# Patient Record
Sex: FEMALE | Race: WHITE | NOT HISPANIC OR LATINO | Employment: OTHER | URBAN - METROPOLITAN AREA
[De-identification: names, ages, dates, MRNs, and addresses within clinical notes are randomized per-mention and may not be internally consistent; named-entity substitution may affect disease eponyms.]

---

## 2017-04-14 DIAGNOSIS — I10 BENIGN ESSENTIAL HYPERTENSION: ICD-10-CM

## 2017-04-14 RX ORDER — LOSARTAN POTASSIUM 50 MG/1
50 TABLET ORAL DAILY
Qty: 30 TABLET | Refills: 0 | Status: SHIPPED | OUTPATIENT
Start: 2017-04-14 | End: 2018-02-27

## 2017-04-14 NOTE — TELEPHONE ENCOUNTER
losartan     Last Written Prescription Date: 10/25/2016  Last Fill Quantity: 90, # refills: 1  [place] [facility] [place] [facility] or [facility] prescribing provider: 11/16/2016       Potassium   Date Value Ref Range Status   04/28/2016 4.4 3.4 - 5.3 mmol/L Final     Creatinine   Date Value Ref Range Status   04/28/2016 0.85 0.52 - 1.04 mg/dL Final     BP Readings from Last 3 Encounters:   12/12/16 139/81   11/16/16 138/61   11/14/16 141/85

## 2017-05-23 DIAGNOSIS — J45.50 UNCOMPLICATED SEVERE PERSISTENT ASTHMA (H): ICD-10-CM

## 2017-05-23 RX ORDER — FLUTICASONE PROPIONATE AND SALMETEROL XINAFOATE 230; 21 UG/1; UG/1
2 AEROSOL, METERED RESPIRATORY (INHALATION) 2 TIMES DAILY
Qty: 1 INHALER | Refills: 0 | Status: SHIPPED | OUTPATIENT
Start: 2017-05-23 | End: 2017-05-26

## 2017-05-23 RX ORDER — TIOTROPIUM BROMIDE 18 UG/1
CAPSULE ORAL; RESPIRATORY (INHALATION)
Qty: 30 CAPSULE | Refills: 0 | Status: SHIPPED | OUTPATIENT
Start: 2017-05-23 | End: 2017-05-26

## 2017-05-23 NOTE — TELEPHONE ENCOUNTER
The patient was seen 5 months ago and recommended follow-up in 10 weeks.  She needs Advair 230/21 mcg 2 puffs twice a day.   250/50/21 is what Kasey picked up from 230/21 and I haven't noticed that. Unusual, but I had same before.  Provided one refill of each, but otherwise, she needs an appointment to continue refilling the medicine.  If not, she may get the refills from PCP.

## 2017-05-23 NOTE — TELEPHONE ENCOUNTER
"Last OV with Dr. Orozcoerea 12/12/16.  Received refill requests for Advair 230 and Spiriva.    Per 12/12/16 OV note: \"Start Advair 250/50/21  g 2 puffs twice a day.   -Start Spiriva once a day.\"    1) please clarify which Advair patient should be taking.  Advair 250 diskus or Advair 230?  2) per OV notes, patient should've followed up in 10 weeks.  No appointment currently scheduled.  Ok to refill x 1 and make appointment?    Please advise for refill requests.  Quiana Segovia RN    "

## 2017-05-24 NOTE — TELEPHONE ENCOUNTER
Called and spoke with pt regarding refill and appointment. Informed of refill x 1 until appointment.   Appointment made for follow up on 5/26/17. No further questions or concerns.   Praveena VALE RN  Specialty [facility]

## 2017-05-26 ENCOUNTER — OFFICE VISIT (OUTPATIENT)
Dept: ALLERGY | Facility: CLINIC | Age: 62
End: 2017-05-26
Payer: COMMERCIAL

## 2017-05-26 VITALS
DIASTOLIC BLOOD PRESSURE: 82 MMHG | BODY MASS INDEX: 35.26 KG/M2 | OXYGEN SATURATION: 94 % | HEIGHT: 65 IN | HEART RATE: 93 BPM | WEIGHT: 211.64 LBS | SYSTOLIC BLOOD PRESSURE: 131 MMHG

## 2017-05-26 DIAGNOSIS — J45.50 UNCOMPLICATED SEVERE PERSISTENT ASTHMA (H): Primary | ICD-10-CM

## 2017-05-26 DIAGNOSIS — J31.0 CHRONIC RHINITIS: ICD-10-CM

## 2017-05-26 LAB
FEF 25/75: NORMAL
FEV-1: NORMAL
FEV1/FVC: NORMAL
FVC: NORMAL

## 2017-05-26 PROCEDURE — 99213 OFFICE O/P EST LOW 20 MIN: CPT | Mod: 25 | Performed by: ALLERGY & IMMUNOLOGY

## 2017-05-26 PROCEDURE — A4627 SPACER BAG/RESERVOIR: HCPCS | Performed by: ALLERGY & IMMUNOLOGY

## 2017-05-26 PROCEDURE — 94010 BREATHING CAPACITY TEST: CPT | Performed by: ALLERGY & IMMUNOLOGY

## 2017-05-26 RX ORDER — FLUTICASONE PROPIONATE AND SALMETEROL XINAFOATE 230; 21 UG/1; UG/1
2 AEROSOL, METERED RESPIRATORY (INHALATION) 2 TIMES DAILY
Qty: 1 INHALER | Refills: 3 | Status: SHIPPED | OUTPATIENT
Start: 2017-05-26 | End: 2017-10-23

## 2017-05-26 RX ORDER — TIOTROPIUM BROMIDE 18 UG/1
CAPSULE ORAL; RESPIRATORY (INHALATION)
Qty: 30 CAPSULE | Refills: 3 | Status: SHIPPED | OUTPATIENT
Start: 2017-05-26 | End: 2017-10-23

## 2017-05-26 ASSESSMENT — ENCOUNTER SYMPTOMS
CHEST TIGHTNESS: 0
EYE DISCHARGE: 0
SHORTNESS OF BREATH: 0
MYALGIAS: 0
RHINORRHEA: 0
NAUSEA: 0
DIARRHEA: 0
WHEEZING: 0
SINUS PRESSURE: 0
EYE ITCHING: 0
FACIAL SWELLING: 0
COUGH: 1
ACTIVITY CHANGE: 0
EYE REDNESS: 0
ARTHRALGIAS: 0
HEADACHES: 0
ADENOPATHY: 0
FEVER: 0
CHILLS: 0
VOMITING: 0

## 2017-05-26 NOTE — MR AVS SNAPSHOT
"              After Visit Summary   5/26/2017    Luz Elena Cristina    MRN: [id number]           Patient Information     Date Of Birth          1955        Visit Information        Provider Department      5/26/2017 11:00 AM Galindo Montano MD [facility]        Today's Diagnoses     Uncomplicated severe persistent asthma    -  1    Chronic rhinitis           Follow-ups after your visit        Follow-up notes from your care team     Return in about 4 months (around 9/26/2017) for rhinitis follow up, asthma follow up.      Who to contact     If you have questions or need follow up information about today's clinic visit or your schedule please contact [facility] directly at [phone].  Normal or non-critical lab and imaging results will be communicated to you by MyChart, letter or phone within 4 business days after the clinic has received the results. If you do not hear from us within 7 days, please contact the clinic through [facility] or phone. If you have a critical or abnormal lab result, we will notify you by phone as soon as possible.  Submit refill requests through [employer] or call your pharmacy and they will forward the refill request to us. Please allow 3 business days for your refill to be completed.          Additional Information About Your Visit        MyChart Information     [employer] gives you secure access to your electronic health record. If you see a primary care provider, you can also send messages to your care team and make appointments. If you have questions, please call your primary care clinic.  If you do not have a primary care provider, please call [phone] and they will assist you.        Care EveryWhere ID     This is your Care EveryWhere ID. This could be used by other organizations to access your [facility] medical records  [id number]        Your Vitals Were     Pulse Height Pulse Oximetry BMI (Body Mass Index)          93 5' 5\" (1.651 m) 94% 35.22 kg/m2      "    Blood Pressure from Last 3 Encounters:   05/26/17 131/82   12/12/16 139/81   11/16/16 138/61    Weight from Last 3 Encounters:   05/26/17 211 lb 10.3 oz (96 kg)   12/12/16 238 lb 9.6 oz (108.2 kg)   11/14/16 234 lb (106.1 kg)              We Performed the Following     OPTICHAMBER     Spirometry, Breathing Capacity          Today's Medication Changes          These changes are accurate as of: 5/26/17 12:07 PM.  If you have any questions, ask your nurse or doctor.               Stop taking these medicines if you haven't already. Please contact your care team if you have questions.     mometasone-formoterol 200-5 MCG/ACT oral inhaler   Commonly known as:  DULERA   Stopped by:  Galindo Montano MD                Where to get your medicines      [facility] [address] [place] [address] AT [facility] OF [address] [place] [address]  [address] [facility] [address]     Phone:  [phone]     fluticasone-salmeterol 230-21 MCG/ACT inhaler    tiotropium 18 MCG capsule                Primary Care Provider Office Phone # Fax #    Merari KALI Robin [phone]       [place] [address] [facility] [address]        Thank you!     Thank you for choosing [facility]  for your care. Our goal is always to provide you with excellent care. Hearing back from our patients is one way we can continue to improve our services. Please take a few minutes to complete the written survey that you may receive in the mail after your visit with us. Thank you!             Your Updated Medication List - Protect others around you: Learn how to safely use, store and throw away your medicines at www.disposemymeds.org.          This list is accurate as of: 5/26/17 12:07 PM.  Always use your most recent med list.                   Brand Name Dispense Instructions for use    ACCU-CHEK MARILUZ PLUS test strip   Generic drug:  blood glucose monitoring           * albuterol 108 (90 BASE) MCG/ACT Inhaler    PROAIR HFA/PROVENTIL HFA/VENTOLIN HFA    3 Inhaler    Inhale 2 puffs into the lungs every 6 hours as needed for shortness of breath / dyspnea or wheezing       * albuterol (2.5 MG/3ML) 0.083% neb solution     50 vial    Take 1 vial (2.5 mg) by nebulization every 4 hours as needed for shortness of breath / dyspnea, wheezing or other (chest tightness, persistent cough)       azelastine 0.1 % spray    ASTELIN    30 mL    Spray 2 sprays into both nostrils 2 times daily as needed for rhinitis or allergies       blood glucose monitoring meter device kit          calcipotriene 0.005 % cream    DOVONOX    60 g    Apply topically 2 times daily       * clobetasol 0.05 % ointment    TEMOVATE         * clobetasol 0.05 % cream    TEMOVATE    30 g    Apply sparingly to affected area twice weekly as needed.  Do not apply to face.       fluticasone-salmeterol 230-21 MCG/ACT inhaler    ADVAIR-HFA    1 Inhaler    Inhale 2 puffs into the lungs 2 times daily       losartan 50 MG tablet    COZAAR    30 tablet    Take 1 tablet (50 mg) by mouth daily DUE FOR APPT/ FASTING LAB PRIOR TO FURTHER REFILL       nebulizer/tubing/mouthpiece Kit     1 kit    Use with albuterol neb solution       tiotropium 18 MCG capsule    SPIRIVA HANDIHALER    30 capsule    Inhale contents of one capsule daily.       * Notice:  This list has 4 medication(s) that are the same as other medications prescribed for you. Read the directions carefully, and ask your doctor or other care provider to review them with you.

## 2017-05-26 NOTE — PROGRESS NOTES
"SUBJECTIVE:                                                               Luz Elena Cristina presents today to our Allergy Clinic at [facility] for a follow up visit.  As you know, she is a 62 year old female with severe persistent asthma and chronic rhinitis.  Regarding her asthma, she has been 7/7 days compliant with Advair 230/21 mcg 2 puffs twice a day and Spiriva daily. The patient states that she \"feels one million ways better\". It has been going on for the last 6 months since she was started on LABA/ICS.  Sleeps at night well pretty much without waking up at night. Or maybe once a month. She is to use albuterol less than twice a week.  The patient denies current chest tightness, cough, wheezing or shortness of breath. She uses Advair and albuterol with chamber device; however, she asks for one more.    In regards to postnasal drip and intermittent nasal congestion, she ahs using azelastine on as needed basis which she finds very helpful. In the past, serum IgE for regional aeroallergen panel was negative.    Patient Active Problem List   Diagnosis     Severe persistent asthma     Lichen sclerosus     Benign essential hypertension     Elevated glucose     Family history of diabetes mellitus     Severe persistent asthma with (acute) exacerbation       Past Medical History:   Diagnosis Date     Uncomplicated asthma       Problem (# of Occurrences) Relation (Name,Age of Onset)    Alzheimer Disease (1) Brother: early onset    Aneurysm (1) Paternal Grandmother    Breast Cancer (2) Sister, Paternal Aunt    CEREBROVASCULAR DISEASE (1) Father: CVA    Chronic Obstructive Pulmonary Disease (1) Father    DIABETES (2) Mother, Son    Other Cancer (1) Mother: panreatic    Skin Cancer (1) Sister    Unknown/Adopted (1) Paternal Grandfather        Past Surgical History:   Procedure Laterality Date     TONSILLECTOMY       Social History     Social History     Marital status:      Spouse name: N/A     " Number of children: N/A     Years of education: N/A     Social History Main Topics     Smoking status: Former Smoker     Quit date: 4/28/1987     Smokeless tobacco: None     Alcohol use None     Drug use: None     Sexual activity: Not Asked     Other Topics Concern     None     Social History Narrative    ENVIRONMENTAL HISTORY: [place] The home is heated with a forced air but does not use.  Has floor heating. They do have central air conditioning. The patient's bedroom is furnished with carpeting in bedroom.  No pets inside the house. But is exposed to chicken. There is not history of cockroach or mice infestation. There are no smokers in the house.  The house does not have a damp basement.                Review of Systems   Constitutional: Negative for activity change, chills and fever.   HENT: Negative for congestion, ear discharge, facial swelling, nosebleeds, postnasal drip, rhinorrhea, sinus pressure and sneezing.    Eyes: Negative for discharge, redness and itching.   Respiratory: Positive for cough. Negative for chest tightness, shortness of breath and wheezing.    Cardiovascular: Negative for chest pain.   Gastrointestinal: Negative for diarrhea, nausea and vomiting.   Musculoskeletal: Negative for arthralgias and myalgias.   Skin: Negative for rash.   Allergic/Immunologic: Positive for environmental allergies.   Neurological: Negative for headaches.   Hematological: Negative for adenopathy.   Psychiatric/Behavioral: Negative for behavioral problems and self-injury.           Current Outpatient Prescriptions:      tiotropium (SPIRIVA HANDIHALER) 18 MCG capsule, Inhale contents of one capsule daily., Disp: 30 capsule, Rfl: 3     fluticasone-salmeterol (ADVAIR-HFA) 230-21 MCG/ACT inhaler, Inhale 2 puffs into the lungs 2 times daily, Disp: 1 Inhaler, Rfl: 3     losartan (COZAAR) 50 MG tablet, Take 1 tablet (50 mg) by mouth daily DUE FOR APPT/ FASTING LAB PRIOR TO  "FURTHER REFILL, Disp: 30 tablet, Rfl: 0     azelastine (ASTELIN) 0.1 % spray, Spray 2 sprays into both nostrils 2 times daily as needed for rhinitis or allergies, Disp: 30 mL, Rfl: 3     albuterol (2.5 MG/3ML) 0.083% nebulizer solution, Take 1 vial (2.5 mg) by nebulization every 4 hours as needed for shortness of breath / dyspnea, wheezing or other (chest tightness, persistent cough), Disp: 50 vial, Rfl: 1     Respiratory Therapy Supplies (NEBULIZER/TUBING/MOUTHPIECE) KIT, Use with albuterol neb solution, Disp: 1 kit, Rfl: 0     clobetasol (TEMOVATE) 0.05 % cream, Apply sparingly to affected area twice weekly as needed.  Do not apply to face., Disp: 30 g, Rfl: 0     calcipotriene (DOVONOX) 0.005 % cream, Apply topically 2 times daily, Disp: 60 g, Rfl: 3     blood glucose monitoring (ACCU-CHEK MARILUZ PLUS) meter device kit, , Disp: , Rfl: 0     clobetasol (TEMOVATE) 0.05 % ointment, , Disp: , Rfl: 0     ACCU-CHEK MARILUZ PLUS test strip, , Disp: , Rfl: 0     albuterol (PROAIR HFA, PROVENTIL HFA, VENTOLIN HFA) 108 (90 BASE) MCG/ACT inhaler, Inhale 2 puffs into the lungs every 6 hours as needed for shortness of breath / dyspnea or wheezing, Disp: 3 Inhaler, Rfl: 1     [DISCONTINUED] fluticasone-salmeterol (ADVAIR-HFA) 230-21 MCG/ACT inhaler, Inhale 2 puffs into the lungs 2 times daily, Disp: 1 Inhaler, Rfl: 0     [DISCONTINUED] tiotropium (SPIRIVA HANDIHALER) 18 MCG capsule, Inhale contents of one capsule daily., Disp: 30 capsule, Rfl: 0  Immunization History   Administered Date(s) Administered     Influenza (IIV3) 11/08/2012     Influenza Vaccine IM 3yrs+ 4 Valent IIV4 12/12/2016     TDAP Vaccine (Adacel) 11/08/2012     Allergies   Allergen Reactions     Sulfa Drugs Rash     OBJECTIVE:                                                                 /82 (BP Location: Right arm, Patient Position: Chair, Cuff Size: Adult Regular)  Pulse 93  Ht 5' 5\" (1.651 m)  Wt 211 lb 10.3 oz (96 kg)  SpO2 94%  BMI 35.22 " kg/m2        Physical Exam   Constitutional: No distress.   HENT:   Head: Normocephalic and atraumatic.   Right Ear: Tympanic membrane, external ear and ear canal normal.   Left Ear: Tympanic membrane, external ear and ear canal normal.   Nose: No mucosal edema or rhinorrhea.   Mouth/Throat: Oropharynx is clear and moist and mucous membranes are normal.   Eyes: Conjunctivae are normal. Right eye exhibits no discharge. Left eye exhibits no discharge.   Neck: Normal range of motion.   Cardiovascular: Normal rate, regular rhythm and normal heart sounds.    No murmur heard.  Pulmonary/Chest: Effort normal and breath sounds normal. No respiratory distress. She has no wheezes. She has no rales.   Musculoskeletal: Normal range of motion.   Neurological: She is alert.   Skin: Skin is warm. No rash noted. She is not diaphoretic.   Psychiatric: Affect normal.   Nursing note and vitals reviewed.      WORKUP:     SPIROMETRY       FVC 3.00L (95% of predicted).     FEV1 1.60L (64% of predicted).     FEV1/FVC 53%     FEF 25%-75%  0.81L/s (37% of predicted).    The office spirometry performed today suggests moderate obstruction. Stable comparing with previous study.    Asthma Control Test (ACT) total score: 22      ASSESSMENT/PLAN:      Problem List Items Addressed This Visit        Respiratory    1. Severe persistent asthma  -  Primary  Currently well controlled with Advair 230/21 mcg 2 puffs twice a day and Spiriva daily.  -Continue as is.  Asthma action plan reviewed again and provided. No changes.    Relevant Medications    tiotropium (SPIRIVA HANDIHALER) 18 MCG capsule    fluticasone-salmeterol (ADVAIR-HFA) 230-21 MCG/ACT inhaler    Other Relevant Orders    Spirometry, Breathing Capacity    OPTICHAMBER (Completed)      Other Visit Diagnoses     2. Chronic rhinitis    Currently well controlled with azelastine 2 sprays in each nostril twice a day as needed.  -Continue as is.       Follow up in 4 months or sooner if  needed.    Thank you for allowing us to participate in the care of this patient. Please feel free to contact us if there are any questions or concerns about the patient.    Disclaimer: This note consists of symbols derived from keyboarding, dictation and/or voice recognition software. As a result, there may be errors in the script that have gone undetected. Please consider this when interpreting information found in this chart.    Galindo Montano MD, FACI  Allergy, Asthma and Immunology  [place], [place] and [place]

## 2017-05-26 NOTE — Clinical Note
Dear Dr. Robin  The patient was seen in Allergy Clinic for a follow up visit  Please see the office visit note for more details. Thank you!

## 2017-05-26 NOTE — NURSING NOTE
"Chief Complaint   Patient presents with     Asthma Recheck     Asthma follow up. Needs refills.        Initial /82 (BP Location: Right arm, Patient Position: Chair, Cuff Size: Adult Regular)  Pulse 93  Ht 5' 5\" (1.651 m)  Wt 211 lb 10.3 oz (96 kg)  SpO2 94%  BMI 35.22 kg/m2 Estimated body mass index is 35.22 kg/(m^2) as calculated from the following:    Height as of this encounter: 5' 5\" (1.651 m).    Weight as of this encounter: 211 lb 10.3 oz (96 kg).  Medication Reconciliation: complete    "

## 2017-05-26 NOTE — LETTER
My Asthma Action Plan  Name: Luz Elena Cristina   YOB: 1955  Date: 5/26/2017  My doctor: Merari Robin   My clinic: [facility]      My Control Medicine: Fluticasone+salmeterol (Advair) -  /21 mcg        Dose: 2 puffs twice a day  Spiriva once a day  My Rescue Medicine: Albuterol (Proair/Ventolin/Proventil) HFA        Dose: 2-4 puffs every 4 hrs as needed   My Asthma Severity: severe persistent  Avoid your asthma triggers: smoke, upper respiratory infections, humidity, exercise or sports and cold air        GREEN ZONE   Good Control    I feel good    No cough or wheeze    Can work, sleep and play without asthma symptoms       Take your asthma control medicine every day.     1. If exercise triggers your asthma, take your rescue medication    15 minutes before exercise or sports, and    During exercise if you have asthma symptoms  2. Spacer to use with inhaler: If you have a spacer, make sure to use it with your inhaler             YELLOW ZONE Getting Worse  I have ANY of these:    I do not feel good    Cough or wheeze    Chest feels tight    Wake up at night   1. Keep taking your Green Zone medications  2. Start taking your rescue medicine:    every 20 minutes for up to 1 hour. Then every 4 hours for 24-48 hours.  3. If you stay in the Yellow Zone for more than 12-24 hours, contact your doctor.             RED ZONE Medical Alert - Get Help  I have ANY of these:    I feel awful    Medicine is not helping    Breathing getting harder    Trouble walking or talking    Nose opens wide to breathe       1. Take your rescue medicine NOW  2. If your provider has prescribed an oral steroid medicine, start taking it NOW  3. Call your doctor NOW  4. If you are still in the Red Zone after 20 minutes and you have not reached your doctor:    Take your rescue medicine again and    Call 911 or go to the emergency room right away    See your regular doctor within 2 weeks of an Emergency Room or Urgent  Care visit for follow-up treatment.        The above medication may be given at school or day care?: N/A (Adult Patient)  Child can carry and use inhaler(s) at school with approval of school nurse?: N/A (Adult Patient)    Electronically signed by: Galindo Montano, 5/26/2017      Annual Reminders:  Meet with Asthma Educator,  Flu Shot in the Fall, consider Pneumonia Vaccination for patients with asthma (aged 19 and older).    Pharmacy: [email or web address] [address] [place] [address] AVE AT [address]                    Asthma Triggers  How To Control Things That Make Your Asthma Worse    Triggers are things that make your asthma worse.  Look at the list below to help you find your triggers and what you can do about them.  You can help prevent asthma flare-ups by staying away from your triggers.      Trigger                                                          What you can do   Cigarette Smoke  Tobacco smoke can make asthma worse. Do not allow smoking in your home, car or around you.  Be sure no one smokes at a child s day care or school.  If you smoke, ask your health care provider for ways to help you quit.  Ask family members to quit too.  Ask your health care provider for a referral to Quit Plan to help you quit smoking, or call 3-540-178-PLAN.     Colds, Flu, Bronchitis  These are common triggers of asthma. Wash your hands often.  Don t touch your eyes, nose or mouth.  Get a flu shot every year.     Dust Mites  These are tiny bugs that live in cloth or carpet. They are too small to see. Wash sheets and blankets in hot water every week.   Encase pillows and mattress in dust mite proof covers.  Avoid having carpet if you can. If you have carpet, vacuum weekly.   Use a dust mask and HEPA vacuum.   Pollen and Outdoor Mold  Some people are allergic to trees, grass, or weed pollen, or molds. Try to keep your windows closed.  Limit time out doors when pollen count is high.   Ask you health  care provider about taking medicine during allergy season.     Animal Dander  Some people are allergic to skin flakes, urine or saliva from pets with fur or feathers. Keep pets with fur or feathers out of your home.    If you can t keep the pet outdoors, then keep the pet out of your bedroom.  Keep the bedroom door closed.  Keep pets off cloth furniture and away from stuffed toys.     Mice, Rats, and Cockroaches  Some people are allergic to the waste from these pests.   Cover food and garbage.  Clean up spills and food crumbs.  Store grease in the refrigerator.   Keep food out of the bedroom.   Indoor Mold  This can be a trigger if your home has high moisture. Fix leaking faucets, pipes, or other sources of water.   Clean moldy surfaces.  Dehumidify basement if it is damp and smelly.   Smoke, Strong Odors, and Sprays  These can reduce air quality. Stay away from strong odors and sprays, such as perfume, powder, hair spray, paints, smoke incense, paint, cleaning products, candles and new carpet.   Exercise or Sports  Some people with asthma have this trigger. Be active!  Ask your doctor about taking medicine before sports or exercise to prevent symptoms.    Warm up for 5-10 minutes before and after sports or exercise.     Other Triggers of Asthma  Cold air:  Cover your nose and mouth with a scarf.  Sometimes laughing or crying can be a trigger.  Some medicines and food can trigger asthma.

## 2017-05-27 ASSESSMENT — ASTHMA QUESTIONNAIRES: ACT_TOTALSCORE: 22

## 2017-07-13 ENCOUNTER — TELEPHONE (OUTPATIENT)
Dept: ALLERGY | Facility: CLINIC | Age: 62
End: 2017-07-13

## 2017-07-13 NOTE — TELEPHONE ENCOUNTER
Reason for Call:  Other call back    Detailed comments: pt calling stating she had a cold and some phlegm . This morning she coughed a couple times and coughed up some blood. Not sure what it could be from and if it is something she should be concerned about or be seen for?     Phone Number Patient can be reached at: Cell number on file:    Telephone Information:   Mobile [phone]       Best Time: any     Can we leave a detailed message on this number? YES    Call taken on 7/13/2017 at 11:19 AM by Martha Sharif

## 2017-07-13 NOTE — TELEPHONE ENCOUNTER
Spoke with patient and she states she's been battling a cold with a lot of heavy mucus since the end of June.  Denies fevers.  Denies SOB, wheezing, or chest tightness.  Feels that since she saw Dr. Montano, her asthma is well controlled.  She has been using mucinex for the phlegm.  States it was greenish in color but then turned white and now clear.  This morning she coughed, not even that hard, and there was bright red blood about the size of a 50 cent piece mixed in with clear mucus.  Patient states this was the only incident.  Has not been coughing since this morning.  Overall, feels fine.  Feels like she is at the tail end of this cold.    Patient had not used any albuterol during illness.  Encouraged her to use albuterol neb treatments prn.  This can help to open airways and help to clear the phlegm. Patient will try this and if she coughs again, will observe for any blood again.  Told patient I would still run this concern past Dr. Montano and would only call back if he has anything to add.  Quiana Segovia RN

## 2017-07-14 NOTE — TELEPHONE ENCOUNTER
If more sounds like she had a bronchitis episode. Usually, asthma is not associated with hemoptysis. If she develops again, I recommend to discuss that up with PCP, and consider Pulmonology evaluation.

## 2017-08-25 ENCOUNTER — OFFICE VISIT (OUTPATIENT)
Dept: FAMILY MEDICINE | Facility: CLINIC | Age: 62
End: 2017-08-25
Payer: COMMERCIAL

## 2017-08-25 VITALS
DIASTOLIC BLOOD PRESSURE: 76 MMHG | TEMPERATURE: 98.1 F | OXYGEN SATURATION: 95 % | SYSTOLIC BLOOD PRESSURE: 130 MMHG | HEIGHT: 65 IN | WEIGHT: 213.4 LBS | BODY MASS INDEX: 35.56 KG/M2 | HEART RATE: 81 BPM

## 2017-08-25 DIAGNOSIS — H10.32 ACUTE BACTERIAL CONJUNCTIVITIS OF LEFT EYE: Primary | ICD-10-CM

## 2017-08-25 PROCEDURE — 99213 OFFICE O/P EST LOW 20 MIN: CPT | Performed by: PHYSICIAN ASSISTANT

## 2017-08-25 RX ORDER — ERYTHROMYCIN 5 MG/G
0.25 OINTMENT OPHTHALMIC 3 TIMES DAILY
Qty: 1 G | Refills: 0 | Status: SHIPPED | OUTPATIENT
Start: 2017-08-25 | End: 2017-09-01

## 2017-08-25 NOTE — MR AVS SNAPSHOT
"              After Visit Summary   8/25/2017    Luz Elena Cristina    MRN: [id number]           Patient Information     Date Of Birth          1955        Visit Information        Provider Department      8/25/2017 4:40 PM Balta Barbour PA-C [facility]        Today's Diagnoses     Acute bacterial conjunctivitis of left eye    -  1       Follow-ups after your visit        Who to contact     If you have questions or need follow up information about today's clinic visit or your schedule please contact [facility] directly at [phone].  Normal or non-critical lab and imaging results will be communicated to you by Feedbookshart, letter or phone within 4 business days after the clinic has received the results. If you do not hear from us within 7 days, please contact the clinic through RadarFindt or phone. If you have a critical or abnormal lab result, we will notify you by phone as soon as possible.  Submit refill requests through MaxVision or call your pharmacy and they will forward the refill request to us. Please allow 3 business days for your refill to be completed.          Additional Information About Your Visit        MyChart Information     MaxVision gives you secure access to your electronic health record. If you see a primary care provider, you can also send messages to your care team and make appointments. If you have questions, please call your primary care clinic.  If you do not have a primary care provider, please call [phone] and they will assist you.        Care EveryWhere ID     This is your Care EveryWhere ID. This could be used by other organizations to access your [place] medical records  [id number]        Your Vitals Were     Pulse Temperature Height Pulse Oximetry BMI (Body Mass Index)       81 98.1  F (36.7  C) (Tympanic) 5' 5\" (1.651 m) 95% 35.51 kg/m2        Blood Pressure from Last 3 Encounters:   08/25/17 130/76   05/26/17 131/82   12/12/16 139/81    " Weight from Last 3 Encounters:   08/25/17 213 lb 6.4 oz (96.8 kg)   05/26/17 211 lb 10.3 oz (96 kg)   12/12/16 238 lb 9.6 oz (108.2 kg)              Today, you had the following     No orders found for display         Today's Medication Changes          These changes are accurate as of: 8/25/17 11:59 PM.  If you have any questions, ask your nurse or doctor.               Start taking these medicines.        Dose/Directions    erythromycin ophthalmic ointment   Commonly known as:  ROMYCIN   Used for:  Acute bacterial conjunctivitis of left eye   Started by:  Balta Barbour PA-C        Dose:  0.25 inch   Place 0.25 inches Into the left eye 3 times daily for 7 days   Quantity:  1 g   Refills:  0            Where to get your medicines      These medications were sent to [facility] [id number] [place] [address] AT [facility] OF [address] [place] [address]  [address] [facility] [address]     Phone:  [phone]     erythromycin ophthalmic ointment                Primary Care Provider Office Phone # Fax #    Merari KALI Robin [phone]       [address] [place] [address]        Equal Access to Services     KEKE MONTEJO AH: Hadii titus waddell hadasho Soomaali, waaxda luqadaha, qaybta kaalmada adeegyada, lulu torres. So [place] [phone].    ATENCIÓN: Si habla español, tiene a arriaza disposición servicios gratuitos de asistencia lingüística. Llame al [phone].    We comply with applicable federal civil rights laws and [place] laws. We do not discriminate on the basis of race, color, national origin, age, disability sex, sexual orientation or gender identity.            Thank you!     Thank you for choosing [facility]  for your care. Our goal is always to provide you with excellent care. Hearing back from our patients is one way we can continue to improve our services. Please take a few minutes to complete the written survey that  you may receive in the mail after your visit with us. Thank you!             Your Updated Medication List - Protect others around you: Learn how to safely use, store and throw away your medicines at www.disposemymeds.org.          This list is accurate as of: 8/25/17 11:59 PM.  Always use your most recent med list.                   Brand Name Dispense Instructions for use Diagnosis    ACCU-CHEK MARILUZ PLUS test strip   Generic drug:  blood glucose monitoring           * albuterol 108 (90 BASE) MCG/ACT Inhaler    PROAIR HFA/PROVENTIL HFA/VENTOLIN HFA    3 Inhaler    Inhale 2 puffs into the lungs every 6 hours as needed for shortness of breath / dyspnea or wheezing    Intermittent asthma, uncomplicated       * albuterol (2.5 MG/3ML) 0.083% neb solution     50 vial    Take 1 vial (2.5 mg) by nebulization every 4 hours as needed for shortness of breath / dyspnea, wheezing or other (chest tightness, persistent cough)    Severe persistent asthma with (acute) exacerbation       azelastine 0.1 % spray    ASTELIN    30 mL    Spray 2 sprays into both nostrils 2 times daily as needed for rhinitis or allergies    Postnasal drip       blood glucose monitoring meter device kit           calcipotriene 0.005 % cream    DOVONOX    60 g    Apply topically 2 times daily    Lichen sclerosus       * clobetasol 0.05 % ointment    TEMOVATE          * clobetasol 0.05 % cream    TEMOVATE    30 g    Apply sparingly to affected area twice weekly as needed.  Do not apply to face.    Lichen sclerosus       erythromycin ophthalmic ointment    ROMYCIN    1 g    Place 0.25 inches Into the left eye 3 times daily for 7 days    Acute bacterial conjunctivitis of left eye       fluticasone-salmeterol 230-21 MCG/ACT inhaler    ADVAIR-HFA    1 Inhaler    Inhale 2 puffs into the lungs 2 times daily    Uncomplicated severe persistent asthma       losartan 50 MG tablet    COZAAR    30 tablet    Take 1 tablet (50 mg) by mouth daily DUE FOR APPT/ FASTING LAB  PRIOR TO FURTHER REFILL    Benign essential hypertension       nebulizer/tubing/mouthpiece Kit     1 kit    Use with albuterol neb solution    Severe persistent asthma with (acute) exacerbation       tiotropium 18 MCG capsule    SPIRIVA HANDIHALER    30 capsule    Inhale contents of one capsule daily.    Uncomplicated severe persistent asthma       * Notice:  This list has 4 medication(s) that are the same as other medications prescribed for you. Read the directions carefully, and ask your doctor or other care provider to review them with you.

## 2017-08-25 NOTE — PROGRESS NOTES
SUBJECTIVE:   Luz Elena Cristina is a 62 year old female who presents to clinic today for the following health issues:      Eye(s) Problem      Duration: 24 hours     Description:  Location: left  Pain: YES  Redness: YES  Discharge: YES- was this morning and was crusted over     Accompanying signs and symptoms: looks water filled, jelly bean in size.     History (Trauma, foreign body exposure,): None    Precipitating or alleviating factors (contact use): None    Therapies tried and outcome: hot and cold compresses     SUBJECTIVE:  Chief Complaint:   Chief Complaint   Patient presents with     Eye Problem     History of Present Illness:  Luz Elena Cristina is a 62 year old female who presents complaining of moderate left eye discharge, mattering, redness for 1 day(s).   Onset/timing: sudden.    Associated Signs and Symptoms: none  Treatment measures tried include: warm packs and cool compresses  Contact wearer : No    Past Medical History:   Diagnosis Date     Uncomplicated asthma      Current Outpatient Prescriptions   Medication Sig Dispense Refill     tiotropium (SPIRIVA HANDIHALER) 18 MCG capsule Inhale contents of one capsule daily. 30 capsule 3     fluticasone-salmeterol (ADVAIR-HFA) 230-21 MCG/ACT inhaler Inhale 2 puffs into the lungs 2 times daily 1 Inhaler 3     losartan (COZAAR) 50 MG tablet Take 1 tablet (50 mg) by mouth daily DUE FOR APPT/ FASTING LAB PRIOR TO FURTHER REFILL 30 tablet 0     albuterol (2.5 MG/3ML) 0.083% nebulizer solution Take 1 vial (2.5 mg) by nebulization every 4 hours as needed for shortness of breath / dyspnea, wheezing or other (chest tightness, persistent cough) 50 vial 1     Respiratory Therapy Supplies (NEBULIZER/TUBING/MOUTHPIECE) KIT Use with albuterol neb solution 1 kit 0     clobetasol (TEMOVATE) 0.05 % cream Apply sparingly to affected area twice weekly as needed.  Do not apply to face. 30 g 0     calcipotriene (DOVONOX) 0.005 % cream Apply topically 2 times daily 60 g 3     blood  "glucose monitoring (ACCU-CHEK MARILUZ PLUS) meter device kit   0     clobetasol (TEMOVATE) 0.05 % ointment   0     ACCU-CHEK MARILUZ PLUS test strip   0     albuterol (PROAIR HFA, PROVENTIL HFA, VENTOLIN HFA) 108 (90 BASE) MCG/ACT inhaler Inhale 2 puffs into the lungs every 6 hours as needed for shortness of breath / dyspnea or wheezing 3 Inhaler 1     azelastine (ASTELIN) 0.1 % spray Spray 2 sprays into both nostrils 2 times daily as needed for rhinitis or allergies (Patient not taking: Reported on 8/25/2017) 30 mL 3        ROS:  Review of systems negative except as stated above.    OBJECTIVE:  /76  Pulse 81  Temp 98.1  F (36.7  C) (Tympanic)  Ht 5' 5\" (1.651 m)  Wt 213 lb 6.4 oz (96.8 kg)  SpO2 95%  BMI 35.51 kg/m2  General: no acute distress  Eye exam: left eye abnormal findings: conjunctivitis with erythema.  Edema and swelling under the left eye  NO vision changes.   Sclerae quiet  Anterior chamber quiet  Ears: normal canals, TMs bilaterally, normal TM mobility  Nose: NORMAL - no drainage, turbinates normal in size.  Neck: supple, non-tender, free range of motion, no adenopathy  Heart: NORMAL - regular rate and rhythm without murmur.    ASSESSMENT:  Bacterial Conjunctivitis    PLAN:  Warm packs for comfort. Hygiene measures discussed.   Antibiotic ointment per order.     1. Acute bacterial conjunctivitis of left eye    - erythromycin (ROMYCIN) ophthalmic ointment; Place 0.25 inches Into the left eye 3 times daily for 7 days  Dispense: 1 g; Refill: 0          "

## 2017-08-25 NOTE — NURSING NOTE
"Chief Complaint   Patient presents with     Eye Problem       Initial /76  Pulse 81  Temp 98.1  F (36.7  C) (Tympanic)  Ht 5' 5\" (1.651 m)  Wt 213 lb 6.4 oz (96.8 kg)  SpO2 95%  BMI 35.51 kg/m2 Estimated body mass index is 35.51 kg/(m^2) as calculated from the following:    Height as of this encounter: 5' 5\" (1.651 m).    Weight as of this encounter: 213 lb 6.4 oz (96.8 kg).  Medication Reconciliation: complete    Health Maintenance that is potentially due pending provider review:  Hepatitis c screening and Asthma action plan     Patient would like to do hepatitis c screening at a different time. Rocky Lomeli cma    Is there anyone who you would like to be able to receive your results? No  If yes have patient fill out SOHAM      "

## 2017-08-28 ENCOUNTER — TELEPHONE (OUTPATIENT)
Dept: FAMILY MEDICINE | Facility: CLINIC | Age: 62
End: 2017-08-28

## 2017-08-28 NOTE — TELEPHONE ENCOUNTER
Reason for call:  Patient reporting a symptom    Symptom or request: Pt was seen on Friday by [facility] provider for and eye problem. She continues to have a lot of pain in that eye. She says on Friday it was like a water bag and now it's hard.     Phone Number patient can be reached at:  Cell number on file:    Telephone Information:   Mobile [phone]       Best Time:  anytime    Can we leave a detailed message on this number:  YES    Call taken on 8/28/2017 at 3:00 PM by Airam Kearns

## 2017-08-28 NOTE — TELEPHONE ENCOUNTER
She says she has a painful lump with a white head on it inside her lower left eyelid. The lump is pushing her eyelid out so she can see it when she looks down. She has been using hot packs and the antibiotic ointment. Advised she probably should be seen given the sty ot lump is pushing her eylid out. She is going to call [facility] to see when she can get in.

## 2017-10-23 DIAGNOSIS — J45.50 UNCOMPLICATED SEVERE PERSISTENT ASTHMA (H): ICD-10-CM

## 2017-10-23 RX ORDER — FLUTICASONE PROPIONATE AND SALMETEROL XINAFOATE 230; 21 UG/1; UG/1
2 AEROSOL, METERED RESPIRATORY (INHALATION) 2 TIMES DAILY
Qty: 1 INHALER | Refills: 3 | Status: SHIPPED | OUTPATIENT
Start: 2017-10-23 | End: 2018-06-25

## 2017-10-23 RX ORDER — TIOTROPIUM BROMIDE 18 UG/1
CAPSULE ORAL; RESPIRATORY (INHALATION)
Qty: 30 CAPSULE | Refills: 3 | Status: SHIPPED | OUTPATIENT
Start: 2017-10-23 | End: 2018-06-25

## 2017-10-23 NOTE — TELEPHONE ENCOUNTER
Refill request for Advair and Spiriva per below.   Per last office visit note 5/26/17: follow up in 4 months.  No future appointment scheduled.     Please advise. Thank you.  Praveena WAN

## 2017-10-23 NOTE — TELEPHONE ENCOUNTER
Advair:  LAST REFILL: 09/19/2017  LOV: 05/26/2017    Spiriva:  LAST REFILL: 09/19/2017  LOV: 05/26/2017

## 2017-10-24 NOTE — TELEPHONE ENCOUNTER
Called and spoke with pt. Pt will call in January to schedule as her current insurance dropped [place]. She will have new insurance as of January 1st (that should cover [place]). Pt aware that her refills should get her through the next 4 months. Agreeable.   No further questions or concerns.  Praveena VALE RN

## 2017-11-18 DIAGNOSIS — I10 BENIGN ESSENTIAL HYPERTENSION: ICD-10-CM

## 2017-11-21 RX ORDER — LOSARTAN POTASSIUM 50 MG/1
TABLET ORAL
Qty: 90 TABLET | Refills: 0 | Status: SHIPPED | OUTPATIENT
Start: 2017-11-21 | End: 2018-02-27

## 2018-02-27 ENCOUNTER — OFFICE VISIT (OUTPATIENT)
Dept: FAMILY MEDICINE | Facility: CLINIC | Age: 63
End: 2018-02-27
Payer: MEDICARE

## 2018-02-27 ENCOUNTER — RADIANT APPOINTMENT (OUTPATIENT)
Dept: GENERAL RADIOLOGY | Facility: CLINIC | Age: 63
End: 2018-02-27
Attending: PHYSICIAN ASSISTANT
Payer: MEDICARE

## 2018-02-27 VITALS
SYSTOLIC BLOOD PRESSURE: 138 MMHG | HEIGHT: 65 IN | RESPIRATION RATE: 16 BRPM | HEART RATE: 76 BPM | TEMPERATURE: 98.1 F | BODY MASS INDEX: 38.15 KG/M2 | WEIGHT: 229 LBS | DIASTOLIC BLOOD PRESSURE: 86 MMHG

## 2018-02-27 DIAGNOSIS — Z11.59 NEED FOR HEPATITIS C SCREENING TEST: ICD-10-CM

## 2018-02-27 DIAGNOSIS — Z83.3 FAMILY HISTORY OF DIABETES MELLITUS: ICD-10-CM

## 2018-02-27 DIAGNOSIS — Z83.49 FAMILY HISTORY OF THYROID DISORDER: ICD-10-CM

## 2018-02-27 DIAGNOSIS — Z13.220 LIPID SCREENING: ICD-10-CM

## 2018-02-27 DIAGNOSIS — S69.92XA INJURY OF LEFT WRIST, INITIAL ENCOUNTER: ICD-10-CM

## 2018-02-27 DIAGNOSIS — I10 BENIGN ESSENTIAL HYPERTENSION: Primary | ICD-10-CM

## 2018-02-27 DIAGNOSIS — E66.01 SEVERE OBESITY (BMI 35.0-39.9) WITH COMORBIDITY (H): ICD-10-CM

## 2018-02-27 DIAGNOSIS — S69.92XS INJURY OF LEFT WRIST, SEQUELA: ICD-10-CM

## 2018-02-27 DIAGNOSIS — R73.09 ELEVATED GLUCOSE: ICD-10-CM

## 2018-02-27 DIAGNOSIS — L90.0 LICHEN SCLEROSUS: ICD-10-CM

## 2018-02-27 DIAGNOSIS — J45.50 SEVERE PERSISTENT ASTHMA WITHOUT COMPLICATION (H): ICD-10-CM

## 2018-02-27 DIAGNOSIS — Z23 NEED FOR PROPHYLACTIC VACCINATION AND INOCULATION AGAINST INFLUENZA: ICD-10-CM

## 2018-02-27 LAB — HBA1C MFR BLD: 6.3 % (ref 4.3–6)

## 2018-02-27 PROCEDURE — 73110 X-RAY EXAM OF WRIST: CPT | Mod: RT

## 2018-02-27 PROCEDURE — 86803 HEPATITIS C AB TEST: CPT | Performed by: PHYSICIAN ASSISTANT

## 2018-02-27 PROCEDURE — 99214 OFFICE O/P EST MOD 30 MIN: CPT | Performed by: PHYSICIAN ASSISTANT

## 2018-02-27 PROCEDURE — 36415 COLL VENOUS BLD VENIPUNCTURE: CPT | Performed by: PHYSICIAN ASSISTANT

## 2018-02-27 PROCEDURE — 87522 HEPATITIS C REVRS TRNSCRPJ: CPT | Performed by: PHYSICIAN ASSISTANT

## 2018-02-27 PROCEDURE — G0008 ADMIN INFLUENZA VIRUS VAC: HCPCS | Performed by: PHYSICIAN ASSISTANT

## 2018-02-27 PROCEDURE — 80048 BASIC METABOLIC PNL TOTAL CA: CPT | Performed by: PHYSICIAN ASSISTANT

## 2018-02-27 PROCEDURE — 90670 PCV13 VACCINE IM: CPT | Performed by: PHYSICIAN ASSISTANT

## 2018-02-27 PROCEDURE — 83036 HEMOGLOBIN GLYCOSYLATED A1C: CPT | Performed by: PHYSICIAN ASSISTANT

## 2018-02-27 PROCEDURE — G0009 ADMIN PNEUMOCOCCAL VACCINE: HCPCS | Performed by: PHYSICIAN ASSISTANT

## 2018-02-27 PROCEDURE — 80061 LIPID PANEL: CPT | Performed by: PHYSICIAN ASSISTANT

## 2018-02-27 PROCEDURE — 84443 ASSAY THYROID STIM HORMONE: CPT | Performed by: PHYSICIAN ASSISTANT

## 2018-02-27 PROCEDURE — 90662 IIV NO PRSV INCREASED AG IM: CPT | Performed by: PHYSICIAN ASSISTANT

## 2018-02-27 RX ORDER — LOSARTAN POTASSIUM 50 MG/1
TABLET ORAL
Qty: 90 TABLET | Refills: 3 | Status: SHIPPED | OUTPATIENT
Start: 2018-02-27 | End: 2018-06-18 | Stop reason: SINTOL

## 2018-02-27 ASSESSMENT — PAIN SCALES - GENERAL: PAINLEVEL: NO PAIN (0)

## 2018-02-27 NOTE — MR AVS SNAPSHOT
After Visit Summary   2/27/2018    Luz Elena Cristina    MRN: [id number]           Patient Information     Date Of Birth          1955        Visit Information        Provider Department      2/27/2018 11:00 AM Kiara Zazueta PA-C [facility]        Today's Diagnoses     Benign essential hypertension    -  1    Lichen sclerosus        Elevated glucose        Family history of diabetes mellitus        Severe obesity (BMI 35.0-39.9) with comorbidity (H)        Lipid screening        Need for hepatitis C screening test        Injury of left wrist, sequela        Family history of thyroid disorder          Care Instructions    Labs today   If diabetic as we suspect, make appt for diabetes  Eventually also want wellness visit    Check BPs occasionally - ideally under 130/80    Xray for wrist    Flu and pneumonia shot today    Discuss skin/clothing recommendations with derm so that you can exercise    [place]          Follow-ups after your visit        Future tests that were ordered for you today     Open Future Orders        Priority Expected Expires Ordered    XR Wrist Right G/E 3 Views Routine 2/27/2018 2/27/2019 2/27/2018            Who to contact     If you have questions or need follow up information about today's clinic visit or your schedule please contact [facility] directly at [phone].  Normal or non-critical lab and imaging results will be communicated to you by MyChart, letter or phone within 4 business days after the clinic has received the results. If you do not hear from us within 7 days, please contact the clinic through MyChart or phone. If you have a critical or abnormal lab result, we will notify you by phone as soon as possible.  Submit refill requests through Langhar or call your pharmacy and they will forward the refill request to us. Please allow 3 business days for your refill to be completed.  "         Additional Information About Your Visit        MyChart Information     BlisMedia gives you secure access to your electronic health record. If you see a primary care provider, you can also send messages to your care team and make appointments. If you have questions, please call your primary care clinic.  If you do not have a primary care provider, please call [phone] and they will assist you.        Care EveryWhere ID     This is your Care EveryWhere ID. This could be used by other organizations to access your [place] medical records  [id number]        Your Vitals Were     Pulse Temperature Respirations Height BMI (Body Mass Index)       76 98.1  F (36.7  C) (Tympanic) 16 5' 5\" (1.651 m) 38.11 kg/m2        Blood Pressure from Last 3 Encounters:   02/27/18 138/86   08/25/17 130/76   05/26/17 131/82    Weight from Last 3 Encounters:   02/27/18 229 lb (103.9 kg)   08/25/17 213 lb 6.4 oz (96.8 kg)   05/26/17 211 lb 10.3 oz (96 kg)              We Performed the Following     Basic metabolic panel     Hemoglobin A1c     Hepatitis C Screen Reflex to HCV RNA Quant and Genotype     Lipid panel reflex to direct LDL Non-fasting     TSH with free T4 reflex          Today's Medication Changes          These changes are accurate as of 2/27/18 11:42 AM.  If you have any questions, ask your nurse or doctor.               These medicines have changed or have updated prescriptions.        Dose/Directions    losartan 50 MG tablet   Commonly known as:  COZAAR   This may have changed:  See the new instructions.   Used for:  Benign essential hypertension   Changed by:  Kiara Zazueta PA-C        TAKE 1 TABLET(50 MG) BY MOUTH DAILY   Quantity:  90 tablet   Refills:  3            Where to get your medicines      These medications were sent to [facility] [address] [place] [address] AT [facility] OF [address] [place], [place] [address] [place] [facility] [address]     Phone:  [phone]     losartan " 50 MG tablet                Primary Care Provider Office Phone # Fax #    Kiara Zazueta PA-C [phone] [address] [place] [address]        Equal Access to Services     KEKE MONTEJO : Hadvibha titus ku colteno Sojuvenalali, waaxda luqadaha, qaybta kaalmada adeselinayada, lulu alvarado laMagalysnicole torres. So [facility] [phone].    ATENCIÓN: Si habla español, tiene a arriaza disposición servicios gratuitos de asistencia lingüística. Llame al [phone].    We comply with applicable federal civil rights laws and [place] laws. We do not discriminate on the basis of race, color, national origin, age, disability, sex, sexual orientation, or gender identity.            Thank you!     Thank you for choosing [facility]  for your care. Our goal is always to provide you with excellent care. Hearing back from our patients is one way we can continue to improve our services. Please take a few minutes to complete the written survey that you may receive in the mail after your visit with us. Thank you!             Your Updated Medication List - Protect others around you: Learn how to safely use, store and throw away your medicines at www.disposemymeds.org.          This list is accurate as of 2/27/18 11:42 AM.  Always use your most recent med list.                   Brand Name Dispense Instructions for use Diagnosis    ACCU-CHEK MARILUZ PLUS test strip   Generic drug:  blood glucose monitoring           * albuterol 108 (90 BASE) MCG/ACT Inhaler    PROAIR HFA/PROVENTIL HFA/VENTOLIN HFA    3 Inhaler    Inhale 2 puffs into the lungs every 6 hours as needed for shortness of breath / dyspnea or wheezing    Intermittent asthma, uncomplicated       * albuterol (2.5 MG/3ML) 0.083% neb solution     50 vial    Take 1 vial (2.5 mg) by nebulization every 4 hours as needed for shortness of breath / dyspnea, wheezing or other (chest tightness, persistent cough)    Severe persistent asthma with  (acute) exacerbation       azelastine 0.1 % spray    ASTELIN    30 mL    Spray 2 sprays into both nostrils 2 times daily as needed for rhinitis or allergies    Postnasal drip       blood glucose monitoring meter device kit           calcipotriene 0.005 % cream    DOVONOX    60 g    Apply topically 2 times daily    Lichen sclerosus       * clobetasol 0.05 % ointment    TEMOVATE          * clobetasol 0.05 % cream    TEMOVATE    30 g    Apply sparingly to affected area twice weekly as needed.  Do not apply to face.    Lichen sclerosus       fluticasone-salmeterol 230-21 MCG/ACT inhaler    ADVAIR-HFA    1 Inhaler    Inhale 2 puffs into the lungs 2 times daily    Uncomplicated severe persistent asthma       losartan 50 MG tablet    COZAAR    90 tablet    TAKE 1 TABLET(50 MG) BY MOUTH DAILY    Benign essential hypertension       nebulizer/tubing/mouthpiece Kit     1 kit    Use with albuterol neb solution    Severe persistent asthma with (acute) exacerbation       tiotropium 18 MCG capsule    SPIRIVA HANDIHALER    30 capsule    Inhale contents of one capsule daily.    Uncomplicated severe persistent asthma       * Notice:  This list has 4 medication(s) that are the same as other medications prescribed for you. Read the directions carefully, and ask your doctor or other care provider to review them with you.

## 2018-02-27 NOTE — NURSING NOTE
"Chief Complaint   Patient presents with     Hypertension       Initial /86 (BP Location: Right arm, Patient Position: Chair, Cuff Size: Adult Large)  Pulse 76  Temp 98.1  F (36.7  C) (Tympanic)  Resp 16  Ht 5' 5\" (1.651 m)  Wt 229 lb (103.9 kg)  BMI 38.11 kg/m2 Estimated body mass index is 38.11 kg/(m^2) as calculated from the following:    Height as of this encounter: 5' 5\" (1.651 m).    Weight as of this encounter: 229 lb (103.9 kg).      Health Maintenance that is potentially due pending provider review:  NONE        Is there anyone who you would like to be able to receive your results? No  If yes have patient fill out SOHAM      "

## 2018-02-27 NOTE — PATIENT INSTRUCTIONS
Labs today   If diabetic as we suspect, make appt for diabetes  Eventually also want wellness visit    Check BPs occasionally - ideally under 130/80    Xray for wrist    Flu and pneumonia shot today    Discuss skin/clothing recommendations with derm so that you can exercise    Consider referral to weight specialist in [facility]

## 2018-02-27 NOTE — PROGRESS NOTES
SUBJECTIVE:   Luz Elena Cristina is a 63 year old female who presents to clinic today for the following health issues:      Hypertension Follow-up      Outpatient blood pressures are not being checked.    Low Salt Diet: no added salt      Amount of exercise or physical activity: None.  Has lichen sclerosis, has a question in regards to exercise.  Ends up with sores where the seams are at.  Not sure what exercises she could do without having the sores    Problems taking medications regularly: No    Medication side effects: none    Diet: regular (no restrictions)    Out of med today.  No chest pains, chest pressures, SOB or sudden change of endurance.    Tries to be active, 10,000+ steps per day and competitive with friends.  But limited     * Is out of glucose test strips, not sure if they would be covered for medicare  Prediabetes.  Last a1c 6.4 in 2016.    BMI 38.  Overall stable over time.  Past wt loss with medifast.    R wrist pain x 1 mo since spanked a dog.  No swelling or bruising.    Severe persistent asthma, sees specialist.  Doing well.    Problem list and histories reviewed & adjusted, as indicated.  Additional history: as documented    BP Readings from Last 3 Encounters:   02/27/18 138/86   08/25/17 130/76   05/26/17 131/82    Wt Readings from Last 3 Encounters:   02/27/18 229 lb (103.9 kg)   08/25/17 213 lb 6.4 oz (96.8 kg)   05/26/17 211 lb 10.3 oz (96 kg)         Labs reviewed in EPIC    Reviewed and updated as needed this visit by clinical staff  Tobacco  Allergies  Meds  Problems  Med Hx  Surg Hx  Fam Hx  Soc Hx        Reviewed and updated as needed this visit by Provider  Allergies  Meds  Problems  Med Hx  Surg Hx  Fam Hx         ROS:  Constitutional, cardiovascular, pulmonary, GI, , skin, endocrine and psych systems are negative, except as otherwise noted.    OBJECTIVE:     /86 (BP Location: Right arm, Patient Position: Chair, Cuff Size: Adult Large)  Pulse 76  Temp 98.1  F  "(36.7  C) (Tympanic)  Resp 16  Ht 5' 5\" (1.651 m)  Wt 229 lb (103.9 kg)  BMI 38.11 kg/m2  Body mass index is 38.11 kg/(m^2).  GENERAL: healthy, alert and no distress  RESP: lungs clear to auscultation - no rales, rhonchi or wheezes  CV: regular rate and rhythm, normal S1 S2, no S3 or S4, no murmur, click or rub  PSYCH: mentation appears normal, affect normal/bright  MS: R wrist without swelling or bruising, pain over mid wrist and mid intraosseous membrane, pain with full flexion, extension or radial glide    Xray read by Kiara Zazueta PA-C as unremarkable  ASSESSMENT/PLAN:       ICD-10-CM    1. Benign essential hypertension I10 Basic metabolic panel     TSH with free T4 reflex     losartan (COZAAR) 50 MG tablet   2. Severe obesity (BMI 35.0-39.9) with comorbidity (H) E66.01 Lipid panel reflex to direct LDL Non-fasting     TSH with free T4 reflex   3. Injury of left wrist, initial encounter S69.92XA XR Wrist Right G/E 3 Views   4. Lichen sclerosus L90.0    5. Elevated glucose R73.09 Hemoglobin A1c     TSH with free T4 reflex   6. Family history of diabetes mellitus Z83.3 Hemoglobin A1c   7. Severe persistent asthma without complication J45.50    8. Lipid screening Z13.220 Lipid panel reflex to direct LDL Non-fasting   9. Need for hepatitis C screening test Z11.59 Hepatitis C Screen Reflex to HCV RNA Quant and Genotype   10. Family history of thyroid disorder Z83.49 TSH with free T4 reflex       Patient Instructions   Labs today   If diabetic as we suspect, make appt for diabetes  Eventually also want wellness visit    Check BPs occasionally - ideally under 130/80    Xray for wrist    Flu and pneumonia shot today    Discuss skin/clothing recommendations with derm so that you can exercise    Consider referral to weight specialist in [facility]      Kiara Zazueta PA-C  [facility]    "

## 2018-02-28 PROBLEM — Z91.89 AT RISK FOR CARDIOVASCULAR EVENT: Status: ACTIVE | Noted: 2018-02-28

## 2018-02-28 LAB
ANION GAP SERPL CALCULATED.3IONS-SCNC: 4 MMOL/L (ref 3–14)
BUN SERPL-MCNC: 17 MG/DL (ref 7–30)
CALCIUM SERPL-MCNC: 9 MG/DL (ref 8.5–10.1)
CHLORIDE SERPL-SCNC: 104 MMOL/L (ref 94–109)
CHOLEST SERPL-MCNC: 187 MG/DL
CO2 SERPL-SCNC: 29 MMOL/L (ref 20–32)
CREAT SERPL-MCNC: 0.85 MG/DL (ref 0.52–1.04)
GFR SERPL CREATININE-BSD FRML MDRD: 67 ML/MIN/1.7M2
GLUCOSE SERPL-MCNC: 99 MG/DL (ref 70–99)
HCV AB SERPL QL IA: REACTIVE
HDLC SERPL-MCNC: 71 MG/DL
LDLC SERPL CALC-MCNC: 98 MG/DL
NONHDLC SERPL-MCNC: 116 MG/DL
POTASSIUM SERPL-SCNC: 4.5 MMOL/L (ref 3.4–5.3)
SODIUM SERPL-SCNC: 137 MMOL/L (ref 133–144)
TRIGL SERPL-MCNC: 92 MG/DL
TSH SERPL DL<=0.005 MIU/L-ACNC: 2.23 MU/L (ref 0.4–4)

## 2018-02-28 ASSESSMENT — ASTHMA QUESTIONNAIRES: ACT_TOTALSCORE: 22

## 2018-02-28 NOTE — PROGRESS NOTES
CMA - please let me know if she's willing to start statin.  Add asa 81 to med list.    Jennifer Laguna,    Good news on your labs.  You are still in prediabetic rather than diabetic category for your blood sugars.  Weight loss will help.  Again consider discussing your skin/clothing issue with dermatology and possibly seeing the weight specialist.  Let me know if you want referral.  Cholesterol looks fine overall, but 10 year risk of heart attack or stroke is at 7.5%.  This is at the level where we recommend both a daily baby aspirin (81 mg) and a statin medication to reduce your risk.  Let me know if you'd like to start this medication, or we can discuss further at wellness visit.      Thyroid was normal.     Please contact me if you have questions.    Kiara Zazueta PA-C    The 10-year ASCVD risk score (Kingsland LARRY Jr, et al., 2013) is: 7.5%    Values used to calculate the score:      Age: 63 years      Sex: Female      Is Non- : No      Diabetic: No      Tobacco smoker: No      Systolic Blood Pressure: 138 mmHg      Is BP treated: Yes      HDL Cholesterol: 71 mg/dL      Total Cholesterol: 187 mg/dL

## 2018-02-28 NOTE — PROGRESS NOTES
Jennifer Laguna,    I didn't see any concerns on your xay.  Radiology's report is a bit more complicated, that there may be an old broken (chipped) bone.  Since this is old and not new, I don't think it is the cause of the pain over the past 1 month.  I think you can see how your wrist continues to do, or we can try a splint for 1-2 weeks.  Let me know if you wish to try the splint.    See other result note for lab results.    Please contact me if you have questions.    Kiara Zazueta PA-C

## 2018-03-01 LAB
HCV RNA SERPL NAA+PROBE-ACNC: NORMAL [IU]/ML
HCV RNA SERPL NAA+PROBE-LOG IU: NORMAL LOG IU/ML

## 2018-05-01 ENCOUNTER — HOSPITAL ENCOUNTER (EMERGENCY)
Facility: CLINIC | Age: 63
Discharge: HOME | End: 2018-05-01
Attending: EMERGENCY MEDICINE | Admitting: EMERGENCY MEDICINE
Payer: MEDICARE

## 2018-05-01 ENCOUNTER — APPOINTMENT (OUTPATIENT)
Dept: CT IMAGING | Facility: CLINIC | Age: 63
End: 2018-05-01
Attending: EMERGENCY MEDICINE
Payer: MEDICARE

## 2018-05-01 ENCOUNTER — APPOINTMENT (OUTPATIENT)
Dept: GENERAL RADIOLOGY | Facility: CLINIC | Age: 63
End: 2018-05-01
Attending: EMERGENCY MEDICINE
Payer: MEDICARE

## 2018-05-01 ENCOUNTER — OFFICE VISIT (OUTPATIENT)
Dept: FAMILY MEDICINE | Facility: CLINIC | Age: 63
End: 2018-05-01
Payer: MEDICARE

## 2018-05-01 VITALS
TEMPERATURE: 98 F | HEIGHT: 65 IN | WEIGHT: 229 LBS | SYSTOLIC BLOOD PRESSURE: 129 MMHG | RESPIRATION RATE: 16 BRPM | HEART RATE: 79 BPM | BODY MASS INDEX: 38.15 KG/M2 | DIASTOLIC BLOOD PRESSURE: 80 MMHG | OXYGEN SATURATION: 93 %

## 2018-05-01 VITALS
RESPIRATION RATE: 18 BRPM | TEMPERATURE: 98.2 F | HEART RATE: 80 BPM | DIASTOLIC BLOOD PRESSURE: 74 MMHG | HEIGHT: 65 IN | SYSTOLIC BLOOD PRESSURE: 124 MMHG

## 2018-05-01 DIAGNOSIS — S80.00XA CONTUSION OF KNEE, UNSPECIFIED LATERALITY, INITIAL ENCOUNTER: ICD-10-CM

## 2018-05-01 DIAGNOSIS — S63.502A WRIST SPRAIN, LEFT, INITIAL ENCOUNTER: ICD-10-CM
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
## 2018-05-01 DIAGNOSIS — S09.90XA CLOSED HEAD INJURY WITHOUT LOSS OF CONSCIOUSNESS, INITIAL ENCOUNTER: ICD-10-CM

## 2018-05-01 DIAGNOSIS — S89.92XA INJURY OF LEFT KNEE, INITIAL ENCOUNTER: Primary | ICD-10-CM

## 2018-05-01 DIAGNOSIS — S52.125A CLOSED NONDISPLACED FRACTURE OF HEAD OF LEFT RADIUS, INITIAL ENCOUNTER: ICD-10-CM

## 2018-05-01 DIAGNOSIS — T07.XXXA MULTIPLE ABRASIONS: ICD-10-CM

## 2018-05-01 DIAGNOSIS — S59.902A INJURY OF LEFT ELBOW, INITIAL ENCOUNTER: ICD-10-CM

## 2018-05-01 PROCEDURE — 73562 X-RAY EXAM OF KNEE 3: CPT | Mod: LT

## 2018-05-01 PROCEDURE — 99284 EMERGENCY DEPT VISIT MOD MDM: CPT | Mod: Z6 | Performed by: EMERGENCY MEDICINE

## 2018-05-01 PROCEDURE — 25000132 ZZH RX MED GY IP 250 OP 250 PS 637: Mod: GY | Performed by: EMERGENCY MEDICINE

## 2018-05-01 PROCEDURE — A9270 NON-COVERED ITEM OR SERVICE: HCPCS | Mod: GY | Performed by: EMERGENCY MEDICINE

## 2018-05-01 PROCEDURE — 99284 EMERGENCY DEPT VISIT MOD MDM: CPT | Mod: 25

## 2018-05-01 PROCEDURE — 70450 CT HEAD/BRAIN W/O DYE: CPT

## 2018-05-01 PROCEDURE — 73110 X-RAY EXAM OF WRIST: CPT | Mod: LT

## 2018-05-01 PROCEDURE — 99207 ZZC NO BILLABLE SERVICE THIS VISIT: CPT | Performed by: NURSE PRACTITIONER

## 2018-05-01 PROCEDURE — 73070 X-RAY EXAM OF ELBOW: CPT | Mod: LT

## 2018-05-01 RX ADMIN — IBUPROFEN 600 MG: 400 TABLET ORAL at 17:25

## 2018-05-01 NOTE — MR AVS SNAPSHOT
"              After Visit Summary   5/1/2018    Luz Elena Cristina    MRN: [id number]           Patient Information     Date Of Birth          1955        Visit Information        Provider Department      5/1/2018 2:20 PM Meri Kelly APRN CNP [facility]        Today's Diagnoses     Injury of left knee, initial encounter    -  1    Injury of left elbow, initial encounter        Fall, initial encounter           Follow-ups after your visit        Who to contact     If you have questions or need follow up information about today's clinic visit or your schedule please contact [facility] directly at [phone].  Normal or non-critical lab and imaging results will be communicated to you by Ko[employer], letter or phone within 4 business days after the clinic has received the results. If you do not hear from us within 7 days, please contact the clinic through Koofers[employer] or phone. If you have a critical or abnormal lab result, we will notify you by phone as soon as possible.  Submit refill requests through [employer] or call your pharmacy and they will forward the refill request to us. Please allow 3 business days for your refill to be completed.          Additional Information About Your Visit        MyChart Information     [employer] gives you secure access to your electronic health record. If you see a primary care provider, you can also send messages to your care team and make appointments. If you have questions, please call your primary care clinic.  If you do not have a primary care provider, please call [phone] and they will assist you.        Care EveryWhere ID     This is your Care EveryWhere ID. This could be used by other organizations to access your [facility] medical records  [id number]        Your Vitals Were     Pulse Temperature Respirations Height          80 98.2  F (36.8  C) (Tympanic) 18 5' 5\" (1.651 m)         Blood Pressure from Last 3 Encounters:   05/01/18 117/74 "   05/01/18 124/74   02/27/18 138/86    Weight from Last 3 Encounters:   05/01/18 229 lb (103.9 kg)   02/27/18 229 lb (103.9 kg)   08/25/17 213 lb 6.4 oz (96.8 kg)              We Performed the Following     Asthma Action Plan (AAP)        Primary Care Provider Office Phone # Fax #    Kiara Zazueta PA-C [phone] [address] [place] [address]        Equal Access to Services     KEKE MONTEJO : Hadii aad ku hadasho Soomaali, waaxda luqadaha, qaybta kaalmada adeegyada, waxjayden fernando haynicole green . So [facility] [phone].    ATENCIÓN: Si habla español, tiene a arriaza disposición servicios gratuitos de asistencia lingüística. [facility] [phone].    We comply with applicable federal civil rights laws and [place] laws. We do not discriminate on the basis of race, color, national origin, age, disability, sex, sexual orientation, or gender identity.            Thank you!     Thank you for choosing [facility]  for your care. Our goal is always to provide you with excellent care. Hearing back from our patients is one way we can continue to improve our services. Please take a few minutes to complete the written survey that you may receive in the mail after your visit with us. Thank you!             Your Updated Medication List - Protect others around you: Learn how to safely use, store and throw away your medicines at www.disposemymeds.org.          This list is accurate as of 5/1/18  3:40 PM.  Always use your most recent med list.                   Brand Name Dispense Instructions for use Diagnosis    ACCU-CHEK MARILUZ PLUS test strip   Generic drug:  blood glucose monitoring           * albuterol 108 (90 Base) MCG/ACT Inhaler    PROAIR HFA/PROVENTIL HFA/VENTOLIN HFA    3 Inhaler    Inhale 2 puffs into the lungs every 6 hours as needed for shortness of breath / dyspnea or wheezing    Intermittent asthma, uncomplicated       * albuterol (2.5 MG/3ML) 0.083% neb  solution     50 vial    Take 1 vial (2.5 mg) by nebulization every 4 hours as needed for shortness of breath / dyspnea, wheezing or other (chest tightness, persistent cough)    Severe persistent asthma with (acute) exacerbation       azelastine 0.1 % spray    ASTELIN    30 mL    Spray 2 sprays into both nostrils 2 times daily as needed for rhinitis or allergies    Postnasal drip       blood glucose monitoring meter device kit           calcipotriene 0.005 % cream    DOVONOX    60 g    Apply topically 2 times daily    Lichen sclerosus       * clobetasol 0.05 % ointment    TEMOVATE          * clobetasol 0.05 % cream    TEMOVATE    30 g    Apply sparingly to affected area twice weekly as needed.  Do not apply to face.    Lichen sclerosus       fluticasone-salmeterol 230-21 MCG/ACT inhaler    ADVAIR-HFA    1 Inhaler    Inhale 2 puffs into the lungs 2 times daily    Uncomplicated severe persistent asthma       losartan 50 MG tablet    COZAAR    90 tablet    TAKE 1 TABLET(50 MG) BY MOUTH DAILY    Benign essential hypertension       nebulizer/tubing/mouthpiece Kit     1 kit    Use with albuterol neb solution    Severe persistent asthma with (acute) exacerbation       tiotropium 18 MCG capsule    SPIRIVA HANDIHALER    30 capsule    Inhale contents of one capsule daily.    Uncomplicated severe persistent asthma       * Notice:  This list has 4 medication(s) that are the same as other medications prescribed for you. Read the directions carefully, and ask your doctor or other care provider to review them with you.

## 2018-05-01 NOTE — PROGRESS NOTES
SUBJECTIVE:   Luz Elena Cristina is a 63 year old female who presents to clinic today for the following health issues:    Patient tripped and fell at home and came straight to the clinic. She hit both of her knees, left elbow, and face.     Patient presented today after a fall at home tripped with her flip-flops.  Landed on the bobcat.  No loss of consciousness.  Begin workup patient has left left elbow pain left knee pain with bilateral abrasions to the left knee and abrasion to the bridge of the nose.     In further reviewed patient has frontal hematoma, patient also has questionable septal hematomas bilateral.     Based on these findings would recommend a further workup in the emergency room patient  will drive her to the emergency room.      Report was called to the emergency room workup was canceled here at the clinic Wounds  cleaned and bandaged.    No charge for visit     Meri Kelly CNP

## 2018-05-01 NOTE — ED AVS SNAPSHOT
[facility] Emergency Department    [address] [address]    Phone:  [phone]    Fax:  [phone]                                       Luz Elena Cristina   MRN: [id number]    Department:  [facility] Emergency Department   Date of Visit:  5/1/2018           After Visit Summary Signature Page     I have received my discharge instructions, and my questions have been answered. I have discussed any challenges I see with this plan with the nurse or doctor.    ..........................................................................................................................................  Patient/Patient Representative Signature      ..........................................................................................................................................  Patient Representative Print Name and Relationship to Patient    ..................................................               ................................................  Date                                            Time    ..........................................................................................................................................  Reviewed by Signature/Title    ...................................................              ..............................................  Date                                                            Time

## 2018-05-01 NOTE — ED NOTES
Bed: IN01  Expected date:   Expected time:   Means of arrival: Car  Comments:  Luz Elena Cristina: Fall at home, was at [facility], hit face, no LOC, septum/nasal hematoma, needs more of a work-up than clinic can do

## 2018-05-01 NOTE — ED AVS SNAPSHOT
[facility] Emergency Department    [address] [address]    Phone:  [phone]    Fax:  [phone]                                       Luz Elena Cristina   MRN: [id number]    Department:  [facility] Emergency Department   Date of Visit:  5/1/2018           Patient Information     Date Of Birth          1955        Your diagnoses for this visit were:     Fall, initial encounter     Closed head injury without loss of consciousness, initial encounter     Closed nondisplaced fracture of head of left radius, initial encounter     Wrist sprain, left, initial encounter     Contusion of knee, unspecified laterality, initial encounter Bilateral contusions and abrasions    Multiple abrasions        You were seen by Robin Johnson MD.      Follow-up Information     Follow up with Kiara Zazueta PA-C In 1 week.    Specialty:  Physician Assistant    Why:  For re-evaluation if symptoms not resolving    Contact information:    [address] [place] [address]  [phone]          Follow up with Orthopedic/sports medicine clinic.    Why:  Referral was made for you, they will call you to schedule this      Discharge References/Attachments     RADIAL HEAD FRACTURE (ENGLISH)    WRIST SPRAIN (ENGLISH)    HEAD INJURY, NO WAKE-UP (ADULT) (ENGLISH)    R.I.C.E. (ENGLISH)      24 Hour Appointment Hotline       To make an appointment at any [facility] clinic, call [phone] ([phone]). If you don't have a family doctor or clinic, we will help you find one. [facility] clinics are conveniently located to serve the needs of you and your family.          ED Discharge Orders     ORTHO  REFERRAL       [facility] is referring you to the Orthopedic  Services at [facility].       The  Representative will assist you in the coordination of your Orthopedic and Musculoskeletal Care as prescribed by your physician.    The   Representative will call you within 1 business day to help schedule your appointment, or you may contact the  Representative at:    All areas ~ [phone]     Type of Referral : Non Surgical       Timeframe requested: As deemed appropriate for radial head fracture and wrist sprain    Coverage of these services is subject to the terms and limitations of your health insurance plan.  Please call member services at your health plan with any benefit or coverage questions.      If X-rays, CT or MRI's have been performed, please contact the facility where they were done to arrange for , prior to your scheduled appointment.  Please bring this referral request to your appointment and present it to your specialist.            SLING           [employer]                    Review of your medicines      Our records show that you are taking the medicines listed below. If these are incorrect, please call your family doctor or clinic.        Dose / Directions Last dose taken    ACCU-CHEK MARILUZ PLUS test strip   Generic drug:  blood glucose monitoring        Refills:  0        * albuterol 108 (90 Base) MCG/ACT Inhaler   Commonly known as:  PROAIR HFA/PROVENTIL HFA/VENTOLIN HFA   Dose:  2 puff   Quantity:  3 Inhaler        Inhale 2 puffs into the lungs every 6 hours as needed for shortness of breath / dyspnea or wheezing   Refills:  1        * albuterol (2.5 MG/3ML) 0.083% neb solution   Dose:  1 vial   Quantity:  50 vial        Take 1 vial (2.5 mg) by nebulization every 4 hours as needed for shortness of breath / dyspnea, wheezing or other (chest tightness, persistent cough)   Refills:  1        aspirin 81 MG EC tablet   Dose:  81 mg        Take 81 mg by mouth daily   Refills:  0        azelastine 0.1 % spray   Commonly known as:  ASTELIN   Dose:  2 spray   Quantity:  30 mL        Spray 2 sprays into both nostrils 2 times daily as needed for rhinitis or allergies   Refills:  3        blood  glucose monitoring meter device kit        Refills:  0        clobetasol 0.05 % cream   Commonly known as:  TEMOVATE   Quantity:  30 g        Apply sparingly to affected area twice weekly as needed.  Do not apply to face.   Refills:  0        fluticasone-salmeterol 230-21 MCG/ACT inhaler   Commonly known as:  ADVAIR-HFA   Dose:  2 puff   Quantity:  1 Inhaler        Inhale 2 puffs into the lungs 2 times daily   Refills:  3        losartan 50 MG tablet   Commonly known as:  COZAAR   Quantity:  90 tablet        TAKE 1 TABLET(50 MG) BY MOUTH DAILY   Refills:  3        nebulizer/tubing/mouthpiece Kit   Quantity:  1 kit        Use with albuterol neb solution   Refills:  0        tiotropium 18 MCG capsule   Commonly known as:  SPIRIVA HANDIHALER   Quantity:  30 capsule        Inhale contents of one capsule daily.   Refills:  3        * Notice:  This list has 2 medication(s) that are the same as other medications prescribed for you. Read the directions carefully, and ask your doctor or other care provider to review them with you.            Procedures and tests performed during your visit     Elbow XR, 2 view, left    Head CT w/o contrast    XR Knee Left 3 Views    XR Wrist Left G/E 3 Views      Orders Needing Specimen Collection     None      Pending Results     Date and Time Order Name Status Description    5/1/2018 1549 XR Knee Left 3 Views Preliminary     5/1/2018 1549 XR Wrist Left G/E 3 Views Preliminary     5/1/2018 1549 Elbow XR, 2 view, left Preliminary             Pending Culture Results     No orders found from 4/29/2018 to 5/2/2018.            Pending Results Instructions     If you had any lab results that were not finalized at the time of your Discharge, you can call the ED Lab Result RN at [phone]. You will be contacted by this team for any positive Lab results or changes in treatment. The nurses are available 7 days a week from 10A to 6:30P.  You can leave a message 24 hours per day and they will return  your call.        Test Results From Your Hospital Stay        5/1/2018  4:23 PM      Narrative     CT OF THE HEAD WITHOUT CONTRAST 5/1/2018 4:14 PM     COMPARISON: None    HISTORY: 63-year-old female with sniffing closed frontal head injury,  including nasal trauma. Evaluate for traumatic injury, including the  nose.     TECHNIQUE: 5 mm thick axial CT images of the head were acquired  without IV contrast material.    FINDINGS: There is mild diffuse cerebral volume loss. There are subtle  patchy areas of decreased density in the cerebral white matter  bilaterally that are consistent with sequela of chronic small vessel  ischemic disease.    The ventricles and basal cisterns are within normal limits in  configuration given the degree of cerebral volume loss. There is no  midline shift. There are no extra-axial fluid collections.    No intracranial hemorrhage, mass or recent infarct.    The visualized paranasal sinuses are well-aerated. There is no  mastoiditis. There are no fractures of the visualized bones. There is  a small soft tissue contusion of the midline forehead.        Impression     IMPRESSION: Diffuse cerebral volume loss and cerebral white matter  changes consistent with chronic small vessel ischemic disease. No  evidence for acute intracranial pathology.        Radiation dose for this scan was reduced using automated exposure  control, adjustment of the mA and/or kV according to patient size, or  iterative reconstruction technique.    JERI MARIA MD         5/1/2018  4:35 PM      Narrative     LEFT ELBOW TWO VIEWS  5/1/2018 4:29 PM      HISTORY: Pain, trauma.    COMPARISON: None.        Impression     IMPRESSION: Minimally displaced radial head fracture. Joint effusion.         5/1/2018  4:37 PM      Narrative     LEFT WRIST THREE OR MORE VIEWS   5/1/2018 4:29 PM     HISTORY: Trauma, pain.    COMPARISON: None.         Impression     IMPRESSION: No acute fracture or dislocation. Arthritis at the  thumb  carpometacarpal joint.         5/1/2018  4:38 PM      Narrative     LEFT KNEE THREE VIEWS   5/1/2018 4:28 PM     HISTORY: Trauma, pain. Please include sunrise view.    COMPARISON: None.         Impression     IMPRESSION: No acute fracture or dislocation. Soft tissue swelling  anterior to the patella.                Thank you for choosing [facility]       Thank you for choosing [facility] for your care. Our goal is always to provide you with excellent care. Hearing back from our patients is one way we can continue to improve our services. Please take a few minutes to complete the written survey that you may receive in the mail after you visit with us. Thank you!        [employer] Information     [employer] gives you secure access to your electronic health record. If you see a primary care provider, you can also send messages to your care team and make appointments. If you have questions, please call your primary care clinic.  If you do not have a primary care provider, please call [phone] and they will assist you.        Care EveryWhere ID     This is your Care EveryWhere ID. This could be used by other organizations to access your [facility] medical records  [id number]        Equal Access to Services     SERENA [facility] Hospital[facility] : Hadii titus Betancourt, warodgerda bharat, qamelvin hartman, lulu green . So [facility] [phone].    ATENCIÓN: Si habla español, tiene a arriaza disposición servicios gratuitos de asistencia lingüística. Llame al [phone].    We comply with applicable federal civil rights laws and [place] laws. We do not discriminate on the basis of race, color, national origin, age, disability, sex, sexual orientation, or gender identity.            After Visit Summary       This is your record. Keep this with you and show to your community pharmacist(s) and doctor(s) at your next visit.

## 2018-05-01 NOTE — ED NOTES
Pt reports she was walking into a barn in flip flops and the flip flop caught and sent her flying forward into the counter-weight of a bobcat. Pt did hit head. Pt did not lose consciousness. Pt reports taking blood thinners (81 mg aspirin daily). Pt complains of pain on forehead and nose. Nose bleed immediately. Pt also noted pain later pain in the left wrist and left elbow.

## 2018-05-01 NOTE — ED PROVIDER NOTES
"  History     Chief Complaint   Patient presents with     Fall     tripped on her shoes, went face first into a bobcat, injury to left elbow, nose/face, both knees. sent down from clinic for xrays and workup      HPI  Luz Elena Cristina is a 63 year old female who tripped while wearing sandals and struck the back of the stationary Bobcat.  Injury occurred shortly prior to arrival.  She presented to clinic and was referred to the ED for further evaluation due to significant facial trauma. She stumbled forward onto her knees, then fell further forward and struck her head on the back of the Bobcat without LOC.  She struck her forehead and nose. She has a nasal abrasion and nasal bleeding which have subsided.  She has localized tenderness to the areas of this injury, but no unusual headache and no nausea or vomiting.  She has no visual or neurologic deficit or abnormality.  She has no neck or back injury.  She has lateral left elbow joint pain, left wrist pain and the left \"knee cap\" is very painful, but she is able to bear weight.  No left upper or lower extremity CMS abnormality.  She has abrasions to the anterior knees. She is unsure of her tetanus immunization status.  The musculoskeletal injuries were associated with sudden onset of a aching throbbing pain well localized to the areas of injury, nonradiating and constant.  Left elbow and left patellar pain are severe and the other areas of injury are mildly painful.  No anticoagulant therapy.  No other acute complaints or concerns.    Previous Records Reviewed:  Td/Tdap  11/08/2012        Problem List:    Patient Active Problem List    Diagnosis Date Noted     At risk for cardiovascular event 02/28/2018     Priority: Medium     ASCVD risk 7.5%.  Offering statin.       Severe obesity (BMI 35.0-39.9) with comorbidity (H) 02/27/2018     Priority: Medium     Severe persistent asthma with (acute) exacerbation 11/14/2016     Priority: Medium     Severe persistent asthma " 04/29/2016     Priority: Medium     Symptoms since childhood. Smoked since age of 11 until 31 years.  Until seen in November 2014, used albuterol once in her life.       Lichen sclerosus 04/29/2016     Priority: Medium     Benign essential hypertension 04/29/2016     Priority: Medium     Elevated glucose 04/29/2016     Priority: Medium     Family history of diabetes mellitus 04/29/2016     Priority: Medium        Past Medical History:    Past Medical History:   Diagnosis Date     Hypertension      Uncomplicated asthma        Past Surgical History:    Past Surgical History:   Procedure Laterality Date     BIOPSY      breast,     COLONOSCOPY       GYN SURGERY       TONSILLECTOMY         Family History:    Family History   Problem Relation Age of Onset     Other Cancer Mother      panreatic     DIABETES Mother      Breast Cancer Sister      Skin Cancer Sister      CEREBROVASCULAR DISEASE Father      CVA     Chronic Obstructive Pulmonary Disease Father      Aneurysm Paternal Grandmother      Unknown/Adopted Paternal Grandfather      Alzheimer Disease Brother      early onset     DIABETES Son      Thyroid Disease Son      Breast Cancer Paternal Aunt      DIABETES Son      Breast Cancer Sister        Social History:  Marital Status:   [4]  Social History   Substance Use Topics     Smoking status: Former Smoker     Packs/day: 1.50     Types: Cigarettes     Quit date: 4/28/1987     Smokeless tobacco: Never Used     Alcohol use Yes        Medications:      albuterol (PROAIR HFA, PROVENTIL HFA, VENTOLIN HFA) 108 (90 BASE) MCG/ACT inhaler   aspirin 81 MG EC tablet   clobetasol (TEMOVATE) 0.05 % cream   fluticasone-salmeterol (ADVAIR-HFA) 230-21 MCG/ACT inhaler   losartan (COZAAR) 50 MG tablet   tiotropium (SPIRIVA HANDIHALER) 18 MCG capsule   ACCU-CHEK MARILUZ PLUS test strip   albuterol (2.5 MG/3ML) 0.083% nebulizer solution   azelastine (ASTELIN) 0.1 % spray   blood glucose monitoring (ACCU-CHEK MARILUZ PLUS) meter  "device kit   Respiratory Therapy Supplies (NEBULIZER/TUBING/MOUTHPIECE) KIT         Review of Systems  As mentioned above in the HPI, otherwise focused 10 point ROS was reviewed and was negative.  Constitutional: no fever or chills.  Ears, Nose,Throat: no sore throat or difficulty swallowing.  Respiratory: no cough or shortness of breath.  Cardiovascular: no chest pain or leg swelling.  Gastrointestinal: no nausea, vomiting or abdominal pain or blood in the stools.  Genitourinary: no acute difficulty urinating or UTI symptoms.  Musculoskeletal: no other joint pain or swelling.  Skin: no rash or acute skin changes.  Neurologic: no focal weakness or numbness.  Hematologic: no unusual bleeding or bruising.    Physical Exam   BP: 117/74  Temp: 98  F (36.7  C)  Resp: 16  Height: 165.1 cm (5' 5\")  Weight: 103.9 kg (229 lb)  SpO2: 96 %      Physical Exam   Constitutional: She is oriented to person, place, and time. She appears well-developed and well-nourished. No distress.   HENT:   Head: Normocephalic and atraumatic.   Right Ear: External ear normal.   Left Ear: External ear normal.   Mouth/Throat: Oropharynx is clear and moist.   Eyes: Conjunctivae and EOM are normal. Pupils are equal, round, and reactive to light. No scleral icterus.   Neck: Normal range of motion and full passive range of motion without pain. Neck supple. No JVD present. No spinous process tenderness and no muscular tenderness present. No tracheal deviation and normal range of motion present. No thyromegaly present.   Cardiovascular: Normal rate, regular rhythm, normal heart sounds and intact distal pulses.  Exam reveals no gallop and no friction rub.    No murmur heard.  Pulmonary/Chest: Effort normal and breath sounds normal. No respiratory distress. She has no wheezes. She has no rales.   Abdominal: Soft. She exhibits no distension. There is no tenderness.   Musculoskeletal: She exhibits tenderness. She exhibits no edema.        Left elbow: She " exhibits normal range of motion, no swelling, no effusion, no deformity and no laceration. Tenderness found. Radial head, lateral epicondyle and olecranon process tenderness noted. No medial epicondyle tenderness noted.        Left wrist: She exhibits tenderness. She exhibits normal range of motion, no bony tenderness, no swelling, no effusion, no crepitus, no deformity and no laceration.        Left knee: She exhibits bony tenderness (patellar). She exhibits normal range of motion, no swelling, no effusion, no ecchymosis, no deformity, no laceration, no erythema, normal alignment, no LCL laxity, normal patellar mobility and no MCL laxity. Tenderness ( anterior) found. No medial joint line, no lateral joint line, no MCL, no LCL and no patellar tendon tenderness noted.        Arms:       Legs:  Neurological: She is alert and oriented to person, place, and time. She has normal strength. No cranial nerve deficit ( 2-12 intact) or sensory deficit. Coordination normal. GCS eye subscore is 4. GCS verbal subscore is 5. GCS motor subscore is 6.   Skin: Skin is warm and dry. No rash noted. She is not diaphoretic. No erythema. No pallor.   Psychiatric: She has a normal mood and affect. Her behavior is normal.   Nursing note and vitals reviewed.      ED Course     ED Course     Procedures                 Results for orders placed or performed during the hospital encounter of 05/01/18 (from the past 24 hour(s))   Head CT w/o contrast    Narrative    CT OF THE HEAD WITHOUT CONTRAST 5/1/2018 4:14 PM     COMPARISON: None    HISTORY: 63-year-old female with sniffing closed frontal head injury,  including nasal trauma. Evaluate for traumatic injury, including the  nose.     TECHNIQUE: 5 mm thick axial CT images of the head were acquired  without IV contrast material.    FINDINGS: There is mild diffuse cerebral volume loss. There are subtle  patchy areas of decreased density in the cerebral white matter  bilaterally that are  consistent with sequela of chronic small vessel  ischemic disease.    The ventricles and basal cisterns are within normal limits in  configuration given the degree of cerebral volume loss. There is no  midline shift. There are no extra-axial fluid collections.    No intracranial hemorrhage, mass or recent infarct.    The visualized paranasal sinuses are well-aerated. There is no  mastoiditis. There are no fractures of the visualized bones. There is  a small soft tissue contusion of the midline forehead.      Impression    IMPRESSION: Diffuse cerebral volume loss and cerebral white matter  changes consistent with chronic small vessel ischemic disease. No  evidence for acute intracranial pathology.        Radiation dose for this scan was reduced using automated exposure  control, adjustment of the mA and/or kV according to patient size, or  iterative reconstruction technique.    JERI MARIA MD   XR Knee Left 3 Views    Narrative    LEFT KNEE THREE VIEWS   5/1/2018 4:28 PM     HISTORY: Trauma, pain. Please include sunrise view.    COMPARISON: None.       Impression    IMPRESSION: No acute fracture or dislocation. Soft tissue swelling  anterior to the patella.   XR Wrist Left G/E 3 Views    Narrative    LEFT WRIST THREE OR MORE VIEWS   5/1/2018 4:29 PM     HISTORY: Trauma, pain.    COMPARISON: None.       Impression    IMPRESSION: No acute fracture or dislocation. Arthritis at the thumb  carpometacarpal joint.   Elbow XR, 2 view, left    Narrative    LEFT ELBOW TWO VIEWS  5/1/2018 4:29 PM      HISTORY: Pain, trauma.    COMPARISON: None.      Impression    IMPRESSION: Minimally displaced radial head fracture. Joint effusion.     I independently reviewed the X-rays: Agree with the Radiologist's interpretation.      Medications   ibuprofen (ADVIL/MOTRIN) tablet 600 mg (not administered)       Assessments & Plan (with Medical Decision Making)   Fall with CHI, multiple contusions and abrasions and left radial head fx  treated with placement in a sling. Neurovasc intact. Tetanus immunization status is up-to-date. Ortho  referral made. Patient was provided instructions for supportive care and will return as needed for worsened condition or worsening symptoms, or new problems or concerns. Pt declined rx for an opiate analgesic and will use NSAID prn.      I have reviewed the nursing notes.    I have reviewed the findings, diagnosis, plan and need for follow up with the patient.      New Prescriptions    Ibuprofen 600 mg po q 6 hrs prn (OTC)         Final diagnoses:   Fall, initial encounter   Closed head injury without loss of consciousness, initial encounter   Closed nondisplaced fracture of head of left radius, initial encounter   Wrist sprain, left, initial encounter   Contusion of knee, unspecified laterality, initial encounter - Bilateral contusions and abrasions   Multiple abrasions       5/1/2018   [facility] EMERGENCY DEPARTMENT     Robin Johnson MD  05/07/18 2038

## 2018-05-09 ENCOUNTER — RADIANT APPOINTMENT (OUTPATIENT)
Dept: GENERAL RADIOLOGY | Facility: CLINIC | Age: 63
End: 2018-05-09
Attending: PEDIATRICS
Payer: MEDICARE

## 2018-05-09 ENCOUNTER — OFFICE VISIT (OUTPATIENT)
Dept: ORTHOPEDICS | Facility: CLINIC | Age: 63
End: 2018-05-09
Payer: MEDICARE

## 2018-05-09 VITALS
BODY MASS INDEX: 38.15 KG/M2 | HEIGHT: 65 IN | SYSTOLIC BLOOD PRESSURE: 124 MMHG | WEIGHT: 229 LBS | DIASTOLIC BLOOD PRESSURE: 78 MMHG

## 2018-05-09 DIAGNOSIS — S59.902A ELBOW INJURY, LEFT, INITIAL ENCOUNTER: ICD-10-CM

## 2018-05-09 DIAGNOSIS — S89.92XA INJURY OF LEFT KNEE, INITIAL ENCOUNTER: ICD-10-CM

## 2018-05-09 DIAGNOSIS — S69.92XA INJURY OF LEFT WRIST, INITIAL ENCOUNTER: ICD-10-CM

## 2018-05-09 DIAGNOSIS — M70.42 PREPATELLAR BURSITIS OF LEFT KNEE: ICD-10-CM

## 2018-05-09 DIAGNOSIS — S52.135A CLOSED NONDISPLACED FRACTURE OF NECK OF LEFT RADIUS, INITIAL ENCOUNTER: Primary | ICD-10-CM

## 2018-05-09 PROCEDURE — 73080 X-RAY EXAM OF ELBOW: CPT | Mod: LT

## 2018-05-09 PROCEDURE — 73110 X-RAY EXAM OF WRIST: CPT | Mod: LT

## 2018-05-09 PROCEDURE — 99204 OFFICE O/P NEW MOD 45 MIN: CPT | Performed by: PEDIATRICS

## 2018-05-09 NOTE — LETTER
5/9/2018         RE: Luz Elena Cristina  [id number]  [place] [facility] [id number]        Dear Colleague,    Thank you for referring your patient, Luz Elena Cristina, to the [facility]. Please see a copy of my visit note below.    Sports Medicine Clinic Visit    PCP: Kiara Zazueta    Luz Elena Cristina is a 63 year old female who is seen  as an ER referral presenting with left sided injuries    Injury: Patient reports an injury last week, she tripped going into the barn at her home and ran face first into a bobcat causing injuries to her nose/face, left arm, and bilateral knees. Was evaluated and treated in the ED. Reports her left elbow and wrist are slowly improving, however her left knee seems to be more swollen and painful over the past few days. Denied any new/additional injury.    1) Left elbow radial side, no swelling or bruising  2) Left wrist: mild dorsal pain  3) Left knee worst: medial and lateral pain along with significant swelling.    Location of Pain: left elbow, left wrist, left knee  Duration of Pain: 5/1/18  Rating of Pain at worst: 10/10  Rating of Pain Currently: 7/10  Symptoms are better with: Ice, Rest, Elevation and bracing  Symptoms are worse with: movement of wrist and elbow, knee direct pressure, weightbearing, flexion/extension  Additional Features:   Positive: knee swelling, bruising   Negative: popping, grinding, catching, locking, instability, paresthesias, numbness and weakness  Other evaluation and/or treatments so far consists of: Ice, Rest, Elevation and bracing/sling  Prior History of related problems: None    Social History: Retired    Review of Systems  Skin:yes bruising, yes swelling  Musculoskeletal: as above  Neurologic: no numbness, paresthesias  Remainder of review of systems is negative including constitutional, CV, pulmonary, GI, except as noted in HPI or medical history.    Patient's current problem list, past medical and surgical history,  "and family history were reviewed.    Patient Active Problem List   Diagnosis     Severe persistent asthma     Lichen sclerosus     Benign essential hypertension     Elevated glucose     Family history of diabetes mellitus     Severe persistent asthma with (acute) exacerbation     Severe obesity (BMI 35.0-39.9) with comorbidity (H)     At risk for cardiovascular event     Past Medical History:   Diagnosis Date     Hypertension      Uncomplicated asthma      Past Surgical History:   Procedure Laterality Date     BIOPSY      breast,     COLONOSCOPY       GYN SURGERY       TONSILLECTOMY       Family History   Problem Relation Age of Onset     Other Cancer Mother      panreatic     DIABETES Mother      Breast Cancer Sister      Skin Cancer Sister      CEREBROVASCULAR DISEASE Father      CVA     Chronic Obstructive Pulmonary Disease Father      Aneurysm Paternal Grandmother      Unknown/Adopted Paternal Grandfather      Alzheimer Disease Brother      early onset     DIABETES Son      Thyroid Disease Son      Breast Cancer Paternal Aunt      DIABETES Son      Breast Cancer Sister        Objective  /78 (BP Location: Right arm, Patient Position: Sitting, Cuff Size: Adult Large)  Ht 5' 5\" (1.651 m)  Wt 229 lb (103.9 kg)  BMI 38.11 kg/m2    GENERAL APPEARANCE: healthy, alert and no distress   GAIT: antalgic  SKIN: no suspicious lesions or rashes  HEENT: Sclera clear, anicteric  CV: good peripheral pulses  RESP: Breathing not labored  NEURO: Normal strength and tone, mentation intact and speech normal  PSYCH:  mentation appears normal and affect normal/bright    Bilateral Elbow exam:  Inspection:     no ecchymosis       no edema or effusion    Tender:     none    Non-Tender:      remainder of the elbow bilaterally    ROM:      flexion 80 left       extension 130 left       forearm supination minimal left       forearm pronation minimal left    Strength:     pain with resisted strength testing left though able to " resist    Sensation:     intact throughout hand       intact throughout forearm    Bilateral Wrist and Hand exam  Inspection:       No swelling, bruising or deformity bilateral    Tender:       Mild distal ulnar tenderness    Non Tender:       distal radius left and      anatomic snuffbox left    ROM:       Slightly limited due to pain    Strength:       Strength testing irritates elbow    Neurovascular:       2+ radial pulses bilaterally with brisk capillary refill and      normal sensation to light touch in the radial, median and ulnar nerve distributions    Bilateral Knee exam  Inspection:      significant swelling, bruising, effusion on left    Patella:      Mobility -       hypomobile left    Tender:      Throughout patella and pre-patellar bursa, medial and lateral joint lines    Non Tender:      remainder of knee area bilateral    Knee ROM:      Flexion 80 degrees left       Extension 5 degrees left    Strength:      Limited testing due to pain    Special Tests:     *difficult exam due to patient guarding, stable to varus and valgus stress    Gait:      antalgic gait    Neurovascular:      2+ peripheral pulses bilaterally and brisk capillary refill       sensation grossly intact    Radiology  I ordered, visualized and reviewed these images with the patient  Elbow XR, 2 view, left    Narrative    LEFT ELBOW TWO VIEWS  5/1/2018 4:29 PM      HISTORY: Pain, trauma.    COMPARISON: None.      Impression    IMPRESSION: Minimally displaced radial head fracture. Joint effusion.    TOMASA LIM MD   XR Wrist Left G/E 3 Views    Narrative    LEFT WRIST THREE OR MORE VIEWS   5/1/2018 4:29 PM     HISTORY: Trauma, pain.    COMPARISON: None.       Impression    IMPRESSION: No acute fracture or dislocation. Arthritis at the thumb  carpometacarpal joint.    TOMASA LIM MD   XR Knee Left 3 Views    Narrative    LEFT KNEE THREE VIEWS   5/1/2018 4:28 PM     HISTORY: Trauma, pain. Please include sunrise  view.    COMPARISON: None.       Impression    IMPRESSION: No acute fracture or dislocation. Soft tissue swelling  anterior to the patella.    TOMASA LIM MD       Assessment:  1. Closed nondisplaced fracture of neck of left radius, initial encounter    2. Injury of left wrist, initial encounter    3. Injury of left knee, initial encounter    4. Prepatellar bursitis of left knee      1) Left elbow: Nondisplaced proximal radial neck fracture.  Reviewed images and discussed diagnosis. Given nondisplaced fracture and stable injury, I recommend a brief period of immobilization in a splint and/or sling for 1-2 weeks followed by gentle range of motion as pain allows.  Discussed that elbow stiffness, particularly in extension, may persist following this injury.  Discussed limiting lifting and weightbearing with this arm for likely 3 months following injury.  Discussed potential for occupational therapy if needed in the future to help with motion and strength.    2) Left wrist: Unlikely fracture given current exam and negative x-rays.  Discussed weaning splint.  We consider further workup pending clinical course.    3) Left Knee: Suspicious for nondisplaced fracture given history and exam, will obtain MRI to evaluate.  Discussed supportive care for prepatellar bursitis. Reviewed pathophysiology and natural course of this condition.  Discussed signs, symptoms and risks of infection. Avoid direct irritation and use compressive sleeve or ACE wrap as needed along with ice.    Plan:  - Today's Plan of Care:  MRI of the left knee--Call [phone] to schedule MRI  Knee rest, ice, compression, elevation  Elbow sling for comfort, wean and range of motion as tolerated  Wrist wean splint    -We also discussed other future treatment options:  Occupational therapy for elbow, MRI of wrist if symptoms don't improve    Follow Up: In clinic with Dr. Zacarias after MRI (wait at least 1-2 days)    Concerning signs and symptoms were  reviewed.  The patient expressed understanding of this management plan and all questions were answered at this time.    Sandra Zacarias MD [facility]  Primary Care Sports Medicine  [facility]    Again, thank you for allowing me to participate in the care of your patient.        Sincerely,        Sandra Zacarias MD

## 2018-05-09 NOTE — MR AVS SNAPSHOT
After Visit Summary   5/9/2018    Luz Elena Cristina    MRN: [id number]           Patient Information     Date Of Birth          1955        Visit Information        Provider Department      5/9/2018 1:20 PM Sandra Zacarias MD [facility]        Today's Diagnoses     Closed nondisplaced fracture of neck of left radius, initial encounter    -  1    Injury of left wrist, initial encounter        Injury of left knee, initial encounter        Prepatellar bursitis of left knee          Care Instructions    Plan:  - Today's Plan of Care:  MRI of the left knee--Call [phone] to schedule MRI  Knee rest, ice, compression, elevation  Elbow sling for comfort, wean and range of motion as tolerated  Wrist wean splint    -We also discussed other future treatment options:  Occupational therapy for elbow, MRI of wrist if symptoms don't improve    Follow Up: In clinic with Dr. Zacarias after MRI (wait at least 1-2 days)    If you have any further questions for your physician or physician s care team you can call [phone] and use option 3 to leave a voice message. Calls received during business hours will be returned same day.            Follow-ups after your visit        Future tests that were ordered for you today     Open Future Orders        Priority Expected Expires Ordered    MR Knee Left w/o Contrast Routine  5/9/2019 5/9/2018            Who to contact     If you have questions or need follow up information about today's clinic visit or your schedule please contact [facility] directly at [phone].  Normal or non-critical lab and imaging results will be communicated to you by MyChart, letter or phone within 4 business days after the clinic has received the results. If you do not hear from us within 7 days, please contact the clinic through MyChart or phone. If you have a critical or abnormal lab result, we will notify you by phone as  "soon as possible.  Submit refill requests through [employer] or call your pharmacy and they will forward the refill request to us. Please allow 3 business days for your refill to be completed.          Additional Information About Your Visit        [employer] Information     [employer] gives you secure access to your electronic health record. If you see a primary care provider, you can also send messages to your care team and make appointments. If you have questions, please call your primary care clinic.  If you do not have a primary care provider, please call [phone] and they will assist you.        Care EveryWhere ID     This is your Care EveryWhere ID. This could be used by other organizations to access your [place] medical records  [id number]        Your Vitals Were     Height BMI (Body Mass Index)                5' 5\" (1.651 m) 38.11 kg/m2           Blood Pressure from Last 3 Encounters:   05/09/18 124/78   05/01/18 129/80   05/01/18 124/74    Weight from Last 3 Encounters:   05/09/18 229 lb (103.9 kg)   05/01/18 229 lb (103.9 kg)   02/27/18 229 lb (103.9 kg)                 Today's Medication Changes          These changes are accurate as of 5/9/18  2:03 PM.  If you have any questions, ask your nurse or doctor.               These medicines have changed or have updated prescriptions.        Dose/Directions    tiotropium 18 MCG capsule   Commonly known as:  SPIRIVA HANDIHALER   This may have changed:    - how much to take  - how to take this  - when to take this  - additional instructions   Used for:  Uncomplicated severe persistent asthma        Inhale contents of one capsule daily.   Quantity:  30 capsule   Refills:  3                Primary Care Provider Office Phone # Fax #    Kiara Zazueta PA-C [phone] [place] [address]        Equal Access to Services     KEKE MONTEJO AH: camila Bennett, lulu myers " toshia dottieselina sreedharjelly lamarianashaka ah. So [facility] [phone].    ATENCIÓN: Si phanila juno, tiene a arriaza disposición servicios gratuitos de asistencia lingüística. Michael stewart [phone].    We comply with applicable federal civil rights laws and [place] laws. We do not discriminate on the basis of race, color, national origin, age, disability, sex, sexual orientation, or gender identity.            Thank you!     Thank you for choosing [facility]  for your care. Our goal is always to provide you with excellent care. Hearing back from our patients is one way we can continue to improve our services. Please take a few minutes to complete the written survey that you may receive in the mail after your visit with us. Thank you!             Your Updated Medication List - Protect others around you: Learn how to safely use, store and throw away your medicines at www.disposemymeds.org.          This list is accurate as of 5/9/18  2:03 PM.  Always use your most recent med list.                   Brand Name Dispense Instructions for use Diagnosis    ACCU-CHEK MARILUZ PLUS test strip   Generic drug:  blood glucose monitoring           * albuterol 108 (90 Base) MCG/ACT Inhaler    PROAIR HFA/PROVENTIL HFA/VENTOLIN HFA    3 Inhaler    Inhale 2 puffs into the lungs every 6 hours as needed for shortness of breath / dyspnea or wheezing    Intermittent asthma, uncomplicated       * albuterol (2.5 MG/3ML) 0.083% neb solution     50 vial    Take 1 vial (2.5 mg) by nebulization every 4 hours as needed for shortness of breath / dyspnea, wheezing or other (chest tightness, persistent cough)    Severe persistent asthma with (acute) exacerbation       aspirin 81 MG EC tablet      Take 81 mg by mouth daily        azelastine 0.1 % spray    ASTELIN    30 mL    Spray 2 sprays into both nostrils 2 times daily as needed for rhinitis or allergies    Postnasal drip       blood glucose monitoring meter device kit            clobetasol 0.05 % cream    TEMOVATE    30 g    Apply sparingly to affected area twice weekly as needed.  Do not apply to face.    Lichen sclerosus       fluticasone-salmeterol 230-21 MCG/ACT inhaler    ADVAIR-HFA    1 Inhaler    Inhale 2 puffs into the lungs 2 times daily    Uncomplicated severe persistent asthma       IBUPROFEN PO      Take by mouth 2 times daily as needed for moderate pain        losartan 50 MG tablet    COZAAR    90 tablet    TAKE 1 TABLET(50 MG) BY MOUTH DAILY    Benign essential hypertension       nebulizer/tubing/mouthpiece Kit     1 kit    Use with albuterol neb solution    Severe persistent asthma with (acute) exacerbation       tiotropium 18 MCG capsule    SPIRIVA HANDIHALER    30 capsule    Inhale contents of one capsule daily.    Uncomplicated severe persistent asthma       * Notice:  This list has 2 medication(s) that are the same as other medications prescribed for you. Read the directions carefully, and ask your doctor or other care provider to review them with you.

## 2018-05-09 NOTE — PATIENT INSTRUCTIONS
Plan:  - Today's Plan of Care:  MRI of the left knee--Call [phone] to schedule MRI  Knee rest, ice, compression, elevation  Elbow sling for comfort, wean and range of motion as tolerated  Wrist wean splint    -We also discussed other future treatment options:  Occupational therapy for elbow, MRI of wrist if symptoms don't improve    Follow Up: In clinic with Dr. Zacarias after MRI (wait at least 1-2 days)    If you have any further questions for your physician or physician s care team you can call [phone] and use option 3 to leave a voice message. Calls received during business hours will be returned same day.

## 2018-05-09 NOTE — PROGRESS NOTES
Sports Medicine Clinic Visit    PCP: Kiara Zazueta    Luz Elena Cristina is a 63 year old female who is seen  as an ER referral presenting with left sided injuries    Injury: Patient reports an injury last week, she tripped going into the barn at her home and ran face first into a bobcat causing injuries to her nose/face, left arm, and bilateral knees. Was evaluated and treated in the ED. Reports her left elbow and wrist are slowly improving, however her left knee seems to be more swollen and painful over the past few days. Denied any new/additional injury.    1) Left elbow radial side, no swelling or bruising  2) Left wrist: mild dorsal pain  3) Left knee worst: medial and lateral pain along with significant swelling.    Location of Pain: left elbow, left wrist, left knee  Duration of Pain: 5/1/18  Rating of Pain at worst: 10/10  Rating of Pain Currently: 7/10  Symptoms are better with: Ice, Rest, Elevation and bracing  Symptoms are worse with: movement of wrist and elbow, knee direct pressure, weightbearing, flexion/extension  Additional Features:   Positive: knee swelling, bruising   Negative: popping, grinding, catching, locking, instability, paresthesias, numbness and weakness  Other evaluation and/or treatments so far consists of: Ice, Rest, Elevation and bracing/sling  Prior History of related problems: None    Social History: Retired    Review of Systems  Skin:yes bruising, yes swelling  Musculoskeletal: as above  Neurologic: no numbness, paresthesias  Remainder of review of systems is negative including constitutional, CV, pulmonary, GI, except as noted in HPI or medical history.    Patient's current problem list, past medical and surgical history, and family history were reviewed.    Patient Active Problem List   Diagnosis     Severe persistent asthma     Lichen sclerosus     Benign essential hypertension     Elevated glucose     Family history of diabetes mellitus     Severe persistent asthma with  "(acute) exacerbation     Severe obesity (BMI 35.0-39.9) with comorbidity (H)     At risk for cardiovascular event     Past Medical History:   Diagnosis Date     Hypertension      Uncomplicated asthma      Past Surgical History:   Procedure Laterality Date     BIOPSY      breast,     COLONOSCOPY       GYN SURGERY       TONSILLECTOMY       Family History   Problem Relation Age of Onset     Other Cancer Mother      panreatic     DIABETES Mother      Breast Cancer Sister      Skin Cancer Sister      CEREBROVASCULAR DISEASE Father      CVA     Chronic Obstructive Pulmonary Disease Father      Aneurysm Paternal Grandmother      Unknown/Adopted Paternal Grandfather      Alzheimer Disease Brother      early onset     DIABETES Son      Thyroid Disease Son      Breast Cancer Paternal Aunt      DIABETES Son      Breast Cancer Sister        Objective  /78 (BP Location: Right arm, Patient Position: Sitting, Cuff Size: Adult Large)  Ht 5' 5\" (1.651 m)  Wt 229 lb (103.9 kg)  BMI 38.11 kg/m2    GENERAL APPEARANCE: healthy, alert and no distress   GAIT: antalgic  SKIN: no suspicious lesions or rashes  HEENT: Sclera clear, anicteric  CV: good peripheral pulses  RESP: Breathing not labored  NEURO: Normal strength and tone, mentation intact and speech normal  PSYCH:  mentation appears normal and affect normal/bright    Bilateral Elbow exam:  Inspection:     no ecchymosis       no edema or effusion    Tender:     none    Non-Tender:      remainder of the elbow bilaterally    ROM:      flexion 80 left       extension 130 left       forearm supination minimal left       forearm pronation minimal left    Strength:     pain with resisted strength testing left though able to resist    Sensation:     intact throughout hand       intact throughout forearm    Bilateral Wrist and Hand exam  Inspection:       No swelling, bruising or deformity bilateral    Tender:       Mild distal ulnar tenderness    Non Tender:       distal radius left " and      anatomic snuffbox left    ROM:       Slightly limited due to pain    Strength:       Strength testing irritates elbow    Neurovascular:       2+ radial pulses bilaterally with brisk capillary refill and      normal sensation to light touch in the radial, median and ulnar nerve distributions    Bilateral Knee exam  Inspection:      significant swelling, bruising, effusion on left    Patella:      Mobility -       hypomobile left    Tender:      Throughout patella and pre-patellar bursa, medial and lateral joint lines    Non Tender:      remainder of knee area bilateral    Knee ROM:      Flexion 80 degrees left       Extension 5 degrees left    Strength:      Limited testing due to pain    Special Tests:     *difficult exam due to patient guarding, stable to varus and valgus stress    Gait:      antalgic gait    Neurovascular:      2+ peripheral pulses bilaterally and brisk capillary refill       sensation grossly intact    Radiology  I ordered, visualized and reviewed these images with the patient  Elbow XR, 2 view, left    Narrative    LEFT ELBOW TWO VIEWS  5/1/2018 4:29 PM      HISTORY: Pain, trauma.    COMPARISON: None.      Impression    IMPRESSION: Minimally displaced radial head fracture. Joint effusion.    TOMASA LIM MD   XR Wrist Left G/E 3 Views    Narrative    LEFT WRIST THREE OR MORE VIEWS   5/1/2018 4:29 PM     HISTORY: Trauma, pain.    COMPARISON: None.       Impression    IMPRESSION: No acute fracture or dislocation. Arthritis at the thumb  carpometacarpal joint.    TOMASA LIM MD   XR Knee Left 3 Views    Narrative    LEFT KNEE THREE VIEWS   5/1/2018 4:28 PM     HISTORY: Trauma, pain. Please include sunrise view.    COMPARISON: None.       Impression    IMPRESSION: No acute fracture or dislocation. Soft tissue swelling  anterior to the patella.    TOMASA LIM MD       Assessment:  1. Closed nondisplaced fracture of neck of left radius, initial encounter    2. Injury of left wrist,  initial encounter    3. Injury of left knee, initial encounter    4. Prepatellar bursitis of left knee      1) Left elbow: Nondisplaced proximal radial neck fracture.  Reviewed images and discussed diagnosis. Given nondisplaced fracture and stable injury, I recommend a brief period of immobilization in a splint and/or sling for 1-2 weeks followed by gentle range of motion as pain allows.  Discussed that elbow stiffness, particularly in extension, may persist following this injury.  Discussed limiting lifting and weightbearing with this arm for likely 3 months following injury.  Discussed potential for occupational therapy if needed in the future to help with motion and strength.    2) Left wrist: Unlikely fracture given current exam and negative x-rays.  Discussed weaning splint.  We consider further workup pending clinical course.    3) Left Knee: Suspicious for nondisplaced fracture given history and exam, will obtain MRI to evaluate.  Discussed supportive care for prepatellar bursitis. Reviewed pathophysiology and natural course of this condition.  Discussed signs, symptoms and risks of infection. Avoid direct irritation and use compressive sleeve or ACE wrap as needed along with ice.    Plan:  - Today's Plan of Care:  MRI of the left knee--Call [phone] to schedule MRI  Knee rest, ice, compression, elevation  Elbow sling for comfort, wean and range of motion as tolerated  Wrist wean splint    -We also discussed other future treatment options:  Occupational therapy for elbow, MRI of wrist if symptoms don't improve    Follow Up: In clinic with Dr. Zacarias after MRI (wait at least 1-2 days)    Concerning signs and symptoms were reviewed.  The patient expressed understanding of this management plan and all questions were answered at this time.    Sandra Zacarias MD [facility]  Primary Care Sports Medicine  [facility]

## 2018-05-10 ENCOUNTER — HOSPITAL ENCOUNTER (OUTPATIENT)
Dept: MRI IMAGING | Facility: CLINIC | Age: 63
Discharge: HOME | End: 2018-05-10
Attending: PEDIATRICS | Admitting: PEDIATRICS
Payer: MEDICARE

## 2018-05-10 DIAGNOSIS — S89.92XA INJURY OF LEFT KNEE, INITIAL ENCOUNTER: ICD-10-CM

## 2018-05-10 PROCEDURE — 73721 MRI JNT OF LWR EXTRE W/O DYE: CPT | Mod: LT

## 2018-05-14 ENCOUNTER — OFFICE VISIT (OUTPATIENT)
Dept: ORTHOPEDICS | Facility: CLINIC | Age: 63
End: 2018-05-14
Payer: MEDICARE

## 2018-05-14 VITALS
WEIGHT: 229 LBS | HEIGHT: 65 IN | DIASTOLIC BLOOD PRESSURE: 74 MMHG | BODY MASS INDEX: 38.15 KG/M2 | SYSTOLIC BLOOD PRESSURE: 128 MMHG

## 2018-05-14 DIAGNOSIS — M70.42 PREPATELLAR BURSITIS OF LEFT KNEE: ICD-10-CM

## 2018-05-14 DIAGNOSIS — S52.135A CLOSED NONDISPLACED FRACTURE OF NECK OF LEFT RADIUS, INITIAL ENCOUNTER: ICD-10-CM

## 2018-05-14 DIAGNOSIS — S89.92XA INJURY OF LEFT KNEE, INITIAL ENCOUNTER: Primary | ICD-10-CM

## 2018-05-14 PROCEDURE — 99213 OFFICE O/P EST LOW 20 MIN: CPT | Performed by: PEDIATRICS

## 2018-05-14 NOTE — PATIENT INSTRUCTIONS
Plan:  - Today's Plan of Care:  Knee: Rehab: Home Exercise Program  Knee: Continue rest, ice, compression, elevation  Elbow: Sling for comfort, range of motion as tolerated, limit lifting    Follow Up: 2 weeks with elbow xray    If you have any further questions for your physician or physician s care team you can call [phone] and use option 3 to leave a voice message. Calls received during business hours will be returned same day.    Heel Slides    This exercise is for an injured right knee. Switch sides if the injury is to your left knee.  1. Sit on the floor with your legs straight in front of you.  2. Slide your right heel along the floor toward you, bending your knee and keeping your foot flexed.  3. Move it as close to you as you comfortably can. Hold for 5 to 10 seconds. Then slide your heel back.  4. Repeat 5 times, or as instructed.     Tip: If you re sitting on carpet, put a plastic bag under your heel to make it slide more easily. If you re sitting on a hard floor, put a small towel under your heel.   Date Last Reviewed: 3/10/2016    4607-0000 [employer]. [address] [place] [address]. All rights reserved. This information is not intended as a substitute for professional medical care. Always follow your healthcare professional's instructions.        Quad Set for Leg and Knee    This exercise is designed to stretch and strengthen your knee. Before beginning, read through all the instructions. While exercising, breathe normally and use smooth movements. If you feel any pain, stop the exercise. If pain persists, call your healthcare provider.  1.  Sit on the floor with one leg straight, the other bent.  2.  Flex the foot of your straight leg by pointing your toes toward you. Press the back of your knee into the floor while tightening the muscle on the top of your thigh. Hold for ______ seconds. Then relax.  3.  Repeat ______ times. Do ______ sets a day.  Caution    Don t arch  your back.    Don t hunch your shoulders.  Date Last Reviewed: 9/20/2015 2000-2017 [employer]. [address] [place] [address]. All rights reserved. This information is not intended as a substitute for professional medical care. Always follow your healthcare professional's instructions.        Knee Rehabilitation: Straight Leg Raises    Begin your rehabilitation with exercises that develop muscle control. These help you meet basic goals, like driving a car or going back to work. Exercise as often as you re advised. But stop right away if any exercise causes sharp or increasing pain. Icing your knee for 15 to 20 minutes after exercise can help prevent swelling and soreness.    Sit or lie on the floor with your injured leg straight and the other leg bent. Point the toes on your injured leg straight up.    Raise your injured leg a few inches off the floor.    Hold for 10 seconds. Repeat 5 times. Rest for a minute, and then do another set. Do 2 to 3 sessions per day.  Note: Do this exercise with toes turned out to strengthen inner thigh muscles.  Date Last Reviewed: 8/16/2015 2000-2017 [employer]. [address] [place] [address]. All rights reserved. This information is not intended as a substitute for professional medical care. Always follow your healthcare professional's instructions.

## 2018-05-14 NOTE — PROGRESS NOTES
Sports Medicine Clinic Visit - Interim History May 14, 2018    PCP: Kiara Zazueta    Luz Elena Cristina is a 63 year old female who is seen in f/u up for    Injury of left knee, initial encounter  Prepatellar bursitis of left knee  Closed nondisplaced fracture of neck of left radius, initial encounter.  Since last visit on 5/9/18, patient has had an MRI or the left knee and is here to review results and recommendations. Reports swelling and bruising has improved in knee. Has weaned out of sling and brace for elbow and wrist.    Symptoms are better with: Ice, Rest, Elevation and elevation  Symptoms are worse with: direct pressure on kneecap, weightbearing, flexion/extension  Additional Features:   Positive: swelling and bruising   Negative: popping, grinding, catching, locking, instability, paresthesias, numbness and weakness    Social History: Retired    Review of Systems  Skin: knee bruising, knee swelling  Musculoskeletal: as above  Neurologic: no numbness, paresthesias  Remainder of review of systems is negative including constitutional, CV, pulmonary, GI, except as noted in HPI or medical history.    Patient's current problem list, past medical and surgical history, and family history were reviewed.    Patient Active Problem List   Diagnosis     Severe persistent asthma     Lichen sclerosus     Benign essential hypertension     Elevated glucose     Family history of diabetes mellitus     Severe persistent asthma with (acute) exacerbation     Severe obesity (BMI 35.0-39.9) with comorbidity (H)     At risk for cardiovascular event     Past Medical History:   Diagnosis Date     Hypertension      Uncomplicated asthma      Past Surgical History:   Procedure Laterality Date     BIOPSY      breast,     COLONOSCOPY       GYN SURGERY       TONSILLECTOMY       Family History   Problem Relation Age of Onset     Other Cancer Mother      panreatic     DIABETES Mother      Breast Cancer Sister      Skin Cancer Sister       "CEREBROVASCULAR DISEASE Father      CVA     Chronic Obstructive Pulmonary Disease Father      Aneurysm Paternal Grandmother      Unknown/Adopted Paternal Grandfather      Alzheimer Disease Brother      early onset     DIABETES Son      Thyroid Disease Son      Breast Cancer Paternal Aunt      DIABETES Son      Breast Cancer Sister        Objective  /74 (BP Location: Right arm, Patient Position: Sitting, Cuff Size: Adult Large)  Ht 5' 5\" (1.651 m)  Wt 229 lb (103.9 kg)  BMI 38.11 kg/m2    GENERAL APPEARANCE: healthy, alert and no distress   GAIT: antalgic  SKIN: no suspicious lesions or rashes  HEENT: Sclera clear, anicteric  CV: good peripheral pulses  RESP: Breathing not labored  NEURO: Normal strength and tone, mentation intact and speech normal  PSYCH:  mentation appears normal and affect normal/bright    Bilateral Elbow exam:  Inspection:     no ecchymosis       no edema or effusion     Tender:     none     Non-Tender:      remainder of the elbow bilaterally     ROM:      flexion 130 left       extension 5 left       forearm supination improved left       forearm pronation improved left     Strength:     pain with resisted strength testing left though able to resist     Sensation:     intact throughout hand       intact throughout forearm     Bilateral Knee exam  Inspection:      improved swelling, bruising, mild effusion - left     Patella:      Mobility -       hypomobile left     Tender:      Throughout patella and pre-patellar bursa, medial and lateral joint lines     Non Tender:      remainder of knee area bilateral     Knee ROM:      Flexion 90 degrees left       Extension 10 degrees left     Strength:      4/5 with knee extension  - SLR intact     Special Tests:     *difficult exam due to patient guarding, stable to varus and valgus stress     Gait:      antalgic gait     Neurovascular:      2+ peripheral pulses bilaterally and brisk capillary refill       sensation grossly intact    Radiology  I " ordered, visualized and reviewed these images with the patient    MR KNEE LEFT WITHOUT CONTRAST   5/10/2018 9:55 AM     HISTORY:  One week of left knee pain and swelling after falling.     COMPARISON: Radiographs on 5/1/2018.     TECHNIQUE: Multiplanar MR imaging was performed without contrast.     FINDINGS:      Medial Meniscus: No tear, displaced fragment, or extrusion.     Lateral Meniscus: No tear, displaced fragment, or extrusion.     Anterior Cruciate Ligament: Unremarkable.     Posterior Cruciate Ligament: Unremarkable.     Medial Collateral Ligament: Unremarkable.     Lateral Collateral Ligament Complex, Popliteus Tendon: The iliotibial  band, fibular collateral ligament, biceps femoris tendon, and  popliteus tendon are unremarkable.     Osseous and Cartilaginous Structures: No fracture or osseous lesion is  demonstrated. There is moderate marrow edema in the medial aspect of  the patella. No other abnormal marrow signal intensity is seen. There  is grade 2 chondromalacia throughout the patellofemoral articulation.  The remaining cartilage surfaces are well preserved.     Extensor Mechanism: The quadriceps and patellar tendons are  unremarkable. The medial and lateral patellar retinacula appear  unremarkable.     Joint Space: Small joint effusion. No definite loose bodies  appreciated.     Additional Findings: No Baker's cyst. No semimembranosus-tibial  collateral ligament or pes anserine bursitis. There is a prominent  well-defined mass in the subcutaneous tissues anterior to the patella  and patellar tendon. This measures approximately 8.4 cm craniocaudal x  6.6 cm transverse x up to 2.3 cm AP. This is moderately heterogeneous  with moderate increased signal on the T2 fat-suppressed sequences with  signal intensity slightly decreased relative to the surrounding fat on  the other pulse sequences. No other soft tissue abnormality is  demonstrated.         IMPRESSION:   1. There is an 8.4 cm long mass in the  subcutaneous tissues anterior  to the patella and patellar tendon. Given the history of recent  injury, this is highly suggestive of a hematoma or prepatellar  bursitis.  2. Bone contusion of the medial patella.  3. Mild degenerative changes of the patellofemoral joint.     LEIGH GRIFFIN MD    Assessment:  1. Injury of left knee, initial encounter    2. Prepatellar bursitis of left knee    3. Closed nondisplaced fracture of neck of left radius, initial encounter      1) Left Knee: No evidence of fracture, improving prepatellar bursitis. Discussed supportive care for prepatellar bursitis. Reviewed pathophysiology and natural course of this condition.  Discussed signs, symptoms and risks of infection. Avoid direct irritation and use compressive sleeve or ACE wrap as needed along with ice.    2) Left elbow: Nondisplaced proximal radial neck fracture.  Reviewed images and discussed diagnosis. Given nondisplaced fracture and stable injury, I recommend a brief period of immobilization in a splint and/or sling for 1-2 weeks followed by gentle range of motion as pain allows.  Discussed that elbow stiffness, particularly in extension, may persist following this injury.  Discussed limiting lifting and weightbearing with this arm for likely 3 months following injury.  Discussed potential for occupational therapy if needed in the future to help with motion and strength.    Plan:  - Today's Plan of Care:  Knee: Rehab: Home Exercise Program  Knee: Continue rest, ice, compression, elevation  Elbow: Sling for comfort, range of motion as tolerated, limit lifting    Follow Up: 2 weeks with elbow xray    Concerning signs and symptoms were reviewed.  The patient expressed understanding of this management plan and all questions were answered at this time.    Sandra Zacarias MD [facility]  Primary Care Sports Medicine  [facility]

## 2018-05-14 NOTE — LETTER
5/14/2018         RE: Luz Elena Cristina  [id number]  [place] [facility] [id number]        Dear Colleague,    Thank you for referring your patient, Luz Elena Cristina, to the [facility]. Please see a copy of my visit note below.    Sports Medicine Clinic Visit - Interim History May 14, 2018    PCP: Kiara Zazueta    Luz Elena Cristina is a 63 year old female who is seen in f/u up for    Injury of left knee, initial encounter  Prepatellar bursitis of left knee  Closed nondisplaced fracture of neck of left radius, initial encounter.  Since last visit on 5/9/18, patient has had an MRI or the left knee and is here to review results and recommendations. Reports swelling and bruising has improved in knee. Has weaned out of sling and brace for elbow and wrist.    Symptoms are better with: Ice, Rest, Elevation and elevation  Symptoms are worse with: direct pressure on kneecap, weightbearing, flexion/extension  Additional Features:   Positive: swelling and bruising   Negative: popping, grinding, catching, locking, instability, paresthesias, numbness and weakness    Social History: Retired    Review of Systems  Skin: knee bruising, knee swelling  Musculoskeletal: as above  Neurologic: no numbness, paresthesias  Remainder of review of systems is negative including constitutional, CV, pulmonary, GI, except as noted in HPI or medical history.    Patient's current problem list, past medical and surgical history, and family history were reviewed.    Patient Active Problem List   Diagnosis     Severe persistent asthma     Lichen sclerosus     Benign essential hypertension     Elevated glucose     Family history of diabetes mellitus     Severe persistent asthma with (acute) exacerbation     Severe obesity (BMI 35.0-39.9) with comorbidity (H)     At risk for cardiovascular event     Past Medical History:   Diagnosis Date     Hypertension      Uncomplicated asthma      Past Surgical History:   Procedure  "Laterality Date     BIOPSY      breast,     COLONOSCOPY       GYN SURGERY       TONSILLECTOMY       Family History   Problem Relation Age of Onset     Other Cancer Mother      panreatic     DIABETES Mother      Breast Cancer Sister      Skin Cancer Sister      CEREBROVASCULAR DISEASE Father      CVA     Chronic Obstructive Pulmonary Disease Father      Aneurysm Paternal Grandmother      Unknown/Adopted Paternal Grandfather      Alzheimer Disease Brother      early onset     DIABETES Son      Thyroid Disease Son      Breast Cancer Paternal Aunt      DIABETES Son      Breast Cancer Sister        Objective  /74 (BP Location: Right arm, Patient Position: Sitting, Cuff Size: Adult Large)  Ht 5' 5\" (1.651 m)  Wt 229 lb (103.9 kg)  BMI 38.11 kg/m2    GENERAL APPEARANCE: healthy, alert and no distress   GAIT: antalgic  SKIN: no suspicious lesions or rashes  HEENT: Sclera clear, anicteric  CV: good peripheral pulses  RESP: Breathing not labored  NEURO: Normal strength and tone, mentation intact and speech normal  PSYCH:  mentation appears normal and affect normal/bright    Bilateral Elbow exam:  Inspection:     no ecchymosis       no edema or effusion     Tender:     none     Non-Tender:      remainder of the elbow bilaterally     ROM:      flexion  130 left       extension  5 left       forearm supination improved left       forearm pronation improved left     Strength:     pain with resisted strength testing left though able to resist     Sensation:     intact throughout hand       intact throughout forearm     Bilateral Knee exam  Inspection:      improved swelling, bruising, mild effusion - left     Patella:      Mobility -       hypomobile left     Tender:      Throughout patella and pre-patellar bursa, medial and lateral joint lines     Non Tender:      remainder of knee area bilateral     Knee ROM:      Flexion  90 degrees left       Extension  10 degrees left     Strength:       4/5 with knee extension  - " SLR intact     Special Tests:     *difficult exam due to patient guarding, stable to varus and valgus stress     Gait:      antalgic gait     Neurovascular:      2+ peripheral pulses bilaterally and brisk capillary refill       sensation grossly intact    Radiology  I ordered, visualized and reviewed these images with the patient    MR KNEE LEFT WITHOUT CONTRAST   5/10/2018 9:55 AM     HISTORY:  One week of left knee pain and swelling after falling.     COMPARISON: Radiographs on 5/1/2018.     TECHNIQUE: Multiplanar MR imaging was performed without contrast.     FINDINGS:      Medial Meniscus: No tear, displaced fragment, or extrusion.     Lateral Meniscus: No tear, displaced fragment, or extrusion.     Anterior Cruciate Ligament: Unremarkable.     Posterior Cruciate Ligament: Unremarkable.     Medial Collateral Ligament: Unremarkable.     Lateral Collateral Ligament Complex, Popliteus Tendon: The iliotibial  band, fibular collateral ligament, biceps femoris tendon, and  popliteus tendon are unremarkable.     Osseous and Cartilaginous Structures: No fracture or osseous lesion is  demonstrated. There is moderate marrow edema in the medial aspect of  the patella. No other abnormal marrow signal intensity is seen. There  is grade 2 chondromalacia throughout the patellofemoral articulation.  The remaining cartilage surfaces are well preserved.     Extensor Mechanism: The quadriceps and patellar tendons are  unremarkable. The medial and lateral patellar retinacula appear  unremarkable.     Joint Space: Small joint effusion. No definite loose bodies  appreciated.     Additional Findings: No Baker's cyst. No semimembranosus-tibial  collateral ligament or pes anserine bursitis. There is a prominent  well-defined mass in the subcutaneous tissues anterior to the patella  and patellar tendon. This measures approximately 8.4 cm craniocaudal x  6.6 cm transverse x up to 2.3 cm AP. This is moderately heterogeneous  with moderate  increased signal on the T2 fat-suppressed sequences with  signal intensity slightly decreased relative to the surrounding fat on  the other pulse sequences. No other soft tissue abnormality is  demonstrated.         IMPRESSION:   1. There is an 8.4 cm long mass in the subcutaneous tissues anterior  to the patella and patellar tendon. Given the history of recent  injury, this is highly suggestive of a hematoma or prepatellar  bursitis.  2. Bone contusion of the medial patella.  3. Mild degenerative changes of the patellofemoral joint.     LEIGH GRIFFIN MD    Assessment:  1. Injury of left knee, initial encounter    2. Prepatellar bursitis of left knee    3. Closed nondisplaced fracture of neck of left radius, initial encounter      1) Left Knee: No evidence of fracture, improving prepatellar bursitis. Discussed supportive care for prepatellar bursitis. Reviewed pathophysiology and natural course of this condition.  Discussed signs, symptoms and risks of infection. Avoid direct irritation and use compressive sleeve or ACE wrap as needed along with ice.    2) Left elbow: Nondisplaced proximal radial neck fracture.  Reviewed images and discussed diagnosis. Given nondisplaced fracture and stable injury, I recommend a brief period of immobilization in a splint and/or sling for 1-2 weeks followed by gentle range of motion as pain allows.  Discussed that elbow stiffness, particularly in extension, may persist following this injury.  Discussed limiting lifting and weightbearing with this arm for likely 3 months following injury.  Discussed potential for occupational therapy if needed in the future to help with motion and strength.    Plan:  - Today's Plan of Care:  Knee: Rehab: Home Exercise Program  Knee: Continue rest, ice, compression, elevation  Elbow: Sling for comfort, range of motion as tolerated, limit lifting    Follow Up: 2 weeks with elbow xray    Concerning signs and symptoms were reviewed.  The patient  expressed understanding of this management plan and all questions were answered at this time.    Sandra Zacarias MD [facility]  Primary Care Sports Medicine  [facility]      Again, thank you for allowing me to participate in the care of your patient.        Sincerely,        Sandra Zacarias MD

## 2018-05-14 NOTE — MR AVS SNAPSHOT
After Visit Summary   5/14/2018    Luz Elena Cristina    MRN: [id number]           Patient Information     Date Of Birth          1955        Visit Information        Provider Department      5/14/2018 10:20 AM Sandra Zacarias MD [facility]        Today's Diagnoses     Closed nondisplaced fracture of neck of left radius, initial encounter    -  1    Injury of left knee, initial encounter        Prepatellar bursitis of left knee          Care Instructions    Plan:  - Today's Plan of Care:  Knee: Rehab: Home Exercise Program  Knee: Continue rest, ice, compression, elevation  Elbow: Sling for comfort, range of motion as tolerated, limit lifting    Follow Up: 2 weeks with elbow xray    If you have any further questions for your physician or physician s care team you can call [phone] and use option 3 to leave a voice message. Calls received during business hours will be returned same day.    Heel Slides    This exercise is for an injured right knee. Switch sides if the injury is to your left knee.  1. Sit on the floor with your legs straight in front of you.  2. Slide your right heel along the floor toward you, bending your knee and keeping your foot flexed.  3. Move it as close to you as you comfortably can. Hold for 5 to 10 seconds. Then slide your heel back.  4. Repeat 5 times, or as instructed.     Tip: If you re sitting on carpet, put a plastic bag under your heel to make it slide more easily. If you re sitting on a hard floor, put a small towel under your heel.   Date Last Reviewed: 3/10/2016    8252-0508 [employer]. [address] [place] [address]. All rights reserved. This information is not intended as a substitute for professional medical care. Always follow your healthcare professional's instructions.        Quad Set for Leg and Knee    This exercise is designed to stretch and strengthen your knee. Before beginning, read through all  the instructions. While exercising, breathe normally and use smooth movements. If you feel any pain, stop the exercise. If pain persists, call your healthcare provider.  1.  Sit on the floor with one leg straight, the other bent.  2.  Flex the foot of your straight leg by pointing your toes toward you. Press the back of your knee into the floor while tightening the muscle on the top of your thigh. Hold for ______ seconds. Then relax.  3.  Repeat ______ times. Do ______ sets a day.  Caution    Don t arch your back.    Don t hunch your shoulders.  Date Last Reviewed: 9/20/2015 2000-2017 [employer]. [address] [place] [address]. All rights reserved. This information is not intended as a substitute for professional medical care. Always follow your healthcare professional's instructions.        Knee Rehabilitation: Straight Leg Raises    Begin your rehabilitation with exercises that develop muscle control. These help you meet basic goals, like driving a car or going back to work. Exercise as often as you re advised. But stop right away if any exercise causes sharp or increasing pain. Icing your knee for 15 to 20 minutes after exercise can help prevent swelling and soreness.    Sit or lie on the floor with your injured leg straight and the other leg bent. Point the toes on your injured leg straight up.    Raise your injured leg a few inches off the floor.    Hold for 10 seconds. Repeat 5 times. Rest for a minute, and then do another set. Do 2 to 3 sessions per day.  Note: Do this exercise with toes turned out to strengthen inner thigh muscles.  Date Last Reviewed: 8/16/2015 2000-2017 [employer]. [address] [place] [address]. All rights reserved. This information is not intended as a substitute for professional medical care. Always follow your healthcare professional's instructions.                Follow-ups after your visit        Who to contact     If you have  "questions or need follow up information about today's clinic visit or your schedule please contact [facility] directly at [phone].  Normal or non-critical lab and imaging results will be communicated to you by MyChart, letter or phone within 4 business days after the clinic has received the results. If you do not hear from us within 7 days, please contact the clinic through [employer] or phone. If you have a critical or abnormal lab result, we will notify you by phone as soon as possible.  Submit refill requests through [employer] or call your pharmacy and they will forward the refill request to us. Please allow 3 business days for your refill to be completed.          Additional Information About Your Visit        [employer] Information     [employer] gives you secure access to your electronic health record. If you see a primary care provider, you can also send messages to your care team and make appointments. If you have questions, please call your primary care clinic.  If you do not have a primary care provider, please call [phone] and they will assist you.        Care EveryWhere ID     This is your Care EveryWhere ID. This could be used by other organizations to access your [facility] medical records  [id number]        Your Vitals Were     Height BMI (Body Mass Index)                5' 5\" (1.651 m) 38.11 kg/m2           Blood Pressure from Last 3 Encounters:   05/14/18 128/74   05/09/18 124/78   05/01/18 129/80    Weight from Last 3 Encounters:   05/14/18 229 lb (103.9 kg)   05/09/18 229 lb (103.9 kg)   05/01/18 229 lb (103.9 kg)              Today, you had the following     No orders found for display         Today's Medication Changes          These changes are accurate as of 5/14/18 10:55 AM.  If you have any questions, ask your nurse or doctor.               These medicines have changed or have updated prescriptions.        Dose/Directions    tiotropium 18 MCG capsule   Commonly " known as:  KENDRICK VERA   This may have changed:    - how much to take  - how to take this  - when to take this  - additional instructions   Used for:  Uncomplicated severe persistent asthma        Inhale contents of one capsule daily.   Quantity:  30 capsule   Refills:  3                Primary Care Provider Office Phone # Fax #    Kiara Zazueta PA-C [phone] [address] [place] [address]        Equal Access to Services     [facility] : Hadii aad ku hadasho Soomaali, waaxda luqadaha, qaybta kaalmada adeegyada, waxay idiin hayaan adeeg khaspenroselia lafabián . So [facility] [phone].    ATENCIÓN: Si angie fraire, tiene a arriaza disposición servicios gratuitos de asistencia lingüística. [facility] [phone].    We comply with applicable federal civil rights laws and [place] laws. We do not discriminate on the basis of race, color, national origin, age, disability, sex, sexual orientation, or gender identity.            Thank you!     Thank you for choosing [facility]  for your care. Our goal is always to provide you with excellent care. Hearing back from our patients is one way we can continue to improve our services. Please take a few minutes to complete the written survey that you may receive in the mail after your visit with us. Thank you!             Your Updated Medication List - Protect others around you: Learn how to safely use, store and throw away your medicines at www.disposemymeds.org.          This list is accurate as of 5/14/18 10:55 AM.  Always use your most recent med list.                   Brand Name Dispense Instructions for use Diagnosis    ACCU-CHEK MARILUZ PLUS test strip   Generic drug:  blood glucose monitoring           * albuterol 108 (90 Base) MCG/ACT Inhaler    PROAIR HFA/PROVENTIL HFA/VENTOLIN HFA    3 Inhaler    Inhale 2 puffs into the lungs every 6 hours as needed for shortness of breath / dyspnea or wheezing     Intermittent asthma, uncomplicated       * albuterol (2.5 MG/3ML) 0.083% neb solution     50 vial    Take 1 vial (2.5 mg) by nebulization every 4 hours as needed for shortness of breath / dyspnea, wheezing or other (chest tightness, persistent cough)    Severe persistent asthma with (acute) exacerbation       aspirin 81 MG EC tablet      Take 81 mg by mouth daily        azelastine 0.1 % spray    ASTELIN    30 mL    Spray 2 sprays into both nostrils 2 times daily as needed for rhinitis or allergies    Postnasal drip       blood glucose monitoring meter device kit           clobetasol 0.05 % cream    TEMOVATE    30 g    Apply sparingly to affected area twice weekly as needed.  Do not apply to face.    Lichen sclerosus       fluticasone-salmeterol 230-21 MCG/ACT inhaler    ADVAIR-HFA    1 Inhaler    Inhale 2 puffs into the lungs 2 times daily    Uncomplicated severe persistent asthma       IBUPROFEN PO      Take by mouth 2 times daily as needed for moderate pain        losartan 50 MG tablet    COZAAR    90 tablet    TAKE 1 TABLET(50 MG) BY MOUTH DAILY    Benign essential hypertension       nebulizer/tubing/mouthpiece Kit     1 kit    Use with albuterol neb solution    Severe persistent asthma with (acute) exacerbation       tiotropium 18 MCG capsule    SPIRIVA HANDIHALER    30 capsule    Inhale contents of one capsule daily.    Uncomplicated severe persistent asthma       * Notice:  This list has 2 medication(s) that are the same as other medications prescribed for you. Read the directions carefully, and ask your doctor or other care provider to review them with you.

## 2018-05-18 ENCOUNTER — OFFICE VISIT (OUTPATIENT)
Dept: ORTHOPEDICS | Facility: CLINIC | Age: 63
End: 2018-05-18
Payer: MEDICARE

## 2018-05-18 ENCOUNTER — TELEPHONE (OUTPATIENT)
Dept: ORTHOPEDICS | Facility: CLINIC | Age: 63
End: 2018-05-18

## 2018-05-18 VITALS
SYSTOLIC BLOOD PRESSURE: 125 MMHG | HEIGHT: 65 IN | WEIGHT: 228 LBS | DIASTOLIC BLOOD PRESSURE: 70 MMHG | BODY MASS INDEX: 37.99 KG/M2

## 2018-05-18 DIAGNOSIS — M70.42 PREPATELLAR BURSITIS OF LEFT KNEE: ICD-10-CM

## 2018-05-18 DIAGNOSIS — S89.92XD INJURY OF LEFT KNEE, SUBSEQUENT ENCOUNTER: ICD-10-CM

## 2018-05-18 DIAGNOSIS — M71.50 TRAUMATIC BURSITIS: Primary | ICD-10-CM

## 2018-05-18 PROCEDURE — 20611 DRAIN/INJ JOINT/BURSA W/US: CPT | Mod: LT | Performed by: FAMILY MEDICINE

## 2018-05-18 NOTE — TELEPHONE ENCOUNTER
Spoke with patient and reviewed recommendations per Dr. Zacarias's note. She would like to try and have the knee drained by Dr. Smith today in [place]. Let Mario Mittal ATC know to expect her around 11:20 however may be late as she is coming from [place]. No further questions.    Graeme Jefferson, ATC

## 2018-05-18 NOTE — TELEPHONE ENCOUNTER
Spoke with patient to see how procedure went this morning and how she is feeling. Reports she is doing very well and feels much better. No further questions.    Graeme Jefferson, ATC

## 2018-05-18 NOTE — PROGRESS NOTES
Luz Elena Cristina  :  1955  DOS: 2018  MRN: [id number]    Sports Medicine Clinic Procedure    Ultrasound Guided Left Intra-Articular Knee Prepatellar Bursa Aspiration    Clinical History: Patient tripped in barn, stumbling and hitting left knee on skidsteer bucket or ground ~ 18 days ago.  Swelling and possible hematoma noted over anterior knee.  Moderate-severe pain with limited knee AROM noted.  Patient presents today for discussion of bursa aspiration and possible steroid injection.    Diagnosis:   1. Traumatic bursitis    2. Prepatellar bursitis of left knee    3. Injury of left knee, subsequent encounter      Referring Physician: Sandra Zacarias MD  Technique: The risks of the procedure were explained to the patient.  A consent was signed for the intra-articular knee procedure.  The patient was evaluated with a [employer] ultrasound machine using a 12 MHz linear probe.     The Left knee was prepped and draped in a sterile manner.  Ultrasound identification of the patella, prepatellar bursa, quadriceps tendon and femur in both long and short axis. The probe was placed in short axis to the Left prepatellar bursa.  A 1.5 inch 18 gauge needle was placed under ultrasound guidance into the prepatellar bursa, and a #15 blade was used as well to allow increased drainage.  About 35 ml of bloody fluid was aspirated and expressed.  The needle was removed and there was good hemostasis without complications.  There was ultrasound documentation of needle placement and injection.  Pre-procedural pain 9/10.  Post procedural pain 3/10.    Impression:  Successful Left knee prepatellar bursa aspiration and drainage    Plan:  Follow up as directed by janine Mcgowan at this point  Given underlying knee injury reviewed that this will not likely treat all of her pain  Can be repeated in future, but will try to avoid  Good initial results relative to pain  Watch closely for signs of infection  Supportive care reviewed  All  questions were answered today  Contact us with additional questions or concerns  Signs and sx of concern reviewed      Dyllan Smith DO, BETTY  Primary Care Sports Medicine  [facility]

## 2018-05-18 NOTE — TELEPHONE ENCOUNTER
Please call patient.    Discuss supportive care including rest, ice, compression.  Body will take some time to re-absorb hematoma.  On trip:  - Would be concerned for signs of infection (red including streaking, warm, fevers, chills).  This would warrant a trip to the ED.  - Symptoms of Blood clot would be more calf pain and increased swelling, this would also warrant a trip to the ED.    Dr. Smith is willing to add patient on today (preference over tomorrow) in [place] to consider drainage.  - Risks include introducing infection, unable to aspirate (given organizing hematoma) and risk of recurrence  - Would send fluid for analysis if concerned about infection (low suspicion given exam on Monday).    Sandra Zacarias MD

## 2018-05-18 NOTE — MR AVS SNAPSHOT
After Visit Summary   5/18/2018    Luz Elena Cristina    MRN: [id number]           Patient Information     Date Of Birth          1955        Visit Information        Provider Department      5/18/2018 11:20 AM Dyllan Smith DO [facility]        Today's Diagnoses     Traumatic bursitis    -  1    Prepatellar bursitis of left knee        Injury of left knee, subsequent encounter           Follow-ups after your visit        Your next 10 appointments already scheduled     Jun 04, 2018 10:20 AM CDT   Return Visit with Sandra Zacarias MD   [facility]    [address] [facility] [address]  [place] [address] [phone]              Who to contact     If you have questions or need follow up information about today's clinic visit or your schedule please contact [facility] directly at [phone].  Normal or non-critical lab and imaging results will be communicated to you by MyChart, letter or phone within 4 business days after the clinic has received the results. If you do not hear from us within 7 days, please contact the clinic through [employer] or phone. If you have a critical or abnormal lab result, we will notify you by phone as soon as possible.  Submit refill requests through [employer] or call your pharmacy and they will forward the refill request to us. Please allow 3 business days for your refill to be completed.          Additional Information About Your Visit        MyChart Information     [employer] gives you secure access to your electronic health record. If you see a primary care provider, you can also send messages to your care team and make appointments. If you have questions, please call your primary care clinic.  If you do not have a primary care provider, please call [phone] and they will assist you.        Care EveryWhere ID     This is your Care  "EveryWhere ID. This could be used by other organizations to access your [facility] medical records  [id number]        Your Vitals Were     Height BMI (Body Mass Index)                5' 5\" (1.651 m) 37.94 kg/m2           Blood Pressure from Last 3 Encounters:   05/18/18 125/70   05/14/18 128/74   05/09/18 124/78    Weight from Last 3 Encounters:   05/18/18 228 lb (103.4 kg)   05/14/18 229 lb (103.9 kg)   05/09/18 229 lb (103.9 kg)              We Performed the Following     HC ARTHROCENTESIS ASPIR&/INJ MAJOR JT/BURSA W/US          Today's Medication Changes          These changes are accurate as of 5/18/18  1:00 PM.  If you have any questions, ask your nurse or doctor.               These medicines have changed or have updated prescriptions.        Dose/Directions    tiotropium 18 MCG capsule   Commonly known as:  SPIRIVA HANDIHALER   This may have changed:    - how much to take  - how to take this  - when to take this  - additional instructions   Used for:  Uncomplicated severe persistent asthma        Inhale contents of one capsule daily.   Quantity:  30 capsule   Refills:  3                Primary Care Provider Office Phone # Fax #    Kiara Zazueta PA-C [phone]       [address] [place], [place] [address]        Equal Access to Services     KEKE MONTEJO AH: Mohit abelo Sokathryn, waaxda luqadaha, qaybta kaalmada adeegyada, lulu green . So [facility] [phone].    ATENCIÓN: Si habla español, tiene a arriaza disposición servicios gratuitos de asistencia lingüística. Llame al [phone].    We comply with applicable federal civil rights laws and [place] laws. We do not discriminate on the basis of race, color, national origin, age, disability, sex, sexual orientation, or gender identity.            Thank you!     Thank you for choosing [facility]  for your care. Our goal is always to provide you with excellent care. Hearing back " from our patients is one way we can continue to improve our services. Please take a few minutes to complete the written survey that you may receive in the mail after your visit with us. Thank you!             Your Updated Medication List - Protect others around you: Learn how to safely use, store and throw away your medicines at www.disposemymeds.org.          This list is accurate as of 5/18/18  1:00 PM.  Always use your most recent med list.                   Brand Name Dispense Instructions for use Diagnosis    ACCU-CHEK MARILUZ PLUS test strip   Generic drug:  blood glucose monitoring           * albuterol 108 (90 Base) MCG/ACT Inhaler    PROAIR HFA/PROVENTIL HFA/VENTOLIN HFA    3 Inhaler    Inhale 2 puffs into the lungs every 6 hours as needed for shortness of breath / dyspnea or wheezing    Intermittent asthma, uncomplicated       * albuterol (2.5 MG/3ML) 0.083% neb solution     50 vial    Take 1 vial (2.5 mg) by nebulization every 4 hours as needed for shortness of breath / dyspnea, wheezing or other (chest tightness, persistent cough)    Severe persistent asthma with (acute) exacerbation       aspirin 81 MG EC tablet      Take 81 mg by mouth daily        azelastine 0.1 % spray    ASTELIN    30 mL    Spray 2 sprays into both nostrils 2 times daily as needed for rhinitis or allergies    Postnasal drip       blood glucose monitoring meter device kit           clobetasol 0.05 % cream    TEMOVATE    30 g    Apply sparingly to affected area twice weekly as needed.  Do not apply to face.    Lichen sclerosus       fluticasone-salmeterol 230-21 MCG/ACT inhaler    ADVAIR-HFA    1 Inhaler    Inhale 2 puffs into the lungs 2 times daily    Uncomplicated severe persistent asthma       IBUPROFEN PO      Take by mouth 2 times daily as needed for moderate pain        losartan 50 MG tablet    COZAAR    90 tablet    TAKE 1 TABLET(50 MG) BY MOUTH DAILY    Benign essential hypertension       nebulizer/tubing/mouthpiece Kit     1  kit    Use with albuterol neb solution    Severe persistent asthma with (acute) exacerbation       tiotropium 18 MCG capsule    SPIRIVA HANDIHALER    30 capsule    Inhale contents of one capsule daily.    Uncomplicated severe persistent asthma       * Notice:  This list has 2 medication(s) that are the same as other medications prescribed for you. Read the directions carefully, and ask your doctor or other care provider to review them with you.

## 2018-05-18 NOTE — TELEPHONE ENCOUNTER
Spoke with patient. Reports she feels her knee is not improving and is still swollen as well as her ankle. She has been doing the exercises prescribed, using ACE wraps from the ankle to the knee, elevating, and icing regularly. She leaves for [place] 5/21 to 5/31 and is concerned about being gone. Reviewed the recommendations and discussed making plans ahead for [place] in case she needs to see a provider there for the knee. Recommended trying over the counter medication: Ibuprofen (Advil) maximum of 800mg three times a day with food OR Naproxen (Aleve) maximum of 440mg two times a day with food to help with pain and swelling. Recommended she take it easy on the home exercises right now until things calmed down. Discussed options of coming in to the clinic today, tomorrow (Saturday), or Monday to be seen if she felt necessary. Will discuss with Dr. Zacarias and call back with any additional recommendations. No further questions.    Graeme Jefferson, ATC

## 2018-05-18 NOTE — LETTER
2018         RE: Luz Elena Cristina  [address]  [place] [facility] [address]        Dear Colleague,    Thank you for referring your patient, Luz Elena Cristina, to the [facility]. Please see a copy of my visit note below.    Luz Elena Cristina  :  1955  DOS: 2018  MRN: [id number]    Sports Medicine Clinic Procedure    Ultrasound Guided Left Intra-Articular Knee Prepatellar Bursa Aspiration    Clinical History: Patient tripped in barn, stumbling and hitting left knee on skidsteer bucket or ground ~ 18 days ago.  Swelling and possible hematoma noted over anterior knee.  Moderate-severe pain with limited knee AROM noted.  Patient presents today for discussion of bursa aspiration and possible steroid injection.    Diagnosis:   1. Traumatic bursitis    2. Prepatellar bursitis of left knee    3. Injury of left knee, subsequent encounter      Referring Physician: Sandra Zacarias MD  Technique: The risks of the procedure were explained to the patient.  A consent was signed for the intra-articular knee procedure.  The patient was evaluated with a [employer] ultrasound machine using a 12 MHz linear probe.     The Left knee was prepped and draped in a sterile manner.  Ultrasound identification of the patella, prepatellar bursa, quadriceps tendon and femur in both long and short axis. The probe was placed in short axis to the Left prepatellar bursa.  A 1.5 inch 18 gauge needle was placed under ultrasound guidance into the prepatellar bursa, and a #15 blade was used as well to allow increased drainage.  About 35 ml of bloody fluid was aspirated and expressed.   A mixture of 2 ml's of 1% lidocaine and 1 ml kenalog (40mg/ml) was injected without difficulty. The needle was removed and there was good hemostasis without complications.  There was ultrasound documentation of needle placement and injection.  Pre-procedural pain /10.  Post procedural pain 3/10.    Impression:  Successful Left  knee prepatellar bursa aspiration and drainage    Plan:  Follow up as directed by janine Mcgowan at this point  Given underlying knee injury reviewed that this will not likely treat all of her pain  Can be repeated in future, but will try to avoid  Good initial results relative to pain  Watch closely for signs of infection  Supportive care reviewed  All questions were answered today  Contact us with additional questions or concerns  Signs and sx of concern reviewed      Dyllan Smith DO, CAQ  Primary Care Sports Medicine  [facility]           Again, thank you for allowing me to participate in the care of your patient.        Sincerely,        Dyllan Smith DO

## 2018-05-18 NOTE — TELEPHONE ENCOUNTER
Pt called stating that she had seen Dr. Zacarias on 5/14/18 for her left knee. Pt states that Dr. Zacarias gave pt exercises to do to help strengthen and alleviate pain in her knee. Pt states that she is no longer able to do these exercises as it makes her knee hurt and swell. Pt states that she cannot really walk on it as it is so swollen and painful. Pt states that she will be going to [place] on 5/21/18 to care for her granddaughter and with her knee like this it will be next to impossible to do. Please advise.      Carmen Patrick  Clinic Station

## 2018-06-04 ENCOUNTER — OFFICE VISIT (OUTPATIENT)
Dept: ORTHOPEDICS | Facility: CLINIC | Age: 63
End: 2018-06-04
Payer: MEDICARE

## 2018-06-04 ENCOUNTER — RADIANT APPOINTMENT (OUTPATIENT)
Dept: GENERAL RADIOLOGY | Facility: CLINIC | Age: 63
End: 2018-06-04
Attending: PEDIATRICS
Payer: MEDICARE

## 2018-06-04 VITALS
BODY MASS INDEX: 39.15 KG/M2 | SYSTOLIC BLOOD PRESSURE: 136 MMHG | HEIGHT: 65 IN | DIASTOLIC BLOOD PRESSURE: 89 MMHG | WEIGHT: 235 LBS

## 2018-06-04 DIAGNOSIS — M70.42 PREPATELLAR BURSITIS OF LEFT KNEE: ICD-10-CM

## 2018-06-04 DIAGNOSIS — M20.10 VALGUS DEFORMITY OF GREAT TOE, UNSPECIFIED LATERALITY: ICD-10-CM

## 2018-06-04 DIAGNOSIS — S52.135D CLOSED NONDISPLACED FRACTURE OF NECK OF LEFT RADIUS WITH ROUTINE HEALING, SUBSEQUENT ENCOUNTER: Primary | ICD-10-CM

## 2018-06-04 DIAGNOSIS — M79.674 GREAT TOE PAIN, RIGHT: ICD-10-CM

## 2018-06-04 DIAGNOSIS — S89.92XD INJURY OF LEFT KNEE, SUBSEQUENT ENCOUNTER: ICD-10-CM

## 2018-06-04 DIAGNOSIS — S52.135D CLOSED NONDISPLACED FRACTURE OF NECK OF LEFT RADIUS WITH ROUTINE HEALING, SUBSEQUENT ENCOUNTER: ICD-10-CM

## 2018-06-04 DIAGNOSIS — S99.921A TOE INJURY, RIGHT, INITIAL ENCOUNTER: ICD-10-CM

## 2018-06-04 PROCEDURE — 99213 OFFICE O/P EST LOW 20 MIN: CPT | Performed by: PEDIATRICS

## 2018-06-04 PROCEDURE — 73630 X-RAY EXAM OF FOOT: CPT | Mod: RT

## 2018-06-04 NOTE — PATIENT INSTRUCTIONS
Plan:  - Today's Plan of Care:  1) Left Elbow:  - Continue to limit lifting and weight bearing, range of motion ok    2) Right Toe:  - Continue supportive care of rest, ice, elevation  - Referral to Podiatry    3) Left Knee:  - Continue supportive care of rest, ice, elevation, compression  - Start Physical Therapy  Rehab: [facility] - [phone]    Follow Up: 6 weeks with x-ray of elbow    If you have any further questions for your physician or physician s care team you can call [phone] and use option 3 to leave a voice message. Calls received during business hours will be returned same day.

## 2018-06-04 NOTE — MR AVS SNAPSHOT
After Visit Summary   6/4/2018    Luz Elena Cristina    MRN: [id number]           Patient Information     Date Of Birth          1955        Visit Information        Provider Department      6/4/2018 10:20 AM Sandra Zacarias MD [facility]        Today's Diagnoses     Closed nondisplaced fracture of neck of left radius with routine healing, subsequent encounter    -  1    Toe injury, right, initial encounter        Prepatellar bursitis of left knee        Injury of left knee, subsequent encounter        Valgus deformity of great toe, unspecified laterality          Care Instructions      Plan:  - Today's Plan of Care:  1) Left Elbow:  - Continue to limit lifting and weight bearing, range of motion ok    2) Right Toe:  - Continue supportive care of rest, ice, elevation  - Referral to Podiatry    3) Left Knee:  - Continue supportive care of rest, ice, elevation, compression  - Start Physical Therapy  [facility] - [phone]    Follow Up: 6 weeks with x-ray of elbow    If you have any further questions for your physician or physician s care team you can call [phone] and use option 3 to leave a voice message. Calls received during business hours will be returned same day.            Follow-ups after your visit        Additional Services     ORTHO  REFERRAL       [facility] is referring you to the Orthopedic  Services at [facility].       The  Representative will assist you in the coordination of your Orthopedic and Musculoskeletal Care as prescribed by your physician.    The  Representative will call you within 1 business day to help schedule your appointment, or you may contact the  Representative at:    All areas ~ [phone]     Type of Referral : [facility]       Timeframe requested: Routine    Coverage of these  "services is subject to the terms and limitations of your health insurance plan.  Please call member services at your health plan with any benefit or coverage questions.      If X-rays, CT or MRI's have been performed, please contact the facility where they were done to arrange for , prior to your scheduled appointment.  Please bring this referral request to your appointment and present it to your specialist.            PHYSICAL THERAPY REFERRAL       *This therapy referral will be filtered to a centralized scheduling office at [facility] and the patient will receive a call to schedule an appointment at a [place] location most convenient for them. *     [facility] provides Physical Therapy evaluation and treatment and many specialty services across the [place] system.  If requesting a specialty program, please choose from the list below.    If you have not heard from the scheduling office within 2 business days, please call [phone] for all locations, with the exception of [place], please call [phone] and [facility], please call [phone]  Treatment: Evaluation & Treatment  Special Instructions/Modalities: none  Special Programs: None    Please be aware that coverage of these services is subject to the terms and limitations of your health insurance plan.  Call member services at your health plan with any benefit or coverage questions.      **Note to Provider:  If you are referring outside of [place] for the therapy appointment, please list the name of the location in the \"special instructions\" above, print the referral and give to the patient to schedule the appointment.                  Who to contact     If you have questions or need follow up information about today's clinic visit or your schedule please contact [facility] directly at [phone].  Normal or non-critical lab and imaging results will be communicated " "to you by [employer], letter or phone within 4 business days after the clinic has received the results. If you do not hear from us within 7 days, please contact the clinic through [employer] or phone. If you have a critical or abnormal lab result, we will notify you by phone as soon as possible.  Submit refill requests through [employer] or call your pharmacy and they will forward the refill request to us. Please allow 3 business days for your refill to be completed.          Additional Information About Your Visit        [employer] Information     [employer] gives you secure access to your electronic health record. If you see a primary care provider, you can also send messages to your care team and make appointments. If you have questions, please call your primary care clinic.  If you do not have a primary care provider, please call [phone] and they will assist you.        Care EveryWhere ID     This is your Care EveryWhere ID. This could be used by other organizations to access your [place] medical records  [id number]        Your Vitals Were     Height BMI (Body Mass Index)                5' 5\" (1.651 m) 39.11 kg/m2           Blood Pressure from Last 3 Encounters:   06/04/18 136/89   05/18/18 125/70   05/14/18 128/74    Weight from Last 3 Encounters:   06/04/18 235 lb (106.6 kg)   05/18/18 228 lb (103.4 kg)   05/14/18 229 lb (103.9 kg)              We Performed the Following     ORTHO  REFERRAL     PHYSICAL THERAPY REFERRAL          Today's Medication Changes          These changes are accurate as of 6/4/18 10:54 AM.  If you have any questions, ask your nurse or doctor.               These medicines have changed or have updated prescriptions.        Dose/Directions    tiotropium 18 MCG capsule   Commonly known as:  SPIRIVA HANDIHALER   This may have changed:    - how much to take  - how to take this  - when to take this  - additional instructions   Used for:  Uncomplicated severe persistent asthma        Inhale " contents of one capsule daily.   Quantity:  30 capsule   Refills:  3                Primary Care Provider Office Phone # Fax #    Kiara Zazueta PA-C [phone] [address] [place] [id number]        Equal Access to Services     DEJONSERENA GUSTABO : Hadii aad ku hadmiguelo Soomaali, waaxda luqadaha, qaybta kaalmada adeegyada, waxay idiin shlomon adeselina alvarado lamarianashaka . So [facility] [phone].    ATENCIÓN: Si habla español, tiene a arriaza disposición servicios gratuitos de asistencia lingüística. Llame al [phone].    We comply with applicable federal civil rights laws and [place] laws. We do not discriminate on the basis of race, color, national origin, age, disability, sex, sexual orientation, or gender identity.            Thank you!     Thank you for choosing [facility] [place]  for your care. Our goal is always to provide you with excellent care. Hearing back from our patients is one way we can continue to improve our services. Please take a few minutes to complete the written survey that you may receive in the mail after your visit with us. Thank you!             Your Updated Medication List - Protect others around you: Learn how to safely use, store and throw away your medicines at www.disposemymeds.org.          This list is accurate as of 6/4/18 10:54 AM.  Always use your most recent med list.                   Brand Name Dispense Instructions for use Diagnosis    ACCU-CHEK MARILUZ PLUS test strip   Generic drug:  blood glucose monitoring           * albuterol 108 (90 Base) MCG/ACT Inhaler    PROAIR HFA/PROVENTIL HFA/VENTOLIN HFA    3 Inhaler    Inhale 2 puffs into the lungs every 6 hours as needed for shortness of breath / dyspnea or wheezing    Intermittent asthma, uncomplicated       * albuterol (2.5 MG/3ML) 0.083% neb solution     50 vial    Take 1 vial (2.5 mg) by nebulization every 4 hours as needed for shortness of breath / dyspnea, wheezing or other (chest  tightness, persistent cough)    Severe persistent asthma with (acute) exacerbation       aspirin 81 MG EC tablet      Take 81 mg by mouth daily        azelastine 0.1 % spray    ASTELIN    30 mL    Spray 2 sprays into both nostrils 2 times daily as needed for rhinitis or allergies    Postnasal drip       blood glucose monitoring meter device kit           clobetasol 0.05 % cream    TEMOVATE    30 g    Apply sparingly to affected area twice weekly as needed.  Do not apply to face.    Lichen sclerosus       fluticasone-salmeterol 230-21 MCG/ACT inhaler    ADVAIR-HFA    1 Inhaler    Inhale 2 puffs into the lungs 2 times daily    Uncomplicated severe persistent asthma       IBUPROFEN PO      Take by mouth 2 times daily as needed for moderate pain        losartan 50 MG tablet    COZAAR    90 tablet    TAKE 1 TABLET(50 MG) BY MOUTH DAILY    Benign essential hypertension       nebulizer/tubing/mouthpiece Kit     1 kit    Use with albuterol neb solution    Severe persistent asthma with (acute) exacerbation       tiotropium 18 MCG capsule    SPIRIVA HANDIHALER    30 capsule    Inhale contents of one capsule daily.    Uncomplicated severe persistent asthma       * Notice:  This list has 2 medication(s) that are the same as other medications prescribed for you. Read the directions carefully, and ask your doctor or other care provider to review them with you.

## 2018-06-04 NOTE — LETTER
6/4/2018         RE: Luz Elena Cristina  [id number] [facility] [address]        Dear Colleague,    Thank you for referring your patient, Luz Elena Cristina, to the [facility] [place]. Please see a copy of my visit note below.    Sports Medicine Clinic Visit - Interim History June 4, 2018      PCP: Kiara Zazueta    Luz Elena Cristina is a 63 year old female who is seen in f/u up for    Closed nondisplaced fracture of neck of left radius with routine healing, subsequent encounter  Prepatellar bursitis of left knee  Injury of left knee, subsequent encounter  Toe injury, right, initial encounter  Valgus deformity of great toe, unspecified laterality.  Since last visit on 5/14/18, patient has less pain as long as she does not lift with left arm. She also reports pain with pressure to the elbow.  - Now ~ 4 weeks and 6 days from initial injury    2) Left knee has been better with less pain and increased movement following the knee aspiration performed by Dr Smith on 5/18/18. Pt reports the knee oozed afterwards for 4 hours and then pain was diminished.  Has some pain extending down her leg.    3) Luz Elena is also complaining of great toe pain on the right foot.  Thinks it was injured at the same time.  Has some swelling at the first MTP joint as well, improved with good footwear.    Social History: retired    Review of Systems  Skin: no bruising, yes knee and toe swelling  Musculoskeletal: as above  Neurologic: no numbness, paresthesias  Remainder of review of systems is negative including constitutional, CV, pulmonary, GI, except as noted in HPI or medical history.    Patient's current problem list, past medical and surgical history, and family history were reviewed.    Patient Active Problem List   Diagnosis     Severe persistent asthma     Lichen sclerosus     Benign essential hypertension     Elevated glucose     Family history of diabetes mellitus     Severe persistent asthma with  "(acute) exacerbation     Severe obesity (BMI 35.0-39.9) with comorbidity (H)     At risk for cardiovascular event     Past Medical History:   Diagnosis Date     Hypertension      Uncomplicated asthma      Past Surgical History:   Procedure Laterality Date     BIOPSY      breast,     COLONOSCOPY       GYN SURGERY       TONSILLECTOMY       Family History   Problem Relation Age of Onset     Other Cancer Mother      panreatic     DIABETES Mother      Breast Cancer Sister      Skin Cancer Sister      CEREBROVASCULAR DISEASE Father      CVA     Chronic Obstructive Pulmonary Disease Father      Aneurysm Paternal Grandmother      Unknown/Adopted Paternal Grandfather      Alzheimer Disease Brother      early onset     DIABETES Son      Thyroid Disease Son      Breast Cancer Paternal Aunt      DIABETES Son      Breast Cancer Sister        Objective  /89 (BP Location: Right arm, Patient Position: Chair, Cuff Size: Adult Large)  Ht 5' 5\" (1.651 m)  Wt 235 lb (106.6 kg)  BMI 39.11 kg/m2    GENERAL APPEARANCE: healthy, alert and no distress   GAIT: NORMAL  SKIN: no suspicious lesions or rashes  HEENT: Sclera clear, anicteric  CV: good peripheral pulses  RESP: Breathing not labored  NEURO: Normal strength and tone, mentation intact and speech normal  PSYCH:  mentation appears normal and affect normal/bright    Bilateral Elbow exam:  Inspection:     no ecchymosis       no edema or effusion      Tender:     none      Non-Tender:      remainder of the elbow bilaterally      ROM:      flexion 130 left       extension 5 left       forearm supination improved left       forearm pronation improved left      Strength:     pain with resisted strength testing left though able to resist 5/5 throughout      Sensation:     intact throughout hand       intact throughout forearm      Bilateral Knee exam  Inspection:      improved swelling, bruising      Patella:      Mobility -       hypomobile left      Tender:      Throughout patella " and pre-patellar bursa, medial and lateral joint lines - improved  - mild soft tissue swelling lateral leg      Non Tender:      remainder of knee area bilateral, none over fibula or tibia      Knee ROM:      Flexion 120 degrees left       Extension 5 degrees left      Strength:      4/5 with knee extension      Special Tests:     stable to varus and valgus stress, negative Dai's left      Gait:      normal      Neurovascular:      2+ peripheral pulses bilaterally and brisk capillary refill       sensation grossly intact    Bilateral Foot and Ankle Exam:    Inspection:       no visible ecchymosis       Mild right first MTP swelling    Foot inspection:       hallux valgus bilaterally    Tender:       Right first MTP joint    Non-Tender:       remainder of ankle and foot bilateral    ROM:        Full active and passive ROM, ankle dorsiflexion, plantarflexion, inversion, eversion, great toe dorsiflexion, remainder of toes, midfoot and subtalar bilateral  - mild pain with right first MTP joint movement    Strength:       ankle dorsiflexion 5/5 bilateral       plantarflexion 5/5 bilateral       inversion 5/5 bilateral       eversion 5/5 bilateral    Gait:       antalgic gait    Neurovascular:       2+ peripheral pulses bilaterally and brisk capillary refill       sensation grossly intact      Radiology  I ordered, visualized and reviewed these images with the patient  LEFT ELBOW THREE OR MORE VIEWS   6/4/2018 10:43 AM   HISTORY:  Closed nondisplaced fracture of neck of left radius with  routine healing, subsequent encounter.  COMPARISON: 5/9/2018.  IMPRESSION: Minimally impacted radial neck fracture is again seen.  There has been interval development of a minimal amount of healing  callus just distal to the fracture on the radial side. No  intra-articular extension. Remainder of the bony structures are normal  and currently there is no joint space effusion.    RIGHT FOOT THREE OR MORE VIEWS  6/4/2018 10:43 AM    HISTORY:  Great toe pain, right.  COMPARISON: None.  IMPRESSION: Moderate hallux valgus metatarsus varus deformity with  medial first metatarsal head hypertrophic changes consistent with a  bunion. No significant degenerative change at the first  metatarsophalangeal joint or elsewhere. No acute-appearing bony  abnormalities. Mild pes planus.    Assessment:  1. Closed nondisplaced fracture of neck of left radius with routine healing, subsequent encounter    2. Prepatellar bursitis of left knee    3. Injury of left knee, subsequent encounter    4. Toe injury, right, initial encounter    5. Valgus deformity of great toe, unspecified laterality        Plan:  - Today's Plan of Care:  1) Left Elbow:  - Continue to limit lifting and weight bearing, range of motion ok    2) Right Toe:  - Continue supportive care of rest, ice, elevation  - Referral to Podiatry    3) Left Knee:  - Continue supportive care of rest, ice, elevation, compression  - Start Physical Therapy  Rehab[facility] - [phone]    Follow Up: 6 weeks with x-ray of elbow    Concerning signs and symptoms were reviewed.  The patient expressed understanding of this management plan and all questions were answered at this time.    Sandra Zacarias MD CAQ  Primary Care Sports Medicine  [facility]    Again, thank you for allowing me to participate in the care of your patient.        Sincerely,        Sandra Zacarias MD

## 2018-06-04 NOTE — PROGRESS NOTES
Sports Medicine Clinic Visit - Interim History June 4, 2018      PCP: Kiara Zazueta    Luz Elena Cristina is a 63 year old female who is seen in f/u up for    Closed nondisplaced fracture of neck of left radius with routine healing, subsequent encounter  Prepatellar bursitis of left knee  Injury of left knee, subsequent encounter  Toe injury, right, initial encounter  Valgus deformity of great toe, unspecified laterality.  Since last visit on 5/14/18, patient has less pain as long as she does not lift with left arm. She also reports pain with pressure to the elbow.  - Now ~ 4 weeks and 6 days from initial injury    2) Left knee has been better with less pain and increased movement following the knee aspiration performed by Dr Smith on 5/18/18. Pt reports the knee oozed afterwards for 4 hours and then pain was diminished.  Has some pain extending down her leg.    3) Luz Elena is also complaining of great toe pain on the right foot.  Thinks it was injured at the same time.  Has some swelling at the first MTP joint as well, improved with good footwear.    Social History: retired    Review of Systems  Skin: no bruising, yes knee and toe swelling  Musculoskeletal: as above  Neurologic: no numbness, paresthesias  Remainder of review of systems is negative including constitutional, CV, pulmonary, GI, except as noted in HPI or medical history.    Patient's current problem list, past medical and surgical history, and family history were reviewed.    Patient Active Problem List   Diagnosis     Severe persistent asthma     Lichen sclerosus     Benign essential hypertension     Elevated glucose     Family history of diabetes mellitus     Severe persistent asthma with (acute) exacerbation     Severe obesity (BMI 35.0-39.9) with comorbidity (H)     At risk for cardiovascular event     Past Medical History:   Diagnosis Date     Hypertension      Uncomplicated asthma      Past Surgical History:   Procedure Laterality Date      "BIOPSY      breast,     COLONOSCOPY       GYN SURGERY       TONSILLECTOMY       Family History   Problem Relation Age of Onset     Other Cancer Mother      panreatic     DIABETES Mother      Breast Cancer Sister      Skin Cancer Sister      CEREBROVASCULAR DISEASE Father      CVA     Chronic Obstructive Pulmonary Disease Father      Aneurysm Paternal Grandmother      Unknown/Adopted Paternal Grandfather      Alzheimer Disease Brother      early onset     DIABETES Son      Thyroid Disease Son      Breast Cancer Paternal Aunt      DIABETES Son      Breast Cancer Sister        Objective  /89 (BP Location: Right arm, Patient Position: Chair, Cuff Size: Adult Large)  Ht 5' 5\" (1.651 m)  Wt 235 lb (106.6 kg)  BMI 39.11 kg/m2    GENERAL APPEARANCE: healthy, alert and no distress   GAIT: NORMAL  SKIN: no suspicious lesions or rashes  HEENT: Sclera clear, anicteric  CV: good peripheral pulses  RESP: Breathing not labored  NEURO: Normal strength and tone, mentation intact and speech normal  PSYCH:  mentation appears normal and affect normal/bright    Bilateral Elbow exam:  Inspection:     no ecchymosis       no edema or effusion      Tender:     none      Non-Tender:      remainder of the elbow bilaterally      ROM:      flexion 130 left       extension 5 left       forearm supination improved left       forearm pronation improved left      Strength:     pain with resisted strength testing left though able to resist 5/5 throughout      Sensation:     intact throughout hand       intact throughout forearm      Bilateral Knee exam  Inspection:      improved swelling, bruising      Patella:      Mobility -       hypomobile left      Tender:      Throughout patella and pre-patellar bursa, medial and lateral joint lines - improved  - mild soft tissue swelling lateral leg      Non Tender:      remainder of knee area bilateral, none over fibula or tibia      Knee ROM:      Flexion 120 degrees left       Extension 5 degrees " left      Strength:      4/5 with knee extension      Special Tests:     stable to varus and valgus stress, negative Dai's left      Gait:      normal      Neurovascular:      2+ peripheral pulses bilaterally and brisk capillary refill       sensation grossly intact    Bilateral Foot and Ankle Exam:    Inspection:       no visible ecchymosis       Mild right first MTP swelling    Foot inspection:       hallux valgus bilaterally    Tender:       Right first MTP joint    Non-Tender:       remainder of ankle and foot bilateral    ROM:        Full active and passive ROM, ankle dorsiflexion, plantarflexion, inversion, eversion, great toe dorsiflexion, remainder of toes, midfoot and subtalar bilateral  - mild pain with right first MTP joint movement    Strength:       ankle dorsiflexion 5/5 bilateral       plantarflexion 5/5 bilateral       inversion 5/5 bilateral       eversion 5/5 bilateral    Gait:       antalgic gait    Neurovascular:       2+ peripheral pulses bilaterally and brisk capillary refill       sensation grossly intact      Radiology  I ordered, visualized and reviewed these images with the patient  LEFT ELBOW THREE OR MORE VIEWS   6/4/2018 10:43 AM   HISTORY:  Closed nondisplaced fracture of neck of left radius with  routine healing, subsequent encounter.  COMPARISON: 5/9/2018.  IMPRESSION: Minimally impacted radial neck fracture is again seen.  There has been interval development of a minimal amount of healing  callus just distal to the fracture on the radial side. No  intra-articular extension. Remainder of the bony structures are normal  and currently there is no joint space effusion.    RIGHT FOOT THREE OR MORE VIEWS  6/4/2018 10:43 AM   HISTORY:  Great toe pain, right.  COMPARISON: None.  IMPRESSION: Moderate hallux valgus metatarsus varus deformity with  medial first metatarsal head hypertrophic changes consistent with a  bunion. No significant degenerative change at the  first  metatarsophalangeal joint or elsewhere. No acute-appearing bony  abnormalities. Mild pes planus.    Assessment:  1. Closed nondisplaced fracture of neck of left radius with routine healing, subsequent encounter    2. Prepatellar bursitis of left knee    3. Injury of left knee, subsequent encounter    4. Toe injury, right, initial encounter    5. Valgus deformity of great toe, unspecified laterality        Plan:  - Today's Plan of Care:  1) Left Elbow:  - Continue to limit lifting and weight bearing, range of motion ok    2) Right Toe:  - Continue supportive care of rest, ice, elevation  - Referral to Podiatry    3) Left Knee:  - Continue supportive care of rest, ice, elevation, compression  - Start Physical Therapy  Rehab: Physical Therapy: [facility] - [phone]    Follow Up: 6 weeks with x-ray of elbow    Concerning signs and symptoms were reviewed.  The patient expressed understanding of this management plan and all questions were answered at this time.    Sandra Zacarias MD CAQ  Primary Care Sports Medicine  [facility]

## 2018-06-11 ENCOUNTER — OFFICE VISIT (OUTPATIENT)
Dept: FAMILY MEDICINE | Facility: CLINIC | Age: 63
End: 2018-06-11
Payer: MEDICARE

## 2018-06-11 ENCOUNTER — RADIANT APPOINTMENT (OUTPATIENT)
Dept: GENERAL RADIOLOGY | Facility: CLINIC | Age: 63
End: 2018-06-11
Attending: PHYSICIAN ASSISTANT
Payer: MEDICARE

## 2018-06-11 VITALS
BODY MASS INDEX: 38.99 KG/M2 | HEART RATE: 81 BPM | DIASTOLIC BLOOD PRESSURE: 82 MMHG | TEMPERATURE: 98.3 F | WEIGHT: 234 LBS | RESPIRATION RATE: 16 BRPM | HEIGHT: 65 IN | SYSTOLIC BLOOD PRESSURE: 136 MMHG

## 2018-06-11 DIAGNOSIS — R05.9 COUGH: Primary | ICD-10-CM

## 2018-06-11 DIAGNOSIS — J45.50 SEVERE PERSISTENT ASTHMA WITHOUT COMPLICATION (H): ICD-10-CM

## 2018-06-11 DIAGNOSIS — I10 BENIGN ESSENTIAL HYPERTENSION: ICD-10-CM

## 2018-06-11 DIAGNOSIS — E66.01 SEVERE OBESITY (BMI 35.0-39.9) WITH COMORBIDITY (H): ICD-10-CM

## 2018-06-11 DIAGNOSIS — R12 HEARTBURN: ICD-10-CM

## 2018-06-11 DIAGNOSIS — R73.03 PREDIABETES: ICD-10-CM

## 2018-06-11 DIAGNOSIS — R05.9 COUGH: ICD-10-CM

## 2018-06-11 DIAGNOSIS — Z12.31 ENCOUNTER FOR SCREENING MAMMOGRAM FOR BREAST CANCER: ICD-10-CM

## 2018-06-11 DIAGNOSIS — Z87.891 FORMER SMOKER: ICD-10-CM

## 2018-06-11 PROCEDURE — 71046 X-RAY EXAM CHEST 2 VIEWS: CPT | Mod: FY

## 2018-06-11 PROCEDURE — 99214 OFFICE O/P EST MOD 30 MIN: CPT | Performed by: PHYSICIAN ASSISTANT

## 2018-06-11 RX ORDER — MECOBALAMIN 5000 MCG
15 TABLET,DISINTEGRATING ORAL DAILY
Qty: 30 CAPSULE | Refills: 1 | Status: SHIPPED | OUTPATIENT
Start: 2018-06-11 | End: 2018-06-14

## 2018-06-11 NOTE — PROGRESS NOTES
SUBJECTIVE:   Luz Elena Cristina is a 63 year old female who presents to clinic today for the following health issues:      GERD/Heartburn      Duration: Few months    Description (location/character/radiation):     Intensity:  mild    Accompanying signs and symptoms:  Feels that when she eats, she starts coughing  food getting stuck: YES- sometimes  nausea/vomiting/blood: no  abdominal pain: no   black/tarry or bloody stools: no :    History (similar episodes/previous evaluation): Does have acid reflux    Precipitating or alleviating factors:  worse with no particular food or drink.  current NSAID/Aspirin use: no     Therapies tried and outcome: tums, zantac, cut alcohol, carbs, red sauces, bed elevation     Threw up in her sleep, not sure if she inhaled - true emesis  Cough since  Severe persistent asthma - done well since seeing allergist, but now cough seems back to same severity - but is now productive, coughs with eating, not if has been awhile since eating, sometimes in sleep  Daily heartburn, slightly better in past 3 wks    No wt loss  No blood  No pain in throat, chest, belly  Not exactly dysphagia but odd feeling like food getting stuck.  20 x yrs x 2 packs day, quit in her 30s    HTN - on ARB, history cough on ACE.    Trying to lose wt, would like nutrition referral.  Prediabetes.    Problem list and histories reviewed & adjusted, as indicated.  Additional history: as documented    BP Readings from Last 3 Encounters:   06/11/18 136/82   06/04/18 136/89   05/18/18 125/70    Wt Readings from Last 3 Encounters:   06/11/18 234 lb (106.1 kg)   06/04/18 235 lb (106.6 kg)   05/18/18 228 lb (103.4 kg)        Labs reviewed in EPIC    Reviewed and updated as needed this visit by clinical staff  Tobacco  Allergies  Meds  Problems  Med Hx  Surg Hx  Fam Hx  Soc Hx        Reviewed and updated as needed this visit by Provider  Tobacco  Allergies  Meds  Problems  Med Hx  Surg Hx  Fam Hx  Soc Hx       "    ROS:  Constitutional, HEENT, cardiovascular, pulmonary, GI, endocrine systems are negative, except as otherwise noted.    OBJECTIVE:     /82 (BP Location: Right arm, Patient Position: Chair, Cuff Size: Adult Large)  Pulse 81  Temp 98.3  F (36.8  C) (Tympanic)  Resp 16  Ht 5' 5\" (1.651 m)  Wt 234 lb (106.1 kg)  BMI 38.94 kg/m2  Body mass index is 38.94 kg/(m^2).  GENERAL: healthy, alert and no distress  HENT: ear canals and TM's normal, nose and mouth without ulcers or lesions  NECK: no adenopathy, no asymmetry, masses, or scars   RESP: lungs clear to auscultation - no rales, rhonchi or wheezes  CV: regular rate and rhythm, normal S1 S2, no S3 or S4, no murmur, click or rub  ABDOMEN: soft, nontender, no hepatosplenomegaly, no masses and bowel sounds normal    ASSESSMENT/PLAN:       ICD-10-CM    1. Cough R05 LANsoprazole (PREVACID) 15 MG CR capsule     XR Chest 2 Views   2. Severe persistent asthma without complication J45.50 XR Chest 2 Views   3. Heartburn R12    4. Benign essential hypertension I10    5. Severe obesity (BMI 35.0-39.9) with comorbidity (H) E66.01 NUTRITION REFERRAL   6. Prediabetes R73.03 NUTRITION REFERRAL   7. Former smoker Z87.891    8. Encounter for screening mammogram for breast cancer Z12.31 *MA Screening Digital Bilateral   discussed endoscopy and possible swallow study if symptoms persist    Patient Instructions   Cough -  Chest xray today   Try prevacid every day x 2 weeks   If did well, then try off prevacid.    If not doing well, see me.  Discussed possible further steps.    Optional on trying off losartan x 2-3 days to see if is cause of cough, but I doubt it. Call me if seemed to cause cough.  Set up with allergist as well.  Contact me if questions or if troubles tolerating prevacid.      [facility] Mammo Schedule  [facility] ~ [phone]  One Day Weekly- Alternating Days    [place] ~ [phone]  Every other Monday or Wednesday   & one Saturday morning a " month    [place] ~ [phone]  Every Other Monday Morning    [place] ~ [phone]  Every Other Monday Afternoon    [place] ~ [phone]  Every Monday morning  Every Tuesday afternoon        Wed, Thurs, Friday morning & afternoon        Kiara Zazueta PA-C  [facility]

## 2018-06-11 NOTE — PATIENT INSTRUCTIONS
Cough -  Chest xray today   Try prevacid every day x 2 weeks   If did well, then try off prevacid.    If not doing well, see me.  Discussed possible further steps.    Optional on trying off losartan x 2-3 days to see if is cause of cough, but I doubt it. Call me if seemed to cause cough.  Set up with allergist as well.  Contact me if questions or if troubles tolerating prevacid.      [facility] ~ [phone]  One Day Weekly- Alternating Days    [place] ~ [phone]  Every other Monday or Wednesday   & one Saturday morning a month    [place] ~ [phone]  Every Other Monday Morning    [place] ~ [phone]  Every Other Monday Afternoon    [place] ~ [phone]  Every Monday morning  Every Tuesday afternoon        Wed, Thurs, Friday morning & afternoon

## 2018-06-11 NOTE — PROGRESS NOTES
Luz Elena -     Basically normal chest xray.  Continue current plan.    Please contact me if you have questions.    Kiara Zazueta PA-C

## 2018-06-11 NOTE — MR AVS SNAPSHOT
After Visit Summary   6/11/2018    Luz Elena Cristina    MRN: [id number]           Patient Information     Date Of Birth          1955        Visit Information        Provider Department      6/11/2018 10:20 AM Kiara Zazueta PA-C [facility]        Today's Diagnoses     Cough    -  1    Severe persistent asthma without complication        Benign essential hypertension        Encounter for screening mammogram for breast cancer          Care Instructions    Cough -  Chest xray today   Try prevacid every day x 2 weeks   If did well, then try off prevacid.    If not doing well, see me.  Discussed possible further steps.    Optional on trying off losartan x 2-3 days to see if is cause of cough, but I doubt it. Call me if seemed to cause cough.  Set up with allergist as well.  Contact me if questions or if troubles tolerating prevacid.      [facility] Mammo Schedule  [facility] ~ [phone]  One Day Weekly- Alternating Days    [place] ~ [phone]  Every other Monday or Wednesday   & one Saturday morning a month    [place] ~ [phone]  Every Other Monday Morning    [place] ~ [phone]  Every Other Monday Afternoon    [place] ~ [phone]  Every Monday morning  Every Tuesday afternoon        Wed, Thurs, Friday morning & afternoon            Follow-ups after your visit        Your next 10 appointments already scheduled     Jun 18, 2018 10:20 AM CDT   New Visit with Balta Norman DPM   [facility] [place] ([facility])    [address]  [address]  [place] [address] [phone]              Future tests that were ordered for you today     Open Future Orders        Priority Expected Expires Ordered    XR Chest 2 Views Routine 6/11/2018 6/11/2019 6/11/2018    *MA Screening Digital Bilateral Routine  6/11/2019 6/11/2018            Who to contact     If you have questions or need follow up information about  "today's clinic visit or your schedule please contact [facility] directly at [phone].  Normal or non-critical lab and imaging results will be communicated to you by MyChart, letter or phone within 4 business days after the clinic has received the results. If you do not hear from us within 7 days, please contact the clinic through [employer] or phone. If you have a critical or abnormal lab result, we will notify you by phone as soon as possible.  Submit refill requests through [employer] or call your pharmacy and they will forward the refill request to us. Please allow 3 business days for your refill to be completed.          Additional Information About Your Visit        [employer] Information     [employer] gives you secure access to your electronic health record. If you see a primary care provider, you can also send messages to your care team and make appointments. If you have questions, please call your primary care clinic.  If you do not have a primary care provider, please call [phone] and they will assist you.        Care EveryWhere ID     This is your Care EveryWhere ID. This could be used by other organizations to access your [place] medical records  [id number]        Your Vitals Were     Pulse Temperature Respirations Height BMI (Body Mass Index)       81 98.3  F (36.8  C) (Tympanic) 16 5' 5\" (1.651 m) 38.94 kg/m2        Blood Pressure from Last 3 Encounters:   06/11/18 136/82   06/04/18 136/89   05/18/18 125/70    Weight from Last 3 Encounters:   06/11/18 234 lb (106.1 kg)   06/04/18 235 lb (106.6 kg)   05/18/18 228 lb (103.4 kg)                 Today's Medication Changes          These changes are accurate as of 6/11/18 11:25 AM.  If you have any questions, ask your nurse or doctor.               Start taking these medicines.        Dose/Directions    LANsoprazole 15 MG CR capsule   Commonly known as:  PREVACID   Used for:  Cough   Started by:  Kiara Zazueta PA-C        Dose:  " 15 mg   Take 1 capsule (15 mg) by mouth daily Take 30-60 minutes before a meal.   Quantity:  30 capsule   Refills:  1         These medicines have changed or have updated prescriptions.        Dose/Directions    tiotropium 18 MCG capsule   Commonly known as:  SPIRIVA HANDIHALER   This may have changed:    - how much to take  - how to take this  - when to take this  - additional instructions   Used for:  Uncomplicated severe persistent asthma        Inhale contents of one capsule daily.   Quantity:  30 capsule   Refills:  3            Where to get your medicines      These medications were sent to [id number] - [address] AT [facility] OF [address] [place] [address]  [address] [facility] [address]     Phone:  [phone]     LANsoprazole 15 MG CR capsule                Primary Care Provider Office Phone # Fax #    Kiara Zazueta PA-C [phone] [address] [address]        Equal Access to Services     KEKE MONTEJO : Hadii titus waddell hadasho Soomaali, waaxda luqadaha, qaybta kaalmada adeegyada, lulu green . So [facility] [phone].    ATENCIÓN: Si habla español, tiene a arriaza disposición servicios gratuitos de asistencia lingüística. [facility] [phone].    We comply with applicable federal civil rights laws and [place] laws. We do not discriminate on the basis of race, color, national origin, age, disability, sex, sexual orientation, or gender identity.            Thank you!     Thank you for choosing [facility]  for your care. Our goal is always to provide you with excellent care. Hearing back from our patients is one way we can continue to improve our services. Please take a few minutes to complete the written survey that you may receive in the mail after your visit with us. Thank you!             Your Updated Medication List - Protect others around you: Learn how to safely use, store and  throw away your medicines at www.disposemymeds.org.          This list is accurate as of 6/11/18 11:25 AM.  Always use your most recent med list.                   Brand Name Dispense Instructions for use Diagnosis    ACCU-CHEK MARILUZ PLUS test strip   Generic drug:  blood glucose monitoring           * albuterol 108 (90 Base) MCG/ACT Inhaler    PROAIR HFA/PROVENTIL HFA/VENTOLIN HFA    3 Inhaler    Inhale 2 puffs into the lungs every 6 hours as needed for shortness of breath / dyspnea or wheezing    Intermittent asthma, uncomplicated       * albuterol (2.5 MG/3ML) 0.083% neb solution     50 vial    Take 1 vial (2.5 mg) by nebulization every 4 hours as needed for shortness of breath / dyspnea, wheezing or other (chest tightness, persistent cough)    Severe persistent asthma with (acute) exacerbation       aspirin 81 MG EC tablet      Take 81 mg by mouth daily        azelastine 0.1 % spray    ASTELIN    30 mL    Spray 2 sprays into both nostrils 2 times daily as needed for rhinitis or allergies    Postnasal drip       blood glucose monitoring meter device kit           clobetasol 0.05 % cream    TEMOVATE    30 g    Apply sparingly to affected area twice weekly as needed.  Do not apply to face.    Lichen sclerosus       fluticasone-salmeterol 230-21 MCG/ACT inhaler    ADVAIR-HFA    1 Inhaler    Inhale 2 puffs into the lungs 2 times daily    Uncomplicated severe persistent asthma       IBUPROFEN PO      Take by mouth 2 times daily as needed for moderate pain        LANsoprazole 15 MG CR capsule    PREVACID    30 capsule    Take 1 capsule (15 mg) by mouth daily Take 30-60 minutes before a meal.    Cough       losartan 50 MG tablet    COZAAR    90 tablet    TAKE 1 TABLET(50 MG) BY MOUTH DAILY    Benign essential hypertension       nebulizer/tubing/mouthpiece Kit     1 kit    Use with albuterol neb solution    Severe persistent asthma with (acute) exacerbation       tiotropium 18 MCG capsule    SPIRIVA HANDIHALER    30  capsule    Inhale contents of one capsule daily.    Uncomplicated severe persistent asthma       * Notice:  This list has 2 medication(s) that are the same as other medications prescribed for you. Read the directions carefully, and ask your doctor or other care provider to review them with you.

## 2018-06-11 NOTE — NURSING NOTE
"Chief Complaint   Patient presents with     Gastrophageal Reflux       Initial /82 (BP Location: Right arm, Patient Position: Chair, Cuff Size: Adult Large)  Pulse 81  Temp 98.3  F (36.8  C) (Tympanic)  Resp 16  Ht 5' 5\" (1.651 m)  Wt 234 lb (106.1 kg)  BMI 38.94 kg/m2 Estimated body mass index is 38.94 kg/(m^2) as calculated from the following:    Height as of this encounter: 5' 5\" (1.651 m).    Weight as of this encounter: 234 lb (106.1 kg).      Health Maintenance that is potentially due pending provider review:  Mammogram        Is there anyone who you would like to be able to receive your results? No  If yes have patient fill out SOHAM      "

## 2018-06-13 ENCOUNTER — TELEPHONE (OUTPATIENT)
Dept: FAMILY MEDICINE | Facility: CLINIC | Age: 63
End: 2018-06-13

## 2018-06-14 ENCOUNTER — TELEPHONE (OUTPATIENT)
Dept: FAMILY MEDICINE | Facility: CLINIC | Age: 63
End: 2018-06-14

## 2018-06-14 DIAGNOSIS — R05.9 COUGH: ICD-10-CM

## 2018-06-14 RX ORDER — PANTOPRAZOLE SODIUM 20 MG/1
TABLET, DELAYED RELEASE ORAL
Qty: 30 TABLET | Refills: 1 | Status: SHIPPED | OUTPATIENT
Start: 2018-06-14 | End: 2018-07-13 | Stop reason: SINTOL

## 2018-06-14 NOTE — TELEPHONE ENCOUNTER
Reason for call:  Medication   If this is a refill request, has the caller requested the refill from the pharmacy already? Yes  Will the patient be using a [facility]? No  Name of the pharmacy and phone number for the current request: [facility]    Name of the medication requested: Lansoprazole was sent but not covered.  Pharmacy says Pantorazoletabmg, Dexilantcapmgdr.  Please fax/call the pharmacy to change medication along with strength directions,quanity and refills.    Other request: none    Phone number to reach patient:  Home number on file [phone] (home)    Best Time:  any    Can we leave a detailed message on this number?  YES

## 2018-06-16 ENCOUNTER — RADIANT APPOINTMENT (OUTPATIENT)
Dept: MAMMOGRAPHY | Facility: CLINIC | Age: 63
End: 2018-06-16
Attending: PHYSICIAN ASSISTANT
Payer: MEDICARE

## 2018-06-16 DIAGNOSIS — Z12.31 ENCOUNTER FOR SCREENING MAMMOGRAM FOR BREAST CANCER: ICD-10-CM

## 2018-06-16 PROCEDURE — 77067 SCR MAMMO BI INCL CAD: CPT | Mod: TC

## 2018-06-17 ENCOUNTER — MYC MEDICAL ADVICE (OUTPATIENT)
Dept: FAMILY MEDICINE | Facility: CLINIC | Age: 63
End: 2018-06-17

## 2018-06-17 DIAGNOSIS — I10 BENIGN ESSENTIAL HYPERTENSION: Primary | ICD-10-CM

## 2018-06-18 ENCOUNTER — OFFICE VISIT (OUTPATIENT)
Dept: PODIATRY | Facility: CLINIC | Age: 63
End: 2018-06-18
Payer: MEDICARE

## 2018-06-18 VITALS
SYSTOLIC BLOOD PRESSURE: 148 MMHG | HEART RATE: 73 BPM | BODY MASS INDEX: 38.99 KG/M2 | DIASTOLIC BLOOD PRESSURE: 86 MMHG | WEIGHT: 234 LBS | HEIGHT: 65 IN

## 2018-06-18 DIAGNOSIS — M20.11 HALLUX VALGUS, RIGHT: Primary | ICD-10-CM

## 2018-06-18 PROCEDURE — 99203 OFFICE O/P NEW LOW 30 MIN: CPT | Performed by: PODIATRIST

## 2018-06-18 RX ORDER — AMLODIPINE BESYLATE 5 MG/1
5 TABLET ORAL DAILY
Qty: 30 TABLET | Refills: 0 | Status: SHIPPED | OUTPATIENT
Start: 2018-06-18 | End: 2018-07-13 | Stop reason: SINTOL

## 2018-06-18 NOTE — MR AVS SNAPSHOT
After Visit Summary   6/18/2018    Luz Elena Cristina    MRN: [id number]           Patient Information     Date Of Birth          1955        Visit Information        Provider Department      6/18/2018 10:20 AM Balta Norman DPM [facility] [place]        Today's Diagnoses     Hallux valgus, right    -  1      Care Instructions    BUNION (HALLUX ABDUCTO VALGUS)  A bunion is caused by muscle imbalance. The great toe is pulled toward the smaller toes. The metatarsal head is pushed outward creating a lump on the side of your foot. Imbalance is the result of foot structure and instability.   Bunions do not improve with time. They usually enlarge, however this is a fairly slow process. Shoes do not necessarily cause bunions, however, they can hasten development and definitely cause bunions to hurt.   Bunions often run in families. We inherit a certain foot structure, which may be predisposed to bunion development.   Bunion pain is likely a combination of shoes rubbing on the bump, nerve irritation, compression between the toes, joint misalignment, arthritis and altered gait.   SYMPTOMS   Bunions are usually termed mild, moderate or severe. Just because you have a bunion does not mean you have to have pain. There are some people with very severe bunions and no pain and people with mild bunions and a lot of pain.   - Pain on the inside of your foot at the big toe joint (1st MTPJ)   - Swelling on the inside of your foot at the big toe joint   - Redness on the inside of your foot at the big toe joint   - Numbness or burning in the big toe (hallux)   - Decreased motion at the big toe joint   - Painful bursa (fluid-filled sac) on the inside of your foot at the big toe joint   - Pain while wearing shoes -especially shoes too narrow or with high heels    - Pain during activities   - Corn in between the big toe and second toe   - Callous formation on the side or bottom of the big toe  or big toe joint   - Callous under the second toe joint (2nd MTPJ)   - Pain in the second toe joint   TREATMENT  Conservative (non-surgical) treatment will not make the bunion go away, but it will hopefully decrease the signs and symptoms you have and help you get rid of the pain and get you back to your activities.   1.  Wider shoes or extra depth shoes: Most bunion pain can be improved simply by wearing compatible shoes. People with bunions cannot choose footwear simply because they like the style. Your bunion should determine which shoes are to be worn. Wide shoes with nonirritating seams,soft leather and a square toe box are most compatible with a bunion. Shoes should fit appropriately right out of the box but may need to be professionally stretched and modified to accommodate the bump. Heels, dress shoes and shoes with pointed toes will not be comfortable.   2. NSAIDs   3.  Arch supports, custom inserts, padding, splints, toe spacers : Most bunion pain can be improved simply by wearing compatible shoes. People with bunions cannot choose footwear simply because they like the style. Your bunion should determine which shoes are to be worn. Wide shoes with nonirritating seams,soft leather and a square toe box are most compatible with a bunion. Shoes should fit appropriately right out of the box but may need to be professionally stretched and modified to accommodate the bump. Heels, dress shoes and shoes with pointed toes will not be comfortable.   4.  Change activities   5.  Physical therapy  SURGERY  Surgical treatment for bunions is sometimes needed. If you are limited by pain, cannot fit in shoes comfortably and are not able to do your daily activities then surgery may be a good option for you. There are many different surgical procedures to repair bunions. Your foot and ankle surgeon will review your foot exam findings, your x-rays, your age, your health, your lifestyle, your physical activity level and discuss  with you which procedure he or she would recommend. Surgical procedures for bunions range from soft tissue repair to cutting and realigning the bones. It is not recommended that you have bunion surgery for cosmetic reasons (you do not like how your foot looks) or because you want to fit in a certain pair of shoes; There is the risk that even after surgery, the bunion will reoccur 9-10% of the time.   Bunion surgery involves cutting and repositioning the bones surrounding the bunion. Pins and screws are used to hold the bones in place during the healing process. The goal of bunion surgery is to reduce the size of the bunion bump. Realignment of the toe and joint is attempted.     Some first toes cannot be forced back into normal alignment even with surgery. Surgery is helpful in most cases but does not necessarily create a normal foot.   Healing after surgery requires about six weeks of protection. This allows the bone to heal. Maximum recovery takes about one year. The scar tissue and jOint structures require this amount of time to finish the healing process. Expect stiffness, swelling and numbness during that time frame. Bunion surgery does involve side effects. Some side effects are predictable and others are less common but do occur. A scar will be visible and could be irritated by shoes. The shoe may rub on the screw or internal pin requiring surgical removal of these fixation devices. The screw and pin would likely be left in place for a full year. The first toe may loose motion after bunion surgery. The amount of stiffness is variable. Some people never regain normal motion of the first toe. This is due to scar tissue inherent to any surgery. The first toe may drift toward the second toe or away from the second toe. Spreading of the first and second toes is a rare occurrence after bunion surgery. This can be quite bothersome and would need to be surgically repaired. Toe drift toward the second toe could result  in a recurrent bunion and revision surgery. Joint fusion is one option to correct an unstable, drifting toe. This procedure straightens the toe, however, no motion remains. Fusion may be necessary to correct complications of bunion surgery or as the original procedure in severe cases.   All surgical procedures involve risk of infection, numbness, pain, delayed healing, osteotomy dislocation, blood clots, continued foot pain, etc. Bunion surgery is quite complex and should not be taken lightly.   Any skin incision can lead to infection. Deep infection might involve the bone and thus repeat surgery and six weeks of IV antibiotics. Scar tissue can cause nerve pain or numbness. This is generally temporary but can be permanent. We do not have treatments that cure nerve problems. Second toe pain could be related to altered mechanics and pressure transferred to the second toe. Most feet with bunions have pre-existing second toe problems. Delayed bone healing would lengthen the healing time. Some bones simply do not heal. This requires repeat surgery, electronic bone stimulation and/or extended protection. Smokers have an approximate 20% chance of poor bone healing. This is double that of a non-smoker. The bone cut may displace. This may need to be repaired with a second operation. Displacement can cause jOint malalignment. Immobility after surgery can cause blood clots in the legs and lungs. This could result in death.   Foot pain is complex. Most feet hurt for more than one reason. Fixing the bunion would not necessarily create a pain free foot. Appropriate shoes, healthy body weight, avoidance of bare foot walking and moderation of activity will always be necessary to enjoy foot comfort. Your bunion may involve arthritis, which is incurable even with surgery. Long standing bunions often involve chronic irritation to the surrounding nerves. Nerve pain may not resolve even with reducing the bunion bump since permanent  nerve damage may be present   Bunion surgery is nevertheless quite successful. Most surgical patients are pleased with their foot following bunion surgery. Many of the issues described above can be controlled by taking proper care of your foot during the healing process.   Your surgeon would be happy to fully describe any of the above issues. You should pursue a full understanding of the operation,recovery process and any potential problems that could develop.   PREVENTION  1.  Do not wear high heels if there is a family history of bunions.  2.  Wear shoes that have enough width and depth in the toe box  Here are exercises that may benefit people with bunions:   Toe stretches - Stretching out your toes can help keep them limber and offset foot pain. To stretch your toes, point your toes straight ahead for 5 seconds and then curl them under for 5 seconds. Repeat these stretches 10 times. These exercises can be especially beneficial if you also have hammertoes, or chronically bent toes, in addition to a bunion.   Toe flexing and solo - Press your toes against a hard surface such as a wall, to flex and stretch them; hold the position for 10 seconds and repeat three to four times. Then flex your toes in the opposite direction; hold the position for 10 seconds and repeat three to four times.   Stretching your big toe - Using your fingers to gently pull your big toe over into proper alignment can be helpful as well. Hold your toe in position for 10 seconds and repeat three to four times.   Resistance exercises - Wrap either a towel or belt around your big toe and use it to pull your big toe toward you while simultaneously pushing forward, against the towel, with your big toe.   Ball roll - To massage the bottom of your foot, sit down, place a golf ball on the floor under your foot, and roll it around under your foot for two minutes. This can help relieve foot strain and cramping.   Towel curls - You can strengthen  your toes by spreading out a small towel on the floor, curling your toes around it, and pulling it toward you. Repeat five times. Gripping objects with your toes like this can help keep your foot flexible.   Picking up marbles - Another gripping exercise you can perform to keep your foot flexible is picking up marbles with your toes. Do this by placing 20 marbles on the floor in front of you and use your foot to pick the marbles up one by one and place them in a bowl.   Walking along the beach - Whenever possible, spend time walking on sand. This can give you a gentle foot massage and also help strengthen your toes. This is especially beneficial for people who have arthritis associated with their bunions.             Follow-ups after your visit        Follow-up notes from your care team     Return if symptoms worsen or fail to improve.      Your next 10 appointments already scheduled     Jun 25, 2018 11:30 AM CDT   Ortho Treatment with Tana Soliz PT   [facility] ([facility])    [address] [id number] [phone]            Jul 02, 2018  9:30 AM CDT   Ortho Treatment with Tana Soliz PT   [facility] ([facility])    [address]  [facility] [id number] [phone]              Who to contact     If you have questions or need follow up information about today's clinic visit or your schedule please contact [facility] directly at [phone].  Normal or non-critical lab and imaging results will be communicated to you by MyChart, letter or phone within 4 business days after the clinic has received the results. If you do not hear from us within 7 days, please contact the clinic through [employer] or phone. If you have a critical or abnormal lab result, we will notify you by phone as soon as possible.  Submit refill requests through [employer] or call your  "pharmacy and they will forward the refill request to us. Please allow 3 business days for your refill to be completed.          Additional Information About Your Visit        [employer] Information     [employer] gives you secure access to your electronic health record. If you see a primary care provider, you can also send messages to your care team and make appointments. If you have questions, please call your primary care clinic.  If you do not have a primary care provider, please call [phone] and they will assist you.        Care EveryWhere ID     This is your Care EveryWhere ID. This could be used by other organizations to access your [facility] medical records  [id number]        Your Vitals Were     Pulse Height BMI (Body Mass Index)             73 5' 5\" (1.651 m) 38.94 kg/m2          Blood Pressure from Last 3 Encounters:   06/18/18 148/86   06/11/18 136/82   06/04/18 136/89    Weight from Last 3 Encounters:   06/18/18 234 lb (106.1 kg)   06/11/18 234 lb (106.1 kg)   06/04/18 235 lb (106.6 kg)              Today, you had the following     No orders found for display         Today's Medication Changes          These changes are accurate as of 6/18/18 11:59 PM.  If you have any questions, ask your nurse or doctor.               These medicines have changed or have updated prescriptions.        Dose/Directions    tiotropium 18 MCG capsule   Commonly known as:  SPIRIVA HANDIHALER   This may have changed:    - how much to take  - how to take this  - when to take this  - additional instructions   Used for:  Uncomplicated severe persistent asthma        Inhale contents of one capsule daily.   Quantity:  30 capsule   Refills:  3                Primary Care Provider Office Phone # Fax #    Kiara Zazueta PA-C [phone] [address] [place] [address]        Equal Access to Services     KEKE MONTEJO AH: camila Bennett, lulu myers " toshia dottieselina sreedharjelly lafabián ah. So [facility] [phone].    ATENCIÓN: Si phanila juno, tiene a arriaza disposición servicios gratuitos de asistencia lingüística. Michael stewart [phone].    We comply with applicable federal civil rights laws and [place] laws. We do not discriminate on the basis of race, color, national origin, age, disability, sex, sexual orientation, or gender identity.            Thank you!     Thank you for choosing [facility]  for your care. Our goal is always to provide you with excellent care. Hearing back from our patients is one way we can continue to improve our services. Please take a few minutes to complete the written survey that you may receive in the mail after your visit with us. Thank you!             Your Updated Medication List - Protect others around you: Learn how to safely use, store and throw away your medicines at www.disposemymeds.org.          This list is accurate as of 6/18/18 11:59 PM.  Always use your most recent med list.                   Brand Name Dispense Instructions for use Diagnosis    ACCU-CHEK MARILUZ PLUS test strip   Generic drug:  blood glucose monitoring           * albuterol 108 (90 Base) MCG/ACT Inhaler    PROAIR HFA/PROVENTIL HFA/VENTOLIN HFA    3 Inhaler    Inhale 2 puffs into the lungs every 6 hours as needed for shortness of breath / dyspnea or wheezing    Intermittent asthma, uncomplicated       * albuterol (2.5 MG/3ML) 0.083% neb solution     50 vial    Take 1 vial (2.5 mg) by nebulization every 4 hours as needed for shortness of breath / dyspnea, wheezing or other (chest tightness, persistent cough)    Severe persistent asthma with (acute) exacerbation       amLODIPine 5 MG tablet    NORVASC    30 tablet    Take 1 tablet (5 mg) by mouth daily    Benign essential hypertension       aspirin 81 MG EC tablet      Take 81 mg by mouth daily        azelastine 0.1 % spray    ASTELIN    30 mL    Spray 2 sprays into both nostrils 2  times daily as needed for rhinitis or allergies    Postnasal drip       blood glucose monitoring meter device kit           clobetasol 0.05 % cream    TEMOVATE    30 g    Apply sparingly to affected area twice weekly as needed.  Do not apply to face.    Lichen sclerosus       fluticasone-salmeterol 230-21 MCG/ACT inhaler    ADVAIR-HFA    1 Inhaler    Inhale 2 puffs into the lungs 2 times daily    Uncomplicated severe persistent asthma       IBUPROFEN PO      Take by mouth 2 times daily as needed for moderate pain        nebulizer/tubing/mouthpiece Kit     1 kit    Use with albuterol neb solution    Severe persistent asthma with (acute) exacerbation       pantoprazole 20 MG EC tablet    PROTONIX    30 tablet    Take by mouth 30-60 minutes before a meal.    Cough       tiotropium 18 MCG capsule    SPIRIVA HANDIHALER    30 capsule    Inhale contents of one capsule daily.    Uncomplicated severe persistent asthma       * Notice:  This list has 2 medication(s) that are the same as other medications prescribed for you. Read the directions carefully, and ask your doctor or other care provider to review them with you.

## 2018-06-18 NOTE — PATIENT INSTRUCTIONS
BUNION (HALLUX ABDUCTO VALGUS)  A bunion is caused by muscle imbalance. The great toe is pulled toward the smaller toes. The metatarsal head is pushed outward creating a lump on the side of your foot. Imbalance is the result of foot structure and instability.   Bunions do not improve with time. They usually enlarge, however this is a fairly slow process. Shoes do not necessarily cause bunions, however, they can hasten development and definitely cause bunions to hurt.   Bunions often run in families. We inherit a certain foot structure, which may be predisposed to bunion development.   Bunion pain is likely a combination of shoes rubbing on the bump, nerve irritation, compression between the toes, joint misalignment, arthritis and altered gait.   SYMPTOMS   Bunions are usually termed mild, moderate or severe. Just because you have a bunion does not mean you have to have pain. There are some people with very severe bunions and no pain and people with mild bunions and a lot of pain.   - Pain on the inside of your foot at the big toe joint (1st MTPJ)   - Swelling on the inside of your foot at the big toe joint   - Redness on the inside of your foot at the big toe joint   - Numbness or burning in the big toe (hallux)   - Decreased motion at the big toe joint   - Painful bursa (fluid-filled sac) on the inside of your foot at the big toe joint   - Pain while wearing shoes -especially shoes too narrow or with high heels    - Pain during activities   - Corn in between the big toe and second toe   - Callous formation on the side or bottom of the big toe or big toe joint   - Callous under the second toe joint (2nd MTPJ)   - Pain in the second toe joint   TREATMENT  Conservative (non-surgical) treatment will not make the bunion go away, but it will hopefully decrease the signs and symptoms you have and help you get rid of the pain and get you back to your activities.   1.  Wider shoes or extra depth shoes: Most bunion pain can  be improved simply by wearing compatible shoes. People with bunions cannot choose footwear simply because they like the style. Your bunion should determine which shoes are to be worn. Wide shoes with nonirritating seams,soft leather and a square toe box are most compatible with a bunion. Shoes should fit appropriately right out of the box but may need to be professionally stretched and modified to accommodate the bump. Heels, dress shoes and shoes with pointed toes will not be comfortable.   2. NSAIDs   3.  Arch supports, custom inserts, padding, splints, toe spacers : Most bunion pain can be improved simply by wearing compatible shoes. People with bunions cannot choose footwear simply because they like the style. Your bunion should determine which shoes are to be worn. Wide shoes with nonirritating seams,soft leather and a square toe box are most compatible with a bunion. Shoes should fit appropriately right out of the box but may need to be professionally stretched and modified to accommodate the bump. Heels, dress shoes and shoes with pointed toes will not be comfortable.   4.  Change activities   5.  Physical therapy  SURGERY  Surgical treatment for bunions is sometimes needed. If you are limited by pain, cannot fit in shoes comfortably and are not able to do your daily activities then surgery may be a good option for you. There are many different surgical procedures to repair bunions. Your foot and ankle surgeon will review your foot exam findings, your x-rays, your age, your health, your lifestyle, your physical activity level and discuss with you which procedure he or she would recommend. Surgical procedures for bunions range from soft tissue repair to cutting and realigning the bones. It is not recommended that you have bunion surgery for cosmetic reasons (you do not like how your foot looks) or because you want to fit in a certain pair of shoes; There is the risk that even after surgery, the bunion will  reoccur 9-10% of the time.   Bunion surgery involves cutting and repositioning the bones surrounding the bunion. Pins and screws are used to hold the bones in place during the healing process. The goal of bunion surgery is to reduce the size of the bunion bump. Realignment of the toe and joint is attempted.     Some first toes cannot be forced back into normal alignment even with surgery. Surgery is helpful in most cases but does not necessarily create a normal foot.   Healing after surgery requires about six weeks of protection. This allows the bone to heal. Maximum recovery takes about one year. The scar tissue and jOint structures require this amount of time to finish the healing process. Expect stiffness, swelling and numbness during that time frame. Bunion surgery does involve side effects. Some side effects are predictable and others are less common but do occur. A scar will be visible and could be irritated by shoes. The shoe may rub on the screw or internal pin requiring surgical removal of these fixation devices. The screw and pin would likely be left in place for a full year. The first toe may loose motion after bunion surgery. The amount of stiffness is variable. Some people never regain normal motion of the first toe. This is due to scar tissue inherent to any surgery. The first toe may drift toward the second toe or away from the second toe. Spreading of the first and second toes is a rare occurrence after bunion surgery. This can be quite bothersome and would need to be surgically repaired. Toe drift toward the second toe could result in a recurrent bunion and revision surgery. Joint fusion is one option to correct an unstable, drifting toe. This procedure straightens the toe, however, no motion remains. Fusion may be necessary to correct complications of bunion surgery or as the original procedure in severe cases.   All surgical procedures involve risk of infection, numbness, pain, delayed healing,  osteotomy dislocation, blood clots, continued foot pain, etc. Bunion surgery is quite complex and should not be taken lightly.   Any skin incision can lead to infection. Deep infection might involve the bone and thus repeat surgery and six weeks of IV antibiotics. Scar tissue can cause nerve pain or numbness. This is generally temporary but can be permanent. We do not have treatments that cure nerve problems. Second toe pain could be related to altered mechanics and pressure transferred to the second toe. Most feet with bunions have pre-existing second toe problems. Delayed bone healing would lengthen the healing time. Some bones simply do not heal. This requires repeat surgery, electronic bone stimulation and/or extended protection. Smokers have an approximate 20% chance of poor bone healing. This is double that of a non-smoker. The bone cut may displace. This may need to be repaired with a second operation. Displacement can cause jOint malalignment. Immobility after surgery can cause blood clots in the legs and lungs. This could result in death.   Foot pain is complex. Most feet hurt for more than one reason. Fixing the bunion would not necessarily create a pain free foot. Appropriate shoes, healthy body weight, avoidance of bare foot walking and moderation of activity will always be necessary to enjoy foot comfort. Your bunion may involve arthritis, which is incurable even with surgery. Long standing bunions often involve chronic irritation to the surrounding nerves. Nerve pain may not resolve even with reducing the bunion bump since permanent nerve damage may be present   Bunion surgery is nevertheless quite successful. Most surgical patients are pleased with their foot following bunion surgery. Many of the issues described above can be controlled by taking proper care of your foot during the healing process.   Your surgeon would be happy to fully describe any of the above issues. You should pursue a full  understanding of the operation,recovery process and any potential problems that could develop.   PREVENTION  1.  Do not wear high heels if there is a family history of bunions.  2.  Wear shoes that have enough width and depth in the toe box  Here are exercises that may benefit people with bunions:   Toe stretches - Stretching out your toes can help keep them limber and offset foot pain. To stretch your toes, point your toes straight ahead for 5 seconds and then curl them under for 5 seconds. Repeat these stretches 10 times. These exercises can be especially beneficial if you also have hammertoes, or chronically bent toes, in addition to a bunion.   Toe flexing and solo - Press your toes against a hard surface such as a wall, to flex and stretch them; hold the position for 10 seconds and repeat three to four times. Then flex your toes in the opposite direction; hold the position for 10 seconds and repeat three to four times.   Stretching your big toe - Using your fingers to gently pull your big toe over into proper alignment can be helpful as well. Hold your toe in position for 10 seconds and repeat three to four times.   Resistance exercises - Wrap either a towel or belt around your big toe and use it to pull your big toe toward you while simultaneously pushing forward, against the towel, with your big toe.   Ball roll - To massage the bottom of your foot, sit down, place a golf ball on the floor under your foot, and roll it around under your foot for two minutes. This can help relieve foot strain and cramping.   Towel curls - You can strengthen your toes by spreading out a small towel on the floor, curling your toes around it, and pulling it toward you. Repeat five times. Gripping objects with your toes like this can help keep your foot flexible.   Picking up marbles - Another gripping exercise you can perform to keep your foot flexible is picking up marbles with your toes. Do this by placing 20 marbles on  the floor in front of you and use your foot to pick the marbles up one by one and place them in a bowl.   Walking along the beach - Whenever possible, spend time walking on sand. This can give you a gentle foot massage and also help strengthen your toes. This is especially beneficial for people who have arthritis associated with their bunions.

## 2018-06-18 NOTE — PROGRESS NOTES
PATIENT HISTORY:  Luz Elena Cristina is a 63 year old female who presents to clinic for a painful right foot .  The patient describes the pain as sharp aching.  The patient relates the pain level is moderate.  The patient relates pain is located over the big toe joint on the right foot.  The patient relates the pain has been present for the past several weeks.  The patient relates pain with ambulation.  The patient has tried wider shoes with little relief.       REVIEW OF SYSTEMS:  Constitutional, HEENT, cardiovascular, pulmonary, GI, , musculoskeletal, neuro, skin, endocrine and psych systems are negative, except as otherwise noted.     PAST MEDICAL HISTORY:   Past Medical History:   Diagnosis Date     Hypertension      Uncomplicated asthma         PAST SURGICAL HISTORY:   Past Surgical History:   Procedure Laterality Date     BIOPSY      breast,     COLONOSCOPY       GYN SURGERY       TONSILLECTOMY          MEDICATIONS:   Current Outpatient Prescriptions:      ACCU-CHEK MRAILUZ PLUS test strip, , Disp: , Rfl: 0     albuterol (2.5 MG/3ML) 0.083% nebulizer solution, Take 1 vial (2.5 mg) by nebulization every 4 hours as needed for shortness of breath / dyspnea, wheezing or other (chest tightness, persistent cough), Disp: 50 vial, Rfl: 1     albuterol (PROAIR HFA, PROVENTIL HFA, VENTOLIN HFA) 108 (90 BASE) MCG/ACT inhaler, Inhale 2 puffs into the lungs every 6 hours as needed for shortness of breath / dyspnea or wheezing, Disp: 3 Inhaler, Rfl: 1     aspirin 81 MG EC tablet, Take 81 mg by mouth daily, Disp: , Rfl:      azelastine (ASTELIN) 0.1 % spray, Spray 2 sprays into both nostrils 2 times daily as needed for rhinitis or allergies, Disp: 30 mL, Rfl: 3     blood glucose monitoring (ACCU-CHEK MARILUZ PLUS) meter device kit, , Disp: , Rfl: 0     clobetasol (TEMOVATE) 0.05 % cream, Apply sparingly to affected area twice weekly as needed.  Do not apply to face., Disp: 30 g, Rfl: 0     fluticasone-salmeterol (ADVAIR-HFA)  230-21 MCG/ACT inhaler, Inhale 2 puffs into the lungs 2 times daily, Disp: 1 Inhaler, Rfl: 3     IBUPROFEN PO, Take by mouth 2 times daily as needed for moderate pain, Disp: , Rfl:      losartan (COZAAR) 50 MG tablet, TAKE 1 TABLET(50 MG) BY MOUTH DAILY, Disp: 90 tablet, Rfl: 3     pantoprazole (PROTONIX) 20 MG EC tablet, Take by mouth 30-60 minutes before a meal., Disp: 30 tablet, Rfl: 1     Respiratory Therapy Supplies (NEBULIZER/TUBING/MOUTHPIECE) KIT, Use with albuterol neb solution, Disp: 1 kit, Rfl: 0     tiotropium (SPIRIVA HANDIHALER) 18 MCG capsule, Inhale contents of one capsule daily. (Patient taking differently: Inhale 18 mcg into the lungs daily Inhale contents of one capsule daily.), Disp: 30 capsule, Rfl: 3     ALLERGIES:    Allergies   Allergen Reactions     Sulfa Drugs Rash        SOCIAL HISTORY:   Social History     Social History     Marital status:      Spouse name: N/A     Number of children: N/A     Years of education: N/A     Occupational History     Not on file.     Social History Main Topics     Smoking status: Former Smoker     Packs/day: 2.00     Years: 20.00     Types: Cigarettes     Quit date: 4/28/1987     Smokeless tobacco: Never Used     Alcohol use Yes     Drug use: No     Sexual activity: Not Currently     Partners: Male     Birth control/ protection: Post-menopausal     Other Topics Concern     Parent/Sibling W/ Cabg, Mi Or Angioplasty Before 65f 55m? No     Social History Narrative    ENVIRONMENTAL HISTORY: [place] The home is heated with a forced air but does not use.  Has floor heating. They do have central air conditioning. The patient's bedroom is furnished with carpeting in bedroom.  No pets inside the house. But is exposed to chicken. There is not history of cockroach or mice infestation. There are no smokers in the house.  The house does not have a damp basement.             FAMILY HISTORY:   Family History   Problem  "Relation Age of Onset     Other Cancer Mother      panreatic     DIABETES Mother      Breast Cancer Sister      Skin Cancer Sister      CEREBROVASCULAR DISEASE Father      CVA     Chronic Obstructive Pulmonary Disease Father      Aneurysm Paternal Grandmother      Unknown/Adopted Paternal Grandfather      Alzheimer Disease Brother      early onset     DIABETES Son      Thyroid Disease Son      Breast Cancer Paternal Aunt      DIABETES Son      Breast Cancer Sister         EXAM:Vitals: /86 (BP Location: Left arm, Patient Position: Sitting, Cuff Size: Adult Regular)  Pulse 73  Ht 5' 5\" (1.651 m)  Wt 234 lb (106.1 kg)  BMI 38.94 kg/m2  BMI= Body mass index is 38.94 kg/(m^2).    Weight management plan: Patient was referred to their PCP to discuss a diet and exercise plan.    General appearance: Patient is alert and fully cooperative with history & exam.  No sign of distress is noted during the visit.     Psychiatric: Affect is pleasant & appropriate.  Patient appears motivated to improve health.     Respiratory: Breathing is regular & unlabored while sitting.     HEENT: Hearing is intact to spoken word.  Speech is clear.  No gross evidence of visual impairment that would impact ambulation.     Dermatologic: Skin is intact to both lower extremities without significant lesions, rash or abrasion.  No paronychia or evidence of soft tissue infection is noted.     Vascular: DP & PT pulses are intact & regular bilaterally.  No significant edema or varicosities noted.  CFT and skin temperature is normal to both lower extremities.     Neurologic: Lower extremity sensation is intact to light touch.  No evidence of weakness or contracture in the lower extremities.  No evidence of neuropathy.     Musculoskeletal: Patient is ambulatory without assistive device or brace.  No gross ankle deformity noted.  No foot or ankle joint effusion is noted.  One notes a moderate bunion deformity on the right foot.  Noted pain on " palpation over the medial eminence of the first metatarsophalangeal joint on the right foot.  Slight erythema noted    Radiographs were evaluated including AP, lateral and medial oblique views of the right foot reveals a moderate bunion deformity with an increased intermetatarsal angle and hallux abductus angle.  No cortical erosions or periosteal elevation.  All joint margins appear stable.  There is no apparent fracture or tumor formation noted.  There is no evidence of foreign body.    ASSESSMENT / PLAN:     ICD-10-CM    1. Hallux valgus, right M20.11        I have explained to Luz Elena  about the conditions.  We discussed the nature of the condition as well as the treatment plan and expected length of recovery.  At this time, the patient would like to continue with conservative care offloading the bunion deformity with room air shoes.      Disclaimer: This note consists of symbols derived from keyboarding, dictation and/or voice recognition software. As a result, there may be errors in the script that have gone undetected. Please consider this when interpreting information found in this chart.       DAGOBERTO Norman D.P.M., F.JONATHAN.C.[email or web address].S.

## 2018-06-18 NOTE — LETTER
6/18/2018         RE: Luz Elena Cristina  [id number] [facility] [place] [address]        Dear Colleague,    Thank you for referring your patient, Luz Elena Cristina, to the [facility]. Please see a copy of my visit note below.    PATIENT HISTORY:  Luz Elena Cristina is a 63 year old female who presents to clinic for a painful right foot .  The patient describes the pain as sharp aching.  The patient relates the pain level is moderate.  The patient relates pain is located over the big toe joint on the right foot.  The patient relates the pain has been present for the past several weeks.  The patient relates pain with ambulation.  The patient has tried wider shoes with little relief.       REVIEW OF SYSTEMS:  Constitutional, HEENT, cardiovascular, pulmonary, GI, , musculoskeletal, neuro, skin, endocrine and psych systems are negative, except as otherwise noted.     PAST MEDICAL HISTORY:   Past Medical History:   Diagnosis Date     Hypertension      Uncomplicated asthma         PAST SURGICAL HISTORY:   Past Surgical History:   Procedure Laterality Date     BIOPSY      breast,     COLONOSCOPY       GYN SURGERY       TONSILLECTOMY          MEDICATIONS:   Current Outpatient Prescriptions:      [email or web address] MARILUZ PLUS test strip, , Disp: , Rfl: 0     albuterol (2.5 MG/3ML) 0.083% nebulizer solution, Take 1 vial (2.5 mg) by nebulization every 4 hours as needed for shortness of breath / dyspnea, wheezing or other (chest tightness, persistent cough), Disp: 50 vial, Rfl: 1     albuterol (PROAIR HFA, PROVENTIL HFA, VENTOLIN HFA) 108 (90 BASE) MCG/ACT inhaler, Inhale 2 puffs into the lungs every 6 hours as needed for shortness of breath / dyspnea or wheezing, Disp: 3 Inhaler, Rfl: 1     aspirin 81 MG EC tablet, Take 81 mg by mouth daily, Disp: , Rfl:      azelastine (ASTELIN) 0.1 % spray, Spray 2 sprays into both nostrils 2 times daily as needed for rhinitis or allergies, Disp: 30 mL, Rfl: 3     blood  glucose monitoring (ACCU-CHEK MARILUZ PLUS) meter device kit, , Disp: , Rfl: 0     clobetasol (TEMOVATE) 0.05 % cream, Apply sparingly to affected area twice weekly as needed.  Do not apply to face., Disp: 30 g, Rfl: 0     fluticasone-salmeterol (ADVAIR-HFA) 230-21 MCG/ACT inhaler, Inhale 2 puffs into the lungs 2 times daily, Disp: 1 Inhaler, Rfl: 3     IBUPROFEN PO, Take by mouth 2 times daily as needed for moderate pain, Disp: , Rfl:      losartan (COZAAR) 50 MG tablet, TAKE 1 TABLET(50 MG) BY MOUTH DAILY, Disp: 90 tablet, Rfl: 3     pantoprazole (PROTONIX) 20 MG EC tablet, Take by mouth 30-60 minutes before a meal., Disp: 30 tablet, Rfl: 1     Respiratory Therapy Supplies (NEBULIZER/TUBING/MOUTHPIECE) KIT, Use with albuterol neb solution, Disp: 1 kit, Rfl: 0     tiotropium (SPIRIVA HANDIHALER) 18 MCG capsule, Inhale contents of one capsule daily. (Patient taking differently: Inhale 18 mcg into the lungs daily Inhale contents of one capsule daily.), Disp: 30 capsule, Rfl: 3     ALLERGIES:    Allergies   Allergen Reactions     Sulfa Drugs Rash        SOCIAL HISTORY:   Social History     Social History     Marital status:      Spouse name: N/A     Number of children: N/A     Years of education: N/A     Occupational History     Not on file.     Social History Main Topics     Smoking status: Former Smoker     Packs/day: 2.00     Years: 20.00     Types: Cigarettes     Quit date: 4/28/1987     Smokeless tobacco: Never Used     Alcohol use Yes     Drug use: No     Sexual activity: Not Currently     Partners: Male     Birth control/ protection: Post-menopausal     Other Topics Concern     Parent/Sibling W/ Cabg, Mi Or Angioplasty Before 65f 55m? No     Social History Narrative    ENVIRONMENTAL HISTORY: [place] The home is heated with a forced air but does not use.  Has floor heating. They do have central air conditioning. The patient's bedroom is furnished with carpeting  "in bedroom.  No pets inside the house. But is exposed to chicken. There is not history of cockroach or mice infestation. There are no smokers in the house.  The house does not have a damp basement.             FAMILY HISTORY:   Family History   Problem Relation Age of Onset     Other Cancer Mother      panreatic     DIABETES Mother      Breast Cancer Sister      Skin Cancer Sister      CEREBROVASCULAR DISEASE Father      CVA     Chronic Obstructive Pulmonary Disease Father      Aneurysm Paternal Grandmother      Unknown/Adopted Paternal Grandfather      Alzheimer Disease Brother      early onset     DIABETES Son      Thyroid Disease Son      Breast Cancer Paternal Aunt      DIABETES Son      Breast Cancer Sister         EXAM:Vitals: /86 (BP Location: Left arm, Patient Position: Sitting, Cuff Size: Adult Regular)  Pulse 73  Ht 5' 5\" (1.651 m)  Wt 234 lb (106.1 kg)  BMI 38.94 kg/m2  BMI= Body mass index is 38.94 kg/(m^2).    Weight management plan: Patient was referred to their PCP to discuss a diet and exercise plan.    General appearance: Patient is alert and fully cooperative with history & exam.  No sign of distress is noted during the visit.     Psychiatric: Affect is pleasant & appropriate.  Patient appears motivated to improve health.     Respiratory: Breathing is regular & unlabored while sitting.     HEENT: Hearing is intact to spoken word.  Speech is clear.  No gross evidence of visual impairment that would impact ambulation.     Dermatologic: Skin is intact to both lower extremities without significant lesions, rash or abrasion.  No paronychia or evidence of soft tissue infection is noted.     Vascular: DP & PT pulses are intact & regular bilaterally.  No significant edema or varicosities noted.  CFT and skin temperature is normal to both lower extremities.     Neurologic: Lower extremity sensation is intact to light touch.  No evidence of weakness or contracture in the lower extremities.  No " evidence of neuropathy.     Musculoskeletal: Patient is ambulatory without assistive device or brace.  No gross ankle deformity noted.  No foot or ankle joint effusion is noted.  One notes a moderate bunion deformity on the right foot.  Noted pain on palpation over the medial eminence of the first metatarsophalangeal joint on the right foot.  Slight erythema noted    Radiographs were evaluated including AP, lateral and medial oblique views of the right foot reveals a moderate bunion deformity with an increased intermetatarsal angle and hallux abductus angle.  No cortical erosions or periosteal elevation.  All joint margins appear stable.  There is no apparent fracture or tumor formation noted.  There is no evidence of foreign body.    ASSESSMENT / PLAN:     ICD-10-CM    1. Hallux valgus, right M20.11        I have explained to Luz Elena  about the conditions.  We discussed the nature of the condition as well as the treatment plan and expected length of recovery.  At this time, the patient would like to continue with conservative care offloading the bunion deformity with room air shoes.      Disclaimer: This note consists of symbols derived from keyboarding, dictation and/or voice recognition software. As a result, there may be errors in the script that have gone undetected. Please consider this when interpreting information found in this chart.       CHELLY Castillo.P.GUILLERMINA., F.A.C.F.A.S.        Again, thank you for allowing me to participate in the care of your patient.        Sincerely,        Balta Norman DPM

## 2018-06-19 ENCOUNTER — HOSPITAL ENCOUNTER (OUTPATIENT)
Dept: PHYSICAL THERAPY | Facility: CLINIC | Age: 63
Setting detail: THERAPIES SERIES
End: 2018-06-19
Attending: PEDIATRICS
Payer: MEDICARE

## 2018-06-19 PROCEDURE — 40000718 ZZHC STATISTIC PT DEPARTMENT ORTHO VISIT: Performed by: PHYSICAL THERAPIST

## 2018-06-19 PROCEDURE — G8979 MOBILITY GOAL STATUS: HCPCS | Mod: GP,CH | Performed by: PHYSICAL THERAPIST

## 2018-06-19 PROCEDURE — 97161 PT EVAL LOW COMPLEX 20 MIN: CPT | Mod: GP | Performed by: PHYSICAL THERAPIST

## 2018-06-19 PROCEDURE — 97110 THERAPEUTIC EXERCISES: CPT | Mod: GP | Performed by: PHYSICAL THERAPIST

## 2018-06-19 PROCEDURE — G8978 MOBILITY CURRENT STATUS: HCPCS | Mod: GP,CJ | Performed by: PHYSICAL THERAPIST

## 2018-06-19 NOTE — PROGRESS NOTES
Physical Therapy Initial (L) Knee Evaluation   06/19/18 1400   General Information   Type of Visit Initial OP Ortho PT Evaluation   Start of Care Date 06/19/18   Referring Physician Sandra Zacarias MD   Patient/Family Goals Statement Be able to golf; and improve stability to not worry S.O.   Orders Evaluate and Treat   Date of Order 06/04/18   Insurance Type Medicare;[employer] Cross   Insurance Comments/Visits Authorized 365; $2010 cap   Medical Diagnosis Prepatellar bursitis of left knee; injury of (L) knee, subsequent encounter   Surgical/Medical history reviewed Yes   Precautions/Limitations no known precautions/limitations   General Information Comments PMHx: asthma, elbow fracture 5/1/18; HTN, Bladder Control, menopause, arthritis   Body Part(s)   Body Part(s) Knee   Presentation and Etiology   Pertinent history of current problem (include personal factors and/or comorbidities that impact the POC) On 5/1/18, pt tripped on her flip flops, and ran forward and fell into a [employer] Machine.  She suffered (L) elbow fx and fell onto both knees.  Suffered pre-patellar traumatic bursitis and had aspiration and drainage on 5/18/18.  Currently has mild (L) knee pain, and is not using as functional as she would like.    Impairments A. Pain;D. Decreased ROM;F. Decreased strength and endurance   Functional Limitations perform activities of daily living;perform desired leisure / sports activities   Symptom Location (L) knee, mainly lateral, but also has c/o medial posterior joint line pain   How/Where did it occur With a fall;At home   Onset date of current episode/exacerbation 05/01/18   Chronicity New   Best (/10) 0   Worst (/10) 7   Pain quality A. Sharp   Frequency of pain/symptoms C. With activity   Pain/symptoms are: The same all the time   Pain/symptoms exacerbated by G. Certain positions;I. Bending   Pain/symptoms eased by A. Sitting;F. Certain positions   Progression of symptoms since onset: Improved   Current / Previous  "Interventions   Diagnostic Tests: MRI   MRI Results Results   MRI results unremarkable, except bone contusion (L) medial patella   Current Level of Function   Current Community Support Family/friend caregiver   Patient role/employment history F. Retired   Living environment House/townhome   Home/community accessibility one level living, no stairs   Fall Risk Screen   Fall screen completed by PT   Have you fallen 2 or more times in the past year? No   Have you fallen and had an injury in the past year? Yes   Is patient a fall risk? No   Fall screen comments Situational, tripped on shoes   Knee Objective Findings   Side (if bilateral, select both right and left) Left   Observation Mild acute pain behaviors demonstrated, guarded/protecting of (L) knee   Integumentary  Joint Circumf: (R) = 43.0cm, (L) = 43.5cm   Posture WFL   Gait/Locomotion Initial steps show decreased heel strike (L). Step thru pattern. Mild decrease in WB'ing (L) with increased (R) lateral sway in (L) stance.   Foot Position In Standing Mild pronation (B)   Knee/Hip Strength Comments Terminal knee extension is biggest deficit, with pain   Step Test Height Unable to do > 2 inches without pain   Lachmans Test neg   Anterior Drawer Test neg   Posterior Drawer Test neg   Varus Stress Test Pain with medial pressure   Valgus Stress Test Pain with stretch medially   Palpation Pain at both medial and lateral (L) knee joint lines   Accessory Motion/Joint Mobility Decreased Patellar Mobility (L)   Left Knee Extension AROM 3* (L), 0* (R)   Left Knee Extension PROM 0* (B)   Left Knee Flexion AROM 130* (B)   Left Knee Flexion PROM (L) = 134*, (R) = 143*   Left Knee Flexion Strength \"Tight, but not really painful\" with (L) fullest flexion.   Left Knee Extension Strength 4/5 (L), 5/5 (R)   Left Hip Abduction Strength 5/5 (B)   Left Quad Set Strength 4/5 (L), 5/5 (B)   L VMO Strength 4-/5 (L)   Left Gastrocnemius Flexibility 10* (B)   Left Quadricep Flexibility " Tight (L)   Left ITB Flexibility WFL   Planned Therapy Interventions   Planned Therapy Interventions joint mobilization;manual therapy;gait training;balance training;motor coordination training;neuromuscular re-education;strengthening;stretching   Clinical Impression   Criteria for Skilled Therapeutic Interventions Met yes, treatment indicated   PT Diagnosis Impaired function at the left knee, mild PFPS in addition to trauma   Influenced by the following impairments edema,stiffness, decreased ROM, pain, weakness   Functional limitations due to impairments Walking on uneven surfaces, bending, golfing, stair ambulation   Clinical Presentation Evolving/Changing   Clinical Presentation Rationale Recent injury, predictable pain patterns, minimal comorbidities   Clinical Decision Making (Complexity) Low complexity   Therapy Frequency 1 time/week   Predicted Duration of Therapy Intervention (days/wks) 8 weeks for up to 8 sessions   Risk & Benefits of therapy have been explained Yes   Patient, Family & other staff in agreement with plan of care Yes   Clinical Impression Comments Pt presents 6 weeks out from trip and falling into a Bobcat, sustaining (L) knee medial patellar contusion, pre-patellar bursitis and (L) elbow/radial fracture. Pt could benefit from skilled PT to improve ROM, Strength and stability to meet set goals.   Education Assessment   Preferred Learning Style Listening;Reading;Demonstration;Pictures/video   Barriers to Learning No barriers   Ortho Goal 1   Goal Identifier STG   Goal Description 1)Pt will report being able to bend knee for ADL's with feeling of stiffness, in 2 weeks.   Target Date 07/03/18   Ortho Goal 2   Goal Identifier STG   Goal Description 2)Pt will be able to walk with reciprocal pattern and 1/10 pain on stairs, in 2 weeks.   Target Date 07/03/18   Ortho Goal 3   Goal Identifier STG   Goal Description 3)Pt will walk on uneven surfaces without feeling of falling in 4 weeks.    Target  Date 07/17/18   Ortho Goal 4   Goal Identifier LTG   Goal Description 4)Pt will be able to golf 9 holes in 8 weeks.   Target Date 08/14/18   Ortho Goal 5   Goal Identifier LTG   Goal Description 5)Pt will be indep in HEP to prevent return of symptoms in 8 weeks.    Target Date 08/14/18   Total Evaluation Time   Total Evaluation Time 35   Therapy Certification   Certification date from 06/19/18   Certification date to 08/14/18   Medical Diagnosis Prepatellar bursitis of left knee; injury of (L) knee, subsequent encounter   Thank you for the referral of this patient.  Tana Soliz, PT, MA  #[id number]

## 2018-06-19 NOTE — PROGRESS NOTES
[facility]          OUTPATIENT PHYSICAL THERAPY ORTHOPEDIC EVALUATION  PLAN OF TREATMENT FOR OUTPATIENT REHABILITATION  (COMPLETE FOR INITIAL CLAIMS ONLY)  Patient's Last Name, First Name, M.I.  YOB: 1955  Luz Elena Cristina       Provider s Name:  [facility]   Medical Record No.  [id number]   Start of Care Date:  06/19/18   Onset Date:  05/01/18   Type:     _X__PT   ___OT   ___SLP Medical Diagnosis:  Prepatellar bursitis of left knee; injury of (L) knee, subsequent encounter     PT Diagnosis:  Impaired function at the left knee, mild PFPS in addition to trauma   Visits from SOC:  1      _________________________________________________________________________________  Plan of Treatment/Functional Goals:  joint mobilization, manual therapy, gait training, balance training, motor coordination training, neuromuscular re-education, strengthening, stretching           Goals  Goal Identifier: STG  Goal Description: 1)Pt will report being able to bend knee for ADL's with feeling of stiffness, in 2 weeks.  Target Date: 07/03/18    Goal Identifier: STG  Goal Description: 2)Pt will be able to walk with reciprocal pattern and 1/10 pain on stairs, in 2 weeks.  Target Date: 07/03/18    Goal Identifier: STG  Goal Description: 3)Pt will walk on uneven surfaces without feeling of falling in 4 weeks.   Target Date: 07/17/18    Goal Identifier: LTG  Goal Description: 4)Pt will be able to golf 9 holes in 8 weeks.  Target Date: 08/14/18    Goal Identifier: LTG  Goal Description: 5)Pt will be indep in HEP to prevent return of symptoms in 8 weeks.   Target Date: 08/14/18             Therapy Frequency:  1 time/week  Predicted Duration of Therapy Intervention:  8 weeks for up to 8 sessions    Tana Soliz, PT                 I CERTIFY THE NEED FOR THESE SERVICES FURNISHED UNDER        THIS PLAN OF TREATMENT AND WHILE UNDER MY CARE     (Physician co-signature of this document  indicates review and certification of the therapy plan).                       Certification Date From:  06/19/18   Certification Date To:  08/14/18    Referring Provider:  Sandra Zacarias MD    Initial Assessment        See Epic Evaluation Start of Care Date: 06/19/18

## 2018-06-25 ENCOUNTER — HOSPITAL ENCOUNTER (OUTPATIENT)
Dept: PHYSICAL THERAPY | Facility: CLINIC | Age: 63
Setting detail: THERAPIES SERIES
End: 2018-06-25
Attending: PEDIATRICS
Payer: MEDICARE

## 2018-06-25 ENCOUNTER — TELEPHONE (OUTPATIENT)
Dept: ALLERGY | Facility: CLINIC | Age: 63
End: 2018-06-25

## 2018-06-25 ENCOUNTER — OFFICE VISIT (OUTPATIENT)
Dept: ALLERGY | Facility: CLINIC | Age: 63
End: 2018-06-25
Payer: MEDICARE

## 2018-06-25 VITALS
TEMPERATURE: 98.4 F | RESPIRATION RATE: 16 BRPM | SYSTOLIC BLOOD PRESSURE: 131 MMHG | WEIGHT: 231.04 LBS | HEART RATE: 85 BPM | DIASTOLIC BLOOD PRESSURE: 82 MMHG | BODY MASS INDEX: 38.45 KG/M2 | OXYGEN SATURATION: 94 %

## 2018-06-25 DIAGNOSIS — J31.0 OTHER CHRONIC RHINITIS: ICD-10-CM

## 2018-06-25 DIAGNOSIS — J45.50 UNCOMPLICATED SEVERE PERSISTENT ASTHMA (H): Primary | ICD-10-CM

## 2018-06-25 DIAGNOSIS — K21.9 GASTROESOPHAGEAL REFLUX DISEASE, ESOPHAGITIS PRESENCE NOT SPECIFIED: ICD-10-CM

## 2018-06-25 LAB
FEF 25/75: NORMAL
FEV-1: NORMAL
FEV1/FVC: NORMAL
FVC: NORMAL

## 2018-06-25 PROCEDURE — 97140 MANUAL THERAPY 1/> REGIONS: CPT | Mod: GP | Performed by: PHYSICAL THERAPIST

## 2018-06-25 PROCEDURE — 97110 THERAPEUTIC EXERCISES: CPT | Mod: GP | Performed by: PHYSICAL THERAPIST

## 2018-06-25 PROCEDURE — 94010 BREATHING CAPACITY TEST: CPT | Performed by: ALLERGY & IMMUNOLOGY

## 2018-06-25 PROCEDURE — 99214 OFFICE O/P EST MOD 30 MIN: CPT | Mod: 25 | Performed by: ALLERGY & IMMUNOLOGY

## 2018-06-25 PROCEDURE — 40000718 ZZHC STATISTIC PT DEPARTMENT ORTHO VISIT: Performed by: PHYSICAL THERAPIST

## 2018-06-25 RX ORDER — ALBUTEROL SULFATE 0.83 MG/ML
2.5 SOLUTION RESPIRATORY (INHALATION) EVERY 4 HOURS PRN
Qty: 50 VIAL | Refills: 1 | Status: SHIPPED | OUTPATIENT
Start: 2018-06-25 | End: 2023-05-24

## 2018-06-25 RX ORDER — FLUTICASONE PROPIONATE AND SALMETEROL XINAFOATE 230; 21 UG/1; UG/1
2 AEROSOL, METERED RESPIRATORY (INHALATION) 2 TIMES DAILY
Qty: 1 INHALER | Refills: 3 | Status: CANCELLED | OUTPATIENT
Start: 2018-06-25

## 2018-06-25 RX ORDER — ALBUTEROL SULFATE 90 UG/1
2-4 AEROSOL, METERED RESPIRATORY (INHALATION) EVERY 4 HOURS PRN
Qty: 1 INHALER | Refills: 3 | Status: SHIPPED | OUTPATIENT
Start: 2018-06-25 | End: 2020-05-01

## 2018-06-25 RX ORDER — TIOTROPIUM BROMIDE 18 UG/1
CAPSULE ORAL; RESPIRATORY (INHALATION)
Qty: 30 CAPSULE | Refills: 3 | Status: CANCELLED | OUTPATIENT
Start: 2018-06-25

## 2018-06-25 RX ORDER — ALBUTEROL SULFATE 90 UG/1
2 AEROSOL, METERED RESPIRATORY (INHALATION) EVERY 6 HOURS PRN
Qty: 3 INHALER | Refills: 1 | Status: CANCELLED | OUTPATIENT
Start: 2018-06-25

## 2018-06-25 ASSESSMENT — ENCOUNTER SYMPTOMS
EYE DISCHARGE: 0
HEADACHES: 0
SHORTNESS OF BREATH: 0
FACIAL SWELLING: 0
VOMITING: 0
RHINORRHEA: 1
SINUS PRESSURE: 0
CHILLS: 0
FEVER: 0
ARTHRALGIAS: 0
DIARRHEA: 0
JOINT SWELLING: 0
CHEST TIGHTNESS: 1
ADENOPATHY: 0
NAUSEA: 0
MYALGIAS: 0
EYE REDNESS: 0
EYE ITCHING: 0
ACTIVITY CHANGE: 0
COUGH: 1
WHEEZING: 0

## 2018-06-25 NOTE — MR AVS SNAPSHOT
After Visit Summary   6/25/2018    Luz Elena Cristina    MRN: [id number]           Patient Information     Date Of Birth          1955        Visit Information        Provider Department      6/25/2018 1:40 PM Galindo Montano MD [facility]        Today's Diagnoses     Uncomplicated severe persistent asthma    -  1    Other chronic rhinitis        Gastroesophageal reflux disease, esophagitis presence not specified          Care Instructions    Asthma management per asthma action plan.    Start taking reflux meds.           Follow-ups after your visit        Follow-up notes from your care team     Return in about 4 weeks (around 7/23/2018).      Your next 10 appointments already scheduled     Jun 25, 2018  1:40 PM CDT   Return Visit with Galindo Montano MD   [facility] ([facility]    [address] [facility] [address]   [phone]            Jul 02, 2018  9:30 AM CDT   Ortho Treatment with Tana Soliz PT   [facility] ([facility])    [address]  [address]  [facility] [id number] [phone]            Jul 23, 2018  4:40 PM CDT   Return Visit with Galindo Montano MD   [facility] ([facility])    [address] [facility] [address]   [phone]              Who to contact     If you have questions or need follow up information about today's clinic visit or your schedule please contact [facility] directly at [phone].  Normal or non-critical lab and imaging results will be communicated to you by MyChart, letter or phone within 4 business days after the clinic has received the results. If you do not hear from us within 7 days, please contact the clinic through MyChart or phone. If you have a critical or abnormal lab result, we will notify you by phone as soon as possible.  Submit refill requests through [employer] or call your pharmacy and they will  forward the refill request to us. Please allow 3 business days for your refill to be completed.          Additional Information About Your Visit        [employer] Information     [employer] gives you secure access to your electronic health record. If you see a primary care provider, you can also send messages to your care team and make appointments. If you have questions, please call your primary care clinic.  If you do not have a primary care provider, please call [phone] and they will assist you.        Care EveryWhere ID     This is your Care EveryWhere ID. This could be used by other organizations to access your [facility] medical records  [id number]        Your Vitals Were     Pulse Temperature Respirations Pulse Oximetry BMI (Body Mass Index)       85 98.4  F (36.9  C) (Oral) 16 94% 38.45 kg/m2        Blood Pressure from Last 3 Encounters:   06/25/18 131/82   06/18/18 148/86   06/11/18 136/82    Weight from Last 3 Encounters:   06/25/18 104.8 kg (231 lb 0.7 oz)   06/18/18 106.1 kg (234 lb)   06/11/18 106.1 kg (234 lb)              We Performed the Following     Spirometry, Breathing Capacity          Today's Medication Changes          These changes are accurate as of 6/25/18  1:30 PM.  If you have any questions, ask your nurse or doctor.               Start taking these medicines.        Dose/Directions    fluticasone-vilanterol 200-25 MCG/INH oral inhaler   Commonly known as:  BREO ELLIPTA   Used for:  Uncomplicated severe persistent asthma   Started by:  Galindo Montano MD        Dose:  1 puff   Inhale 1 puff into the lungs daily   Quantity:  1 Inhaler   Refills:  3       Tiotropium Bromide Monohydrate 1.25 MCG/ACT Aers   Used for:  Uncomplicated severe persistent asthma   Replaces:  tiotropium 18 MCG capsule   Started by:  Galindo Montano MD        Dose:  2 puff   Inhale 2 puffs into the lungs daily   Quantity:  1 Inhaler   Refills:  3         These medicines have changed or have updated prescriptions.         Dose/Directions    * albuterol 108 (90 Base) MCG/ACT Inhaler   Commonly known as:  PROAIR HFA/PROVENTIL HFA/VENTOLIN HFA   This may have changed:    - how much to take  - when to take this   Used for:  Uncomplicated severe persistent asthma   Changed by:  Galindo Montano MD        Dose:  2-4 puff   Inhale 2-4 puffs into the lungs every 4 hours as needed for shortness of breath / dyspnea or wheezing   Quantity:  1 Inhaler   Refills:  3       * albuterol (2.5 MG/3ML) 0.083% neb solution   This may have changed:  reasons to take this   Used for:  Uncomplicated severe persistent asthma   Changed by:  Galindo Montano MD        Dose:  2.5 mg   Take 1 vial (2.5 mg) by nebulization every 4 hours as needed for shortness of breath / dyspnea, wheezing or other (persistent cough)   Quantity:  50 vial   Refills:  1       * Notice:  This list has 2 medication(s) that are the same as other medications prescribed for you. Read the directions carefully, and ask your doctor or other care provider to review them with you.      Stop taking these medicines if you haven't already. Please contact your care team if you have questions.     fluticasone-salmeterol 230-21 MCG/ACT inhaler   Commonly known as:  ADVAIR-HFA   Stopped by:  Galindo Montano MD           tiotropium 18 MCG capsule   Commonly known as:  SPIRIVA HANDIHALER   Replaced by:  Tiotropium Bromide Monohydrate 1.25 MCG/ACT Aers   Stopped by:  Galindo Montano MD                Where to get your medicines      These medications were sent to [facility] [address] [place] [address] [address] [facility] [address]  [address] [facility] [address]     Phone:  [phone]     albuterol (2.5 MG/3ML) 0.083% neb solution    albuterol 108 (90 Base) MCG/ACT Inhaler    fluticasone-vilanterol 200-25 MCG/INH oral inhaler    Tiotropium Bromide Monohydrate 1.25 MCG/ACT Aers                Primary Care Provider Office Phone # Fax #    Kiara Zazueta  TERE [phone]       [address] [place] [address]        Equal Access to Services     KEKE MONTEJO : Mohit titus waddell delfina Betancourt, camila nicholson, loida hartman, lulu torres. So [facility] [phone].    ATENCIÓN: Si habla español, tiene a arriaza disposición servicios gratuitos de asistencia lingüística. Llame al [phone].    We comply with applicable federal civil rights laws and [place] laws. We do not discriminate on the basis of race, color, national origin, age, disability, sex, sexual orientation, or gender identity.            Thank you!     Thank you for choosing [facility]  for your care. Our goal is always to provide you with excellent care. Hearing back from our patients is one way we can continue to improve our services. Please take a few minutes to complete the written survey that you may receive in the mail after your visit with us. Thank you!             Your Updated Medication List - Protect others around you: Learn how to safely use, store and throw away your medicines at www.disposemymeds.org.          This list is accurate as of 6/25/18  1:30 PM.  Always use your most recent med list.                   Brand Name Dispense Instructions for use Diagnosis    ACCU-CHEK MARILUZ PLUS test strip   Generic drug:  blood glucose monitoring           * albuterol 108 (90 Base) MCG/ACT Inhaler    PROAIR HFA/PROVENTIL HFA/VENTOLIN HFA    1 Inhaler    Inhale 2-4 puffs into the lungs every 4 hours as needed for shortness of breath / dyspnea or wheezing    Uncomplicated severe persistent asthma       * albuterol (2.5 MG/3ML) 0.083% neb solution     50 vial    Take 1 vial (2.5 mg) by nebulization every 4 hours as needed for shortness of breath / dyspnea, wheezing or other (persistent cough)    Uncomplicated severe persistent asthma       amLODIPine 5 MG tablet    NORVASC    30 tablet    Take 1 tablet (5 mg) by mouth daily    Benign  essential hypertension       aspirin 81 MG EC tablet      Take 81 mg by mouth daily        azelastine 0.1 % spray    ASTELIN    30 mL    Spray 2 sprays into both nostrils 2 times daily as needed for rhinitis or allergies    Postnasal drip       blood glucose monitoring meter device kit           clobetasol 0.05 % cream    TEMOVATE    30 g    Apply sparingly to affected area twice weekly as needed.  Do not apply to face.    Lichen sclerosus       fluticasone-vilanterol 200-25 MCG/INH oral inhaler    BREO ELLIPTA    1 Inhaler    Inhale 1 puff into the lungs daily    Uncomplicated severe persistent asthma       IBUPROFEN PO      Take by mouth 2 times daily as needed for moderate pain        nebulizer/tubing/mouthpiece Kit     1 kit    Use with albuterol neb solution    Severe persistent asthma with (acute) exacerbation       pantoprazole 20 MG EC tablet    PROTONIX    30 tablet    Take by mouth 30-60 minutes before a meal.    Cough       Tiotropium Bromide Monohydrate 1.25 MCG/ACT Aers     1 Inhaler    Inhale 2 puffs into the lungs daily    Uncomplicated severe persistent asthma       * Notice:  This list has 2 medication(s) that are the same as other medications prescribed for you. Read the directions carefully, and ask your doctor or other care provider to review them with you.

## 2018-06-25 NOTE — TELEPHONE ENCOUNTER
Called and spoke with [facility] pharmacy regarding request below. They apologize, this was sent in error. Please disregard.      Praveena VALE RN

## 2018-06-25 NOTE — LETTER
My Asthma Action Plan  Name: Luz Elena Cristina   YOB: 1955  Date: 6/25/2018   My doctor: Galindo Montano MD   My clinic: [facility]        My Control Medicine: Fluticasone furoate + vilanterol (Breo Ellipta)-  200/25mcg 1 puff once daily  Tiotropium (Spiriva) -  Respimat 2.5 mcg 2 puffs once daily  My Rescue Medicine: Albuterol nebulizer solution every 4 hrs as needed OR  Albuterol (Proair/Ventolin/Proventil) inhaler 2-4 puffs every 4 hrs as needed   My Asthma Severity: severe persistent  Avoid your asthma triggers:   smoke, upper respiratory infections, humidity, exercise or sports and cold air               GREEN ZONE   Good Control    I feel good    No cough or wheeze    Can work, sleep and play without asthma symptoms       Take your asthma control medicine every day.     1. If exercise triggers your asthma, take your rescue medication    15 minutes before exercise or sports, and    During exercise if you have asthma symptoms  2. Spacer to use with inhaler: If you have a spacer, make sure to use it with your inhaler             YELLOW ZONE Getting Worse  I have ANY of these:    I do not feel good    Cough or wheeze    Chest feels tight    Wake up at night   1. Keep taking your Green Zone medications  2. Start taking your rescue medicine:    every 20 minutes for up to 1 hour. Then every 4 hours for 24-48 hours.  3. If you stay in the Yellow Zone for more than 24-48 hours, contact your doctor.             RED ZONE Medical Alert - Get Help  I have ANY of these:    I feel awful    Medicine is not helping    Breathing getting harder    Trouble walking or talking    Nose opens wide to breathe       1. Take your rescue medicine NOW  2. If your provider has prescribed an oral steroid medicine, start taking it NOW  3. Call your doctor NOW  4. If you are still in the Red Zone after 20 minutes and you have not reached your doctor:    Take your rescue medicine again and    Call 911 or go to the  emergency room right away    See your regular doctor within 2 weeks of an Emergency Room or Urgent Care visit for follow-up treatment.          Annual Reminders:  Meet with Asthma Educator,  Flu Shot in the Fall, consider Pneumonia Vaccination for patients with asthma (aged 19 and older).    Pharmacy: [facility] [address] [place] [address] AVE AT [facility] [address]                      Asthma Triggers  How To Control Things That Make Your Asthma Worse    Triggers are things that make your asthma worse.  Look at the list below to help you find your triggers and what you can do about them.  You can help prevent asthma flare-ups by staying away from your triggers.      Trigger                                                          What you can do   Cigarette Smoke  Tobacco smoke can make asthma worse. Do not allow smoking in your home, car or around you.  Be sure no one smokes at a child s day care or school.  If you smoke, ask your health care provider for ways to help you quit.  Ask family members to quit too.  Ask your health care provider for a referral to Quit Plan to help you quit smoking, or call 7-138-503-PLAN.     Colds, Flu, Bronchitis  These are common triggers of asthma. Wash your hands often.  Don t touch your eyes, nose or mouth.  Get a flu shot every year.     Dust Mites  These are tiny bugs that live in cloth or carpet. They are too small to see. Wash sheets and blankets in hot water every week.   Encase pillows and mattress in dust mite proof covers.  Avoid having carpet if you can. If you have carpet, vacuum weekly.   Use a dust mask and HEPA vacuum.   Pollen and Outdoor Mold  Some people are allergic to trees, grass, or weed pollen, or molds. Try to keep your windows closed.  Limit time out doors when pollen count is high.   Ask you health care provider about taking medicine during allergy season.     Animal Dander  Some people are allergic to skin flakes, urine or  saliva from pets with fur or feathers. Keep pets with fur or feathers out of your home.    If you can t keep the pet outdoors, then keep the pet out of your bedroom.  Keep the bedroom door closed.  Keep pets off cloth furniture and away from stuffed toys.     Mice, Rats, and Cockroaches  Some people are allergic to the waste from these pests.   Cover food and garbage.  Clean up spills and food crumbs.  Store grease in the refrigerator.   Keep food out of the bedroom.   Indoor Mold  This can be a trigger if your home has high moisture. Fix leaking faucets, pipes, or other sources of water.   Clean moldy surfaces.  Dehumidify basement if it is damp and smelly.   Smoke, Strong Odors, and Sprays  These can reduce air quality. Stay away from strong odors and sprays, such as perfume, powder, hair spray, paints, smoke incense, paint, cleaning products, candles and new carpet.   Exercise or Sports  Some people with asthma have this trigger. Be active!  Ask your doctor about taking medicine before sports or exercise to prevent symptoms.    Warm up for 5-10 minutes before and after sports or exercise.     Other Triggers of Asthma  Cold air:  Cover your nose and mouth with a scarf.  Sometimes laughing or crying can be a trigger.  Some medicines and food can trigger asthma.

## 2018-06-25 NOTE — NURSING NOTE
RN reviewed Asthma Action Plan with patient. Verbalized understanding.No further questions.    Praveena VALE RN

## 2018-06-25 NOTE — LETTER
6/25/2018         RE: Luz Elena Cristina  [id number] [facility] [address]        Dear Colleague,    Thank you for referring your patient, Luz Elena Cristina, to the [facility]. Please see a copy of my visit note below.    SUBJECTIVE:                                                                 Luz Elena Cristina presents today to our Allergy Clinic at [facility] for a follow up visit.  As you know, she is a 62 year old female with severe persistent asthma and chronic rhinitis.  Asthma since childhood. November 2016: Negative serum IgE to regional aeroallergen panel.CBC with differential without eosinophilia.  I saw the patient last time in May 2017 and recommended a 4-month follow-up.  This is the first time I am seeing her today.  Per pharmacy, both Advair and Spiriva refilled in December 2017.  The patient states she has been using Spiriva for the last several months and she has been using Advair 230/21 mcg 1 puff once daily (instead of twice daily).   She has been having chest symptoms since February, mainly manifested by dyspnea and cough. Albuterol is helpful.  She uses it multiple times per week, usually once a day.  She wakes up several times a week. At some point, Losartan was changed to different BP meds, and she thought she got gradually better, but she is still symptomatic.    She develops PND, nasal congestion only when she coughs. She has had reflux and was prescribed PPIs, but she hasn't started it yet.  CHEST TWO VIEWS  6/11/2018 11:45 AM      HISTORY: 63-year-old with new productive cough.          IMPRESSION: Since November 14, 2016, heart size is normal. No pleural  effusion, pneumothorax, or abnormal area of consolidation. Minimal  inferolateral opacity in the left hemithorax is unchanged, likely scar  formation or atelectasis.     SHAE SERRANO MD    Patient Active Problem List   Diagnosis     Severe persistent asthma     Lichen sclerosus     Benign  essential hypertension     Elevated glucose     Family history of diabetes mellitus     Severe persistent asthma with (acute) exacerbation     Severe obesity (BMI 35.0-39.9) with comorbidity (H)     At risk for cardiovascular event       Past Medical History:   Diagnosis Date     Hypertension      Uncomplicated asthma       Problem (# of Occurrences) Relation (Name,Age of Onset)    Alzheimer Disease (1) Brother: early onset    Aneurysm (1) Paternal Grandmother    Breast Cancer (3) Sister, Paternal Aunt, Sister (bob mccrary)    Cerebrovascular Disease (1) Father (ben felder): CVA    Chronic Obstructive Pulmonary Disease (1) Father (ben felder)    Diabetes (3) Mother (fawn felder), Son, Son (oziel barnes)    Other Cancer (1) Mother (fawn felder): panreatic    Skin Cancer (1) Sister    Thyroid Disease (1) Son    Unknown/Adopted (1) Paternal Grandfather (radha alicea)        Past Surgical History:   Procedure Laterality Date     BIOPSY      breast,     COLONOSCOPY       GYN SURGERY       TONSILLECTOMY       Social History     Social History     Marital status:      Spouse name: N/A     Number of children: N/A     Years of education: N/A     Occupational History           Retired     Social History Main Topics     Smoking status: Former Smoker     Packs/day: 2.00     Years: 20.00     Types: Cigarettes     Quit date: 4/28/1987     Smokeless tobacco: Never Used     Alcohol use Yes     Drug use: No     Sexual activity: Not Currently     Partners: Male     Birth control/ protection: Post-menopausal     Other Topics Concern     Parent/Sibling W/ Cabg, Mi Or Angioplasty Before 65f 55m? No     Social History Narrative    ENVIRONMENTAL HISTORY: [place] The home is heated with a forced air but does not use.  Has floor heating. They do have central air conditioning. The patient's bedroom is furnished with carpeting in bedroom.  No pets  inside the house. But is exposed to chicken. There is no history of cockroach or mice infestation. There are no smokers in the house.  The house does not have a damp basement.            Review of Systems   Constitutional: Negative for activity change, chills and fever.   HENT: Positive for congestion, postnasal drip and rhinorrhea. Negative for dental problem, ear pain, facial swelling, nosebleeds, sinus pressure and sneezing.    Eyes: Negative for discharge, redness and itching.   Respiratory: Positive for cough and chest tightness. Negative for shortness of breath and wheezing.    Cardiovascular: Negative for chest pain.   Gastrointestinal: Negative for diarrhea, nausea and vomiting.   Musculoskeletal: Negative for arthralgias, joint swelling and myalgias.   Skin: Negative for rash.   Neurological: Negative for headaches.   Hematological: Negative for adenopathy.   Psychiatric/Behavioral: Negative for behavioral problems and self-injury.           Current Outpatient Prescriptions:      ACCU-CHEK MARILUZ PLUS test strip, , Disp: , Rfl: 0     albuterol (2.5 MG/3ML) 0.083% neb solution, Take 1 vial (2.5 mg) by nebulization every 4 hours as needed for shortness of breath / dyspnea, wheezing or other (persistent cough), Disp: 50 vial, Rfl: 1     albuterol (PROAIR HFA/PROVENTIL HFA/VENTOLIN HFA) 108 (90 Base) MCG/ACT Inhaler, Inhale 2-4 puffs into the lungs every 4 hours as needed for shortness of breath / dyspnea or wheezing, Disp: 1 Inhaler, Rfl: 3     amLODIPine (NORVASC) 5 MG tablet, Take 1 tablet (5 mg) by mouth daily, Disp: 30 tablet, Rfl: 0     aspirin 81 MG EC tablet, Take 81 mg by mouth daily, Disp: , Rfl:      azelastine (ASTELIN) 0.1 % spray, Spray 2 sprays into both nostrils 2 times daily as needed for rhinitis or allergies, Disp: 30 mL, Rfl: 3     blood glucose monitoring (ACCU-CHEK MARILUZ PLUS) meter device kit, , Disp: , Rfl: 0     clobetasol (TEMOVATE) 0.05 % cream, Apply sparingly to affected area twice  weekly as needed.  Do not apply to face., Disp: 30 g, Rfl: 0     fluticasone-vilanterol (BREO ELLIPTA) 200-25 MCG/INH oral inhaler, Inhale 1 puff into the lungs daily, Disp: 1 Inhaler, Rfl: 3     IBUPROFEN PO, Take by mouth 2 times daily as needed for moderate pain, Disp: , Rfl:      Respiratory Therapy Supplies (NEBULIZER/TUBING/MOUTHPIECE) KIT, Use with albuterol neb solution, Disp: 1 kit, Rfl: 0     Tiotropium Bromide Monohydrate 1.25 MCG/ACT AERS, Inhale 2 puffs into the lungs daily, Disp: 1 Inhaler, Rfl: 3     pantoprazole (PROTONIX) 20 MG EC tablet, Take by mouth 30-60 minutes before a meal. (Patient not taking: Reported on 6/25/2018), Disp: 30 tablet, Rfl: 1     [DISCONTINUED] albuterol (2.5 MG/3ML) 0.083% nebulizer solution, Take 1 vial (2.5 mg) by nebulization every 4 hours as needed for shortness of breath / dyspnea, wheezing or other (chest tightness, persistent cough), Disp: 50 vial, Rfl: 1     [DISCONTINUED] albuterol (PROAIR HFA, PROVENTIL HFA, VENTOLIN HFA) 108 (90 BASE) MCG/ACT inhaler, Inhale 2 puffs into the lungs every 6 hours as needed for shortness of breath / dyspnea or wheezing, Disp: 3 Inhaler, Rfl: 1     [DISCONTINUED] tiotropium (SPIRIVA HANDIHALER) 18 MCG capsule, Inhale contents of one capsule daily. (Patient taking differently: Inhale 18 mcg into the lungs daily Inhale contents of one capsule daily.), Disp: 30 capsule, Rfl: 3  Immunization History   Administered Date(s) Administered     Influenza (High Dose) 3 valent vaccine 02/27/2018     Influenza (IIV3) PF 11/08/2012     Influenza Vaccine IM 3yrs+ 4 Valent IIV4 12/12/2016     Pneumo Conj 13-V (2010&after) 02/27/2018     TDAP Vaccine (Adacel) 11/08/2012     Allergies   Allergen Reactions     Losartan Cough     Sulfa Drugs Rash     OBJECTIVE:                                                                 /82 (BP Location: Left arm, Patient Position: Sitting, Cuff Size: Adult Regular)  Pulse 85  Temp 98.4  F (36.9  C) (Oral)   Resp 16  Wt 104.8 kg (231 lb 0.7 oz)  SpO2 94%  BMI 38.45 kg/m2        Physical Exam   Constitutional: No distress.   HENT:   Head: Normocephalic and atraumatic.   Right Ear: Tympanic membrane, external ear and ear canal normal.   Left Ear: Tympanic membrane, external ear and ear canal normal.   Nose: No mucosal edema or rhinorrhea.   Mouth/Throat: Oropharynx is clear and moist and mucous membranes are normal. No oropharyngeal exudate, posterior oropharyngeal edema or posterior oropharyngeal erythema.   Eyes: Conjunctivae are normal. Right eye exhibits no discharge. Left eye exhibits no discharge.   Neck: Normal range of motion.   Cardiovascular: Normal rate, regular rhythm and normal heart sounds.    No murmur heard.  Pulmonary/Chest: Effort normal and breath sounds normal. No respiratory distress. She has no wheezes. She has no rales.   Musculoskeletal: Normal range of motion.   Neurological: She is alert.   Skin: Skin is warm. No rash noted. She is not diaphoretic.   Psychiatric: Affect normal.   Nursing note and vitals reviewed.      WORKUP:   SPIROMETRY       FVC 2.61L (83% of predicted).     FEV1 1.71L (70% of predicted).     FEV1/FVC 66%     FEF 25%-75%  1.10L/s (52% of predicted)      The office spirometry performed today suggests mild obstruction.    Asthma Control Test (ACT) total score: 11     ASSESSMENT/CLAUDY     Visit Diagnoses     1. Uncomplicated severe persistent asthma    -  Primary  Currently not well controlled.  I question adherence to controller medications.  To optimize treatment, I recommend switching from Advair to Breo Ellipta 200/25 mcg 1 puff once daily.  -She will also start using Spiriva Respimat instead of Handihaler.  --Continue using albuterol inhaler 2-4 puffs every 4-6 hours as needed for chest tightness/wheezing/shortness of breath/persistent cough.  -Use it with chamber device.    Relevant Medications    albuterol (PROAIR HFA/PROVENTIL HFA/VENTOLIN HFA) 108 (90 Base) MCG/ACT  Inhaler    albuterol (2.5 MG/3ML) 0.083% neb solution    fluticasone-vilanterol (BREO ELLIPTA) 200-25 MCG/INH oral inhaler    Tiotropium Bromide Monohydrate 1.25 MCG/ACT AERS    Other Relevant Orders    Spirometry, Breathing Capacity (Completed)    2. Other chronic rhinitis      Seems to be well-controlled.  Exacerbated by cough, but short lasting.  Reflux related?  -I recommend optimizing GERD treatment with PPI prescribed by family practice.  -When she has persistent symptoms, she may use azelastine, which she found helpful in the past    3. Gastroesophageal reflux disease, esophagitis presence not specified       Follow up in 4 weeks or sooner if needed.    Thank you for allowing us to participate in the care of this patient. Please feel free to contact us if there are any questions or concerns about the patient.    Disclaimer: This note consists of symbols derived from keyboarding, dictation and/or voice recognition software. As a result, there may be errors in the script that have gone undetected. Please consider this when interpreting information found in this chart.    Galindo Montano MD, FACI  Allergy, Asthma and Immunology  [place], [place] and [place]      Again, thank you for allowing me to participate in the care of your patient.        Sincerely,        Galidno Montano MD

## 2018-06-25 NOTE — PROGRESS NOTES
SUBJECTIVE:                                                                 Luz Elena Cristina presents today to our Allergy Clinic at [facility] for a follow up visit.  As you know, she is a 63 year old female with severe persistent asthma and chronic rhinitis.  Asthma since childhood. November 2016: Negative serum IgE to regional aeroallergen panel.CBC with differential without eosinophilia.  I saw the patient last time in May 2017 and recommended a 4-month follow-up.  This is the first time I am seeing her today.  Per pharmacy, both Advair and Spiriva refilled in December 2017.  The patient states she has been using Spiriva for the last several months and she has been using Advair 230/21 mcg 1 puff once daily (instead of twice daily).   She has been having chest symptoms since February, mainly manifested by dyspnea and cough. Albuterol is helpful.  She uses it multiple times per week, usually once a day.  She wakes up several times a week. At some point, Losartan was changed to different BP meds, and she thought she got gradually better, but she is still symptomatic.    She develops PND, nasal congestion only when she coughs. She has had reflux and was prescribed PPIs, but she hasn't started it yet.  CHEST TWO VIEWS  6/11/2018 11:45 AM      HISTORY: 63-year-old with new productive cough.          IMPRESSION: Since November 14, 2016, heart size is normal. No pleural  effusion, pneumothorax, or abnormal area of consolidation. Minimal  inferolateral opacity in the left hemithorax is unchanged, likely scar  formation or atelectasis.     SHAE SERRANO MD    Patient Active Problem List   Diagnosis     Severe persistent asthma     Lichen sclerosus     Benign essential hypertension     Elevated glucose     Family history of diabetes mellitus     Severe persistent asthma with (acute) exacerbation     Severe obesity (BMI 35.0-39.9) with comorbidity (H)     At risk for cardiovascular event       Past  Medical History:   Diagnosis Date     Hypertension      Uncomplicated asthma       Problem (# of Occurrences) Relation (Name,Age of Onset)    Alzheimer Disease (1) Brother: early onset    Aneurysm (1) Paternal Grandmother    Breast Cancer (3) Sister, Paternal Aunt, Sister (bob mccrary)    Cerebrovascular Disease (1) Father (ben felder): CVA    Chronic Obstructive Pulmonary Disease (1) Father (ben felder)    Diabetes (3) Mother (fawn felder), Son, Son (oziel barnes)    Other Cancer (1) Mother (fawn felder): panreatic    Skin Cancer (1) Sister    Thyroid Disease (1) Son    Unknown/Adopted (1) Paternal Grandfather (radha alicea)        Past Surgical History:   Procedure Laterality Date     BIOPSY      breast,     COLONOSCOPY       GYN SURGERY       TONSILLECTOMY       Social History     Social History     Marital status:      Spouse name: N/A     Number of children: N/A     Years of education: N/A     Occupational History           Retired     Social History Main Topics     Smoking status: Former Smoker     Packs/day: 2.00     Years: 20.00     Types: Cigarettes     Quit date: 4/28/1987     Smokeless tobacco: Never Used     Alcohol use Yes     Drug use: No     Sexual activity: Not Currently     Partners: Male     Birth control/ protection: Post-menopausal     Other Topics Concern     Parent/Sibling W/ Cabg, Mi Or Angioplasty Before 65f 55m? No     Social History Narrative    ENVIRONMENTAL HISTORY: [place] The home is heated with a forced air but does not use.  Has floor heating. They do have central air conditioning. The patient's bedroom is furnished with carpeting in bedroom.  No pets inside the house. But is exposed to chicken. There is no history of cockroach or mice infestation. There are no smokers in the house.  The house does not have a damp basement.            Review of Systems   Constitutional: Negative for activity  change, chills and fever.   HENT: Positive for congestion, postnasal drip and rhinorrhea. Negative for dental problem, ear pain, facial swelling, nosebleeds, sinus pressure and sneezing.    Eyes: Negative for discharge, redness and itching.   Respiratory: Positive for cough and chest tightness. Negative for shortness of breath and wheezing.    Cardiovascular: Negative for chest pain.   Gastrointestinal: Negative for diarrhea, nausea and vomiting.   Musculoskeletal: Negative for arthralgias, joint swelling and myalgias.   Skin: Negative for rash.   Neurological: Negative for headaches.   Hematological: Negative for adenopathy.   Psychiatric/Behavioral: Negative for behavioral problems and self-injury.           Current Outpatient Prescriptions:      ACCU-CHEK MARILUZ PLUS test strip, , Disp: , Rfl: 0     albuterol (2.5 MG/3ML) 0.083% neb solution, Take 1 vial (2.5 mg) by nebulization every 4 hours as needed for shortness of breath / dyspnea, wheezing or other (persistent cough), Disp: 50 vial, Rfl: 1     albuterol (PROAIR HFA/PROVENTIL HFA/VENTOLIN HFA) 108 (90 Base) MCG/ACT Inhaler, Inhale 2-4 puffs into the lungs every 4 hours as needed for shortness of breath / dyspnea or wheezing, Disp: 1 Inhaler, Rfl: 3     amLODIPine (NORVASC) 5 MG tablet, Take 1 tablet (5 mg) by mouth daily, Disp: 30 tablet, Rfl: 0     aspirin 81 MG EC tablet, Take 81 mg by mouth daily, Disp: , Rfl:      azelastine (ASTELIN) 0.1 % spray, Spray 2 sprays into both nostrils 2 times daily as needed for rhinitis or allergies, Disp: 30 mL, Rfl: 3     blood glucose monitoring (ACCU-CHEK MARILUZ PLUS) meter device kit, , Disp: , Rfl: 0     clobetasol (TEMOVATE) 0.05 % cream, Apply sparingly to affected area twice weekly as needed.  Do not apply to face., Disp: 30 g, Rfl: 0     fluticasone-vilanterol (BREO ELLIPTA) 200-25 MCG/INH oral inhaler, Inhale 1 puff into the lungs daily, Disp: 1 Inhaler, Rfl: 3     IBUPROFEN PO, Take by mouth 2 times daily as  needed for moderate pain, Disp: , Rfl:      Respiratory Therapy Supplies (NEBULIZER/TUBING/MOUTHPIECE) KIT, Use with albuterol neb solution, Disp: 1 kit, Rfl: 0     Tiotropium Bromide Monohydrate 1.25 MCG/ACT AERS, Inhale 2 puffs into the lungs daily, Disp: 1 Inhaler, Rfl: 3     pantoprazole (PROTONIX) 20 MG EC tablet, Take by mouth 30-60 minutes before a meal. (Patient not taking: Reported on 6/25/2018), Disp: 30 tablet, Rfl: 1     [DISCONTINUED] albuterol (2.5 MG/3ML) 0.083% nebulizer solution, Take 1 vial (2.5 mg) by nebulization every 4 hours as needed for shortness of breath / dyspnea, wheezing or other (chest tightness, persistent cough), Disp: 50 vial, Rfl: 1     [DISCONTINUED] albuterol (PROAIR HFA, PROVENTIL HFA, VENTOLIN HFA) 108 (90 BASE) MCG/ACT inhaler, Inhale 2 puffs into the lungs every 6 hours as needed for shortness of breath / dyspnea or wheezing, Disp: 3 Inhaler, Rfl: 1     [DISCONTINUED] tiotropium (SPIRIVA HANDIHALER) 18 MCG capsule, Inhale contents of one capsule daily. (Patient taking differently: Inhale 18 mcg into the lungs daily Inhale contents of one capsule daily.), Disp: 30 capsule, Rfl: 3  Immunization History   Administered Date(s) Administered     Influenza (High Dose) 3 valent vaccine 02/27/2018     Influenza (IIV3) PF 11/08/2012     Influenza Vaccine IM 3yrs+ 4 Valent IIV4 12/12/2016     Pneumo Conj 13-V (2010&after) 02/27/2018     TDAP Vaccine (Adacel) 11/08/2012     Allergies   Allergen Reactions     Losartan Cough     Sulfa Drugs Rash     OBJECTIVE:                                                                 /82 (BP Location: Left arm, Patient Position: Sitting, Cuff Size: Adult Regular)  Pulse 85  Temp 98.4  F (36.9  C) (Oral)  Resp 16  Wt 104.8 kg (231 lb 0.7 oz)  SpO2 94%  BMI 38.45 kg/m2        Physical Exam   Constitutional: No distress.   HENT:   Head: Normocephalic and atraumatic.   Right Ear: Tympanic membrane, external ear and ear canal normal.   Left  Ear: Tympanic membrane, external ear and ear canal normal.   Nose: No mucosal edema or rhinorrhea.   Mouth/Throat: Oropharynx is clear and moist and mucous membranes are normal. No oropharyngeal exudate, posterior oropharyngeal edema or posterior oropharyngeal erythema.   Eyes: Conjunctivae are normal. Right eye exhibits no discharge. Left eye exhibits no discharge.   Neck: Normal range of motion.   Cardiovascular: Normal rate, regular rhythm and normal heart sounds.    No murmur heard.  Pulmonary/Chest: Effort normal and breath sounds normal. No respiratory distress. She has no wheezes. She has no rales.   Musculoskeletal: Normal range of motion.   Neurological: She is alert.   Skin: Skin is warm. No rash noted. She is not diaphoretic.   Psychiatric: Affect normal.   Nursing note and vitals reviewed.      WORKUP:   SPIROMETRY       FVC 2.61L (83% of predicted).     FEV1 1.71L (70% of predicted).     FEV1/FVC 66%     FEF 25%-75%  1.10L/s (52% of predicted)      The office spirometry performed today suggests mild obstruction.    Asthma Control Test (ACT) total score: 11     ASSESSMENT/CLAUDY     Visit Diagnoses     1. Uncomplicated severe persistent asthma    -  Primary  Currently not well controlled.  I question adherence to controller medications.  To optimize treatment, I recommend switching from Advair to Breo Ellipta 200/25 mcg 1 puff once daily.  -She will also start using Spiriva Respimat instead of Handihaler.  --Continue using albuterol inhaler 2-4 puffs every 4-6 hours as needed for chest tightness/wheezing/shortness of breath/persistent cough.  -Use it with chamber device.    Relevant Medications    albuterol (PROAIR HFA/PROVENTIL HFA/VENTOLIN HFA) 108 (90 Base) MCG/ACT Inhaler    albuterol (2.5 MG/3ML) 0.083% neb solution    fluticasone-vilanterol (BREO ELLIPTA) 200-25 MCG/INH oral inhaler    Tiotropium Bromide Monohydrate 1.25 MCG/ACT AERS    Other Relevant Orders    Spirometry, Breathing Capacity  (Completed)    2. Other chronic rhinitis      Seems to be well-controlled.  Exacerbated by cough, but short lasting.  Reflux related?  -I recommend optimizing GERD treatment with PPI prescribed by family practice.  -When she has persistent symptoms, she may use azelastine, which she found helpful in the past    3. Gastroesophageal reflux disease, esophagitis presence not specified       Follow up in 4 weeks or sooner if needed.    Thank you for allowing us to participate in the care of this patient. Please feel free to contact us if there are any questions or concerns about the patient.    Disclaimer: This note consists of symbols derived from keyboarding, dictation and/or voice recognition software. As a result, there may be errors in the script that have gone undetected. Please consider this when interpreting information found in this chart.    Galindo Montano MD, [facility]  Allergy, Asthma and Immunology  [place], [place] and [place]

## 2018-06-25 NOTE — TELEPHONE ENCOUNTER
Fax received from [facility]    Message:  Proventil HFA not covered by patient plan. The preferred alternative is Ventolin INH FAAER, Levalbuterol Aeract. Please call/fax the pharmacy to change medication along with strength, directions, quantity, and refills    Denise Behrendt  Specialty CSS

## 2018-06-26 ENCOUNTER — TELEPHONE (OUTPATIENT)
Dept: ALLERGY | Facility: CLINIC | Age: 63
End: 2018-06-26

## 2018-06-26 ASSESSMENT — ASTHMA QUESTIONNAIRES: ACT_TOTALSCORE: 11

## 2018-06-26 NOTE — TELEPHONE ENCOUNTER
Prior Authorization Retail Medication Request    Medication/Dose: Spiriva Respimat Aerosol 1.25 mcg/act  ICD code (if different than what is on RX):    Previously Tried and Failed:    Rationale:      Insurance Name:  Medicare  Insurance ID:        Pharmacy Information (if different than what is on RX)  Name:    Phone:

## 2018-06-26 NOTE — ADDENDUM NOTE
Encounter addended by: Tana Soliz PT on: 6/26/2018 12:12 PM<BR>     Actions taken: Sign clinical note

## 2018-06-27 NOTE — TELEPHONE ENCOUNTER
Prior Authorization Approval    Authorization Effective Date: 3/29/2018  Authorization Expiration Date: 6/27/2019  Medication: Spiriva Respimat  Approved Dose/Quantity:    Reference #:     Insurance Company: [employer] - Phone [phone] Fax [phone]  Expected CoPay:       CoPay Card Available:      Foundation Assistance Needed:    Which Pharmacy is filling the prescription (Not needed for infusion/clinic administered): [facility] [address] [place] [address] AT [address]  Pharmacy Notified: Yes  Patient Notified: Yes

## 2018-06-27 NOTE — TELEPHONE ENCOUNTER
Central Prior Authorization Team   Phone: [phone]    [place] Initiation    Medication: Spiriva Respimat  Insurance Company: [employer] - Phone [phone] Fax [phone]  Pharmacy Filling the Rx: [facility] [address] [place] [address] AT [address]  Filling Pharmacy Phone: [phone]  Filling Pharmacy Fax:    Start Date: 6/27/2018

## 2018-07-02 ENCOUNTER — HOSPITAL ENCOUNTER (OUTPATIENT)
Dept: PHYSICAL THERAPY | Facility: CLINIC | Age: 63
Setting detail: THERAPIES SERIES
End: 2018-07-02
Attending: PEDIATRICS
Payer: MEDICARE

## 2018-07-02 ENCOUNTER — MYC MEDICAL ADVICE (OUTPATIENT)
Dept: FAMILY MEDICINE | Facility: CLINIC | Age: 63
End: 2018-07-02

## 2018-07-02 DIAGNOSIS — I10 BENIGN ESSENTIAL HYPERTENSION: Primary | ICD-10-CM

## 2018-07-02 DIAGNOSIS — R12 HEARTBURN: ICD-10-CM

## 2018-07-02 DIAGNOSIS — R05.9 COUGH: ICD-10-CM

## 2018-07-02 PROCEDURE — 97110 THERAPEUTIC EXERCISES: CPT | Mod: GP | Performed by: PHYSICAL THERAPIST

## 2018-07-02 PROCEDURE — 40000718 ZZHC STATISTIC PT DEPARTMENT ORTHO VISIT: Performed by: PHYSICAL THERAPIST

## 2018-07-13 RX ORDER — DILTIAZEM HYDROCHLORIDE 120 MG/1
120 CAPSULE, COATED, EXTENDED RELEASE ORAL DAILY
Qty: 30 CAPSULE | Refills: 0 | Status: SHIPPED | OUTPATIENT
Start: 2018-07-13 | End: 2018-08-08

## 2018-07-23 ENCOUNTER — OFFICE VISIT (OUTPATIENT)
Dept: ALLERGY | Facility: CLINIC | Age: 63
End: 2018-07-23
Payer: MEDICARE

## 2018-07-23 VITALS
WEIGHT: 234.79 LBS | OXYGEN SATURATION: 95 % | HEART RATE: 75 BPM | DIASTOLIC BLOOD PRESSURE: 83 MMHG | TEMPERATURE: 98 F | BODY MASS INDEX: 39.07 KG/M2 | SYSTOLIC BLOOD PRESSURE: 138 MMHG | RESPIRATION RATE: 16 BRPM

## 2018-07-23 DIAGNOSIS — J45.50 SEVERE PERSISTENT ASTHMA WITHOUT COMPLICATION (H): Primary | ICD-10-CM

## 2018-07-23 DIAGNOSIS — J31.0 OTHER CHRONIC RHINITIS: ICD-10-CM

## 2018-07-23 DIAGNOSIS — K21.9 GASTROESOPHAGEAL REFLUX DISEASE WITHOUT ESOPHAGITIS: ICD-10-CM

## 2018-07-23 LAB
BASOPHILS # BLD AUTO: 0 10E9/L (ref 0–0.2)
BASOPHILS NFR BLD AUTO: 0.3 %
DIFFERENTIAL METHOD BLD: NORMAL
EOSINOPHIL # BLD AUTO: 0.2 10E9/L (ref 0–0.7)
EOSINOPHIL NFR BLD AUTO: 2.1 %
ERYTHROCYTE [DISTWIDTH] IN BLOOD BY AUTOMATED COUNT: 12.6 % (ref 10–15)
FEF 25/75: NORMAL
FEV-1: NORMAL
FEV1/FVC: NORMAL
FVC: NORMAL
HCT VFR BLD AUTO: 44.7 % (ref 35–47)
HGB BLD-MCNC: 15 G/DL (ref 11.7–15.7)
LYMPHOCYTES # BLD AUTO: 2.9 10E9/L (ref 0.8–5.3)
LYMPHOCYTES NFR BLD AUTO: 28.1 %
MCH RBC QN AUTO: 32.2 PG (ref 26.5–33)
MCHC RBC AUTO-ENTMCNC: 33.6 G/DL (ref 31.5–36.5)
MCV RBC AUTO: 96 FL (ref 78–100)
MONOCYTES # BLD AUTO: 1.1 10E9/L (ref 0–1.3)
MONOCYTES NFR BLD AUTO: 10.4 %
NEUTROPHILS # BLD AUTO: 6.2 10E9/L (ref 1.6–8.3)
NEUTROPHILS NFR BLD AUTO: 59.1 %
PLATELET # BLD AUTO: 249 10E9/L (ref 150–450)
RBC # BLD AUTO: 4.66 10E12/L (ref 3.8–5.2)
WBC # BLD AUTO: 10.5 10E9/L (ref 4–11)

## 2018-07-23 PROCEDURE — 94010 BREATHING CAPACITY TEST: CPT | Performed by: ALLERGY & IMMUNOLOGY

## 2018-07-23 PROCEDURE — 85025 COMPLETE CBC W/AUTO DIFF WBC: CPT | Performed by: ALLERGY & IMMUNOLOGY

## 2018-07-23 PROCEDURE — 82103 ALPHA-1-ANTITRYPSIN TOTAL: CPT | Performed by: ALLERGY & IMMUNOLOGY

## 2018-07-23 PROCEDURE — 36415 COLL VENOUS BLD VENIPUNCTURE: CPT | Performed by: ALLERGY & IMMUNOLOGY

## 2018-07-23 PROCEDURE — 99214 OFFICE O/P EST MOD 30 MIN: CPT | Mod: 25 | Performed by: ALLERGY & IMMUNOLOGY

## 2018-07-23 ASSESSMENT — ENCOUNTER SYMPTOMS
HEADACHES: 0
SHORTNESS OF BREATH: 0
ARTHRALGIAS: 0
NAUSEA: 0
FEVER: 0
COUGH: 1
CHILLS: 0
RHINORRHEA: 1
SINUS PRESSURE: 0
ACTIVITY CHANGE: 0
CHEST TIGHTNESS: 0
FACIAL SWELLING: 0
VOMITING: 0
EYE REDNESS: 0
EYE DISCHARGE: 1
WHEEZING: 0
MYALGIAS: 0
DIARRHEA: 0
EYE ITCHING: 0
ADENOPATHY: 0
JOINT SWELLING: 0

## 2018-07-23 NOTE — LETTER
7/23/2018         RE: Luz Elena Cristina  [phone] [facility] [address]        Dear Colleague,    Thank you for referring your patient, Luz Elena Cristina, to the [facility]. Please see a copy of my visit note below.    SUBJECTIVE:                                                               Luz Elena Cristina presents today to our Allergy Clinic at [facility] for a follow up visit.  As you know, she is a 89 year old female with severe persistent asthma and chronic rhinitis.  Asthma since childhood. November 2016: Negative serum IgE to regional aeroallergen panel. CBC with differential without eosinophilia.  Regarding her asthma, she has been  Using Breo 200/25 mcg 1 puff once daily and Spiriva Respimat on a daily basis.  She didn't have any day time symptoms. She continued waking up with cough almost every night. Most of the times, she would go back to sleep. At the beginning she was using albuterol, but for the last several weeks, her GERD and BP meds were adjusted, and she believes she is improving with it. She hasn't had any cough for the last week.  There has been no use of oral steroids since last visit. No ED/PCP/urgent care/other specialist visits for asthma flare since the last visit. The patient denies current chest tightness, cough, wheeze or SOB.  She doesn't use a spacer device, where applicable.  Despite having nocturnal cough, she states her asthma is well controlled.  His significant other told her several times that she stopped breathing when she was sleeping.  She develops on occasions PND. No sinus pressure. Shew couldn't tolerate PPIs, so switched to ranitidine 150 mg by mouth daily. She still has some clear phlegm at night. She doesn't use azelastine because she doesn't feel she has symptoms.   Patient Active Problem List   Diagnosis     Severe persistent asthma     Lichen sclerosus     Benign essential hypertension     Elevated glucose     Family history  of diabetes mellitus     Severe persistent asthma with (acute) exacerbation     Severe obesity (BMI 35.0-39.9) with comorbidity (H)     At risk for cardiovascular event       Past Medical History:   Diagnosis Date     Hypertension      Uncomplicated asthma       Problem (# of Occurrences) Relation (Name,Age of Onset)    Alzheimer Disease (1) Brother: early onset    Aneurysm (1) Paternal Grandmother    Breast Cancer (3) Sister, Paternal Aunt, Sister (bob mccrary)    Cerebrovascular Disease (1) Father (ben felder): CVA    Chronic Obstructive Pulmonary Disease (1) Father (ben felder)    Diabetes (3) Mother (fawn felder), Son, Son (oziel barnes)    Other Cancer (1) Mother (fawn felder): panreatic    Skin Cancer (1) Sister    Thyroid Disease (1) Son    Unknown/Adopted (1) Paternal Grandfather (radha alicea)        Past Surgical History:   Procedure Laterality Date     BIOPSY      breast,     COLONOSCOPY       GYN SURGERY       TONSILLECTOMY       Social History     Social History     Marital status:      Spouse name: N/A     Number of children: N/A     Years of education: N/A     Occupational History           Retired     Social History Main Topics     Smoking status: Former Smoker     Packs/day: 2.00     Years: 20.00     Types: Cigarettes     Quit date: 4/28/1987     Smokeless tobacco: Never Used     Alcohol use Yes     Drug use: No     Sexual activity: Not Currently     Partners: Male     Birth control/ protection: Post-menopausal     Other Topics Concern     Parent/Sibling W/ Cabg, Mi Or Angioplasty Before 65f 55m? No     Social History Narrative    ENVIRONMENTAL HISTORY: [place] home is heated with a forced air but does not use.  Has floor heating. They do have central air conditioning. The patient's bedroom is furnished with carpeting in bedroom.  No pets inside the house. But is exposed to chicken. There is no history of  cockroach or mice infestation. There are no smokers in the house.  The house does not have a damp basement.                Review of Systems   Constitutional: Negative for activity change, chills and fever.   HENT: Positive for congestion, postnasal drip, rhinorrhea and sneezing. Negative for dental problem, ear pain, facial swelling, nosebleeds and sinus pressure.    Eyes: Positive for discharge. Negative for redness and itching.   Respiratory: Positive for cough. Negative for chest tightness, shortness of breath and wheezing.    Cardiovascular: Negative for chest pain.   Gastrointestinal: Negative for diarrhea, nausea and vomiting.   Musculoskeletal: Negative for arthralgias, joint swelling and myalgias.   Skin: Negative for rash.   Neurological: Negative for headaches.   Hematological: Negative for adenopathy.   Psychiatric/Behavioral: Negative for behavioral problems and self-injury.       Current Outpatient Prescriptions:      albuterol (2.5 MG/3ML) 0.083% neb solution, Take 1 vial (2.5 mg) by nebulization every 4 hours as needed for shortness of breath / dyspnea, wheezing or other (persistent cough), Disp: 50 vial, Rfl: 1     albuterol (PROAIR HFA/PROVENTIL HFA/VENTOLIN HFA) 108 (90 Base) MCG/ACT Inhaler, Inhale 2-4 puffs into the lungs every 4 hours as needed for shortness of breath / dyspnea or wheezing, Disp: 1 Inhaler, Rfl: 3     aspirin 81 MG EC tablet, Take 81 mg by mouth daily, Disp: , Rfl:      diltiazem (CARDIZEM CD) 120 MG 24 hr capsule, Take 1 capsule (120 mg) by mouth daily, Disp: 30 capsule, Rfl: 0     fluticasone-vilanterol (BREO ELLIPTA) 200-25 MCG/INH oral inhaler, Inhale 1 puff into the lungs daily, Disp: 1 Inhaler, Rfl: 3     IBUPROFEN PO, Take by mouth 2 times daily as needed for moderate pain, Disp: , Rfl:      ranitidine (ZANTAC) 150 MG tablet, 1 tab daily but if further symptoms ok to take up to 2 tabs daily., Disp: 60 tablet, Rfl: 11     Respiratory Therapy Supplies  (NEBULIZER/TUBING/MOUTHPIECE) KIT, Use with albuterol neb solution, Disp: 1 kit, Rfl: 0     Tiotropium Bromide Monohydrate 1.25 MCG/ACT AERS, Inhale 2 puffs into the lungs daily, Disp: 1 Inhaler, Rfl: 3     ACCU-CHEK MARILUZ PLUS test strip, , Disp: , Rfl: 0     azelastine (ASTELIN) 0.1 % spray, Spray 2 sprays into both nostrils 2 times daily as needed for rhinitis or allergies (Patient not taking: Reported on 7/23/2018), Disp: 30 mL, Rfl: 3     blood glucose monitoring (ACCU-CHEK MARILUZ PLUS) meter device kit, , Disp: , Rfl: 0     clobetasol (TEMOVATE) 0.05 % cream, Apply sparingly to affected area twice weekly as needed.  Do not apply to face. (Patient not taking: Reported on 7/23/2018), Disp: 30 g, Rfl: 0  Immunization History   Administered Date(s) Administered     Influenza (High Dose) 3 valent vaccine 02/27/2018     Influenza (IIV3) PF 11/08/2012     Influenza Vaccine IM 3yrs+ 4 Valent IIV4 12/12/2016     Pneumo Conj 13-V (2010&after) 02/27/2018     TDAP Vaccine (Adacel) 11/08/2012     Allergies   Allergen Reactions     Losartan Cough     Sulfa Drugs Rash     OBJECTIVE:                                                                 /83 (BP Location: Left arm, Patient Position: Sitting, Cuff Size: Adult Large)  Pulse 75  Temp 98  F (36.7  C) (Oral)  Resp 16  Wt 106.5 kg (234 lb 12.6 oz)  SpO2 95%  BMI 39.07 kg/m2      Physical Exam   Constitutional: No distress.   HENT:   Head: Normocephalic and atraumatic.   Right Ear: Tympanic membrane, external ear and ear canal normal.   Left Ear: Tympanic membrane, external ear and ear canal normal.   Nose: No mucosal edema or rhinorrhea.   Mouth/Throat: Oropharynx is clear and moist and mucous membranes are normal. No oropharyngeal exudate, posterior oropharyngeal edema or posterior oropharyngeal erythema.   Eyes: Conjunctivae are normal. Right eye exhibits no discharge. Left eye exhibits no discharge.   Neck: Normal range of motion.   Cardiovascular: Normal  rate, regular rhythm and normal heart sounds.    No murmur heard.  Pulmonary/Chest: Effort normal and breath sounds normal. No respiratory distress. She has no wheezes. She has no rales.   Musculoskeletal: Normal range of motion.   Neurological: She is alert.   Skin: Skin is warm. No rash noted. She is not diaphoretic.   Psychiatric: Affect normal.   Nursing note and vitals reviewed.      WORKUP:   SPIROMETRY       FVC 2.49L (80% of predicted).     FEV1 1.53L (63% of predicted).     FEV1/FVC 61%     FEF 25%-75%  0.92L/s (43% of predicted)    The office spirometry performed today suggests mild to moderate obstruction.      Asthma Control Test (ACT) total score: 18       ASSESSMENT/PLAN:    Problem List Items Addressed This Visit        Respiratory    1. Severe persistent asthma - Primary  A nocturnal cough represents GERD versus obstructive sleep apnea versus upper airway cough syndrome versus uncontrolled asthma versus medication side effect.  Usually, side effects from the medicine would not be nocturnal only.  -She takes ranitidine 150 mg by mouth in the morning.  I recommend increasing it to twice daily.  -I also recommend sleep medicine evaluation since there is some concern for obstructive sleep apnea.  -If she continues having symptoms and does not improve with the changes mentioned above, may need anti-IL-5. I ordered CBC with differential to see if she would qualify-also ordered alpha-1 antitrypsin and ABPA panel.    Relevant Orders    Spirometry, Breathing Capacity (Completed)    Alpha-1-antitrypsin total (Completed)    CBC with platelets differential (Completed)    Bronchopulm Aspergillosis Allergic Panel (Completed)    SLEEP EVALUATION & MANAGEMENT REFERRAL - [facility]  [phone] (Age 2 and up)      Other Visit Diagnoses     2. Other chronic rhinitis     The patient states that it is well controlled.    She may use azelastine as needed.  In the past it was helpful.     3.  Gastroesophageal reflux disease without esophagitis            Follow up in 6-8 weeks or sooner if needed.    Thank you for allowing us to participate in the care of this patient. Please feel free to contact us if there are any questions or concerns about the patient.    Disclaimer: This note consists of symbols derived from keyboarding, dictation and/or voice recognition software. As a result, there may be errors in the script that have gone undetected. Please consider this when interpreting information found in this chart.    Galindo Montano MD, [facility]  Allergy, Asthma and Immunology  [place], [place] and [place]      Again, thank you for allowing me to participate in the care of your patient.        Sincerely,        Galindo Montano MD

## 2018-07-23 NOTE — PROGRESS NOTES
SUBJECTIVE:                                                               Luz Elena Cristina presents today to our Allergy Clinic at [facility] for a follow up visit.  As you know, she is a 63 year old female with severe persistent asthma and chronic rhinitis.  Asthma since childhood. November 2016: Negative serum IgE to regional aeroallergen panel. CBC with differential without eosinophilia.  Regarding her asthma, she has been  Using Breo 200/25 mcg 1 puff once daily and Spiriva Respimat on a daily basis.  She didn't have any day time symptoms. She continued waking up with cough almost every night. Most of the times, she would go back to sleep. At the beginning she was using albuterol, but for the last several weeks, her GERD and BP meds were adjusted, and she believes she is improving with it. She hasn't had any cough for the last week.  There has been no use of oral steroids since last visit. No ED/PCP/urgent care/other specialist visits for asthma flare since the last visit. The patient denies current chest tightness, cough, wheeze or SOB.  She doesn't use a spacer device, where applicable.  Despite having nocturnal cough, she states her asthma is well controlled.  His significant other told her several times that she stopped breathing when she was sleeping.  She develops on occasions PND. No sinus pressure. Shew couldn't tolerate PPIs, so switched to ranitidine 150 mg by mouth daily. She still has some clear phlegm at night. She doesn't use azelastine because she doesn't feel she has symptoms.   Patient Active Problem List   Diagnosis     Severe persistent asthma     Lichen sclerosus     Benign essential hypertension     Elevated glucose     Family history of diabetes mellitus     Severe persistent asthma with (acute) exacerbation     Severe obesity (BMI 35.0-39.9) with comorbidity (H)     At risk for cardiovascular event       Past Medical History:   Diagnosis Date     Hypertension       Uncomplicated asthma       Problem (# of Occurrences) Relation (Name,Age of Onset)    Alzheimer Disease (1) Brother: early onset    Aneurysm (1) Paternal Grandmother    Breast Cancer (3) Sister, Paternal Aunt, Sister (bob mccrary)    Cerebrovascular Disease (1) Father (ben felder): CVA    Chronic Obstructive Pulmonary Disease (1) Father (ben felder)    Diabetes (3) Mother (fawn felder), Son, Son (oziel barnes)    Other Cancer (1) Mother (fawn felder): panreatic    Skin Cancer (1) Sister    Thyroid Disease (1) Son    Unknown/Adopted (1) Paternal Grandfather (radha alicea)        Past Surgical History:   Procedure Laterality Date     BIOPSY      breast,     COLONOSCOPY       GYN SURGERY       TONSILLECTOMY       Social History     Social History     Marital status:      Spouse name: N/A     Number of children: N/A     Years of education: N/A     Occupational History           Retired     Social History Main Topics     Smoking status: Former Smoker     Packs/day: 2.00     Years: 20.00     Types: Cigarettes     Quit date: 4/28/1987     Smokeless tobacco: Never Used     Alcohol use Yes     Drug use: No     Sexual activity: Not Currently     Partners: Male     Birth control/ protection: Post-menopausal     Other Topics Concern     Parent/Sibling W/ Cabg, Mi Or Angioplasty Before 65f 55m? No     Social History Narrative    ENVIRONMENTAL HISTORY: [place] The home is heated with a forced air but does not use.  Has floor heating. They do have central air conditioning. The patient's bedroom is furnished with carpeting in bedroom.  No pets inside the house. But is exposed to chicken. There is no history of cockroach or mice infestation. There are no smokers in the house.  The house does not have a damp basement.                Review of Systems   Constitutional: Negative for activity change, chills and fever.   HENT: Positive for  congestion, postnasal drip, rhinorrhea and sneezing. Negative for dental problem, ear pain, facial swelling, nosebleeds and sinus pressure.    Eyes: Positive for discharge. Negative for redness and itching.   Respiratory: Positive for cough. Negative for chest tightness, shortness of breath and wheezing.    Cardiovascular: Negative for chest pain.   Gastrointestinal: Negative for diarrhea, nausea and vomiting.   Musculoskeletal: Negative for arthralgias, joint swelling and myalgias.   Skin: Negative for rash.   Neurological: Negative for headaches.   Hematological: Negative for adenopathy.   Psychiatric/Behavioral: Negative for behavioral problems and self-injury.       Current Outpatient Prescriptions:      albuterol (2.5 MG/3ML) 0.083% neb solution, Take 1 vial (2.5 mg) by nebulization every 4 hours as needed for shortness of breath / dyspnea, wheezing or other (persistent cough), Disp: 50 vial, Rfl: 1     albuterol (PROAIR HFA/PROVENTIL HFA/VENTOLIN HFA) 108 (90 Base) MCG/ACT Inhaler, Inhale 2-4 puffs into the lungs every 4 hours as needed for shortness of breath / dyspnea or wheezing, Disp: 1 Inhaler, Rfl: 3     aspirin 81 MG EC tablet, Take 81 mg by mouth daily, Disp: , Rfl:      diltiazem (CARDIZEM CD) 120 MG 24 hr capsule, Take 1 capsule (120 mg) by mouth daily, Disp: 30 capsule, Rfl: 0     fluticasone-vilanterol (BREO ELLIPTA) 200-25 MCG/INH oral inhaler, Inhale 1 puff into the lungs daily, Disp: 1 Inhaler, Rfl: 3     IBUPROFEN PO, Take by mouth 2 times daily as needed for moderate pain, Disp: , Rfl:      ranitidine (ZANTAC) 150 MG tablet, 1 tab daily but if further symptoms ok to take up to 2 tabs daily., Disp: 60 tablet, Rfl: 11     Respiratory Therapy Supplies (NEBULIZER/TUBING/MOUTHPIECE) KIT, Use with albuterol neb solution, Disp: 1 kit, Rfl: 0     Tiotropium Bromide Monohydrate 1.25 MCG/ACT AERS, Inhale 2 puffs into the lungs daily, Disp: 1 Inhaler, Rfl: 3     ACCU-CHEK MARILUZ PLUS test strip, , Disp: ,  Rfl: 0     azelastine (ASTELIN) 0.1 % spray, Spray 2 sprays into both nostrils 2 times daily as needed for rhinitis or allergies (Patient not taking: Reported on 7/23/2018), Disp: 30 mL, Rfl: 3     blood glucose monitoring (ACCU-CHEK MARILUZ PLUS) meter device kit, , Disp: , Rfl: 0     clobetasol (TEMOVATE) 0.05 % cream, Apply sparingly to affected area twice weekly as needed.  Do not apply to face. (Patient not taking: Reported on 7/23/2018), Disp: 30 g, Rfl: 0  Immunization History   Administered Date(s) Administered     Influenza (High Dose) 3 valent vaccine 02/27/2018     Influenza (IIV3) PF 11/08/2012     Influenza Vaccine IM 3yrs+ 4 Valent IIV4 12/12/2016     Pneumo Conj 13-V (2010&after) 02/27/2018     TDAP Vaccine (Adacel) 11/08/2012     Allergies   Allergen Reactions     Losartan Cough     Sulfa Drugs Rash     OBJECTIVE:                                                                 /83 (BP Location: Left arm, Patient Position: Sitting, Cuff Size: Adult Large)  Pulse 75  Temp 98  F (36.7  C) (Oral)  Resp 16  Wt 106.5 kg (234 lb 12.6 oz)  SpO2 95%  BMI 39.07 kg/m2      Physical Exam   Constitutional: No distress.   HENT:   Head: Normocephalic and atraumatic.   Right Ear: Tympanic membrane, external ear and ear canal normal.   Left Ear: Tympanic membrane, external ear and ear canal normal.   Nose: No mucosal edema or rhinorrhea.   Mouth/Throat: Oropharynx is clear and moist and mucous membranes are normal. No oropharyngeal exudate, posterior oropharyngeal edema or posterior oropharyngeal erythema.   Eyes: Conjunctivae are normal. Right eye exhibits no discharge. Left eye exhibits no discharge.   Neck: Normal range of motion.   Cardiovascular: Normal rate, regular rhythm and normal heart sounds.    No murmur heard.  Pulmonary/Chest: Effort normal and breath sounds normal. No respiratory distress. She has no wheezes. She has no rales.   Musculoskeletal: Normal range of motion.   Neurological: She is  alert.   Skin: Skin is warm. No rash noted. She is not diaphoretic.   Psychiatric: Affect normal.   Nursing note and vitals reviewed.      WORKUP:   SPIROMETRY       FVC 2.49L (80% of predicted).     FEV1 1.53L (63% of predicted).     FEV1/FVC 61%     FEF 25%-75%  0.92L/s (43% of predicted)    The office spirometry performed today suggests mild to moderate obstruction.      Asthma Control Test (ACT) total score: 18       ASSESSMENT/PLAN:    Problem List Items Addressed This Visit        Respiratory    1. Severe persistent asthma - Primary  A nocturnal cough represents GERD versus obstructive sleep apnea versus upper airway cough syndrome versus uncontrolled asthma versus medication side effect.  Usually, side effects from the medicine would not be nocturnal only.  -She takes ranitidine 150 mg by mouth in the morning.  I recommend increasing it to twice daily.  -I also recommend sleep medicine evaluation since there is some concern for obstructive sleep apnea.  -If she continues having symptoms and does not improve with the changes mentioned above, may need anti-IL-5. I ordered CBC with differential to see if she would qualify-also ordered alpha-1 antitrypsin and ABPA panel.    Relevant Orders    Spirometry, Breathing Capacity (Completed)    Alpha-1-antitrypsin total (Completed)    CBC with platelets differential (Completed)    Bronchopulm Aspergillosis Allergic Panel (Completed)    SLEEP EVALUATION & MANAGEMENT REFERRAL - [facility]  [phone] (Age 2 and up)      Other Visit Diagnoses     2. Other chronic rhinitis     The patient states that it is well controlled.    She may use azelastine as needed.  In the past it was helpful.     3. Gastroesophageal reflux disease without esophagitis            Follow up in 6-8 weeks or sooner if needed.    Thank you for allowing us to participate in the care of this patient. Please feel free to contact us if there are any questions or concerns  about the patient.    Disclaimer: This note consists of symbols derived from keyboarding, dictation and/or voice recognition software. As a result, there may be errors in the script that have gone undetected. Please consider this when interpreting information found in this chart.    Galindo Montano MD, [facility]  Allergy, Asthma and Immunology  [place], [place] and [place]

## 2018-07-23 NOTE — PATIENT INSTRUCTIONS
Use chamber device with albuterol inhaler.    Continue the same asthma management.  Get the bloodwork done.    Increase ranitidine to twice daily.       Get the sleep study done.

## 2018-07-23 NOTE — MR AVS SNAPSHOT
After Visit Summary   7/23/2018    Luz Elena Cristina    MRN: [id number]           Patient Information     Date Of Birth          1955        Visit Information        Provider Department      7/23/2018 4:40 PM Galindo Montano MD [facility]        Today's Diagnoses     Severe persistent asthma without complication    -  1    Other chronic rhinitis        Gastroesophageal reflux disease without esophagitis          Care Instructions    Use chamber device with albuterol inhaler.    Continue the same asthma management.  Get the bloodwork done.    Increase ranitidine to twice daily.       Get the sleep study done.           Follow-ups after your visit        Additional Services     SLEEP EVALUATION & MANAGEMENT REFERRAL - [facility]  [phone] (Age 2 and up)       Please be aware that coverage of these services is subject to the terms and limitations of your health insurance plan.  Call member services at your health plan with any benefit or coverage questions.      Please bring the following to your appointment:    >>   List of current medications   >>   This referral request   >>   Any documents/labs given to you for this referral                      Your next 10 appointments already scheduled     Sep 18, 2018 10:00 AM CDT   Return Visit with Galindo Montano MD   [facility] ([facility]    [address] [address] [phone]              Future tests that were ordered for you today     Open Future Orders        Priority Expected Expires Ordered    SLEEP EVALUATION & MANAGEMENT REFERRAL - [facility]  [phone] (Age 2 and up) Routine  7/23/2019 7/23/2018            Who to contact     If you have questions or need follow up information about today's clinic visit or your schedule please contact [facility] directly at [phone].  Normal or non-critical lab and  imaging results will be communicated to you by Flithart, letter or phone within 4 business days after the clinic has received the results. If you do not hear from us within 7 days, please contact the clinic through Zbird or phone. If you have a critical or abnormal lab result, we will notify you by phone as soon as possible.  Submit refill requests through Zbird or call your pharmacy and they will forward the refill request to us. Please allow 3 business days for your refill to be completed.          Additional Information About Your Visit        Zbird Information     Zbird gives you secure access to your electronic health record. If you see a primary care provider, you can also send messages to your care team and make appointments. If you have questions, please call your primary care clinic.  If you do not have a primary care provider, please call [phone] and they will assist you.        Care EveryWhere ID     This is your Care EveryWhere ID. This could be used by other organizations to access your [place] medical records  [id number]        Your Vitals Were     Pulse Temperature Respirations Pulse Oximetry BMI (Body Mass Index)       75 98  F (36.7  C) (Oral) 16 95% 39.07 kg/m2        Blood Pressure from Last 3 Encounters:   07/23/18 138/83   06/25/18 131/82   06/18/18 148/86    Weight from Last 3 Encounters:   07/23/18 106.5 kg (234 lb 12.6 oz)   06/25/18 104.8 kg (231 lb 0.7 oz)   06/18/18 106.1 kg (234 lb)              We Performed the Following     Alpha-1-antitrypsin total     Bronchopulm Aspergillosis Allergic Panel     CBC with platelets differential     Spirometry, Breathing Capacity        Primary Care Provider Office Phone # Fax #    Kiara Zazueta PA-C [phone] [address] [place] [address]        Equal Access to Services     KEKE MONTEJO : Mohit Betancourt, warodgerda bharat, qaybta kaalnate hartman, lulu green  ah. So [facility] [phone].    ATENCIÓN: Si angie fraire, tiene a arriaza disposición servicios gratuitos de asistencia lingüística. Michael al [phone].    We comply with applicable federal civil rights laws and [place] laws. We do not discriminate on the basis of race, color, national origin, age, disability, sex, sexual orientation, or gender identity.            Thank you!     Thank you for choosing [facility]  for your care. Our goal is always to provide you with excellent care. Hearing back from our patients is one way we can continue to improve our services. Please take a few minutes to complete the written survey that you may receive in the mail after your visit with us. Thank you!             Your Updated Medication List - Protect others around you: Learn how to safely use, store and throw away your medicines at www.disposemymeds.org.          This list is accurate as of 7/23/18  5:47 PM.  Always use your most recent med list.                   Brand Name Dispense Instructions for use Diagnosis    ACCU-CHEK MARILUZ PLUS test strip   Generic drug:  blood glucose monitoring           * albuterol 108 (90 Base) MCG/ACT Inhaler    PROAIR HFA/PROVENTIL HFA/VENTOLIN HFA    1 Inhaler    Inhale 2-4 puffs into the lungs every 4 hours as needed for shortness of breath / dyspnea or wheezing    Uncomplicated severe persistent asthma       * albuterol (2.5 MG/3ML) 0.083% neb solution     50 vial    Take 1 vial (2.5 mg) by nebulization every 4 hours as needed for shortness of breath / dyspnea, wheezing or other (persistent cough)    Uncomplicated severe persistent asthma       aspirin 81 MG EC tablet      Take 81 mg by mouth daily        azelastine 0.1 % spray    ASTELIN    30 mL    Spray 2 sprays into both nostrils 2 times daily as needed for rhinitis or allergies    Postnasal drip       blood glucose monitoring meter device kit           clobetasol 0.05 % cream    TEMOVATE    30 g    Apply sparingly to affected  area twice weekly as needed.  Do not apply to face.    Lichen sclerosus       diltiazem 120 MG 24 hr capsule    CARDIZEM CD    30 capsule    Take 1 capsule (120 mg) by mouth daily    Benign essential hypertension       fluticasone-vilanterol 200-25 MCG/INH oral inhaler    BREO ELLIPTA    1 Inhaler    Inhale 1 puff into the lungs daily    Uncomplicated severe persistent asthma       IBUPROFEN PO      Take by mouth 2 times daily as needed for moderate pain        nebulizer/tubing/mouthpiece Kit     1 kit    Use with albuterol neb solution    Severe persistent asthma with (acute) exacerbation       ranitidine 150 MG tablet    ZANTAC    60 tablet    1 tab daily but if further symptoms ok to take up to 2 tabs daily.    Cough, Heartburn       Tiotropium Bromide Monohydrate 1.25 MCG/ACT Aers     1 Inhaler    Inhale 2 puffs into the lungs daily    Uncomplicated severe persistent asthma       * Notice:  This list has 2 medication(s) that are the same as other medications prescribed for you. Read the directions carefully, and ask your doctor or other care provider to review them with you.

## 2018-07-24 ASSESSMENT — ASTHMA QUESTIONNAIRES: ACT_TOTALSCORE: 18

## 2018-07-26 LAB
A1AT SERPL-MCNC: 112 MG/DL (ref 80–200)
DEPRECATED MISC ALLERGEN IGE RAST QL: NORMAL

## 2018-07-29 LAB
A FUMIGATUS IGE QN: <0.1 KU/L
A FUMIGATUS1 AB SER QL ID: NORMAL
A FUMIGATUS6 AB SER QL ID: NORMAL
IGE SERPL-ACNC: 17 KU/L

## 2018-07-30 NOTE — PROGRESS NOTES
Normal total serum IgE.  Negative ABPA panel.  Alpha-1 antitrypsin within normal limits.  Normal CBC with differential without hypereosinophilia.  Galindo Orozcoerea

## 2018-08-03 ENCOUNTER — MYC MEDICAL ADVICE (OUTPATIENT)
Dept: FAMILY MEDICINE | Facility: CLINIC | Age: 63
End: 2018-08-03

## 2018-08-07 ENCOUNTER — HOSPITAL ENCOUNTER (OUTPATIENT)
Dept: NUTRITION | Facility: CLINIC | Age: 63
End: 2018-08-07
Attending: PHYSICIAN ASSISTANT
Payer: MEDICARE

## 2018-08-07 NOTE — PROGRESS NOTES
Patient signed ABN form on 8/7/18 and chose not to follow through with appointment due to uncertainty of insurance coverage.     Brii Durand RDN, LD  Clinical Dietitian  [phone]

## 2018-08-08 ENCOUNTER — OFFICE VISIT (OUTPATIENT)
Dept: SLEEP MEDICINE | Facility: CLINIC | Age: 63
End: 2018-08-08
Attending: ALLERGY & IMMUNOLOGY
Payer: MEDICARE

## 2018-08-08 VITALS
WEIGHT: 236.4 LBS | HEART RATE: 74 BPM | SYSTOLIC BLOOD PRESSURE: 138 MMHG | OXYGEN SATURATION: 93 % | DIASTOLIC BLOOD PRESSURE: 86 MMHG | HEIGHT: 66 IN | BODY MASS INDEX: 37.99 KG/M2

## 2018-08-08 DIAGNOSIS — J45.50 SEVERE PERSISTENT ASTHMA WITHOUT COMPLICATION (H): ICD-10-CM

## 2018-08-08 DIAGNOSIS — R06.81 WITNESSED EPISODE OF APNEA: ICD-10-CM

## 2018-08-08 DIAGNOSIS — I10 BENIGN ESSENTIAL HYPERTENSION: ICD-10-CM

## 2018-08-08 DIAGNOSIS — R05.8 NOCTURNAL COUGH: ICD-10-CM

## 2018-08-08 DIAGNOSIS — R06.83 SNORING: Primary | ICD-10-CM

## 2018-08-08 DIAGNOSIS — R06.89 GASPING FOR BREATH: ICD-10-CM

## 2018-08-08 PROCEDURE — 99205 OFFICE O/P NEW HI 60 MIN: CPT | Performed by: FAMILY MEDICINE

## 2018-08-08 RX ORDER — ZOLPIDEM TARTRATE 5 MG/1
TABLET ORAL
Qty: 1 TABLET | Refills: 0 | Status: SHIPPED | OUTPATIENT
Start: 2018-08-08 | End: 2018-09-18

## 2018-08-08 RX ORDER — DILTIAZEM HYDROCHLORIDE 120 MG/1
120 CAPSULE, COATED, EXTENDED RELEASE ORAL DAILY
Qty: 30 CAPSULE | Refills: 0 | Status: SHIPPED | OUTPATIENT
Start: 2018-08-08 | End: 2018-09-08

## 2018-08-08 NOTE — PROGRESS NOTES
"[facility]  Outpatient Sleep Medicine Consultation  August 8, 2018    Name: Luz Elena Cristina MRN# [id number]   Age: 63 year old YOB: 1955     Date of Consultation: August 8, 2018  Consultation is requested by: Galindo Montano MD  [address] [place] [address]  Primary care provider: Kiara Zazueta    Patient is accompanied by: Patient presents alone today.       Reason for Sleep Consult:     Luz Elena Cristina is a 63 year old female patient that presents here for an initial evaluation due to \"nocturnal cough.\"         Assessment and Plan:     Summary Sleep Diagnoses:    (1) Snoring  (2) Severe Persistent Asthma  (3) Nocturnal Cough  (4) Gasping for Breath  (5) Witnessed Apneas    Summary Recommendations / Discussion:    This patient has sxs, hx, and physical findings that suggest the diagnosis of a sleep disordered breathing. In addition, she has a high pre-test probability of NEGRA. Given that she has a severe asthma with nocturnal coughing speels, a portable HST sleep study should not be performed and it would not be the best testing alternative for this patient. Instead, this patient should undergo an in-lab split-night PSG sleep study (split at 15 events per hour). Based on the patient's history, clinical presentation, and today's physical examination, there is a reasonable level of suspicion for hypoventilation and, therefore, TCM monitoring as well as ABG studies would be necessary for a complete evaluation. Today, the nature and pathophysiology of NEGRA were discussed. The different treatment options for NEGRA were also reviewed and explained today. The patient was given zolpidem 5 mg to use only during the night of the study and only if needed (medication side effects and possible complications discussed). Lifestyle recommendations including healthy dietary and exercising habits were discussed. Pt will follow up with me after having completed an in-lab PSG sleep study.    Visit " Summary:   -In-lab split-night PSG sleep study with TCM and ABG (split at 15 events per hour)   -Zolpidem 5 mg at bedtime PRN for the night of the sleep study   -Follow up with me after completing PSG sleep study    Coding:  (R06.83) Snoring  (primary encounter diagnosis)  (J45.50) Severe persistent asthma without complication  (R05) Nocturnal cough  (R06.89) Gasping for breath  (R06.81) Witnessed episode of apnea    Counseling included a comprehensive review of diagnostic and therapeutic strategies as well as risks of inadequate therapy.    Educational materials provided in instructions. The patient was instructed to avoid driving or operating any heavy machinery when experiencing drowsiness.    All questions and concerns were addressed today. Pt agrees and understands the assessment and plan.         History of Present Illness:   Luz Elena Cristina is a 63 year old  RHD female pt with PMH of severe persistent asthma, Lichen sclerosus, hypertension, gastroesophageal reflux disease, and obesity presents for an initial evaluation today due to persistent nocturnal cough.    This patient has severe persistent asthma and she has been managed with Breo 200/25 mcg 1 puff qday and Spiriva Respimat qday (spacer device used). Recent lab studies include alpha 1 antitrypsin with normal results at 112 mg/dL, normal recent CBC, and normal TSH values.    The patient has been endorsing frequent nocturnal cough waking he up from her sleep. Although the dose may be secondary to asthma as well as GERD. In addition, the patient is currently on ranitidine. As such, a sleep evaluation was requested by Dr Montano to evaluate the patient for a possible sleep disordered breathing contributing to the presentation.     The patient explains that she has been using albuterol daily (used about one time a day). However, she explains that many years ago she was the last time she was seen in ED or urgent care due to asthma  exacerbation.    This patient reports daily nocturnal loud snoring accompanied by gasping / choking for air with also witnessed apneas (snoring can be heard from a different room). The patient also endorses non-restorative sleep with sleep inertia. He denies any EDS with no inadvertent naps. This patient admits having some xerostomia. Pt reports nocturnal coughing spells that wakes her up throughout the night (this occurs about 3 times a week). Pt reports GERD sxs at night time and ranitidine helps with the condition. The patient denies any morning cephalgia, morning myalgias, CP, SOB, N/V, diarrhea, nocturia, positional dyspnea, or orthopnea. The patient sleeps with one pillow under her head with elevation of the head of the bed. Lastly, this patient denies any drowsiness when driving with no near accidents.     PREVIOUS IN- LAB or HOME SLEEP STUDIES: None Reported    SLEEP-WAKE SCHEDULE:     Luz Elena Cristina      -Describes herself as a night person; prefers to go to sleep at 11:00 PM and wakes up at 7:00 AM.      -The patient takes no naps during the week / weekend; takes no inadvertent naps.      -ON WEEKDAYS, goes to sleep at 11:00 PM during the week; awakens 5:30 AM with an alarm; falls asleep in 5 minutes; denies difficulty falling asleep.      -ON WEEKENDS, goes to sleep at 11:00 PM and wakes up at 7:00 AM with an alarm; falls asleep in 5 minutes.        -Awakens 1-2 times a night for 5 minutes before falling back to sleep; awakens to go to the bathroom.      BEDTIME ACTIVITIES AND SHIFT WORK:    Luz Elena Cristina     -Bedtime Activities and Other Sleeping Information: Pt lives with . Pt sleeps with . Pt sleeps on her sides. No electronics or other activities besides sleeping done in bed.     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)     SCALES        -SLEEP APNEA: Stopbang score: 7 / 8        -SLEEPINESS: Antoine sleepiness scale (ESS):  3 / 24    Drowsy driving / near accidents: Denies any  near accidents    Consequences: None Reported    SLEEP COMPLAINTS:  Cardio-respiratory     -Snoring: Significant snoring reported    -Dyspnea: Pt admits having witnessed apneas   -Morning headaches or confusion: Denies any morning cephalgia   -Coexisting Lung disease: See Above     -Coexisting Heart disease: Denies diagnosed or known cardiovascular disease at this time     -Does patient have a bed partner: Patient sleeps alone   -Has bed partner been sleeping separately because of snoring:  No            RLS Screen:    -When you try to relax in the evening or sleep at night, do you ever have unpleasant, restless feelings in your legs that can be relieved by walking or movement? None Reported     -Periodic limb movement: None Reported    Narcolepsy:     - Denies sudden urges of sleep attacks   - Denies cataplexy   - Denies sleep paralysis    - Denies hallucinations    - Admits feeling refreshed after a nap.    Sleep Behaviors:   - Denies leg symptoms/movements   - Denies motor restlessness   - Denies night terrors   - Denies bruxism   - Denies automatic behaviors    Other Subjective Complaints:   - Denies anxiety or rumination    - Denies pain and discomfort at night   - Denies waking up with heart pounding or racing   - Denies GERD or aspiration         Parasomnia:    -NREM - Denies recurrent persistent confusional arousal, night eating, sleep walking or sleep terrors.      -REM - Denies dream enactment or injuries.          Medications:     Current Outpatient Prescriptions   Medication Sig     albuterol (2.5 MG/3ML) 0.083% neb solution Take 1 vial (2.5 mg) by nebulization every 4 hours as needed for shortness of breath / dyspnea, wheezing or other (persistent cough)     albuterol (PROAIR HFA/PROVENTIL HFA/VENTOLIN HFA) 108 (90 Base) MCG/ACT Inhaler Inhale 2-4 puffs into the lungs every 4 hours as needed for shortness of breath / dyspnea or wheezing     aspirin 81 MG EC tablet Take 81 mg by mouth daily     blood  glucose monitoring (ACCU-CHEK MARILUZ PLUS) meter device kit      clobetasol (TEMOVATE) 0.05 % cream Apply sparingly to affected area twice weekly as needed.  Do not apply to face.     diltiazem (CARDIZEM CD) 120 MG 24 hr capsule Take 1 capsule (120 mg) by mouth daily     fluticasone-vilanterol (BREO ELLIPTA) 200-25 MCG/INH oral inhaler Inhale 1 puff into the lungs daily     ranitidine (ZANTAC) 150 MG tablet 1 tab daily but if further symptoms ok to take up to 2 tabs daily.     Respiratory Therapy Supplies (NEBULIZER/TUBING/MOUTHPIECE) KIT Use with albuterol neb solution     Tiotropium Bromide Monohydrate 1.25 MCG/ACT AERS Inhale 2 puffs into the lungs daily     ACCU-CHEK MARILUZ PLUS test strip      azelastine (ASTELIN) 0.1 % spray Spray 2 sprays into both nostrils 2 times daily as needed for rhinitis or allergies (Patient not taking: Reported on 7/23/2018)     No current facility-administered medications for this visit.      Allergies   Allergen Reactions     Losartan Cough     Sulfa Drugs Rash          Past Medical History:     Denies needing any 02 supplement at night.    Past Medical History:   Diagnosis Date     Hypertension      Uncomplicated asthma            Past Surgical History:    Admits previous upper airway surgery.     Past Surgical History:   Procedure Laterality Date     BIOPSY      breast,     COLONOSCOPY       GYN SURGERY       TONSILLECTOMY            Social History:     Social History   Substance Use Topics     Smoking status: Former Smoker     Packs/day: 2.00     Years: 20.00     Types: Cigarettes     Quit date: 4/28/1987     Smokeless tobacco: Never Used     Alcohol use Yes     Chemical History:     Tobacco: Quit smoking about 31 yrs ago     Uses 4 cups/day of coffee. Last caffeine intake is usually before 10 AM. One soda a day.    Supplements for wakefulness: Patient does not use any supplements to stay awake    EtOH: 1 to 2 drinks a week  Recreational Drugs: Patient denies using any recreational  drugs     Psych Hx:   Current dangers to self or others: No. Pt denies any SI / HI, hallucinations, or delusions         Family History:     Family History   Problem Relation Age of Onset     Other Cancer Mother      panreatic     Diabetes Mother      Breast Cancer Sister      Skin Cancer Sister      Cerebrovascular Disease Father      CVA     Chronic Obstructive Pulmonary Disease Father      Aneurysm Paternal Grandmother      Unknown/Adopted Paternal Grandfather      Alzheimer Disease Brother      early onset     Diabetes Son      Thyroid Disease Son      Breast Cancer Paternal Aunt      Diabetes Son      Breast Cancer Sister      Sleep Family Hx:       RLS - None reported   NEGRA - Brother with NEGRA  Insomnia - None reported  Parasomnia - None reported         Review of Systems:     A complete 10 point review of systems was negative other than HPI or as commented below:     A complete review of systems reviewed by me is negative with the exeption of what has been mentioned in the history of present illness.  CONSTITUTIONAL: NEGATIVE for weight gain/loss, fever, chills, sweats or night sweats, drug allergies.  EYES:  POSITIVE for changes in vision  ENT:  POSITIVE for  sore throat and post-nasal drip  CARDIAC:  POSITIVE for  swollen legs, swollen feet and high blood pressure  NEUROLOGIC:  POSITIVE for  headaches  DERMATOLOGIC:  POSITIVE for  rashes  PULMONARY:  POSITIVE for  SOB with activity, dry cough and productive cough  GASTROINTESTINAL: NEGATIVE for nausea or vomitting, loose or watery stools, fat or grease in stools, constipation, abdominal pain, bowel movements black in color or blood noted.  GENITOURINARY: NEGATIVE for pain during urination, blood in urine, urinating more frequently than usual, irregular menstrual periods.  MUSCULOSKELETAL: NEGATIVE for muscle pain, bone or joint pain, swollen joints.  ENDOCRINE:  POSITIVE for  increased thirst and increased urination  LYMPHATIC: NEGATIVE for swollen lymph  "nodes, lumps or bumps in the breasts or nipple discharge.          Physical Examination:   /86  Pulse 74  Ht 1.67 m (5' 5.75\")  Wt 107.2 kg (236 lb 6.4 oz)  SpO2 93%  BMI 38.45 kg/m2     VS: Reviewed and normal.  General: Alert, oriented, not in distress. Dressed casually; Good eye contact; Comfortably sitting in a chair; in no apparent distress  HEENT: Normocephalic and atraumatic; NL TM x 2; pupils are isocoric and equally responsive to the light. PERRLA. EOMI. Normal fundoscopic examination; Nasal turbinates are normal with a normal septal alignment;  Mallampati score: Grade IV; Tonsillar hypertrophy: 0  surgically removed; Pharynx with no erythema or exudates.  NECK: Neck supple; symmetrical; no lymphadenopathy; no thyromegaly, bruit, JVD noted. Neck circumference of 16.75 inches (42.5 cm).  Lungs: Both hemithoraces are symmetrical, normal to palpation, no dullness to percussion, auscultation of lungs revealed normal breath sounds with no expirium prolongation, wheezing, rhonci and crackles.  CVS: Normal S1 and S2 heart sounds with no extra heart sounds. No murmur, rubs, or clicks. Normal peripheral pulses throughout with no obvious peripheral edema.  Abdomen: Bowel sounds present. Abdomen is soft, non-tender, and non-distended. No organomegaly, ascitis, or obvious masses noted. Negative CVA tenderness.  Extremities/musculoskeletal: No deformity, cyanosis, or clubbing noted.  Neurology: Awake, alert, and oriented x 3. No obvious gross motor / sensorial deficits with normal strength in all extremities at 5/5 and normal sensation throughout. Cranial nerves are grossly intact with normal II to XII CN functions. Negative Romberg's test with normal gait. DTR are symmetric and normal at 2+/4.  Integumentary: No obvious skin rash.  Psychiatry: Mood and affect are appropriate. Euthymic with affect congruent with full range and intensity. No SI/HI with adequate insight and judgement.          Data: All " pertinent previous laboratory data reviewed     No results found for: PH, PHARTERIAL, PO2, [id number], SAT, PCO2, HCO3, BASEEXCESS, SEGUN, BEB  Lab Results   Component Value Date    TSH 2.23 02/27/2018     Lab Results   Component Value Date    GLC 99 02/27/2018     (H) 04/28/2016     Lab Results   Component Value Date    HGB 15.0 07/23/2018    HGB 15.8 (H) 12/28/2016     Lab Results   Component Value Date    BUN 17 02/27/2018    BUN 18 04/28/2016    CR 0.85 02/27/2018    CR 0.85 04/28/2016     Echocardiology: None Available    Chest x-ray:    -Chest x-ray films obtained on 06/11/2018 showed minimal inferolateral opacity unchanged from prior films (likely scar or atelectasis). No obvious cardiopulmonary abnormalities noted.    PFT:    -Recent spirometry obtained on 07/23/2018 showed FVC at 80% and FEV1 at 63%.    Laboratory Studies:   Component Value Flag Ref Range Units Status Collected Lab   Immunoglobulin E 17  <=214 kU/L Final 07/23/2018  5:51      Component Value Flag Ref Range Units Status Collected Lab   Alpha 1 Antitrypsin 112  80 - 200 mg/dL Final 07/23/2018  5:51 PM 51     Component Value Flag Ref Range Units Status Collected Lab   WBC 10.5  4.0 - 11.0 10e9/L Final 07/23/2018  5:51    RBC Count 4.66  3.8 - 5.2 10e12/L Final 07/23/2018  5:51    Hemoglobin 15.0  11.7 - 15.7 g/dL Final 07/23/2018  5:51    Hematocrit 44.7  35.0 - 47.0 % Final 07/23/2018  5:51    MCV 96  78 - 100 fl Final 07/23/2018  5:51    MCH 32.2  26.5 - 33.0 pg Final 07/23/2018  5:51    MCHC 33.6  31.5 - 36.5 g/dL Final 07/23/2018  5:51    RDW 12.6  10.0 - 15.0 % Final 07/23/2018  5:51    Platelet Count 249  150 - 450 10e9/L Final 07/23/2018  5:51    Diff Method     Final 07/23/2018  5:51    Automated Method   % Neutrophils 59.1   % Final 07/23/2018  5:51    % Lymphocytes 28.1   % Final 07/23/2018  5:51    % Monocytes 10.4   % Final 07/23/2018  5:51     % Eosinophils 2.1   % Final 07/23/2018  5:51    % Basophils 0.3   % Final 07/23/2018  5:51    Absolute Neutrophil 6.2  1.6 - 8.3 10e9/L Final 07/23/2018  5:51    Absolute Lymphocytes 2.9  0.8 - 5.3 10e9/L Final 07/23/2018  5:51    Absolute Monocytes 1.1  0.0 - 1.3 10e9/L Final 07/23/2018  5:51    Absolute Eosinophils 0.2  0.0 - 0.7 10e9/L Final 07/23/2018  5:51    Absolute Basophils 0.0  0.0 - 0.2 10e9/L Final 07/23/2018  5:51      Component Value Flag Ref Range Units Status Collected Lab   TSH 2.23  0.40 - 4.00 mU/L Final 02/27/2018 12:03 PM 59     Component Value Flag Ref Range Units Status Collected Lab   Sodium 137  133 - 144 mmol/L Final 02/27/2018 12:03 PM 59   Potassium 4.5  3.4 - 5.3 mmol/L Final 02/27/2018 12:03 PM 59   Chloride 104  94 - 109 mmol/L Final 02/27/2018 12:03 PM 59   Carbon Dioxide 29  20 - 32 mmol/L Final 02/27/2018 12:03 PM 59   Anion Gap 4  3 - 14 mmol/L Final 02/27/2018 12:03 PM 59   Glucose 99  70 - 99 mg/dL Final 02/27/2018 12:03 PM 59   Comment:   Non Fasting   Urea Nitrogen 17  7 - 30 mg/dL Final 02/27/2018 12:03 PM 59   Creatinine 0.85  0.52 - 1.04 mg/dL Final 02/27/2018 12:03 PM 59   GFR Estimate 67  >60 mL/min/1.7m2 Final 02/27/2018 12:03 PM 59   Comment:   Non  GFR Calc   GFR Estimate If Black 81  >60 mL/min/1.7m2 Final 02/27/2018 12:03 PM 59   Comment:   African American GFR Calc   Calcium 9.0  8.5 - 10.1 mg/dL Final 02/27/2018 12:03 PM 59     Maycol Galeana MD, MPH  Clinical Sleep Medicine    Total time spent with patient: 60 min. Over >50% of the time was spent for face to face counseling, education, and evaluation.

## 2018-08-08 NOTE — PATIENT INSTRUCTIONS
Your BMI is Body mass index is 38.45 kg/(m^2).  Weight management is a personal decision.  If you are interested in exploring weight loss strategies, the following discussion covers the approaches that may be successful. Body mass index (BMI) is one way to tell whether you are at a healthy weight, overweight, or obese. It measures your weight in relation to your height.  A BMI of 18.5 to 24.9 is in the healthy range. A person with a BMI of 25 to 29.9 is considered overweight, and someone with a BMI of 30 or greater is considered obese. More than two-thirds of American adults are considered overweight or obese.  Being overweight or obese increases the risk for further weight gain. Excess weight may lead to heart disease and diabetes.  Creating and following plans for healthy eating and physical activity may help you improve your health.  Weight control is part of healthy lifestyle and includes exercise, emotional health, and healthy eating habits. Careful eating habits lifelong are the mainstay of weight control. Though there are significant health benefits from weight loss, long-term weight loss with diet alone may be very difficult to achieve- studies show long-term success with dietary management in less than 10% of people. Attaining a healthy weight may be especially difficult to achieve in those with severe obesity. In some cases, medications, devices and surgical management might be considered.  What can you do?  If you are overweight or obese and are interested in methods for weight loss, you should discuss this with your provider.     Consider reducing daily calorie intake by 500 calories.     Keep a food journal.     Avoiding skipping meals, consider cutting portions instead.    Diet combined with exercise helps maintain muscle while optimizing fat loss. Strength training is particularly important for building and maintaining muscle mass. Exercise helps reduce stress, increase energy, and improves fitness.  "Increasing exercise without diet control, however, may not burn enough calories to loose weight.       Start walking three days a week 10-20 minutes at a time    Work towards walking thirty minutes five days a week     Eventually, increase the speed of your walking for 1-2 minutes at time    In addition, we recommend that you review healthy lifestyles and methods for weight loss available through the National Institutes of Health patient information sites:  http://win.niddk.nih.gov/publications/index.htm    And look into health and wellness programs that may be available through your health insurance provider, employer, local community center, or donavan club.    Weight management plan: Patient was referred to their PCP to discuss a diet and exercise plan.     MY TREATMENT INFORMATION FOR SLEEP APNEA -  Luz Elena Cristina    DOCTOR: Maycol Galeana MD, MPH  SLEEP CENTER : [facility]         If I haven't had a sleep study yet, what can I expect?  A personal story from [employer]  https://www.Casinity.com/watch?v=AxPLmlRpnCs      Am I having a home sleep study?  Here is a video in case you get home and want to make sure you have done it correctly  https://www.Casinity.com/watch?v=QPR0U9xBdb8&feature=youtu.be      Frequently asked questions:  1. What is Obstructive Sleep Apnea (NEGRA)? NEGRA is the most common type of sleep apnea. Apnea literally means, \"without breath.\" It is characterized by repetitive pauses in breathing, despite continued effort to breathe, and is usually associated with a reduction in blood oxygen saturation. Apneas can last 10 to over 60 seconds. It is caused by narrowing or collapse of the upper airway as muscles relax during sleep. Severity of sleep apnea is determined by frequency of breathing events and their effect on your sleep and oxygen levels determined during sleep testing.     2. What are the consequences of NEGRA? Symptoms include: daytime sleepiness- possibly increasing the risk " of falling asleep while driving, unrefreshing/restless sleep, snoring, insomnia, waking frequently to urinate, waking with heartburn or reflux, reduced concentration and memory, and morning headaches. Other health consequences may include development of high blood pressure and other cardiovascular disease in persons who are susceptible. Untreated NEGRA  can contribute to heart disease, stroke and diabetes.     3. What are the treatment options? In most situations, sleep apnea is a lifelong disease that must be managed with daily therapy. Medications are not effective for sleep apnea and surgery is generally not performed until other therapies have been tried. Therapy is usually tailored to the individual patient based on many factors including your wishes as well as severity of sleep apnea and severity of obesity. Continuous Positive Airway (CPAP) is the most reliable treatment. An oral device to hold your jaw forward is usually the next most reliable option. Other options include postioning devices (to keep you off your back), weight loss, and surgery including a tongue pacing device. There is more detail about some of these options below.            1. CPAP-  WHAT DOES IT DO AND HOW CAN I LEARN TO WEAR IT?                               BEFORE I START, CAN I WATCH A MOVIE TO GET A PLAN ON HOW TO USE CPAP?  https://www.vidCoin.com/watch?h=s5S82yb705X      Continuous positive airway pressure, or CPAP, is the most effective treatment for obstructive sleep apnea. It works by blowing room air, through a mask, to hold your throat open. A decision to use CPAP is a major step forward in the pursuit of a healthier life. The successful use of CPAP will help you breathe easier, sleep better and live healthier. You can choose CPAP equipment from any durable medical equipment provider that meets your needs.  Using CPAP can be a positive experience if you keep these hua points in mind:  1. Commitment  CPAP is not a quick fix for  "your problem. It involves a long-term commitment to improve your sleep and your health.    2. Communication  Stay in close communication with both your sleep doctor and your CPAP supplier. Ask lots of questions and seek help when you need it.    3. Consistency  Use CPAP all night, every night and for every nap. You will receive the maximum health benefits from CPAP when you use it every time that you sleep. This will also make it easier for your body to adjust to the treatment.    4. Correction  The first machine and mask that you try may not be the best ones for you. Work with your sleep doctor and your CPAP supplier to make corrections to your equipment selection. Ask about trying a different type of machine or mask if you have ongoing problems. Make sure that your mask is a good fit and learn to use your equipment properly.    5. Challenge  Tell a family member or close friend to ask you each morning if you used your CPAP the previous night. Have someone to challenge you to give it your best effort.    6. Connection   Your adjustment to CPAP will be easier if you are able to connect with others who use the same treatment. Ask your sleep doctor if there is a support group in your area for people who have sleep apnea, or look for one on the Internet.  7. Comfort   Increase your level of comfort by using a saline spray, decongestant or heated humidifier if CPAP irritates your nose, mouth or throat. Use your unit's \"ramp\" setting to slowly get used to the air pressure level. There may be soft pads you can buy that will fit over your mask straps. Look on www.CPAP.com for accessories that can help make CPAP use more comfortable.  8. Cleaning   Clean your mask, tubing and headgear on a regular basis. Put this time in your schedule so that you don't forget to do it. Check and replace the filters for your CPAP unit and humidifier.    9. Completion   Although you are never finished with CPAP therapy, you should reward " yourself by celebrating the completion of your first month of treatment. Expect this first month to be your hardest period of adjustment. It will involve some trial and error as you find the machine, mask and pressure settings that are right for you.    10. Continuation  After your first month of treatment, continue to make a daily commitment to use your CPAP all night, every night and for every nap.    CPAP-Tips to starting with success:  Begin using your CPAP for short periods of time during the day while you watch TV or read.    Use CPAP every night and for every nap. Using it less often reduces the health benefits and makes it harder for your body to get used to it.    Make small adjustments to your mask, tubing, straps and headgear until you get the right fit. Tightening the mask may actually worsen the leak.  If it leaves significant marks on your face or irritates the bridge of your nose, it may not be the best mask for you.  Speak with the person who supplied the mask and consider trying other masks. Insurances will allow you to try different masks during the first month of starting CPAP.  Insurance also covers a new mask, hose and filter about every 6 months.    Use a saline nasal spray to ease mild nasal congestion. Neti-Pot or saline nasal rinses may also help. Nasal gel sprays can help reduce nasal dryness.  Biotene mouthwash can be helpful to protect your teeth if you experience frequent dry mouth.  Dry mouth may be a sign of air escaping out of your mouth or out of the mask in the case of a full face mask.  Speak with your provider if you expect that is the case.     Take a nasal decongestant to relieve more severe nasal or sinus congestion.  Do not use Afrin (oxymetazoline) nasal spray more than 3 days in a row.  Speak with your sleep doctor if your nasal congestion is chronic.    Use a heated humidifier that fits your CPAP model to enhance your breathing comfort. Adjust the heat setting up if you get  a dry nose or throat, down if you get condensation in the hose or mask.  Position the CPAP lower than you so that any condensation in the hose drains back into the machine rather than towards the mask.    Try a system that uses nasal pillows if traditional masks give you problems.    Clean your mask, tubing and headgear once a week. Make sure the equipment dries fully.    Regularly check and replace the filters for your CPAP unit and humidifier.    Work closely with your sleep provider and your CPAP supplier to make sure that you have the machine, mask and air pressure setting that works best for you. It is better to stop using it and call your provider to solve problems than to lay awake all night frustrated with the device.      BESIDES CPAP, WHAT OTHER THERAPIES ARE THERE?        Positioning Device  Positioning devices are generally used when sleep apnea is mild and only occurs on your back.This example shows a pillow that straps around the waist. It may be appropriate for those whose sleep study shows milder sleep apnea that occurs primarily when lying flat on one's back. Preliminary studies have shown benefit but effectiveness at home may need to be verified by a home sleep test. These devices are generally not covered by medical insurance.                        Oral Appliance  What is oral appliance therapy?  An oral appliance is a small acrylic device that fits over the upper and lower teeth or tongue (similar to an orthodontic retainer or a mouth guard). This device slightly advances the lower jaw or tongue, which moves the base of the tongue forward, opens the airway, improves breathing and can effectively treat snoring and obstructive sleep apnea sleep apnea. The appliance is fabricated and customized by a qualified dentist with experience in treating snoring and sleep apnea. Oral appliances are usually well tolerated and have relatively high compliance by patients1, 2, 3.  When is an oral appliance  indicated?  Oral appliance therapy is recommended as a first-line treatment for patients with primary snoring, mild sleep apnea, and for patients with moderate sleep apnea who prefer appliance therapy to use of CPAP4, 5. Severity of sleep apnea is determined by sleep testing and is based on the number of respiratory events per hour of sleep.   How successful is oral appliance therapy?  The success rate of oral appliance therapy in patients with mild sleep apnea is 75-80% while in patients with moderate sleep apnea it is 50-70%. The chance of success in patients with severe sleep apnea is 40-50%. The research also shows that oral appliances have a beneficial effect on the cardiovascular health of NEGRA patients at the same magnitude as CPAP therapy7.  Oral appliances should be a second-line treatment in cases of severe sleep apnea, but if not completely successful then a combination therapy utilizing CPAP plus oral appliance therapy may be effective. Oral appliances tend to be effective in a broad range of patients although studies show that the patients who have the highest success are females, younger patients, those with milder disease, and less severe obesity. 3, 6.   The chances of success are lower in patients who have more severe NEGRA, are older, and those who are morbidly obese.     Example of an oral appliance   Finding a dentist that practices dental sleep medicine  Specific training is available through the American Academy of Dental Sleep Medicine for dentists interested in working in the field of sleep. To find a dentist who is educated in the field of sleep and the use of oral appliances, near you, visit the Web site of the American Academy of Dental Sleep Medicine; also see   http://www.accpstorage.org/newOrganization/patients/oralAppliances.pdf  To search for a dentist certified in these practices:  Http://aadsm.org/FindADentist.aspx?1  1. Tai et al. Objectively measured vs self-reported  compliance during oral appliance therapy for sleep-disordered breathing. Chest 2013; 144(5): 4488-8784.  2. Lefty, et al. Objective measurement of compliance during oral appliance therapy for sleep-disordered breathing. Thorax 2013; 68(1): 91-96.  3. Marquise et al. Mandibular advancement devices in 620 men and women with NEGRA and snoring: tolerability and predictors of treatment success. Chest 2004; 125: 4736-1995.  4. Daniella et al. Oral appliances for snoring and NEGRA: a review. Sleep 2006; 29: 244-262.  5. Kaden et al. Oral appliance treatment for NEGRA: an update. J Clin Sleep Med 2014; 10(2): 215-227.  6. Khadijah et al. Predictors of OSAH treatment outcome. J Dent Res 2007; 86: 1107-1204.      Weight Loss:    Weight management is a personal decision.  If you are interested in exploring weight loss strategies, the following discussion covers the impact on weight loss on sleep apnea and the approaches that may be successful.    Weight loss decreases severity of sleep apnea in most people with obesity. For those with mild obesity who have developed snoring with weight gain, even 15-30 pound weight loss can improve and occasionally eliminate sleep apnea.  Structured and life-long dietary and health habits are necessary to lose weight and keep healthier weight levels.     Though there may be significant health benefits from weight loss, long-term weight loss is very difficult to achieve- studies show success with dietary management in less than 10% of people. In addition, substantial weight loss may require years of dietary control and may be difficult if patients have severe obesity. In these cases, surgical management may be considered.  Finally, older individuals who have tolerated obesity without health complications may be less likely to benefit from weight loss strategies.    Your BMI is Body mass index is 38.45 kg/(m^2).  Body mass index (BMI) is one way to tell whether you are at a healthy  weight, overweight, or obese. It measures your weight in relation to your height.  A BMI of 18.5 to 24.9 is in the healthy range. A person with a BMI of 25 to 29.9 is considered overweight, and someone with a BMI of 30 or greater is considered obese. More than two-thirds of American adults are considered overweight or obese.  Being overweight or obese increases the risk for further weight gain. Excess weight may lead to heart disease and diabetes.  Creating and following plans for healthy eating and physical activity may help you improve your health.  Weight control is part of healthy lifestyle and includes exercise, emotional health, and healthy eating habits. Careful eating habits lifelong are the mainstay of weight control. Though there are significant health benefits from weight loss, long-term weight loss with diet alone may be very difficult to achieve- studies show long-term success with dietary management in less than 10% of people. Attaining a healthy weight may be especially difficult to achieve in those with severe obesity. In some cases, medications, devices and surgical management might be considered.  What can you do?  If you are overweight or obese and are interested in methods for weight loss, you should discuss this with your provider.     Consider reducing daily calorie intake by 500 calories.     Keep a food journal.     Avoiding skipping meals, consider cutting portions instead.    Diet combined with exercise helps maintain muscle while optimizing fat loss. Strength training is particularly important for building and maintaining muscle mass. Exercise helps reduce stress, increase energy, and improves fitness. Increasing exercise without diet control, however, may not burn enough calories to loose weight.       Start walking three days a week 10-20 minutes at a time    Work towards walking thirty minutes five days a week     Eventually, increase the speed of your walking for 1-2 minutes at  time    In addition, we recommend that you review healthy lifestyles and methods for weight loss available through the National Institutes of Health patient information sites:  http://win.niddk.nih.gov/publications/index.htm    And look into health and wellness programs that may be available through your health insurance provider, employer, local community center, or donavan club.    Weight management plan: Discussed healthy diet and exercise guidelines and patient will follow up in 3 months in clinic to re-evaluate.  Surgery:    Upper Airway Surgery for NEGRA  Surgery for NEGRA is a second-line treatment option in the management of sleep apnea.  Surgery should be considered for patients who are having a difficult time tolerating CPAP.    Surgery for NEGRA is directed at areas that are responsible for narrowing or complete obstruction of the airway during sleep.  There are a wide range of procedures available to enlarge and/or stabilize the airway to prevent blockage of breathing in the three major areas where it can occur: the palate, tongue, and nasal regions.  Successful surgical treatment depends on the accurate identification of the factors responsible for obstructive sleep apnea in each person.  A personalized approach is required because there is no single treatment that works well for everyone.  Because of anatomic variation, consultation with an examination by a sleep surgeon is a critical first step in determining what surgical options are best for each patient.  In some cases, examination during sedation may be recommended in order to guide the selection of procedures.  Patients will be counseled about risks and benefits as well as the typical recovery course after surgery. Surgery is typically not a cure for a person s NEGRA.  However, surgery will often significantly improve one s NEGRA severity (termed  success rate ).  Even in the absence of a cure, surgery will decrease the cardiovascular risk associated with  OSA7; improve overall quality of life8 (sleepiness, functionality, sleep quality, etc).          Palate Procedures:  Patients with NEGRA often have narrowing of their airway in the region of their tonsils and uvula.  The goals of palate procedures are to widen the airway in this region as well as to help the tissues resist collapse.  Modern palate procedure techniques focus on tissue conservation and soft tissue rearrangement, rather than tissue removal.  Often the uvula is preserved in this procedure. Residual sleep apnea is common in patient after pharyngoplasty with an average reduction in sleep apnea events of 33%2.      Tongue Procedures:  While patients are awake, the muscles that surround the throat are active and keep this region open for breathing. These muscles relax during sleep, allowing the tongue and other structures to collapse and block breathing.  There are several different tongue procedures available.  Selection of a tongue base procedure depends on characteristics seen on physical exam.  Generally, procedures are aimed at removing bulky tissues in this area or preventing the back of the tongue from falling back during sleep.  Success rates for tongue surgery range from 50-62%3.    Hypoglossal Nerve Stimulation:  Hypoglossal nerve stimulation has recently received approval from the [place] Food and Drug Administration for the treatment of obstructive sleep apnea.  This is based on research showing that the system was safe and effective in treating sleep apnea6.  Results showed that the median AHI score decreased 68%, from 29.3 to 9.0. This therapy uses an implant system that senses breathing patterns and delivers mild stimulation to airway muscles, which keeps the airway open during sleep.  The system consists of three fully implanted components: a small generator (similar in size to a pacemaker), a breathing sensor, and a stimulation lead.  Using a small handheld remote, a patient turns the  therapy on before bed and off upon awakening.    Candidates for this device must be greater than 22 years of age, have moderate to severe NEGRA (AHI between 20-65), BMI less than 32, have tried CPAP/oral appliance without success, and have appropriate upper airway anatomy (determined by a sleep endoscopy performed by Dr. De La Torre).    Hypoglossal Nerve Stimulation Pathway:    The sleep surgeon s office will work with the patient through the insurance prior-authorization process (including communications and appeals).    Nasal Procedures:  Nasal obstruction can interfere with nasal breathing during the day and night.  Studies have shown that relief of nasal obstruction can improve the ability of some patients to tolerate positive airway pressure therapy for obstructive sleep apnea1.  Treatment options include medications such as nasal saline, topical corticosteroid and antihistamine sprays, and oral medications such as antihistamines or decongestants. Non-surgical treatments can include external nasal dilators for selected patients. If these are not successful by themselves, surgery can improve the nasal airway either alone or in combination with these other options.    Combination Procedures:  Combination of surgical procedures and other treatments may be recommended, particularly if patients have more than one area of narrowing or persistent positional disease.  The success rate of combination surgery ranges from 66-80%2,3.      1. Neil HAMILTON. The Role of the Nose in Snoring and Obstructive Sleep Apnoea: An Update.  Eur Arch Otorhinolaryngol. 2011; 268: 1365-73.  2.  Kristin SM; Dorina JA; Jaylene JR; Pallanch JF; Yvonne MB; Keerthi SG; Shankar YUN. Surgical modifications of the upper airway for obstructive sleep apnea in adults: a systematic review and meta-analysis. SLEEP 2010;33(10):3738-3768. Sahhriar PORTILLO. Hypopharyngeal surgery in obstructive sleep apnea: an evidence-based medicine review.  Arch Otolaryngol Head Neck  Surg. 2006 Feb;132(2):206-13.  3. Jose YH1, Han Y, Bj ALEXYS. The efficacy of anatomically based multilevel surgery for obstructive sleep apnea. Otolaryngol Head Neck Surg. 2003 Oct;129(4):327-35.  4. Kezirian E, Goldberg A. Hypopharyngeal Surgery in Obstructive Sleep Apnea: An Evidence-Based Medicine Review. Arch Otolaryngol Head Neck Surg. 2006 Feb;132(2):206-13.  5. Jono JEAN et al. Upper-Airway Stimulation for Obstructive Sleep Apnea.  N Engl J Med. 2014 Jan 9;370(2):139-49.  6. Marion Y et al. Increased Incidence of Cardiovascular Disease in Middle-aged Men with Obstructive Sleep Apnea. Am J Respir Crit Care Med; 2002 166: 159-165  7. Kali ABERNATHY et al. Studying Life Effects and Effectiveness of Palatopharyngoplasty (SLEEP) study: Subjective Outcomes of Isolated Uvulopalatopharyngoplasty. Otolaryngol Head Neck Surg. 2011; 144: 623-631.  Please, schedule sleep study and follow up visit with the nurse (she will coming into the room and meet with you). Use sleeping aid only if needed during the night of the study.    Thank you!    Maycol Galeana MD, MPH  Clinical Sleep and Occupational / Environmental Medicine

## 2018-08-08 NOTE — MR AVS SNAPSHOT
After Visit Summary   8/8/2018    Luz Elena Cristina    MRN: [id number]           Patient Information     Date Of Birth          1955        Visit Information        Provider Department      8/8/2018 1:00 PM Maycol Galeana MD [facility] [place]        Today's Diagnoses     Snoring    -  1    Severe persistent asthma without complication        Nocturnal cough        Gasping for breath        Witnessed episode of apnea          Care Instructions      Your BMI is Body mass index is 38.45 kg/(m^2).  Weight management is a personal decision.  If you are interested in exploring weight loss strategies, the following discussion covers the approaches that may be successful. Body mass index (BMI) is one way to tell whether you are at a healthy weight, overweight, or obese. It measures your weight in relation to your height.  A BMI of 18.5 to 24.9 is in the healthy range. A person with a BMI of 25 to 29.9 is considered overweight, and someone with a BMI of 30 or greater is considered obese. More than two-thirds of American adults are considered overweight or obese.  Being overweight or obese increases the risk for further weight gain. Excess weight may lead to heart disease and diabetes.  Creating and following plans for healthy eating and physical activity may help you improve your health.  Weight control is part of healthy lifestyle and includes exercise, emotional health, and healthy eating habits. Careful eating habits lifelong are the mainstay of weight control. Though there are significant health benefits from weight loss, long-term weight loss with diet alone may be very difficult to achieve- studies show long-term success with dietary management in less than 10% of people. Attaining a healthy weight may be especially difficult to achieve in those with severe obesity. In some cases, medications, devices and surgical management might be considered.  What can you do?  If you are overweight or  "obese and are interested in methods for weight loss, you should discuss this with your provider.     Consider reducing daily calorie intake by 500 calories.     Keep a food journal.     Avoiding skipping meals, consider cutting portions instead.    Diet combined with exercise helps maintain muscle while optimizing fat loss. Strength training is particularly important for building and maintaining muscle mass. Exercise helps reduce stress, increase energy, and improves fitness. Increasing exercise without diet control, however, may not burn enough calories to loose weight.       Start walking three days a week 10-20 minutes at a time    Work towards walking thirty minutes five days a week     Eventually, increase the speed of your walking for 1-2 minutes at time    In addition, we recommend that you review healthy lifestyles and methods for weight loss available through the National Institutes of Health patient information sites:  http://win.niddk.nih.gov/publications/index.htm    And look into health and wellness programs that may be available through your health insurance provider, employer, local community center, or donavan club.    Weight management plan: Patient was referred to their PCP to discuss a diet and exercise plan.     MY TREATMENT INFORMATION FOR SLEEP APNEA -  Luz Elena Cristina    DOCTOR: Maycol Galeana MD, MPH  SLEEP CENTER : [facility]         If I haven't had a sleep study yet, what can I expect?  A personal story from Jt  https://www.youJymobube.com/watch?v=AxPLmlRpnCs      Am I having a home sleep study?  Here is a video in case you get home and want to make sure you have done it correctly  https://www.youJymobube.com/watch?v=UJC0M2fZvo8&feature=youtu.be      Frequently asked questions:  1. What is Obstructive Sleep Apnea (NEGRA)? NEGRA is the most common type of sleep apnea. Apnea literally means, \"without breath.\" It is characterized by repetitive pauses in breathing, despite continued " effort to breathe, and is usually associated with a reduction in blood oxygen saturation. Apneas can last 10 to over 60 seconds. It is caused by narrowing or collapse of the upper airway as muscles relax during sleep. Severity of sleep apnea is determined by frequency of breathing events and their effect on your sleep and oxygen levels determined during sleep testing.     2. What are the consequences of NEGRA? Symptoms include: daytime sleepiness- possibly increasing the risk of falling asleep while driving, unrefreshing/restless sleep, snoring, insomnia, waking frequently to urinate, waking with heartburn or reflux, reduced concentration and memory, and morning headaches. Other health consequences may include development of high blood pressure and other cardiovascular disease in persons who are susceptible. Untreated NEGRA  can contribute to heart disease, stroke and diabetes.     3. What are the treatment options? In most situations, sleep apnea is a lifelong disease that must be managed with daily therapy. Medications are not effective for sleep apnea and surgery is generally not performed until other therapies have been tried. Therapy is usually tailored to the individual patient based on many factors including your wishes as well as severity of sleep apnea and severity of obesity. Continuous Positive Airway (CPAP) is the most reliable treatment. An oral device to hold your jaw forward is usually the next most reliable option. Other options include postioning devices (to keep you off your back), weight loss, and surgery including a tongue pacing device. There is more detail about some of these options below.            1. CPAP-  WHAT DOES IT DO AND HOW CAN I LEARN TO WEAR IT?                               BEFORE I START, CAN I WATCH A MOVIE TO GET A PLAN ON HOW TO USE CPAP?  https://www.[employer].com/watch?e=l9T62zr245N      Continuous positive airway pressure, or CPAP, is the most effective treatment for obstructive  "sleep apnea. It works by blowing room air, through a mask, to hold your throat open. A decision to use CPAP is a major step forward in the pursuit of a healthier life. The successful use of CPAP will help you breathe easier, sleep better and live healthier. You can choose CPAP equipment from any durable medical equipment provider that meets your needs.  Using CPAP can be a positive experience if you keep these hua points in mind:  1. Commitment  CPAP is not a quick fix for your problem. It involves a long-term commitment to improve your sleep and your health.    2. Communication  Stay in close communication with both your sleep doctor and your CPAP supplier. Ask lots of questions and seek help when you need it.    3. Consistency  Use CPAP all night, every night and for every nap. You will receive the maximum health benefits from CPAP when you use it every time that you sleep. This will also make it easier for your body to adjust to the treatment.    4. Correction  The first machine and mask that you try may not be the best ones for you. Work with your sleep doctor and your CPAP supplier to make corrections to your equipment selection. Ask about trying a different type of machine or mask if you have ongoing problems. Make sure that your mask is a good fit and learn to use your equipment properly.    5. Challenge  Tell a family member or close friend to ask you each morning if you used your CPAP the previous night. Have someone to challenge you to give it your best effort.    6. Connection   Your adjustment to CPAP will be easier if you are able to connect with others who use the same treatment. Ask your sleep doctor if there is a support group in your area for people who have sleep apnea, or look for one on the Internet.  7. Comfort   Increase your level of comfort by using a saline spray, decongestant or heated humidifier if CPAP irritates your nose, mouth or throat. Use your unit's \"ramp\" setting to slowly get used " to the air pressure level. There may be soft pads you can buy that will fit over your mask straps. Look on www.CPAP.com for accessories that can help make CPAP use more comfortable.  8. Cleaning   Clean your mask, tubing and headgear on a regular basis. Put this time in your schedule so that you don't forget to do it. Check and replace the filters for your CPAP unit and humidifier.    9. Completion   Although you are never finished with CPAP therapy, you should reward yourself by celebrating the completion of your first month of treatment. Expect this first month to be your hardest period of adjustment. It will involve some trial and error as you find the machine, mask and pressure settings that are right for you.    10. Continuation  After your first month of treatment, continue to make a daily commitment to use your CPAP all night, every night and for every nap.    CPAP-Tips to starting with success:  Begin using your CPAP for short periods of time during the day while you watch TV or read.    Use CPAP every night and for every nap. Using it less often reduces the health benefits and makes it harder for your body to get used to it.    Make small adjustments to your mask, tubing, straps and headgear until you get the right fit. Tightening the mask may actually worsen the leak.  If it leaves significant marks on your face or irritates the bridge of your nose, it may not be the best mask for you.  Speak with the person who supplied the mask and consider trying other masks. Insurances will allow you to try different masks during the first month of starting CPAP.  Insurance also covers a new mask, hose and filter about every 6 months.    Use a saline nasal spray to ease mild nasal congestion. Neti-Pot or saline nasal rinses may also help. Nasal gel sprays can help reduce nasal dryness.  Biotene mouthwash can be helpful to protect your teeth if you experience frequent dry mouth.  Dry mouth may be a sign of air escaping  out of your mouth or out of the mask in the case of a full face mask.  Speak with your provider if you expect that is the case.     Take a nasal decongestant to relieve more severe nasal or sinus congestion.  Do not use Afrin (oxymetazoline) nasal spray more than 3 days in a row.  Speak with your sleep doctor if your nasal congestion is chronic.    Use a heated humidifier that fits your CPAP model to enhance your breathing comfort. Adjust the heat setting up if you get a dry nose or throat, down if you get condensation in the hose or mask.  Position the CPAP lower than you so that any condensation in the hose drains back into the machine rather than towards the mask.    Try a system that uses nasal pillows if traditional masks give you problems.    Clean your mask, tubing and headgear once a week. Make sure the equipment dries fully.    Regularly check and replace the filters for your CPAP unit and humidifier.    Work closely with your sleep provider and your CPAP supplier to make sure that you have the machine, mask and air pressure setting that works best for you. It is better to stop using it and call your provider to solve problems than to lay awake all night frustrated with the device.      BESIDES CPAP, WHAT OTHER THERAPIES ARE THERE?        Positioning Device  Positioning devices are generally used when sleep apnea is mild and only occurs on your back.This example shows a pillow that straps around the waist. It may be appropriate for those whose sleep study shows milder sleep apnea that occurs primarily when lying flat on one's back. Preliminary studies have shown benefit but effectiveness at home may need to be verified by a home sleep test. These devices are generally not covered by medical insurance.                        Oral Appliance  What is oral appliance therapy?  An oral appliance is a small acrylic device that fits over the upper and lower teeth or tongue (similar to an orthodontic retainer or a  mouth guard). This device slightly advances the lower jaw or tongue, which moves the base of the tongue forward, opens the airway, improves breathing and can effectively treat snoring and obstructive sleep apnea sleep apnea. The appliance is fabricated and customized by a qualified dentist with experience in treating snoring and sleep apnea. Oral appliances are usually well tolerated and have relatively high compliance by patients1, 2, 3.  When is an oral appliance indicated?  Oral appliance therapy is recommended as a first-line treatment for patients with primary snoring, mild sleep apnea, and for patients with moderate sleep apnea who prefer appliance therapy to use of CPAP4, 5. Severity of sleep apnea is determined by sleep testing and is based on the number of respiratory events per hour of sleep.   How successful is oral appliance therapy?  The success rate of oral appliance therapy in patients with mild sleep apnea is 75-80% while in patients with moderate sleep apnea it is 50-70%. The chance of success in patients with severe sleep apnea is 40-50%. The research also shows that oral appliances have a beneficial effect on the cardiovascular health of NEGRA patients at the same magnitude as CPAP therapy7.  Oral appliances should be a second-line treatment in cases of severe sleep apnea, but if not completely successful then a combination therapy utilizing CPAP plus oral appliance therapy may be effective. Oral appliances tend to be effective in a broad range of patients although studies show that the patients who have the highest success are females, younger patients, those with milder disease, and less severe obesity. 3, 6.   The chances of success are lower in patients who have more severe NEGRA, are older, and those who are morbidly obese.     Example of an oral appliance   Finding a dentist that practices dental sleep medicine  Specific training is available through the American Academy of Dental Sleep Medicine  for dentists interested in working in the field of sleep. To find a dentist who is educated in the field of sleep and the use of oral appliances, near you, visit the Web site of the American Academy of Dental Sleep Medicine; also see   http://www.accpstorage.org/newOrganization/patients/oralAppliances.pdf  To search for a dentist certified in these practices:  Http://aadsm.org/FindADentist.aspx?1  1. Tai et al. Objectively measured vs self-reported compliance during oral appliance therapy for sleep-disordered breathing. Chest 2013; 144(5): 3631-2667.  2. Lefty, et al. Objective measurement of compliance during oral appliance therapy for sleep-disordered breathing. Thorax 2013; 68(1): 91-96.  3. Marquise et al. Mandibular advancement devices in 620 men and women with NEGRA and snoring: tolerability and predictors of treatment success. Chest 2004; 125: 4640-1361.  4. Daniella, et al. Oral appliances for snoring and NEGRA: a review. Sleep 2006; 29: 244-262.  5. Kaden et al. Oral appliance treatment for NEGRA: an update. J Clin Sleep Med 2014; 10(2): 215-227.  6. hKadijah et al. Predictors of OSAH treatment outcome. J Dent Res 2007; 86: 8423-0751.      Weight Loss:    Weight management is a personal decision.  If you are interested in exploring weight loss strategies, the following discussion covers the impact on weight loss on sleep apnea and the approaches that may be successful.    Weight loss decreases severity of sleep apnea in most people with obesity. For those with mild obesity who have developed snoring with weight gain, even 15-30 pound weight loss can improve and occasionally eliminate sleep apnea.  Structured and life-long dietary and health habits are necessary to lose weight and keep healthier weight levels.     Though there may be significant health benefits from weight loss, long-term weight loss is very difficult to achieve- studies show success with dietary management in less than 10% of  people. In addition, substantial weight loss may require years of dietary control and may be difficult if patients have severe obesity. In these cases, surgical management may be considered.  Finally, older individuals who have tolerated obesity without health complications may be less likely to benefit from weight loss strategies.    Your BMI is Body mass index is 38.45 kg/(m^2).  Body mass index (BMI) is one way to tell whether you are at a healthy weight, overweight, or obese. It measures your weight in relation to your height.  A BMI of 18.5 to 24.9 is in the healthy range. A person with a BMI of 25 to 29.9 is considered overweight, and someone with a BMI of 30 or greater is considered obese. More than two-thirds of American adults are considered overweight or obese.  Being overweight or obese increases the risk for further weight gain. Excess weight may lead to heart disease and diabetes.  Creating and following plans for healthy eating and physical activity may help you improve your health.  Weight control is part of healthy lifestyle and includes exercise, emotional health, and healthy eating habits. Careful eating habits lifelong are the mainstay of weight control. Though there are significant health benefits from weight loss, long-term weight loss with diet alone may be very difficult to achieve- studies show long-term success with dietary management in less than 10% of people. Attaining a healthy weight may be especially difficult to achieve in those with severe obesity. In some cases, medications, devices and surgical management might be considered.  What can you do?  If you are overweight or obese and are interested in methods for weight loss, you should discuss this with your provider.     Consider reducing daily calorie intake by 500 calories.     Keep a food journal.     Avoiding skipping meals, consider cutting portions instead.    Diet combined with exercise helps maintain muscle while optimizing  fat loss. Strength training is particularly important for building and maintaining muscle mass. Exercise helps reduce stress, increase energy, and improves fitness. Increasing exercise without diet control, however, may not burn enough calories to loose weight.       Start walking three days a week 10-20 minutes at a time    Work towards walking thirty minutes five days a week     Eventually, increase the speed of your walking for 1-2 minutes at time    In addition, we recommend that you review healthy lifestyles and methods for weight loss available through the National Institutes of Health patient information sites:  http://win.niddk.nih.gov/publications/index.htm    And look into health and wellness programs that may be available through your health insurance provider, employer, local community center, or donavan club.    Weight management plan: Discussed healthy diet and exercise guidelines and patient will follow up in 3 months in clinic to re-evaluate.  Surgery:    Upper Airway Surgery for NEGRA  Surgery for NEGRA is a second-line treatment option in the management of sleep apnea.  Surgery should be considered for patients who are having a difficult time tolerating CPAP.    Surgery for NEGRA is directed at areas that are responsible for narrowing or complete obstruction of the airway during sleep.  There are a wide range of procedures available to enlarge and/or stabilize the airway to prevent blockage of breathing in the three major areas where it can occur: the palate, tongue, and nasal regions.  Successful surgical treatment depends on the accurate identification of the factors responsible for obstructive sleep apnea in each person.  A personalized approach is required because there is no single treatment that works well for everyone.  Because of anatomic variation, consultation with an examination by a sleep surgeon is a critical first step in determining what surgical options are best for each patient.  In some  cases, examination during sedation may be recommended in order to guide the selection of procedures.  Patients will be counseled about risks and benefits as well as the typical recovery course after surgery. Surgery is typically not a cure for a person s NEGRA.  However, surgery will often significantly improve one s NEGRA severity (termed  success rate ).  Even in the absence of a cure, surgery will decrease the cardiovascular risk associated with OSA7; improve overall quality of life8 (sleepiness, functionality, sleep quality, etc).          Palate Procedures:  Patients with NEGRA often have narrowing of their airway in the region of their tonsils and uvula.  The goals of palate procedures are to widen the airway in this region as well as to help the tissues resist collapse.  Modern palate procedure techniques focus on tissue conservation and soft tissue rearrangement, rather than tissue removal.  Often the uvula is preserved in this procedure. Residual sleep apnea is common in patient after pharyngoplasty with an average reduction in sleep apnea events of 33%2.      Tongue Procedures:  While patients are awake, the muscles that surround the throat are active and keep this region open for breathing. These muscles relax during sleep, allowing the tongue and other structures to collapse and block breathing.  There are several different tongue procedures available.  Selection of a tongue base procedure depends on characteristics seen on physical exam.  Generally, procedures are aimed at removing bulky tissues in this area or preventing the back of the tongue from falling back during sleep.  Success rates for tongue surgery range from 50-62%3.    Hypoglossal Nerve Stimulation:  Hypoglossal nerve stimulation has recently received approval from the [place] Food and Drug Administration for the treatment of obstructive sleep apnea.  This is based on research showing that the system was safe and effective in treating sleep  apnea6.  Results showed that the median AHI score decreased 68%, from 29.3 to 9.0. This therapy uses an implant system that senses breathing patterns and delivers mild stimulation to airway muscles, which keeps the airway open during sleep.  The system consists of three fully implanted components: a small generator (similar in size to a pacemaker), a breathing sensor, and a stimulation lead.  Using a small handheld remote, a patient turns the therapy on before bed and off upon awakening.    Candidates for this device must be greater than 22 years of age, have moderate to severe NEGRA (AHI between 20-65), BMI less than 32, have tried CPAP/oral appliance without success, and have appropriate upper airway anatomy (determined by a sleep endoscopy performed by Dr. De La Torre).    Hypoglossal Nerve Stimulation Pathway:    The sleep surgeon s office will work with the patient through the insurance prior-authorization process (including communications and appeals).    Nasal Procedures:  Nasal obstruction can interfere with nasal breathing during the day and night.  Studies have shown that relief of nasal obstruction can improve the ability of some patients to tolerate positive airway pressure therapy for obstructive sleep apnea1.  Treatment options include medications such as nasal saline, topical corticosteroid and antihistamine sprays, and oral medications such as antihistamines or decongestants. Non-surgical treatments can include external nasal dilators for selected patients. If these are not successful by themselves, surgery can improve the nasal airway either alone or in combination with these other options.    Combination Procedures:  Combination of surgical procedures and other treatments may be recommended, particularly if patients have more than one area of narrowing or persistent positional disease.  The success rate of combination surgery ranges from 66-80%2,3.      1. Neil HAMILTON. The Role of the Nose in Snoring and  Obstructive Sleep Apnoea: An Update.  Eur Arch Otorhinolaryngol. 2011; 268: 1365-73.  2.  Kristin SM; Dorina JA; Jaylene JR; Pallanch JF; Yvonne MB; Keerthi SG; Shankar YUN. Surgical modifications of the upper airway for obstructive sleep apnea in adults: a systematic review and meta-analysis. SLEEP 2010;33(10):0399-9192. Shahriar PORTILLO. Hypopharyngeal surgery in obstructive sleep apnea: an evidence-based medicine review.  Arch Otolaryngol Head Neck Surg. 2006 Feb;132(2):206-13.  3. Jose YH1, Han Y, Bj ALEXYS. The efficacy of anatomically based multilevel surgery for obstructive sleep apnea. Otolaryngol Head Neck Surg. 2003 Oct;129(4):327-35.  4. Kezirian E, Goldberg A. Hypopharyngeal Surgery in Obstructive Sleep Apnea: An Evidence-Based Medicine Review. Arch Otolaryngol Head Neck Surg. 2006 Feb;132(2):206-13.  5. Jono JEAN et al. Upper-Airway Stimulation for Obstructive Sleep Apnea.  N Engl J Med. 2014 Jan 9;370(2):139-49.  6. Pekanika Y et al. Increased Incidence of Cardiovascular Disease in Middle-aged Men with Obstructive Sleep Apnea. Am J Respir Crit Care Med; 2002 166: 159-165  7. Bass EM et al. Studying Life Effects and Effectiveness of Palatopharyngoplasty (SLEEP) study: Subjective Outcomes of Isolated Uvulopalatopharyngoplasty. Otolaryngol Head Neck Surg. 2011; 144: 623-631.  Please, schedule sleep study and follow up visit with the nurse (she will coming into the room and meet with you). Use sleeping aid only if needed during the night of the study.    Thank you!    Maycol Galeana MD, MPH  Clinical Sleep and Occupational / Environmental Medicine              Follow-ups after your visit        Your next 10 appointments already scheduled     Sep 18, 2018 10:00 AM CDT   [facility] with Galindo Montano MD   [facility]    [address] [facility] [address]   [phone]              Future tests that were ordered for you today     Open Future Orders         "Priority Expected Expires Ordered    Comprehensive Sleep Study Routine  2/4/2019 8/8/2018    ABG-Blood Gas Arterial ([place] [facility] [place] [facility] [place] / [facility] [place]) Routine  10/7/2018 8/8/2018            Who to contact     If you have questions or need follow up information about today's clinic visit or your schedule please contact [facility] directly at [phone].  Normal or non-critical lab and imaging results will be communicated to you by Jobzippershart, letter or phone within 4 business days after the clinic has received the results. If you do not hear from us within 7 days, please contact the clinic through Chartbeatt or phone. If you have a critical or abnormal lab result, we will notify you by phone as soon as possible.  Submit refill requests through ShopTutors or call your pharmacy and they will forward the refill request to us. Please allow 3 business days for your refill to be completed.          Additional Information About Your Visit        ShopTutors Information     ShopTutors gives you secure access to your electronic health record. If you see a primary care provider, you can also send messages to your care team and make appointments. If you have questions, please call your primary care clinic.  If you do not have a primary care provider, please call [phone] and they will assist you.        Care EveryWhere ID     This is your Care EveryWhere ID. This could be used by other organizations to access your [place] medical records  [id number]        Your Vitals Were     Pulse Height Pulse Oximetry BMI (Body Mass Index)          74 1.67 m (5' 5.75\") 93% 38.45 kg/m2         Blood Pressure from Last 3 Encounters:   08/08/18 138/86   07/23/18 138/83   06/25/18 131/82    Weight from Last 3 Encounters:   08/08/18 107.2 kg (236 lb 6.4 oz)   07/23/18 106.5 kg (234 lb 12.6 oz)   06/25/18 104.8 kg (231 lb 0.7 oz)              We Performed the Following     SLEEP EVALUATION & MANAGEMENT REFERRAL - ADULT " -[facility]  [phone] (Age 2 and up)          Today's Medication Changes          These changes are accurate as of 8/8/18  1:50 PM.  If you have any questions, ask your nurse or doctor.               Start taking these medicines.        Dose/Directions    zolpidem 5 MG tablet   Commonly known as:  AMBIEN   Used for:  Severe persistent asthma without complication, Witnessed episode of apnea, Gasping for breath, Snoring, Nocturnal cough   Started by:  Maycol Galeana MD        Take tablet by mouth 15 minutes prior to sleep, for Sleep Study   Quantity:  1 tablet   Refills:  0            Where to get your medicines      Some of these will need a paper prescription and others can be bought over the counter.  Ask your nurse if you have questions.     Bring a paper prescription for each of these medications     zolpidem 5 MG tablet                Primary Care Provider Office Phone # Fax #    Kiara Zazueta PA-C [phone] [address] [place] [address]        Equal Access to Services     KEKE MONTEJO AH: Hadii titus ku hadasho Soomaali, waaxda luqadaha, qaybta kaalmada adeegyada, waxay mikein toshia green . So [facility] [phone].    ATENCIÓN: Si habla español, tiene a arriaza disposición servicios gratuitos de asistencia lingüística. Llame al [phone].    We comply with applicable federal civil rights laws and [place] laws. We do not discriminate on the basis of race, color, national origin, age, disability, sex, sexual orientation, or gender identity.            Thank you!     Thank you for choosing [facility]  for your care. Our goal is always to provide you with excellent care. Hearing back from our patients is one way we can continue to improve our services. Please take a few minutes to complete the written survey that you may receive in the mail after your visit with us. Thank you!             Your Updated Medication List -  Protect others around you: Learn how to safely use, store and throw away your medicines at www.disposemymeds.org.          This list is accurate as of 8/8/18  1:50 PM.  Always use your most recent med list.                   Brand Name Dispense Instructions for use Diagnosis    ACCU-CHEK MARILUZ PLUS test strip   Generic drug:  blood glucose monitoring           * albuterol 108 (90 Base) MCG/ACT Inhaler    PROAIR HFA/PROVENTIL HFA/VENTOLIN HFA    1 Inhaler    Inhale 2-4 puffs into the lungs every 4 hours as needed for shortness of breath / dyspnea or wheezing    Uncomplicated severe persistent asthma       * albuterol (2.5 MG/3ML) 0.083% neb solution     50 vial    Take 1 vial (2.5 mg) by nebulization every 4 hours as needed for shortness of breath / dyspnea, wheezing or other (persistent cough)    Uncomplicated severe persistent asthma       aspirin 81 MG EC tablet      Take 81 mg by mouth daily        azelastine 0.1 % spray    ASTELIN    30 mL    Spray 2 sprays into both nostrils 2 times daily as needed for rhinitis or allergies    Postnasal drip       blood glucose monitoring meter device kit           clobetasol 0.05 % cream    TEMOVATE    30 g    Apply sparingly to affected area twice weekly as needed.  Do not apply to face.    Lichen sclerosus       diltiazem 120 MG 24 hr capsule    CARDIZEM CD    30 capsule    Take 1 capsule (120 mg) by mouth daily    Benign essential hypertension       fluticasone-vilanterol 200-25 MCG/INH oral inhaler    BREO ELLIPTA    1 Inhaler    Inhale 1 puff into the lungs daily    Uncomplicated severe persistent asthma       nebulizer/tubing/mouthpiece Kit     1 kit    Use with albuterol neb solution    Severe persistent asthma with (acute) exacerbation       ranitidine 150 MG tablet    ZANTAC    60 tablet    1 tab daily but if further symptoms ok to take up to 2 tabs daily.    Cough, Heartburn       Tiotropium Bromide Monohydrate 1.25 MCG/ACT Aers     1 Inhaler    Inhale 2 puffs into  the lungs daily    Uncomplicated severe persistent asthma       zolpidem 5 MG tablet    AMBIEN    1 tablet    Take tablet by mouth 15 minutes prior to sleep, for Sleep Study    Severe persistent asthma without complication, Witnessed episode of apnea, Gasping for breath, Snoring, Nocturnal cough       * Notice:  This list has 2 medication(s) that are the same as other medications prescribed for you. Read the directions carefully, and ask your doctor or other care provider to review them with you.

## 2018-08-15 NOTE — ADDENDUM NOTE
Encounter addended by: Tana Soliz, PT on: 8/15/2018  2:24 PM<BR>     Actions taken: Pend clinical note, Flowsheet accepted, Episode resolved, Sign clinical note

## 2018-08-15 NOTE — PROGRESS NOTES
"PHYSICAL THERAPY DISCHARGE SUMMARY    DATE:  8/15/2018  NAME:  Luz Elena Cristina           MR#:  [id number]  YOB: 1955  Diagnosis:   Left Knee Pain  Referring Physician:  Hillary Zacarias DO    Patient was seen from 6/19/18 to 7/2/18.    Session Number: 3/8 (CLEO, EZRA)      Subjective Report: Did a lot of stairs this week, out of necessity and this was full step height.  Using 3# wts for elbow flexion/extension, without difficulty or pain.       Objective Measurements:  Objective Measure: Pain (L) knee  Details: Achiness, sore after first flight of full stairs. \"In general, I have no pain.\"   Objective Measure: Flexion ROM  Details: 0-134 AAROM (B), 0-144* (B) PROM  Objective Measure: Stairs  Details: Full flight and reciprocal pattern  Objective Measure: LEFS  Details: 71/80           Goals:    Goal Identifier STG   Goal Description 1)Pt will report being able to bend knee for ADL's with feeling of stiffness, in 2 weeks. Met: no pain with full squat.   Target Date 07/03/18   Date Met  07/02/18   Progress:     Goal Identifier STG   Goal Description 2)Pt will be able to walk with reciprocal pattern and 1/10 pain on stairs, in 2 weeks. Met: had to do many stairs to help out a friend this weekend, \"no problem\" with reciprocal pattern.    Target Date 07/03/18   Date Met  07/02/18   Progress:     Goal Identifier STG   Goal Description 3)Pt will walk on uneven surfaces without feeling of falling in 4 weeks.  Met: \"Okay on regular grass,\" but has not walked in her 'field' by her house as S.O. says too uneven for her to try yet.    Target Date 07/17/18   Date Met  07/02/18   Progress:     Goal Identifier LTG   Goal Description 4)Pt will be able to golf 9 holes in 8 weeks.  Not Met: pt is planning to do this in the next one week. Has walked 9 hole distances, but has not swung club for 9 holes.   Target Date 08/14/18   Date Met      Progress:     Goal Identifier LTG   Goal Description 5)Pt will be indep in HEP to " prevent return of symptoms in 8 weeks.  Met; pt knows and performs home program well.    Target Date 08/14/18   Date Met  07/02/18   Progress:         Progress Toward Goals:   Progress this reporting period: Goals Met.    Plan:  Discharge from therapy.    Reason for Discharge:  Patient has met all goals.      Tana Soliz, PT, MA  #[id number]

## 2018-08-27 ENCOUNTER — HOSPITAL ENCOUNTER (OUTPATIENT)
Dept: RESPIRATORY THERAPY | Facility: CLINIC | Age: 63
Discharge: HOME | End: 2018-08-27
Attending: INTERNAL MEDICINE | Admitting: INTERNAL MEDICINE
Payer: MEDICARE

## 2018-08-27 LAB
BASE EXCESS BLDA CALC-SCNC: 1.4 MMOL/L
HCO3 BLD-SCNC: 27 MMOL/L (ref 21–28)
O2/TOTAL GAS SETTING VFR VENT: 21 %
PCO2 BLD: 44 MM HG (ref 35–45)
PH BLD: 7.39 PH (ref 7.35–7.45)
PO2 BLD: 67 MM HG (ref 80–105)

## 2018-08-27 PROCEDURE — 82803 BLOOD GASES ANY COMBINATION: CPT | Performed by: FAMILY MEDICINE

## 2018-08-27 PROCEDURE — 36600 WITHDRAWAL OF ARTERIAL BLOOD: CPT

## 2018-08-28 ENCOUNTER — THERAPY VISIT (OUTPATIENT)
Dept: SLEEP MEDICINE | Facility: CLINIC | Age: 63
End: 2018-08-28
Payer: MEDICARE

## 2018-08-28 DIAGNOSIS — J45.50 SEVERE PERSISTENT ASTHMA WITHOUT COMPLICATION (H): ICD-10-CM

## 2018-08-28 DIAGNOSIS — R06.81 WITNESSED EPISODE OF APNEA: ICD-10-CM

## 2018-08-28 DIAGNOSIS — R06.89 GASPING FOR BREATH: ICD-10-CM

## 2018-08-28 DIAGNOSIS — R05.8 NOCTURNAL COUGH: ICD-10-CM

## 2018-08-28 DIAGNOSIS — R06.83 SNORING: ICD-10-CM

## 2018-08-28 PROCEDURE — 95811 POLYSOM 6/>YRS CPAP 4/> PARM: CPT | Performed by: FAMILY MEDICINE

## 2018-08-28 NOTE — MR AVS SNAPSHOT
After Visit Summary   8/28/2018    Luz Elena Cristina    MRN: [id number]           Patient Information     Date Of Birth          1955        Visit Information        Provider Department      8/28/2018 8:00 PM SLEEP LAB, BED ONE [facility]        Today's Diagnoses     Severe persistent asthma without complication        Witnessed episode of apnea        Gasping for breath        Snoring        Nocturnal cough           Follow-ups after your visit        Your next 10 appointments already scheduled     Sep 05, 2018  9:00 AM CDT   Return Sleep Patient with Maycol Galeana MD   [facility] ([facility])    [address]  [facility] [id number] [phone]            Sep 18, 2018 10:00 AM CDT   Return Visit with Galindo Montano MD   [facility]    [address] [facility] [address] [phone]              Who to contact     If you have questions or need follow up information about today's clinic visit or your schedule please contact [facility] directly at [phone].  Normal or non-critical lab and imaging results will be communicated to you by MyChart, letter or phone within 4 business days after the clinic has received the results. If you do not hear from us within 7 days, please contact the clinic through Quietymehart or phone. If you have a critical or abnormal lab result, we will notify you by phone as soon as possible.  Submit refill requests through TapTrack or call your pharmacy and they will forward the refill request to us. Please allow 3 business days for your refill to be completed.          Additional Information About Your Visit        MyChart Information     TapTrack gives you secure access to your electronic health record. If you see a primary care provider, you can also send messages to your care team and make appointments. If you have questions, please  call your primary care clinic.  If you do not have a primary care provider, please call [phone] and they will assist you.        Care EveryWhere ID     This is your Care EveryWhere ID. This could be used by other organizations to access your [place] medical records  [id number]         Blood Pressure from Last 3 Encounters:   08/08/18 138/86   07/23/18 138/83   06/25/18 131/82    Weight from Last 3 Encounters:   08/08/18 107.2 kg (236 lb 6.4 oz)   07/23/18 106.5 kg (234 lb 12.6 oz)   06/25/18 104.8 kg (231 lb 0.7 oz)              We Performed the Following     Comprehensive Sleep Study        Primary Care Provider Office Phone # Fax #    Kiara Zazueta PA-C [phone] [address] [place] [address]        Equal Access to Services     SERENA MONTEJO : Hadii aad ku hadasho Sokathryn, waaxda luqadaha, qaybta kaalmada adeegyada, lulu green . So [facility] [place] [phone].    ATENCIÓN: Si habla español, tiene a arriaza disposición servicios gratuitos de asistencia lingüística. Michael al [phone].    We comply with applicable federal civil rights laws and [place] laws. We do not discriminate on the basis of race, color, national origin, age, disability, sex, sexual orientation, or gender identity.            Thank you!     Thank you for choosing [facility]  for your care. Our goal is always to provide you with excellent care. Hearing back from our patients is one way we can continue to improve our services. Please take a few minutes to complete the written survey that you may receive in the mail after your visit with us. Thank you!             Your Updated Medication List - Protect others around you: Learn how to safely use, store and throw away your medicines at www.disposemymeds.org.          This list is accurate as of 8/28/18 11:59 PM.  Always use your most recent med list.                   Brand Name Dispense Instructions for use Diagnosis     ACCU-CHEK MARILUZ PLUS test strip   Generic drug:  blood glucose monitoring           * albuterol 108 (90 Base) MCG/ACT inhaler    PROAIR HFA/PROVENTIL HFA/VENTOLIN HFA    1 Inhaler    Inhale 2-4 puffs into the lungs every 4 hours as needed for shortness of breath / dyspnea or wheezing    Uncomplicated severe persistent asthma       * albuterol (2.5 MG/3ML) 0.083% neb solution     50 vial    Take 1 vial (2.5 mg) by nebulization every 4 hours as needed for shortness of breath / dyspnea, wheezing or other (persistent cough)    Uncomplicated severe persistent asthma       aspirin 81 MG EC tablet      Take 81 mg by mouth daily        azelastine 0.1 % nasal spray    ASTELIN    30 mL    Spray 2 sprays into both nostrils 2 times daily as needed for rhinitis or allergies    Postnasal drip       blood glucose monitoring meter device kit           clobetasol 0.05 % cream    TEMOVATE    30 g    Apply sparingly to affected area twice weekly as needed.  Do not apply to face.    Lichen sclerosus       diltiazem 120 MG 24 hr capsule    CARDIZEM CD    30 capsule    Take 1 capsule (120 mg) by mouth daily    Benign essential hypertension       fluticasone-vilanterol 200-25 MCG/INH inhaler    BREO ELLIPTA    1 Inhaler    Inhale 1 puff into the lungs daily    Uncomplicated severe persistent asthma       nebulizer/tubing/mouthpiece Kit     1 kit    Use with albuterol neb solution    Severe persistent asthma with (acute) exacerbation       ranitidine 150 MG tablet    ZANTAC    60 tablet    1 tab daily but if further symptoms ok to take up to 2 tabs daily.    Cough, Heartburn       Tiotropium Bromide Monohydrate 1.25 MCG/ACT Aers     1 Inhaler    Inhale 2 puffs into the lungs daily    Uncomplicated severe persistent asthma       zolpidem 5 MG tablet    AMBIEN    1 tablet    Take tablet by mouth 15 minutes prior to sleep, for Sleep Study    Severe persistent asthma without complication, Witnessed episode of apnea, Gasping for breath,  Snoring, Nocturnal cough       * Notice:  This list has 2 medication(s) that are the same as other medications prescribed for you. Read the directions carefully, and ask your doctor or other care provider to review them with you.

## 2018-08-29 NOTE — PROGRESS NOTES
Completed a split night PSG per provider order.    Preliminary AHI =24.  A final therapeutic PAP pressure was achieved.    Supine REM was seen on therapeutic pressure.    Patient reports feeling refreshed in AM.

## 2018-09-04 NOTE — PROCEDURES
" SLEEP STUDY INTERPRETATION  SPLIT NIGHT STUDY      Patient: EDD PARRA  YOB: 1955  Study Date: 8/28/2018  MRN: [id number]  Referring Provider: Galindo Montano MD  Ordering Provider: Maycol Galeana MD, MPH    Indications for Polysomnography: The patient is a 63 year old female who is 5' 5\" and weighs 236.0 lbs. Her BMI is 39.3 kg/m2, [place] sleepiness scale 3, and neck circumference is 42.5 cm. Relevant medical history includes severe persistent asthma, Lichen sclerosus, hypertension, gastroesophageal reflux disease, and obesity. A diagnostic polysomnogram (PSG) was performed to evaluate for obstructive sleep apnea (NEGRA), periodic limb movements (PLMS), central sleep apnea (CSA), and/or sleep associated hypoventilation/hypoxemia. After 139.5 minutes of sleep time the patient exhibited sufficient respiratory events qualifying her for a CPAP trial which was then initiated.    Polysomnogram Data: A full night polysomnogram (PSG) recorded the standard physiologic parameters including EEG, EOG, EMG, ECG, nasal and oral airflow. Respiratory parameters of chest and abdominal movements were recorded with respiratory inductance plethysmography. Oxygen saturation was recorded by pulse oximetry. Hypopnea scoring rule used: 1B 4%    Diagnostic PSG  Sleep Architecture: Somewhat preserved sleep architecture with some sleep fragmentation and only marginally decreased sleep efficiency. REM sleep, excluding REM supine, captured.  The total recording time of the polysomnogram (PSG) was 162.0 minutes. The total sleep time was 139.5 minutes. Sleep latency was normal at 10.0 minutes without the use of a sleep aid. REM latency was 137.5 minutes. Arousal index was increased at 28.0 arousals per hour. Sleep efficiency was marginally decreased at 86.1%. Wake after sleep onset was 7.0 minutes. The patient spent 9.7% of total sleep time in Stage N1, 40.5% in Stage N2, 44.1% in Stage N3, and 5.7% in REM. There was no REM " supine captured during the diagnostic portion of the sleep study.    Respiration: Moderate, REM and supine predominant NEGRA with some sleep associated hypoxemia noted. Moderate to loud snoring recorded.    Events ? The polysomnogram revealed a presence of 0 obstructive, 0 central, and 0 mixed apneas resulting in an apnea index of 0 events per hour. There were 55 obstructive hypopneas and 0 central hypopneas resulting in an obstructive hypopnea index of 23.7 and central hypopnea index of 0 events per hour. The combined apnea/hypopnea index was 23.7 events per hour (central apnea/hypopnea index was 0 events per hour). The REM AHI was 0 events per hour. The supine AHI was 63.5 events per hour. The RERA index was 5.2 events per hour. The RDI was 28.8 events per hour.    Snoring - was reported as moderate to loud.    Respiratory rate and pattern - was notable for normal respiratory rate and pattern.    Sustained Sleep Associated Hypoventilation - Transcutaneous carbon dioxide monitoring was used, however significant hypoventilation was not suggested by oximetry, and this was not present on TCM monitoring with a maximum change from 46.6 to 53.2 mmHg and 0 minutes at or greater than 55 mmHg.    Sleep Associated Hypoxemia - (Greater than 5 minutes O2 sat at or below 88%) was present. Baseline oxygen saturation was 91.5%. Lowest oxygen saturation was 77.3%. Time spent less than or equal to 88% was 11.0 minutes. Time spent less than or equal to 89% was 17.9 minutes.     Treatment PSG  Sleep Architecture: Improved sleep architecture with sleep consolidation and normalized sleep efficiency. REM sleep, including REM supine, captured.  At 01:40:20 AM the patient was placed on PAP treatment and was titrated at pressures ranging from CPAP 4 cmH2O up to CPAP 8 cmH2O. The total recording time of the treatment portion of the study was 267.1 minutes. The total sleep time was 243.0 minutes. During the treatment portion of the study the  sleep latency was 7.0 minutes. REM latency was 71.5 minutes. Arousal index was improved and normalized at 4.2 arousals per hour. Sleep efficiency was also improved and normal at 91.0%. Wake after sleep onset was 16.5 minutes. The patient spent 2.9% of total sleep time in Stage N1, 48.8% in Stage N2, 23.7% in Stage N3, and 24.7% in REM. Time in REM supine was 28.5 minutes.     Respiration: Optimal PAP titration with final CPAP pressure of 8 cmH2O and a residual AHI of 3.9 events per hour. REM supine during this final PAP pressure captured.    The final pressure was CPAP 8 cmH2O with an AHI of 3.9 events per hour. Time in REM supine on final pressure was 28.5 minutes. This titration was considered optimal (residual AHI < 5 events per hour and including REM?supine sleep at final pressure).     Movement Activity: No abnormal periodic limb movements, REM EMG activity, nocturnal behaviors, or bruxism noted.    Periodic Limb Movements  o During the diagnostic portion of the study, there were 0 PLMs recorded. The PLM index was 0 movements per hour. The PLM Arousal Index was 0 per hour.  o During the treatment portion of the study, there were 0 PLMs recorded. The PLM index was 0 movements per hour. The PLM Arousal Index was 0 per hour.    REM EMG Activity - Excessive transient/sustained muscle activity was not present.    Nocturnal Behavior - Abnormal sleep related behaviors were not noted during/arising out of NREM / REM sleep.    Bruxism - None apparent.    Cardiac Summary: Normal sinus rhythm with some occasional PVCs noted.  During the diagnostic portion of the study, the average pulse rate was 76.3 bpm. The minimum pulse rate was 59.0 bpm while the maximum pulse rate was 95.1 bpm.    During the treatment portion of the study, the average pulse rate was 67.7 bpm. The minimum pulse rate was 57.0 bpm while the maximum pulse rate was 88.1 bpm.     Arrhythmias were not noted. Some occasional premature wide complex beats  suggestive of premature ventricular contractions (PVCs) were observed.    Assessment:     The PSG sleep study demonstrated moderate, REM and supine predominant NEGRA with some sleep associated hypoxemia noted. No hypoventilation was observed. Moderate to loud snoring was recorded during the sleep study. Of note, the lack of REM supine may have underestimated the true severity of the condition.    The treatment portion of the sleep study showed optimal PAP titration with final CPAP pressure of 8 cmH2O and a residual AHI of 3.9 events per hour. REM supine during this final PAP pressure was captured.    There was somewhat preserved sleep architecture with some sleep fragmentation and only marginally decreased sleep efficiency noted during the diagnostic portion of the sleep study. REM sleep, excluding REM supine, was captured.    The treatment portion of the sleep study showed improved sleep architecture with sleep consolidation and normalized sleep efficiency. REM sleep, including REM supine, was captured.    There were no abnormal periodic limb movements, REM EMG activity, nocturnal behaviors, or bruxism noted.    Lastly, normal sinus rhythm with some occasional PVCs was observed throughout the PSG sleep study.    Recommendations:    Treatment of NEGRA with CPAP at 8 cmH2O. Recommend clinical follow up with sleep management team, for coaching and review of effectiveness and compliance measures.    Patient may be a candidate for dental appliance through referral to Sleep Dentistry for the treatment of obstructive sleep apnea and/or socially disruptive snoring.    If devices are not acceptable or effective, patient may benefit from evaluation of possible surgical options for the treatment of obstructive sleep apnea and/or socially disruptive snoring. If she is interested, would recommend referral to specialized ENT?Sleep provider.    Advice regarding the risks of drowsy driving.    Suggest optimizing sleep schedule and  avoiding sleep deprivation.    Weight management.    Diagnostic Codes:     Obstructive Sleep Apnea G47.33    Sleep Hypoxemia G47.36     Unspecified Sleep Disturbance G47.9    8/28/2018 [facility] Sleep Study (236.0 lbs) - AHI 23.7, RDI 28.8, Supine AHI 63.5, REM AHI -, Low O2% 77.3%, Time Spent ?88% 11.0, Time Spent ?89% 17.9. Treatment was titrated to a pressure of CPAP 8 with an AHI 3.9. Time spent in REM supine at this pressure was 28.5 minutes.         ________________________________________________________   Electronically Signed By: Maycol Galeana MD, MPH (09/04/2018)         Range(%) Time in range (min)   0.0 - 88.0 11.0   0.0 - 89.0 17.9       Stage Min(mm Hg) Max(mm Hg)   Wake 46.6 52.1   NREM(1+2+3) 48.3 53.2   REM 49.6 53.0         Range(mmHg) Time in range (min)   55.0 - 100.0 -   Excluded data <20.0 & >90.0 -

## 2018-09-05 ENCOUNTER — OFFICE VISIT (OUTPATIENT)
Dept: SLEEP MEDICINE | Facility: CLINIC | Age: 63
End: 2018-09-05
Payer: MEDICARE

## 2018-09-05 VITALS
RESPIRATION RATE: 18 BRPM | SYSTOLIC BLOOD PRESSURE: 141 MMHG | HEIGHT: 66 IN | BODY MASS INDEX: 37.93 KG/M2 | WEIGHT: 236 LBS | DIASTOLIC BLOOD PRESSURE: 84 MMHG | HEART RATE: 64 BPM | OXYGEN SATURATION: 92 %

## 2018-09-05 DIAGNOSIS — G47.33 OSA (OBSTRUCTIVE SLEEP APNEA): Primary | ICD-10-CM

## 2018-09-05 DIAGNOSIS — J45.50 SEVERE PERSISTENT ASTHMA WITHOUT COMPLICATION (H): ICD-10-CM

## 2018-09-05 DIAGNOSIS — G47.34 NOCTURNAL HYPOXEMIA: ICD-10-CM

## 2018-09-05 DIAGNOSIS — R05.8 NOCTURNAL COUGH: ICD-10-CM

## 2018-09-05 PROCEDURE — 99214 OFFICE O/P EST MOD 30 MIN: CPT | Performed by: FAMILY MEDICINE

## 2018-09-05 NOTE — NURSING NOTE
"Chief Complaint   Patient presents with     Results     discuss study results.       Initial /74  Pulse 64  Resp 18  Ht 1.67 m (5' 5.75\")  Wt 107 kg (236 lb)  SpO2 92%  BMI 38.38 kg/m2 Estimated body mass index is 38.38 kg/(m^2) as calculated from the following:    Height as of this encounter: 1.67 m (5' 5.75\").    Weight as of this encounter: 107 kg (236 lb).    Medication Reconciliation: complete  Kavitha Olivo MA    "

## 2018-09-05 NOTE — PROGRESS NOTES
[facility]  Outpatient Sleep Medicine Follow Up Visit  September 5, 2018    Name: Luz Elena Cristina MRN# [id number]   Age: 63 year old YOB: 1955     Date of Follow Up Visit: September 5, 2018  Consultation is requested by: Galindo Montano MD  [address] [place] [address]  Primary care provider: Kiara Zazueta    Patient is accompanied by: Patient presents alone today.       Reason for Sleep Consult:     Luz Elena Cristina is a 63 year old female patient that presents here for a follow up evaluation after completing a PSG sleep study.         Assessment and Plan:     Summary Sleep Diagnoses:    (1) Moderate, REM and Supine Predominant NEGRA (AHI of 23.7 events per hour)  (2) Sleep Associated Hypoxemia (11.0 minutes under the SpO2 of 88%)  (3) Severe Persistent Asthma  (4) Nocturnal Cough    Summary Recommendations / Discussion:    During the initial visit, this patient had sxs, hx, and physical findings that suggested the diagnosis of a sleep disordered breathing. In addition, she had a high pre-test probability of NEGRA. Given that she had a severe asthma with nocturnal coughing spells, a portable HST sleep study was not performed and, instead, this patient underwent an in-lab split-night PSG sleep study. Based on the patient's history, clinical presentation, and physical examination, there was a reasonable level of suspicion for hypoventilation and, therefore, TCM monitoring as well as ABG studies were necessary. The PSG sleep study demonstrated  moderate, REM and supine predominant NEGRA with some sleep associated hypoxemia (AHI was 23.7 events per hour and the patient spent 11.0 minutes under the SpO2 of 88%). During this study, the patient was titrated with CPAP to an optimal final CPAP pressure of 8 cmH2O and a residual AHI of 3.9 events per hour (REM supine during this final PAP pressure was captured). The patient showed NSR with only occasional PVCs noted. Today, the PSG sleep  study results were explained and discussed. Once again, the nature and pathophysiology of NEGRA were discussed. The different treatment options for NEGRA were also reviewed and explained again today. After much discussion, the patient opted to start PAP therapy. As such, the patient will be started on CPAP at fixed pressure 8 cmH2O. PAP compliance was discussed and explained. Lifestyle recommendations including healthy dietary and exercising habits were discussed. Pt will follow up with us after having completed 6 to 8 weeks of PAP therapy.    This patient's evaluation showed elevated BP today. Education and counseling regarding elevated BP were given. The effects of elevated BP were discussed. The patient was advised to monitor his BP and keep a BP diary / log. The patient will follow up with her PCP for further management.    Visit Summary:   -CPAP at 8 cmH2O   -Follow up with us after completing 6 to 8 weeks of PAP therapy    Coding:  (G47.33) NEGRA (obstructive sleep apnea)  (primary encounter diagnosis)  (G47.34) Nocturnal hypoxemia  (J45.50) Severe persistent asthma without complication  (R05) Nocturnal cough    Counseling included a comprehensive review of diagnostic and therapeutic strategies as well as risks of inadequate therapy.    Educational materials provided in instructions. The patient was instructed to avoid driving or operating any heavy machinery when experiencing drowsiness.    All questions and concerns were addressed today. Pt agrees and understands the assessment and plan.         History of Present Illness:   Luz Elena Cristina is a 63 year old  RHD female pt with PMH of severe persistent asthma, Lichen sclerosus, hypertension, gastroesophageal reflux disease, and obesity presents for a follow up evaluation today after completing an in-lab PSG sleep study due to persistent nocturnal cough.    As previously explained, this patient has severe persistent asthma and she has been managed with Breo  200/25 mcg 1 puff qday and Spiriva Respimat qday (spacer device used). Recent lab studies include alpha 1 antitrypsin with normal results at 112 mg/dL, normal recent CBC, and normal TSH values.    During the initial visit, the patient endorsed frequent nocturnal cough waking her up from her sleep. Although the cough may be secondary to asthma as well as GERD.     During the initial visit, the patient explained that she has been using albuterol daily (used about one time a day). However, she explained that many years ago she was the last time she was seen in ED or urgent care due to asthma exacerbation.    During the first visit, this patient reported daily nocturnal loud snoring accompanied by gasping / choking for air with also witnessed apneas (snoring can be heard from a different room). The patient also endorsed non-restorative sleep with sleep inertia. He denied any EDS with no inadvertent naps. This patient admitted having some xerostomia. Pt reported nocturnal coughing spells that wakes her up throughout the night (this occurs about 3 times a week). Pt reported GERD sxs at night time and ranitidine helps with the condition. The patient denied any morning cephalgia, morning myalgias, CP, SOB, N/V, diarrhea, nocturia, positional dyspnea, or orthopnea. The patient sleeps with one pillow under her head with elevation of the head of the bed. Lastly, this patient denied any drowsiness when driving with no near accidents.     The patient completed an in-lab PSG sleep study on 08/28/2018 and this demonstrated moderate, REM and supine predominant NEGRA with some sleep associated hypoxemia (AHI was 23.7 events per hour and the patient spent 11.0 minutes under the SpO2 of 88%). During this study, the patient was titrated with CPAP to an optimal final CPAP pressure of 8 cmH2O and a residual AHI of 3.9 events per hour (REM supine during this final PAP pressure was captured). The patient showed NSR with only occasional PVCs  noted.    Today, the patient denies any new sxs or concerns.    PREVIOUS IN- LAB or HOME SLEEP STUDIES: None Reported    SLEEP-WAKE SCHEDULE:     Luz Elena Cristina      -Describes herself as a night person; prefers to go to sleep at 11:00 PM and wakes up at 7:00 AM.      -The patient takes no naps during the week / weekend; takes no inadvertent naps.      -ON WEEKDAYS, goes to sleep at 11:00 PM during the week; awakens 5:30 AM with an alarm; falls asleep in 5 minutes; denies difficulty falling asleep.      -ON WEEKENDS, goes to sleep at 11:00 PM and wakes up at 7:00 AM with an alarm; falls asleep in 5 minutes.        -Awakens 1-2 times a night for 5 minutes before falling back to sleep; awakens to go to the bathroom.      BEDTIME ACTIVITIES AND SHIFT WORK:    Luz Elena Cristina     -Bedtime Activities and Other Sleeping Information: Pt lives with . Pt sleeps with . Pt sleeps on her sides. No electronics or other activities besides sleeping done in bed.     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)     SCALES        -SLEEP APNEA: Stopbang score: 7 / 8        -SLEEPINESS: [place] sleepiness scale (ESS): 3 / 24 (initial visit)    Drowsy driving / near accidents: Denies any near accidents    Consequences: None Reported    SLEEP COMPLAINTS:  Cardio-respiratory     -Snoring: Significant snoring reported    -Dyspnea: Pt admits having witnessed apneas   -Morning headaches or confusion: Denies any morning cephalgia   -Coexisting Lung disease: See Above     -Coexisting Heart disease: Denies diagnosed or known cardiovascular disease at this time     -Does patient have a bed partner: Patient sleeps alone   -Has bed partner been sleeping separately because of snoring:  No            RLS Screen:    -When you try to relax in the evening or sleep at night, do you ever have unpleasant, restless feelings in your legs that can be relieved by walking or movement? None Reported     -Periodic limb movement: None  Reported    Narcolepsy:     - Denies sudden urges of sleep attacks   - Denies cataplexy   - Denies sleep paralysis    - Denies hallucinations    - Admits feeling refreshed after a nap.    Sleep Behaviors:   - Denies leg symptoms/movements   - Denies motor restlessness   - Denies night terrors   - Denies bruxism   - Denies automatic behaviors    Other Subjective Complaints:   - Denies anxiety or rumination    - Denies pain and discomfort at night   - Denies waking up with heart pounding or racing   - Denies GERD or aspiration         Parasomnia:    -NREM - Denies recurrent persistent confusional arousal, night eating, sleep walking or sleep terrors.      -REM - Denies dream enactment or injuries.          Medications:     Current Outpatient Prescriptions   Medication Sig     ACCU-CHEK MARILUZ PLUS test strip      albuterol (2.5 MG/3ML) 0.083% neb solution Take 1 vial (2.5 mg) by nebulization every 4 hours as needed for shortness of breath / dyspnea, wheezing or other (persistent cough)     albuterol (PROAIR HFA/PROVENTIL HFA/VENTOLIN HFA) 108 (90 Base) MCG/ACT Inhaler Inhale 2-4 puffs into the lungs every 4 hours as needed for shortness of breath / dyspnea or wheezing     aspirin 81 MG EC tablet Take 81 mg by mouth daily     azelastine (ASTELIN) 0.1 % spray Spray 2 sprays into both nostrils 2 times daily as needed for rhinitis or allergies (Patient not taking: Reported on 7/23/2018)     blood glucose monitoring (ACCU-CHEK MARILUZ PLUS) meter device kit      clobetasol (TEMOVATE) 0.05 % cream Apply sparingly to affected area twice weekly as needed.  Do not apply to face.     diltiazem (CARDIZEM CD) 120 MG 24 hr capsule Take 1 capsule (120 mg) by mouth daily     fluticasone-vilanterol (BREO ELLIPTA) 200-25 MCG/INH oral inhaler Inhale 1 puff into the lungs daily     ranitidine (ZANTAC) 150 MG tablet 1 tab daily but if further symptoms ok to take up to 2 tabs daily.     Respiratory Therapy Supplies  (NEBULIZER/TUBING/MOUTHPIECE) KIT Use with albuterol neb solution     Tiotropium Bromide Monohydrate 1.25 MCG/ACT AERS Inhale 2 puffs into the lungs daily     zolpidem (AMBIEN) 5 MG tablet Take tablet by mouth 15 minutes prior to sleep, for Sleep Study     No current facility-administered medications for this visit.      Allergies   Allergen Reactions     Losartan Cough     Sulfa Drugs Rash          Past Medical History:     Denies needing any 02 supplement at night.    Past Medical History:   Diagnosis Date     Hypertension      Uncomplicated asthma            Past Surgical History:    Admits previous upper airway surgery.     Past Surgical History:   Procedure Laterality Date     BIOPSY      breast,     COLONOSCOPY       GYN SURGERY       TONSILLECTOMY            Social History:     Social History   Substance Use Topics     Smoking status: Former Smoker     Packs/day: 2.00     Years: 20.00     Types: Cigarettes     Quit date: 4/28/1987     Smokeless tobacco: Never Used     Alcohol use Yes     Chemical History:     Tobacco: Quit smoking about 31 yrs ago     Uses 4 cups/day of coffee. Last caffeine intake is usually before 10 AM. One soda a day.    Supplements for wakefulness: Patient does not use any supplements to stay awake    EtOH: 1 to 2 drinks a week  Recreational Drugs: Patient denies using any recreational drugs     Psych Hx:   Current dangers to self or others: No. Pt denies any SI / HI, hallucinations, or delusions         Family History:     Family History   Problem Relation Age of Onset     Other Cancer Mother      panreatic     Diabetes Mother      Breast Cancer Sister      Skin Cancer Sister      Cerebrovascular Disease Father      CVA     Chronic Obstructive Pulmonary Disease Father      Aneurysm Paternal Grandmother      Unknown/Adopted Paternal Grandfather      Alzheimer Disease Brother      early onset     Diabetes Son      Thyroid Disease Son      Breast Cancer Paternal Aunt      Diabetes Son       "Breast Cancer Sister      Sleep Family Hx:       RLS - None reported   NEGRA - Brother with NEGRA  Insomnia - None reported  Parasomnia - None reported         Review of Systems:     A complete 10 point review of systems was negative other than HPI or as commented below:     A complete review of systems reviewed by me is negative with the exeption of what has been mentioned in the history of present illness.  CONSTITUTIONAL: NEGATIVE for weight gain/loss, fever, chills, sweats or night sweats, drug allergies.  EYES:  POSITIVE for changes in vision  ENT:  POSITIVE for  sore throat and post-nasal drip  CARDIAC:  POSITIVE for  swollen legs, swollen feet and high blood pressure  NEUROLOGIC:  POSITIVE for  headaches  DERMATOLOGIC:  POSITIVE for  rashes  PULMONARY:  POSITIVE for  SOB with activity, dry cough and productive cough  GASTROINTESTINAL: NEGATIVE for nausea or vomitting, loose or watery stools, fat or grease in stools, constipation, abdominal pain, bowel movements black in color or blood noted.  GENITOURINARY: NEGATIVE for pain during urination, blood in urine, urinating more frequently than usual, irregular menstrual periods.  MUSCULOSKELETAL: NEGATIVE for muscle pain, bone or joint pain, swollen joints.  ENDOCRINE:  POSITIVE for  increased thirst and increased urination  LYMPHATIC: NEGATIVE for swollen lymph nodes, lumps or bumps in the breasts or nipple discharge.          Physical Examination:   /84  Pulse 64  Resp 18  Ht 1.67 m (5' 5.75\")  Wt 107 kg (236 lb)  SpO2 92%  BMI 38.38 kg/m2     VS: Reviewed and elevated SBP noted.  General: Alert, oriented, not in distress. Dressed casually; Good eye contact; Comfortably sitting in a chair; in no apparent distress  Lungs: Both hemithoraces are symmetrical, normal to palpation, no dullness to percussion, auscultation of lungs revealed normal breath sounds with no expirium prolongation, wheezing, rhonci and crackles.  CVS: Normal S1 and S2 heart sounds with " no extra heart sounds. No murmur, rubs, or clicks. Normal peripheral pulses throughout with no obvious peripheral edema.  Psychiatry: Mood and affect are appropriate. Euthymic with affect congruent with full range and intensity. No SI/HI with adequate insight and judgement.          Data: All pertinent previous laboratory data reviewed     Lab Results   Component Value Date    PH 7.39 08/27/2018    PO2 67 (L) 08/27/2018    PCO2 44 08/27/2018    HCO3 27 08/27/2018    SEGUN 1.4 08/27/2018     Lab Results   Component Value Date    TSH 2.23 02/27/2018     Lab Results   Component Value Date    GLC 99 02/27/2018     (H) 04/28/2016     Lab Results   Component Value Date    HGB 15.0 07/23/2018    HGB 15.8 (H) 12/28/2016     Lab Results   Component Value Date    BUN 17 02/27/2018    BUN 18 04/28/2016    CR 0.85 02/27/2018    CR 0.85 04/28/2016     Echocardiology: None Available    Chest x-ray:    -Chest x-ray films obtained on 06/11/2018 showed minimal inferolateral opacity unchanged from prior films (likely scar or atelectasis). No obvious cardiopulmonary abnormalities noted.    PFT:    -Recent spirometry obtained on 07/23/2018 showed FVC at 80% and FEV1 at 63%.    Laboratory Studies:   Component Value Flag Ref Range Units Status Collected Lab   Immunoglobulin E 17  <=214 kU/L Final 07/23/2018  5:51      Component Value Flag Ref Range Units Status Collected Lab   Alpha 1 Antitrypsin 112  80 - 200 mg/dL Final 07/23/2018  5:51 PM 51     Component Value Flag Ref Range Units Status Collected Lab   WBC 10.5  4.0 - 11.0 10e9/L Final 07/23/2018  5:51    RBC Count 4.66  3.8 - 5.2 10e12/L Final 07/23/2018  5:51    Hemoglobin 15.0  11.7 - 15.7 g/dL Final 07/23/2018  5:51    Hematocrit 44.7  35.0 - 47.0 % Final 07/23/2018  5:51    MCV 96  78 - 100 fl Final 07/23/2018  5:51    MCH 32.2  26.5 - 33.0 pg Final 07/23/2018  5:51    MCHC 33.6  31.5 - 36.5 g/dL Final 07/23/2018  5:51    RDW  12.6  10.0 - 15.0 % Final 07/23/2018  5:51    Platelet Count 249  150 - 450 10e9/L Final 07/23/2018  5:51    Diff Method     Final 07/23/2018  5:51    Automated Method   % Neutrophils 59.1   % Final 07/23/2018  5:51    % Lymphocytes 28.1   % Final 07/23/2018  5:51    % Monocytes 10.4   % Final 07/23/2018  5:51    % Eosinophils 2.1   % Final 07/23/2018  5:51    % Basophils 0.3   % Final 07/23/2018  5:51    Absolute Neutrophil 6.2  1.6 - 8.3 10e9/L Final 07/23/2018  5:51    Absolute Lymphocytes 2.9  0.8 - 5.3 10e9/L Final 07/23/2018  5:51    Absolute Monocytes 1.1  0.0 - 1.3 10e9/L Final 07/23/2018  5:51    Absolute Eosinophils 0.2  0.0 - 0.7 10e9/L Final 07/23/2018  5:51    Absolute Basophils 0.0  0.0 - 0.2 10e9/L Final 07/23/2018  5:51      Component Value Flag Ref Range Units Status Collected Lab   TSH 2.23  0.40 - 4.00 mU/L Final 02/27/2018 12:03 PM 59     Component Value Flag Ref Range Units Status Collected Lab   Sodium 137  133 - 144 mmol/L Final 02/27/2018 12:03 PM 59   Potassium 4.5  3.4 - 5.3 mmol/L Final 02/27/2018 12:03 PM 59   Chloride 104  94 - 109 mmol/L Final 02/27/2018 12:03 PM 59   Carbon Dioxide 29  20 - 32 mmol/L Final 02/27/2018 12:03 PM 59   Anion Gap 4  3 - 14 mmol/L Final 02/27/2018 12:03 PM 59   Glucose 99  70 - 99 mg/dL Final 02/27/2018 12:03 PM 59   Comment:   Non Fasting   Urea Nitrogen 17  7 - 30 mg/dL Final 02/27/2018 12:03 PM 59   Creatinine 0.85  0.52 - 1.04 mg/dL Final 02/27/2018 12:03 PM 59   GFR Estimate 67  >60 mL/min/1.7m2 Final 02/27/2018 12:03 PM 59   Comment:   Non  GFR Calc   GFR Estimate If Black 81  >60 mL/min/1.7m2 Final 02/27/2018 12:03 PM 59   Comment:   African American GFR Calc   Calcium 9.0  8.5 - 10.1 mg/dL Final 02/27/2018 12:03 PM 59     Maycol Galeana MD, MPH  Clinical Sleep Medicine    Total time spent with patient: 40 min. Over >50% of the time was spent for face to  face counseling, education, and evaluation.

## 2018-09-05 NOTE — PATIENT INSTRUCTIONS
Your BMI is Body mass index is 38.38 kg/(m^2).  Weight management is a personal decision.  If you are interested in exploring weight loss strategies, the following discussion covers the approaches that may be successful. Body mass index (BMI) is one way to tell whether you are at a healthy weight, overweight, or obese. It measures your weight in relation to your height.  A BMI of 18.5 to 24.9 is in the healthy range. A person with a BMI of 25 to 29.9 is considered overweight, and someone with a BMI of 30 or greater is considered obese. More than two-thirds of [place] adults are considered overweight or obese.  Being overweight or obese increases the risk for further weight gain. Excess weight may lead to heart disease and diabetes.  Creating and following plans for healthy eating and physical activity may help you improve your health.  Weight control is part of healthy lifestyle and includes exercise, emotional health, and healthy eating habits. Careful eating habits lifelong are the mainstay of weight control. Though there are significant health benefits from weight loss, long-term weight loss with diet alone may be very difficult to achieve- studies show long-term success with dietary management in less than 10% of people. Attaining a healthy weight may be especially difficult to achieve in those with severe obesity. In some cases, medications, devices and surgical management might be considered.  What can you do?  If you are overweight or obese and are interested in methods for weight loss, you should discuss this with your provider.     Consider reducing daily calorie intake by 500 calories.     Keep a food journal.     Avoiding skipping meals, consider cutting portions instead.    Diet combined with exercise helps maintain muscle while optimizing fat loss. Strength training is particularly important for building and maintaining muscle mass. Exercise helps reduce stress, increase energy, and improves fitness.  Increasing exercise without diet control, however, may not burn enough calories to loose weight.       Start walking three days a week 10-20 minutes at a time    Work towards walking thirty minutes five days a week     Eventually, increase the speed of your walking for 1-2 minutes at time    In addition, we recommend that you review healthy lifestyles and methods for weight loss available through the [employer] of [employer] patient information sites:  http://win.niddk.nih.gov/publications/index.htm    And look into health and wellness programs that may be available through your health insurance provider, employer, local community center, or donavan club.    Weight management plan: Patient was referred to their PCP to discuss a diet and exercise plan.    1. CPAP-  WHAT DOES IT DO AND HOW CAN I LEARN TO WEAR IT?                               BEFORE I START, CAN I WATCH A MOVIE TO GET A PLAN ON HOW TO USE CPAP?  https://www.[employer].com/watch?v=p3J86tt586U      Continuous positive airway pressure, or CPAP, is the most effective treatment for obstructive sleep apnea. It works by blowing room air, through a mask, to hold your throat open. A decision to use CPAP is a major step forward in the pursuit of a healthier life. The successful use of CPAP will help you breathe easier, sleep better and live healthier. You can choose CPAP equipment from any durable medical equipment provider that meets your needs.  Using CPAP can be a positive experience if you keep these hua points in mind:  1. Commitment  CPAP is not a quick fix for your problem. It involves a long-term commitment to improve your sleep and your health.    2. Communication  Stay in close communication with both your sleep doctor and your CPAP supplier. Ask lots of questions and seek help when you need it.    3. Consistency  Use CPAP all night, every night and for every nap. You will receive the maximum health benefits from CPAP when you use it every time that  "you sleep. This will also make it easier for your body to adjust to the treatment.    4. Correction  The first machine and mask that you try may not be the best ones for you. Work with your sleep doctor and your CPAP supplier to make corrections to your equipment selection. Ask about trying a different type of machine or mask if you have ongoing problems. Make sure that your mask is a good fit and learn to use your equipment properly.    5. Challenge  Tell a family member or close friend to ask you each morning if you used your CPAP the previous night. Have someone to challenge you to give it your best effort.    6. Connection   Your adjustment to CPAP will be easier if you are able to connect with others who use the same treatment. Ask your sleep doctor if there is a support group in your area for people who have sleep apnea, or look for one on the Internet.  7. Comfort   Increase your level of comfort by using a saline spray, decongestant or heated humidifier if CPAP irritates your nose, mouth or throat. Use your unit's \"ramp\" setting to slowly get used to the air pressure level. There may be soft pads you can buy that will fit over your mask straps. Look on www.CPAP.com for accessories that can help make CPAP use more comfortable.  8. Cleaning   Clean your mask, tubing and headgear on a regular basis. Put this time in your schedule so that you don't forget to do it. Check and replace the filters for your CPAP unit and humidifier.    9. Completion   Although you are never finished with CPAP therapy, you should reward yourself by celebrating the completion of your first month of treatment. Expect this first month to be your hardest period of adjustment. It will involve some trial and error as you find the machine, mask and pressure settings that are right for you.    10. Continuation  After your first month of treatment, continue to make a daily commitment to use your CPAP all night, every night and for every " nap.    CPAP-Tips to starting with success:  Begin using your CPAP for short periods of time during the day while you watch TV or read.    Use CPAP every night and for every nap. Using it less often reduces the health benefits and makes it harder for your body to get used to it.    Make small adjustments to your mask, tubing, straps and headgear until you get the right fit. Tightening the mask may actually worsen the leak.  If it leaves significant marks on your face or irritates the bridge of your nose, it may not be the best mask for you.  Speak with the person who supplied the mask and consider trying other masks. Insurances will allow you to try different masks during the first month of starting CPAP.  Insurance also covers a new mask, hose and filter about every 6 months.    Use a saline nasal spray to ease mild nasal congestion. Neti-Pot or saline nasal rinses may also help. Nasal gel sprays can help reduce nasal dryness.  Biotene mouthwash can be helpful to protect your teeth if you experience frequent dry mouth.  Dry mouth may be a sign of air escaping out of your mouth or out of the mask in the case of a full face mask.  Speak with your provider if you expect that is the case.     Take a nasal decongestant to relieve more severe nasal or sinus congestion.  Do not use Afrin (oxymetazoline) nasal spray more than 3 days in a row.  Speak with your sleep doctor if your nasal congestion is chronic.    Use a heated humidifier that fits your CPAP model to enhance your breathing comfort. Adjust the heat setting up if you get a dry nose or throat, down if you get condensation in the hose or mask.  Position the CPAP lower than you so that any condensation in the hose drains back into the machine rather than towards the mask.    Try a system that uses nasal pillows if traditional masks give you problems.    Clean your mask, tubing and headgear once a week. Make sure the equipment dries fully.    Regularly check and  replace the filters for your CPAP unit and humidifier.    Work closely with your sleep provider and your CPAP supplier to make sure that you have the machine, mask and air pressure setting that works best for you. It is better to stop using it and call your provider to solve problems than to lay awake all night frustrated with the device.    Daytime naps are not advised, but use the PAP device if taking naps. Many insurances require that we prove you are using the PAP device at least 4 hours on at least 70% of nights over a 30 day period. We have 90 days to meet those criteria.    You can get new supplies (mask, hose and filter) for your device every 3-6 months (covered by insurance). You do not need to get supplies that often, but they are available if you would like them. You may exchange the mask once within the first month if you feel the initial mask does not fit well. Please, contact your medical equipment provider for equipment issues.    Please let me know if you have any snoring, daytime sleepiness, or poor sleep quality. We will want to make sure your PAP device is adequately treating your condition.    There is a website called CPAP.com that has accessories that may make CPAP use easier. Please visit it at your convenience.    Maycol Galeana MD, MPH  Clinical Sleep and Occupational / Environmental Medicine

## 2018-09-05 NOTE — MR AVS SNAPSHOT
After Visit Summary   9/5/2018    Luz Elena Cristina    MRN: [id number]           Patient Information     Date Of Birth          1955        Visit Information        Provider Department      9/5/2018 9:00 AM Maycol Galeana MD [facility]        Today's Diagnoses     NEGRA (obstructive sleep apnea)    -  1    Nocturnal hypoxemia        Severe persistent asthma without complication        Nocturnal cough          Care Instructions      Your BMI is Body mass index is 38.38 kg/(m^2).  Weight management is a personal decision.  If you are interested in exploring weight loss strategies, the following discussion covers the approaches that may be successful. Body mass index (BMI) is one way to tell whether you are at a healthy weight, overweight, or obese. It measures your weight in relation to your height.  A BMI of 18.5 to 24.9 is in the healthy range. A person with a BMI of 25 to 29.9 is considered overweight, and someone with a BMI of 30 or greater is considered obese. More than two-thirds of American adults are considered overweight or obese.  Being overweight or obese increases the risk for further weight gain. Excess weight may lead to heart disease and diabetes.  Creating and following plans for healthy eating and physical activity may help you improve your health.  Weight control is part of healthy lifestyle and includes exercise, emotional health, and healthy eating habits. Careful eating habits lifelong are the mainstay of weight control. Though there are significant health benefits from weight loss, long-term weight loss with diet alone may be very difficult to achieve- studies show long-term success with dietary management in less than 10% of people. Attaining a healthy weight may be especially difficult to achieve in those with severe obesity. In some cases, medications, devices and surgical management might be considered.  What can you do?  If you are overweight or obese and  are interested in methods for weight loss, you should discuss this with your provider.     Consider reducing daily calorie intake by 500 calories.     Keep a food journal.     Avoiding skipping meals, consider cutting portions instead.    Diet combined with exercise helps maintain muscle while optimizing fat loss. Strength training is particularly important for building and maintaining muscle mass. Exercise helps reduce stress, increase energy, and improves fitness. Increasing exercise without diet control, however, may not burn enough calories to loose weight.       Start walking three days a week 10-20 minutes at a time    Work towards walking thirty minutes five days a week     Eventually, increase the speed of your walking for 1-2 minutes at time    In addition, we recommend that you review healthy lifestyles and methods for weight loss available through the National Institutes of Health patient information sites:  http://win.niddk.nih.gov/publications/index.htm    And look into health and wellness programs that may be available through your health insurance provider, employer, local community center, or donavan club.    Weight management plan: Patient was referred to their PCP to discuss a diet and exercise plan.    1. CPAP-  WHAT DOES IT DO AND HOW CAN I LEARN TO WEAR IT?                               BEFORE I START, CAN I WATCH A MOVIE TO GET A PLAN ON HOW TO USE CPAP?  https://www.[employer].com/watch?e=p0D61lh390K      Continuous positive airway pressure, or CPAP, is the most effective treatment for obstructive sleep apnea. It works by blowing room air, through a mask, to hold your throat open. A decision to use CPAP is a major step forward in the pursuit of a healthier life. The successful use of CPAP will help you breathe easier, sleep better and live healthier. You can choose CPAP equipment from any durable medical equipment provider that meets your needs.  Using CPAP can be a positive experience if you keep  "these hua points in mind:  1. Commitment  CPAP is not a quick fix for your problem. It involves a long-term commitment to improve your sleep and your health.    2. Communication  Stay in close communication with both your sleep doctor and your CPAP supplier. Ask lots of questions and seek help when you need it.    3. Consistency  Use CPAP all night, every night and for every nap. You will receive the maximum health benefits from CPAP when you use it every time that you sleep. This will also make it easier for your body to adjust to the treatment.    4. Correction  The first machine and mask that you try may not be the best ones for you. Work with your sleep doctor and your CPAP supplier to make corrections to your equipment selection. Ask about trying a different type of machine or mask if you have ongoing problems. Make sure that your mask is a good fit and learn to use your equipment properly.    5. Challenge  Tell a family member or close friend to ask you each morning if you used your CPAP the previous night. Have someone to challenge you to give it your best effort.    6. Connection   Your adjustment to CPAP will be easier if you are able to connect with others who use the same treatment. Ask your sleep doctor if there is a support group in your area for people who have sleep apnea, or look for one on the Internet.  7. Comfort   Increase your level of comfort by using a saline spray, decongestant or heated humidifier if CPAP irritates your nose, mouth or throat. Use your unit's \"ramp\" setting to slowly get used to the air pressure level. There may be soft pads you can buy that will fit over your mask straps. Look on www.CPAP.com for accessories that can help make CPAP use more comfortable.  8. Cleaning   Clean your mask, tubing and headgear on a regular basis. Put this time in your schedule so that you don't forget to do it. Check and replace the filters for your CPAP unit and humidifier.    9. Completion "   Although you are never finished with CPAP therapy, you should reward yourself by celebrating the completion of your first month of treatment. Expect this first month to be your hardest period of adjustment. It will involve some trial and error as you find the machine, mask and pressure settings that are right for you.    10. Continuation  After your first month of treatment, continue to make a daily commitment to use your CPAP all night, every night and for every nap.    CPAP-Tips to starting with success:  Begin using your CPAP for short periods of time during the day while you watch TV or read.    Use CPAP every night and for every nap. Using it less often reduces the health benefits and makes it harder for your body to get used to it.    Make small adjustments to your mask, tubing, straps and headgear until you get the right fit. Tightening the mask may actually worsen the leak.  If it leaves significant marks on your face or irritates the bridge of your nose, it may not be the best mask for you.  Speak with the person who supplied the mask and consider trying other masks. Insurances will allow you to try different masks during the first month of starting CPAP.  Insurance also covers a new mask, hose and filter about every 6 months.    Use a saline nasal spray to ease mild nasal congestion. Neti-Pot or saline nasal rinses may also help. Nasal gel sprays can help reduce nasal dryness.  Biotene mouthwash can be helpful to protect your teeth if you experience frequent dry mouth.  Dry mouth may be a sign of air escaping out of your mouth or out of the mask in the case of a full face mask.  Speak with your provider if you expect that is the case.     Take a nasal decongestant to relieve more severe nasal or sinus congestion.  Do not use Afrin (oxymetazoline) nasal spray more than 3 days in a row.  Speak with your sleep doctor if your nasal congestion is chronic.    Use a heated humidifier that fits your CPAP model  to enhance your breathing comfort. Adjust the heat setting up if you get a dry nose or throat, down if you get condensation in the hose or mask.  Position the CPAP lower than you so that any condensation in the hose drains back into the machine rather than towards the mask.    Try a system that uses nasal pillows if traditional masks give you problems.    Clean your mask, tubing and headgear once a week. Make sure the equipment dries fully.    Regularly check and replace the filters for your CPAP unit and humidifier.    Work closely with your sleep provider and your CPAP supplier to make sure that you have the machine, mask and air pressure setting that works best for you. It is better to stop using it and call your provider to solve problems than to lay awake all night frustrated with the device.    Daytime naps are not advised, but use the PAP device if taking naps. Many insurances require that we prove you are using the PAP device at least 4 hours on at least 70% of nights over a 30 day period. We have 90 days to meet those criteria.    You can get new supplies (mask, hose and filter) for your device every 3-6 months (covered by insurance). You do not need to get supplies that often, but they are available if you would like them. You may exchange the mask once within the first month if you feel the initial mask does not fit well. Please, contact your medical equipment provider for equipment issues.    Please let me know if you have any snoring, daytime sleepiness, or poor sleep quality. We will want to make sure your PAP device is adequately treating your condition.    There is a website called CPAP.com that has accessories that may make CPAP use easier. Please visit it at your convenience.    Maycol Galeana MD, MPH  Clinical Sleep and Occupational / Environmental Medicine              Follow-ups after your visit        Your next 10 appointments already scheduled     Sep 18, 2018 10:00 AM CDT   Return Visit  "[facility] Galindo [facility]ano MD   [facility]    [address]  [facility] [address] [phone]              Who to contact     If you have questions or need follow up information about today's clinic visit or your schedule please contact [facility] directly at [phone].  Normal or non-critical lab and imaging results will be communicated to you by MyChart, letter or phone within 4 business days after the clinic has received the results. If you do not hear from us within 7 days, please contact the clinic through [facility] or phone. If you have a critical or abnormal lab result, we will notify you by phone as soon as possible.  Submit refill requests through [employer] or call your pharmacy and they will forward the refill request to us. Please allow 3 business days for your refill to be completed.          Additional Information About Your Visit        [facility] Information     [employer] gives you secure access to your electronic health record. If you see a primary care provider, you can also send messages to your care team and make appointments. If you have questions, please call your primary care clinic.  If you do not have a primary care provider, please call [phone] and they will assist you.        Care EveryWhere ID     This is your Care EveryWhere ID. This could be used by other organizations to access your [facility] medical records  [id number]        Your Vitals Were     Pulse Respirations Height Pulse Oximetry BMI (Body Mass Index)       64 18 1.67 m (5' 5.75\") 92% 38.38 kg/m2        Blood Pressure from Last 3 Encounters:   09/05/18 151/74   08/08/18 138/86   07/23/18 138/83    Weight from Last 3 Encounters:   09/05/18 107 kg (236 lb)   08/08/18 107.2 kg (236 lb 6.4 oz)   07/23/18 106.5 kg (234 lb 12.6 oz)              We Performed the Following     Comprehensive DME        Primary Care Provider Office Phone # Fax #    Kiara Zazueta, " TERE [phone]       [address] [place] [address]        Equal Access to Services     KEKE MONTEJO : Mohit titus waddell delfina Betancourt, camila nicholson, loida hartman, lulu torres. So [facility] [phone].    ATENCIÓN: Si habla español, tiene a arriaza disposición servicios gratuitos de asistencia lingüística. Llame al [phone].    We comply with applicable federal civil rights laws and [place] laws. We do not discriminate on the basis of race, color, national origin, age, disability, sex, sexual orientation, or gender identity.            Thank you!     Thank you for choosing [facility]  for your care. Our goal is always to provide you with excellent care. Hearing back from our patients is one way we can continue to improve our services. Please take a few minutes to complete the written survey that you may receive in the mail after your visit with us. Thank you!             Your Updated Medication List - Protect others around you: Learn how to safely use, store and throw away your medicines at www.disposemymeds.org.          This list is accurate as of 9/5/18  9:23 AM.  Always use your most recent med list.                   Brand Name Dispense Instructions for use Diagnosis    ACCU-CHEK MARILUZ PLUS test strip   Generic drug:  blood glucose monitoring           * albuterol 108 (90 Base) MCG/ACT inhaler    PROAIR HFA/PROVENTIL HFA/VENTOLIN HFA    1 Inhaler    Inhale 2-4 puffs into the lungs every 4 hours as needed for shortness of breath / dyspnea or wheezing    Uncomplicated severe persistent asthma       * albuterol (2.5 MG/3ML) 0.083% neb solution     50 vial    Take 1 vial (2.5 mg) by nebulization every 4 hours as needed for shortness of breath / dyspnea, wheezing or other (persistent cough)    Uncomplicated severe persistent asthma       aspirin 81 MG EC tablet      Take 81 mg by mouth daily        azelastine 0.1 % nasal spray     ASTELIN    30 mL    Spray 2 sprays into both nostrils 2 times daily as needed for rhinitis or allergies    Postnasal drip       blood glucose monitoring meter device kit           clobetasol 0.05 % cream    TEMOVATE    30 g    Apply sparingly to affected area twice weekly as needed.  Do not apply to face.    Lichen sclerosus       diltiazem 120 MG 24 hr capsule    CARDIZEM CD    30 capsule    Take 1 capsule (120 mg) by mouth daily    Benign essential hypertension       fluticasone-vilanterol 200-25 MCG/INH inhaler    BREO ELLIPTA    1 Inhaler    Inhale 1 puff into the lungs daily    Uncomplicated severe persistent asthma       nebulizer/tubing/mouthpiece Kit     1 kit    Use with albuterol neb solution    Severe persistent asthma with (acute) exacerbation       ranitidine 150 MG tablet    ZANTAC    60 tablet    1 tab daily but if further symptoms ok to take up to 2 tabs daily.    Cough, Heartburn       Tiotropium Bromide Monohydrate 1.25 MCG/ACT Aers     1 Inhaler    Inhale 2 puffs into the lungs daily    Uncomplicated severe persistent asthma       zolpidem 5 MG tablet    AMBIEN    1 tablet    Take tablet by mouth 15 minutes prior to sleep, for Sleep Study    Severe persistent asthma without complication, Witnessed episode of apnea, Gasping for breath, Snoring, Nocturnal cough       * Notice:  This list has 2 medication(s) that are the same as other medications prescribed for you. Read the directions carefully, and ask your doctor or other care provider to review them with you.

## 2018-09-06 LAB — SLPCOMP: NORMAL

## 2018-09-08 ENCOUNTER — MYC REFILL (OUTPATIENT)
Dept: FAMILY MEDICINE | Facility: CLINIC | Age: 63
End: 2018-09-08

## 2018-09-08 DIAGNOSIS — I10 BENIGN ESSENTIAL HYPERTENSION: ICD-10-CM

## 2018-09-10 RX ORDER — DILTIAZEM HYDROCHLORIDE 120 MG/1
120 CAPSULE, COATED, EXTENDED RELEASE ORAL DAILY
Qty: 30 CAPSULE | Refills: 0 | Status: SHIPPED | OUTPATIENT
Start: 2018-09-10 | End: 2018-10-09

## 2018-09-10 NOTE — TELEPHONE ENCOUNTER
BP Readings from Last 6 Encounters:   09/05/18 141/84   08/08/18 138/86   07/23/18 138/83   06/25/18 131/82   06/18/18 148/86   06/11/18 136/82

## 2018-09-10 NOTE — TELEPHONE ENCOUNTER
Message from [employer]:  Original authorizing provider: TERE Marshall would like a refill of the following medications:  diltiazem (CARDIZEM CD) 120 MG 24 hr capsule [Kiara Zazueta PA-C]    Preferred pharmacy: [id number] [address] [facility]    Comment:

## 2018-09-11 ENCOUNTER — DOCUMENTATION ONLY (OUTPATIENT)
Dept: SLEEP MEDICINE | Facility: CLINIC | Age: 63
End: 2018-09-11
Payer: MEDICARE

## 2018-09-11 NOTE — PROGRESS NOTES
Patient was offered choice of vendor and chose [facility].  Patient Luz Elena Cristina was set up at [facility]  on September 11, 2018. Patient received a Resmed AirSense 10 Auto. Pressures were set at 8 cm H2O.   Patient s ramp is 5 cm H2O for Auto and FLEX/EPR is 2.  Patient received a [employer] Mask name: DREAMWEAR  Full Face mask Size Medium, heated tubing and heated humidifier.  Patient is enrolled in the STM Program and does need to meet compliance. Patient has a follow up on 10/29/18 with Dr. Ag.    Andressa Landaverde

## 2018-09-14 ENCOUNTER — DOCUMENTATION ONLY (OUTPATIENT)
Dept: SLEEP MEDICINE | Facility: CLINIC | Age: 63
End: 2018-09-14

## 2018-09-14 NOTE — PROGRESS NOTES
3 DAY STM VISIT    Diagnostic AHI: 23.7 PSG    Patient contacted for 3 day STM visit  Subjective measures:  Pt states things are going well and has no issues or complaints.  Pt is benefiting from therapy.     Device type: CPAP  PAP settings from order::  CPAP fixed 8 cm  H20  Mask type:  Full Face Mask     Device settings from machine: CPAP fixed 8.0 cm  H20  Assessment: Nightly usage over four hours.  Action plan: Pt to have f/u 14 day STM visit.  Patient has a follow up visit scheduled:   yes within 31-90 days of set up.

## 2018-09-18 ENCOUNTER — OFFICE VISIT (OUTPATIENT)
Dept: ALLERGY | Facility: CLINIC | Age: 63
End: 2018-09-18
Payer: MEDICARE

## 2018-09-18 VITALS
TEMPERATURE: 97.4 F | BODY MASS INDEX: 38.08 KG/M2 | RESPIRATION RATE: 16 BRPM | HEART RATE: 71 BPM | SYSTOLIC BLOOD PRESSURE: 134 MMHG | DIASTOLIC BLOOD PRESSURE: 84 MMHG | WEIGHT: 234.13 LBS | OXYGEN SATURATION: 93 %

## 2018-09-18 DIAGNOSIS — Z23 NEED FOR PROPHYLACTIC VACCINATION AND INOCULATION AGAINST INFLUENZA: ICD-10-CM

## 2018-09-18 DIAGNOSIS — J45.50 UNCOMPLICATED SEVERE PERSISTENT ASTHMA (H): Primary | ICD-10-CM

## 2018-09-18 DIAGNOSIS — J31.0 OTHER CHRONIC RHINITIS: ICD-10-CM

## 2018-09-18 DIAGNOSIS — K21.9 GASTROESOPHAGEAL REFLUX DISEASE, ESOPHAGITIS PRESENCE NOT SPECIFIED: ICD-10-CM

## 2018-09-18 LAB
FEF 25/75: NORMAL
FEV-1: NORMAL
FEV1/FVC: NORMAL
FVC: NORMAL

## 2018-09-18 PROCEDURE — 90662 IIV NO PRSV INCREASED AG IM: CPT | Performed by: ALLERGY & IMMUNOLOGY

## 2018-09-18 PROCEDURE — 94010 BREATHING CAPACITY TEST: CPT | Performed by: ALLERGY & IMMUNOLOGY

## 2018-09-18 PROCEDURE — G0008 ADMIN INFLUENZA VIRUS VAC: HCPCS | Performed by: ALLERGY & IMMUNOLOGY

## 2018-09-18 PROCEDURE — 99214 OFFICE O/P EST MOD 30 MIN: CPT | Mod: 25 | Performed by: ALLERGY & IMMUNOLOGY

## 2018-09-18 RX ORDER — AZELASTINE 1 MG/ML
2 SPRAY, METERED NASAL 2 TIMES DAILY PRN
Qty: 30 ML | Refills: 3 | Status: SHIPPED | OUTPATIENT
Start: 2018-09-18 | End: 2019-02-26

## 2018-09-18 ASSESSMENT — ENCOUNTER SYMPTOMS
FEVER: 0
EYE DISCHARGE: 0
ADENOPATHY: 0
EYE ITCHING: 0
CHILLS: 0
CHEST TIGHTNESS: 0
FACIAL SWELLING: 0
JOINT SWELLING: 0
WHEEZING: 0
SHORTNESS OF BREATH: 0
EYE REDNESS: 0
ARTHRALGIAS: 0
SINUS PRESSURE: 0
VOMITING: 0
ACTIVITY CHANGE: 0
NAUSEA: 0
COUGH: 1
MYALGIAS: 0
RHINORRHEA: 1
DIARRHEA: 0
HEADACHES: 0

## 2018-09-18 NOTE — MR AVS SNAPSHOT
After Visit Summary   9/18/2018    Luz Elena Cristina    MRN: [id number]           Patient Information     Date Of Birth          1955        Visit Information        Provider Department      9/18/2018 10:00 AM Galindo Montano MD [facility]        Today's Diagnoses     Other chronic rhinitis    -  1    Uncomplicated severe persistent asthma        Gastroesophageal reflux disease, esophagitis presence not specified           Follow-ups after your visit        Follow-up notes from your care team     Return in about 3 months (around 12/28/2018), or if symptoms worsen or fail to improve.      Your next 10 appointments already scheduled     Oct 29, 2018  9:30 AM CDT   Return Sleep Patient with Reyes Ag MD   [facility] ([facility]    [address]  [facility] [id number] [phone]            Dec 28, 2018 10:00 AM CST   Return Visit with Galindo Montano MD   [facility]    [address] [facility] [address] [phone]              Who to contact     If you have questions or need follow up information about today's clinic visit or your schedule please contact [facility] directly at [phone].  Normal or non-critical lab and imaging results will be communicated to you by MyChart, letter or phone within 4 business days after the clinic has received the results. If you do not hear from us within 7 days, please contact the clinic through MyChart or phone. If you have a critical or abnormal lab result, we will notify you by phone as soon as possible.  Submit refill requests through [employer] or call your pharmacy and they will forward the refill request to us. Please allow 3 business days for your refill to be completed.          Additional Information About Your Visit        [employer] Information     [employer] gives you secure access to your electronic health record.  If you see a primary care provider, you can also send messages to your care team and make appointments. If you have questions, please call your primary care clinic.  If you do not have a primary care provider, please call [phone] and they will assist you.        Care EveryWhere ID     This is your Care EveryWhere ID. This could be used by other organizations to access your [facility] medical records  [id number]        Your Vitals Were     Pulse Temperature Respirations Pulse Oximetry BMI (Body Mass Index)       71 97.4  F (36.3  C) (Oral) 16 93% 38.08 kg/m2        Blood Pressure from Last 3 Encounters:   09/18/18 134/84   09/05/18 141/84   08/08/18 138/86    Weight from Last 3 Encounters:   09/18/18 106.2 kg (234 lb 2.1 oz)   09/05/18 107 kg (236 lb)   08/08/18 107.2 kg (236 lb 6.4 oz)              We Performed the Following     Spirometry, Breathing Capacity          Where to get your medicines      These medications were sent to [id number] [place] [id number] [facility]  [address] [place] [address]    Hours:  Closed 10-16-08 business to [facility] Phone:  [phone]     azelastine 0.1 % nasal spray    fluticasone-vilanterol 200-25 MCG/INH inhaler    Tiotropium Bromide Monohydrate 1.25 MCG/ACT Aers          Primary Care Provider Office Phone # Fax #    Kiara Zazueta PA-C [phone] [address] [facility] [address]        Equal Access to Services     [facility] GUSTABO : Hadii titus Betancourt, waaxda luqadaha, qaybta lulu pompa. So [facility] [phone].    ATENCIÓN: Si habla español, tiene a arriaza disposición servicios gratuitos de asistencia lingüística. Llame al [phone].    We comply with applicable federal civil rights laws and [place] laws. We do not discriminate on the basis of race, color, national origin, age, disability, sex, sexual orientation, or gender identity.            Thank  you!     Thank you for choosing [facility]  for your care. Our goal is always to provide you with excellent care. Hearing back from our patients is one way we can continue to improve our services. Please take a few minutes to complete the written survey that you may receive in the mail after your visit with us. Thank you!             Your Updated Medication List - Protect others around you: Learn how to safely use, store and throw away your medicines at www.disposemymeds.org.          This list is accurate as of 9/18/18 11:09 AM.  Always use your most recent med list.                   Brand Name Dispense Instructions for use Diagnosis    ACCU-CHEK MARILUZ PLUS test strip   Generic drug:  blood glucose monitoring           * albuterol 108 (90 Base) MCG/ACT inhaler    PROAIR HFA/PROVENTIL HFA/VENTOLIN HFA    1 Inhaler    Inhale 2-4 puffs into the lungs every 4 hours as needed for shortness of breath / dyspnea or wheezing    Uncomplicated severe persistent asthma       * albuterol (2.5 MG/3ML) 0.083% neb solution     50 vial    Take 1 vial (2.5 mg) by nebulization every 4 hours as needed for shortness of breath / dyspnea, wheezing or other (persistent cough)    Uncomplicated severe persistent asthma       aspirin 81 MG EC tablet      Take 81 mg by mouth daily        azelastine 0.1 % nasal spray    ASTELIN    30 mL    Spray 2 sprays into both nostrils 2 times daily as needed for rhinitis or allergies    Other chronic rhinitis       blood glucose monitoring meter device kit           clobetasol 0.05 % cream    TEMOVATE    30 g    Apply sparingly to affected area twice weekly as needed.  Do not apply to face.    Lichen sclerosus       diltiazem 120 MG 24 hr capsule    CARDIZEM CD    30 capsule    Take 1 capsule (120 mg) by mouth daily    Benign essential hypertension       fluticasone-vilanterol 200-25 MCG/INH inhaler    BREO ELLIPTA    1 Inhaler    Inhale 1 puff into the lungs daily    Uncomplicated severe  persistent asthma       nebulizer/tubing/mouthpiece Kit     1 kit    Use with albuterol neb solution    Severe persistent asthma with (acute) exacerbation       ranitidine 150 MG tablet    ZANTAC    60 tablet    1 tab daily but if further symptoms ok to take up to 2 tabs daily.    Cough, Heartburn       Tiotropium Bromide Monohydrate 1.25 MCG/ACT Aers     1 Inhaler    Inhale 2 puffs into the lungs daily    Uncomplicated severe persistent asthma       * Notice:  This list has 2 medication(s) that are the same as other medications prescribed for you. Read the directions carefully, and ask your doctor or other care provider to review them with you.

## 2018-09-18 NOTE — PROGRESS NOTES

## 2018-09-18 NOTE — LETTER
9/18/2018         RE: Luz Elena Cristina  [id number] [facility] [address]        Dear Colleague,    Thank you for referring your patient, Luz Elena Cristina, to the [facility]. Please see a copy of my visit note below.    SUBJECTIVE:                                                               Luz Elena Cristina presents today to our Allergy Clinic at [facility] for a follow up visit.  As you know, she is a 63 year old female with severe persistent asthma and chronic rhinitis.  Asthma since childhood. November 2016: Negative serum IgE to regional aeroallergen panel. CBC with differential without eosinophilia.  Normal total serum IgE.  Negative ABPA panel.  Alpha-1 antitrypsin within normal limits.  Normal CBC with differential without hypereosinophilia.  The PSG sleep study demonstrated moderate, REM, and supine predominant NEGRA with some sleep associated hypoxemia.     In regards to her asthma, she has been using Breo Ellipta 200/25 mcg 1 puff once daily, Spiriva Respimat on a daily basis and albuterol inhaler as needed.  She rarely has daytime symptoms.  She uses albuterol once-twice a week during the day.  On several occasions, she needed albuterol at night.  However, it happened before she was started on CPAP.  The PSG sleep study demonstrated moderate, REM, and supine predominant NEGRA with some sleep associated hypoxemia.  She was started on CPAP last week.  Since then, her nighttime cough and dyspnea significantly improved.  She has now more gasping episodes.  Now episodes when she would stop breathing at night.    For rhinitis, she uses azelastine on as needed basis and if it is helpful.  She has mild rhinorrhea and postnasal drip when she wakes up.  It gets better within the first hour.  She thinks it is secondary to the CPAP machine.    She could not tolerate PPIs, so she was switched to ranitidine 150 mg by mouth daily.  She tried it twice a day, but then after feeling  better, she decreased the dose back to once daily.  She admits having occasional heartburn throughout the day but not every day.    Patient Active Problem List   Diagnosis     Severe persistent asthma     Lichen sclerosus     Benign essential hypertension     Elevated glucose     Family history of diabetes mellitus     Severe obesity (BMI 35.0-39.9) with comorbidity (H)     At risk for cardiovascular event       Past Medical History:   Diagnosis Date     Hypertension      Uncomplicated asthma       Problem (# of Occurrences) Relation (Name,Age of Onset)    Alzheimer Disease (1) Brother: early onset    Aneurysm (1) Paternal Grandmother    Breast Cancer (3) Sister, Paternal Aunt, Sister (bob mccrary)    Cerebrovascular Disease (1) Father (ben felder): CVA    Chronic Obstructive Pulmonary Disease (1) Father (ben felder)    Diabetes (3) Mother (fawn felder), Son, Son (oziel barnes)    Other Cancer (1) Mother (fawn felder): panreatic    Skin Cancer (1) Sister    Thyroid Disease (1) Son    Unknown/Adopted (1) Paternal Grandfather (radha alicea)        Past Surgical History:   Procedure Laterality Date     BIOPSY      breast,     COLONOSCOPY       GYN SURGERY       TONSILLECTOMY       Social History     Social History     Marital status:      Spouse name: N/A     Number of children: N/A     Years of education: N/A     Occupational History           Retired     Social History Main Topics     Smoking status: Former Smoker     Packs/day: 2.00     Years: 20.00     Types: Cigarettes     Quit date: 4/28/1987     Smokeless tobacco: Never Used     Alcohol use Yes     Drug use: No     Sexual activity: Not Currently     Partners: Male     Birth control/ protection: Post-menopausal     Other Topics Concern     Parent/Sibling W/ Cabg, Mi Or Angioplasty Before 65f 55m? No     Social History Narrative    ENVIRONMENTAL HISTORY: [place] The home is  heated with a forced air but does not use.  Has floor heating. They do have central air conditioning. The patient's bedroom is furnished with carpeting in bedroom.  No pets inside the house. But is exposed to chicken. There is no history of cockroach or mice infestation. There are no smokers in the house.  The house does not have a damp basement.                Review of Systems   Constitutional: Negative for activity change, chills and fever.   HENT: Positive for postnasal drip, rhinorrhea and sneezing. Negative for congestion, dental problem, ear pain, facial swelling, nosebleeds and sinus pressure.    Eyes: Negative for discharge, redness and itching.   Respiratory: Positive for cough. Negative for chest tightness, shortness of breath and wheezing.    Cardiovascular: Positive for chest pain.   Gastrointestinal: Negative for diarrhea, nausea and vomiting.   Musculoskeletal: Negative for arthralgias, joint swelling and myalgias.   Skin: Negative for rash.   Neurological: Negative for headaches.   Hematological: Negative for adenopathy.   Psychiatric/Behavioral: Negative for behavioral problems and self-injury.           Current Outpatient Prescriptions:      albuterol (2.5 MG/3ML) 0.083% neb solution, Take 1 vial (2.5 mg) by nebulization every 4 hours as needed for shortness of breath / dyspnea, wheezing or other (persistent cough), Disp: 50 vial, Rfl: 1     albuterol (PROAIR HFA/PROVENTIL HFA/VENTOLIN HFA) 108 (90 Base) MCG/ACT Inhaler, Inhale 2-4 puffs into the lungs every 4 hours as needed for shortness of breath / dyspnea or wheezing, Disp: 1 Inhaler, Rfl: 3     aspirin 81 MG EC tablet, Take 81 mg by mouth daily, Disp: , Rfl:      azelastine (ASTELIN) 0.1 % nasal spray, Spray 2 sprays into both nostrils 2 times daily as needed for rhinitis or allergies, Disp: 30 mL, Rfl: 3     clobetasol (TEMOVATE) 0.05 % cream, Apply sparingly to affected area twice weekly as needed.  Do not apply to face., Disp: 30 g, Rfl:  0     diltiazem (CARDIZEM CD) 120 MG 24 hr capsule, Take 1 capsule (120 mg) by mouth daily, Disp: 30 capsule, Rfl: 0     fluticasone-vilanterol (BREO ELLIPTA) 200-25 MCG/INH inhaler, Inhale 1 puff into the lungs daily, Disp: 1 Inhaler, Rfl: 3     ranitidine (ZANTAC) 150 MG tablet, 1 tab daily but if further symptoms ok to take up to 2 tabs daily., Disp: 60 tablet, Rfl: 11     Respiratory Therapy Supplies (NEBULIZER/TUBING/MOUTHPIECE) KIT, Use with albuterol neb solution, Disp: 1 kit, Rfl: 0     Tiotropium Bromide Monohydrate 1.25 MCG/ACT AERS, Inhale 2 puffs into the lungs daily, Disp: 1 Inhaler, Rfl: 3     ACCU-CHEK MARILUZ PLUS test strip, , Disp: , Rfl: 0     blood glucose monitoring (ACCU-CHEK MARILUZ PLUS) meter device kit, , Disp: , Rfl: 0     [DISCONTINUED] Tiotropium Bromide Monohydrate 1.25 MCG/ACT AERS, Inhale 2 puffs into the lungs daily, Disp: 1 Inhaler, Rfl: 3  Immunization History   Administered Date(s) Administered     Influenza (High Dose) 3 valent vaccine 02/27/2018, 09/18/2018     Influenza (IIV3) PF 11/08/2012     Influenza Vaccine IM 3yrs+ 4 Valent IIV4 12/12/2016     Pneumo Conj 13-V (2010&after) 02/27/2018     TDAP Vaccine (Adacel) 11/08/2012     Allergies   Allergen Reactions     Losartan Cough     Sulfa Drugs Rash     OBJECTIVE:                                                                 /84 (BP Location: Left arm, Patient Position: Sitting, Cuff Size: Adult Regular)  Pulse 71  Temp 97.4  F (36.3  C) (Oral)  Resp 16  Wt 106.2 kg (234 lb 2.1 oz)  SpO2 93%  BMI 38.08 kg/m2        Physical Exam   Constitutional: No distress.   HENT:   Head: Normocephalic and atraumatic.   Right Ear: Tympanic membrane, external ear and ear canal normal.   Left Ear: Tympanic membrane, external ear and ear canal normal.   Nose: No mucosal edema or rhinorrhea.   Mouth/Throat: Oropharynx is clear and moist and mucous membranes are normal. No oropharyngeal exudate, posterior oropharyngeal edema or  posterior oropharyngeal erythema.   Eyes: Conjunctivae are normal. Right eye exhibits no discharge. Left eye exhibits no discharge.   Neck: Normal range of motion.   Cardiovascular: Normal rate, regular rhythm and normal heart sounds.    No murmur heard.  Pulmonary/Chest: Effort normal and breath sounds normal. No respiratory distress. She has no wheezes. She has no rales.   Musculoskeletal: Normal range of motion.   Neurological: She is alert.   Skin: Skin is warm. No rash noted. She is not diaphoretic.   Psychiatric: Affect normal.   Nursing note and vitals reviewed.    WORKUP:   SPIROMETRY       FVC 2.36L (76% of predicted).     FEV1 1.60L (66% of predicted).     FEV1/FVC 68%     FEF 25%-75%  1.00L/s (48% of predicted)    The office spirometry performed today suggests moderate obstruction.  Stable.      Asthma Control Test (ACT) total score: 18    ASSESSMENT/PLAN:          Visit Diagnoses     1. Uncomplicated severe persistent asthma    -  Primary  Her symptoms improved with Breo Ellipta 200/25 mcg 1 puff once daily, Spiriva Respimat on a daily basis and albuterol inhaler as needed.  Nighttime symptoms improved with CPAP.  We will follow-up nighttime symptoms further.  If she continues having them despite titrating CPAP, may consider anti-IL-5, but at this point, I consider those symptoms to be secondary to NEGRA.  -Continue as is.    Relevant Medications    fluticasone-vilanterol (BREO ELLIPTA) 200-25 MCG/INH inhaler    Tiotropium Bromide Monohydrate 1.25 MCG/ACT AERS    azelastine (ASTELIN) 0.1 % nasal spray    Other Relevant Orders    Spirometry, Breathing Capacity (Completed)    2. Other chronic rhinitis     Currently well controlled with azelastine on as needed basis.  -Continue as is.    Relevant Medications    azelastine (ASTELIN) 0.1 % nasal spray            3. Gastroesophageal reflux disease, esophagitis presence not specified     If she has frequent breakthrough symptoms, suggest taking ranitidine twice  daily.  Depending on symptom control, consider GI evaluation.    4. Need for prophylactic vaccination and inoculation against influenza        Relevant Orders    FLU VACCINE, INCREASED ANTIGEN, PRESV FREE, AGE 65+ [59244] (Completed)    ADMIN INFLUENZA (For MEDICARE Patients ONLY) [] (Completed)        Return in about 3 months (around 12/28/2018), or if symptoms worsen or fail to improve.    Thank you for allowing us to participate in the care of this patient. Please feel free to contact us if there are any questions or concerns about the patient.    Disclaimer: This note consists of symbols derived from keyboarding, dictation and/or voice recognition software. As a result, there may be errors in the script that have gone undetected. Please consider this when interpreting information found in this chart.    Galindo Montano MD, [facility]  Allergy, Asthma and Immunology  [place], [place] and [place]        Injectable Influenza Immunization Documentation    1.  Is the person to be vaccinated sick today?   No    2. Does the person to be vaccinated have an allergy to a component   of the vaccine?   No  Egg Allergy Algorithm Link    3. Has the person to be vaccinated ever had a serious reaction   to influenza vaccine in the past?   No    4. Has the person to be vaccinated ever had Guillain-Barré syndrome?   No    Form completed by Juan Alberto WAN           Again, thank you for allowing me to participate in the care of your patient.        Sincerely,        Galindo Montano MD

## 2018-09-18 NOTE — PROGRESS NOTES
SUBJECTIVE:                                                               Luz Elena Cristina presents today to our Allergy Clinic at [facility] for a follow up visit.  As you know, she is a 63 year old female with severe persistent asthma and chronic rhinitis.  Asthma since childhood. November 2016: Negative serum IgE to regional aeroallergen panel. CBC with differential without eosinophilia.  Normal total serum IgE.  Negative ABPA panel.  Alpha-1 antitrypsin within normal limits.  Normal CBC with differential without hypereosinophilia.  The PSG sleep study demonstrated moderate, REM, and supine predominant NEGRA with some sleep associated hypoxemia.     In regards to her asthma, she has been using Breo Ellipta 200/25 mcg 1 puff once daily, Spiriva Respimat on a daily basis and albuterol inhaler as needed.  She rarely has daytime symptoms.  She uses albuterol once-twice a week during the day.  On several occasions, she needed albuterol at night.  However, it happened before she was started on CPAP.  The PSG sleep study demonstrated moderate, REM, and supine predominant NEGRA with some sleep associated hypoxemia.  She was started on CPAP last week.  Since then, her nighttime cough and dyspnea significantly improved.  She has no more gasping episodes.  No episodes when she would stop breathing at night.    For rhinitis, she uses azelastine on as needed basis and if it is helpful.  She has mild rhinorrhea and postnasal drip when she wakes up.  It gets better within the first hour.  She thinks it is secondary to the CPAP machine.    She could not tolerate PPIs, so she was switched to ranitidine 150 mg by mouth daily.  She tried it twice a day, but then after feeling better, she decreased the dose back to once daily.  She admits having occasional heartburn throughout the day but not every day.    Patient Active Problem List   Diagnosis     Severe persistent asthma     Lichen sclerosus     Benign essential  hypertension     Elevated glucose     Family history of diabetes mellitus     Severe obesity (BMI 35.0-39.9) with comorbidity (H)     At risk for cardiovascular event       Past Medical History:   Diagnosis Date     Hypertension      Uncomplicated asthma       Problem (# of Occurrences) Relation (Name,Age of Onset)    Alzheimer Disease (1) Brother: early onset    Aneurysm (1) Paternal Grandmother    Breast Cancer (3) Sister, Paternal Aunt, Sister (bob mccrary)    Cerebrovascular Disease (1) Father (ben felder): CVA    Chronic Obstructive Pulmonary Disease (1) Father (ben felder)    Diabetes (3) Mother (fawn felder), Son, Son (oziel barnes)    Other Cancer (1) Mother (fawn felder): panreatic    Skin Cancer (1) Sister    Thyroid Disease (1) Son    Unknown/Adopted (1) Paternal Grandfather (radha alicea)        Past Surgical History:   Procedure Laterality Date     BIOPSY      breast,     COLONOSCOPY       GYN SURGERY       TONSILLECTOMY       Social History     Social History     Marital status:      Spouse name: N/A     Number of children: N/A     Years of education: N/A     Occupational History           Retired     Social History Main Topics     Smoking status: Former Smoker     Packs/day: 2.00     Years: 20.00     Types: Cigarettes     Quit date: 4/28/1987     Smokeless tobacco: Never Used     Alcohol use Yes     Drug use: No     Sexual activity: Not Currently     Partners: Male     Birth control/ protection: Post-menopausal     Other Topics Concern     Parent/Sibling W/ Cabg, Mi Or Angioplasty Before 65f 55m? No     Social History Narrative    ENVIRONMENTAL HISTORY: [place] The home is heated with a forced air but does not use.  Has floor heating. They do have central air conditioning. The patient's bedroom is furnished with carpeting in bedroom.  No pets inside the house. But is exposed to chicken. There is no history of  cockroach or mice infestation. There are no smokers in the house.  The house does not have a damp basement.                Review of Systems   Constitutional: Negative for activity change, chills and fever.   HENT: Positive for postnasal drip, rhinorrhea and sneezing. Negative for congestion, dental problem, ear pain, facial swelling, nosebleeds and sinus pressure.    Eyes: Negative for discharge, redness and itching.   Respiratory: Positive for cough. Negative for chest tightness, shortness of breath and wheezing.    Cardiovascular: Positive for chest pain.   Gastrointestinal: Negative for diarrhea, nausea and vomiting.   Musculoskeletal: Negative for arthralgias, joint swelling and myalgias.   Skin: Negative for rash.   Neurological: Negative for headaches.   Hematological: Negative for adenopathy.   Psychiatric/Behavioral: Negative for behavioral problems and self-injury.           Current Outpatient Prescriptions:      albuterol (2.5 MG/3ML) 0.083% neb solution, Take 1 vial (2.5 mg) by nebulization every 4 hours as needed for shortness of breath / dyspnea, wheezing or other (persistent cough), Disp: 50 vial, Rfl: 1     albuterol (PROAIR HFA/PROVENTIL HFA/VENTOLIN HFA) 108 (90 Base) MCG/ACT Inhaler, Inhale 2-4 puffs into the lungs every 4 hours as needed for shortness of breath / dyspnea or wheezing, Disp: 1 Inhaler, Rfl: 3     aspirin 81 MG EC tablet, Take 81 mg by mouth daily, Disp: , Rfl:      azelastine (ASTELIN) 0.1 % nasal spray, Spray 2 sprays into both nostrils 2 times daily as needed for rhinitis or allergies, Disp: 30 mL, Rfl: 3     clobetasol (TEMOVATE) 0.05 % cream, Apply sparingly to affected area twice weekly as needed.  Do not apply to face., Disp: 30 g, Rfl: 0     diltiazem (CARDIZEM CD) 120 MG 24 hr capsule, Take 1 capsule (120 mg) by mouth daily, Disp: 30 capsule, Rfl: 0     fluticasone-vilanterol (BREO ELLIPTA) 200-25 MCG/INH inhaler, Inhale 1 puff into the lungs daily, Disp: 1 Inhaler, Rfl:  3     ranitidine (ZANTAC) 150 MG tablet, 1 tab daily but if further symptoms ok to take up to 2 tabs daily., Disp: 60 tablet, Rfl: 11     Respiratory Therapy Supplies (NEBULIZER/TUBING/MOUTHPIECE) KIT, Use with albuterol neb solution, Disp: 1 kit, Rfl: 0     Tiotropium Bromide Monohydrate 1.25 MCG/ACT AERS, Inhale 2 puffs into the lungs daily, Disp: 1 Inhaler, Rfl: 3     ACCU-CHEK MARILUZ PLUS test strip, , Disp: , Rfl: 0     blood glucose monitoring (ACCU-CHEK MARILUZ PLUS) meter device kit, , Disp: , Rfl: 0     [DISCONTINUED] Tiotropium Bromide Monohydrate 1.25 MCG/ACT AERS, Inhale 2 puffs into the lungs daily, Disp: 1 Inhaler, Rfl: 3  Immunization History   Administered Date(s) Administered     Influenza (High Dose) 3 valent vaccine 02/27/2018, 09/18/2018     Influenza (IIV3) PF 11/08/2012     Influenza Vaccine IM 3yrs+ 4 Valent IIV4 12/12/2016     Pneumo Conj 13-V (2010&after) 02/27/2018     TDAP Vaccine (Adacel) 11/08/2012     Allergies   Allergen Reactions     Losartan Cough     Sulfa Drugs Rash     OBJECTIVE:                                                                 /84 (BP Location: Left arm, Patient Position: Sitting, Cuff Size: Adult Regular)  Pulse 71  Temp 97.4  F (36.3  C) (Oral)  Resp 16  Wt 106.2 kg (234 lb 2.1 oz)  SpO2 93%  BMI 38.08 kg/m2        Physical Exam   Constitutional: No distress.   HENT:   Head: Normocephalic and atraumatic.   Right Ear: Tympanic membrane, external ear and ear canal normal.   Left Ear: Tympanic membrane, external ear and ear canal normal.   Nose: No mucosal edema or rhinorrhea.   Mouth/Throat: Oropharynx is clear and moist and mucous membranes are normal. No oropharyngeal exudate, posterior oropharyngeal edema or posterior oropharyngeal erythema.   Eyes: Conjunctivae are normal. Right eye exhibits no discharge. Left eye exhibits no discharge.   Neck: Normal range of motion.   Cardiovascular: Normal rate, regular rhythm and normal heart sounds.    No murmur  heard.  Pulmonary/Chest: Effort normal and breath sounds normal. No respiratory distress. She has no wheezes. She has no rales.   Musculoskeletal: Normal range of motion.   Neurological: She is alert.   Skin: Skin is warm. No rash noted. She is not diaphoretic.   Psychiatric: Affect normal.   Nursing note and vitals reviewed.    WORKUP:   SPIROMETRY       FVC 2.36L (76% of predicted).     FEV1 1.60L (66% of predicted).     FEV1/FVC 68%     FEF 25%-75%  1.00L/s (48% of predicted)    The office spirometry performed today suggests moderate obstruction.  Stable.      Asthma Control Test (ACT) total score: 18    ASSESSMENT/PLAN:          Visit Diagnoses     1. Uncomplicated severe persistent asthma    -  Primary  Her symptoms improved with Breo Ellipta 200/25 mcg 1 puff once daily, Spiriva Respimat on a daily basis and albuterol inhaler as needed.  Nighttime symptoms improved with CPAP.  We will follow-up nighttime symptoms further.  If she continues having them despite titrating CPAP, may consider anti-IL-5, but at this point, I consider those symptoms to be secondary to NERGA.  -Continue as is.    Relevant Medications    fluticasone-vilanterol (BREO ELLIPTA) 200-25 MCG/INH inhaler    Tiotropium Bromide Monohydrate 1.25 MCG/ACT AERS    azelastine (ASTELIN) 0.1 % nasal spray    Other Relevant Orders    Spirometry, Breathing Capacity (Completed)    2. Other chronic rhinitis     Currently well controlled with azelastine on as needed basis.  -Continue as is.    Relevant Medications    azelastine (ASTELIN) 0.1 % nasal spray            3. Gastroesophageal reflux disease, esophagitis presence not specified     If she has frequent breakthrough symptoms, suggest taking ranitidine twice daily.  Depending on symptom control, consider GI evaluation.    4. Need for prophylactic vaccination and inoculation against influenza        Relevant Orders    FLU VACCINE, INCREASED ANTIGEN, PRESV FREE, AGE 65+ [44095] (Completed)    ADMIN  INFLUENZA (For MEDICARE Patients ONLY) [] (Completed)        Return in about 3 months (around 12/28/2018), or if symptoms worsen or fail to improve.    Thank you for allowing us to participate in the care of this patient. Please feel free to contact us if there are any questions or concerns about the patient.    Disclaimer: This note consists of symbols derived from keyboarding, dictation and/or voice recognition software. As a result, there may be errors in the script that have gone undetected. Please consider this when interpreting information found in this chart.    Galindo Montano MD, FACAAI  Allergy, Asthma and Immunology  [place], [place] and [place]

## 2018-09-20 ASSESSMENT — ASTHMA QUESTIONNAIRES: ACT_TOTALSCORE: 18

## 2018-09-26 ENCOUNTER — DOCUMENTATION ONLY (OUTPATIENT)
Dept: SLEEP MEDICINE | Facility: CLINIC | Age: 63
End: 2018-09-26
Payer: MEDICARE

## 2018-09-26 NOTE — PROGRESS NOTES
14 DAY STM VISIT    Diagnostic AHI: 23.7 PSG    Subjective measures:   Patient more awake during the day her bed partner says she is much quieter at night.     Assessment: Pt meeting objective benchmarks.  Patient meeting subjective benchmarks.   Action plan: Pt to have 30 day STM visit.    Device type: CPAP  PAP settings: CPAP fixed 8.0 cm  H20  Mask type:  Full Face Mask  Objective measures: 14 day rolling measures      Compliance  100 %      Leak  7.11 lpm  last  upload      AHI 3.05   last  upload      Average number of minutes 502     Average hours of usage 8.4          Objective measure goal  Compliance   Goal >70%  Leak   Goal < 24 lpm  AHI  Goal < 5  Usage  Goal >240

## 2018-10-09 DIAGNOSIS — I10 BENIGN ESSENTIAL HYPERTENSION: ICD-10-CM

## 2018-10-09 RX ORDER — DILTIAZEM HYDROCHLORIDE 120 MG/1
CAPSULE, EXTENDED RELEASE ORAL
Qty: 30 CAPSULE | Refills: 3 | Status: SHIPPED | OUTPATIENT
Start: 2018-10-09 | End: 2019-01-13

## 2018-10-09 NOTE — TELEPHONE ENCOUNTER
"Requested Prescriptions   Pending Prescriptions Disp Refills     CARTIA  MG 24 hr capsule [Pharmacy Med Name: CARTIA XT  MG CAP ACTA] 30 capsule 0     Sig: TAKE ONE CAPSULE BY MOUTH ONE TIME DAILY    Calcium Channel Blockers Protocol  Failed    10/9/2018 12:51 PM       Failed - Normal ALT in past 12 months    No lab results found.         Passed - Blood pressure under 140/90 in past 12 months    BP Readings from Last 3 Encounters:   09/18/18 134/84   09/05/18 141/84   08/08/18 138/86                Passed - Recent (12 mo) or future (30 days) visit within the authorizing provider's specialty    Patient had office visit in the last 12 months or has a visit in the next 30 days with authorizing provider or within the authorizing provider's specialty.  See \"Patient Info\" tab in inbasket, or \"Choose Columns\" in Meds & Orders section of the refill encounter.           Passed - Patient is age 18 or older       Passed - No active pregnancy on record       Passed - Normal serum creatinine on file in past 12 months    Recent Labs   Lab Test  02/27/18   1203   CR  0.85            Passed - No positive pregnancy test in past 12 months        Last Written Prescription Date:  9/10/18  Last Fill Quantity: 30,  # refills: 0   Last office visit: 6/11/2018 with prescribing provider:     Future Office Visit:   Next 5 appointments (look out 90 days)     Dec 28, 2018 10:00 AM CST   Return Visit with Galindo Montano MD   [facility] ([facility]    [address] [address] [phone]                   "

## 2018-10-12 ENCOUNTER — DOCUMENTATION ONLY (OUTPATIENT)
Dept: SLEEP MEDICINE | Facility: CLINIC | Age: 63
End: 2018-10-12

## 2018-10-12 NOTE — PROGRESS NOTES
30 DAY STM VISIT    Diagnostic AHI: 23.7   PSG    Subjective measures:   Pt states things are going well and has no issues or complaints.  Pt is benefiting from therapy.      Assessment: Pt meeting objective benchmarks.  Patient failing following subjective benchmarks: congestion and throat irritation.   Action plan: pt to have 6 month STM visit  Patient has scheduled a follow up visit with Dr. Ag on 10/29/2018.   Device type: CPAP  PAP settings:     CPAP fixed 8.0 cm  H20      Mask type:  Full Face Mask  Objective measures: 14 day rolling measures      Compliance  92 %      Leak  3.78 lpm  last  upload      AHI 2.41   last  upload      Average number of minutes 499      Objective measure goal  Compliance   Goal >70%  Leak   Goal < 24 lpm  AHI  Goal < 5  Usage  Goal >240

## 2018-10-29 ENCOUNTER — OFFICE VISIT (OUTPATIENT)
Dept: SLEEP MEDICINE | Facility: CLINIC | Age: 63
End: 2018-10-29
Payer: MEDICARE

## 2018-10-29 VITALS
BODY MASS INDEX: 38.38 KG/M2 | OXYGEN SATURATION: 94 % | HEART RATE: 71 BPM | DIASTOLIC BLOOD PRESSURE: 81 MMHG | HEIGHT: 66 IN | WEIGHT: 238.8 LBS | SYSTOLIC BLOOD PRESSURE: 139 MMHG

## 2018-10-29 DIAGNOSIS — G47.33 OSA (OBSTRUCTIVE SLEEP APNEA): Primary | ICD-10-CM

## 2018-10-29 PROCEDURE — 99214 OFFICE O/P EST MOD 30 MIN: CPT | Performed by: FAMILY MEDICINE

## 2018-10-29 NOTE — PROGRESS NOTES
"    Obstructive Sleep Apnea - PAP Follow-Up Visit:    Chief Complaint   Patient presents with     CPAP Follow Up     feels she wakes up more frequently after getting her c pap because it makes noise.        Luz Elena Cristina comes in today for follow-up of their moderate obstructive sleep apnea with mild sleep-associated hypoxemia, managed with CPAP.     Pertinent PMHx of HTN, obesity, severe persistent asthma, lichen sclerosus.    Last visit on 2018 with Dr. Galeana to review sleep testing, seen to have moderate NEGRA and started on treatment with CPAP 8 cm H2O.  Concerns of snoring, daytime fatigue, nocturnal cough and dyspnea.    Today, she feels the CPAP has gone well overall.  Feels less tired and nocturnal cough seems to have improved.  Previously described as a \"whole body\" cough, now seems more mild like a \"post nasal drip\" but possibly more frequent.  On review of her last visit with Dr. Montano, discussion to potential start an anti-IL-5 treatment if residual cough.    CPAP download from 2018 - 10/23/2018 on set pressure 8 cm H2O.  Average daily usage of 8 hours 18 minutes, used >= 4 hours on 100% of nights.  AHI 2.6.    Prior Sleep Testin2018 - PSG with weight 236 lbs.  AHI 23.7, RDI 28.8, baseline SpO2 91.5%, doretha SpO2 77.3%, time of SpO2 <= 88% of 11 minutes.  CPAP 8 appeared optimal.    Problem List:  Patient Active Problem List    Diagnosis Date Noted     At risk for cardiovascular event 2018     Priority: Medium     ASCVD risk 7.5%.  Offering statin.       Severe obesity (BMI 35.0-39.9) with comorbidity (H) 2018     Priority: Medium     Severe persistent asthma 2016     Priority: Medium     Symptoms since childhood. Smoked since age of 11 until 31 years.  Until seen in 2014, used albuterol once in her life.       Lichen sclerosus 2016     Priority: Medium     Benign essential hypertension 2016     Priority: Medium     Elevated glucose 2016 " "    Priority: Medium     Family history of diabetes mellitus 04/29/2016     Priority: Medium          /81  Pulse 71  Ht 1.67 m (5' 5.75\")  Wt 108.3 kg (238 lb 12.8 oz)  SpO2 94%  BMI 38.84 kg/m2    Impression/Plan:    1.)  Moderate NEGRA with mild sleep-associated hypoxemia.   - Appears well controlled with CPAP 8 cm H2O and excellent compliance.   - Seems to have had improvement in nocturnal dyspnea and overall benefit for nocturnal cough.   - Given some residual cough, will send message to Dr. Montano given discussion to potential start an anti-IL-5 treatment if residual cough.   - Otherwise, continue CPAP 8 cm H2O.     Luz Elena Cristina will follow up in about 1 year(s).     Twenty-five minutes spent with patient, all of which were spent face-to-face counseling, consulting, coordinating plan of care.      Reyes Ag MD, MD    CC:  Kiara Zazueta  "

## 2018-10-29 NOTE — PATIENT INSTRUCTIONS

## 2018-10-29 NOTE — MR AVS SNAPSHOT
After Visit Summary   10/29/2018    Luz Elena Cristina    MRN: [id number]           Patient Information     Date Of Birth          1955        Visit Information        Provider Department      10/29/2018 9:30 AM Reyes Ag MD [facility]        Today's Diagnoses     NEGRA (obstructive sleep apnea)    -  1      Care Instructions      Your BMI is Body mass index is 38.84 kg/(m^2).  Weight management is a personal decision.  If you are interested in exploring weight loss strategies, the following discussion covers the approaches that may be successful. Body mass index (BMI) is one way to tell whether you are at a healthy weight, overweight, or obese. It measures your weight in relation to your height.  A BMI of 18.5 to 24.9 is in the healthy range. A person with a BMI of 25 to 29.9 is considered overweight, and someone with a BMI of 30 or greater is considered obese. More than two-thirds of American adults are considered overweight or obese.  Being overweight or obese increases the risk for further weight gain. Excess weight may lead to heart disease and diabetes.  Creating and following plans for healthy eating and physical activity may help you improve your health.  Weight control is part of healthy lifestyle and includes exercise, emotional health, and healthy eating habits. Careful eating habits lifelong are the mainstay of weight control. Though there are significant health benefits from weight loss, long-term weight loss with diet alone may be very difficult to achieve- studies show long-term success with dietary management in less than 10% of people. Attaining a healthy weight may be especially difficult to achieve in those with severe obesity. In some cases, medications, devices and surgical management might be considered.  What can you do?  If you are overweight or obese and are interested in methods for weight loss, you should discuss this with your provider.      Consider reducing daily calorie intake by 500 calories.     Keep a food journal.     Avoiding skipping meals, consider cutting portions instead.    Diet combined with exercise helps maintain muscle while optimizing fat loss. Strength training is particularly important for building and maintaining muscle mass. Exercise helps reduce stress, increase energy, and improves fitness. Increasing exercise without diet control, however, may not burn enough calories to loose weight.       Start walking three days a week 10-20 minutes at a time    Work towards walking thirty minutes five days a week     Eventually, increase the speed of your walking for 1-2 minutes at time    In addition, we recommend that you review healthy lifestyles and methods for weight loss available through the National Institutes of Health patient information sites:  http://win.niddk.nih.gov/publications/index.htm    And look into health and wellness programs that may be available through your health insurance provider, employer, local community center, or donavan club.    Weight management plan: Patient was referred to their PCP to discuss a diet and exercise plan.         Your Body mass index is 38.84 kg/(m^2).  Weight management is a personal decision.  If you are interested in exploring weight loss strategies, the following discussion covers the approaches that may be successful. Body mass index (BMI) is one way to tell whether you are at a healthy weight, overweight, or obese. It measures your weight in relation to your height.  A BMI of 18.5 to 24.9 is in the healthy range. A person with a BMI of 25 to 29.9 is considered overweight, and someone with a BMI of 30 or greater is considered obese. More than two-thirds of [place] adults are considered overweight or obese.  Being overweight or obese increases the risk for further weight gain. Excess weight may lead to heart disease and diabetes.  Creating and following plans for healthy eating and  physical activity may help you improve your health.  Weight control is part of healthy lifestyle and includes exercise, emotional health, and healthy eating habits. Careful eating habits lifelong are the mainstay of weight control. Though there are significant health benefits from weight loss, long-term weight loss with diet alone may be very difficult to achieve- studies show long-term success with dietary management in less than 10% of people. Attaining a healthy weight may be especially difficult to achieve in those with severe obesity. In some cases, medications, devices and surgical management might be considered.  What can you do?  If you are overweight or obese and are interested in methods for weight loss, you should discuss this with your provider.     Consider reducing daily calorie intake by 500 calories.     Keep a food journal.     Avoiding skipping meals, consider cutting portions instead.    Diet combined with exercise helps maintain muscle while optimizing fat loss. Strength training is particularly important for building and maintaining muscle mass. Exercise helps reduce stress, increase energy, and improves fitness. Increasing exercise without diet control, however, may not burn enough calories to loose weight.       Start walking three days a week 10-20 minutes at a time    Work towards walking thirty minutes five days a week     Eventually, increase the speed of your walking for 1-2 minutes at time    In addition, we recommend that you review healthy lifestyles and methods for weight loss available through the National Institutes of Health patient information sites:  http://win.niddk.nih.gov/publications/index.htm    And look into health and wellness programs that may be available through your health insurance provider, employer, local community center, or donavan club.    Weight management plan: Patient was referred to their PCP to discuss a diet and exercise plan.          Follow-ups after your  "visit        Follow-up notes from your care team     Return in 1 year (on 10/29/2019) for PAP follow up.      Your next 10 appointments already scheduled     Dec 28, 2018 10:00 AM CST   Return Visit with Galindo Montano MD   [facility]    [address]  [facility] [place] [id number] [phone]              Who to contact     If you have questions or need follow up information about today's clinic visit or your schedule please contact [facility] directly at [phone].  Normal or non-critical lab and imaging results will be communicated to you by MyChart, letter or phone within 4 business days after the clinic has received the results. If you do not hear from us within 7 days, please contact the clinic through [employer] or phone. If you have a critical or abnormal lab result, we will notify you by phone as soon as possible.  Submit refill requests through [employer] or call your pharmacy and they will forward the refill request to us. Please allow 3 business days for your refill to be completed.          Additional Information About Your Visit        MyChart Information     [employer] gives you secure access to your electronic health record. If you see a primary care provider, you can also send messages to your care team and make appointments. If you have questions, please call your primary care clinic.  If you do not have a primary care provider, please call [phone] and they will assist you.        Care EveryWhere ID     This is your Care EveryWhere ID. This could be used by other organizations to access your [place] medical records  [id number]        Your Vitals Were     Pulse Height Pulse Oximetry BMI (Body Mass Index)          71 1.67 m (5' 5.75\") 94% 38.84 kg/m2         Blood Pressure from Last 3 Encounters:   10/29/18 139/81   09/18/18 134/84   09/05/18 141/84    Weight from Last 3 Encounters:   10/29/18 108.3 kg (238 lb 12.8 oz)   09/18/18 " 106.2 kg (234 lb 2.1 oz)   09/05/18 107 kg (236 lb)              Today, you had the following     No orders found for display       Primary Care Provider Office Phone # Fax #    Kiara Zazueta PA-C [phone] [address] [address]        Equal Access to Services     [facility]: Hadii aad ku hadasho Soomaali, waaxda luqadaha, qaybta kaalmada adeegyada, waxay idiin hayaan adeeg kharash la'aan . So [facility] [phone].    ATENCIÓN: Si habla español, tiene a arriaza disposición servicios gratuitos de asistencia lingüística. Michael al [phone].    We comply with applicable federal civil rights laws and [place] laws. We do not discriminate on the basis of race, color, national origin, age, disability, sex, sexual orientation, or gender identity.            Thank you!     Thank you for choosing [facility]  for your care. Our goal is always to provide you with excellent care. Hearing back from our patients is one way we can continue to improve our services. Please take a few minutes to complete the written survey that you may receive in the mail after your visit with us. Thank you!             Your Updated Medication List - Protect others around you: Learn how to safely use, store and throw away your medicines at www.disposemymeds.org.          This list is accurate as of 10/29/18  2:44 PM.  Always use your most recent med list.                   Brand Name Dispense Instructions for use Diagnosis    ACCU-CHEK MARILUZ PLUS test strip   Generic drug:  blood glucose monitoring           * albuterol 108 (90 Base) MCG/ACT inhaler    PROAIR HFA/PROVENTIL HFA/VENTOLIN HFA    1 Inhaler    Inhale 2-4 puffs into the lungs every 4 hours as needed for shortness of breath / dyspnea or wheezing    Uncomplicated severe persistent asthma       * albuterol (2.5 MG/3ML) 0.083% neb solution     50 vial    Take 1 vial (2.5 mg) by nebulization every 4 hours as needed for shortness of  breath / dyspnea, wheezing or other (persistent cough)    Uncomplicated severe persistent asthma       aspirin 81 MG EC tablet      Take 81 mg by mouth daily        azelastine 0.1 % nasal spray    ASTELIN    30 mL    Spray 2 sprays into both nostrils 2 times daily as needed for rhinitis or allergies    Other chronic rhinitis       blood glucose monitoring meter device kit           CARTIA  MG 24 hr capsule   Generic drug:  diltiazem     30 capsule    TAKE ONE CAPSULE BY MOUTH ONE TIME DAILY    Benign essential hypertension       clobetasol 0.05 % cream    TEMOVATE    30 g    Apply sparingly to affected area twice weekly as needed.  Do not apply to face.    Lichen sclerosus       fluticasone-vilanterol 200-25 MCG/INH inhaler    BREO ELLIPTA    1 Inhaler    Inhale 1 puff into the lungs daily    Uncomplicated severe persistent asthma       nebulizer/tubing/mouthpiece Kit     1 kit    Use with albuterol neb solution    Severe persistent asthma with (acute) exacerbation       ranitidine 150 MG tablet    ZANTAC    60 tablet    1 tab daily but if further symptoms ok to take up to 2 tabs daily.    Cough, Heartburn       Tiotropium Bromide Monohydrate 1.25 MCG/ACT Aers     1 Inhaler    Inhale 2 puffs into the lungs daily    Uncomplicated severe persistent asthma       * Notice:  This list has 2 medication(s) that are the same as other medications prescribed for you. Read the directions carefully, and ask your doctor or other care provider to review them with you.

## 2018-12-28 ENCOUNTER — OFFICE VISIT (OUTPATIENT)
Dept: ALLERGY | Facility: CLINIC | Age: 63
End: 2018-12-28
Payer: MEDICARE

## 2018-12-28 VITALS
WEIGHT: 238.54 LBS | OXYGEN SATURATION: 95 % | HEART RATE: 77 BPM | TEMPERATURE: 97.1 F | BODY MASS INDEX: 38.79 KG/M2 | DIASTOLIC BLOOD PRESSURE: 87 MMHG | RESPIRATION RATE: 16 BRPM | SYSTOLIC BLOOD PRESSURE: 134 MMHG

## 2018-12-28 DIAGNOSIS — J31.0 CHRONIC RHINITIS: ICD-10-CM

## 2018-12-28 DIAGNOSIS — K21.9 GASTROESOPHAGEAL REFLUX DISEASE, ESOPHAGITIS PRESENCE NOT SPECIFIED: ICD-10-CM

## 2018-12-28 DIAGNOSIS — J45.50 SEVERE PERSISTENT ASTHMA WITHOUT COMPLICATION (H): Primary | ICD-10-CM

## 2018-12-28 LAB
FEF 25/75: NORMAL
FEV-1: NORMAL
FEV1/FVC: NORMAL
FVC: NORMAL

## 2018-12-28 PROCEDURE — 99214 OFFICE O/P EST MOD 30 MIN: CPT | Mod: 25 | Performed by: ALLERGY & IMMUNOLOGY

## 2018-12-28 PROCEDURE — 94060 EVALUATION OF WHEEZING: CPT | Performed by: ALLERGY & IMMUNOLOGY

## 2018-12-28 ASSESSMENT — ENCOUNTER SYMPTOMS
DIARRHEA: 0
SINUS PRESSURE: 0
HEADACHES: 0
CHEST TIGHTNESS: 0
WHEEZING: 0
ACTIVITY CHANGE: 0
CHILLS: 0
VOMITING: 0
MYALGIAS: 0
SHORTNESS OF BREATH: 0
FEVER: 0
EYE REDNESS: 0
JOINT SWELLING: 0
EYE ITCHING: 0
ARTHRALGIAS: 0
ADENOPATHY: 0
NAUSEA: 0
FACIAL SWELLING: 0
EYE DISCHARGE: 0
RHINORRHEA: 0
COUGH: 1

## 2018-12-28 NOTE — LETTER
12/28/2018         RE: Luz Elena Cristina  [id number] [facility] [address]        Dear Colleague,    Thank you for referring your patient, Luz Elena Cristina, to the [facility]. Please see a copy of my visit note below.    SUBJECTIVE:                                                               Luz Elena Cristina presents today to our Allergy Clinic at [facility] for a follow up visit.  As you know, she is a 63 year old female with severe persistent asthma and chronic rhinitis.  Asthma since childhood. November 2016: Negative serum IgE to regional aeroallergen panel. CBC with differential without eosinophilia.  Normal total serum IgE.  Negative ABPA panel.  Alpha-1 antitrypsin within normal limits.  Normal CBC with differential without hypereosinophilia.  The PSG sleep study demonstrated moderate, REM, and supine predominant NEGRA with some sleep associated hypoxemia.  In regards to her asthma, she has been using Breo Ellipta 200/25 mcg 1 puff once daily, Spiriva Respimat on a daily basis and albuterol inhaler as needed. She uses albuterol once or twice a week cough. She states she has chest tightness and dyspnea once a week. She is on CPAP and sleeps well. She is waking up less than twice per month due to chest symptoms.  There has been no use of oral steroids since the last visit. No ED/PCP/urgent care/other specialist visits for asthma flare since the last visit. The patient denies current chest tightness, cough, wheeze or shortness of breath.       For rhinitis, she uses azelastine on as needed basis and if it is helpful.  She has mild rhinorrhea and postnasal drip when she wakes up.  It gets better within the first hour.  She thinks it is secondary to the CPAP machine. Azelastine helps.    She takes ranitidine twice daily, but she still had GERD symptoms. PPIs caused diarrhea.    Patient Active Problem List   Diagnosis     Severe persistent asthma     Lichen sclerosus      Benign essential hypertension     Elevated glucose     Family history of diabetes mellitus     Severe obesity (BMI 35.0-39.9) with comorbidity (H)     At risk for cardiovascular event       Past Medical History:   Diagnosis Date     Hypertension      Uncomplicated asthma       Problem (# of Occurrences) Relation (Name,Age of Onset)    Alzheimer Disease (1) Brother: early onset    Aneurysm (1) Paternal Grandmother    Breast Cancer (3) Sister, Paternal Aunt, Sister (bob mccrary)    Cerebrovascular Disease (1) Father (ben felder): CVA    Chronic Obstructive Pulmonary Disease (1) Father (ben felder)    Diabetes (3) Mother (fawn felder), Son, Son (oziel barnes)    Other Cancer (1) Mother (fawn felder): panreatic    Skin Cancer (1) Sister    Thyroid Disease (1) Son    Unknown/Adopted (1) Paternal Grandfather (radha alicea)        Past Surgical History:   Procedure Laterality Date     BIOPSY      breast,     COLONOSCOPY       GYN SURGERY       TONSILLECTOMY       Social History     Socioeconomic History     Marital status:      Spouse name: None     Number of children: None     Years of education: None     Highest education level: None   Social Needs     Financial resource strain: None     Food insecurity - worry: None     Food insecurity - inability: None     Transportation needs - medical: None     Transportation needs - non-medical: None   Occupational History     Comment: Retired   Tobacco Use     Smoking status: Former Smoker     Packs/day: 2.00     Years: 20.00     Pack years: 40.00     Types: Cigarettes     Last attempt to quit: 1987     Years since quittin.6     Smokeless tobacco: Never Used   Substance and Sexual Activity     Alcohol use: Yes     Drug use: No     Sexual activity: Not Currently     Partners: Male     Birth control/protection: Post-menopausal   Other Topics Concern     Parent/sibling w/ CABG, MI or angioplasty before 65F 55M? No   Social  History Narrative    ENVIRONMENTAL HISTORY: [place] The home is heated with a forced air but does not use.  Has floor heating. They do have central air conditioning. The patient's bedroom is furnished with carpeting in bedroom.  No pets inside the house. But is exposed to chicken. There is no history of cockroach or mice infestation. There are no smokers in the house.  The house does not have a damp basement.            Review of Systems   Constitutional: Negative for activity change, chills and fever.   HENT: Positive for postnasal drip and sneezing. Negative for congestion, dental problem, ear pain, facial swelling, nosebleeds, rhinorrhea and sinus pressure.    Eyes: Negative for discharge, redness and itching.   Respiratory: Positive for cough. Negative for chest tightness, shortness of breath and wheezing.    Cardiovascular: Negative for chest pain.   Gastrointestinal: Negative for diarrhea, nausea and vomiting.   Musculoskeletal: Negative for arthralgias, joint swelling and myalgias.   Skin: Negative for rash.   Neurological: Negative for headaches.   Hematological: Negative for adenopathy.   Psychiatric/Behavioral: Negative for behavioral problems and self-injury.           Current Outpatient Medications:      albuterol (PROAIR HFA/PROVENTIL HFA/VENTOLIN HFA) 108 (90 Base) MCG/ACT Inhaler, Inhale 2-4 puffs into the lungs every 4 hours as needed for shortness of breath / dyspnea or wheezing, Disp: 1 Inhaler, Rfl: 3     azelastine (ASTELIN) 0.1 % nasal spray, Spray 2 sprays into both nostrils 2 times daily as needed for rhinitis or allergies, Disp: 30 mL, Rfl: 3     CARTIA  MG 24 hr capsule, TAKE ONE CAPSULE BY MOUTH ONE TIME DAILY, Disp: 30 capsule, Rfl: 3     fluticasone-vilanterol (BREO ELLIPTA) 200-25 MCG/INH inhaler, Inhale 1 puff into the lungs daily, Disp: 1 Inhaler, Rfl: 3     ranitidine (ZANTAC) 150 MG tablet, 1 tab daily but if further symptoms ok to  take up to 2 tabs daily., Disp: 60 tablet, Rfl: 11     Tiotropium Bromide Monohydrate 1.25 MCG/ACT AERS, Inhale 2 puffs into the lungs daily, Disp: 1 Inhaler, Rfl: 3     ACCU-CHEK MARILUZ PLUS test strip, , Disp: , Rfl: 0     albuterol (2.5 MG/3ML) 0.083% neb solution, Take 1 vial (2.5 mg) by nebulization every 4 hours as needed for shortness of breath / dyspnea, wheezing or other (persistent cough) (Patient not taking: Reported on 12/28/2018), Disp: 50 vial, Rfl: 1     aspirin 81 MG EC tablet, Take 81 mg by mouth daily, Disp: , Rfl:      blood glucose monitoring (ACCU-CHEK MARILUZ PLUS) meter device kit, , Disp: , Rfl: 0     clobetasol (TEMOVATE) 0.05 % cream, Apply sparingly to affected area twice weekly as needed.  Do not apply to face. (Patient not taking: Reported on 12/28/2018), Disp: 30 g, Rfl: 0     Respiratory Therapy Supplies (NEBULIZER/TUBING/MOUTHPIECE) KIT, Use with albuterol neb solution (Patient not taking: Reported on 12/28/2018), Disp: 1 kit, Rfl: 0  Immunization History   Administered Date(s) Administered     Influenza (High Dose) 3 valent vaccine 02/27/2018, 09/18/2018     Influenza (IIV3) PF 11/08/2012     Influenza Vaccine IM 3yrs+ 4 Valent IIV4 12/12/2016     Pneumo Conj 13-V (2010&after) 02/27/2018     TDAP Vaccine (Adacel) 11/08/2012     Allergies   Allergen Reactions     Losartan Cough     Sulfa Drugs Rash     OBJECTIVE:                                                                 /87 (BP Location: Left arm, Patient Position: Sitting, Cuff Size: Adult Regular)   Pulse 77   Temp 97.1  F (36.2  C) (Oral)   Resp 16   Wt 108.2 kg (238 lb 8.6 oz)   SpO2 95%   BMI 38.79 kg/m           Physical Exam   Constitutional: She is oriented to person, place, and time. No distress.   HENT:   Head: Normocephalic and atraumatic.   Right Ear: Tympanic membrane, external ear and ear canal normal.   Left Ear: Tympanic membrane, external ear and ear canal normal.   Nose: No mucosal edema or rhinorrhea.    Mouth/Throat: Oropharynx is clear and moist and mucous membranes are normal. No oropharyngeal exudate, posterior oropharyngeal edema or posterior oropharyngeal erythema.   Eyes: Conjunctivae are normal. Right eye exhibits no discharge. Left eye exhibits no discharge.   Neck: Normal range of motion.   Cardiovascular: Normal rate, regular rhythm and normal heart sounds.   No murmur heard.  Pulmonary/Chest: Effort normal and breath sounds normal. No respiratory distress. She has no wheezes. She has no rales.   Musculoskeletal: Normal range of motion.   Lymphadenopathy:     She has no cervical adenopathy.   Neurological: She is alert and oriented to person, place, and time.   Skin: Skin is warm. No rash noted. She is not diaphoretic.   Psychiatric: She has a normal mood and affect.   Nursing note and vitals reviewed.      WORKUP:   SPIROMETRY  initial FVC 2.56L (76% of predicted); after bronchodilator 2.65L (+4% change)   initial FEV1 1.49L (58% of predicted); after bronchodilator 1.52L (+2% change)  initial FEV1/FVC 58 %; after bronchodilator 57%  initial FEF 25%-75% 0.69L/s (31% of predicted); after bronchodilator 0.70L/s (+2% change)        The office spirometry performed today suggests moderate obstruction.  There is gradual decrease in FEV1 compared with the previous studies.  No reversibility after nebulized albuterol.      Asthma Control Test (ACT) total score: 21       ASSESSMENT/PLAN:      Problem List Items Addressed This Visit        Respiratory    1. Severe persistent asthma - Primary  There is a decrease in FEV1; however, we also switched to a new spirometer, and that could be the factor.  In the past, the patient has reversibility in FEV1 after bronchodilator, and there is a lack of reversibility on today's study. The patient is asymptomatic.  -Decided not to treat the number at this point.  -Continue Breo Ellipta 200/25 mcg 1 puff once daily, Spiriva Respimat on a daily basis, and albuterol inhaler as  needed.  We will reevaluate symptoms and the office spirometry in 2 months.      Relevant Orders    ALBUTEROL UNIT DOSE, 1 MG (Completed)    BRONCHODILATION RESPONSE, PRE/POST ADMIN      Other Visit Diagnoses     2. Chronic rhinitis      Currently well controlled with azelastine on as needed basis.  -Continue as is.    3. Gastroesophageal reflux disease, esophagitis presence not specified        Relevant Orders    GASTROENTEROLOGY ADULT REF CONSULT ONLY       Return in about 3 months (around 3/26/2019), or if symptoms worsen or fail to improve.    Thank you for allowing us to participate in the care of this patient. Please feel free to contact us if there are any questions or concerns about the patient.    Disclaimer: This note consists of symbols derived from keyboarding, dictation and/or voice recognition software. As a result, there may be errors in the script that have gone undetected. Please consider this when interpreting information found in this chart.    Galindo Montano MD, [facility]  Allergy, Asthma and Immunology  [place], [place] and [place]      Again, thank you for allowing me to participate in the care of your patient.        Sincerely,        Galindo Montano MD

## 2018-12-28 NOTE — PROGRESS NOTES
SUBJECTIVE:                                                               Luz Elena Cristina presents today to our Allergy Clinic at [facility] for a follow up visit.  As you know, she is a 63 year old female with severe persistent asthma and chronic rhinitis.  Asthma since childhood. November 2016: Negative serum IgE to regional aeroallergen panel. CBC with differential without eosinophilia.  Normal total serum IgE.  Negative ABPA panel.  Alpha-1 antitrypsin within normal limits.  Normal CBC with differential without hypereosinophilia.  The PSG sleep study demonstrated moderate, REM, and supine predominant NEGRA with some sleep associated hypoxemia.  In regards to her asthma, she has been using Breo Ellipta 200/25 mcg 1 puff once daily, Spiriva Respimat on a daily basis and albuterol inhaler as needed. She uses albuterol once or twice a week cough. She states she has chest tightness and dyspnea once a week. She is on CPAP and sleeps well. She is waking up less than twice per month due to chest symptoms.  There has been no use of oral steroids since the last visit. No ED/PCP/urgent care/other specialist visits for asthma flare since the last visit. The patient denies current chest tightness, cough, wheeze or shortness of breath.       For rhinitis, she uses azelastine on as needed basis and if it is helpful.  She has mild rhinorrhea and postnasal drip when she wakes up.  It gets better within the first hour.  She thinks it is secondary to the CPAP machine. Azelastine helps.    She takes ranitidine twice daily, but she still had GERD symptoms. PPIs caused diarrhea.    Patient Active Problem List   Diagnosis     Severe persistent asthma     Lichen sclerosus     Benign essential hypertension     Elevated glucose     Family history of diabetes mellitus     Severe obesity (BMI 35.0-39.9) with comorbidity (H)     At risk for cardiovascular event       Past Medical History:   Diagnosis Date     Hypertension       Uncomplicated asthma       Problem (# of Occurrences) Relation (Name,Age of Onset)    Alzheimer Disease (1) Brother: early onset    Aneurysm (1) Paternal Grandmother    Breast Cancer (3) Sister, Paternal Aunt, Sister (bob mccrary)    Cerebrovascular Disease (1) Father (ben felder): CVA    Chronic Obstructive Pulmonary Disease (1) Father (ben felder)    Diabetes (3) Mother (fawn felder), Son, Son (oziel barnes)    Other Cancer (1) Mother (fawn felder): panreatic    Skin Cancer (1) Sister    Thyroid Disease (1) Son    Unknown/Adopted (1) Paternal Grandfather (radha alicea)        Past Surgical History:   Procedure Laterality Date     BIOPSY      breast,     COLONOSCOPY       GYN SURGERY       TONSILLECTOMY       Social History     Socioeconomic History     Marital status:      Spouse name: None     Number of children: None     Years of education: None     Highest education level: None   Social Needs     Financial resource strain: None     Food insecurity - worry: None     Food insecurity - inability: None     Transportation needs - medical: None     Transportation needs - non-medical: None   Occupational History     Comment: Retired   Tobacco Use     Smoking status: Former Smoker     Packs/day: 2.00     Years: 20.00     Pack years: 40.00     Types: Cigarettes     Last attempt to quit: 1987     Years since quittin.6     Smokeless tobacco: Never Used   Substance and Sexual Activity     Alcohol use: Yes     Drug use: No     Sexual activity: Not Currently     Partners: Male     Birth control/protection: Post-menopausal   Other Topics Concern     Parent/sibling w/ CABG, MI or angioplasty before 65F 55M? No   Social History Narrative    ENVIRONMENTAL HISTORY: [place] The home is heated with a forced air but does not use.  Has floor heating. They do have central air conditioning. The patient's bedroom is furnished with  carpeting in bedroom.  No pets inside the house. But is exposed to chicken. There is no history of cockroach or mice infestation. There are no smokers in the house.  The house does not have a damp basement.            Review of Systems   Constitutional: Negative for activity change, chills and fever.   HENT: Positive for postnasal drip and sneezing. Negative for congestion, dental problem, ear pain, facial swelling, nosebleeds, rhinorrhea and sinus pressure.    Eyes: Negative for discharge, redness and itching.   Respiratory: Positive for cough. Negative for chest tightness, shortness of breath and wheezing.    Cardiovascular: Negative for chest pain.   Gastrointestinal: Negative for diarrhea, nausea and vomiting.   Musculoskeletal: Negative for arthralgias, joint swelling and myalgias.   Skin: Negative for rash.   Neurological: Negative for headaches.   Hematological: Negative for adenopathy.   Psychiatric/Behavioral: Negative for behavioral problems and self-injury.           Current Outpatient Medications:      albuterol (PROAIR HFA/PROVENTIL HFA/VENTOLIN HFA) 108 (90 Base) MCG/ACT Inhaler, Inhale 2-4 puffs into the lungs every 4 hours as needed for shortness of breath / dyspnea or wheezing, Disp: 1 Inhaler, Rfl: 3     azelastine (ASTELIN) 0.1 % nasal spray, Spray 2 sprays into both nostrils 2 times daily as needed for rhinitis or allergies, Disp: 30 mL, Rfl: 3     CARTIA  MG 24 hr capsule, TAKE ONE CAPSULE BY MOUTH ONE TIME DAILY, Disp: 30 capsule, Rfl: 3     fluticasone-vilanterol (BREO ELLIPTA) 200-25 MCG/INH inhaler, Inhale 1 puff into the lungs daily, Disp: 1 Inhaler, Rfl: 3     ranitidine (ZANTAC) 150 MG tablet, 1 tab daily but if further symptoms ok to take up to 2 tabs daily., Disp: 60 tablet, Rfl: 11     Tiotropium Bromide Monohydrate 1.25 MCG/ACT AERS, Inhale 2 puffs into the lungs daily, Disp: 1 Inhaler, Rfl: 3     ACCU-CHEK MARILUZ PLUS test strip, , Disp: , Rfl: 0     albuterol (2.5 MG/3ML)  0.083% neb solution, Take 1 vial (2.5 mg) by nebulization every 4 hours as needed for shortness of breath / dyspnea, wheezing or other (persistent cough) (Patient not taking: Reported on 12/28/2018), Disp: 50 vial, Rfl: 1     aspirin 81 MG EC tablet, Take 81 mg by mouth daily, Disp: , Rfl:      blood glucose monitoring (ACCU-CHEK MARILUZ PLUS) meter device kit, , Disp: , Rfl: 0     clobetasol (TEMOVATE) 0.05 % cream, Apply sparingly to affected area twice weekly as needed.  Do not apply to face. (Patient not taking: Reported on 12/28/2018), Disp: 30 g, Rfl: 0     Respiratory Therapy Supplies (NEBULIZER/TUBING/MOUTHPIECE) KIT, Use with albuterol neb solution (Patient not taking: Reported on 12/28/2018), Disp: 1 kit, Rfl: 0  Immunization History   Administered Date(s) Administered     Influenza (High Dose) 3 valent vaccine 02/27/2018, 09/18/2018     Influenza (IIV3) PF 11/08/2012     Influenza Vaccine IM 3yrs+ 4 Valent IIV4 12/12/2016     Pneumo Conj 13-V (2010&after) 02/27/2018     TDAP Vaccine (Adacel) 11/08/2012     Allergies   Allergen Reactions     Losartan Cough     Sulfa Drugs Rash     OBJECTIVE:                                                                 /87 (BP Location: Left arm, Patient Position: Sitting, Cuff Size: Adult Regular)   Pulse 77   Temp 97.1  F (36.2  C) (Oral)   Resp 16   Wt 108.2 kg (238 lb 8.6 oz)   SpO2 95%   BMI 38.79 kg/m          Physical Exam   Constitutional: She is oriented to person, place, and time. No distress.   HENT:   Head: Normocephalic and atraumatic.   Right Ear: Tympanic membrane, external ear and ear canal normal.   Left Ear: Tympanic membrane, external ear and ear canal normal.   Nose: No mucosal edema or rhinorrhea.   Mouth/Throat: Oropharynx is clear and moist and mucous membranes are normal. No oropharyngeal exudate, posterior oropharyngeal edema or posterior oropharyngeal erythema.   Eyes: Conjunctivae are normal. Right eye exhibits no discharge. Left eye  exhibits no discharge.   Neck: Normal range of motion.   Cardiovascular: Normal rate, regular rhythm and normal heart sounds.   No murmur heard.  Pulmonary/Chest: Effort normal and breath sounds normal. No respiratory distress. She has no wheezes. She has no rales.   Musculoskeletal: Normal range of motion.   Lymphadenopathy:     She has no cervical adenopathy.   Neurological: She is alert and oriented to person, place, and time.   Skin: Skin is warm. No rash noted. She is not diaphoretic.   Psychiatric: She has a normal mood and affect.   Nursing note and vitals reviewed.      WORKUP:   SPIROMETRY  initial FVC 2.56L (76% of predicted); after bronchodilator 2.65L (+4% change)   initial FEV1 1.49L (58% of predicted); after bronchodilator 1.52L (+2% change)  initial FEV1/FVC 58 %; after bronchodilator 57%  initial FEF 25%-75% 0.69L/s (31% of predicted); after bronchodilator 0.70L/s (+2% change)        The office spirometry performed today suggests moderate obstruction.  There is gradual decrease in FEV1 compared with the previous studies.  No reversibility after nebulized albuterol.      Asthma Control Test (ACT) total score: 21       ASSESSMENT/PLAN:      Problem List Items Addressed This Visit        Respiratory    1. Severe persistent asthma - Primary  There is a decrease in FEV1; however, we also switched to a new spirometer, and that could be the factor.  In the past, the patient has reversibility in FEV1 after bronchodilator, and there is a lack of reversibility on today's study. The patient is asymptomatic.  -Decided not to treat the number at this point.  -Continue Breo Ellipta 200/25 mcg 1 puff once daily, Spiriva Respimat on a daily basis, and albuterol inhaler as needed.  We will reevaluate symptoms and the office spirometry in 2 months.      Relevant Orders    ALBUTEROL UNIT DOSE, 1 MG (Completed)    BRONCHODILATION RESPONSE, PRE/POST ADMIN      Other Visit Diagnoses     2. Chronic rhinitis      Currently  well controlled with azelastine on as needed basis.  -Continue as is.    3. Gastroesophageal reflux disease, esophagitis presence not specified        Relevant Orders    GASTROENTEROLOGY ADULT REF CONSULT ONLY       Return in about 3 months (around 3/26/2019), or if symptoms worsen or fail to improve.    Thank you for allowing us to participate in the care of this patient. Please feel free to contact us if there are any questions or concerns about the patient.    Disclaimer: This note consists of symbols derived from keyboarding, dictation and/or voice recognition software. As a result, there may be errors in the script that have gone undetected. Please consider this when interpreting information found in this chart.    Galindo Montano MD, FACAAI  Allergy, Asthma and Immunology  [place], [place] and [place]

## 2018-12-28 NOTE — NURSING NOTE
The following nebulizer treatment was given:     MEDICATION: Albuterol Sulfate 2.5 mg  : charming charlie  LOT #: [id number]  EXPIRATION DATE:  April 2020  NDC # 85238-430-89

## 2018-12-29 ASSESSMENT — ASTHMA QUESTIONNAIRES: ACT_TOTALSCORE: 21

## 2019-01-07 DIAGNOSIS — J45.50 UNCOMPLICATED SEVERE PERSISTENT ASTHMA (H): ICD-10-CM

## 2019-01-07 NOTE — TELEPHONE ENCOUNTER
Reason for Call:  Medication or medication refill:    Do you use a [facility]?  Name of the pharmacy and phone number for the current request:  [facility] - [phone]    Name of the medication requested: Breo Ellipta; Imelda    Other request:   LAST REFILL: 12/14/2018  LOV: 12/28/2018    Can we leave a detailed message on this number? Not Applicable    Phone number patient can be reached at: Home number on file [phone] (home)    Best Time: NA    Call taken on 1/7/2019 at 8:24 AM by Denise Behrendt

## 2019-01-07 NOTE — TELEPHONE ENCOUNTER
Prescription approved per [facility] [facility].    Catalina Bragg RN

## 2019-01-09 ENCOUNTER — OFFICE VISIT (OUTPATIENT)
Dept: GASTROENTEROLOGY | Facility: CLINIC | Age: 64
End: 2019-01-09
Payer: MEDICARE

## 2019-01-09 VITALS
BODY MASS INDEX: 39.11 KG/M2 | SYSTOLIC BLOOD PRESSURE: 144 MMHG | OXYGEN SATURATION: 95 % | HEART RATE: 95 BPM | WEIGHT: 240.5 LBS | DIASTOLIC BLOOD PRESSURE: 85 MMHG

## 2019-01-09 DIAGNOSIS — Z86.0100 HISTORY OF COLONIC POLYPS: ICD-10-CM

## 2019-01-09 DIAGNOSIS — K21.9 GASTROESOPHAGEAL REFLUX DISEASE, ESOPHAGITIS PRESENCE NOT SPECIFIED: Primary | ICD-10-CM

## 2019-01-09 PROCEDURE — 99205 OFFICE O/P NEW HI 60 MIN: CPT | Performed by: PHYSICIAN ASSISTANT

## 2019-01-09 RX ORDER — SUCRALFATE 1 G/1
1 TABLET ORAL
Qty: 360 TABLET | Refills: 0 | Status: SHIPPED | OUTPATIENT
Start: 2019-01-09 | End: 2019-02-19

## 2019-01-09 ASSESSMENT — ENCOUNTER SYMPTOMS
SHORTNESS OF BREATH: 0
ABDOMINAL PAIN: 0
CHEST TIGHTNESS: 0
NERVOUS/ANXIOUS: 0
DYSURIA: 0
BLOOD IN STOOL: 0
TROUBLE SWALLOWING: 0
VOICE CHANGE: 0
WEAKNESS: 0
SPEECH DIFFICULTY: 0
FATIGUE: 0
HEMATURIA: 0
COUGH: 1
UNEXPECTED WEIGHT CHANGE: 0
LIGHT-HEADEDNESS: 0
FEVER: 0
FREQUENCY: 0

## 2019-01-09 NOTE — PROGRESS NOTES
"        GASTROENTEROLOGY NEW PATIENT CLINIC VISIT    CC/REFERRING MD:    Kiara Zazueta    REASON FOR CONSULTATION:   Referred by Galindo Montano for Consult (Gastroesophagel reflux )      HISTORY OF PRESENT ILLNESS:    Luz Elena Cristina is 63 year old female who presents for evaluation of reflux.  Patient first started having symptoms about 9 months ago.  Since then she has been having acid reflux every day.  She has tried PPI medications however she has had side effects since had to discontinue.  She is currently taking ranitidine 150 mg twice daily.  This does help alleviate symptoms temporarily.  She finds that she does take Tums throughout the day.  She does proper bed up at nighttime.  She does have a cough at night and every morning.  Denies any NSAID use.  She drinks a glass of rum and Dr. Pepper nightly.     She notes burning in the back of the throat and heartburn.  She denies any nausea or vomiting.  She does not have any problems swallowing.    She does have asthma and COPD and is on inhalers.  She denies any shortness of breath.  No leg swelling.  She was a previous smoker.        PREVIOUS ENDOSCOPY:  No previous upper endoscopy.      Colonoscopy  03/14/2014  1. Rectal polyp   2. Moderate sigmoid diverticulosis     SPECIMEN/GROSS DESCRIPTION  A) SOURCE: Rectal polyp  Labeled with the patient's name and \"rectal polyp\" is a 0.4 cm polypoid  tissue. The specimen is entirely submitted in one cassette.      PROBLEM LIST  Patient Active Problem List    Diagnosis Date Noted     At risk for cardiovascular event 02/28/2018     Priority: Medium     ASCVD risk 7.5%.  Offering statin.       Severe obesity (BMI 35.0-39.9) with comorbidity (H) 02/27/2018     Priority: Medium     Severe persistent asthma 04/29/2016     Priority: Medium     Symptoms since childhood. Smoked since age of 11 until 31 years.  Until seen in November 2014, used albuterol once in her life.       Lichen sclerosus 04/29/2016    "  Priority: Medium     Benign essential hypertension 04/29/2016     Priority: Medium     Elevated glucose 04/29/2016     Priority: Medium     Family history of diabetes mellitus 04/29/2016     Priority: Medium       PERTINENT PAST MEDICAL HISTORY:  (I personally reviewed this history with the patient at today's visit)   Past Medical History:   Diagnosis Date     Hypertension      Uncomplicated asthma          PREVIOUS SURGERIES: (I personally reviewed this history with the patient at today's visit)   Past Surgical History:   Procedure Laterality Date     BIOPSY      breast,     COLONOSCOPY       GYN SURGERY       TONSILLECTOMY           ALLERGIES:     Allergies   Allergen Reactions     Losartan Cough     Sulfa Drugs Rash       PERTINENT MEDICATIONS:    Current Outpatient Medications:      ACCU-CHEK MARILUZ PLUS test strip, , Disp: , Rfl: 0     albuterol (2.5 MG/3ML) 0.083% neb solution, Take 1 vial (2.5 mg) by nebulization every 4 hours as needed for shortness of breath / dyspnea, wheezing or other (persistent cough) (Patient not taking: Reported on 12/28/2018), Disp: 50 vial, Rfl: 1     albuterol (PROAIR HFA/PROVENTIL HFA/VENTOLIN HFA) 108 (90 Base) MCG/ACT Inhaler, Inhale 2-4 puffs into the lungs every 4 hours as needed for shortness of breath / dyspnea or wheezing, Disp: 1 Inhaler, Rfl: 3     aspirin 81 MG EC tablet, Take 81 mg by mouth daily, Disp: , Rfl:      azelastine (ASTELIN) 0.1 % nasal spray, Spray 2 sprays into both nostrils 2 times daily as needed for rhinitis or allergies, Disp: 30 mL, Rfl: 3     blood glucose monitoring (ACCU-CHEK MARILUZ PLUS) meter device kit, , Disp: , Rfl: 0     CARTIA  MG 24 hr capsule, TAKE ONE CAPSULE BY MOUTH ONE TIME DAILY, Disp: 30 capsule, Rfl: 3     clobetasol (TEMOVATE) 0.05 % cream, Apply sparingly to affected area twice weekly as needed.  Do not apply to face. (Patient not taking: Reported on 12/28/2018), Disp: 30 g, Rfl: 0     fluticasone-vilanterol (BREO ELLIPTA)  200-25 MCG/INH inhaler, Inhale 1 puff into the lungs daily, Disp: 1 Inhaler, Rfl: 3     ranitidine (ZANTAC) 150 MG tablet, 1 tab daily but if further symptoms ok to take up to 2 tabs daily., Disp: 60 tablet, Rfl: 11     Respiratory Therapy Supplies (NEBULIZER/TUBING/MOUTHPIECE) KIT, Use with albuterol neb solution (Patient not taking: Reported on 2018), Disp: 1 kit, Rfl: 0     Tiotropium Bromide Monohydrate 1.25 MCG/ACT AERS, Inhale 2 puffs into the lungs daily, Disp: 1 Inhaler, Rfl: 3    SOCIAL HISTORY:  Social History     Socioeconomic History     Marital status:      Spouse name: Not on file     Number of children: Not on file     Years of education: Not on file     Highest education level: Not on file   Social Needs     Financial resource strain: Not on file     Food insecurity - worry: Not on file     Food insecurity - inability: Not on file     Transportation needs - medical: Not on file     Transportation needs - non-medical: Not on file   Occupational History     Comment: Retired   Tobacco Use     Smoking status: Former Smoker     Packs/day: 2.00     Years: 20.00     Pack years: 40.00     Types: Cigarettes     Last attempt to quit: 1987     Years since quittin.7     Smokeless tobacco: Never Used   Substance and Sexual Activity     Alcohol use: Yes     Drug use: No     Sexual activity: Not Currently     Partners: Male     Birth control/protection: Post-menopausal   Other Topics Concern     Parent/sibling w/ CABG, MI or angioplasty before 65F 55M? No   Social History Narrative    ENVIRONMENTAL HISTORY: [place] The home is heated with a forced air but does not use.  Has floor heating. They do have central air conditioning. The patient's bedroom is furnished with carpeting in bedroom.  No pets inside the house. But is exposed to chicken. There is no history of cockroach or mice infestation. There are no smokers in the house.  The house does not  have a damp basement.        FAMILY HISTORY: (I personally reviewed this history with the patient at today's visit)  Family History   Problem Relation Age of Onset     Other Cancer Mother         panreatic     Diabetes Mother      Breast Cancer Sister      Skin Cancer Sister      Cerebrovascular Disease Father         CVA     Chronic Obstructive Pulmonary Disease Father      Aneurysm Paternal Grandmother      Unknown/Adopted Paternal Grandfather      Alzheimer Disease Brother         early onset     Diabetes Son      Thyroid Disease Son      Breast Cancer Paternal Aunt      Diabetes Son      Breast Cancer Sister         Review of Systems   Constitutional: Negative for fatigue, fever and unexpected weight change.   HENT: Negative for nosebleeds, trouble swallowing and voice change.    Eyes: Negative for visual disturbance.   Respiratory: Positive for cough. Negative for chest tightness and shortness of breath.    Cardiovascular: Negative for chest pain and leg swelling.   Gastrointestinal: Negative for abdominal pain and blood in stool.   Genitourinary: Negative for dysuria, frequency and hematuria.   Skin: Negative for pallor and rash.   Neurological: Negative for speech difficulty, weakness and light-headedness.   Psychiatric/Behavioral: The patient is not nervous/anxious.      PHYSICAL EXAMINATION:  Constitutional: aaox3, cooperative, pleasant, not dyspneic/diaphoretic, no acute distress  Vitals reviewed: /85 (BP Location: Left arm, Patient Position: Chair, Cuff Size: Adult Large)   Pulse 95   Wt 109.1 kg (240 lb 8 oz)   SpO2 95%   BMI 39.11 kg/m     Wt:   Wt Readings from Last 2 Encounters:   12/28/18 108.2 kg (238 lb 8.6 oz)   10/29/18 108.3 kg (238 lb 12.8 oz)        Physical Exam   Constitutional: She appears well-developed and well-nourished.   obese   HENT:   Head: Normocephalic and atraumatic.   Eyes: No scleral icterus.   Cardiovascular: Normal rate and regular rhythm.   Pulmonary/Chest: Effort  normal. No stridor. No respiratory distress.   Abdominal: Soft. Bowel sounds are normal. There is no tenderness.   Truncal obesity   Skin: Skin is warm and dry.   Psychiatric: She has a normal mood and affect. Her behavior is normal.   Nursing note and vitals reviewed.        PERTINENT STUDIES: (I personally reviewed these laboratory studies today)  Most recent CBC:  Recent Labs   Lab Test 07/23/18  1751 12/28/16  1435 11/14/16  1037   WBC 10.5  --  7.7   HGB 15.0 15.8* 15.5   HCT 44.7  --  48.1*     --  247     Most recent creatinine:  Recent Labs   Lab Test 02/27/18  1203 04/28/16  1108   CR 0.85 0.85       TSH   Date Value Ref Range Status   02/27/2018 2.23 0.40 - 4.00 mU/L Final         RADIOLOGY:    No pertinent imaging to review.         ASSESSMENT/PLAN:    Luz Elena Cristina is a 63 year old female who presents for evaluation of GERD.     Further evaluate with an upper endoscopy to r/o esophagitis, gastritis, pud. Patient to continue taking ranitidine 150mg twice daily. We discussed option of switching to PPI however she is hesistant as previous PPI caused diarrhea side effect. I will add in Carafate for sx relief. She can start by taking this at bedtime nightly. She can also take with meals if she finds it offers her relief.       Review of records reveal that patients last colonoscopy was in 2014 which revealed colon polyps.  She was advised to repeat in 3 years.  Recommended patient to schedule colonoscopy. She verbalizes understanding.     Gastroesophageal reflux disease, esophagitis presence not specified  History of colonic polyps        Orders Placed This Encounter   Procedures     GASTROENTEROLOGY ADULT REF PROCEDURE ONLY [facility] [phone]       RTC 4-6 weeks    Thank you for this consultation.  It was a pleasure to participate in the care of this patient; please contact us with any further questions.  A total of 60 minutes, face to face, was spent with this patient, >50% of which was  counseling regarding the above delineated issues.    This note was created with voice recognition software, and while reviewed for accuracy, typos may remain.     Aidan Camacho PA-C  Gastroenterology  [facility]

## 2019-01-09 NOTE — NURSING NOTE
Luz Elena Cristina's goals for this visit include: New  She requests these members of her care team be copied on today's visit information: Yes    PCP: Kiara Zazueta    Referring Provider:  Galindo Montano MD  [address] [place] [address]    /85 (BP Location: Left arm, Patient Position: Chair, Cuff Size: Adult Large)   Pulse 95   Wt 109.1 kg (240 lb 8 oz)   SpO2 95%   BMI 39.11 kg/m      Do you need any medication refills at today's visit? no    Joleen Kamara CMA on 1/9/2019 at 2:57 PM

## 2019-01-09 NOTE — PATIENT INSTRUCTIONS
Continue ranitidine (zantac) 150 mg twice a day   Start taking sucralfate (carafate) once at bedtime. You can also take this before meals.     Schedule an upper endoscopy and colonoscopy with Dr. Ochoa     Follow up in 6 weeks.

## 2019-01-13 DIAGNOSIS — I10 BENIGN ESSENTIAL HYPERTENSION: ICD-10-CM

## 2019-01-13 NOTE — TELEPHONE ENCOUNTER
"Requested Prescriptions   Pending Prescriptions Disp Refills     CARTIA  MG 24 hr capsule   Last Written Prescription Date:  10/9/18  Last Fill Quantity: 30,  # refills: 3   Last office visit: 6/11/2018 with prescribing provider:  SHAHEED Zazueta   Future Office Visit:   Next 5 appointments (look out 90 days)    Feb 19, 2019 11:00 AM CST  Return Visit with Aidan Camacho PA-C  [facility] [address] [place] [id number] [phone]   Feb 26, 2019 12:20 PM CST  Return Visit with Galindo Montano MD  [facility] ([facility]) [address] [place] [id number] [phone]          30 capsule 2     Sig: TAKE ONE CAPSULE BY MOUTH ONE TIME DAILY    Calcium Channel Blockers Protocol  Failed - 1/13/2019  9:18 AM       Failed - Blood pressure under 140/90 in past 12 months    BP Readings from Last 3 Encounters:   01/09/19 144/85   12/28/18 134/87   10/29/18 139/81                Failed - Normal ALT in past 12 months    No lab results found.         Passed - Recent (12 mo) or future (30 days) visit within the authorizing provider's specialty    Patient had office visit in the last 12 months or has a visit in the next 30 days with authorizing provider or within the authorizing provider's specialty.  See \"Patient Info\" tab in inbasket, or \"Choose Columns\" in Meds & Orders section of the refill encounter.             Passed - Medication is active on med list       Passed - Patient is age 18 or older       Passed - No active pregnancy on record       Passed - Normal serum creatinine on file in past 12 months    Recent Labs   Lab Test 02/27/18  1203   CR 0.85            Passed - No positive pregnancy test in past 12 months          "

## 2019-01-14 ENCOUNTER — TELEPHONE (OUTPATIENT)
Dept: FAMILY MEDICINE | Facility: CLINIC | Age: 64
End: 2019-01-14

## 2019-01-14 RX ORDER — DILTIAZEM HYDROCHLORIDE 120 MG/1
CAPSULE, EXTENDED RELEASE ORAL
Qty: 30 CAPSULE | Refills: 0 | Status: SHIPPED | OUTPATIENT
Start: 2019-01-14 | End: 2019-01-28 | Stop reason: SINTOL

## 2019-01-14 NOTE — TELEPHONE ENCOUNTER
Reason for call:  Patient reporting a symptom    Symptom or request: Heartburn- Pt is wondering if her Diabetic Cartia XT could be causing her Heartburn. Pt said she hadn't taken her medication one day until late and did not have the Heartburn. Please Advise.    Duration (how long have symptoms been present): for months.    Have you been treated for this before? Yes    Phone Number patient can be reached at:  Home number on file [phone] (home    Can we leave a detailed message on this number:  YES    Call taken on 1/14/2019 at 9:55 AM by Kusum Arnold

## 2019-01-14 NOTE — TELEPHONE ENCOUNTER
Pt called. States she has heart burn most of the time. But states she took off for a trip and forgot to take her pills until later and noted she had no heartburn. F/u appointment made with pcp to discuss. Hillary Patrick RN

## 2019-01-21 ENCOUNTER — OFFICE VISIT (OUTPATIENT)
Dept: FAMILY MEDICINE | Facility: CLINIC | Age: 64
End: 2019-01-21
Payer: MEDICARE

## 2019-01-21 VITALS
WEIGHT: 231 LBS | HEART RATE: 76 BPM | SYSTOLIC BLOOD PRESSURE: 120 MMHG | RESPIRATION RATE: 18 BRPM | DIASTOLIC BLOOD PRESSURE: 88 MMHG | BODY MASS INDEX: 37.12 KG/M2 | TEMPERATURE: 97.8 F | HEIGHT: 66 IN

## 2019-01-21 DIAGNOSIS — I10 BENIGN ESSENTIAL HYPERTENSION: ICD-10-CM

## 2019-01-21 DIAGNOSIS — E66.01 SEVERE OBESITY (BMI 35.0-39.9) WITH COMORBIDITY (H): ICD-10-CM

## 2019-01-21 DIAGNOSIS — K21.9 GASTROESOPHAGEAL REFLUX DISEASE, ESOPHAGITIS PRESENCE NOT SPECIFIED: Primary | ICD-10-CM

## 2019-01-21 PROCEDURE — 99213 OFFICE O/P EST LOW 20 MIN: CPT | Performed by: PHYSICIAN ASSISTANT

## 2019-01-21 RX ORDER — DILTIAZEM HYDROCHLORIDE 120 MG/1
120 CAPSULE, COATED, EXTENDED RELEASE ORAL DAILY
Qty: 30 CAPSULE | Refills: 0 | Status: CANCELLED | OUTPATIENT
Start: 2019-01-21

## 2019-01-21 ASSESSMENT — MIFFLIN-ST. JEOR: SCORE: 1601.62

## 2019-01-21 ASSESSMENT — PAIN SCALES - GENERAL: PAINLEVEL: NO PAIN (0)

## 2019-01-21 NOTE — PROGRESS NOTES
SUBJECTIVE:   Luz Elena Cristina is a 63 year old female who presents to clinic today for the following health issues:      GERD/Heartburn      Duration: on going x months    Description (location/character/radiation): burning in throat    Intensity:  moderate, severe    Accompanying signs and symptoms:  food getting stuck: no   nausea/vomiting/blood: no   abdominal pain: no   black/tarry or bloody stools: no :    History (similar episodes/previous evaluation): scheduled for scoped 2/8/2019    Precipitating or alleviating factors:  worse with fatty foods, spicy foods and caffeinated drinks.    current NSAID/Aspirin use: no - not taking her ASA at this time    Therapies tried and outcome: Zantac (Ranitidine) and antacids    Chronic cough.  Severe persistent asthma, working on this with allergist, some question of control.  Daily heartburn, intolerant of PPI, saw GI, upcoming scope.  Does wonder if her cartia is cause of cough - 1 day forgot all her meds and noted no cough.  Wants to try off cartia before doing the scope.    HTN - has cuff.  132/70s.      Trying for wt loss.  Due for labs.    Problem list and histories reviewed & adjusted, as indicated.  Additional history: as documented    BP Readings from Last 3 Encounters:   01/21/19 120/88   01/09/19 144/85   12/28/18 134/87    Wt Readings from Last 3 Encounters:   01/21/19 104.8 kg (231 lb)   01/09/19 109.1 kg (240 lb 8 oz)   12/28/18 108.2 kg (238 lb 8.6 oz)         Labs reviewed in EPIC    Reviewed and updated as needed this visit by clinical staff  Tobacco  Allergies  Meds  Med Hx  Surg Hx  Fam Hx  Soc Hx      Reviewed and updated as needed this visit by Provider         ROS:  Constitutional, HEENT, cardiovascular, pulmonary, GI, systems are negative, except as otherwise noted.    OBJECTIVE:     /88 (BP Location: Right arm, Patient Position: Chair, Cuff Size: Adult Large)   Pulse 76   Temp 97.8  F (36.6  C) (Tympanic)   Resp 18   Ht 1.664 m (5'  "5.5\")   Wt 104.8 kg (231 lb)   BMI 37.86 kg/m    Body mass index is 37.86 kg/m .  GENERAL: healthy, alert and no distress  RESP: lungs clear to auscultation - no rales, rhonchi or wheezes  CV: regular rate and rhythm, normal S1 S2, no S3 or S4, no murmur, click or rub    ASSESSMENT/PLAN:       ICD-10-CM    1. Gastroesophageal reflux disease, esophagitis presence not specified K21.9    2. Benign essential hypertension I10    3. Severe obesity (BMI 35.0-39.9) with comorbidity (H) E66.01 Has lost 8-9 pounds recently     Due for A1c, HIV, Cr/BMP  Patient Instructions   Try off BP med for a few days  If no heartburn but BP high - will try Hydrochlorothiazide   Labs 2-4 weeks after med change   If decide no med change, will still want to do labs - let me know so I can place orders.    Set up medicare wellness visit - talk vaccines, bone density test, labs due, etc        Kiara Zazueta PA-C  [facility]  "

## 2019-01-21 NOTE — NURSING NOTE
"Chief Complaint   Patient presents with     Heartburn     Scopes scheduled for 2/8/2019     Medication Reconciliation     Has not taken Carafate.       Initial /88 (BP Location: Right arm, Patient Position: Chair, Cuff Size: Adult Large)   Pulse 76   Temp 97.8  F (36.6  C) (Tympanic)   Resp 18   Ht 1.664 m (5' 5.5\")   Wt 104.8 kg (231 lb)   BMI 37.86 kg/m   Estimated body mass index is 37.86 kg/m  as calculated from the following:    Height as of this encounter: 1.664 m (5' 5.5\").    Weight as of this encounter: 104.8 kg (231 lb).    Patient presents to the clinic using No DME    Health Maintenance that is potentially due pending provider review:  Pap Smear due soon    Pt will schedule PE appt.    Is there anyone who you would like to be able to receive your results? No  If yes have patient fill out SOHAM  Argelia Cuello CMA    "

## 2019-01-21 NOTE — PATIENT INSTRUCTIONS
Try off BP med for a few days  If no heartburn but BP high - will try Hydrochlorothiazide   Labs 2-4 weeks after med change   If decide no med change, will still want to do labs - let me know so I can place orders.    Set up medicare wellness visit - talk vaccines, bone density test, labs due, etc

## 2019-01-28 ENCOUNTER — TELEPHONE (OUTPATIENT)
Dept: FAMILY MEDICINE | Facility: CLINIC | Age: 64
End: 2019-01-28

## 2019-01-28 DIAGNOSIS — I10 BENIGN ESSENTIAL HYPERTENSION: Primary | ICD-10-CM

## 2019-01-28 RX ORDER — HYDROCHLOROTHIAZIDE 12.5 MG/1
12.5 TABLET ORAL DAILY
Qty: 30 TABLET | Refills: 1 | Status: SHIPPED | OUTPATIENT
Start: 2019-01-28 | End: 2019-03-29

## 2019-01-28 NOTE — TELEPHONE ENCOUNTER
Call patient -  Thank her for updating me.  I am glad she found cause of her heartburn.  I've updated her med history to reflect this.  Stay off cartia.  If BP off the medication is being high (prefer under 130/80, but for sure must be under 140/90), please order Hydrochlorothiazide 12.5 qday, with BP update from patient or RN visit in 2 wks, and BMP at approximately 2 wks.

## 2019-01-28 NOTE — TELEPHONE ENCOUNTER
Pt was told to stop her B/P Medication Cartia XT  due to Heart Burn at last OV with Kiara  Pt said the Heart Burn stopped immediately. Pt stopped taking it for 3 days and restarted.  Pt is still taking her Ranitidine. Pt said once pt started her Cartia back up it so did the Heart Burn.      Please Advise  [facility]

## 2019-01-28 NOTE — TELEPHONE ENCOUNTER
BP off cartia was very elevated. Sent new Rx for hydrochlorothiazide to Saritha, she will recheck in clinic in 2 weeks for labs

## 2019-02-08 ENCOUNTER — HOSPITAL ENCOUNTER (OUTPATIENT)
Facility: AMBULATORY SURGERY CENTER | Age: 64
Discharge: HOME | End: 2019-02-08
Attending: INTERNAL MEDICINE | Admitting: INTERNAL MEDICINE
Payer: MEDICARE

## 2019-02-08 VITALS
HEART RATE: 104 BPM | RESPIRATION RATE: 16 BRPM | TEMPERATURE: 97.9 F | OXYGEN SATURATION: 92 % | SYSTOLIC BLOOD PRESSURE: 112 MMHG | DIASTOLIC BLOOD PRESSURE: 75 MMHG

## 2019-02-08 LAB
COLONOSCOPY: NORMAL
UPPER GI ENDOSCOPY: NORMAL

## 2019-02-08 PROCEDURE — 43239 EGD BIOPSY SINGLE/MULTIPLE: CPT

## 2019-02-08 PROCEDURE — 45385 COLONOSCOPY W/LESION REMOVAL: CPT | Mod: PT

## 2019-02-08 PROCEDURE — G8918 PT W/O PREOP ORDER IV AB PRO: HCPCS

## 2019-02-08 PROCEDURE — 45380 COLONOSCOPY AND BIOPSY: CPT | Mod: XS,PT

## 2019-02-08 PROCEDURE — G8907 PT DOC NO EVENTS ON DISCHARG: HCPCS

## 2019-02-08 RX ORDER — LIDOCAINE 40 MG/G
CREAM TOPICAL
Status: DISCONTINUED | OUTPATIENT
Start: 2019-02-08 | End: 2019-02-09 | Stop reason: HOSPADM

## 2019-02-08 RX ORDER — FENTANYL CITRATE 50 UG/ML
INJECTION, SOLUTION INTRAMUSCULAR; INTRAVENOUS PRN
Status: DISCONTINUED | OUTPATIENT
Start: 2019-02-08 | End: 2019-02-08 | Stop reason: HOSPADM

## 2019-02-08 RX ORDER — ONDANSETRON 2 MG/ML
4 INJECTION INTRAMUSCULAR; INTRAVENOUS
Status: DISCONTINUED | OUTPATIENT
Start: 2019-02-08 | End: 2019-02-09 | Stop reason: HOSPADM

## 2019-02-12 LAB — COPATH REPORT: NORMAL

## 2019-02-15 ENCOUNTER — TELEPHONE (OUTPATIENT)
Dept: GASTROENTEROLOGY | Facility: CLINIC | Age: 64
End: 2019-02-15

## 2019-02-15 NOTE — TELEPHONE ENCOUNTER
Nurse will forward note to Aidan Camacho to review results and give recommendations.     Roberta Centeno RN, BSN, PHN  [facility]  GI/Gen Surg/Hepatology Care Coordinator

## 2019-02-15 NOTE — TELEPHONE ENCOUNTER
[facility] Call Center    Phone Message    May a detailed message be left on voicemail: yes    Reason for Call: Requesting Results   Name/type of test: Results from colonoscopy and EGD  Date of test: 2/8/19         Action Taken: Message routed to:  Adult Clinics: Gastroenterology (GI) p [phone]

## 2019-02-19 ENCOUNTER — OFFICE VISIT (OUTPATIENT)
Dept: GASTROENTEROLOGY | Facility: CLINIC | Age: 64
End: 2019-02-19
Payer: MEDICARE

## 2019-02-19 VITALS
DIASTOLIC BLOOD PRESSURE: 79 MMHG | SYSTOLIC BLOOD PRESSURE: 128 MMHG | TEMPERATURE: 97.1 F | OXYGEN SATURATION: 97 % | RESPIRATION RATE: 20 BRPM | HEART RATE: 69 BPM

## 2019-02-19 DIAGNOSIS — K44.9 HIATAL HERNIA: Primary | ICD-10-CM

## 2019-02-19 DIAGNOSIS — D12.2 ADENOMATOUS POLYP OF ASCENDING COLON: ICD-10-CM

## 2019-02-19 DIAGNOSIS — R12 HEARTBURN: ICD-10-CM

## 2019-02-19 DIAGNOSIS — K63.5 HYPERPLASTIC POLYP OF DESCENDING COLON: ICD-10-CM

## 2019-02-19 PROCEDURE — 99214 OFFICE O/P EST MOD 30 MIN: CPT | Performed by: PHYSICIAN ASSISTANT

## 2019-02-19 ASSESSMENT — ENCOUNTER SYMPTOMS
NERVOUS/ANXIOUS: 0
WEAKNESS: 0
SHORTNESS OF BREATH: 0
UNEXPECTED WEIGHT CHANGE: 0
FREQUENCY: 0
DYSURIA: 0
VOICE CHANGE: 0
FEVER: 0
CHEST TIGHTNESS: 0
FATIGUE: 0
TROUBLE SWALLOWING: 0
LIGHT-HEADEDNESS: 0
ABDOMINAL PAIN: 0
SPEECH DIFFICULTY: 0
HEMATURIA: 0
ROS GI COMMENTS: ACID REFLUX
BLOOD IN STOOL: 0

## 2019-02-19 ASSESSMENT — PAIN SCALES - GENERAL: PAINLEVEL: NO PAIN (0)

## 2019-02-19 NOTE — NURSING NOTE
Luz Elena Cristina's goals for this visit include:   Chief Complaint   Patient presents with     Follow Up     gastro reflux- currently on ranitidine with reflux still present       She requests these members of her care team be copied on today's visit information:   PCP: Kiara Zazueta    Referring Provider:  No referring provider defined for this encounter.    /79 (BP Location: Left arm, Patient Position: Sitting, Cuff Size: Adult Large)   Pulse 69   Temp 97.1  F (36.2  C) (Oral)   Resp 20   SpO2 97%     Do you need any medication refills at today's visit? Mignon Kumar LPN

## 2019-02-19 NOTE — PATIENT INSTRUCTIONS
Increase zantac to two tablets in the morning and one tablet in the evening.   Avoid NSAID's and limit alcohol intake  Avoid acidic foods.         Patient Education   Hiatal Hernia  The diaphragm is a thin sheet of muscle that runs across the top of the stomach. The small opening in the diaphragm where the esophagus and stomach meet is called the hiatus. When you eat, the muscle around the hiatus relaxes to let food pass from the esophagus into the stomach. The hiatus tightens to keep food and acid in the stomach.  In some people, the hiatus is too large or the muscle around it is weak. Then the top of the stomach can push upward through the hiatus. This is called a hiatal hernia. A hiatal hernia can let stomach acid flow back up the esophagus. This is called acid reflux.  Hiatal hernias are common. The cause of a hiatal hernia is not certain, but some things may make it more likely. These may include obesity, pregnancy, and vomiting or coughing.  Many people with hiatal hernia do not have symptoms. Often the symptoms are like those of GERD or acid reflux. They can include:    Burning feeling under the breastbone (heartburn)    Other chest discomfort    Frequent burping    Food coming back up into the throat or mouth    Acid taste in the mouth    Trouble swallowing food or liquid    Nighttime choking, coughing, or wheezing    Feeling full after eating only a small amount of food    Nausea and vomiting  Treatment can reduce symptoms. This includes medicines and lifestyle changes. In severe cases, you may need surgery to tighten the hiatus.  Home care  Medicines help control symptoms, but they can't fix the hernia.     Antacids help neutralize the normal acids in your stomach. You may find one works better than another for you.     Acid blockers (H2 blockers) decrease acid production.     Acid inhibitors (PPIs) decrease acid production in a different way than the blockers.     Don't take NSAIDs such as aspirin,  ibuprofen, and naproxen. These may worsen symptoms in some people. If you are taking these medicines for another medical problem, talk with your healthcare provider before stopping them.  Lifestyle changes are important in treating your symptoms. You can help reduce or relieve your symptoms if you:    Stop smoking and using tobacco. Also if possible, avoid second-hand smoke.    Lose excess weight. Excess weight puts pressure on the stomach and esophagus.    Symptoms can be worsened by certain foods. Limit or avoid fatty, fried, and spicy foods, as well as coffee, chocolate, mint, and foods with high acid content such as tomatoes and citrus fruit and juices (orange, grapefruit, lemon).     Eat smaller amounts at a time. Don't get overfull.    Don't use alcohol, caffeine, or tobacco. They can delay healing and worsen your symptoms.  Follow-up care  Follow up with your healthcare provider. Regular visits may be needed to check on your health. Be sure to take any medicines as prescribed and keep all your appointments. In some cases, you may need surgery to stop symptoms. Your healthcare provider will talk with you about this option if needed.  When to seek medical advice  Call your healthcare provider right away if any of these occur:    Severe pain in your chest or abdomen    Can t keep down food or liquid    Symptoms get worse    Other symptoms as indicated by your healthcare provider  Call 911  Call 911 if any of these occur:    Trouble breathing or swallowing    Worsening chest pain, especially if different from usual    Vomiting blood    Large amounts of blood in stool  Date Last Reviewed: 2/1/2018 2000-2018 [employer]. [address] [place] [address], [place] [address]. All rights reserved. This information is not intended as a substitute for professional medical care. Always follow your healthcare professional's instructions.           Patient Education     GERD (Adult)    The esophagus is a tube that  "carries food from the mouth to the stomach. A valve (the LES, lower esophageal sphincter) at the lower end of the esophagus prevents stomach acid from flowing upward. When this valve doesn't work properly, stomach contents may repeatedly flow back up (reflux) into the esophagus. This is called gastroesophageal reflux disease (GERD). GERD can irritate the esophagus. It can cause problems with pain, swallowing or breathing. In severe cases, GERD can cause recurrent pneumonia (from aspiration or breathing in particles) or other serious problems.  Symptoms of reflux include burning, pressure or sharp pain in the upper abdomen or mid to lower chest. The pain can spread to the neck, back, or shoulder. There may be belching, an acid taste in the back of the throat, chronic cough, or sore throat, or hoarseness. GERD symptoms often occur during the day after a big meal. They can also occur at night when lying down.   Home care  Lifestyle changes can help reduce symptoms. If needed, your healthcare provider may prescribe medicines. Symptoms often improve with treatment, but if treatment is stopped, the symptoms often return after a few months. So most persons with GERD will need to continue treatment or get treatment on and off.  Lifestyle changes    Limit or avoid fatty, fried, and spicy foods, as well as coffee, chocolate, mint, and foods with high acid content such as tomatoes and citrus fruit and juices (orange, grapefruit, lemon).    Don t eat large meals, especially at night. Frequent, smaller meals are best. Don't lie down right after eating. And don t eat anything 3 hours before going to bed.    Don't drink alcohol or smoke. As much as possible, stay away from second hand smoke.    If you are overweight, losing weight will reduce symptoms.     Don't wear tight clothing around your stomach area.    If your symptoms occur during sleep, use a foam wedge to elevate your upper body (not just your head.) Or, place 4\" blocks " under the head of your bed. Or use 2 bed risers under your bedframe.  Medicines  If needed, medicines can help relieve the symptoms of GERD and prevent damage to the esophagus. Discuss a medicine plan with your healthcare provider. This may include one or more of the following medicines:    Antacids to help neutralize the normal acids in your stomach.    Acid blockers (Histamine or H2 blockers) to decrease acid production.    Acid inhibitors (proton pump inhibitors PPIs) to decrease acid production in a different way than the blockers. They may work better, but can take a little longer to take effect.  Take an antacid 30 to 60 minutes after eating and at bedtime, but not at the same time as an acid blocker.  Try not to take medicines such as ibuprofen and aspirin. If you are taking aspirin for your heart or other medical reasons, talk to your healthcare provider about stopping it.  Follow-up care  Follow up with your healthcare provider or as advised by our staff.  When to seek medical advice  Call your healthcare provider if any of the following occur:    Stomach pain gets worse or moves to the lower right abdomen (appendix area)    Chest pain appears or gets worse, or spreads to the back, neck, shoulder, or arm    An over-the-counter trial of medicine doesn't relieve your symptoms    Weight loss that can't be explained    Trouble or pain swallowing    Frequent vomiting (can t keep down liquids)    Blood in the stool or vomit (red or black in color)    Feeling weak or dizzy    Fever of 100.4 F (38 C) or higher, or as directed by your healthcare provider  Date Last Reviewed: 3/1/2018    3554-7159 [employer]. [address] [place] [address], [place] [address]. All rights reserved. This information is not intended as a substitute for professional medical care. Always follow your healthcare professional's instructions.

## 2019-02-19 NOTE — PROGRESS NOTES
GASTROENTEROLOGY FOLLOW UP CLINIC VISIT    CC/REFERRING MD:    Kiara Zazueta    REASON FOR CONSULTATION:   Referred by Galindo Montano for Follow Up       HISTORY OF PRESENT ILLNESS:    Luz Elena Cristina is 63 year old female who presents for follow up.     Initially evaluated on 1/9/19 for reflux.  Patient first started having symptoms 9 months prior.  Since then she has been having acid reflux every day.  She has tried PPI medications however she has had side effects since had to discontinue.  She is currently taking ranitidine 150 mg twice daily.  This does help alleviate symptoms temporarily.  She finds that she does take Tums throughout the day.  She does proper bed up at nighttime.  She does have a cough at night and every morning.  Denies any NSAID use.  She continues to drink a glass of rum and Dr. Pepper nightly. She notes burning in the back of the throat and heartburn.  She denies any nausea or vomiting.  She does not have any problems swallowing.    We further evaluated her with an upper endoscopy which revealed a hiatal hernia and signs of chronic inflammation.  After further discussion she does admit to taking NSAIDs frequently a few months ago. She also drinks coffee on an empty stomach at times. She does have spicy and acidic foods in her diet as well.       We also evaluated her with a colonoscopy as she was due for her colon cancer screening.  She was found to have multiple polyps in the adenomatous ascending colon the largest being 12 mm.  She was also noted to have hyperplastic polyps in the descending colon.  She was advised to have repeat colonoscopy in 3 years.      PREVIOUS ENDOSCOPY:    Upper endoscopy 02/08/19  Impression:       - Esophagogastric landmarks identified.                             - 3 cm hiatal hernia.                             - Gastroesophageal flap valve classified as Hill Grade IV (no fold, wide open lumen, hiatal hernia present).                              - Esophageal mucosal changes suspicious for short-segment Kate's esophagus. Biopsied. Separate slightly nodular area at 2 O'clock at    [facility]  biopsied separately.                             - Normal stomach.                             - Normal duodenal bulb, first portion of the duodenum and second portion of the duodenum.   José Miguel Ochoa MD      Colonoscopy 02/08/19  Impression:       - The examined portion of the ileum was normal.                             - One 12 mm polyp in the ascending colon, removed with a hot snare. Resected and retrieved.                             - Three 3 to 5 mm polyps in the ascending colon, removed with a cold snare. Resected and retrieved.                             - One 2 mm polyp in the ascending colon, removed with a cold biopsy forceps. Resected and retrieved.                             - Two 4 to 6 mm polyps in the descending colon, removed with a cold snare. Resected and retrieved.                             - Diverticulosis in the sigmoid colon and in the descending colon.                             - The distal rectum and anal verge are normal on retroflexion view.     José Miguel Ochoa MD       FINAL DIAGNOSIS:   A. GASTROESOPHAGEAL JUNCTION AT 2 O'CLOCK, BIOPSY:   - Esophageal squamous mucosa with features of reflux including basal cell hyperplasia, scattered lymphocytes and rare eosinophil   - Gastric columnar epithelium with nonspecific chronic inflammation, foveolar prominence   - Negative for intestinal metaplasia and dysplasia     B. GASTROESOPHAGEAL JUNCTION BIOPSY:   - Gastric columnar epithelium with nonspecific chronic inflammation   - Negative for intestinal metaplasia and dysplasia     C. ASCENDING COLON POLYPS X 5, POLYPECTOMY:   - Sessile serrated adenomas, fragmented (three pieces), negative for cytologic dysplasia   - Two pieces of tubular adenoma, negative for high grade dysplasia   - Strip of unremarkable colonic  "mucosa     D. DESCENDING COLON POLYP, POLYPECTOMYx2:   - One hyperplastic polyp, negative for dysplasia   - Fragment of vegetable matter     I have personally reviewed all specimens and/or slides, including the   listed special stains, and used them   with my medical judgement to determine or confirm the final diagnosis.     Electronically signed out by:     Nirmal Langston M.D., [facility]         Colonoscopy  03/14/2014  1. Rectal polyp   2. Moderate sigmoid diverticulosis     SPECIMEN/GROSS DESCRIPTION  A) SOURCE: Rectal polyp  Labeled with the patient's name and \"rectal polyp\" is a 0.4 cm polypoid  tissue. The specimen is entirely submitted in one cassette.      PROBLEM LIST  Patient Active Problem List    Diagnosis Date Noted     At risk for cardiovascular event 02/28/2018     Priority: Medium     ASCVD risk 7.5%.  Offering statin.       Severe obesity (BMI 35.0-39.9) with comorbidity (H) 02/27/2018     Priority: Medium     Severe persistent asthma 04/29/2016     Priority: Medium     Symptoms since childhood. Smoked since age of 11 until 31 years.  Until seen in November 2014, used albuterol once in her life.       Lichen sclerosus 04/29/2016     Priority: Medium     Benign essential hypertension 04/29/2016     Priority: Medium     Elevated glucose 04/29/2016     Priority: Medium     Family history of diabetes mellitus 04/29/2016     Priority: Medium       PERTINENT PAST MEDICAL HISTORY:  (I personally reviewed this history with the patient at today's visit)   Past Medical History:   Diagnosis Date     Hypertension      Sleep apnea      Uncomplicated asthma          PREVIOUS SURGERIES: (I personally reviewed this history with the patient at today's visit)   Past Surgical History:   Procedure Laterality Date     BIOPSY      breast,     COLONOSCOPY       COLONOSCOPY N/A 2/8/2019    Procedure: Combined Colonoscopy, Single Or Multiple Biopsy/Polypectomy By Biopsy;  Surgeon: José Miguel Ochoa MD;  " Location: [facility] OR     COLONOSCOPY WITH CO2 INSUFFLATION N/A 2/8/2019    Procedure: COLONOSCOPY WITH CO2 INSUFFLATION;  Surgeon: José Miguel Ochoa MD;  Location: [facility] OR     COMBINED ESOPHAGOSCOPY, GASTROSCOPY, DUODENOSCOPY (EGD) WITH CO2 INSUFFLATION N/A 2/8/2019    Procedure: COMBINED ESOPHAGOSCOPY, GASTROSCOPY, DUODENOSCOPY (EGD) WITH CO2 INSUFFLATION;  Surgeon: José Miguel Ochoa MD;  Location: [facility] OR     ESOPHAGOSCOPY, GASTROSCOPY, DUODENOSCOPY (EGD), COMBINED N/A 2/8/2019    Procedure: Combined Esophagoscopy, Gastroscopy, Duodenoscopy (Egd), Biopsy Single Or Multiple;  Surgeon: José Miguel Ochoa MD;  Location: [facility] OR     GYN SURGERY       TONSILLECTOMY           ALLERGIES:     Allergies   Allergen Reactions     Cartia Xt [Diltiazem] Other (See Comments)     Heartburn whenever on this medication     Losartan Cough     Sulfa Drugs Rash       PERTINENT MEDICATIONS:    Current Outpatient Medications:      ACCU-CHEK MARILUZ PLUS test strip, , Disp: , Rfl: 0     albuterol (2.5 MG/3ML) 0.083% neb solution, Take 1 vial (2.5 mg) by nebulization every 4 hours as needed for shortness of breath / dyspnea, wheezing or other (persistent cough), Disp: 50 vial, Rfl: 1     albuterol (PROAIR HFA/PROVENTIL HFA/VENTOLIN HFA) 108 (90 Base) MCG/ACT Inhaler, Inhale 2-4 puffs into the lungs every 4 hours as needed for shortness of breath / dyspnea or wheezing, Disp: 1 Inhaler, Rfl: 3     azelastine (ASTELIN) 0.1 % nasal spray, Spray 2 sprays into both nostrils 2 times daily as needed for rhinitis or allergies, Disp: 30 mL, Rfl: 3     blood glucose monitoring (ACCU-CHEK MARILUZ PLUS) meter device kit, , Disp: , Rfl: 0     clobetasol (TEMOVATE) 0.05 % cream, Apply sparingly to affected area twice weekly as needed.  Do not apply to face., Disp: 30 g, Rfl: 0     fluticasone-vilanterol (BREO ELLIPTA) 200-25 MCG/INH inhaler, Inhale 1 puff into the lungs daily, Disp: 1 Inhaler, Rfl: 3     hydrochlorothiazide (HYDRODIURIL)  12.5 MG tablet, Take 1 tablet (12.5 mg) by mouth daily, Disp: 30 tablet, Rfl: 1     ranitidine (ZANTAC) 150 MG tablet, 1 tab daily but if further symptoms ok to take up to 2 tabs daily., Disp: 60 tablet, Rfl: 11     Respiratory Therapy Supplies (NEBULIZER/TUBING/MOUTHPIECE) KIT, Use with albuterol neb solution, Disp: 1 kit, Rfl: 0     Tiotropium Bromide Monohydrate 1.25 MCG/ACT AERS, Inhale 2 puffs into the lungs daily, Disp: 1 Inhaler, Rfl: 3     sucralfate (CARAFATE) 1 GM tablet, Take 1 tablet (1 g) by mouth 4 times daily (before meals and nightly) (Patient not taking: Reported on 2019), Disp: 360 tablet, Rfl: 0    SOCIAL HISTORY:  Social History     Socioeconomic History     Marital status:      Spouse name: Not on file     Number of children: Not on file     Years of education: Not on file     Highest education level: Not on file   Social Needs     Financial resource strain: Not on file     Food insecurity - worry: Not on file     Food insecurity - inability: Not on file     Transportation needs - medical: Not on file     Transportation needs - non-medical: Not on file   Occupational History     Comment: Retired   Tobacco Use     Smoking status: Former Smoker     Packs/day: 2.00     Years: 20.00     Pack years: 40.00     Types: Cigarettes     Last attempt to quit: 1987     Years since quittin.8     Smokeless tobacco: Never Used   Substance and Sexual Activity     Alcohol use: Yes     Comment: 1 nightly     Drug use: No     Sexual activity: Not Currently     Partners: Male     Birth control/protection: Post-menopausal   Other Topics Concern     Parent/sibling w/ CABG, MI or angioplasty before 65F 55M? No   Social History Narrative    ENVIRONMENTAL HISTORY: [place] The home is heated with a forced air but does not use.  Has floor heating. They do have central air conditioning. The patient's bedroom is furnished with carpeting in bedroom.  No pets  inside the house. But is exposed to chicken. There is no history of cockroach or mice infestation. There are no smokers in the house.  The house does not have a damp basement.        FAMILY HISTORY: (I personally reviewed this history with the patient at today's visit)  Family History   Problem Relation Age of Onset     Other Cancer Mother         panreatic     Diabetes Mother      Breast Cancer Sister      Skin Cancer Sister      Cerebrovascular Disease Father         CVA     Chronic Obstructive Pulmonary Disease Father      Aneurysm Paternal Grandmother      Unknown/Adopted Paternal Grandfather      Alzheimer Disease Brother         early onset     Diabetes Son      Thyroid Disease Son      Breast Cancer Paternal Aunt      Diabetes Son      Breast Cancer Sister         Review of Systems   Constitutional: Negative for fatigue, fever and unexpected weight change.   HENT: Negative for nosebleeds, trouble swallowing and voice change.    Eyes: Negative for visual disturbance.   Respiratory: Negative for chest tightness and shortness of breath.    Cardiovascular: Negative for chest pain and leg swelling.   Gastrointestinal: Negative for abdominal pain and blood in stool.        Acid reflux     Genitourinary: Negative for dysuria, frequency and hematuria.   Skin: Negative for pallor and rash.   Neurological: Negative for speech difficulty, weakness and light-headedness.   Psychiatric/Behavioral: The patient is not nervous/anxious.      PHYSICAL EXAMINATION:  Constitutional: aaox3, cooperative, pleasant, not dyspneic/diaphoretic, no acute distress  Vitals reviewed: /79 (BP Location: Left arm, Patient Position: Sitting, Cuff Size: Adult Large)   Pulse 69   Temp 97.1  F (36.2  C) (Oral)   Resp 20   SpO2 97%    Wt:   Wt Readings from Last 2 Encounters:   01/21/19 104.8 kg (231 lb)   01/09/19 109.1 kg (240 lb 8 oz)        Physical Exam   Constitutional: She is oriented to person, place, and time. She appears  well-developed and well-nourished.   obese   HENT:   Head: Normocephalic and atraumatic.   Eyes: Pupils are equal, round, and reactive to light. No scleral icterus.   Cardiovascular: Normal rate and regular rhythm.   Pulmonary/Chest: Effort normal. No stridor. No respiratory distress.   Abdominal: Soft. Bowel sounds are normal. There is no tenderness.   Truncal obesity   Neurological: She is alert and oriented to person, place, and time.   Skin: Skin is warm and dry.   Psychiatric: She has a normal mood and affect. Her behavior is normal.   Nursing note and vitals reviewed.        PERTINENT STUDIES: (I personally reviewed these laboratory studies today)  Most recent CBC:  Recent Labs   Lab Test 07/23/18  1751 12/28/16  1435 11/14/16  1037   WBC 10.5  --  7.7   HGB 15.0 15.8* 15.5   HCT 44.7  --  48.1*     --  247     Most recent creatinine:  Recent Labs   Lab Test 02/27/18  1203 04/28/16  1108   CR 0.85 0.85       TSH   Date Value Ref Range Status   02/27/2018 2.23 0.40 - 4.00 mU/L Final         RADIOLOGY:    No pertinent imaging to review.         ASSESSMENT/PLAN:    Luz Elena Cristina is a 63 year old female who presents for follow up. She was initially seen on 1/9/19 for acid reflux that was not well controlled with zantac 150mg twice daily. We further evaluated her with an upper endoscopy which revealed hiatal hernia and chronic gastritis.  Negative for H. pylori.    We reviewed her upper endoscopy findings today and reviewed her continued symptoms.  We discussed necessary dietary changes including decreasing and limiting caffeine products, alcohol, carbonated drinks and sugar products. Patient was given information today of foods that could be causing/worseningher symptoms.  Strongly advised to make dietary changes.  Also advised patient to eat smaller meals. I will increase her Zantac 150 mg 2 tablets in the morning and 1 tablet in the evening to help better control her acid reflux.  We will follow-up with  her in 2 months time to check her progress.    A colonoscopy was also performed recently due to her history of colon polyps.  Colonoscopy was notable for multiple adenomatous polyps in the ascending colon the largest one being 12 mm.  She was also noted to have hyperplastic polyps in the descending colon.  She is advised to repeat colonoscopy in 3 years.  Sooner if any alarming or concerning symptoms arise.      Hiatal hernia  Heartburn  - ranitidine (ZANTAC) 150 MG tablet  Dispense: 90 tablet; Refill: 1  Adenomatous polyp of ascending colon  Hyperplastic polyp of descending colon        RTC 2 months.     Thank you for this consultation.  It was a pleasure to participate in the care of this patient; please contact us with any further questions.  A total of 25 minutes, face to face, was spent with this patient, >50% of which was counseling regarding the above delineated issues.    This note was created with voice recognition software, and while reviewed for accuracy, typos may remain.     Aidan Camacho PA-C  Gastroenterology  [facility]

## 2019-02-26 ENCOUNTER — OFFICE VISIT (OUTPATIENT)
Dept: ALLERGY | Facility: CLINIC | Age: 64
End: 2019-02-26
Payer: MEDICARE

## 2019-02-26 VITALS
RESPIRATION RATE: 16 BRPM | SYSTOLIC BLOOD PRESSURE: 130 MMHG | TEMPERATURE: 97.2 F | DIASTOLIC BLOOD PRESSURE: 83 MMHG | HEART RATE: 80 BPM | WEIGHT: 238.76 LBS | OXYGEN SATURATION: 96 % | BODY MASS INDEX: 39.13 KG/M2

## 2019-02-26 DIAGNOSIS — J45.50 SEVERE PERSISTENT ASTHMA WITHOUT COMPLICATION (H): Primary | ICD-10-CM

## 2019-02-26 DIAGNOSIS — J31.0 CHRONIC RHINITIS: ICD-10-CM

## 2019-02-26 LAB
FEF 25/75: NORMAL
FEV-1: NORMAL
FEV1/FVC: NORMAL
FVC: NORMAL

## 2019-02-26 PROCEDURE — 99214 OFFICE O/P EST MOD 30 MIN: CPT | Mod: 25 | Performed by: ALLERGY & IMMUNOLOGY

## 2019-02-26 PROCEDURE — 94010 BREATHING CAPACITY TEST: CPT | Performed by: ALLERGY & IMMUNOLOGY

## 2019-02-26 RX ORDER — AZELASTINE 1 MG/ML
2 SPRAY, METERED NASAL 2 TIMES DAILY PRN
Qty: 30 ML | Refills: 3 | Status: SHIPPED | OUTPATIENT
Start: 2019-02-26 | End: 2020-05-01

## 2019-02-26 ASSESSMENT — ENCOUNTER SYMPTOMS
JOINT SWELLING: 0
CHILLS: 0
WHEEZING: 0
ADENOPATHY: 0
CHEST TIGHTNESS: 0
MYALGIAS: 0
SHORTNESS OF BREATH: 0
FACIAL SWELLING: 0
VOMITING: 0
SINUS PRESSURE: 1
COUGH: 1
ACTIVITY CHANGE: 0
HEADACHES: 1
NAUSEA: 0
EYE REDNESS: 0
FEVER: 0
EYE DISCHARGE: 0
DIARRHEA: 0
RHINORRHEA: 1
EYE ITCHING: 0
ARTHRALGIAS: 0

## 2019-02-26 NOTE — LETTER
2/26/2019         RE: Luz Elena Cristina  [phone] [facility] [address]        Dear Colleague,    Thank you for referring your patient, Luz Elena Cristina, to the [facility]. Please see a copy of my visit note below.    SUBJECTIVE:                                                                 Luz Elena Cristina presents today to our Allergy Clinic at [facility] for a follow up visit.  As you know, she is a 64 year old female with severe persistent asthma and chronic rhinitis.    Asthma since childhood. November 2016: Negative serum IgE to regional aeroallergen panel. CBC with differential without eosinophilia.  Normal total serum IgE.  Negative ABPA panel.  Alpha-1 antitrypsin within normal limits.  Normal CBC with differential without hypereosinophilia.  The PSG sleep study demonstrated moderate, REM, and supine predominant NEGRA with some sleep associated hypoxemia.    In regards to her asthma, she continues doing well on Breo Ellipta 200/25 mcg 1 puff once daily, Spiriva Respimat daily, and albuterol inhaler as needed. She uses albuterol approximately twice a week for cough, chest tightness, and dyspnea.  She does not feel any different compared with several months ago.  She is pleased with her chest symptoms control.  She is on CPAP and sleeps well. She is waking up less than twice per month due to chest symptoms.  There has been no use of oral steroids since the last visit. No ED/PCP/urgent care/other specialist visits for asthma flare since the last visit. The patient denies current chest tightness, cough, wheeze or shortness of breath.       For rhinitis, she uses azelastine on as needed basis and if it has been helpful. He mainly uses it after CPAP.  Had some mild sinus pressure last week. Otherwise, she is doing well. The patient denies clear rhinorrhea, nasal itch, stuffiness, sneezing or interval sinusitis symptoms of fever, facial pain or purulent rhinorrhea.    CHEST  TWO VIEWS  6/11/2018 11:45 AM      HISTORY: 63-year-old with new productive cough.                                                                       IMPRESSION: Since November 14, 2016, heart size is normal. No pleural  effusion, pneumothorax, or abnormal area of consolidation. Minimal  inferolateral opacity in the left hemithorax is unchanged, likely scar  formation or atelectasis.     SHAE SERRANO MD      Patient Active Problem List   Diagnosis     Severe persistent asthma     Lichen sclerosus     Benign essential hypertension     Elevated glucose     Family history of diabetes mellitus     Severe obesity (BMI 35.0-39.9) with comorbidity (H)     At risk for cardiovascular event       Past Medical History:   Diagnosis Date     Hypertension      Sleep apnea      Uncomplicated asthma       Problem (# of Occurrences) Relation (Name,Age of Onset)    Alzheimer Disease (1) Brother: early onset    Aneurysm (1) Paternal Grandmother    Breast Cancer (3) Sister, Paternal Aunt, Sister (bob mccrary)    Cerebrovascular Disease (1) Father (ben felder): CVA    Chronic Obstructive Pulmonary Disease (1) Father (ben felder)    Diabetes (3) Mother (fawn felder), Son, Son (oziel barnes)    Other Cancer (1) Mother (fawn felder): panreatic    Skin Cancer (1) Sister    Thyroid Disease (1) Son    Unknown/Adopted (1) Paternal Grandfather (radha alicea)        Past Surgical History:   Procedure Laterality Date     BIOPSY      breast,     COLONOSCOPY       COLONOSCOPY N/A 2/8/2019    Procedure: Combined Colonoscopy, Single Or Multiple Biopsy/Polypectomy By Biopsy;  Surgeon: José Miguel Ochoa MD;  Location: [facility] OR     COLONOSCOPY WITH CO2 INSUFFLATION N/A 2/8/2019    Procedure: COLONOSCOPY WITH CO2 INSUFFLATION;  Surgeon: José Miguel Ochoa MD;  Location: [facility]     COMBINED ESOPHAGOSCOPY, GASTROSCOPY, DUODENOSCOPY (EGD) WITH CO2 INSUFFLATION N/A 2/8/2019    Procedure: COMBINED  ESOPHAGOSCOPY, GASTROSCOPY, DUODENOSCOPY (EGD) WITH CO2 INSUFFLATION;  Surgeon: José Miguel Ochoa MD;  Location: [facility] OR     ESOPHAGOSCOPY, GASTROSCOPY, DUODENOSCOPY (EGD), COMBINED N/A 2019    Procedure: Combined Esophagoscopy, Gastroscopy, Duodenoscopy (Egd), Biopsy Single Or Multiple;  Surgeon: José Miguel Ochoa MD;  Location: [facility] OR     GYN SURGERY       TONSILLECTOMY       Social History     Socioeconomic History     Marital status:      Spouse name: None     Number of children: None     Years of education: None     Highest education level: None   Occupational History     Comment: Retired   Social Needs     Financial resource strain: None     Food insecurity:     Worry: None     Inability: None     Transportation needs:     Medical: None     Non-medical: None   Tobacco Use     Smoking status: Former Smoker     Packs/day: 2.00     Years: 20.00     Pack years: 40.00     Types: Cigarettes     Last attempt to quit: 1987     Years since quittin.8     Smokeless tobacco: Never Used   Substance and Sexual Activity     Alcohol use: Yes     Comment: 1 nightly     Drug use: No     Sexual activity: Not Currently     Partners: Male     Birth control/protection: Post-menopausal   Lifestyle     Physical activity:     Days per week: None     Minutes per session: None     Stress: None   Relationships     Social connections:     Talks on phone: None     Gets together: None     Attends Zoroastrian service: None     Active member of club or organization: None     Attends meetings of clubs or organizations: None     Relationship status: None     Intimate partner violence:     Fear of current or ex partner: None     Emotionally abused: None     Physically abused: None     Forced sexual activity: None   Other Topics Concern     Parent/sibling w/ CABG, MI or angioplasty before 65F 55M? No   Social History Narrative    ENVIRONMENTAL HISTORY: [place]  home is heated with a forced air but does not use.  Has floor heating. They do have central air conditioning. The patient's bedroom is furnished with carpeting in bedroom.  No pets inside the house. But is exposed to chicken. There is no history of cockroach or mice infestation. There are no smokers in the house.  The house does not have a damp basement.            Review of Systems   Constitutional: Negative for activity change, chills and fever.   HENT: Positive for postnasal drip, rhinorrhea and sinus pressure. Negative for congestion, dental problem, ear pain, facial swelling, nosebleeds and sneezing.    Eyes: Negative for discharge, redness and itching.   Respiratory: Positive for cough. Negative for chest tightness, shortness of breath and wheezing.    Cardiovascular: Negative for chest pain.   Gastrointestinal: Negative for diarrhea, nausea and vomiting.   Musculoskeletal: Negative for arthralgias, joint swelling and myalgias.   Skin: Negative for rash.   Neurological: Positive for headaches.   Hematological: Negative for adenopathy.   Psychiatric/Behavioral: Negative for behavioral problems and self-injury.           Current Outpatient Medications:      albuterol (2.5 MG/3ML) 0.083% neb solution, Take 1 vial (2.5 mg) by nebulization every 4 hours as needed for shortness of breath / dyspnea, wheezing or other (persistent cough), Disp: 50 vial, Rfl: 1     albuterol (PROAIR HFA/PROVENTIL HFA/VENTOLIN HFA) 108 (90 Base) MCG/ACT Inhaler, Inhale 2-4 puffs into the lungs every 4 hours as needed for shortness of breath / dyspnea or wheezing, Disp: 1 Inhaler, Rfl: 3     azelastine (ASTELIN) 0.1 % nasal spray, Spray 2 sprays into both nostrils 2 times daily as needed for rhinitis or allergies, Disp: 30 mL, Rfl: 3     clobetasol (TEMOVATE) 0.05 % cream, Apply sparingly to affected area twice weekly as needed.  Do not apply to face., Disp: 30 g, Rfl: 0     fluticasone-vilanterol (BREO ELLIPTA) 200-25 MCG/INH inhaler,  Inhale 1 puff into the lungs daily, Disp: 1 Inhaler, Rfl: 3     hydrochlorothiazide (HYDRODIURIL) 12.5 MG tablet, Take 1 tablet (12.5 mg) by mouth daily, Disp: 30 tablet, Rfl: 1     ranitidine (ZANTAC) 150 MG tablet, Take 2 tablets in the morning and 1 tablet in the evening, Disp: 90 tablet, Rfl: 1     Respiratory Therapy Supplies (NEBULIZER/TUBING/MOUTHPIECE) KIT, Use with albuterol neb solution, Disp: 1 kit, Rfl: 0     Tiotropium Bromide Monohydrate 1.25 MCG/ACT AERS, Inhale 2 puffs into the lungs daily, Disp: 1 Inhaler, Rfl: 3  Immunization History   Administered Date(s) Administered     Influenza (High Dose) 3 valent vaccine 02/27/2018, 09/18/2018     Influenza (IIV3) PF 11/08/2012     Influenza Vaccine IM 3yrs+ 4 Valent IIV4 12/12/2016     Pneumo Conj 13-V (2010&after) 02/27/2018     TDAP Vaccine (Adacel) 11/08/2012     Allergies   Allergen Reactions     Cartia Xt [Diltiazem] Other (See Comments)     Heartburn whenever on this medication     Losartan Cough     Sulfa Drugs Rash     OBJECTIVE:                                                                 /83 (BP Location: Left arm, Patient Position: Sitting, Cuff Size: Adult Regular)   Pulse 80   Temp 97.2  F (36.2  C) (Oral)   Resp 16   Wt 108.3 kg (238 lb 12.1 oz)   SpO2 96%   BMI 39.13 kg/m          Physical Exam   Constitutional: She is oriented to person, place, and time. No distress.   HENT:   Head: Normocephalic and atraumatic.   Right Ear: Tympanic membrane, external ear and ear canal normal.   Left Ear: Tympanic membrane, external ear and ear canal normal.   Nose: No mucosal edema or rhinorrhea.   Mouth/Throat: Oropharynx is clear and moist and mucous membranes are normal. No oropharyngeal exudate, posterior oropharyngeal edema or posterior oropharyngeal erythema.   Eyes: Conjunctivae are normal. Right eye exhibits no discharge. Left eye exhibits no discharge.   Neck: Normal range of motion.   Cardiovascular: Normal rate, regular rhythm and  normal heart sounds.   No murmur heard.  Pulmonary/Chest: Effort normal and breath sounds normal. No respiratory distress. She has no wheezes. She has no rales.   Musculoskeletal: Normal range of motion.   Lymphadenopathy:     She has no cervical adenopathy.   Neurological: She is alert and oriented to person, place, and time.   Skin: Skin is warm. No rash noted. She is not diaphoretic.   Psychiatric: She has a normal mood and affect.   Nursing note and vitals reviewed.      WORKUP:   SPIROMETRY       FVC 2.57L (78% of predicted).     FEV1 1.47L (58% of predicted).     FEV1/FVC 57%     FEF 25%-75%  0.67L/s (31% of predicted)      My interpretation:The office spirometry performed today suggests moderate obstruction.   there is a stable FEV1 compared with the previous study, but it is still lower compared with earlier spirometry results.        Asthma Control Test (ACT) total score: 21       ASSESSMENT/PLAN:      Problem List Items Addressed This Visit        Respiratory    1. Severe persistent asthma - Primary  Symptoms well controlled.  However, the reasons a decline in FEV1.  I will order chest CT.  If normal,  continue current management of Breo Ellipta 200/25 mcg 1 puff once daily, Spiriva Respimat 1.25 mcg 2 inhalations once daily, and albuterol inhaler as needed.  Depending on future FEV1, may consider biologics.    Relevant Medications    fluticasone-vilanterol (BREO ELLIPTA) 200-25 MCG/INH inhaler    Tiotropium Bromide Monohydrate 1.25 MCG/ACT AERS    azelastine (ASTELIN) 0.1 % nasal spray    Other Relevant Orders    Spirometry, Breathing Capacity (Completed)    CT Chest w/o Contrast      Other Visit Diagnoses     2. Chronic rhinitis      Currently well controlled with azelastine 2 sprays in each nostril twice daily as needed  -Continue as is.    Relevant Medications    azelastine (ASTELIN) 0.1 % nasal spray        Return in about 3 months (around 5/26/2019), or if symptoms worsen or fail to improve.    Thank  you for allowing us to participate in the care of this patient. Please feel free to contact us if there are any questions or concerns about the patient.    Disclaimer: This note consists of symbols derived from keyboarding, dictation and/or voice recognition software. As a result, there may be errors in the script that have gone undetected. Please consider this when interpreting information found in this chart.    Galindo Montano MD, formerly [facility]  Allergy, Asthma and Immunology  [place], [place] and [place]      Again, thank you for allowing me to participate in the care of your patient.        Sincerely,        Galindo Montano MD

## 2019-02-26 NOTE — PROGRESS NOTES
SUBJECTIVE:                                                                 Luz Elena Cristina presents today to our Allergy Clinic at [facility] for a follow up visit.  As you know, she is a 64 year old female with severe persistent asthma and chronic rhinitis.    Asthma since childhood. November 2016: Negative serum IgE to regional aeroallergen panel. CBC with differential without eosinophilia.  Normal total serum IgE.  Negative ABPA panel.  Alpha-1 antitrypsin within normal limits.  Normal CBC with differential without hypereosinophilia.  The PSG sleep study demonstrated moderate, REM, and supine predominant NEGRA with some sleep associated hypoxemia.    In regards to her asthma, she continues doing well on Breo Ellipta 200/25 mcg 1 puff once daily, Spiriva Respimat daily, and albuterol inhaler as needed. She uses albuterol approximately twice a week for cough, chest tightness, and dyspnea.  She does not feel any different compared with several months ago.  She is pleased with her chest symptoms control.  She is on CPAP and sleeps well. She is waking up less than twice per month due to chest symptoms.  There has been no use of oral steroids since the last visit. No ED/PCP/urgent care/other specialist visits for asthma flare since the last visit. The patient denies current chest tightness, cough, wheeze or shortness of breath.       For rhinitis, she uses azelastine on as needed basis and if it has been helpful. He mainly uses it after CPAP.  Had some mild sinus pressure last week. Otherwise, she is doing well. The patient denies clear rhinorrhea, nasal itch, stuffiness, sneezing or interval sinusitis symptoms of fever, facial pain or purulent rhinorrhea.    CHEST TWO VIEWS  6/11/2018 11:45 AM      HISTORY: 63-year-old with new productive cough.                                                                       IMPRESSION: Since November 14, 2016, heart size is normal. No pleural  effusion,  pneumothorax, or abnormal area of consolidation. Minimal  inferolateral opacity in the left hemithorax is unchanged, likely scar  formation or atelectasis.     SHAE SERRANO MD      Patient Active Problem List   Diagnosis     Severe persistent asthma     Lichen sclerosus     Benign essential hypertension     Elevated glucose     Family history of diabetes mellitus     Severe obesity (BMI 35.0-39.9) with comorbidity (H)     At risk for cardiovascular event       Past Medical History:   Diagnosis Date     Hypertension      Sleep apnea      Uncomplicated asthma       Problem (# of Occurrences) Relation (Name,Age of Onset)    Alzheimer Disease (1) Brother: early onset    Aneurysm (1) Paternal Grandmother    Breast Cancer (3) Sister, Paternal Aunt, Sister (bob mccrary)    Cerebrovascular Disease (1) Father (ben felder): CVA    Chronic Obstructive Pulmonary Disease (1) Father (ben felder)    Diabetes (3) Mother (fawn felder), Son, Son (oziel barnes)    Other Cancer (1) Mother (fawn felder): panreatic    Skin Cancer (1) Sister    Thyroid Disease (1) Son    Unknown/Adopted (1) Paternal Grandfather (radha alicea)        Past Surgical History:   Procedure Laterality Date     BIOPSY      breast,     COLONOSCOPY       COLONOSCOPY N/A 2/8/2019    Procedure: Combined Colonoscopy, Single Or Multiple Biopsy/Polypectomy By Biopsy;  Surgeon: José Miguel cOhoa MD;  Location: [facility] OR     COLONOSCOPY WITH CO2 INSUFFLATION N/A 2/8/2019    Procedure: COLONOSCOPY WITH CO2 INSUFFLATION;  Surgeon: José Miguel Ochoa MD;  Location: [facility] OR     COMBINED ESOPHAGOSCOPY, GASTROSCOPY, DUODENOSCOPY (EGD) WITH CO2 INSUFFLATION N/A 2/8/2019    Procedure: COMBINED ESOPHAGOSCOPY, GASTROSCOPY, DUODENOSCOPY (EGD) WITH CO2 INSUFFLATION;  Surgeon: José Miguel Ochoa MD;  Location: [facility] OR     ESOPHAGOSCOPY, GASTROSCOPY, DUODENOSCOPY (EGD), COMBINED N/A 2/8/2019    Procedure: Combined  Esophagoscopy, Gastroscopy, Duodenoscopy (Egd), Biopsy Single Or Multiple;  Surgeon: José Miguel Ochoa MD;  Location: [facility] OR     GYN SURGERY       TONSILLECTOMY       Social History     Socioeconomic History     Marital status:      Spouse name: None     Number of children: None     Years of education: None     Highest education level: None   Occupational History     Comment: Retired   Social Needs     Financial resource strain: None     Food insecurity:     Worry: None     Inability: None     Transportation needs:     Medical: None     Non-medical: None   Tobacco Use     Smoking status: Former Smoker     Packs/day: 2.00     Years: 20.00     Pack years: 40.00     Types: Cigarettes     Last attempt to quit: 1987     Years since quittin.8     Smokeless tobacco: Never Used   Substance and Sexual Activity     Alcohol use: Yes     Comment: 1 nightly     Drug use: No     Sexual activity: Not Currently     Partners: Male     Birth control/protection: Post-menopausal   Lifestyle     Physical activity:     Days per week: None     Minutes per session: None     Stress: None   Relationships     Social connections:     Talks on phone: None     Gets together: None     Attends Rastafarian service: None     Active member of club or organization: None     Attends meetings of clubs or organizations: None     Relationship status: None     Intimate partner violence:     Fear of current or ex partner: None     Emotionally abused: None     Physically abused: None     Forced sexual activity: None   Other Topics Concern     Parent/sibling w/ CABG, MI or angioplasty before 65F 55M? No   Social History Narrative    ENVIRONMENTAL HISTORY: [place] The home is heated with a forced air but does not use.  Has floor heating. They do have central air conditioning. The patient's bedroom is furnished with carpeting in bedroom.  No pets inside the house. But is exposed to chicken.  There is no history of cockroach or mice infestation. There are no smokers in the house.  The house does not have a damp basement.            Review of Systems   Constitutional: Negative for activity change, chills and fever.   HENT: Positive for postnasal drip, rhinorrhea and sinus pressure. Negative for congestion, dental problem, ear pain, facial swelling, nosebleeds and sneezing.    Eyes: Negative for discharge, redness and itching.   Respiratory: Positive for cough. Negative for chest tightness, shortness of breath and wheezing.    Cardiovascular: Negative for chest pain.   Gastrointestinal: Negative for diarrhea, nausea and vomiting.   Musculoskeletal: Negative for arthralgias, joint swelling and myalgias.   Skin: Negative for rash.   Neurological: Positive for headaches.   Hematological: Negative for adenopathy.   Psychiatric/Behavioral: Negative for behavioral problems and self-injury.           Current Outpatient Medications:      albuterol (2.5 MG/3ML) 0.083% neb solution, Take 1 vial (2.5 mg) by nebulization every 4 hours as needed for shortness of breath / dyspnea, wheezing or other (persistent cough), Disp: 50 vial, Rfl: 1     albuterol (PROAIR HFA/PROVENTIL HFA/VENTOLIN HFA) 108 (90 Base) MCG/ACT Inhaler, Inhale 2-4 puffs into the lungs every 4 hours as needed for shortness of breath / dyspnea or wheezing, Disp: 1 Inhaler, Rfl: 3     azelastine (ASTELIN) 0.1 % nasal spray, Spray 2 sprays into both nostrils 2 times daily as needed for rhinitis or allergies, Disp: 30 mL, Rfl: 3     clobetasol (TEMOVATE) 0.05 % cream, Apply sparingly to affected area twice weekly as needed.  Do not apply to face., Disp: 30 g, Rfl: 0     fluticasone-vilanterol (BREO ELLIPTA) 200-25 MCG/INH inhaler, Inhale 1 puff into the lungs daily, Disp: 1 Inhaler, Rfl: 3     hydrochlorothiazide (HYDRODIURIL) 12.5 MG tablet, Take 1 tablet (12.5 mg) by mouth daily, Disp: 30 tablet, Rfl: 1     ranitidine (ZANTAC) 150 MG tablet, Take 2  tablets in the morning and 1 tablet in the evening, Disp: 90 tablet, Rfl: 1     Respiratory Therapy Supplies (NEBULIZER/TUBING/MOUTHPIECE) KIT, Use with albuterol neb solution, Disp: 1 kit, Rfl: 0     Tiotropium Bromide Monohydrate 1.25 MCG/ACT AERS, Inhale 2 puffs into the lungs daily, Disp: 1 Inhaler, Rfl: 3  Immunization History   Administered Date(s) Administered     Influenza (High Dose) 3 valent vaccine 02/27/2018, 09/18/2018     Influenza (IIV3) PF 11/08/2012     Influenza Vaccine IM 3yrs+ 4 Valent IIV4 12/12/2016     Pneumo Conj 13-V (2010&after) 02/27/2018     TDAP Vaccine (Adacel) 11/08/2012     Allergies   Allergen Reactions     Cartia Xt [Diltiazem] Other (See Comments)     Heartburn whenever on this medication     Losartan Cough     Sulfa Drugs Rash     OBJECTIVE:                                                                 /83 (BP Location: Left arm, Patient Position: Sitting, Cuff Size: Adult Regular)   Pulse 80   Temp 97.2  F (36.2  C) (Oral)   Resp 16   Wt 108.3 kg (238 lb 12.1 oz)   SpO2 96%   BMI 39.13 kg/m          Physical Exam   Constitutional: She is oriented to person, place, and time. No distress.   HENT:   Head: Normocephalic and atraumatic.   Right Ear: Tympanic membrane, external ear and ear canal normal.   Left Ear: Tympanic membrane, external ear and ear canal normal.   Nose: No mucosal edema or rhinorrhea.   Mouth/Throat: Oropharynx is clear and moist and mucous membranes are normal. No oropharyngeal exudate, posterior oropharyngeal edema or posterior oropharyngeal erythema.   Eyes: Conjunctivae are normal. Right eye exhibits no discharge. Left eye exhibits no discharge.   Neck: Normal range of motion.   Cardiovascular: Normal rate, regular rhythm and normal heart sounds.   No murmur heard.  Pulmonary/Chest: Effort normal and breath sounds normal. No respiratory distress. She has no wheezes. She has no rales.   Musculoskeletal: Normal range of motion.   Lymphadenopathy:      She has no cervical adenopathy.   Neurological: She is alert and oriented to person, place, and time.   Skin: Skin is warm. No rash noted. She is not diaphoretic.   Psychiatric: She has a normal mood and affect.   Nursing note and vitals reviewed.      WORKUP:   SPIROMETRY       FVC 2.57L (78% of predicted).     FEV1 1.47L (58% of predicted).     FEV1/FVC 57%     FEF 25%-75%  0.67L/s (31% of predicted)      My interpretation:The office spirometry performed today suggests moderate obstruction.   there is a stable FEV1 compared with the previous study, but it is still lower compared with earlier spirometry results.        Asthma Control Test (ACT) total score: 21       ASSESSMENT/PLAN:      Problem List Items Addressed This Visit        Respiratory    1. Severe persistent asthma - Primary  Symptoms well controlled.  However, there is a decline in FEV1.  I will order chest CT.  If normal,  continue current management of Breo Ellipta 200/25 mcg 1 puff once daily, Spiriva Respimat 1.25 mcg 2 inhalations once daily, and albuterol inhaler as needed.  Depending on future FEV1, may consider biologics.    Relevant Medications    fluticasone-vilanterol (BREO ELLIPTA) 200-25 MCG/INH inhaler    Tiotropium Bromide Monohydrate 1.25 MCG/ACT AERS    azelastine (ASTELIN) 0.1 % nasal spray    Other Relevant Orders    Spirometry, Breathing Capacity (Completed)    CT Chest w/o Contrast      Other Visit Diagnoses     2. Chronic rhinitis      Currently well controlled with azelastine 2 sprays in each nostril twice daily as needed  -Continue as is.    Relevant Medications    azelastine (ASTELIN) 0.1 % nasal spray        Return in about 3 months (around 5/26/2019), or if symptoms worsen or fail to improve.    Thank you for allowing us to participate in the care of this patient. Please feel free to contact us if there are any questions or concerns about the patient.    Disclaimer: This note consists of symbols derived from keyboarding,  dictation and/or voice recognition software. As a result, there may be errors in the script that have gone undetected. Please consider this when interpreting information found in this chart.    Galindo Montano MD, FACI  Allergy, Asthma and Immunology  [place], [place] and [facility]

## 2019-02-27 ASSESSMENT — ASTHMA QUESTIONNAIRES: ACT_TOTALSCORE: 21

## 2019-02-28 ENCOUNTER — HOSPITAL ENCOUNTER (OUTPATIENT)
Dept: CT IMAGING | Facility: CLINIC | Age: 64
Discharge: HOME | End: 2019-02-28
Attending: ALLERGY & IMMUNOLOGY | Admitting: ALLERGY & IMMUNOLOGY
Payer: MEDICARE

## 2019-02-28 ENCOUNTER — TELEPHONE (OUTPATIENT)
Dept: ALLERGY | Facility: CLINIC | Age: 64
End: 2019-02-28

## 2019-02-28 DIAGNOSIS — I10 BENIGN ESSENTIAL HYPERTENSION: ICD-10-CM

## 2019-02-28 DIAGNOSIS — J45.50 SEVERE PERSISTENT ASTHMA WITHOUT COMPLICATION (H): ICD-10-CM

## 2019-02-28 DIAGNOSIS — J45.50 UNCOMPLICATED SEVERE PERSISTENT ASTHMA (H): Primary | ICD-10-CM

## 2019-02-28 LAB
BASOPHILS # BLD AUTO: 0 10E9/L (ref 0–0.2)
BASOPHILS NFR BLD AUTO: 0.3 %
DIFFERENTIAL METHOD BLD: NORMAL
EOSINOPHIL # BLD AUTO: 0.1 10E9/L (ref 0–0.7)
EOSINOPHIL NFR BLD AUTO: 1.4 %
ERYTHROCYTE [DISTWIDTH] IN BLOOD BY AUTOMATED COUNT: 12.6 % (ref 10–15)
HCT VFR BLD AUTO: 45.4 % (ref 35–47)
HGB BLD-MCNC: 15.2 G/DL (ref 11.7–15.7)
LYMPHOCYTES # BLD AUTO: 2.4 10E9/L (ref 0.8–5.3)
LYMPHOCYTES NFR BLD AUTO: 31.3 %
MCH RBC QN AUTO: 31.6 PG (ref 26.5–33)
MCHC RBC AUTO-ENTMCNC: 33.5 G/DL (ref 31.5–36.5)
MCV RBC AUTO: 94 FL (ref 78–100)
MONOCYTES # BLD AUTO: 0.9 10E9/L (ref 0–1.3)
MONOCYTES NFR BLD AUTO: 11.3 %
NEUTROPHILS # BLD AUTO: 4.3 10E9/L (ref 1.6–8.3)
NEUTROPHILS NFR BLD AUTO: 55.7 %
PLATELET # BLD AUTO: 248 10E9/L (ref 150–450)
RBC # BLD AUTO: 4.81 10E12/L (ref 3.8–5.2)
WBC # BLD AUTO: 7.8 10E9/L (ref 4–11)

## 2019-02-28 PROCEDURE — 86001 ALLERGEN SPECIFIC IGG: CPT | Mod: 90 | Performed by: ALLERGY & IMMUNOLOGY

## 2019-02-28 PROCEDURE — 86606 ASPERGILLUS ANTIBODY: CPT | Mod: 90 | Performed by: ALLERGY & IMMUNOLOGY

## 2019-02-28 PROCEDURE — 86003 ALLG SPEC IGE CRUDE XTRC EA: CPT | Mod: 90 | Performed by: ALLERGY & IMMUNOLOGY

## 2019-02-28 PROCEDURE — 82785 ASSAY OF IGE: CPT | Mod: 90 | Performed by: ALLERGY & IMMUNOLOGY

## 2019-02-28 PROCEDURE — 85025 COMPLETE CBC W/AUTO DIFF WBC: CPT | Performed by: ALLERGY & IMMUNOLOGY

## 2019-02-28 PROCEDURE — 36415 COLL VENOUS BLD VENIPUNCTURE: CPT | Performed by: ALLERGY & IMMUNOLOGY

## 2019-02-28 PROCEDURE — 71250 CT THORAX DX C-: CPT

## 2019-02-28 RX ORDER — AZITHROMYCIN 250 MG/1
TABLET, FILM COATED ORAL
Qty: 6 TABLET | Refills: 0 | Status: SHIPPED | OUTPATIENT
Start: 2019-02-28 | End: 2019-05-28

## 2019-02-28 NOTE — TELEPHONE ENCOUNTER
Called and spoke with pt regarding results and medication. Agreeable. States she will get her labs drawn right away. Informed her that she will need a repeat CT scan in 3 months. Also in agreement. No further questions at this time.     Praveena VALE   Allergy RN

## 2019-02-28 NOTE — RESULT ENCOUNTER NOTE
Chest CT with some opacities and small nodules that could be infectious.  It could also represent ABPA.  Follow-up CT is recommended in 3 months by Radiology.  Meanwhile, I recommend the patient to take azithromycin for 5 days and get the blood work done.  I will order CBC with differential, and ABPA panel.  I also ordered chest ct to be done in 3 months.

## 2019-02-28 NOTE — TELEPHONE ENCOUNTER
----- Message from Galindo Montano MD sent at 2/28/2019 11:56 AM CST -----  Regarding: chest CT results  Chest CT with some opacities and small nodules that could be infectious.  It could also represent ABPA.  Follow-up CT is recommended in 3 months by Radiology.  Meanwhile, I recommend the patient to take azithromycin for 5 days and get the blood work done.  I will order CBC with differential, and ABPA panel.  I also ordered chest ct to be done in 3 months.

## 2019-02-28 NOTE — PROGRESS NOTES
Chest CT with some opacities and small nodules that could be infectious.  It could also represent ABPA.  Follow-up CT is recommended in 3 months by Radiology.  Meanwhile, I recommend the patient to take azithromycin for 5 days and get the blood work done.  I will order CBC with differential, and ABPA panel.  I also ordered chest ct to be done in 3 months.   Galindo Montano

## 2019-03-03 LAB — DEPRECATED MISC ALLERGEN IGE RAST QL: NORMAL

## 2019-03-05 ENCOUNTER — TELEPHONE (OUTPATIENT)
Dept: ALLERGY | Facility: CLINIC | Age: 64
End: 2019-03-05

## 2019-03-05 LAB — LAB SCANNED RESULT: NORMAL

## 2019-03-05 NOTE — TELEPHONE ENCOUNTER
Called and spoke with pt regarding ARUP in her lab results. Informed her that was only an interpretation guideline and not actual results for her lab tests, which are still in process. Agreeable.     Discussed CT scan and results. No further questions at this time. Will await results.     Praveena VALE   Allergy RN

## 2019-03-05 NOTE — TELEPHONE ENCOUNTER
Reason for Call:  Other     Detailed comments: Pt had a CT chest and was started on Z-pack (she finished this on 03/04). She saw the CT results in MyChart but would like to discuss these with an RN (has some questions about the findings) - Please advise     Phone Number Patient can be reached at: Cell number on file:    Telephone Information:   Mobile [phone]       Best Time: Any    Can we leave a detailed message on this number? YES    Call taken on 3/5/2019 at 12:06 PM by Denise Behrendt  .ent

## 2019-03-06 LAB
A FUMIGATUS IGE QN: <0.1 KU/L
A FUMIGATUS1 AB SER QL ID: NORMAL
A FUMIGATUS6 AB SER QL ID: NORMAL
IGE SERPL-ACNC: 12 KU/L

## 2019-03-09 NOTE — TELEPHONE ENCOUNTER
Component      Latest Ref Rng & Units 2/28/2019   WBC      4.0 - 11.0 10e9/L 7.8   RBC Count      3.8 - 5.2 10e12/L 4.81   Hemoglobin      11.7 - 15.7 g/dL 15.2   Hematocrit      35.0 - 47.0 % 45.4   MCV      78 - 100 fl 94   MCH      26.5 - 33.0 pg 31.6   MCHC      31.5 - 36.5 g/dL 33.5   RDW      10.0 - 15.0 % 12.6   Platelet Count      150 - 450 10e9/L 248   % Neutrophils      % 55.7   % Lymphocytes      % 31.3   % Monocytes      % 11.3   % Eosinophils      % 1.4   % Basophils      % 0.3   Absolute Neutrophil      1.6 - 8.3 10e9/L 4.3   Absolute Lymphocytes      0.8 - 5.3 10e9/L 2.4   Absolute Monocytes      0.0 - 1.3 10e9/L 0.9   Absolute Eosinophils      0.0 - 0.7 10e9/L 0.1   Absolute Basophils      0.0 - 0.2 10e9/L 0.0   Diff Method       Automated Method   Immunoglobulin E      <=214 kU/L 12   Allergen Fungimold A Fumigatus IgE      <=0.34 kU/L <0.10   Aspergillus Fumagatis 1 Antibody      None Detected None Detected   Aspergillus Fumagatis 6 Antibody      None Detected None Detected   Lab Scanned Result       ASPERGILLUS NIGER IGG-Scanned   ARUP Allergen Interpretation       SEE NOTE       Negative work up for ABPA.  Galindo Montano

## 2019-03-12 ENCOUNTER — DOCUMENTATION ONLY (OUTPATIENT)
Dept: SLEEP MEDICINE | Facility: CLINIC | Age: 64
End: 2019-03-12
Payer: MEDICARE

## 2019-03-12 NOTE — PROGRESS NOTES
6 month [facility] Recheck Visit     Diagnostic AHI: 23.7  PSG    Data only recheck.      Assessment: Pt meeting objective benchmarks.   Patient compliance is 97% the last 180 days.   compliance is 97% the last 180 days.   Action plan:   Pt to follow up per provider request.      Device type: CPAP  PAP settings: CPAP fixed 8.0 cm  H20      Objective measures: 14 day rolling measures      Compliance  100 %      Leak  13.2 lpm  last  upload      AHI 3.81   last  upload      Average number of minutes 418      Objective measure goal  Compliance   Goal >70%  Leak   Goal < 24 lpm  AHI  Goal < 5  Usage  Goal >240

## 2019-03-18 ENCOUNTER — TELEPHONE (OUTPATIENT)
Dept: ALLERGY | Facility: CLINIC | Age: 64
End: 2019-03-18

## 2019-03-18 DIAGNOSIS — J45.50 SEVERE PERSISTENT ASTHMA WITHOUT COMPLICATION (H): Primary | ICD-10-CM

## 2019-03-18 RX ORDER — AZITHROMYCIN 250 MG/1
TABLET, FILM COATED ORAL
Qty: 6 TABLET | Refills: 0 | Status: SHIPPED | OUTPATIENT
Start: 2019-03-18 | End: 2019-05-28

## 2019-03-18 NOTE — TELEPHONE ENCOUNTER
Reason for Call:  Other     Detailed comments: Pt was started on a Z-pack a week or so ago - After finishing the Z-pack her phlegm is now gray-green in color. The phlegm is starting to clear up in color, but still has the phlegm, but states she is coughing a lot now. Runny nose for the first couple hours in the morning.     Phone Number Patient can be reached at: Cell number on file:    Telephone Information:   Mobile [phone]     Best Time: Any    Can we leave a detailed message on this number? YES    Call taken on 3/18/2019 at 11:32 AM by Denise Behrendt

## 2019-03-18 NOTE — TELEPHONE ENCOUNTER
Well, I really doubt that Z-Trey made her phlegm more discolored.  I recommend the patient to take another course of Z-Trey and let us know if there is any difference.  Galindo Montano

## 2019-03-18 NOTE — TELEPHONE ENCOUNTER
Called and spoke with pt regarding Z-pack. Agreeable. No further questions.     Praveena VALE   Allergy RN

## 2019-03-29 DIAGNOSIS — I10 BENIGN ESSENTIAL HYPERTENSION: ICD-10-CM

## 2019-03-29 NOTE — TELEPHONE ENCOUNTER
Last seen 1-21-19.  Did she try off BP med?  Did she start HCTZ?  What is BP from home  Argelia Casas RN

## 2019-03-29 NOTE — TELEPHONE ENCOUNTER
"Requested Prescriptions   Pending Prescriptions Disp Refills     hydrochlorothiazide (HYDRODIURIL) 12.5 MG tablet [Pharmacy Med Name: HYDROCHLOROTHIAZIDE 12.5MG TABLETS] 30 tablet 0     Sig: TAKE 1 TABLET(12.5 MG) BY MOUTH DAILY    Diuretics (Including Combos) Protocol Failed - 3/29/2019  2:13 PM       Failed - Normal serum creatinine on file in past 12 months    Recent Labs   Lab Test 02/27/18  1203   CR 0.85             Failed - Normal serum potassium on file in past 12 months    Recent Labs   Lab Test 02/27/18  1203   POTASSIUM 4.5                   Failed - Normal serum sodium on file in past 12 months    Recent Labs   Lab Test 02/27/18  1203                Passed - Blood pressure under 140/90 in past 12 months    BP Readings from Last 3 Encounters:   02/26/19 130/83   02/19/19 128/79   02/08/19 112/75                Passed - Recent (12 mo) or future (30 days) visit within the authorizing provider's specialty    Patient had office visit in the last 12 months or has a visit in the next 30 days with authorizing provider or within the authorizing provider's specialty.  See \"Patient Info\" tab in inbasket, or \"Choose Columns\" in Meds & Orders section of the refill encounter.             Passed - Medication is active on med list       Passed - Patient is age 18 or older       Passed - No active pregancy on record       Passed - No positive pregnancy test in past 12 months      hydrochlorothiazide (HYDRODIURIL) 12.5 MG tablet  Last Written Prescription Date:  01/28/2019  Last Fill Quantity: 30 tablet,  # refills: 1   Last office visit: 1/21/2019 with prescribing provider:  GUILLERMINA Zazueta   Future Office Visit:   Next 5 appointments (look out 90 days)    Apr 17, 2019 11:00 AM CDT  Return Visit with TERE [facility] ([facility]) [id number] [place] [id number] [phone]   May 28, 2019 11:00 AM CDT  Return Visit with Galindo Montano MD  Robert " [facility] ([facility]) [address] [facility] [address]  [phone]         Cyn LEE (R) (M)

## 2019-04-01 RX ORDER — HYDROCHLOROTHIAZIDE 12.5 MG/1
TABLET ORAL
Qty: 30 TABLET | Refills: 0 | Status: SHIPPED | OUTPATIENT
Start: 2019-04-01 | End: 2019-04-27

## 2019-04-01 NOTE — TELEPHONE ENCOUNTER
She is taking the HCTZ.  BP she says is OK at home and is < 140/90.  This /80.  Argelia Casas RN

## 2019-04-17 ENCOUNTER — OFFICE VISIT (OUTPATIENT)
Dept: GASTROENTEROLOGY | Facility: CLINIC | Age: 64
End: 2019-04-17
Payer: MEDICARE

## 2019-04-17 VITALS
DIASTOLIC BLOOD PRESSURE: 88 MMHG | HEART RATE: 78 BPM | BODY MASS INDEX: 35.34 KG/M2 | WEIGHT: 219.9 LBS | SYSTOLIC BLOOD PRESSURE: 143 MMHG | HEIGHT: 66 IN | OXYGEN SATURATION: 99 %

## 2019-04-17 DIAGNOSIS — K44.9 HIATAL HERNIA: Primary | ICD-10-CM

## 2019-04-17 DIAGNOSIS — K63.5 HYPERPLASTIC POLYP OF DESCENDING COLON: ICD-10-CM

## 2019-04-17 DIAGNOSIS — D12.2 ADENOMATOUS POLYP OF ASCENDING COLON: ICD-10-CM

## 2019-04-17 DIAGNOSIS — R12 HEARTBURN: ICD-10-CM

## 2019-04-17 PROCEDURE — 99213 OFFICE O/P EST LOW 20 MIN: CPT | Performed by: PHYSICIAN ASSISTANT

## 2019-04-17 ASSESSMENT — ENCOUNTER SYMPTOMS
SPEECH DIFFICULTY: 0
BLOOD IN STOOL: 0
WEAKNESS: 0
NAUSEA: 0
VOMITING: 0
SHORTNESS OF BREATH: 0
CHEST TIGHTNESS: 0
TROUBLE SWALLOWING: 0
DIARRHEA: 0
DYSURIA: 0
HEMATURIA: 0
ABDOMINAL PAIN: 0
FATIGUE: 0
NERVOUS/ANXIOUS: 0
FREQUENCY: 0
VOICE CHANGE: 0
FEVER: 0
LIGHT-HEADEDNESS: 0
CONSTIPATION: 0

## 2019-04-17 ASSESSMENT — PAIN SCALES - GENERAL: PAINLEVEL: NO PAIN (0)

## 2019-04-17 ASSESSMENT — MIFFLIN-ST. JEOR: SCORE: 1546.27

## 2019-04-17 NOTE — PATIENT INSTRUCTIONS
Continue zantac 2 tablets every morning and 1 every evening as needed.   Continue healthy lifestyle measures.   Avoiding spicy foods/greasy foods/acidic foods.   Avoid NSAID's.

## 2019-04-17 NOTE — PROGRESS NOTES
GASTROENTEROLOGY FOLLOW UP CLINIC VISIT    CC/REFERRING MD:    Kiara Zazueta    REASON FOR CONSULTATION:   Referred by Galindo Montano for Follow Up RECHECK (2 month follow up)      HISTORY OF PRESENT ILLNESS:    Luz Elena Cristina is 63 year old female who presents for follow up.     Initially evaluated on 1/9/19 for reflux.  Patient first started having symptoms 9 months prior.  Since then she has been having acid reflux every day.  She has tried PPI medications however she has had side effects since had to discontinue.  She was taken zantac 150mg twice daily but continued to have symptoms. We increased this to zantac 150mg two tablets every morning and 1 tablet every evening. This along with adjusting her diet and eating healthier has significantly helped control her symptoms. She no longer has any heartburn. She denies any nausea or vomiting. She was previously drinking a glass of rum with Dr. Pepper nightly however has decreased this to only occasionally. She also avoids NSAID's.     Her previous work up includes an upper endoscopy which revealed a hiatal hernia and signs of chronic inflammation.      She is overall feeling well and denies any GI complaints at this time.     PREVIOUS ENDOSCOPY:    Upper endoscopy 02/08/19  Impression:       - Esophagogastric landmarks identified.                             - 3 cm hiatal hernia.                             - Gastroesophageal flap valve classified as Hill Grade IV (no fold, wide open lumen, hiatal hernia present).                             - Esophageal mucosal changes suspicious for short-segment Kate's esophagus. Biopsied. Separate slightly nodular area at 2 O'clock at [facility]  biopsied separately.                             - Normal stomach.                             - Normal duodenal bulb, first portion of the duodenum and second portion of the duodenum.   José Miguel Ochoa MD      Colonoscopy 02/08/19  Impression:       - The  examined portion of the ileum was normal.                             - One 12 mm polyp in the ascending colon, removed with a hot snare. Resected and retrieved.                             - Three 3 to 5 mm polyps in the ascending colon, removed with a cold snare. Resected and retrieved.                             - One 2 mm polyp in the ascending colon, removed with a cold biopsy forceps. Resected and retrieved.                             - Two 4 to 6 mm polyps in the descending colon, removed with a cold snare. Resected and retrieved.                             - Diverticulosis in the sigmoid colon and in the descending colon.                             - The distal rectum and anal verge are normal on retroflexion view.     José Miguel Ochoa MD       FINAL DIAGNOSIS:   A. GASTROESOPHAGEAL JUNCTION AT 2 O'CLOCK, BIOPSY:   - Esophageal squamous mucosa with features of reflux including basal cell hyperplasia, scattered lymphocytes and rare eosinophil   - Gastric columnar epithelium with nonspecific chronic inflammation, foveolar prominence   - Negative for intestinal metaplasia and dysplasia     B. GASTROESOPHAGEAL JUNCTION BIOPSY:   - Gastric columnar epithelium with nonspecific chronic inflammation   - Negative for intestinal metaplasia and dysplasia     C. ASCENDING COLON POLYPS X 5, POLYPECTOMY:   - Sessile serrated adenomas, fragmented (three pieces), negative for cytologic dysplasia   - Two pieces of tubular adenoma, negative for high grade dysplasia   - Strip of unremarkable colonic mucosa     D. DESCENDING COLON POLYP, POLYPECTOMYx2:   - One hyperplastic polyp, negative for dysplasia   - Fragment of vegetable matter     I have personally reviewed all specimens and/or slides, including the listed special stains, and used them with my medical judgement to determine or confirm the final diagnosis.     Electronically signed out by:     Nirmal Langston M.D., [facility]         Colonoscopy   "03/14/2014  1. Rectal polyp   2. Moderate sigmoid diverticulosis     SPECIMEN/GROSS DESCRIPTION  A) SOURCE: Rectal polyp  Labeled with the patient's name and \"rectal polyp\" is a 0.4 cm polypoid  tissue. The specimen is entirely submitted in one cassette.      PROBLEM LIST  Patient Active Problem List    Diagnosis Date Noted     At risk for cardiovascular event 02/28/2018     Priority: Medium     ASCVD risk 7.5%.  Offering statin.       Severe obesity (BMI 35.0-39.9) with comorbidity (H) 02/27/2018     Priority: Medium     Severe persistent asthma 04/29/2016     Priority: Medium     Symptoms since childhood. Smoked since age of 11 until 31 years.  Until seen in November 2014, used albuterol once in her life.       Lichen sclerosus 04/29/2016     Priority: Medium     Benign essential hypertension 04/29/2016     Priority: Medium     Elevated glucose 04/29/2016     Priority: Medium     Family history of diabetes mellitus 04/29/2016     Priority: Medium       PERTINENT PAST MEDICAL HISTORY:  (I personally reviewed this history with the patient at today's visit)   Past Medical History:   Diagnosis Date     Hypertension      Sleep apnea      Uncomplicated asthma          PREVIOUS SURGERIES: (I personally reviewed this history with the patient at today's visit)   Past Surgical History:   Procedure Laterality Date     BIOPSY      breast,     COLONOSCOPY       COLONOSCOPY N/A 2/8/2019    Procedure: Combined Colonoscopy, Single Or Multiple Biopsy/Polypectomy By Biopsy;  Surgeon: José Miguel Ochoa MD;  Location:  OR     COLONOSCOPY WITH CO2 INSUFFLATION N/A 2/8/2019    Procedure: COLONOSCOPY WITH CO2 INSUFFLATION;  Surgeon: José Miguel Ochoa MD;  Location:  OR     COMBINED ESOPHAGOSCOPY, GASTROSCOPY, DUODENOSCOPY (EGD) WITH CO2 INSUFFLATION N/A 2/8/2019    Procedure: COMBINED ESOPHAGOSCOPY, GASTROSCOPY, DUODENOSCOPY (EGD) WITH CO2 INSUFFLATION;  Surgeon: José Miguel Ochoa MD;  Location:  OR "     ESOPHAGOSCOPY, GASTROSCOPY, DUODENOSCOPY (EGD), COMBINED N/A 2/8/2019    Procedure: Combined Esophagoscopy, Gastroscopy, Duodenoscopy (Egd), Biopsy Single Or Multiple;  Surgeon: José Miguel Ochoa MD;  Location: [facility] OR     GYN SURGERY       TONSILLECTOMY           ALLERGIES:     Allergies   Allergen Reactions     Cartia Xt [Diltiazem] Other (See Comments)     Heartburn whenever on this medication     Losartan Cough     Sulfa Drugs Rash       PERTINENT MEDICATIONS:    Current Outpatient Medications:      albuterol (2.5 MG/3ML) 0.083% neb solution, Take 1 vial (2.5 mg) by nebulization every 4 hours as needed for shortness of breath / dyspnea, wheezing or other (persistent cough), Disp: 50 vial, Rfl: 1     albuterol (PROAIR HFA/PROVENTIL HFA/VENTOLIN HFA) 108 (90 Base) MCG/ACT Inhaler, Inhale 2-4 puffs into the lungs every 4 hours as needed for shortness of breath / dyspnea or wheezing, Disp: 1 Inhaler, Rfl: 3     azelastine (ASTELIN) 0.1 % nasal spray, Spray 2 sprays into both nostrils 2 times daily as needed for rhinitis or allergies, Disp: 30 mL, Rfl: 3     clobetasol (TEMOVATE) 0.05 % cream, Apply sparingly to affected area twice weekly as needed.  Do not apply to face., Disp: 30 g, Rfl: 0     fluticasone-vilanterol (BREO ELLIPTA) 200-25 MCG/INH inhaler, Inhale 1 puff into the lungs daily, Disp: 1 Inhaler, Rfl: 3     hydrochlorothiazide (HYDRODIURIL) 12.5 MG tablet, TAKE 1 TABLET(12.5 MG) BY MOUTH DAILY, Disp: 30 tablet, Rfl: 0     ranitidine (ZANTAC) 150 MG tablet, Take 2 tablets in the morning and 1 tablet in the evening, Disp: 90 tablet, Rfl: 1     Respiratory Therapy Supplies (NEBULIZER/TUBING/MOUTHPIECE) KIT, Use with albuterol neb solution, Disp: 1 kit, Rfl: 0     Tiotropium Bromide Monohydrate 1.25 MCG/ACT AERS, Inhale 2 puffs into the lungs daily, Disp: 1 Inhaler, Rfl: 3    SOCIAL HISTORY:  Social History     Socioeconomic History     Marital status:      Spouse name: Not on file      Number of children: Not on file     Years of education: Not on file     Highest education level: Not on file   Occupational History     Comment: Retired   Social Needs     Financial resource strain: Not on file     Food insecurity:     Worry: Not on file     Inability: Not on file     Transportation needs:     Medical: Not on file     Non-medical: Not on file   Tobacco Use     Smoking status: Former Smoker     Packs/day: 2.00     Years: 20.00     Pack years: 40.00     Types: Cigarettes     Last attempt to quit: 1987     Years since quittin.9     Smokeless tobacco: Never Used   Substance and Sexual Activity     Alcohol use: Yes     Comment: 1 nightly     Drug use: No     Sexual activity: Not Currently     Partners: Male     Birth control/protection: Post-menopausal   Lifestyle     Physical activity:     Days per week: Not on file     Minutes per session: Not on file     Stress: Not on file   Relationships     Social connections:     Talks on phone: Not on file     Gets together: Not on file     Attends Bahai service: Not on file     Active member of club or organization: Not on file     Attends meetings of clubs or organizations: Not on file     Relationship status: Not on file     Intimate partner violence:     Fear of current or ex partner: Not on file     Emotionally abused: Not on file     Physically abused: Not on file     Forced sexual activity: Not on file   Other Topics Concern     Parent/sibling w/ CABG, MI or angioplasty before 65F 55M? No   Social History Narrative    ENVIRONMENTAL HISTORY: [place] The home is heated with a forced air but does not use.  Has floor heating. They do have central air conditioning. The patient's bedroom is furnished with carpeting in bedroom.  No pets inside the house. But is exposed to chicken. There is no history of cockroach or mice infestation. There are no smokers in the house.  The house does not have a damp  "basement.        FAMILY HISTORY: (I personally reviewed this history with the patient at today's visit)  Family History   Problem Relation Age of Onset     Other Cancer Mother         panreatic     Diabetes Mother      Breast Cancer Sister      Skin Cancer Sister      Cerebrovascular Disease Father         CVA     Chronic Obstructive Pulmonary Disease Father      Aneurysm Paternal Grandmother      Unknown/Adopted Paternal Grandfather      Alzheimer Disease Brother         early onset     Diabetes Son      Thyroid Disease Son      Breast Cancer Paternal Aunt      Diabetes Son      Breast Cancer Sister         Review of Systems   Constitutional: Negative for fatigue and fever.   HENT: Negative for nosebleeds, trouble swallowing and voice change.    Eyes: Negative for visual disturbance.   Respiratory: Negative for chest tightness and shortness of breath.    Cardiovascular: Negative for chest pain and leg swelling.   Gastrointestinal: Negative for abdominal pain, blood in stool, constipation, diarrhea, nausea and vomiting.   Genitourinary: Negative for dysuria, frequency and hematuria.   Skin: Negative for pallor and rash.   Neurological: Negative for speech difficulty, weakness and light-headedness.   Psychiatric/Behavioral: The patient is not nervous/anxious.      PHYSICAL EXAMINATION:  Constitutional: aaox3, cooperative, pleasant, not dyspneic/diaphoretic, no acute distress  Vitals reviewed: /88 (BP Location: Left arm, Patient Position: Sitting, Cuff Size: Adult Regular)   Pulse 78   Ht 1.664 m (5' 5.5\")   Wt 99.7 kg (219 lb 14.4 oz)   SpO2 99%   BMI 36.04 kg/m     Wt:   Wt Readings from Last 2 Encounters:   04/17/19 99.7 kg (219 lb 14.4 oz)   02/26/19 108.3 kg (238 lb 12.1 oz)        Physical Exam   Constitutional: She is oriented to person, place, and time. She appears well-developed and well-nourished.   obese   HENT:   Head: Normocephalic and atraumatic.   Nose: Nose normal.   Eyes: Pupils are equal, " round, and reactive to light. No scleral icterus.   Cardiovascular: Normal rate, regular rhythm and normal heart sounds.   Pulmonary/Chest: Effort normal. No stridor. No respiratory distress.   Abdominal: Soft. Bowel sounds are normal. She exhibits no distension. There is no tenderness.   Truncal obesity   Neurological: She is alert and oriented to person, place, and time.   Skin: Skin is warm and dry.   Psychiatric: She has a normal mood and affect. Her behavior is normal.   Nursing note and vitals reviewed.        PERTINENT STUDIES: (I personally reviewed these laboratory studies today)  Most recent CBC:  Recent Labs   Lab Test 02/28/19  1533 07/23/18  1751   WBC 7.8 10.5   HGB 15.2 15.0   HCT 45.4 44.7    249     Most recent creatinine:  Recent Labs   Lab Test 02/27/18  1203 04/28/16  1108   CR 0.85 0.85       TSH   Date Value Ref Range Status   02/27/2018 2.23 0.40 - 4.00 mU/L Final           ASSESSMENT/PLAN:    Luz Elena Cristina is a 63 year old female who presents for follow up.     She was initially seen on 1/9/19 for acid reflux that was not well controlled with zantac 150mg twice daily. We further evaluated her with an upper endoscopy which revealed hiatal hernia and chronic gastritis.  Negative for H. pylori.  We previously reviewed her upper endoscopy findings and increased her zantac 150 mg to two tablets every morning and 1 tablet every evening. This along with adjusting her diet has causes a significant improvement in her symptoms. She is no longer having any heartburn and is only taking zantac two tablets in the morning. She has actually lose some weight from adjusting her diet and is overall feeling well. She does not have any GI complaints at this time. She can continue with two tablets of zantac every morning and take zantac in the evening just as needed.          Hiatal hernia  - ranitidine (ZANTAC) 150 MG tablet  Dispense: 270 tablet; Refill: 1  Heartburn  - ranitidine (ZANTAC) 150 MG tablet   Dispense: 270 tablet; Refill: 1  Adenomatous polyp of ascending colon  - Reminded that she is due for repeat colonoscopy in 3 years.   Hyperplastic polyp of descending colon      RTC 6 months or PRN     Thank you for this consultation.  It was a pleasure to participate in the care of this patient; please contact us with any further questions.      This note was created with voice recognition software, and while reviewed for accuracy, typos may remain.     Aidan Camacho PA-C  Gastroenterology  [facility]

## 2019-04-17 NOTE — NURSING NOTE
"Luz Elena Cristina's goals for this visit include:   Chief Complaint   Patient presents with     RECHECK     2 month follow up       She requests these members of her care team be copied on today's visit information: Yes    PCP: Kiara Zazueta    Referring Provider:  No referring provider defined for this encounter.    /88 (BP Location: Left arm, Patient Position: Sitting, Cuff Size: Adult Regular)   Pulse 78   Ht 1.664 m (5' 5.5\")   Wt 99.7 kg (219 lb 14.4 oz)   SpO2 99%   BMI 36.04 kg/m      Do you need any medication refills at today's visit? Maybe    Irina Cardenas, JAMES      "

## 2019-04-27 DIAGNOSIS — I10 BENIGN ESSENTIAL HYPERTENSION: ICD-10-CM

## 2019-04-29 RX ORDER — HYDROCHLOROTHIAZIDE 12.5 MG/1
TABLET ORAL
Qty: 30 TABLET | Refills: 0 | Status: SHIPPED | OUTPATIENT
Start: 2019-04-29 | End: 2019-06-06

## 2019-04-29 NOTE — TELEPHONE ENCOUNTER
"Requested Prescriptions   Pending Prescriptions Disp Refills     hydrochlorothiazide (HYDRODIURIL) 12.5 MG tablet [Pharmacy Med Name: HYDROCHLOROTHIAZIDE 12.5MG TABLETS] 30 tablet 0     Sig: TAKE 1 TABLET(12.5 MG) BY MOUTH DAILY       Diuretics (Including Combos) Protocol Failed - 4/27/2019  3:49 PM        Failed - Blood pressure under 140/90 in past 12 months     BP Readings from Last 3 Encounters:   04/17/19 143/88   02/26/19 130/83   02/19/19 128/79                 Failed - Normal serum creatinine on file in past 12 months     Recent Labs   Lab Test 02/27/18  1203   CR 0.85              Failed - Normal serum potassium on file in past 12 months     Recent Labs   Lab Test 02/27/18  1203   POTASSIUM 4.5                    Failed - Normal serum sodium on file in past 12 months     Recent Labs   Lab Test 02/27/18  1203                 Passed - Recent (12 mo) or future (30 days) visit within the authorizing provider's specialty     Patient had office visit in the last 12 months or has a visit in the next 30 days with authorizing provider or within the authorizing provider's specialty.  See \"Patient Info\" tab in inbasket, or \"Choose Columns\" in Meds & Orders section of the refill encounter.              Passed - Medication is active on med list        Passed - Patient is age 18 or older        Passed - No active pregancy on record        Passed - No positive pregnancy test in past 12 months        Last Written Prescription Date:  4/1/19  Last Fill Quantity: 30,  # refills: 0   Last office visit: 1/21/2019 with prescribing provider:  Kiara Zazueta     Future Office Visit:   Next 5 appointments (look out 90 days)    May 28, 2019 11:00 AM CDT  [facility] Visit with Galindo Montano MD  [facility] [address] [address] [phone]           "

## 2019-05-13 ENCOUNTER — HOSPITAL ENCOUNTER (OUTPATIENT)
Dept: CT IMAGING | Facility: CLINIC | Age: 64
Discharge: HOME | End: 2019-05-13
Attending: ALLERGY & IMMUNOLOGY | Admitting: ALLERGY & IMMUNOLOGY
Payer: MEDICARE

## 2019-05-13 ENCOUNTER — TELEPHONE (OUTPATIENT)
Dept: ALLERGY | Facility: CLINIC | Age: 64
End: 2019-05-13

## 2019-05-13 DIAGNOSIS — R93.89 ABNORMAL CHEST CT: Primary | ICD-10-CM

## 2019-05-13 DIAGNOSIS — J45.50 SEVERE PERSISTENT ASTHMA WITHOUT COMPLICATION (H): ICD-10-CM

## 2019-05-13 PROCEDURE — 71250 CT THORAX DX C-: CPT

## 2019-05-14 NOTE — TELEPHONE ENCOUNTER
MD spoke with pt regarding results. Referral contact information sent via My Chart. Pt is aware.     Praveena VALE   Allergy RN

## 2019-05-14 NOTE — TELEPHONE ENCOUNTER
Chest CT with new areas of inflammatory/infectious etiology.  I recommend the patient to be evaluated by Pulmonology and Infectious disease.  I would start with Pulmonology first.  What are the patient's symptoms?     Galindo Montano

## 2019-05-14 NOTE — RESULT ENCOUNTER NOTE
Chest CT with new areas of inflammatory/infectious etiology.  I recommend the patient to be evaluated by Pulmonology and Infectious disease.  I would start with Pulmonology first.  What are the patient's symptoms?

## 2019-05-22 NOTE — TELEPHONE ENCOUNTER
I talked with the patient. She was asymptomatic.  Agreed with Pulmonology evaluation considering new changes on chest CT.   Galindo Montano

## 2019-05-28 ENCOUNTER — OFFICE VISIT (OUTPATIENT)
Dept: ALLERGY | Facility: CLINIC | Age: 64
End: 2019-05-28
Payer: MEDICARE

## 2019-05-28 VITALS
OXYGEN SATURATION: 94 % | TEMPERATURE: 97.6 F | SYSTOLIC BLOOD PRESSURE: 131 MMHG | BODY MASS INDEX: 34.65 KG/M2 | WEIGHT: 211.42 LBS | DIASTOLIC BLOOD PRESSURE: 90 MMHG | HEART RATE: 82 BPM | RESPIRATION RATE: 18 BRPM

## 2019-05-28 DIAGNOSIS — J31.0 CHRONIC RHINITIS: ICD-10-CM

## 2019-05-28 DIAGNOSIS — J45.50 SEVERE PERSISTENT ASTHMA WITHOUT COMPLICATION (H): Primary | ICD-10-CM

## 2019-05-28 LAB
FEF 25/75: NORMAL
FEV-1: NORMAL
FEV1/FVC: NORMAL
FVC: NORMAL

## 2019-05-28 PROCEDURE — 94010 BREATHING CAPACITY TEST: CPT | Performed by: ALLERGY & IMMUNOLOGY

## 2019-05-28 PROCEDURE — 99214 OFFICE O/P EST MOD 30 MIN: CPT | Mod: 25 | Performed by: ALLERGY & IMMUNOLOGY

## 2019-05-28 ASSESSMENT — ENCOUNTER SYMPTOMS
HEADACHES: 0
SHORTNESS OF BREATH: 0
RHINORRHEA: 1
SINUS PRESSURE: 0
EYE ITCHING: 0
ACTIVITY CHANGE: 0
EYE DISCHARGE: 0
MYALGIAS: 0
COUGH: 1
CHILLS: 0
EYE REDNESS: 0
VOMITING: 0
JOINT SWELLING: 0
CHEST TIGHTNESS: 0
ARTHRALGIAS: 0
ADENOPATHY: 0
DIARRHEA: 0
WHEEZING: 0
FACIAL SWELLING: 0
NAUSEA: 0
FEVER: 0

## 2019-05-28 NOTE — LETTER
5/28/2019         RE: Luz Elena Cristina  [id number] [facility] [address]        Dear Colleague,    Thank you for referring your patient, Luz Elena Cristina, to the [facility]. Please see a copy of my visit note below.    SUBJECTIVE:                                                                   Luz Elena Cristina presents today to our Allergy Clinic at [facility] for a follow up visit.  As you know, she is a 64 year old female with severe persistent asthma and chronic rhinitis.     Asthma since childhood. November 2016: Negative serum IgE to regional aeroallergen panel. CBC with differential without eosinophilia.  Normal total serum IgE.  Negative ABPA panel.  Alpha-1 antitrypsin within normal limits.  Normal CBC with differential without hypereosinophilia.  The PSG sleep study demonstrated moderate, REM, and supine predominant NEGRA with some sleep associated hypoxemia.  In February 2019, due to decrease in FEV1, chest CT was ordered that showed some opacities and small nodules that could be infectious.  The patient was treated with azithromycin.  On a follow-up phone call, the patient was asymptomatic; However, chest CT in May 2019 with new opacities.  In regards to her asthma, she continues doing well on Breo Ellipta 200/25 mcg 1 puff once daily, Spiriva Respimat daily, and albuterol inhaler as needed. She uses albuterol approximately twice a week for cough, chest tightness, and dyspnea.  As a matter of fact, she walks 5 miles daily. She is pleased with her chest symptoms control.  She is on CPAP and sleeps well. She is waking up less than twice per month due to chest symptoms.  There has been no use of oral steroids since the last visit. No ED/PCP/urgent care/other specialist visits for asthma flare since the last visit. The patient denies current chest tightness,  wheeze, or shortness of breath. She coughs rarely.    CT CHEST W/O CONTRAST 5/13/2019 10:44 AM     HISTORY:  Persistent asthma small. Follow-up of infiltrates.     TECHNIQUE: CT of the chest is performed without IV contrast.     Assessed structures to the limits no IV contrast administration  include the lungs, mediastinum, pleura, and chest wall.     Radiation exposure is reduced using automated exposure control,  adjustment of the mA and/or kV according to patient size, or iterative  reconstruction technique.      COMPARISON: 2/28/2019.     FINDINGS: Assessment of the right lung shows a new nodular parenchymal  abnormality in the upper lobe on axial image 23 that measures 13 mm.  Slightly more patchy and nodular parenchymal abnormality is seen  subpleurally in the right upper lobe on axial image 33. This measures  6 mm. A new area of grouped nodular and interstitial abnormality  laterally in the right lower lobe is seen on axial image 42. Other  scattered and grouped areas of interstitial and nodular abnormality in  each lung which can relate to chronic bronchiolitis and/or other  inflammatory etiology, haven't significantly changed. No enlarged  lymph nodes are demonstrated. The thoracic aorta is normal in caliber.  Fatty infiltration of the liver is again demonstrated. A hiatal hernia  is again seen.                                                                      IMPRESSION:  There are a few new areas of lung abnormality on the  right which are is consistent with an inflammatory/infectious  etiology. Follow-up chest CT in 3 months is recommended in  reassessment. Other chronic lung findings are stable.      For rhinitis, she uses azelastine on as needed basis (about once a month) and if it has been helpful. She mainly uses it after CPAP.  She has postnasal drip, rhinorrhea, and sneezing in the morning, but then she is fine for the rest of days.       Patient Active Problem List   Diagnosis     Severe persistent asthma     Lichen sclerosus     Benign essential hypertension     Elevated glucose     Family history of  diabetes mellitus     Severe obesity (BMI 35.0-39.9) with comorbidity (H)     At risk for cardiovascular event       Past Medical History:   Diagnosis Date     Hypertension      Sleep apnea      Uncomplicated asthma       Problem (# of Occurrences) Relation (Name,Age of Onset)    Alzheimer Disease (1) Brother: early onset    Aneurysm (1) Paternal Grandmother    Breast Cancer (3) Sister, Paternal Aunt, Sister (bob mccrary)    Cerebrovascular Disease (1) Father (ben felder): CVA    Chronic Obstructive Pulmonary Disease (1) Father (ben felder)    Diabetes (3) Mother (fawn felder), Son, Son (oziel barnes)    Other Cancer (1) Mother (fawn felder): panreatic    Skin Cancer (1) Sister    Thyroid Disease (1) Son    Unknown/Adopted (1) Paternal Grandfather (radha alicea)        Past Surgical History:   Procedure Laterality Date     BIOPSY      breast,     COLONOSCOPY       COLONOSCOPY N/A 2/8/2019    Procedure: Combined Colonoscopy, Single Or Multiple Biopsy/Polypectomy By Biopsy;  Surgeon: José Miguel Ochoa MD;  Location: [facility] OR     COLONOSCOPY WITH CO2 INSUFFLATION N/A 2/8/2019    Procedure: COLONOSCOPY WITH CO2 INSUFFLATION;  Surgeon: José Miguel Ochoa MD;  Location: [facility] OR     COMBINED ESOPHAGOSCOPY, GASTROSCOPY, DUODENOSCOPY (EGD) WITH CO2 INSUFFLATION N/A 2/8/2019    Procedure: COMBINED ESOPHAGOSCOPY, GASTROSCOPY, DUODENOSCOPY (EGD) WITH CO2 INSUFFLATION;  Surgeon: José Miguel Ochoa MD;  Location: [facility] OR     ESOPHAGOSCOPY, GASTROSCOPY, DUODENOSCOPY (EGD), COMBINED N/A 2/8/2019    Procedure: Combined Esophagoscopy, Gastroscopy, Duodenoscopy (Egd), Biopsy Single Or Multiple;  Surgeon: José Miguel Ochoa MD;  Location: [facility] OR     GYN SURGERY       TONSILLECTOMY       Social History     Socioeconomic History     Marital status:      Spouse name: None     Number of children: None     Years of education: None     Highest education level: None    Occupational History     Comment: Retired   Social Needs     Financial resource strain: None     Food insecurity:     Worry: None     Inability: None     Transportation needs:     Medical: None     Non-medical: None   Tobacco Use     Smoking status: Former Smoker     Packs/day: 2.00     Years: 20.00     Pack years: 40.00     Types: Cigarettes     Last attempt to quit: 1987     Years since quittin.1     Smokeless tobacco: Never Used   Substance and Sexual Activity     Alcohol use: Yes     Comment: 1 nightly     Drug use: No     Sexual activity: Not Currently     Partners: Male     Birth control/protection: Post-menopausal   Lifestyle     Physical activity:     Days per week: None     Minutes per session: None     Stress: None   Relationships     Social connections:     Talks on phone: None     Gets together: None     Attends Church service: None     Active member of club or organization: None     Attends meetings of clubs or organizations: None     Relationship status: None     Intimate partner violence:     Fear of current or ex partner: None     Emotionally abused: None     Physically abused: None     Forced sexual activity: None   Other Topics Concern     Parent/sibling w/ CABG, MI or angioplasty before 65F 55M? No   Social History Narrative    ENVIRONMENTAL HISTORY: [place] The home is heated with a forced air but does not use.  Has floor heating. They do have central air conditioning. The patient's bedroom is furnished with carpeting in bedroom.  No pets inside the house. But is exposed to chicken. There is no history of cockroach or mice infestation. There are no smokers in the house.  The house does not have a damp basement.            Review of Systems   Constitutional: Negative for activity change, chills and fever.   HENT: Positive for postnasal drip, rhinorrhea and sneezing. Negative for congestion, dental problem, ear pain, facial swelling,  nosebleeds and sinus pressure.    Eyes: Negative for discharge, redness and itching.   Respiratory: Positive for cough. Negative for chest tightness, shortness of breath and wheezing.    Cardiovascular: Negative for chest pain.   Gastrointestinal: Negative for diarrhea, nausea and vomiting.   Musculoskeletal: Negative for arthralgias, joint swelling and myalgias.   Skin: Negative for rash.   Neurological: Negative for headaches.   Hematological: Negative for adenopathy.   Psychiatric/Behavioral: Negative for behavioral problems and self-injury.           Current Outpatient Medications:      albuterol (2.5 MG/3ML) 0.083% neb solution, Take 1 vial (2.5 mg) by nebulization every 4 hours as needed for shortness of breath / dyspnea, wheezing or other (persistent cough), Disp: 50 vial, Rfl: 1     albuterol (PROAIR HFA/PROVENTIL HFA/VENTOLIN HFA) 108 (90 Base) MCG/ACT Inhaler, Inhale 2-4 puffs into the lungs every 4 hours as needed for shortness of breath / dyspnea or wheezing, Disp: 1 Inhaler, Rfl: 3     azelastine (ASTELIN) 0.1 % nasal spray, Spray 2 sprays into both nostrils 2 times daily as needed for rhinitis or allergies, Disp: 30 mL, Rfl: 3     clobetasol (TEMOVATE) 0.05 % cream, Apply sparingly to affected area twice weekly as needed.  Do not apply to face., Disp: 30 g, Rfl: 0     fluticasone-vilanterol (BREO ELLIPTA) 200-25 MCG/INH inhaler, Inhale 1 puff into the lungs daily, Disp: 1 Inhaler, Rfl: 3     hydrochlorothiazide (HYDRODIURIL) 12.5 MG tablet, TAKE 1 TABLET(12.5 MG) BY MOUTH DAILY, Disp: 30 tablet, Rfl: 0     ranitidine (ZANTAC) 150 MG tablet, Take 2 tablets in the morning and 1 tablet in the evening, Disp: 270 tablet, Rfl: 1     Respiratory Therapy Supplies (NEBULIZER/TUBING/MOUTHPIECE) KIT, Use with albuterol neb solution, Disp: 1 kit, Rfl: 0     Tiotropium Bromide Monohydrate 1.25 MCG/ACT AERS, Inhale 2 puffs into the lungs daily, Disp: 1 Inhaler, Rfl: 3  Immunization History   Administered Date(s)  Administered     Influenza (High Dose) 3 valent vaccine 02/27/2018, 09/18/2018     Influenza (IIV3) PF 11/08/2012     Influenza Vaccine IM 3yrs+ 4 Valent IIV4 12/12/2016     Pneumo Conj 13-V (2010&after) 02/27/2018     TDAP Vaccine (Adacel) 11/08/2012     Allergies   Allergen Reactions     Cartia Xt [Diltiazem] Other (See Comments)     Heartburn whenever on this medication     Losartan Cough     Sulfa Drugs Rash     OBJECTIVE:                                                                 /90 (BP Location: Left arm, Patient Position: Sitting, Cuff Size: Adult Regular)   Pulse 82   Temp 97.6  F (36.4  C) (Oral)   Resp 18   Wt 95.9 kg (211 lb 6.7 oz)   SpO2 94%   BMI 34.65 kg/m    Physical Exam   Constitutional: She is oriented to person, place, and time. No distress.   HENT:   Head: Normocephalic and atraumatic.   Right Ear: Tympanic membrane, external ear and ear canal normal.   Left Ear: Tympanic membrane, external ear and ear canal normal.   Nose: Septal deviation (left) present. No mucosal edema or rhinorrhea.   Mouth/Throat: Oropharynx is clear and moist and mucous membranes are normal. No oropharyngeal exudate, posterior oropharyngeal edema or posterior oropharyngeal erythema.   Eyes: Conjunctivae are normal. Right eye exhibits no discharge. Left eye exhibits no discharge.   Neck: Normal range of motion.   Cardiovascular: Normal rate, regular rhythm and normal heart sounds.   No murmur heard.  Pulmonary/Chest: Effort normal and breath sounds normal. No respiratory distress. She has no wheezes. She has no rales.   Musculoskeletal: Normal range of motion.   Lymphadenopathy:     She has no cervical adenopathy.   Neurological: She is alert and oriented to person, place, and time.   Skin: Skin is warm. No rash noted. She is not diaphoretic.   Psychiatric: She has a normal mood and affect.   Nursing note and vitals reviewed.        WORKUP:   SPIROMETRY       FVC 2.81L (85% of predicted).     FEV1 1.68L  (67% of predicted).     FEV1/FVC 60%     FEF 25%-75%  0.83L/s (38% of predicted)      My interpretation: The office spirometry performed today suggests mild obstruction.  It is improved compared with the study in February, and stable compared with previous studies.    Asthma Control Test (ACT) total score: 22       ASSESSMENT/PLAN:      Problem List Items Addressed This Visit        Respiratory    1. Severe persistent asthma - Primary  Currently well controlled with Breo Ellipta 200/25 mcg 1 puff once daily, Spiriva Respimat 1.25 mcg 2 inhalations once daily, and albuterol inhaler as needed.  -Continue as is.  In regards to new CT findings, the patient will be seen Pulmonology and Infectious Disease.    Relevant Orders    Spirometry, Breathing Capacity (Completed)      Other Visit Diagnoses     2. Chronic rhinitis     Currently well controlled with azelastine on as needed basis.  -Continue as is.             Return in about 6 months (around 11/28/2019), or if symptoms worsen or fail to improve.    Thank you for allowing us to participate in the care of this patient. Please feel free to contact us if there are any questions or concerns about the patient.    Disclaimer: This note consists of symbols derived from keyboarding, dictation and/or voice recognition software. As a result, there may be errors in the script that have gone undetected. Please consider this when interpreting information found in this chart.    Galindo Montano MD, FAAAAI, FACAAI  Allergy, Asthma and Immunology  [place] and [place]    Again, thank you for allowing me to participate in the care of your patient.        Sincerely,        Galindo Montano MD

## 2019-05-28 NOTE — PROGRESS NOTES
SUBJECTIVE:                                                                   Luz Elena Cristina presents today to our Allergy Clinic at [facility] for a follow up visit.  As you know, she is a 64 year old female with severe persistent asthma and chronic rhinitis.     Asthma since childhood. November 2016: Negative serum IgE to regional aeroallergen panel. CBC with differential without eosinophilia.  Normal total serum IgE.  Negative ABPA panel.  Alpha-1 antitrypsin within normal limits.  Normal CBC with differential without hypereosinophilia.  The PSG sleep study demonstrated moderate, REM, and supine predominant NEGRA with some sleep associated hypoxemia.  In February 2019, due to decrease in FEV1, chest CT was ordered that showed some opacities and small nodules that could be infectious.  The patient was treated with azithromycin.  On a follow-up phone call, the patient was asymptomatic; However, chest CT in May 2019 with new opacities.  In regards to her asthma, she continues doing well on Breo Ellipta 200/25 mcg 1 puff once daily, Spiriva Respimat daily, and albuterol inhaler as needed. She uses albuterol approximately twice a week for cough, chest tightness, and dyspnea.  As a matter of fact, she walks 5 miles daily. She is pleased with her chest symptoms control.  She is on CPAP and sleeps well. She is waking up less than twice per month due to chest symptoms.  There has been no use of oral steroids since the last visit. No ED/PCP/urgent care/other specialist visits for asthma flare since the last visit. The patient denies current chest tightness,  wheeze, or shortness of breath. She coughs rarely.    CT CHEST W/O CONTRAST 5/13/2019 10:44 AM     HISTORY: Persistent asthma small. Follow-up of infiltrates.     TECHNIQUE: CT of the chest is performed without IV contrast.     Assessed structures to the limits no IV contrast administration  include the lungs, mediastinum, pleura, and chest  wall.     Radiation exposure is reduced using automated exposure control,  adjustment of the mA and/or kV according to patient size, or iterative  reconstruction technique.      COMPARISON: 2/28/2019.     FINDINGS: Assessment of the right lung shows a new nodular parenchymal  abnormality in the upper lobe on axial image 23 that measures 13 mm.  Slightly more patchy and nodular parenchymal abnormality is seen  subpleurally in the right upper lobe on axial image 33. This measures  6 mm. A new area of grouped nodular and interstitial abnormality  laterally in the right lower lobe is seen on axial image 42. Other  scattered and grouped areas of interstitial and nodular abnormality in  each lung which can relate to chronic bronchiolitis and/or other  inflammatory etiology, haven't significantly changed. No enlarged  lymph nodes are demonstrated. The thoracic aorta is normal in caliber.  Fatty infiltration of the liver is again demonstrated. A hiatal hernia  is again seen.                                                                      IMPRESSION:  There are a few new areas of lung abnormality on the  right which are is consistent with an inflammatory/infectious  etiology. Follow-up chest CT in 3 months is recommended in  reassessment. Other chronic lung findings are stable.      For rhinitis, she uses azelastine on as needed basis (about once a month) and if it has been helpful. She mainly uses it after CPAP.  She has postnasal drip, rhinorrhea, and sneezing in the morning, but then she is fine for the rest of days.       Patient Active Problem List   Diagnosis     Severe persistent asthma     Lichen sclerosus     Benign essential hypertension     Elevated glucose     Family history of diabetes mellitus     Severe obesity (BMI 35.0-39.9) with comorbidity (H)     At risk for cardiovascular event       Past Medical History:   Diagnosis Date     Hypertension      Sleep apnea      Uncomplicated asthma       Problem (#  of Occurrences) Relation (Name,Age of Onset)    Alzheimer Disease (1) Brother: early onset    Aneurysm (1) Paternal Grandmother    Breast Cancer (3) Sister, Paternal Aunt, Sister (bob mccrary)    Cerebrovascular Disease (1) Father (ben felder): CVA    Chronic Obstructive Pulmonary Disease (1) Father (ben felder)    Diabetes (3) Mother (fawn felder), Son, Son (oziel barnes)    Other Cancer (1) Mother (fawn felder): panreatic    Skin Cancer (1) Sister    Thyroid Disease (1) Son    Unknown/Adopted (1) Paternal Grandfather (radha alicea)        Past Surgical History:   Procedure Laterality Date     BIOPSY      breast,     COLONOSCOPY       COLONOSCOPY N/A 2/8/2019    Procedure: Combined Colonoscopy, Single Or Multiple Biopsy/Polypectomy By Biopsy;  Surgeon: José Miguel Ochoa MD;  Location: [facility] OR     COLONOSCOPY WITH CO2 INSUFFLATION N/A 2/8/2019    Procedure: COLONOSCOPY WITH CO2 INSUFFLATION;  Surgeon: José Miguel Ochoa MD;  Location: [facility] OR     COMBINED ESOPHAGOSCOPY, GASTROSCOPY, DUODENOSCOPY (EGD) WITH CO2 INSUFFLATION N/A 2/8/2019    Procedure: COMBINED ESOPHAGOSCOPY, GASTROSCOPY, DUODENOSCOPY (EGD) WITH CO2 INSUFFLATION;  Surgeon: José Miguel Ochoa MD;  Location: [facility] OR     ESOPHAGOSCOPY, GASTROSCOPY, DUODENOSCOPY (EGD), COMBINED N/A 2/8/2019    Procedure: Combined Esophagoscopy, Gastroscopy, Duodenoscopy (Egd), Biopsy Single Or Multiple;  Surgeon: José Miguel Ochoa MD;  Location: [facility] OR     GYN SURGERY       TONSILLECTOMY       Social History     Socioeconomic History     Marital status:      Spouse name: None     Number of children: None     Years of education: None     Highest education level: None   Occupational History     Comment: Retired   Social Needs     Financial resource strain: None     Food insecurity:     Worry: None     Inability: None     Transportation needs:     Medical: None     Non-medical: None   Tobacco Use      Smoking status: Former Smoker     Packs/day: 2.00     Years: 20.00     Pack years: 40.00     Types: Cigarettes     Last attempt to quit: 1987     Years since quittin.1     Smokeless tobacco: Never Used   Substance and Sexual Activity     Alcohol use: Yes     Comment: 1 nightly     Drug use: No     Sexual activity: Not Currently     Partners: Male     Birth control/protection: Post-menopausal   Lifestyle     Physical activity:     Days per week: None     Minutes per session: None     Stress: None   Relationships     Social connections:     Talks on phone: None     Gets together: None     Attends [employer] service: None     Active member of club or organization: None     Attends meetings of clubs or organizations: None     Relationship status: None     Intimate partner violence:     Fear of current or ex partner: None     Emotionally abused: None     Physically abused: None     Forced sexual activity: None   Other Topics Concern     Parent/sibling w/ CABG, MI or angioplasty before 65F 55M? No   Social History Narrative    ENVIRONMENTAL HISTORY: [place] The home is heated with a forced air but does not use.  Has floor heating. They do have central air conditioning. The patient's bedroom is furnished with carpeting in bedroom.  No pets inside the house. But is exposed to chicken. There is no history of cockroach or mice infestation. There are no smokers in the house.  The house does not have a damp basement.            Review of Systems   Constitutional: Negative for activity change, chills and fever.   HENT: Positive for postnasal drip, rhinorrhea and sneezing. Negative for congestion, dental problem, ear pain, facial swelling, nosebleeds and sinus pressure.    Eyes: Negative for discharge, redness and itching.   Respiratory: Positive for cough. Negative for chest tightness, shortness of breath and wheezing.    Cardiovascular: Negative for chest pain.    Gastrointestinal: Negative for diarrhea, nausea and vomiting.   Musculoskeletal: Negative for arthralgias, joint swelling and myalgias.   Skin: Negative for rash.   Neurological: Negative for headaches.   Hematological: Negative for adenopathy.   Psychiatric/Behavioral: Negative for behavioral problems and self-injury.           Current Outpatient Medications:      albuterol (2.5 MG/3ML) 0.083% neb solution, Take 1 vial (2.5 mg) by nebulization every 4 hours as needed for shortness of breath / dyspnea, wheezing or other (persistent cough), Disp: 50 vial, Rfl: 1     albuterol (PROAIR HFA/PROVENTIL HFA/VENTOLIN HFA) 108 (90 Base) MCG/ACT Inhaler, Inhale 2-4 puffs into the lungs every 4 hours as needed for shortness of breath / dyspnea or wheezing, Disp: 1 Inhaler, Rfl: 3     azelastine (ASTELIN) 0.1 % nasal spray, Spray 2 sprays into both nostrils 2 times daily as needed for rhinitis or allergies, Disp: 30 mL, Rfl: 3     clobetasol (TEMOVATE) 0.05 % cream, Apply sparingly to affected area twice weekly as needed.  Do not apply to face., Disp: 30 g, Rfl: 0     fluticasone-vilanterol (BREO ELLIPTA) 200-25 MCG/INH inhaler, Inhale 1 puff into the lungs daily, Disp: 1 Inhaler, Rfl: 3     hydrochlorothiazide (HYDRODIURIL) 12.5 MG tablet, TAKE 1 TABLET(12.5 MG) BY MOUTH DAILY, Disp: 30 tablet, Rfl: 0     ranitidine (ZANTAC) 150 MG tablet, Take 2 tablets in the morning and 1 tablet in the evening, Disp: 270 tablet, Rfl: 1     Respiratory Therapy Supplies (NEBULIZER/TUBING/MOUTHPIECE) KIT, Use with albuterol neb solution, Disp: 1 kit, Rfl: 0     Tiotropium Bromide Monohydrate 1.25 MCG/ACT AERS, Inhale 2 puffs into the lungs daily, Disp: 1 Inhaler, Rfl: 3  Immunization History   Administered Date(s) Administered     Influenza (High Dose) 3 valent vaccine 02/27/2018, 09/18/2018     Influenza (IIV3) PF 11/08/2012     Influenza Vaccine IM 3yrs+ 4 Valent IIV4 12/12/2016     Pneumo Conj 13-V (2010&after) 02/27/2018     TDAP Vaccine  (Adacel) 11/08/2012     Allergies   Allergen Reactions     Cartia Xt [Diltiazem] Other (See Comments)     Heartburn whenever on this medication     Losartan Cough     Sulfa Drugs Rash     OBJECTIVE:                                                                 /90 (BP Location: Left arm, Patient Position: Sitting, Cuff Size: Adult Regular)   Pulse 82   Temp 97.6  F (36.4  C) (Oral)   Resp 18   Wt 95.9 kg (211 lb 6.7 oz)   SpO2 94%   BMI 34.65 kg/m    Physical Exam   Constitutional: She is oriented to person, place, and time. No distress.   HENT:   Head: Normocephalic and atraumatic.   Right Ear: Tympanic membrane, external ear and ear canal normal.   Left Ear: Tympanic membrane, external ear and ear canal normal.   Nose: Septal deviation (left) present. No mucosal edema or rhinorrhea.   Mouth/Throat: Oropharynx is clear and moist and mucous membranes are normal. No oropharyngeal exudate, posterior oropharyngeal edema or posterior oropharyngeal erythema.   Eyes: Conjunctivae are normal. Right eye exhibits no discharge. Left eye exhibits no discharge.   Neck: Normal range of motion.   Cardiovascular: Normal rate, regular rhythm and normal heart sounds.   No murmur heard.  Pulmonary/Chest: Effort normal and breath sounds normal. No respiratory distress. She has no wheezes. She has no rales.   Musculoskeletal: Normal range of motion.   Lymphadenopathy:     She has no cervical adenopathy.   Neurological: She is alert and oriented to person, place, and time.   Skin: Skin is warm. No rash noted. She is not diaphoretic.   Psychiatric: She has a normal mood and affect.   Nursing note and vitals reviewed.        WORKUP:   SPIROMETRY       FVC 2.81L (85% of predicted).     FEV1 1.68L (67% of predicted).     FEV1/FVC 60%     FEF 25%-75%  0.83L/s (38% of predicted)      My interpretation: The office spirometry performed today suggests mild obstruction.  It is improved compared with the study in February, and stable  compared with previous studies.    Asthma Control Test (ACT) total score: 22       ASSESSMENT/PLAN:      Problem List Items Addressed This Visit        Respiratory    1. Severe persistent asthma - Primary  Currently well controlled with Breo Ellipta 200/25 mcg 1 puff once daily, Spiriva Respimat 1.25 mcg 2 inhalations once daily, and albuterol inhaler as needed.  -Continue as is.  In regards to new CT findings, the patient will be seen Pulmonology and Infectious Disease.    Relevant Orders    Spirometry, Breathing Capacity (Completed)      Other Visit Diagnoses     2. Chronic rhinitis     Currently well controlled with azelastine on as needed basis.  -Continue as is.             Return in about 6 months (around 11/28/2019), or if symptoms worsen or fail to improve.    Thank you for allowing us to participate in the care of this patient. Please feel free to contact us if there are any questions or concerns about the patient.    Disclaimer: This note consists of symbols derived from keyboarding, dictation and/or voice recognition software. As a result, there may be errors in the script that have gone undetected. Please consider this when interpreting information found in this chart.    Galindo Montano MD, FAAAAI, FACAAI  Allergy, Asthma and Immunology  [place] [place] [facility] [place]

## 2019-05-29 ASSESSMENT — ASTHMA QUESTIONNAIRES: ACT_TOTALSCORE: 22

## 2019-05-31 ASSESSMENT — ENCOUNTER SYMPTOMS
RECTAL PAIN: 0
CONSTIPATION: 0
NAUSEA: 0
POSTURAL DYSPNEA: 0
VOMITING: 0
WHEEZING: 0
HEMOPTYSIS: 1
JAUNDICE: 0
ABDOMINAL PAIN: 0
BOWEL INCONTINENCE: 0
SNORES LOUDLY: 1
BRUISES/BLEEDS EASILY: 1
DYSPNEA ON EXERTION: 1
COUGH DISTURBING SLEEP: 1
HEARTBURN: 1
BLOOD IN STOOL: 0
SWOLLEN GLANDS: 0
BLOATING: 0
COUGH: 1
SPUTUM PRODUCTION: 1
POOR WOUND HEALING: 0
SHORTNESS OF BREATH: 0
DIARRHEA: 0

## 2019-06-05 ENCOUNTER — OFFICE VISIT (OUTPATIENT)
Dept: INFECTIOUS DISEASES | Facility: CLINIC | Age: 64
End: 2019-06-05
Payer: MEDICARE

## 2019-06-05 VITALS
HEIGHT: 65 IN | DIASTOLIC BLOOD PRESSURE: 82 MMHG | BODY MASS INDEX: 34.99 KG/M2 | OXYGEN SATURATION: 94 % | SYSTOLIC BLOOD PRESSURE: 138 MMHG | HEART RATE: 77 BPM | TEMPERATURE: 98.2 F | WEIGHT: 210 LBS

## 2019-06-05 DIAGNOSIS — R91.8 PULMONARY NODULES: Primary | ICD-10-CM

## 2019-06-05 DIAGNOSIS — J45.50 SEVERE PERSISTENT ASTHMA WITHOUT COMPLICATION (H): ICD-10-CM

## 2019-06-05 DIAGNOSIS — R93.89 ABNORMAL CHEST CT: ICD-10-CM

## 2019-06-05 LAB
CRYPTOC AG SPEC QL: NORMAL
SPECIMEN SOURCE: NORMAL

## 2019-06-05 PROCEDURE — 87385 HISTOPLASMA CAPSUL AG IA: CPT | Mod: 91 | Performed by: STUDENT IN AN ORGANIZED HEALTH CARE EDUCATION/TRAINING PROGRAM

## 2019-06-05 PROCEDURE — 87389 HIV-1 AG W/HIV-1&-2 AB AG IA: CPT | Performed by: STUDENT IN AN ORGANIZED HEALTH CARE EDUCATION/TRAINING PROGRAM

## 2019-06-05 PROCEDURE — 87305 ASPERGILLUS AG IA: CPT | Performed by: STUDENT IN AN ORGANIZED HEALTH CARE EDUCATION/TRAINING PROGRAM

## 2019-06-05 PROCEDURE — 87899 AGENT NOS ASSAY W/OPTIC: CPT | Performed by: STUDENT IN AN ORGANIZED HEALTH CARE EDUCATION/TRAINING PROGRAM

## 2019-06-05 PROCEDURE — 36415 COLL VENOUS BLD VENIPUNCTURE: CPT | Performed by: STUDENT IN AN ORGANIZED HEALTH CARE EDUCATION/TRAINING PROGRAM

## 2019-06-05 PROCEDURE — 87116 MYCOBACTERIA CULTURE: CPT | Performed by: STUDENT IN AN ORGANIZED HEALTH CARE EDUCATION/TRAINING PROGRAM

## 2019-06-05 PROCEDURE — 87449 NOS EACH ORGANISM AG IA: CPT | Performed by: STUDENT IN AN ORGANIZED HEALTH CARE EDUCATION/TRAINING PROGRAM

## 2019-06-05 PROCEDURE — 86481 TB AG RESPONSE T-CELL SUSP: CPT | Performed by: STUDENT IN AN ORGANIZED HEALTH CARE EDUCATION/TRAINING PROGRAM

## 2019-06-05 PROCEDURE — 87385 HISTOPLASMA CAPSUL AG IA: CPT | Performed by: STUDENT IN AN ORGANIZED HEALTH CARE EDUCATION/TRAINING PROGRAM

## 2019-06-05 PROCEDURE — G0463 HOSPITAL OUTPT CLINIC VISIT: HCPCS | Mod: ZF

## 2019-06-05 ASSESSMENT — PAIN SCALES - GENERAL: PAINLEVEL: NO PAIN (0)

## 2019-06-05 ASSESSMENT — MIFFLIN-ST. JEOR: SCORE: 1493.43

## 2019-06-05 NOTE — NURSING NOTE
"Chief Complaint   Patient presents with     Consult     Chest CT with new areas inflammatory/ infectious etiology     /82 (BP Location: Right arm, Patient Position: Sitting, Cuff Size: Adult Regular)   Pulse 77   Temp 98.2  F (36.8  C) (Oral)   Ht 1.651 m (5' 5\")   Wt 95.3 kg (210 lb)   SpO2 94%   BMI 34.95 kg/m    Edna Ahmadi CMA  "

## 2019-06-05 NOTE — LETTER
6/5/2019      RE: Luz Elena Cristina  [id number] [facility] [address]       [facility]  Infectious Disease Clinic Note:  New Patient     Patient:  Luz Elena Cristina, Date of birth 1955, Medical record number [id number]  Date of Visit:  06/05/2019  Consult requested by Dr. Galindo Montano  for evaluation of pulmonary nodules         Assessment and Recommendations:     Assessment:  64 year old female with severe persistent asthma and chronic rhinitis refrred by allergist for pulmonary nodules. Recent follow up CT for nodules found in Feb 2019 with new nodular parenchymal abnormality in the right upper lobe that measures 13 mm. Slightly more patchy and nodular parenchymal abnormality seen subpleurally in the right upper lobe that measures 6 mm. A new area of grouped nodular and interstitial abnormality seen laterally in the right lower lobe. Other scattered and grouped areas of interstitial and nodular abnormality in each lung unchanged. Pt asymptomatic. Will await Pulm consult to determine need for Bronch. Will work up for possible infectious differentials as below. No major risk factors elicited on evaluations other than endemic fungi.    Recommendations:  - Check quantiferon, Coccidiodes antigen, Aspergillus ag, Histoplasma serum and urine ag, Blood AFb culture, Crytpococcus antigen, HIV  - Follow up with Pulm    Plan of care dw Staff ID Physician Dr Delmer Garzon  PGY4 ID Fellow  Pager: [phone]          History of the Infectious Disease lllness:     64 year old female with severe persistent asthma and chronic rhinitis refrred by allergist for pulmonary nodules.      In February 2019, due to decrease in FEV1, chest CT was ordered that showed some opacities and small nodules that could be infectious.  The patient was treated with azithromycin x 2 courses. She does not remember if she had symptoms.   F/u  chest CT in May 2019 with new opacities.  She has  been referred to us and Pulmonologist. She denies any symptoms currently. No fevers, chills, sweats, cough, chest pain, SOB. No exposure to TB, homeless shelters, jails. No exposure to environmental toxins. Has cats. No camping/hiking/recent travel. 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     Review of Systems:  CONSTITUTIONAL:  No fevers or chills. No night sweats.  EYES: negative for icterus or acute vision changes.   ENT:  negative for hearing loss, tinnitus or sore throat  RESPIRATORY:  negative for cough, sputum, dyspnea  CARDIOVASCULAR:  negative for chest pain, heart palpitations  GASTROINTESTINAL:  negative for nausea, vomiting, diarrhea or constipation  GENITOURINARY:  negative for dysuria or hematuria.  HEME:  No easy bruising or bleeding  INTEGUMENT:  negative for rash or pruritus  NEURO:  Negative for headache or tremor.    Past Medical History:   Diagnosis Date     Hypertension      Sleep apnea      Uncomplicated asthma        Past Surgical History:   Procedure Laterality Date     BIOPSY      breast,     COLONOSCOPY       COLONOSCOPY N/A 2/8/2019    Procedure: Combined Colonoscopy, Single Or Multiple Biopsy/Polypectomy By Biopsy;  Surgeon: José Miguel Ochoa MD;  Location: [facility] OR     COLONOSCOPY WITH CO2 INSUFFLATION N/A 2/8/2019    Procedure: COLONOSCOPY WITH CO2 INSUFFLATION;  Surgeon: José Miguel Ochoa MD;  Location: [facility] OR     COMBINED ESOPHAGOSCOPY, GASTROSCOPY, DUODENOSCOPY (EGD) WITH CO2 INSUFFLATION N/A 2/8/2019    Procedure: COMBINED ESOPHAGOSCOPY, GASTROSCOPY, DUODENOSCOPY (EGD) WITH CO2 INSUFFLATION;  Surgeon: José Miguel Ochoa MD;  Location: [facility] OR     ESOPHAGOSCOPY, GASTROSCOPY, DUODENOSCOPY (EGD), COMBINED N/A 2/8/2019    Procedure: Combined Esophagoscopy, Gastroscopy, Duodenoscopy (Egd), Biopsy Single Or Multiple;  Surgeon: José Miguel Ochoa MD;  Location: [facility] OR     GYN SURGERY       TONSILLECTOMY         Family History   Problem Relation Age of Onset      Other Cancer Mother         panreatic     Diabetes Mother      Breast Cancer Sister      Skin Cancer Sister      Cerebrovascular Disease Father         CVA     Chronic Obstructive Pulmonary Disease Father      Aneurysm Paternal Grandmother      Unknown/Adopted Paternal Grandfather      Alzheimer Disease Brother         early onset     Diabetes Son      Thyroid Disease Son      Breast Cancer Paternal Aunt      Diabetes Son      Breast Cancer Sister        Social History     Social History Narrative    ENVIRONMENTAL HISTORY: [place] The home is heated with a forced air but does not use.  Has floor heating. They do have central air conditioning. The patient's bedroom is furnished with carpeting in bedroom.  No pets inside the house. But is exposed to chicken. There is no history of cockroach or mice infestation. There are no smokers in the house.  The house does not have a damp basement.      Social History     Tobacco Use     Smoking status: Former Smoker     Packs/day: 2.00     Years: 20.00     Pack years: 40.00     Types: Cigarettes     Last attempt to quit: 1987     Years since quittin.1     Smokeless tobacco: Never Used   Substance Use Topics     Alcohol use: Yes     Comment: 1 nightly     Drug use: No     Immunization History   Administered Date(s) Administered     Influenza (High Dose) 3 valent vaccine 2018, 2018     Influenza (IIV3) PF 2012     Influenza Vaccine IM 3yrs+ 4 Valent IIV4 2016     Pneumo Conj 13-V (2010&after) 2018     TDAP Vaccine (Adacel) 2012     Patient Active Problem List   Diagnosis     Severe persistent asthma     Lichen sclerosus     Benign essential hypertension     Elevated glucose     Family history of diabetes mellitus     Severe obesity (BMI 35.0-39.9) with comorbidity (H)     At risk for cardiovascular event     Outpatient Medications Marked as Taking for the 19 encounter (Office Visit) with  "Patricia Garzon MD   Medication Sig     albuterol (2.5 MG/3ML) 0.083% neb solution Take 1 vial (2.5 mg) by nebulization every 4 hours as needed for shortness of breath / dyspnea, wheezing or other (persistent cough)     albuterol (PROAIR HFA/PROVENTIL HFA/VENTOLIN HFA) 108 (90 Base) MCG/ACT Inhaler Inhale 2-4 puffs into the lungs every 4 hours as needed for shortness of breath / dyspnea or wheezing     azelastine (ASTELIN) 0.1 % nasal spray Spray 2 sprays into both nostrils 2 times daily as needed for rhinitis or allergies     clobetasol (TEMOVATE) 0.05 % cream Apply sparingly to affected area twice weekly as needed.  Do not apply to face.     fluticasone-vilanterol (BREO ELLIPTA) 200-25 MCG/INH inhaler Inhale 1 puff into the lungs daily     hydrochlorothiazide (HYDRODIURIL) 12.5 MG tablet TAKE 1 TABLET(12.5 MG) BY MOUTH DAILY     Respiratory Therapy Supplies (NEBULIZER/TUBING/MOUTHPIECE) KIT Use with albuterol neb solution     Tiotropium Bromide Monohydrate 1.25 MCG/ACT AERS Inhale 2 puffs into the lungs daily     Allergies   Allergen Reactions     Cartia Xt [Diltiazem] Other (See Comments)     Heartburn whenever on this medication     Losartan Cough     Sulfa Drugs Rash          Physical Exam:   Vitals were reviewed.  All vitals stable  /82 (BP Location: Right arm, Patient Position: Sitting, Cuff Size: Adult Regular)   Pulse 77   Temp 98.2  F (36.8  C) (Oral)   Ht 1.651 m (5' 5\")   Wt 95.3 kg (210 lb)   SpO2 94%   BMI 34.95 kg/m     Wt Readings from Last 4 Encounters:   06/05/19 95.3 kg (210 lb)   05/28/19 95.9 kg (211 lb 6.7 oz)   04/17/19 99.7 kg (219 lb 14.4 oz)   02/26/19 108.3 kg (238 lb 12.1 oz)     Exam:  GENERAL: well-developed, well-nourished, alert, oriented, in no acute distress.  HEAD: Head is normocephalic, atraumatic   EYES: Eyes have anicteric sclerae.    ENT: Oropharynx is moist without exudates or ulcers.  NECK: Supple.  LUNGS: Clear to auscultation.  CARDIOVASCULAR: Regular rate " and rhythm with no murmurs, gallops or rubs.  ABDOMEN: Normal bowel sounds, soft, nontender.  SKIN: No acute rashes.   NEUROLOGIC: Grossly nonfocal.         Laboratory Data:     Immune Globulin Studies     Recent Labs   Lab Test 11/14/16  1037   IGE 10     Metabolic Studies    Recent Labs   Lab Test 02/27/18  1203 04/28/16  1108    141   POTASSIUM 4.5 4.4   CHLORIDE 104 104   CO2 29 29   ANIONGAP 4 8   BUN 17 18   CR 0.85 0.85   GFRESTIMATED 67 68   GLC 99 109*   CHARMAINE 9.0 9.1     Pancreatitis testing    Recent Labs   Lab Test 02/27/18  1203 04/28/16  1108   TRIG 92 84     Lipid testing    Recent Labs   Lab Test 02/27/18  1203 04/28/16  1108 08/13/15   CHOL 187 189 199   HDL 71 72 63   LDL 98 100* 116   TRIG 92 84 99     Hematology Studies     Recent Labs   Lab Test 02/28/19  1533 07/23/18  1751  11/14/16  1037   WBC 7.8 10.5  --  7.7   ANEU 4.3 6.2  --  4.6   ALYM 2.4 2.9  --  2.4   WAYNE 0.9 1.1  --  0.6   AEOS 0.1 0.2  --  0.1   HGB 15.2 15.0   < > 15.5   HCT 45.4 44.7  --  48.1*    249  --  247    < > = values in this interval not displayed.     Iron Testing    Recent Labs   Lab Test 02/28/19  1533   MCV 94     Arterial Blood Gas Testing    Recent Labs   Lab Test 08/27/18  1015   PH 7.39   PCO2 44   PO2 67*   HCO3 27   O2PER 21      Thyroid Studies     Recent Labs   Lab Test 02/27/18  1203   TSH 2.23     Microbiology:  Last Culture results with specimen source  Culture Micro   Date Value Ref Range Status   06/05/2019 No growth after 5 hours  Preliminary    Specimen Description   Date Value Ref Range Status   06/05/2019 Serum  Final   06/05/2019 Blood  Final         Hepatitis C Antibody   Date Value Ref Range Status   02/27/2018 Reactive (A) NR^Nonreactive Final     Comment:     A reactive result indicates one of the following 1) current HCV infection 2)   past HCV infection that has resolved or 3) false positivity. The CDC   recommends that a reactive result should be followed by Nucleic acid testing    for HCV RNA.   If HCV RNA is detected, that indicates current HCV infection. If HCV RNA is   not detected, that indicates either past, resolved HCV infection, or false HCV   antibody positivity.  Assay performance characteristics have not been established for newborns,   infants, and children       Imaging:  Results for orders placed or performed during the hospital encounter of 05/13/19   CT Chest w/o Contrast    Narrative    CT CHEST W/O CONTRAST 5/13/2019 10:44 AM    HISTORY: Persistent asthma small. Follow-up of infiltrates.    TECHNIQUE: CT of the chest is performed without IV contrast.    Assessed structures to the limits no IV contrast administration  include the lungs, mediastinum, pleura, and chest wall.    Radiation exposure is reduced using automated exposure control,  adjustment of the mA and/or kV according to patient size, or iterative  reconstruction technique.    COMPARISON: 2/28/2019.    FINDINGS: Assessment of the right lung shows a new nodular parenchymal  abnormality in the upper lobe on axial image 23 that measures 13 mm.  Slightly more patchy and nodular parenchymal abnormality is seen  subpleurally in the right upper lobe on axial image 33. This measures  6 mm. A new area of grouped nodular and interstitial abnormality  laterally in the right lower lobe is seen on axial image 42. Other  scattered and grouped areas of interstitial and nodular abnormality in  each lung which can relate to chronic bronchiolitis and/or other  inflammatory etiology, haven't significantly changed. No enlarged  lymph nodes are demonstrated. The thoracic aorta is normal in caliber.  Fatty infiltration of the liver is again demonstrated. A hiatal hernia  is again seen.      Impression    IMPRESSION:  There are a few new areas of lung abnormality on the  right which are is consistent with an inflammatory/infectious  etiology. Follow-up chest CT in 3 months is recommended in  reassessment. Other chronic lung findings  are stable.    MARIAN ARAUJO MD     Answers for HPI/ROS submitted by the patient on 5/31/2019   General Symptoms: No  Skin Symptoms: Yes  HENT Symptoms: No  EYE SYMPTOMS: No  HEART SYMPTOMS: No  LUNG SYMPTOMS: Yes  INTESTINAL SYMPTOMS: Yes  URINARY SYMPTOMS: No  GYNECOLOGIC SYMPTOMS: No  BREAST SYMPTOMS: No  SKELETAL SYMPTOMS: No  BLOOD SYMPTOMS: Yes  NERVOUS SYSTEM SYMPTOMS: No  MENTAL HEALTH SYMPTOMS: No  Acne: No  Hair in places you don't want it: No  Change in facial hair: No  Warts: No  Non-healing sores: No  Scarring: Yes  Flaking of skin: No  Color changes of hands/feet in cold : No  Skin thickening: No  Cough: Yes  Sputum or phlegm: Yes  Coughing up blood: Yes  Difficulty breating or shortness of breath: No  Snoring: Yes  Wheezing: No  Difficulty breathing on exertion: Yes  Nighttime Cough: Yes  Difficulty breathing when lying flat: No  Heart burn or indigestion: Yes  Nausea: No  Vomiting: No  Abdominal pain: No  Bloating: No  Constipation: No  Diarrhea: No  Blood in stool: No  Black stools: No  Rectal or Anal pain: No  Fecal incontinence: No  Yellowing of skin or eyes: No  Vomit with blood: No  Change in stools: No  Anemia: No  Swollen glands: No  Easy bleeding or bruising: Yes  Edema or swelling: No      Infectious Disease Clinic Staff Note: Ms. Cristina was seen, examined, and the case was discussed with Dr. Garzon, ID Fellow -- I agree with her consultative patient history and examination, assessment and plan in this outpatient ID Consult note. This note reflects my observations and opinions and the plan outlined fully reflects my approach. I have reviewed the available history, radiology, laboratory results, and reports with the Fellow.      Patricia Garzon MD

## 2019-06-06 DIAGNOSIS — I10 BENIGN ESSENTIAL HYPERTENSION: ICD-10-CM

## 2019-06-06 LAB
GALACTOMANNAN AG SERPL QL IA: NEGATIVE
GALACTOMANNAN AG SERPL-ACNC: 0.02
HIV 1+2 AB+HIV1 P24 AG SERPL QL IA: NONREACTIVE

## 2019-06-06 NOTE — PROGRESS NOTES
[facility]  Infectious Disease Clinic Note:  New Patient     Patient:  Luz Elena Cristina, Date of birth 1955, Medical record number [id number]  Date of Visit:  06/05/2019  Consult requested by Dr. Galindo Montano  for evaluation of pulmonary nodules         Assessment and Recommendations:     Assessment:  64 year old female with severe persistent asthma and chronic rhinitis refrred by allergist for pulmonary nodules. Recent follow up CT for nodules found in Feb 2019 with new nodular parenchymal abnormality in the right upper lobe that measures 13 mm. Slightly more patchy and nodular parenchymal abnormality seen subpleurally in the right upper lobe that measures 6 mm. A new area of grouped nodular and interstitial abnormality seen laterally in the right lower lobe. Other scattered and grouped areas of interstitial and nodular abnormality in each lung unchanged. Pt asymptomatic. Will await Pulm consult to determine need for Bronch. Will work up for possible infectious differentials as below. No major risk factors elicited on evaluations other than endemic fungi.    Recommendations:  - Check quantiferon, Coccidiodes antigen, Aspergillus ag, Histoplasma serum and urine ag, Blood AFb culture, Crytpococcus antigen, HIV  - Follow up with Pulm    Plan of care  Staff ID Physician Dr Delmer Garzon  PGY4 ID Fellow  Pager: [phone]          History of the Infectious Disease lllness:     64 year old female with severe persistent asthma and chronic rhinitis refrred by allergist for pulmonary nodules.      In February 2019, due to decrease in FEV1, chest CT was ordered that showed some opacities and small nodules that could be infectious.  The patient was treated with azithromycin x 2 courses. She does not remember if she had symptoms.  F/u  chest CT in May 2019 with new opacities.  She has been referred to us and Pulmonologist. She denies any symptoms currently. No fevers,  chills, sweats, cough, chest pain, SOB. No exposure to TB, homeless shelters, jails. No exposure to environmental toxins. Has cats. No camping/hiking/recent travel. Does work in yard and [employer].     Review of Systems:  CONSTITUTIONAL:  No fevers or chills. No night sweats.  EYES: negative for icterus or acute vision changes.   ENT:  negative for hearing loss, tinnitus or sore throat  RESPIRATORY:  negative for cough, sputum, dyspnea  CARDIOVASCULAR:  negative for chest pain, heart palpitations  GASTROINTESTINAL:  negative for nausea, vomiting, diarrhea or constipation  GENITOURINARY:  negative for dysuria or hematuria.  HEME:  No easy bruising or bleeding  INTEGUMENT:  negative for rash or pruritus  NEURO:  Negative for headache or tremor.    Past Medical History:   Diagnosis Date     Hypertension      Sleep apnea      Uncomplicated asthma        Past Surgical History:   Procedure Laterality Date     BIOPSY      breast,     COLONOSCOPY       COLONOSCOPY N/A 2/8/2019    Procedure: Combined Colonoscopy, Single Or Multiple Biopsy/Polypectomy By Biopsy;  Surgeon: José Miguel Ochoa MD;  Location: [facility]     COLONOSCOPY WITH CO2 INSUFFLATION N/A 2/8/2019    Procedure: COLONOSCOPY WITH CO2 INSUFFLATION;  Surgeon: José Miguel Ochoa MD;  Location: [facility] OR     COMBINED ESOPHAGOSCOPY, GASTROSCOPY, DUODENOSCOPY (EGD) WITH CO2 INSUFFLATION N/A 2/8/2019    Procedure: COMBINED ESOPHAGOSCOPY, GASTROSCOPY, DUODENOSCOPY (EGD) WITH CO2 INSUFFLATION;  Surgeon: José Miguel Ochoa MD;  Location: [facility] OR     ESOPHAGOSCOPY, GASTROSCOPY, DUODENOSCOPY (EGD), COMBINED N/A 2/8/2019    Procedure: Combined Esophagoscopy, Gastroscopy, Duodenoscopy (Egd), Biopsy Single Or Multiple;  Surgeon: José Miguel Ochoa MD;  Location: [facility] OR     GYN SURGERY       TONSILLECTOMY         Family History   Problem Relation Age of Onset     Other Cancer Mother         panreatic     Diabetes Mother      Breast Cancer Sister       Skin Cancer Sister      Cerebrovascular Disease Father         CVA     Chronic Obstructive Pulmonary Disease Father      Aneurysm Paternal Grandmother      Unknown/Adopted Paternal Grandfather      Alzheimer Disease Brother         early onset     Diabetes Son      Thyroid Disease Son      Breast Cancer Paternal Aunt      Diabetes Son      Breast Cancer Sister        Social History     Social History Narrative    ENVIRONMENTAL HISTORY: [place] The home is heated with a forced air but does not use.  Has floor heating. They do have central air conditioning. The patient's bedroom is furnished with carpeting in bedroom.  No pets inside the house. But is exposed to chicken. There is no history of cockroach or mice infestation. There are no smokers in the house.  The house does not have a damp basement.      Social History     Tobacco Use     Smoking status: Former Smoker     Packs/day: 2.00     Years: 20.00     Pack years: 40.00     Types: Cigarettes     Last attempt to quit: 1987     Years since quittin.1     Smokeless tobacco: Never Used   Substance Use Topics     Alcohol use: Yes     Comment: 1 nightly     Drug use: No     Immunization History   Administered Date(s) Administered     Influenza (High Dose) 3 valent vaccine 2018, 2018     Influenza (IIV3) PF 2012     Influenza Vaccine IM 3yrs+ 4 Valent IIV4 2016     Pneumo Conj 13-V (2010&after) 2018     TDAP Vaccine (Adacel) 2012     Patient Active Problem List   Diagnosis     Severe persistent asthma     Lichen sclerosus     Benign essential hypertension     Elevated glucose     Family history of diabetes mellitus     Severe obesity (BMI 35.0-39.9) with comorbidity (H)     At risk for cardiovascular event     Outpatient Medications Marked as Taking for the 19 encounter (Office Visit) with Patricia Garzon MD   Medication Sig     albuterol (2.5 MG/3ML) 0.083% neb solution  "Take 1 vial (2.5 mg) by nebulization every 4 hours as needed for shortness of breath / dyspnea, wheezing or other (persistent cough)     albuterol (PROAIR HFA/PROVENTIL HFA/VENTOLIN HFA) 108 (90 Base) MCG/ACT Inhaler Inhale 2-4 puffs into the lungs every 4 hours as needed for shortness of breath / dyspnea or wheezing     azelastine (ASTELIN) 0.1 % nasal spray Spray 2 sprays into both nostrils 2 times daily as needed for rhinitis or allergies     clobetasol (TEMOVATE) 0.05 % cream Apply sparingly to affected area twice weekly as needed.  Do not apply to face.     fluticasone-vilanterol (BREO ELLIPTA) 200-25 MCG/INH inhaler Inhale 1 puff into the lungs daily     hydrochlorothiazide (HYDRODIURIL) 12.5 MG tablet TAKE 1 TABLET(12.5 MG) BY MOUTH DAILY     Respiratory Therapy Supplies (NEBULIZER/TUBING/MOUTHPIECE) KIT Use with albuterol neb solution     Tiotropium Bromide Monohydrate 1.25 MCG/ACT AERS Inhale 2 puffs into the lungs daily     Allergies   Allergen Reactions     Cartia Xt [Diltiazem] Other (See Comments)     Heartburn whenever on this medication     Losartan Cough     Sulfa Drugs Rash          Physical Exam:   Vitals were reviewed.  All vitals stable  /82 (BP Location: Right arm, Patient Position: Sitting, Cuff Size: Adult Regular)   Pulse 77   Temp 98.2  F (36.8  C) (Oral)   Ht 1.651 m (5' 5\")   Wt 95.3 kg (210 lb)   SpO2 94%   BMI 34.95 kg/m    Wt Readings from Last 4 Encounters:   06/05/19 95.3 kg (210 lb)   05/28/19 95.9 kg (211 lb 6.7 oz)   04/17/19 99.7 kg (219 lb 14.4 oz)   02/26/19 108.3 kg (238 lb 12.1 oz)     Exam:  GENERAL: well-developed, well-nourished, alert, oriented, in no acute distress.  HEAD: Head is normocephalic, atraumatic   EYES: Eyes have anicteric sclerae.    ENT: Oropharynx is moist without exudates or ulcers.  NECK: Supple.  LUNGS: Clear to auscultation.  CARDIOVASCULAR: Regular rate and rhythm with no murmurs, gallops or rubs.  ABDOMEN: Normal bowel sounds, soft, " nontender.  SKIN: No acute rashes.   NEUROLOGIC: Grossly nonfocal.         Laboratory Data:     Immune Globulin Studies     Recent Labs   Lab Test 11/14/16  1037   IGE 10     Metabolic Studies    Recent Labs   Lab Test 02/27/18  1203 04/28/16  1108    141   POTASSIUM 4.5 4.4   CHLORIDE 104 104   CO2 29 29   ANIONGAP 4 8   BUN 17 18   CR 0.85 0.85   GFRESTIMATED 67 68   GLC 99 109*   CHARMAINE 9.0 9.1     Pancreatitis testing    Recent Labs   Lab Test 02/27/18  1203 04/28/16  1108   TRIG 92 84     Lipid testing    Recent Labs   Lab Test 02/27/18  1203 04/28/16  1108 08/13/15   CHOL 187 189 199   HDL 71 72 63   LDL 98 100* 116   TRIG 92 84 99     Hematology Studies     Recent Labs   Lab Test 02/28/19  1533 07/23/18  1751  11/14/16  1037   WBC 7.8 10.5  --  7.7   ANEU 4.3 6.2  --  4.6   ALYM 2.4 2.9  --  2.4   WAYNE 0.9 1.1  --  0.6   AEOS 0.1 0.2  --  0.1   HGB 15.2 15.0   < > 15.5   HCT 45.4 44.7  --  48.1*    249  --  247    < > = values in this interval not displayed.     Iron Testing    Recent Labs   Lab Test 02/28/19  1533   MCV 94     Arterial Blood Gas Testing    Recent Labs   Lab Test 08/27/18  1015   PH 7.39   PCO2 44   PO2 67*   HCO3 27   O2PER 21      Thyroid Studies     Recent Labs   Lab Test 02/27/18  1203   TSH 2.23     Microbiology:  Last Culture results with specimen source  Culture Micro   Date Value Ref Range Status   06/05/2019 No growth after 5 hours  Preliminary    Specimen Description   Date Value Ref Range Status   06/05/2019 Serum  Final   06/05/2019 Blood  Final         Hepatitis C Antibody   Date Value Ref Range Status   02/27/2018 Reactive (A) NR^Nonreactive Final     Comment:     A reactive result indicates one of the following 1) current HCV infection 2)   past HCV infection that has resolved or 3) false positivity. The CDC   recommends that a reactive result should be followed by Nucleic acid testing   for HCV RNA.   If HCV RNA is detected, that indicates current HCV infection. If  HCV RNA is   not detected, that indicates either past, resolved HCV infection, or false HCV   antibody positivity.  Assay performance characteristics have not been established for newborns,   infants, and children       Imaging:  Results for orders placed or performed during the hospital encounter of 05/13/19   CT Chest w/o Contrast    Narrative    CT CHEST W/O CONTRAST 5/13/2019 10:44 AM    HISTORY: Persistent asthma small. Follow-up of infiltrates.    TECHNIQUE: CT of the chest is performed without IV contrast.    Assessed structures to the limits no IV contrast administration  include the lungs, mediastinum, pleura, and chest wall.    Radiation exposure is reduced using automated exposure control,  adjustment of the mA and/or kV according to patient size, or iterative  reconstruction technique.    COMPARISON: 2/28/2019.    FINDINGS: Assessment of the right lung shows a new nodular parenchymal  abnormality in the upper lobe on axial image 23 that measures 13 mm.  Slightly more patchy and nodular parenchymal abnormality is seen  subpleurally in the right upper lobe on axial image 33. This measures  6 mm. A new area of grouped nodular and interstitial abnormality  laterally in the right lower lobe is seen on axial image 42. Other  scattered and grouped areas of interstitial and nodular abnormality in  each lung which can relate to chronic bronchiolitis and/or other  inflammatory etiology, haven't significantly changed. No enlarged  lymph nodes are demonstrated. The thoracic aorta is normal in caliber.  Fatty infiltration of the liver is again demonstrated. A hiatal hernia  is again seen.      Impression    IMPRESSION:  There are a few new areas of lung abnormality on the  right which are is consistent with an inflammatory/infectious  etiology. Follow-up chest CT in 3 months is recommended in  reassessment. Other chronic lung findings are stable.    MARIAN ARAUJO MD     Answers for HPI/ROS submitted by the patient on  5/31/2019   General Symptoms: No  Skin Symptoms: Yes  HENT Symptoms: No  EYE SYMPTOMS: No  HEART SYMPTOMS: No  LUNG SYMPTOMS: Yes  INTESTINAL SYMPTOMS: Yes  URINARY SYMPTOMS: No  GYNECOLOGIC SYMPTOMS: No  BREAST SYMPTOMS: No  SKELETAL SYMPTOMS: No  BLOOD SYMPTOMS: Yes  NERVOUS SYSTEM SYMPTOMS: No  MENTAL HEALTH SYMPTOMS: No  Acne: No  Hair in places you don't want it: No  Change in facial hair: No  Warts: No  Non-healing sores: No  Scarring: Yes  Flaking of skin: No  Color changes of hands/feet in cold : No  Skin thickening: No  Cough: Yes  Sputum or phlegm: Yes  Coughing up blood: Yes  Difficulty breating or shortness of breath: No  Snoring: Yes  Wheezing: No  Difficulty breathing on exertion: Yes  Nighttime Cough: Yes  Difficulty breathing when lying flat: No  Heart burn or indigestion: Yes  Nausea: No  Vomiting: No  Abdominal pain: No  Bloating: No  Constipation: No  Diarrhea: No  Blood in stool: No  Black stools: No  Rectal or Anal pain: No  Fecal incontinence: No  Yellowing of skin or eyes: No  Vomit with blood: No  Change in stools: No  Anemia: No  Swollen glands: No  Easy bleeding or bruising: Yes  Edema or swelling: No

## 2019-06-07 ENCOUNTER — ALLIED HEALTH/NURSE VISIT (OUTPATIENT)
Dept: FAMILY MEDICINE | Facility: CLINIC | Age: 64
End: 2019-06-07
Payer: MEDICARE

## 2019-06-07 VITALS — RESPIRATION RATE: 18 BRPM | HEART RATE: 82 BPM | DIASTOLIC BLOOD PRESSURE: 80 MMHG | SYSTOLIC BLOOD PRESSURE: 118 MMHG

## 2019-06-07 DIAGNOSIS — I10 BENIGN ESSENTIAL HYPERTENSION: ICD-10-CM

## 2019-06-07 DIAGNOSIS — Z01.30 BP CHECK: Primary | ICD-10-CM

## 2019-06-07 LAB
ANION GAP SERPL CALCULATED.3IONS-SCNC: 8 MMOL/L (ref 3–14)
BUN SERPL-MCNC: 17 MG/DL (ref 7–30)
CALCIUM SERPL-MCNC: 9.8 MG/DL (ref 8.5–10.1)
CHLORIDE SERPL-SCNC: 106 MMOL/L (ref 94–109)
CO2 SERPL-SCNC: 26 MMOL/L (ref 20–32)
CREAT SERPL-MCNC: 0.9 MG/DL (ref 0.52–1.04)
GAMMA INTERFERON BACKGROUND BLD IA-ACNC: 0.02 IU/ML
GFR SERPL CREATININE-BSD FRML MDRD: 67 ML/MIN/{1.73_M2}
GLUCOSE SERPL-MCNC: 109 MG/DL (ref 70–99)
M TB IFN-G BLD-IMP: NEGATIVE
M TB IFN-G CD4+ BCKGRND COR BLD-ACNC: >10 IU/ML
MITOGEN IGNF BCKGRD COR BLD-ACNC: 0 IU/ML
MITOGEN IGNF BCKGRD COR BLD-ACNC: 0 IU/ML
POTASSIUM SERPL-SCNC: 3.7 MMOL/L (ref 3.4–5.3)
SODIUM SERPL-SCNC: 140 MMOL/L (ref 133–144)

## 2019-06-07 PROCEDURE — 80048 BASIC METABOLIC PNL TOTAL CA: CPT | Performed by: PHYSICIAN ASSISTANT

## 2019-06-07 PROCEDURE — 36415 COLL VENOUS BLD VENIPUNCTURE: CPT | Performed by: PHYSICIAN ASSISTANT

## 2019-06-07 PROCEDURE — 99207 ZZC NO CHARGE NURSE ONLY: CPT

## 2019-06-07 RX ORDER — HYDROCHLOROTHIAZIDE 12.5 MG/1
TABLET ORAL
Qty: 30 TABLET | Refills: 0 | Status: SHIPPED | OUTPATIENT
Start: 2019-06-07 | End: 2019-06-07

## 2019-06-07 NOTE — TELEPHONE ENCOUNTER
Notify patient - Refilled x 30 days only - needs nurse BP check or to update us on BPs, and do lab.

## 2019-06-07 NOTE — PROGRESS NOTES
Luz Elena Cristina is a 64 year old year old patient who comes in today for a Blood Pressure check because of ongoing blood pressure monitoring and needed lab work.  Vital Signs as repeated by /80  Patient is taking medication as prescribed  Patient is tolerating medications well.  Patient is monitoring Blood Pressure at home.  Average readings if yes are average 130/80  Current complaints: none  Disposition:  patient to continue with the same medication.  Will await lab.  Argelia Casas RN

## 2019-06-07 NOTE — TELEPHONE ENCOUNTER
Patient notified of Kiara Zazueta PA-C's message.  She will schedule lab and BP check.  BP Readings from Last 3 Encounters:   06/05/19 138/82   05/28/19 131/90   04/17/19 143/88         Argelia Casas RN

## 2019-06-10 LAB
LAB SCANNED RESULT: NORMAL

## 2019-06-10 RX ORDER — HYDROCHLOROTHIAZIDE 12.5 MG/1
12.5 TABLET ORAL DAILY
Qty: 90 TABLET | Refills: 1 | Status: SHIPPED | OUTPATIENT
Start: 2019-06-10 | End: 2019-12-31

## 2019-06-10 NOTE — RESULT ENCOUNTER NOTE
Jennifer Laguna,    Your lab and BP look good.  I've sent refills of your medication.    Please contact me if you have questions.    Kiara Zazueta PA-C

## 2019-06-19 NOTE — PROGRESS NOTES
Infectious Disease Clinic Staff Note: Ms. Cristina was seen, examined, and the case was discussed with Dr. Garzon, ID Fellow -- I agree with her consultative patient history and examination, assessment and plan in this outpatient ID Consult note. This note reflects my observations and opinions and the plan outlined fully reflects my approach. I have reviewed the available history, radiology, laboratory results, and reports with the Fellow.

## 2019-06-24 ENCOUNTER — OFFICE VISIT (OUTPATIENT)
Dept: PULMONOLOGY | Facility: CLINIC | Age: 64
End: 2019-06-24
Payer: MEDICARE

## 2019-06-24 ENCOUNTER — OFFICE VISIT (OUTPATIENT)
Dept: NURSING | Facility: CLINIC | Age: 64
End: 2019-06-24
Payer: MEDICARE

## 2019-06-24 VITALS
HEIGHT: 65 IN | SYSTOLIC BLOOD PRESSURE: 130 MMHG | OXYGEN SATURATION: 93 % | RESPIRATION RATE: 18 BRPM | TEMPERATURE: 97.2 F | WEIGHT: 210 LBS | DIASTOLIC BLOOD PRESSURE: 78 MMHG | HEART RATE: 71 BPM | BODY MASS INDEX: 34.99 KG/M2

## 2019-06-24 DIAGNOSIS — R91.8 PULMONARY NODULES: Primary | ICD-10-CM

## 2019-06-24 DIAGNOSIS — R93.89 ABNORMAL CHEST CT: Primary | ICD-10-CM

## 2019-06-24 DIAGNOSIS — R93.89 ABNORMAL CHEST CT: ICD-10-CM

## 2019-06-24 PROCEDURE — 94729 DIFFUSING CAPACITY: CPT | Performed by: INTERNAL MEDICINE

## 2019-06-24 PROCEDURE — 94726 PLETHYSMOGRAPHY LUNG VOLUMES: CPT | Performed by: INTERNAL MEDICINE

## 2019-06-24 PROCEDURE — 99204 OFFICE O/P NEW MOD 45 MIN: CPT | Mod: 25 | Performed by: INTERNAL MEDICINE

## 2019-06-24 PROCEDURE — 94375 RESPIRATORY FLOW VOLUME LOOP: CPT | Performed by: INTERNAL MEDICINE

## 2019-06-24 ASSESSMENT — MIFFLIN-ST. JEOR: SCORE: 1493.43

## 2019-06-24 ASSESSMENT — PAIN SCALES - GENERAL: PAINLEVEL: NO PAIN (0)

## 2019-06-24 NOTE — PROGRESS NOTES
LUNG NODULE & INTERVENTIONAL PULMONARY CLINIC  [facility]     Luz Elena Cristina MRN# [id number]   Age: 64 year old YOB: 1955     Reason for Consultation: lung nodule(s)       Assessment and Plan:    1. Established multiple pulmonary lung nodule(s). Given the characteristics on current/previous imaging and risk factors; I would classify this to be Intermediate (6-65%) risk for cancer. Waxing and waning nodules/ggo bilaterally. Likely infectious/inflammatory etiology given this pattern. Repeat CT in 3mo.     2. Asthma. 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 Cont current inhaler regimen per allergist.     Billing: The patient was seen and examined by me and the findings, assessment, and plan as documented was explained to the patient/family who expressed understand.     Prince Manzanares MD   of Medicine  Interventional Pulmonology  Department of Pulmonary, Allergy, Critical Care and Sleep Medicine   [facility]  Pager: [phone]          History:     Luz Elena Cristina is a 64 year old female with sig h/o for asthma, HTN, NEGRA who is here for evaluation/followup of lung nodule(s).    - No new resp sx or complaints. Denies dyspnea or cough.   - had CT chest for worsening PFT by her allergist and showed nodules   - Personal hx of cancer: No cancer. Up-to-date on mammo and c-scope  - Family hx of cancer: No lung cancer.   - Exposure hx: Denies asbestos or radon exposure   - Tobacco hx: Past Smoker: 2ppd for 15years. Quit 33yrs ago.   - My interpretation of the images relevant for this visit includes: nodules   - My interpretation of the PFT's relevant for this visit includes: Obstructive pattern     Culprit Nodule(s):   Multiple bilateral lung nodule(s) that are sub 13 mm. First seen by chest CT on 2/28/19. There is no interval change    Other active medical problems  include:   - Has NEGRA. On CPAP.    - Has asthma. On breo and spiriva.    - Has HTN. Stable.           Past Medical History:      Past Medical History:   Diagnosis Date     Hypertension      Sleep apnea      Uncomplicated asthma            Past Surgical History:      Past Surgical History:   Procedure Laterality Date     BIOPSY      breast,     COLONOSCOPY       COLONOSCOPY N/A 2019    Procedure: Combined Colonoscopy, Single Or Multiple Biopsy/Polypectomy By Biopsy;  Surgeon: José Miguel Ochoa MD;  Location: [facility]     COLONOSCOPY WITH CO2 INSUFFLATION N/A 2019    Procedure: COLONOSCOPY WITH CO2 INSUFFLATION;  Surgeon: José Miguel Ochoa MD;  Location: [facility]     COMBINED ESOPHAGOSCOPY, GASTROSCOPY, DUODENOSCOPY (EGD) WITH CO2 INSUFFLATION N/A 2019    Procedure: COMBINED ESOPHAGOSCOPY, GASTROSCOPY, DUODENOSCOPY (EGD) WITH CO2 INSUFFLATION;  Surgeon: José Miguel Ochoa MD;  Location: [facility]     ESOPHAGOSCOPY, GASTROSCOPY, DUODENOSCOPY (EGD), COMBINED N/A 2019    Procedure: Combined Esophagoscopy, Gastroscopy, Duodenoscopy (Egd), Biopsy Single Or Multiple;  Surgeon: José Miguel Ochoa MD;  Location: [facility]     GYN SURGERY       TONSILLECTOMY            Social History:     Social History     Tobacco Use     Smoking status: Former Smoker     Packs/day: 2.00     Years: 20.00     Pack years: 40.00     Types: Cigarettes     Last attempt to quit: 1987     Years since quittin.1     Smokeless tobacco: Never Used   Substance Use Topics     Alcohol use: Yes     Comment: 1 nightly          Family History:     Family History   Problem Relation Age of Onset     Other Cancer Mother         panreatic     Diabetes Mother      Breast Cancer Sister      Skin Cancer Sister      Cerebrovascular Disease Father         CVA     Chronic Obstructive Pulmonary Disease Father      Aneurysm Paternal Grandmother      Unknown/Adopted Paternal Grandfather      Alzheimer Disease Brother          early onset     Diabetes Son      Thyroid Disease Son      Breast Cancer Paternal Aunt      Diabetes Son      Breast Cancer Sister            Allergies:      Allergies   Allergen Reactions     Cartia Xt [Diltiazem] Other (See Comments)     Heartburn whenever on this medication     Losartan Cough     Sulfa Drugs Rash          Medications:     Current Outpatient Medications   Medication Sig     albuterol (2.5 MG/3ML) 0.083% neb solution Take 1 vial (2.5 mg) by nebulization every 4 hours as needed for shortness of breath / dyspnea, wheezing or other (persistent cough)     albuterol (PROAIR HFA/PROVENTIL HFA/VENTOLIN HFA) 108 (90 Base) MCG/ACT Inhaler Inhale 2-4 puffs into the lungs every 4 hours as needed for shortness of breath / dyspnea or wheezing     azelastine (ASTELIN) 0.1 % nasal spray Spray 2 sprays into both nostrils 2 times daily as needed for rhinitis or allergies     clobetasol (TEMOVATE) 0.05 % cream Apply sparingly to affected area twice weekly as needed.  Do not apply to face.     fluticasone-vilanterol (BREO ELLIPTA) 200-25 MCG/INH inhaler Inhale 1 puff into the lungs daily     hydrochlorothiazide (HYDRODIURIL) 12.5 MG tablet Take 1 tablet (12.5 mg) by mouth daily     Respiratory Therapy Supplies (NEBULIZER/TUBING/MOUTHPIECE) KIT Use with albuterol neb solution     Tiotropium Bromide Monohydrate 1.25 MCG/ACT AERS Inhale 2 puffs into the lungs daily     ranitidine (ZANTAC) 150 MG tablet Take 2 tablets in the morning and 1 tablet in the evening (Patient not taking: Reported on 6/24/2019)     No current facility-administered medications for this visit.           Review of Systems:     CONSTITUTIONAL: negative for fever, chills, change in weight  INTEGUMENTARY/SKIN: no rash or obvious new lesions  ENT/MOUTH: no sore throat, new sinus pain or nasal drainage  RESP: see interval history  CV: negative for chest pain, palpitations or peripheral edema  GI: no nausea, vomiting, change in stools  : no  dysuria  MUSCULOSKELETAL: no myalgias, arthralgias  ENDOCRINE: blood sugars with adequate control  PSYCHIATRIC: mood stable  LYMPHATIC: no new lymphadenopathy  HEME: no bleeding or easy bruisability  NEURO: no numbness, weakness, headaches         Physical Exam:     Temp:  [97.2  F (36.2  C)] 97.2  F (36.2  C)  Pulse:  [71] 71  Resp:  [18] 18  BP: (130)/(78) 130/78  SpO2:  [93 %] 93 %  Wt Readings from Last 4 Encounters:   06/24/19 95.3 kg (210 lb)   06/05/19 95.3 kg (210 lb)   05/28/19 95.9 kg (211 lb 6.7 oz)   04/17/19 99.7 kg (219 lb 14.4 oz)     Constitutional:   Awake, alert and in no apparent distress     Eyes:   Nonicteric, CAROLYN     ENT:    Trachea is midline. No gross neck abnormalities      Neck:   Supple without supraclavicular or cervical lymphadenopathy     Lungs:   Good air flow.  No crackles. No rhonchi.  No wheezes.     Cardiovascular:   Normal S1 and S2.  RRR.  No murmur, gallop or rub.  Radial, DP and PT pulses normal and symmetric     Abdomen:   NABS, soft, nontender, nondistended.  No HSM.     Musculoskeletal:   No edema.      Neurologic:   Alert and conversant. Cranial nerves  intact.       Skin:   Warm, dry.  No rash on limited exam.           Current Laboratory Data:   All laboratory and imaging data reviewed.    Results for orders placed or performed in visit on 06/24/19 (from the past 24 hour(s))   General PFT Lab (Please always keep checked)   Result Value Ref Range    FVC-Pred 3.08 L    FVC-Pre 2.91 L    FVC-%Pred-Pre 94 %    FEV1-Pre 1.64 L    FEV1-%Pred-Pre 68 %    FEV1FVC-Pred 78 %    FEV1FVC-Pre 57 %    FEFMax-Pred 6.09 L/sec    FEFMax-Pre 4.99 L/sec    FEFMax-%Pred-Pre 81 %    FEF2575-Pred 2.07 L/sec    FEF2575-Pre 0.72 L/sec    ZFG2337-%Pred-Pre 34 %    ExpTime-Pre 7.52 sec    FIFMax-Pre 3.36 L/sec    VC-Pred 3.27 L    VC-Pre 3.13 L    VC-%Pred-Pre 95 %    IC-Pred 2.86 L    IC-Pre 2.35 L    IC-%Pred-Pre 82 %    ERV-Pred 0.41 L    ERV-Pre 0.79 L    ERV-%Pred-Pre 190 %    FEV1FEV6-Pred  80 %    FEV1FEV6-Pre 58 %    FRCPleth-Pred 2.76 L    FRCPleth-Pre 3.42 L    FRCPleth-%Pred-Pre 123 %    RVPleth-Pred 2.04 L    RVPleth-Pre 2.63 L    RVPleth-%Pred-Pre 128 %    TLCPleth-Pred 5.11 L    TLCPleth-Pre 5.76 L    TLCPleth-%Pred-Pre 112 %    DLCOunc-Pred 20.43 ml/min/mmHg    DLCOunc-Pre 21.23 ml/min/mmHg    DLCOunc-%Pred-Pre 103 %    VA-Pre 4.58 L    VA-%Pred-Pre 92 %    FEV1SVC-Pred 73 %    FEV1SVC-Pre 52 %            Previous Pulmonary Function Testing     FVC-Pred   Date Value Ref Range Status   06/24/2019 3.08 L      FVC-Pre   Date Value Ref Range Status   06/24/2019 2.91 L      FVC-%Pred-Pre   Date Value Ref Range Status   06/24/2019 94 %      FEV1-Pre   Date Value Ref Range Status   06/24/2019 1.64 L      FEV1-%Pred-Pre   Date Value Ref Range Status   06/24/2019 68 %      FEV1FVC-Pred   Date Value Ref Range Status   06/24/2019 78 %      FEV1FVC-Pre   Date Value Ref Range Status   06/24/2019 57 %      No results found for: 83448  FEFMax-Pred   Date Value Ref Range Status   06/24/2019 6.09 L/sec      FEFMax-Pre   Date Value Ref Range Status   06/24/2019 4.99 L/sec      FEFMax-%Pred-Pre   Date Value Ref Range Status   06/24/2019 81 %      ExpTime-Pre   Date Value Ref Range Status   06/24/2019 7.52 sec      FIFMax-Pre   Date Value Ref Range Status   06/24/2019 3.36 L/sec      FEV1FEV6-Pred   Date Value Ref Range Status   06/24/2019 80 %      FEV1FEV6-Pre   Date Value Ref Range Status   06/24/2019 58 %             Previous Cardiology Imaging   No results found for this or any previous visit (from the past 8760 hour(s)).

## 2019-06-24 NOTE — NURSING NOTE
"Luz Elena Cristina's goals for this visit include: Consult  She requests these members of her care team be copied on today's visit information: PCP    PCP: Galindo Montano    Referring Provider:  No referring provider defined for this encounter.    /78   Pulse 71   Temp 97.2  F (36.2  C)   Resp 18   Ht 1.651 m (5' 5\")   Wt 95.3 kg (210 lb)   SpO2 93%   BMI 34.95 kg/m      Do you need any medication refills at today's visit? N    "

## 2019-06-26 LAB
DLCOUNC-%PRED-PRE: 103 %
DLCOUNC-PRE: 21.23 ML/MIN/MMHG
DLCOUNC-PRED: 20.43 ML/MIN/MMHG
ERV-%PRED-PRE: 190 %
ERV-PRE: 0.79 L
ERV-PRED: 0.41 L
EXPTIME-PRE: 7.52 SEC
FEF2575-%PRED-PRE: 34 %
FEF2575-PRE: 0.72 L/SEC
FEF2575-PRED: 2.07 L/SEC
FEFMAX-%PRED-PRE: 81 %
FEFMAX-PRE: 4.99 L/SEC
FEFMAX-PRED: 6.09 L/SEC
FEV1-%PRED-PRE: 68 %
FEV1-PRE: 1.64 L
FEV1FEV6-PRE: 58 %
FEV1FEV6-PRED: 80 %
FEV1FVC-PRE: 57 %
FEV1FVC-PRED: 78 %
FEV1SVC-PRE: 52 %
FEV1SVC-PRED: 73 %
FIFMAX-PRE: 3.36 L/SEC
FRCPLETH-%PRED-PRE: 123 %
FRCPLETH-PRE: 3.42 L
FRCPLETH-PRED: 2.76 L
FVC-%PRED-PRE: 94 %
FVC-PRE: 2.91 L
FVC-PRED: 3.08 L
IC-%PRED-PRE: 82 %
IC-PRE: 2.35 L
IC-PRED: 2.86 L
RVPLETH-%PRED-PRE: 128 %
RVPLETH-PRE: 2.63 L
RVPLETH-PRED: 2.04 L
TLCPLETH-%PRED-PRE: 112 %
TLCPLETH-PRE: 5.76 L
TLCPLETH-PRED: 5.11 L
VA-%PRED-PRE: 92 %
VA-PRE: 4.58 L
VC-%PRED-PRE: 95 %
VC-PRE: 3.13 L
VC-PRED: 3.27 L

## 2019-06-28 ENCOUNTER — TELEPHONE (OUTPATIENT)
Dept: ALLERGY | Facility: CLINIC | Age: 64
End: 2019-06-28

## 2019-06-28 DIAGNOSIS — B37.0 THRUSH: Primary | ICD-10-CM

## 2019-06-28 RX ORDER — CLOTRIMAZOLE 10 MG/1
10 LOZENGE ORAL
Qty: 70 TROCHE | Refills: 0 | Status: SHIPPED | OUTPATIENT
Start: 2019-06-28 | End: 2019-09-09

## 2019-06-28 NOTE — TELEPHONE ENCOUNTER
Reason for Call:  Other     Detailed comments: Pt thinks she has thrush from her inhaler. Has had symptoms (top of the lip is white, gums and lips are burning) for a couple weeks. - Please advise    PHARMACY:  [facility] - [place]    Phone Number Patient can be reached at: Cell number on file:    Telephone Information:   Mobile [phone]       Best Time: Any    Can we leave a detailed message on this number? YES    Call taken on 6/28/2019 at 8:59 AM by Denise Behrendt

## 2019-06-28 NOTE — TELEPHONE ENCOUNTER
Pt calling to check on status of this request.     Spoke with RN who states will send a high priority message to the provider to send in the prescription. (nurse advised if no response by this evening that pt should be seen in an urgent care - pt notified of this also)    Please contact pt when the rx has been sent @: [phone]    Denise Behrendt  Specialty CSS

## 2019-07-01 NOTE — TELEPHONE ENCOUNTER
Sent My Chart communication verifying that pt has received the prescription.     Praveena VALE   All[facility]

## 2019-07-17 LAB
MYCOBACTERIUM SPEC CULT: NORMAL
SPECIMEN SOURCE: NORMAL

## 2019-09-04 ENCOUNTER — OFFICE VISIT (OUTPATIENT)
Dept: DERMATOLOGY | Facility: CLINIC | Age: 64
End: 2019-09-04
Payer: MEDICARE

## 2019-09-04 VITALS — OXYGEN SATURATION: 94 % | SYSTOLIC BLOOD PRESSURE: 148 MMHG | DIASTOLIC BLOOD PRESSURE: 81 MMHG | HEART RATE: 66 BPM

## 2019-09-04 DIAGNOSIS — L90.0 LICHEN SCLEROSUS: ICD-10-CM

## 2019-09-04 PROCEDURE — 99213 OFFICE O/P EST LOW 20 MIN: CPT | Performed by: PHYSICIAN ASSISTANT

## 2019-09-04 RX ORDER — CLOBETASOL PROPIONATE 0.5 MG/G
OINTMENT TOPICAL
Qty: 30 G | Refills: 4 | Status: SHIPPED | OUTPATIENT
Start: 2019-09-04 | End: 2023-10-26

## 2019-09-04 RX ORDER — CLOBETASOL PROPIONATE 0.5 MG/G
CREAM TOPICAL
Qty: 60 G | Refills: 3 | Status: SHIPPED | OUTPATIENT
Start: 2019-09-04 | End: 2023-05-24

## 2019-09-04 RX ORDER — CALCIPOTRIENE 50 UG/G
CREAM TOPICAL
Qty: 60 G | Refills: 4 | Status: SHIPPED | OUTPATIENT
Start: 2019-09-04 | End: 2022-06-01

## 2019-09-04 NOTE — LETTER
9/4/2019         RE: Luz Elena Cristina  [id number] [facility] [address]        Dear Colleague,    Thank you for referring your patient, Luz Elena Cristina, to the [facility]. Please see a copy of my visit note below.    Luz Elena Cristina is a 64 year old year old female patient here today for recheck lichen sclerosus.  Patient notes a few new areas on chest, abdomen and back. She reports that her lichen sclerosus has been very controlled. Just recently noticed some vaginal itching. She did have a sore in mouth but has resolved after taking break from cpap machine. Remainder of the HPI, Meds, PMH, Allergies, FH, and SH was reviewed in chart.    Pertinent Hx:   Lichen Sclerosus   Past Medical History:   Diagnosis Date     Hypertension      Sleep apnea      Uncomplicated asthma        Past Surgical History:   Procedure Laterality Date     BIOPSY      breast,     COLONOSCOPY       COLONOSCOPY N/A 2/8/2019    Procedure: Combined Colonoscopy, Single Or Multiple Biopsy/Polypectomy By Biopsy;  Surgeon: José Miguel Ochoa MD;  Location: [facility]     COLONOSCOPY WITH CO2 INSUFFLATION N/A 2/8/2019    Procedure: COLONOSCOPY WITH CO2 INSUFFLATION;  Surgeon: José Miguel Ochoa MD;  Location: [facility]     COMBINED ESOPHAGOSCOPY, GASTROSCOPY, DUODENOSCOPY (EGD) WITH CO2 INSUFFLATION N/A 2/8/2019    Procedure: COMBINED ESOPHAGOSCOPY, GASTROSCOPY, DUODENOSCOPY (EGD) WITH CO2 INSUFFLATION;  Surgeon: José Miguel Ochoa MD;  Location: [facility]     ESOPHAGOSCOPY, GASTROSCOPY, DUODENOSCOPY (EGD), COMBINED N/A 2/8/2019    Procedure: Combined Esophagoscopy, Gastroscopy, Duodenoscopy (Egd), Biopsy Single Or Multiple;  Surgeon: José Miguel Ochoa MD;  Location: [facility]     GYN SURGERY       TONSILLECTOMY          Family History   Problem Relation Age of Onset     Other Cancer Mother         panreatic     Diabetes Mother      Breast Cancer Sister      Skin Cancer Sister      Cerebrovascular Disease  Father         CVA     Chronic Obstructive Pulmonary Disease Father      Aneurysm Paternal Grandmother      Unknown/Adopted Paternal Grandfather      Alzheimer Disease Brother         early onset     Diabetes Son      Thyroid Disease Son      Breast Cancer Paternal Aunt      Diabetes Son      Breast Cancer Sister        Social History     Socioeconomic History     Marital status:      Spouse name: Not on file     Number of children: Not on file     Years of education: Not on file     Highest education level: Not on file   Occupational History     Comment: Retired   Social Needs     Financial resource strain: Not on file     Food insecurity:     Worry: Not on file     Inability: Not on file     Transportation needs:     Medical: Not on file     Non-medical: Not on file   Tobacco Use     Smoking status: Former Smoker     Packs/day: 2.00     Years: 20.00     Pack years: 40.00     Types: Cigarettes     Last attempt to quit: 1987     Years since quittin.3     Smokeless tobacco: Never Used   Substance and Sexual Activity     Alcohol use: Yes     Comment: 1 nightly     Drug use: No     Sexual activity: Not Currently     Partners: Male     Birth control/protection: Post-menopausal   Lifestyle     Physical activity:     Days per week: Not on file     Minutes per session: Not on file     Stress: Not on file   Relationships     Social connections:     Talks on phone: Not on file     Gets together: Not on file     Attends Adventism service: Not on file     Active member of club or organization: Not on file     Attends meetings of clubs or organizations: Not on file     Relationship status: Not on file     Intimate partner violence:     Fear of current or ex partner: Not on file     Emotionally abused: Not on file     Physically abused: Not on file     Forced sexual activity: Not on file   Other Topics Concern     Parent/sibling w/ CABG, MI or angioplasty before 65F 55M? No   Social History Narrative     ENVIRONMENTAL HISTORY: [place] home is heated with a forced air but does not use.  Has floor heating. They do have central air conditioning. The patient's bedroom is furnished with carpeting in bedroom.  No pets inside the house. But is exposed to chicken. There is no history of cockroach or mice infestation. There are no smokers in the house.  The house does not have a damp basement.        Outpatient Encounter Medications as of 9/4/2019   Medication Sig Dispense Refill     albuterol (2.5 MG/3ML) 0.083% neb solution Take 1 vial (2.5 mg) by nebulization every 4 hours as needed for shortness of breath / dyspnea, wheezing or other (persistent cough) 50 vial 1     albuterol (PROAIR HFA/PROVENTIL HFA/VENTOLIN HFA) 108 (90 Base) MCG/ACT Inhaler Inhale 2-4 puffs into the lungs every 4 hours as needed for shortness of breath / dyspnea or wheezing 1 Inhaler 3     azelastine (ASTELIN) 0.1 % nasal spray Spray 2 sprays into both nostrils 2 times daily as needed for rhinitis or allergies 30 mL 3     calcipotriene (DOVONOX) 0.005 % external cream Apply twice daily to affected area on body as needed. 60 g 4     clobetasol (TEMOVATE) 0.05 % external cream Apply sparingly to affected area twice weekly as needed.  Do not apply to face. 60 g 3     clobetasol (TEMOVATE) 0.05 % external ointment Apply sparingly daily as needed to affected area on groin. 30 g 4     fluticasone-vilanterol (BREO ELLIPTA) 200-25 MCG/INH inhaler Inhale 1 puff into the lungs daily 1 Inhaler 3     hydrochlorothiazide (HYDRODIURIL) 12.5 MG tablet Take 1 tablet (12.5 mg) by mouth daily 90 tablet 1     ranitidine (ZANTAC) 150 MG tablet Take 2 tablets in the morning and 1 tablet in the evening 270 tablet 1     Respiratory Therapy Supplies (NEBULIZER/TUBING/MOUTHPIECE) KIT Use with albuterol neb solution 1 kit 0     Tiotropium Bromide Monohydrate 1.25 MCG/ACT AERS Inhale 2 puffs into the lungs daily 1 Inhaler 3      [] clotrimazole 10 MG refugio Take 1 Refugio (10 mg) by mouth 5 times daily for 14 days 70 Refugio 0     [DISCONTINUED] clobetasol (TEMOVATE) 0.05 % cream Apply sparingly to affected area twice weekly as needed.  Do not apply to face. 30 g 0     No facility-administered encounter medications on file as of 2019.              Review Of Systems  Skin: As above  Eyes: negative  Ears/Nose/Throat: negative  Respiratory: No shortness of breath, dyspnea on exertion, cough, or hemoptysis  Cardiovascular: negative  Gastrointestinal: negative  Genitourinary: negative  Musculoskeletal: negative  Neurologic: negative  Psychiatric: negative  Hematologic/Lymphatic/Immunologic: negative  Endocrine: negative      O:   NAD, WDWN, Alert & Oriented, Mood & Affect wnl, Vitals stable   Here today alone   BP (!) 148/81   Pulse 66   SpO2 94%    General appearance normal   Vitals stable   Alert, oriented and in no acute distress   No oral lesions   White atrophic scaly thin plaques on chest, abdomen and back    No visible atrophic patches on vagina     Eyes: Conjunctivae/lids:Normal     ENT: Lips: normal    MSK:Normal    Pulm: Breathing Normal    Neuro/Psych: Orientation:Normal; Mood/Affect:Normal  A/P:  1. Extragenital Lichen Sclerosus  Apply clobetasol cream twice daily for 2-3 days as needed to areas.   Apply dovonex cream as needed.   2. Genital Lichen Sclerosus-  Improved  Use clobetasol ointment as needed for up to 4-5 days   Skin care regimen reviewed with patient: Eliminate harsh soaps, i.e. Dial, zest, irsih spring; Mild soaps such as Cetaphil or Dove sensitive skin, avoid hot or cold showers, aggressive use of emollients including vanicream, cetaphil or cerave discussed with patient.    Return to clinic as needed      Again, thank you for allowing me to participate in the care of your patient.        Sincerely,        Marlen Lawrence PA-C

## 2019-09-05 ENCOUNTER — TELEPHONE (OUTPATIENT)
Dept: DERMATOLOGY | Facility: CLINIC | Age: 64
End: 2019-09-05

## 2019-09-05 NOTE — TELEPHONE ENCOUNTER
Prior Authorization Retail Medication Request    Medication/Dose: Clobetasol 0.05% ointment  ICD code (if different than what is on RX):    Previously Tried and Failed:    Rationale:      Insurance Name:  Medicare  Insurance ID:  [id number]       Pharmacy Information (if different than what is on RX)  Name:  [facility]  Phone:  [phone]

## 2019-09-05 NOTE — TELEPHONE ENCOUNTER
Prior Authorization Retail Medication Request    Medication/Dose: Calcipotriene 0.005% cream  ICD code (if different than what is on RX):    Previously Tried and Failed:    Rationale:      Insurance Name:  Medicare  Insurance ID:  [id number]       Pharmacy Information (if different than what is on RX)  Name:  [facility]  Phone:  [phone]  [facility]: [id number]

## 2019-09-06 ENCOUNTER — HOSPITAL ENCOUNTER (OUTPATIENT)
Dept: CT IMAGING | Facility: CLINIC | Age: 64
Discharge: HOME | End: 2019-09-06
Attending: INTERNAL MEDICINE | Admitting: INTERNAL MEDICINE
Payer: MEDICARE

## 2019-09-06 DIAGNOSIS — R91.8 PULMONARY NODULES: ICD-10-CM

## 2019-09-06 PROCEDURE — 71250 CT THORAX DX C-: CPT

## 2019-09-08 NOTE — PROGRESS NOTES
Luz Elena Cristina is a 64 year old year old female patient here today for recheck lichen sclerosus.  Patient notes a few new areas on chest, abdomen and back. She reports that her lichen sclerosus has been very controlled. Just recently noticed some vaginal itching. She did have a sore in mouth but has resolved after taking break from cpap machine. Remainder of the HPI, Meds, PMH, Allergies, FH, and SH was reviewed in chart.    Pertinent Hx:   Lichen Sclerosus   Past Medical History:   Diagnosis Date     Hypertension      Sleep apnea      Uncomplicated asthma        Past Surgical History:   Procedure Laterality Date     BIOPSY      breast,     COLONOSCOPY       COLONOSCOPY N/A 2/8/2019    Procedure: Combined Colonoscopy, Single Or Multiple Biopsy/Polypectomy By Biopsy;  Surgeon: José Miguel Ochoa MD;  Location: [facility]     COLONOSCOPY WITH CO2 INSUFFLATION N/A 2/8/2019    Procedure: COLONOSCOPY WITH CO2 INSUFFLATION;  Surgeon: José Miguel Ochoa MD;  Location: [facility]     COMBINED ESOPHAGOSCOPY, GASTROSCOPY, DUODENOSCOPY (EGD) WITH CO2 INSUFFLATION N/A 2/8/2019    Procedure: COMBINED ESOPHAGOSCOPY, GASTROSCOPY, DUODENOSCOPY (EGD) WITH CO2 INSUFFLATION;  Surgeon: José Miguel Ochoa MD;  Location: [facility]     ESOPHAGOSCOPY, GASTROSCOPY, DUODENOSCOPY (EGD), COMBINED N/A 2/8/2019    Procedure: Combined Esophagoscopy, Gastroscopy, Duodenoscopy (Egd), Biopsy Single Or Multiple;  Surgeon: José Miguel Ochoa MD;  Location: [facility]     GYN SURGERY       TONSILLECTOMY          Family History   Problem Relation Age of Onset     Other Cancer Mother         panreatic     Diabetes Mother      Breast Cancer Sister      Skin Cancer Sister      Cerebrovascular Disease Father         CVA     Chronic Obstructive Pulmonary Disease Father      Aneurysm Paternal Grandmother      Unknown/Adopted Paternal Grandfather      Alzheimer Disease Brother         early onset     Diabetes Son      Thyroid Disease Son       Breast Cancer Paternal Aunt      Diabetes Son      Breast Cancer Sister        Social History     Socioeconomic History     Marital status:      Spouse name: Not on file     Number of children: Not on file     Years of education: Not on file     Highest education level: Not on file   Occupational History     Comment: Retired   Social Needs     Financial resource strain: Not on file     Food insecurity:     Worry: Not on file     Inability: Not on file     Transportation needs:     Medical: Not on file     Non-medical: Not on file   Tobacco Use     Smoking status: Former Smoker     Packs/day: 2.00     Years: 20.00     Pack years: 40.00     Types: Cigarettes     Last attempt to quit: 1987     Years since quittin.3     Smokeless tobacco: Never Used   Substance and Sexual Activity     Alcohol use: Yes     Comment: 1 nightly     Drug use: No     Sexual activity: Not Currently     Partners: Male     Birth control/protection: Post-menopausal   Lifestyle     Physical activity:     Days per week: Not on file     Minutes per session: Not on file     Stress: Not on file   Relationships     Social connections:     Talks on phone: Not on file     Gets together: Not on file     Attends Jewish service: Not on file     Active member of club or organization: Not on file     Attends meetings of clubs or organizations: Not on file     Relationship status: Not on file     Intimate partner violence:     Fear of current or ex partner: Not on file     Emotionally abused: Not on file     Physically abused: Not on file     Forced sexual activity: Not on file   Other Topics Concern     Parent/sibling w/ CABG, MI or angioplasty before 65F 55M? No   Social History Narrative    ENVIRONMENTAL HISTORY: [place] The home is heated with a forced air but does not use.  Has floor heating. They do have central air conditioning. The patient's bedroom is furnished with carpeting in  bedroom.  No pets inside the house. But is exposed to chicken. There is no history of cockroach or mice infestation. There are no smokers in the house.  The house does not have a damp basement.        Outpatient Encounter Medications as of 2019   Medication Sig Dispense Refill     albuterol (2.5 MG/3ML) 0.083% neb solution Take 1 vial (2.5 mg) by nebulization every 4 hours as needed for shortness of breath / dyspnea, wheezing or other (persistent cough) 50 vial 1     albuterol (PROAIR HFA/PROVENTIL HFA/VENTOLIN HFA) 108 (90 Base) MCG/ACT Inhaler Inhale 2-4 puffs into the lungs every 4 hours as needed for shortness of breath / dyspnea or wheezing 1 Inhaler 3     azelastine (ASTELIN) 0.1 % nasal spray Spray 2 sprays into both nostrils 2 times daily as needed for rhinitis or allergies 30 mL 3     calcipotriene (DOVONOX) 0.005 % external cream Apply twice daily to affected area on body as needed. 60 g 4     clobetasol (TEMOVATE) 0.05 % external cream Apply sparingly to affected area twice weekly as needed.  Do not apply to face. 60 g 3     clobetasol (TEMOVATE) 0.05 % external ointment Apply sparingly daily as needed to affected area on groin. 30 g 4     fluticasone-vilanterol (BREO ELLIPTA) 200-25 MCG/INH inhaler Inhale 1 puff into the lungs daily 1 Inhaler 3     hydrochlorothiazide (HYDRODIURIL) 12.5 MG tablet Take 1 tablet (12.5 mg) by mouth daily 90 tablet 1     ranitidine (ZANTAC) 150 MG tablet Take 2 tablets in the morning and 1 tablet in the evening 270 tablet 1     Respiratory Therapy Supplies (NEBULIZER/TUBING/MOUTHPIECE) KIT Use with albuterol neb solution 1 kit 0     Tiotropium Bromide Monohydrate 1.25 MCG/ACT AERS Inhale 2 puffs into the lungs daily 1 Inhaler 3     [] clotrimazole 10 MG refugio Take 1 Refugio (10 mg) by mouth 5 times daily for 14 days 70 Refugio 0     [DISCONTINUED] clobetasol (TEMOVATE) 0.05 % cream Apply sparingly to affected area twice weekly as needed.  Do not apply to face. 30 g  0     No facility-administered encounter medications on file as of 9/4/2019.              Review Of Systems  Skin: As above  Eyes: negative  Ears/Nose/Throat: negative  Respiratory: No shortness of breath, dyspnea on exertion, cough, or hemoptysis  Cardiovascular: negative  Gastrointestinal: negative  Genitourinary: negative  Musculoskeletal: negative  Neurologic: negative  Psychiatric: negative  Hematologic/Lymphatic/Immunologic: negative  Endocrine: negative      O:   NAD, WDWN, Alert & Oriented, Mood & Affect wnl, Vitals stable   Here today alone   BP (!) 148/81   Pulse 66   SpO2 94%    General appearance normal   Vitals stable   Alert, oriented and in no acute distress   No oral lesions   White atrophic scaly thin plaques on chest, abdomen and back    No visible atrophic patches on vagina     Eyes: Conjunctivae/lids:Normal     ENT: Lips: normal    MSK:Normal    Pulm: Breathing Normal    Neuro/Psych: Orientation:Normal; Mood/Affect:Normal  A/P:  1. Extragenital Lichen Sclerosus  Apply clobetasol cream twice daily for 2-3 days as needed to areas.   Apply dovonex cream as needed.   2. Genital Lichen Sclerosus-  Improved  Use clobetasol ointment as needed for up to 4-5 days   Skin care regimen reviewed with patient: Eliminate harsh soaps, i.e. Dial, zest, irsih spring; Mild soaps such as Cetaphil or Dove sensitive skin, avoid hot or cold showers, aggressive use of emollients including vanicream, cetaphil or cerave discussed with patient.    Return to clinic as needed

## 2019-09-09 ENCOUNTER — OFFICE VISIT (OUTPATIENT)
Dept: PULMONOLOGY | Facility: CLINIC | Age: 64
End: 2019-09-09
Payer: MEDICARE

## 2019-09-09 VITALS
WEIGHT: 203.9 LBS | RESPIRATION RATE: 20 BRPM | OXYGEN SATURATION: 98 % | DIASTOLIC BLOOD PRESSURE: 79 MMHG | HEART RATE: 68 BPM | HEIGHT: 65 IN | BODY MASS INDEX: 33.97 KG/M2 | SYSTOLIC BLOOD PRESSURE: 137 MMHG

## 2019-09-09 DIAGNOSIS — R91.8 PULMONARY NODULES: ICD-10-CM

## 2019-09-09 DIAGNOSIS — R93.89 ABNORMAL CHEST CT: Primary | ICD-10-CM

## 2019-09-09 PROCEDURE — 99214 OFFICE O/P EST MOD 30 MIN: CPT | Performed by: INTERNAL MEDICINE

## 2019-09-09 ASSESSMENT — MIFFLIN-ST. JEOR: SCORE: 1465.76

## 2019-09-09 ASSESSMENT — PAIN SCALES - GENERAL: PAINLEVEL: NO PAIN (0)

## 2019-09-09 NOTE — NURSING NOTE
"Luz Elena Cristina's goals for this visit include: Return  She requests these members of her care team be copied on today's visit information: PCP    PCP: Kiara Zazueta    Referring Provider:  No referring provider defined for this encounter.    /79   Pulse 68   Resp 20   Ht 1.651 m (5' 5\")   Wt 92.5 kg (203 lb 14.4 oz)   SpO2 98%   BMI 33.93 kg/m      Do you need any medication refills at today's visit? N    "

## 2019-09-09 NOTE — PROGRESS NOTES
LUNG NODULE & INTERVENTIONAL PULMONARY CLINIC  [facility], [facility]     Luz Elena Cristina MRN# [id number]   Age: 64 year old YOB: 1955     Reason for Consultation: lung nodule(s)       Assessment and Plan:    1. Established multiple pulmonary lung nodule(s). Given the characteristics on current/previous imaging and risk factors; I would classify this to be Intermediate (6-65%) risk for cancer. Waxing and waning nodules since February 2019. Previously seen RUL nodule is resolved. Will cont surveillance with next CT in 6mo.     2. Asthma. recs per allergist.     Billing: I spent more than 30 minutes face to face and greater than 50% of time was for counseling and coordination of care about the issues above.    Prince Manzanares MD   of Medicine  Interventional Pulmonology  Department of Pulmonary, Allergy, Critical Care and Sleep Medicine   [facility]  Pager: [phone]          History:     Luz Elena Cristina is a 64 year old female with sig h/o for asthma, HTN, NEGRA who is here for evaluation/followup of lung nodule(s).     - No new resp sx or complaints. Denies dyspnea or cough.   - had CT chest for worsening PFT by her allergist and showed nodules   - Personal hx of cancer: No cancer. Up-to-date on mammo and c-scope  - Family hx of cancer: No lung cancer.   - Exposure hx: Denies asbestos or radon exposure   - Tobacco hx: Past Smoker: 2ppd for 15years. Quit 33yrs ago.   - My interpretation of the images relevant for this visit includes: nodules   - My interpretation of the PFT's relevant for this visit includes: Obstructive pattern      Culprit Nodule(s):   Multiple bilateral lung nodule(s) that are sub 13 mm. First seen by chest CT on 2/28/19. Prevoius RUL nodule is resolved. No interval change on other nodules.     Other active medical problems include:   - Has NEGRA. On CPAP.    - Has asthma. On breo and  spiriva.    - Has HTN. Stable.           Past Medical History:      Past Medical History:   Diagnosis Date     Hypertension      Sleep apnea      Uncomplicated asthma            Past Surgical History:      Past Surgical History:   Procedure Laterality Date     BIOPSY      breast,     COLONOSCOPY       COLONOSCOPY N/A 2019    Procedure: Combined Colonoscopy, Single Or Multiple Biopsy/Polypectomy By Biopsy;  Surgeon: José Miguel Ochoa MD;  Location: [facility]     COLONOSCOPY WITH CO2 INSUFFLATION N/A 2019    Procedure: COLONOSCOPY WITH CO2 INSUFFLATION;  Surgeon: José Miguel Ochoa MD;  Location: [facility]     COMBINED ESOPHAGOSCOPY, GASTROSCOPY, DUODENOSCOPY (EGD) WITH CO2 INSUFFLATION N/A 2019    Procedure: COMBINED ESOPHAGOSCOPY, GASTROSCOPY, DUODENOSCOPY (EGD) WITH CO2 INSUFFLATION;  Surgeon: José Miguel Ochoa MD;  Location: [facility]     ESOPHAGOSCOPY, GASTROSCOPY, DUODENOSCOPY (EGD), COMBINED N/A 2019    Procedure: Combined Esophagoscopy, Gastroscopy, Duodenoscopy (Egd), Biopsy Single Or Multiple;  Surgeon: José Miguel Ochoa MD;  Location: [facility] OR     GYN SURGERY       TONSILLECTOMY            Social History:     Social History     Tobacco Use     Smoking status: Former Smoker     Packs/day: 2.00     Years: 20.00     Pack years: 40.00     Types: Cigarettes     Last attempt to quit: 1987     Years since quittin.3     Smokeless tobacco: Never Used   Substance Use Topics     Alcohol use: Yes     Comment: 1 nightly          Family History:     Family History   Problem Relation Age of Onset     Other Cancer Mother         panreatic     Diabetes Mother      Breast Cancer Sister      Skin Cancer Sister      Cerebrovascular Disease Father         CVA     Chronic Obstructive Pulmonary Disease Father      Aneurysm Paternal Grandmother      Unknown/Adopted Paternal Grandfather      Alzheimer Disease Brother         early onset     Diabetes Son      Thyroid Disease Son       Breast Cancer Paternal Aunt      Diabetes Son      Breast Cancer Sister            Allergies:      Allergies   Allergen Reactions     Cartia Xt [Diltiazem] Other (See Comments)     Heartburn whenever on this medication     Losartan Cough     Sulfa Drugs Rash          Medications:     Current Outpatient Medications   Medication Sig     albuterol (2.5 MG/3ML) 0.083% neb solution Take 1 vial (2.5 mg) by nebulization every 4 hours as needed for shortness of breath / dyspnea, wheezing or other (persistent cough)     albuterol (PROAIR HFA/PROVENTIL HFA/VENTOLIN HFA) 108 (90 Base) MCG/ACT Inhaler Inhale 2-4 puffs into the lungs every 4 hours as needed for shortness of breath / dyspnea or wheezing     azelastine (ASTELIN) 0.1 % nasal spray Spray 2 sprays into both nostrils 2 times daily as needed for rhinitis or allergies     calcipotriene (DOVONOX) 0.005 % external cream Apply twice daily to affected area on body as needed.     clobetasol (TEMOVATE) 0.05 % external cream Apply sparingly to affected area twice weekly as needed.  Do not apply to face.     clobetasol (TEMOVATE) 0.05 % external ointment Apply sparingly daily as needed to affected area on groin.     fluticasone-vilanterol (BREO ELLIPTA) 200-25 MCG/INH inhaler Inhale 1 puff into the lungs daily     hydrochlorothiazide (HYDRODIURIL) 12.5 MG tablet Take 1 tablet (12.5 mg) by mouth daily     Respiratory Therapy Supplies (NEBULIZER/TUBING/MOUTHPIECE) KIT Use with albuterol neb solution     Tiotropium Bromide Monohydrate 1.25 MCG/ACT AERS Inhale 2 puffs into the lungs daily     ranitidine (ZANTAC) 150 MG tablet Take 2 tablets in the morning and 1 tablet in the evening (Patient not taking: Reported on 9/9/2019)     No current facility-administered medications for this visit.           Review of Systems:     CONSTITUTIONAL: negative for fever, chills, change in weight  INTEGUMENTARY/SKIN: no rash or obvious new lesions  ENT/MOUTH: no sore throat, new sinus pain or  nasal drainage  RESP: see interval history  CV: negative for chest pain, palpitations or peripheral edema  GI: no nausea, vomiting, change in stools  : no dysuria  MUSCULOSKELETAL: no myalgias, arthralgias  ENDOCRINE: blood sugars with adequate control  PSYCHIATRIC: mood stable  LYMPHATIC: no new lymphadenopathy  HEME: no bleeding or easy bruisability  NEURO: no numbness, weakness, headaches         Physical Exam:     Pulse:  [68] 68  Resp:  [20] 20  BP: (137)/(79) 137/79  SpO2:  [98 %] 98 %  Wt Readings from Last 4 Encounters:   09/09/19 92.5 kg (203 lb 14.4 oz)   06/24/19 95.3 kg (210 lb)   06/05/19 95.3 kg (210 lb)   05/28/19 95.9 kg (211 lb 6.7 oz)     Constitutional:   Awake, alert and in no apparent distress     Eyes:   Nonicteric, CAROLYN     ENT:    Trachea is midline. No gross neck abnormalities      Neck:   Supple without supraclavicular or cervical lymphadenopathy     Lungs:   Good air flow.  No crackles. No rhonchi.  No wheezes.     Cardiovascular:   Normal S1 and S2.  RRR.  No murmur, gallop or rub.  Radial, DP and PT pulses normal and symmetric     Abdomen:   NABS, soft, nontender, nondistended.  No HSM.     Musculoskeletal:   No edema.      Neurologic:   Alert and conversant. Cranial nerves  intact.       Skin:   Warm, dry.  No rash on limited exam.           Current Laboratory Data:   All laboratory and imaging data reviewed.    No results found for this or any previous visit (from the past 24 hour(s)).         Previous Pulmonary Function Testing     FVC-Pred   Date Value Ref Range Status   06/24/2019 3.08 L      FVC-Pre   Date Value Ref Range Status   06/24/2019 2.91 L      FVC-%Pred-Pre   Date Value Ref Range Status   06/24/2019 94 %      FEV1-Pre   Date Value Ref Range Status   06/24/2019 1.64 L      FEV1-%Pred-Pre   Date Value Ref Range Status   06/24/2019 68 %      FEV1FVC-Pred   Date Value Ref Range Status   06/24/2019 78 %      FEV1FVC-Pre   Date Value Ref Range Status   06/24/2019 57 %       No results found for: 20029  FEFMax-Pred   Date Value Ref Range Status   06/24/2019 6.09 L/sec      FEFMax-Pre   Date Value Ref Range Status   06/24/2019 4.99 L/sec      FEFMax-%Pred-Pre   Date Value Ref Range Status   06/24/2019 81 %      ExpTime-Pre   Date Value Ref Range Status   06/24/2019 7.52 sec      FIFMax-Pre   Date Value Ref Range Status   06/24/2019 3.36 L/sec      FEV1FEV6-Pred   Date Value Ref Range Status   06/24/2019 80 %      FEV1FEV6-Pre   Date Value Ref Range Status   06/24/2019 58 %             Previous Cardiology Imaging   No results found for this or any previous visit (from the past 8760 hour(s)).

## 2019-09-11 NOTE — TELEPHONE ENCOUNTER
PA Initiation    Medication: Clobetasol  Insurance Company: [employer] [place] [employer] [place] [employer] - Phone [phone] Fax [phone]  Pharmacy Filling the Rx: [facility][id number] - [address] AT [facility] OF [address] [place] [address]  Filling Pharmacy Phone:  [phone]  Filling Pharmacy Fax:    Start Date: 9/11/2019

## 2019-09-11 NOTE — TELEPHONE ENCOUNTER
Prior Authorization Approval    Authorization Effective Date: 6/13/2019  Authorization Expiration Date: 9/10/2020  Medication: Clobetasol  Approved Dose/Quantity:   Reference #:     Insurance Company: [employer] - Phone [phone] Fax [phone]  Expected CoPay:       CoPay Card Available:      Foundation Assistance Needed:    Which Pharmacy is filling the prescription (Not needed for infusion/clinic administered): [facility] #[id number] - [address] AT [address]  Pharmacy Notified: Yes  Patient Notified: No

## 2019-09-12 NOTE — TELEPHONE ENCOUNTER
Central Prior Authorization Team   Phone: [phone]      PA Initiation    Medication: Calcipotriene 0.005% cream-Initiated  Insurance Company: [employer] - Phone [phone] Fax [phone]  Pharmacy Filling the Rx: [facility] #[id number] [address] AT [facility] OF [address] [place] [address]  Filling Pharmacy Phone: [phone]  Filling Pharmacy Fax:    Start Date: 9/12/2019

## 2019-09-12 NOTE — TELEPHONE ENCOUNTER
PRIOR AUTHORIZATION DENIED    Medication: Calcipotriene 0.005% cream-DENIED    Denial Date: 9/12/2019    Denial Rational: Insurance covers medication with prescribed for the diagnosis of psoriasis.        Appeal Information:

## 2019-09-13 NOTE — TELEPHONE ENCOUNTER
Pt stated she is doing well with what she had left over from previous script and will try the new stuff and see if it helps and if not she will call back and ask for the appeal for her medication.  Vero HERNANDEZ RN BSN PHN  Specialty Clinics

## 2019-10-02 ENCOUNTER — HEALTH MAINTENANCE LETTER (OUTPATIENT)
Age: 64
End: 2019-10-02

## 2019-10-30 DIAGNOSIS — J45.50 SEVERE PERSISTENT ASTHMA WITHOUT COMPLICATION (H): ICD-10-CM

## 2019-10-30 NOTE — TELEPHONE ENCOUNTER
Reason for Call:  Medication or medication refill:    Do you use a [facility]?  Name of the pharmacy and phone number for the current request:  [facility] [place] [phone]    Name of the medication requested: Tiotropium Bromide Monohydrate    Other request: LOV:  5/28/19  LAST REFILL:  9/30/19    Can we leave a detailed message on this number? YES    Phone number patient can be reached at: Home number on file [phone] (home)    Best Time: any     Call taken on 10/30/2019 at 2:16 PM by Martha Sharif

## 2019-10-31 NOTE — TELEPHONE ENCOUNTER
Asthma Maintenance Inhalers - Anticholinergics Crhxzp00/30 2:19 PM   Patient is age 12 years or older    Asthma control assessment score within normal limits in last 6 months    Medication is active on med list    Recent (6 mo) or future (30 days) visit within the authorizing provider's specialty     Inhaled Steroids Protocol Upvgxy30/30 2:19 PM   Patient is age 12 or older    Asthma control assessment score within normal limits in last 6 months    Medication is active on med list    Recent (6 mo) or future (30 days) visit within the authorizing provider's specialty     Refilled per [facility] protocol and scope of practice.  Quiana Segovia RN

## 2019-12-06 ENCOUNTER — HOSPITAL ENCOUNTER (EMERGENCY)
Facility: CLINIC | Age: 64
Discharge: HOME | End: 2019-12-06
Attending: PHYSICIAN ASSISTANT | Admitting: PHYSICIAN ASSISTANT
Payer: MEDICARE

## 2019-12-06 ENCOUNTER — APPOINTMENT (OUTPATIENT)
Dept: GENERAL RADIOLOGY | Facility: CLINIC | Age: 64
End: 2019-12-06
Attending: PHYSICIAN ASSISTANT
Payer: MEDICARE

## 2019-12-06 VITALS
HEIGHT: 65 IN | SYSTOLIC BLOOD PRESSURE: 157 MMHG | BODY MASS INDEX: 33.15 KG/M2 | OXYGEN SATURATION: 99 % | TEMPERATURE: 98 F | RESPIRATION RATE: 18 BRPM | HEART RATE: 69 BPM | DIASTOLIC BLOOD PRESSURE: 83 MMHG | WEIGHT: 199 LBS
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
## 2019-12-06 DIAGNOSIS — S80.00XA CONTUSION OF KNEE, UNSPECIFIED LATERALITY, INITIAL ENCOUNTER: ICD-10-CM

## 2019-12-06 PROCEDURE — G0463 HOSPITAL OUTPT CLINIC VISIT: HCPCS | Performed by: PHYSICIAN ASSISTANT

## 2019-12-06 PROCEDURE — 99213 OFFICE O/P EST LOW 20 MIN: CPT | Mod: Z6 | Performed by: PHYSICIAN ASSISTANT

## 2019-12-06 PROCEDURE — 73562 X-RAY EXAM OF KNEE 3: CPT | Mod: 50

## 2019-12-06 ASSESSMENT — MIFFLIN-ST. JEOR: SCORE: 1443.54

## 2019-12-06 NOTE — ED AVS SNAPSHOT
[facility] Emergency Department  [address] [facility] [address]  Phone:  [phone]  Fax:  [phone]                                    Luz Elena Cristina   MRN: [id number]    Department:  [facility] Emergency Department   Date of Visit:  12/6/2019           After Visit Summary Signature Page    I have received my discharge instructions, and my questions have been answered. I have discussed any challenges I see with this plan with the nurse or doctor.    ..........................................................................................................................................  Patient/Patient Representative Signature      ..........................................................................................................................................  Patient Representative Print Name and Relationship to Patient    ..................................................               ................................................  Date                                   Time    ..........................................................................................................................................  Reviewed by Signature/Title    ...................................................              ..............................................  Date                                               Time          22EPIC Rev 08/18

## 2019-12-06 NOTE — ED PROVIDER NOTES
History     Chief Complaint   Patient presents with     Knee Pain     pt slipped on ice in her driveway yesterday landing on both knees.  c/o bilateral knee pain.  no LOC and no bloodthinners.     HPI  Luz Elena Cristina is a 64 year old female who presents to the urgent care with concern over bilateral knee pain after injury yesterday.  Patient sustained a fall on the ice and fell forward landing on her knees bilaterally with secondary impact hands.  She complains of bilateral knee pain, swelling, left greater than right.  She has minimal abrasions to the left hand and states that she feels overall soreness however no other areas of significant pain.  She has attempted to treat with ice.  She did not take any over-the-counter medications.  She reports that she does have a history of hematoma in her left knee that had to be drained by ortho after sustaining fall within the last two years.      Allergies:  Allergies   Allergen Reactions     Cartia Xt [Diltiazem] Other (See Comments)     Heartburn whenever on this medication     Losartan Cough     Sulfa Drugs Rash       Problem List:    Patient Active Problem List    Diagnosis Date Noted     At risk for cardiovascular event 02/28/2018     Priority: Medium     ASCVD risk 7.5%.  Offering statin.       Severe obesity (BMI 35.0-39.9) with comorbidity (H) 02/27/2018     Priority: Medium     Severe persistent asthma 04/29/2016     Priority: Medium     Symptoms since childhood. Smoked since age of 11 until 31 years.  Until seen in November 2014, used albuterol once in her life.       Lichen sclerosus 04/29/2016     Priority: Medium     Benign essential hypertension 04/29/2016     Priority: Medium     Elevated glucose 04/29/2016     Priority: Medium     Family history of diabetes mellitus 04/29/2016     Priority: Medium        Past Medical History:    Past Medical History:   Diagnosis Date     Hypertension      Sleep apnea      Uncomplicated asthma        Past Surgical History:     Past Surgical History:   Procedure Laterality Date     BIOPSY      breast,     COLONOSCOPY       COLONOSCOPY N/A 2019    Procedure: Combined Colonoscopy, Single Or Multiple Biopsy/Polypectomy By Biopsy;  Surgeon: José Miguel Ochoa MD;  Location: [facility]     COLONOSCOPY WITH CO2 INSUFFLATION N/A 2019    Procedure: COLONOSCOPY WITH CO2 INSUFFLATION;  Surgeon: José Miguel Ochoa MD;  Location: [facility]     COMBINED ESOPHAGOSCOPY, GASTROSCOPY, DUODENOSCOPY (EGD) WITH CO2 INSUFFLATION N/A 2019    Procedure: COMBINED ESOPHAGOSCOPY, GASTROSCOPY, DUODENOSCOPY (EGD) WITH CO2 INSUFFLATION;  Surgeon: José Miguel Ochoa MD;  Location: [facility]     ESOPHAGOSCOPY, GASTROSCOPY, DUODENOSCOPY (EGD), COMBINED N/A 2019    Procedure: Combined Esophagoscopy, Gastroscopy, Duodenoscopy (Egd), Biopsy Single Or Multiple;  Surgeon: José Miguel Ochoa MD;  Location: [facility]     GYN SURGERY       TONSILLECTOMY         Family History:    Family History   Problem Relation Age of Onset     Other Cancer Mother         panreatic     Diabetes Mother      Breast Cancer Sister      Skin Cancer Sister      Cerebrovascular Disease Father         CVA     Chronic Obstructive Pulmonary Disease Father      Aneurysm Paternal Grandmother      Unknown/Adopted Paternal Grandfather      Alzheimer Disease Brother         early onset     Diabetes Son      Thyroid Disease Son      Breast Cancer Paternal Aunt      Diabetes Son      Breast Cancer Sister        Social History:  Marital Status:   [4]  Social History     Tobacco Use     Smoking status: Former Smoker     Packs/day: 2.00     Years: 20.00     Pack years: 40.00     Types: Cigarettes     Last attempt to quit: 1987     Years since quittin.6     Smokeless tobacco: Never Used   Substance Use Topics     Alcohol use: Yes     Comment: 1 nightly     Drug use: No        Medications:    albuterol (2.5 MG/3ML) 0.083% neb solution  albuterol (PROAIR  "HFA/PROVENTIL HFA/VENTOLIN HFA) 108 (90 Base) MCG/ACT Inhaler  azelastine (ASTELIN) 0.1 % nasal spray  calcipotriene (DOVONOX) 0.005 % external cream  clobetasol (TEMOVATE) 0.05 % external cream  clobetasol (TEMOVATE) 0.05 % external ointment  fluticasone-vilanterol (BREO ELLIPTA) 200-25 MCG/INH inhaler  hydrochlorothiazide (HYDRODIURIL) 12.5 MG tablet  ranitidine (ZANTAC) 150 MG tablet  Respiratory Therapy Supplies (NEBULIZER/TUBING/MOUTHPIECE) KIT  Tiotropium Bromide Monohydrate 1.25 MCG/ACT AERS      Review of Systems  CONSTITUTIONAL:NEGATIVE for fever, chills, change in weight  INTEGUMENTARY/SKIN: POSITIVE for ecchymosis, abrasions NEGATIVE for erythema, lacerations, worrisome rashes   RESP:NEGATIVE for significant cough or SOB  MUSCULOSKELETAL:POSITIVE for bilateral knee pain  NEGATIVE for other significant arthralgias or myalgia  NEURO: NEGATIVE for headache, numbness, paresthesias   Physical Exam   BP: (!) 157/83  Pulse: 69  Temp: 98  F (36.7  C)  Resp: 18  Height: 165.1 cm (5' 5\")  Weight: 90.3 kg (199 lb)  SpO2: 99 %  Physical Exam  Vitals signs and nursing note reviewed.   Constitutional:       General: She is not in acute distress.  HENT:      Head: Normocephalic and atraumatic.   Cardiovascular:      Pulses:           Posterior tibial pulses are 2+ on the right side and 2+ on the left side.   Musculoskeletal:      Right wrist: Normal.      Left wrist: Normal.      Right knee: She exhibits swelling and ecchymosis. She exhibits normal range of motion, no deformity, no laceration, no erythema, no LCL laxity and no MCL laxity. Tenderness found.      Left knee: She exhibits decreased range of motion, swelling and ecchymosis. She exhibits no laceration, no erythema, no LCL laxity and no MCL laxity. Tenderness found.      Right ankle: Normal.      Left ankle: Normal.   Skin:     General: Skin is warm and dry.      Capillary Refill: Capillary refill takes less than 2 seconds.      Findings: Abrasion (small " superficial to hands) and ecchymosis present. No erythema, laceration or wound.   Neurological:      Mental Status: She is alert.      Sensory: No sensory deficit.       ED Course        Procedures               Critical Care time:  none               Results for orders placed or performed during the hospital encounter of 12/06/19   XR Knee Bilateral 3 Views    Addendum: 12/6/2019    Addendum: Sunrise views appear within normal limits.    ANGELITA ACEVEDO MD      Narrative    XR BILATERAL KNEE THREE VIEWS 12/6/2019 3:02 PM     HISTORY: Pain both knees, left greater than right after fall  yesterday.      Impression    IMPRESSION: Right knee suspected articular body at the posterior joint  line. A small ossicle is also noted at the anterior joint line. Right  knee mild medial compartment joint space narrowing may be present. No  apparent right knee joint effusion. Left knee appears within normal  limits.    ANGELITA ACEVEDO MD     \  Medications - No data to display    Assessments & Plan (with Medical Decision Making)     I have reviewed the nursing notes.    I have reviewed the findings, diagnosis, plan and need for follow up with the patient.       New Prescriptions    No medications on file     Final diagnoses:   Contusion of knee, bilateral, initial encounter     64-year-old female presents to the urgent care with concern over bilateral knee pain after sustaining a fall on the ice yesterday.  Physical exam findings are significant for ecchymosis, swelling, tenderness palpation overlying the patella bilaterally.  She had x-rays of the knee which was negative for acute fracture, dislocation, incidental finding of suspected articular body at the posterior joint line of the right knee and a small ossicle noted in the anterior joint line, right mild medial compartment joint space narrowing without evidence of effusion.  As patient has minimal discomfort of the knee and full ROM, at this time I suspect this is an incidental  finding and unrelated to her current complaints.  She was discharged home stable with instructions for continued symptomatic treatment with rest, ice, Tylenol as needed for pain.  Follow-up with primary care provider if no improvement within the next 5 to 7 days.  Worrisome reasons to return to the ER/UC sooner discussed.    Disclaimer: This note consists of symbols derived from keyboarding, dictation, and/or voice recognition software. As a result, there may be errors in the script that have gone undetected.  Please consider this when interpreting information found in the chart.      12/6/2019   [facility] EMERGENCY DEPARTMENT     Laverne Garcia PA-C  12/07/19 2035

## 2019-12-16 ENCOUNTER — HEALTH MAINTENANCE LETTER (OUTPATIENT)
Age: 64
End: 2019-12-16

## 2019-12-19 ENCOUNTER — OFFICE VISIT (OUTPATIENT)
Dept: URGENT CARE | Facility: URGENT CARE | Age: 64
End: 2019-12-19
Payer: MEDICARE

## 2019-12-19 ENCOUNTER — ANCILLARY PROCEDURE (OUTPATIENT)
Dept: GENERAL RADIOLOGY | Facility: CLINIC | Age: 64
End: 2019-12-19
Attending: NURSE PRACTITIONER
Payer: MEDICARE

## 2019-12-19 ENCOUNTER — ALLIED HEALTH/NURSE VISIT (OUTPATIENT)
Dept: FAMILY MEDICINE | Facility: CLINIC | Age: 64
End: 2019-12-19
Payer: MEDICARE

## 2019-12-19 VITALS
HEIGHT: 65 IN | HEART RATE: 84 BPM | RESPIRATION RATE: 17 BRPM | BODY MASS INDEX: 34.26 KG/M2 | TEMPERATURE: 97.1 F | DIASTOLIC BLOOD PRESSURE: 72 MMHG | SYSTOLIC BLOOD PRESSURE: 142 MMHG | WEIGHT: 205.6 LBS | OXYGEN SATURATION: 94 %

## 2019-12-19 DIAGNOSIS — M25.562 LEFT KNEE PAIN: Primary | ICD-10-CM

## 2019-12-19 DIAGNOSIS — S80.02XD CONTUSION OF LEFT KNEE, SUBSEQUENT ENCOUNTER: Primary | ICD-10-CM

## 2019-12-19 DIAGNOSIS — S80.02XD CONTUSION OF LEFT KNEE, SUBSEQUENT ENCOUNTER: ICD-10-CM

## 2019-12-19 PROCEDURE — 99207 ZZC NO CHARGE NURSE ONLY: CPT

## 2019-12-19 PROCEDURE — 99213 OFFICE O/P EST LOW 20 MIN: CPT | Performed by: NURSE PRACTITIONER

## 2019-12-19 PROCEDURE — 73560 X-RAY EXAM OF KNEE 1 OR 2: CPT | Mod: LT

## 2019-12-19 ASSESSMENT — MIFFLIN-ST. JEOR: SCORE: 1473.48

## 2019-12-19 NOTE — PROGRESS NOTES
Subjective     Luz Elena Cristina is a 64 year old female who presents to clinic today for the following health issues:    HPI   Chief Complaint   Patient presents with     Musculoskeletal Problem     2 weeks left knee is swollen from a fall outside, has a lump on the knee, was seen at the ER. Was icing the knee but didn't help. It is a constant pain.           Patient Active Problem List   Diagnosis     Severe persistent asthma     Lichen sclerosus     Benign essential hypertension     Elevated glucose     Family history of diabetes mellitus     Severe obesity (BMI 35.0-39.9) with comorbidity (H)     At risk for cardiovascular event     Past Surgical History:   Procedure Laterality Date     BIOPSY      breast,     COLONOSCOPY       COLONOSCOPY N/A 2019    Procedure: Combined Colonoscopy, Single Or Multiple Biopsy/Polypectomy By Biopsy;  Surgeon: José Miguel Ochoa MD;  Location: [facility]     COLONOSCOPY WITH CO2 INSUFFLATION N/A 2019    Procedure: COLONOSCOPY WITH CO2 INSUFFLATION;  Surgeon: José Miguel Ochoa MD;  Location: [facility]     COMBINED ESOPHAGOSCOPY, GASTROSCOPY, DUODENOSCOPY (EGD) WITH CO2 INSUFFLATION N/A 2019    Procedure: COMBINED ESOPHAGOSCOPY, GASTROSCOPY, DUODENOSCOPY (EGD) WITH CO2 INSUFFLATION;  Surgeon: José Miguel Ochoa MD;  Location: [facility]     ESOPHAGOSCOPY, GASTROSCOPY, DUODENOSCOPY (EGD), COMBINED N/A 2019    Procedure: Combined Esophagoscopy, Gastroscopy, Duodenoscopy (Egd), Biopsy Single Or Multiple;  Surgeon: José Miguel Ochoa MD;  Location: [facility]     GYN SURGERY       TONSILLECTOMY         Social History     Tobacco Use     Smoking status: Former Smoker     Packs/day: 2.00     Years: 20.00     Pack years: 40.00     Types: Cigarettes     Last attempt to quit: 1987     Years since quittin.6     Smokeless tobacco: Never Used   Substance Use Topics     Alcohol use: Yes     Comment: 1 nightly     Family History   Problem Relation Age of  Onset     Other Cancer Mother         panreatic     Diabetes Mother      Breast Cancer Sister      Skin Cancer Sister      Cerebrovascular Disease Father         CVA     Chronic Obstructive Pulmonary Disease Father      Aneurysm Paternal Grandmother      Unknown/Adopted Paternal Grandfather      Alzheimer Disease Brother         early onset     Diabetes Son      Thyroid Disease Son      Breast Cancer Paternal Aunt      Diabetes Son      Breast Cancer Sister          Current Outpatient Medications   Medication Sig Dispense Refill     albuterol (PROAIR HFA/PROVENTIL HFA/VENTOLIN HFA) 108 (90 Base) MCG/ACT Inhaler Inhale 2-4 puffs into the lungs every 4 hours as needed for shortness of breath / dyspnea or wheezing 1 Inhaler 3     azelastine (ASTELIN) 0.1 % nasal spray Spray 2 sprays into both nostrils 2 times daily as needed for rhinitis or allergies 30 mL 3     fluticasone-vilanterol (BREO ELLIPTA) 200-25 MCG/INH inhaler Inhale 1 puff into the lungs daily 1 Inhaler 1     hydrochlorothiazide (HYDRODIURIL) 12.5 MG tablet Take 1 tablet (12.5 mg) by mouth daily 90 tablet 1     Tiotropium Bromide Monohydrate 1.25 MCG/ACT AERS Inhale 2 puffs into the lungs daily 1 Inhaler 1     albuterol (2.5 MG/3ML) 0.083% neb solution Take 1 vial (2.5 mg) by nebulization every 4 hours as needed for shortness of breath / dyspnea, wheezing or other (persistent cough) (Patient not taking: Reported on 12/19/2019) 50 vial 1     calcipotriene (DOVONOX) 0.005 % external cream Apply twice daily to affected area on body as needed. (Patient not taking: Reported on 12/19/2019) 60 g 4     clobetasol (TEMOVATE) 0.05 % external cream Apply sparingly to affected area twice weekly as needed.  Do not apply to face. (Patient not taking: Reported on 12/19/2019) 60 g 3     clobetasol (TEMOVATE) 0.05 % external ointment Apply sparingly daily as needed to affected area on groin. (Patient not taking: Reported on 12/19/2019) 30 g 4     ranitidine (ZANTAC) 150 MG  "tablet Take 2 tablets in the morning and 1 tablet in the evening (Patient not taking: Reported on 9/9/2019) 270 tablet 1     Respiratory Therapy Supplies (NEBULIZER/TUBING/MOUTHPIECE) KIT Use with albuterol neb solution (Patient not taking: Reported on 12/19/2019) 1 kit 0     Allergies   Allergen Reactions     Cartia Xt [Diltiazem] Other (See Comments)     Heartburn whenever on this medication     Losartan Cough     Sulfa Drugs Rash         Reviewed and updated as needed this visit by Provider  Tobacco  Allergies  Meds  Problems  Med Hx  Surg Hx  Fam Hx         Review of Systems   ROS COMP: Constitutional, HEENT, cardiovascular, pulmonary, GI, , musculoskeletal, neuro, skin, endocrine and psych systems are negative, except as otherwise noted.      Objective    BP (!) 142/72 (BP Location: Right arm, Patient Position: Sitting, Cuff Size: Adult Regular)   Pulse 84   Temp 97.1  F (36.2  C) (Tympanic)   Resp 17   Ht 1.651 m (5' 5\")   Wt 93.3 kg (205 lb 9.6 oz)   SpO2 94%   BMI 34.21 kg/m    Body mass index is 34.21 kg/m .  Physical Exam   GENERAL: healthy, alert and no distress  EYES: Eyes grossly normal to inspection, PERRL and conjunctivae and sclerae normal  HENT: ear canals and TM's normal, nose and mouth without ulcers or lesions  NECK: no adenopathy, no asymmetry, masses, or scars and thyroid normal to palpation  RESP: lungs clear to auscultation - no rales, rhonchi or wheezes  CV: regular rate and rhythm, normal S1 S2, no S3 or S4, no murmur, click or rub, no peripheral edema and peripheral pulses strong  ABDOMEN: soft, nontender, no hepatosplenomegaly, no masses and bowel sounds normal  MS: no gross musculoskeletal defects noted, no edema    Diagnostic Test Results: No fracture. Hematoma seen below patella on lateral view.    KNEE ONE-TWO VIEWS LEFT  12/19/2019 5:28 PM      HISTORY: Contusion of left knee, subsequent encounter     COMPARISON: 12/6/2019                                               " "                       IMPRESSION: No acute fracture or malalignment. No significant  degenerative changes. No knee joint effusion.     SHAE BOSTON MD    Labs reviewed in Epic  No results found for this or any previous visit (from the past 24 hour(s)).        Assessment & Plan   Problem List Items Addressed This Visit     None      Visit Diagnoses     Contusion of left knee, subsequent encounter    -  Primary    Relevant Orders    XR Knee Left 1/2 Views (Completed)             BMI:   Estimated body mass index is 33.12 kg/m  as calculated from the following:    Height as of 12/6/19: 1.651 m (5' 5\").    Weight as of 12/6/19: 90.3 kg (199 lb).   Weight management plan: Patient was referred to their PCP to discuss a diet and exercise plan.        Patient Instructions   This still appears to be a contusion issue and will need time to absorb.    Follow up with your primary care provider in 2 weeks and sooner if needed.    Follow-up with your primary care provider next week and as needed.    Indications for emergent return to emergency department discussed with patient, who verbalized good understanding and agreement.  Patient understands the limitations of today's evaluation.             Patient Education     Lower Extremity Contusion  You have a contusion (bruise) of a lower extremity (leg, knee, ankle, foot, or toe). Symptoms include pain, swelling, and skin discoloration. No bones are broken. This injury may take from a few days to a few weeks to heal.  During that time, the bruise may change from reddish in color, to purple-blue, to green-yellow, to yellow-brown.  Home care    Unless another medicine was prescribed, you can take acetaminophen, ibuprofen, or naproxen to control pain. (If you have chronic liver or kidney disease or ever had a stomach ulcer or gastrointestinal bleeding, talk with your doctor before using these medicines.)    Elevate the injured area to reduce pain and swelling. As much as possible, " sit or lie down with the injured area raised about the level of your heart. This is especially important during the first 48 hours.    Ice the injured area to help reduce pain and swelling. Wrap a cold source (ice pack or ice cubes in a plastic bag) in a thin towel. Apply to the bruised area for 20 minutes every 1 to 2 hours the first day. Continue this 3 to 4 times a day until the pain and swelling goes away.    If crutches have been advised, do not bear full weight on the injured leg until you can do so without pain. You may return to sports when you are able to put full weight and impact on the injured leg without pain.  Follow up  Follow up with your healthcare provider or our staff as advised. Call if you are not improving within the next 1 to 2 weeks.  When to seek medical advice   Call your healthcare provider right away if any of these occur:    Increased pain or swelling    Foot or toes become cold, blue, numb or tingly    Signs of infection: Warmth, drainage, or increased redness or pain around the injury    Inability to move the injured area     Frequent bruising for unknown reasons  Date Last Reviewed: 2/1/2017 2000-2018 [employer] [address] [place] [address]. All rights reserved. This information is not intended as a substitute for professional medical care. Always follow your healthcare professional's instructions.           Patient Education     Hematoma  A hematoma is a collection of blood trapped outside of a blood vessel. It is what we think of as a bruise or a contusion. It is usually seen under the skin as a black and blue spot on your arm or leg, or a bump on your head after an injury. It can be almost anywhere on or in your body. It can also occur in an internal organ where it can be more serious.  A hematoma is caused by an injury with damage to small blood vessels. This causes blood to leak into the tissues. Blood forms a pocket under the skin that swells and  looks like a purplish patch. Hematomas sometimes form under the skin from bleeding during childbirth and can be particularly serious. Another serious form of hematoma forms after a fall on the head, called a subdural hematoma.  Gradually the blood in the hematoma is absorbed back into the body. The swelling and pain of the hematoma will go away. This takes from 1 to 4 weeks, depending on the size of the hematoma. The skin over the hematoma may turn bluish then brown and yellow as the blood is dissolved and absorbed. Usually, this only takes a couple of weeks but can last months.  Home care    Limit motion of the joints near the hematoma. If the hematoma is large and painful, avoid sports and other vigorous physical activity until the swelling and pain goes away.    Apply an ice pack (ice cubes in a plastic bag, or a frozen bag of peas, wrapped in a thin towel) over the injured area for 20 minutes every 1 to 2 hours the first day. Continue with ice packs 3 to 4 times a day for the next 2 days. Continue the use of ice packs for relief of pain and swelling as needed.    If you need anything for pain, you can take acetaminophen, unless you were given a different pain medicine to use. Talk with your healthcare provider before using this medicine if you have chronic liver or kidney disease. Also talk with your healthcare provider if you have had a stomach ulcer or digestive tract bleeding, or are taking blood-thinner medicines.  Follow-up care  Follow up with your healthcare provider, or as advised. If X-rays or a CT scan were done, you will be notified if there is a change in the reading, especially if it affects treatment.  When to seek medical advice  Call your healthcare provider right away if any of the following occur:    Redness around the hematoma    Increase in pain or warmth in the hematoma    Increase in size of the hematoma    Fever of 100.4 F (38 C) or higher, or as directed by your healthcare provider    If  the hematoma is on the arm or leg, watch for:  ? Increased swelling or pain in the extremity  ? Numbness or tingling or blue color of the hand or foot  Date Last Reviewed: 4/1/2018 2000-2018 [employer] [address] [place] [address]. All rights reserved. This information is not intended as a substitute for professional medical care. Always follow your healthcare professional's instructions.             Return in about 2 weeks (around 1/2/2020) for Follow up with your primary care provider.    JOSE Fischer [facility]

## 2019-12-19 NOTE — PATIENT INSTRUCTIONS
This still appears to be a contusion issue and will need time to absorb.    Follow up with your primary care provider in 2 weeks and sooner if needed.    Follow-up with your primary care provider next week and as needed.    Indications for emergent return to emergency department discussed with patient, who verbalized good understanding and agreement.  Patient understands the limitations of today's evaluation.             Patient Education     Lower Extremity Contusion  You have a contusion (bruise) of a lower extremity (leg, knee, ankle, foot, or toe). Symptoms include pain, swelling, and skin discoloration. No bones are broken. This injury may take from a few days to a few weeks to heal.  During that time, the bruise may change from reddish in color, to purple-blue, to green-yellow, to yellow-brown.  Home care    Unless another medicine was prescribed, you can take acetaminophen, ibuprofen, or naproxen to control pain. (If you have chronic liver or kidney disease or ever had a stomach ulcer or gastrointestinal bleeding, talk with your doctor before using these medicines.)    Elevate the injured area to reduce pain and swelling. As much as possible, sit or lie down with the injured area raised about the level of your heart. This is especially important during the first 48 hours.    Ice the injured area to help reduce pain and swelling. Wrap a cold source (ice pack or ice cubes in a plastic bag) in a thin towel. Apply to the bruised area for 20 minutes every 1 to 2 hours the first day. Continue this 3 to 4 times a day until the pain and swelling goes away.    If crutches have been advised, do not bear full weight on the injured leg until you can do so without pain. You may return to sports when you are able to put full weight and impact on the injured leg without pain.  Follow up  Follow up with your healthcare provider or our staff as advised. Call if you are not improving within the next 1 to 2 weeks.  When to seek  medical advice   Call your healthcare provider right away if any of these occur:    Increased pain or swelling    Foot or toes become cold, blue, numb or tingly    Signs of infection: Warmth, drainage, or increased redness or pain around the injury    Inability to move the injured area     Frequent bruising for unknown reasons  Date Last Reviewed: 2/1/2017 2000-2018 [employer] [address] [place] [address]. All rights reserved. This information is not intended as a substitute for professional medical care. Always follow your healthcare professional's instructions.           Patient Education     Hematoma  A hematoma is a collection of blood trapped outside of a blood vessel. It is what we think of as a bruise or a contusion. It is usually seen under the skin as a black and blue spot on your arm or leg, or a bump on your head after an injury. It can be almost anywhere on or in your body. It can also occur in an internal organ where it can be more serious.  A hematoma is caused by an injury with damage to small blood vessels. This causes blood to leak into the tissues. Blood forms a pocket under the skin that swells and looks like a purplish patch. Hematomas sometimes form under the skin from bleeding during childbirth and can be particularly serious. Another serious form of hematoma forms after a fall on the head, called a subdural hematoma.  Gradually the blood in the hematoma is absorbed back into the body. The swelling and pain of the hematoma will go away. This takes from 1 to 4 weeks, depending on the size of the hematoma. The skin over the hematoma may turn bluish then brown and yellow as the blood is dissolved and absorbed. Usually, this only takes a couple of weeks but can last months.  Home care    Limit motion of the joints near the hematoma. If the hematoma is large and painful, avoid sports and other vigorous physical activity until the swelling and pain goes away.    Apply  an ice pack (ice cubes in a plastic bag, or a frozen bag of peas, wrapped in a thin towel) over the injured area for 20 minutes every 1 to 2 hours the first day. Continue with ice packs 3 to 4 times a day for the next 2 days. Continue the use of ice packs for relief of pain and swelling as needed.    If you need anything for pain, you can take acetaminophen, unless you were given a different pain medicine to use. Talk with your healthcare provider before using this medicine if you have chronic liver or kidney disease. Also talk with your healthcare provider if you have had a stomach ulcer or digestive tract bleeding, or are taking blood-thinner medicines.  Follow-up care  Follow up with your healthcare provider, or as advised. If X-rays or a CT scan were done, you will be notified if there is a change in the reading, especially if it affects treatment.  When to seek medical advice  Call your healthcare provider right away if any of the following occur:    Redness around the hematoma    Increase in pain or warmth in the hematoma    Increase in size of the hematoma    Fever of 100.4 F (38 C) or higher, or as directed by your healthcare provider    If the hematoma is on the arm or leg, watch for:  ? Increased swelling or pain in the extremity  ? Numbness or tingling or blue color of the hand or foot  Date Last Reviewed: 4/1/2018 2000-2018 The [employer]. [address] [place] [address]. All rights reserved. This information is not intended as a substitute for professional medical care. Always follow your healthcare professional's instructions.

## 2019-12-20 NOTE — PROGRESS NOTES
Pt is here for knee pain. States she feel 5 days ago on knees. Left knee swollen, warm to touch and very tender. Advised to  Stay to see urgent care. Hillary Patrick RN

## 2019-12-31 DIAGNOSIS — I10 BENIGN ESSENTIAL HYPERTENSION: ICD-10-CM

## 2019-12-31 RX ORDER — HYDROCHLOROTHIAZIDE 12.5 MG/1
TABLET ORAL
Qty: 30 TABLET | Refills: 0 | Status: SHIPPED | OUTPATIENT
Start: 2019-12-31 | End: 2020-01-03

## 2019-12-31 NOTE — TELEPHONE ENCOUNTER
"Requested Prescriptions   Pending Prescriptions Disp Refills     hydrochlorothiazide (HYDRODIURIL) 12.5 MG tablet [Pharmacy Med Name: HYDROCHLOROTHIAZIDE 12.5MG TABLETS] 90 tablet 1     Sig: TAKE 1 TABLET(12.5 MG) BY MOUTH DAILY       Diuretics (Including Combos) Protocol Failed - 12/31/2019  2:07 PM        Failed - Blood pressure under 140/90 in past 12 months     BP Readings from Last 3 Encounters:   12/19/19 (!) 142/72   12/06/19 (!) 157/83   09/09/19 137/79                 Passed - Recent (12 mo) or future (30 days) visit within the authorizing provider's specialty     Patient has had an office visit with the authorizing provider or a provider within the authorizing providers department within the previous 12 mos or has a future within next 30 days. See \"Patient Info\" tab in inbasket, or \"Choose Columns\" in Meds & Orders section of the refill encounter.              Passed - Medication is active on med list        Passed - Patient is age 18 or older        Passed - No active pregancy on record        Passed - Normal serum creatinine on file in past 12 months     Recent Labs   Lab Test 06/07/19  1330   CR 0.90              Passed - Normal serum potassium on file in past 12 months     Recent Labs   Lab Test 06/07/19  1330   POTASSIUM 3.7                    Passed - Normal serum sodium on file in past 12 months     Recent Labs   Lab Test 06/07/19  1330                 Passed - No positive pregnancy test in past 12 months        Last Written Prescription Date:  6/10/19  Last Fill Quantity: 90,  # refills: 1   Last office visit: 12/19/2019 with prescribing provider:     Future Office Visit:   Next 5 appointments (look out 90 days)    Mar 09, 2020  2:00 PM CDT  Return Visit with Prince Manzanares MD  [facility] ([facility]) [address] [address] [place] [id number] [phone]           "

## 2020-01-03 DIAGNOSIS — I10 BENIGN ESSENTIAL HYPERTENSION: ICD-10-CM

## 2020-01-03 RX ORDER — HYDROCHLOROTHIAZIDE 12.5 MG/1
TABLET ORAL
Qty: 90 TABLET | Refills: 0 | Status: SHIPPED | OUTPATIENT
Start: 2020-01-03 | End: 2020-05-13

## 2020-01-03 NOTE — TELEPHONE ENCOUNTER
"Requested Prescriptions   Pending Prescriptions Disp Refills     hydrochlorothiazide (HYDRODIURIL) 12.5 MG tablet [Pharmacy Med Name: HYDROCHLOROTHIAZIDE 12.5MG TABLETS] 90 tablet      Sig: TAKE 1 TABLET(12.5 MG) BY MOUTH DAILY       Diuretics (Including Combos) Protocol Failed - 1/3/2020  1:11 PM        Failed - Blood pressure under 140/90 in past 12 months     BP Readings from Last 3 Encounters:   12/19/19 (!) 142/72   12/06/19 (!) 157/83   09/09/19 137/79                 Passed - Recent (12 mo) or future (30 days) visit within the authorizing provider's specialty     Patient has had an office visit with the authorizing provider or a provider within the authorizing providers department within the previous 12 mos or has a future within next 30 days. See \"Patient Info\" tab in inbasket, or \"Choose Columns\" in Meds & Orders section of the refill encounter.              Passed - Medication is active on med list        Passed - Patient is age 18 or older        Passed - No active pregancy on record        Passed - Normal serum creatinine on file in past 12 months     Recent Labs   Lab Test 06/07/19  1330   CR 0.90              Passed - Normal serum potassium on file in past 12 months     Recent Labs   Lab Test 06/07/19  1330   POTASSIUM 3.7                    Passed - Normal serum sodium on file in past 12 months     Recent Labs   Lab Test 06/07/19  1330                 Passed - No positive pregnancy test in past 12 months        Last Written Prescription Date:  12/31/19  Last Fill Quantity: 30,  # refills: 0   Last office visit: 12/19/2019 with prescribing provider:  1/21/19  Mar[place]   Future Office Visit:   Next 5 appointments (look out 90 days)    Mar 09, 2020  2:00 PM CDT  Return Visit with Prince Manzanares MD  [facility] [id number] [address] [place] [id number] [phone]           "

## 2020-01-06 DIAGNOSIS — J45.50 SEVERE PERSISTENT ASTHMA WITHOUT COMPLICATION (H): ICD-10-CM

## 2020-02-06 ENCOUNTER — OFFICE VISIT (OUTPATIENT)
Dept: FAMILY MEDICINE | Facility: CLINIC | Age: 65
End: 2020-02-06
Payer: MEDICARE

## 2020-02-06 VITALS
TEMPERATURE: 97.9 F | SYSTOLIC BLOOD PRESSURE: 130 MMHG | WEIGHT: 206 LBS | BODY MASS INDEX: 34.32 KG/M2 | RESPIRATION RATE: 18 BRPM | OXYGEN SATURATION: 95 % | HEIGHT: 65 IN | HEART RATE: 68 BPM | DIASTOLIC BLOOD PRESSURE: 88 MMHG

## 2020-02-06 DIAGNOSIS — M71.21 SYNOVIAL CYST OF RIGHT KNEE: Primary | ICD-10-CM

## 2020-02-06 PROCEDURE — 99213 OFFICE O/P EST LOW 20 MIN: CPT | Performed by: FAMILY MEDICINE

## 2020-02-06 ASSESSMENT — MIFFLIN-ST. JEOR: SCORE: 1475.29

## 2020-02-06 NOTE — NURSING NOTE
"Chief Complaint   Patient presents with     Knee Pain       Initial /88   Pulse 68   Temp 97.9  F (36.6  C) (Tympanic)   Resp 18   Ht 1.651 m (5' 5\")   Wt 93.4 kg (206 lb)   SpO2 95%   BMI 34.28 kg/m   Estimated body mass index is 34.28 kg/m  as calculated from the following:    Height as of this encounter: 1.651 m (5' 5\").    Weight as of this encounter: 93.4 kg (206 lb).    Patient presents to the clinic using No DME    Health Maintenance that is potentially due pending provider review:  NONE    n/a    Is there anyone who you would like to be able to receive your results? No  If yes have patient fill out SOHAM    Vandana Aguilar CMA(AAMA)    "

## 2020-03-09 ENCOUNTER — OFFICE VISIT (OUTPATIENT)
Dept: PULMONOLOGY | Facility: CLINIC | Age: 65
End: 2020-03-09
Payer: MEDICARE

## 2020-03-09 ENCOUNTER — ANCILLARY PROCEDURE (OUTPATIENT)
Dept: CT IMAGING | Facility: CLINIC | Age: 65
End: 2020-03-09
Attending: INTERNAL MEDICINE
Payer: MEDICARE

## 2020-03-09 VITALS
SYSTOLIC BLOOD PRESSURE: 141 MMHG | DIASTOLIC BLOOD PRESSURE: 90 MMHG | HEART RATE: 90 BPM | HEIGHT: 65 IN | WEIGHT: 209.5 LBS | BODY MASS INDEX: 34.91 KG/M2 | OXYGEN SATURATION: 93 % | RESPIRATION RATE: 18 BRPM

## 2020-03-09 DIAGNOSIS — R91.8 PULMONARY NODULES: ICD-10-CM

## 2020-03-09 DIAGNOSIS — R91.8 PULMONARY NODULES: Primary | ICD-10-CM

## 2020-03-09 PROCEDURE — 71250 CT THORAX DX C-: CPT | Performed by: RADIOLOGY

## 2020-03-09 PROCEDURE — 99214 OFFICE O/P EST MOD 30 MIN: CPT | Performed by: INTERNAL MEDICINE

## 2020-03-09 ASSESSMENT — PAIN SCALES - GENERAL: PAINLEVEL: NO PAIN (0)

## 2020-03-09 ASSESSMENT — MIFFLIN-ST. JEOR: SCORE: 1486.17

## 2020-03-09 NOTE — LETTER
3/9/2020        RE: Luz Elena Cristina  [id number] [facility] [address]        LUNG NODULE & INTERVENTIONAL PULMONARY CLINIC  CLINICS & SURGERY Critical access hospital, [facility]     Luz Elena Cristina MRN# [id number]   Age: 65 year old YOB: 1955     Reason for Consultation: lung nodule(s)     Assessment and Plan:    1. Established multiple pulmonary lung nodule(s). Given the characteristics on current/previous imaging and risk factors; I would classify this to be Intermediate (6-65%) risk for cancer. Waxing and waning nodules since February 2019. New areas of haziness/tree-in-bud however recently had the flu. Will repeat CT in 6mo.      2. Asthma. recs per allergist.      Billing: I spent more than 30 minutes face to face and greater than 50% of time was for counseling and coordination of care about the issues above.     Prince Manzanares MD   of Medicine  Interventional Pulmonology  Department of Pulmonary, Allergy, Critical Care and Sleep Medicine   [facility]  Pager: [phone]          History:     Luz Elena Cristina is a 64 year old female with sig h/o for asthma, HTN, NEGRA who is here for evaluation/followup of lung nodule(s).     - No new resp sx or complaints. Denies dyspnea or cough. Getting over the flu  - here for followup of nodules  - Personal hx of cancer: No cancer. Up-to-date on mammo and c-scope  - Family hx of cancer: No lung cancer.   - Exposure hx: Denies asbestos or radon exposure   - Tobacco hx: Past Smoker: 2ppd for 15years. Quit 33yrs ago.   - My interpretation of the images relevant for this visit includes: nodules   - My interpretation of the PFT's relevant for this visit includes: Obstructive pattern      Culprit Nodule(s):   Multiple bilateral lung nodule(s) that are sub 13 mm. First seen by chest CT on 2/28/19. Prevoius RUL nodule is resolved. No interval change on other nodules.     Other active medical  problems include:   - Has NEGRA. On CPAP  - Has asthma. On breo and spiriva.    - Has HTN. Stable.           Past Medical History:      Past Medical History:   Diagnosis Date     Hypertension      Sleep apnea      Uncomplicated asthma            Past Surgical History:      Past Surgical History:   Procedure Laterality Date     BIOPSY      breast,     COLONOSCOPY       COLONOSCOPY N/A 2019    Procedure: Combined Colonoscopy, Single Or Multiple Biopsy/Polypectomy By Biopsy;  Surgeon: José Miguel Ochoa MD;  Location: [facility] OR     COLONOSCOPY WITH CO2 INSUFFLATION N/A 2019    Procedure: COLONOSCOPY WITH CO2 INSUFFLATION;  Surgeon: José Miguel Ochoa MD;  Location: [facility]     COMBINED ESOPHAGOSCOPY, GASTROSCOPY, DUODENOSCOPY (EGD) WITH CO2 INSUFFLATION N/A 2019    Procedure: COMBINED ESOPHAGOSCOPY, GASTROSCOPY, DUODENOSCOPY (EGD) WITH CO2 INSUFFLATION;  Surgeon: José Miguel Ochoa MD;  Location: [facility] OR     ESOPHAGOSCOPY, GASTROSCOPY, DUODENOSCOPY (EGD), COMBINED N/A 2019    Procedure: Combined Esophagoscopy, Gastroscopy, Duodenoscopy (Egd), Biopsy Single Or Multiple;  Surgeon: José Miguel Ochoa MD;  Location: [facility] OR     GYN SURGERY       TONSILLECTOMY            Social History:     Social History     Tobacco Use     Smoking status: Former Smoker     Packs/day: 2.00     Years: 20.00     Pack years: 40.00     Types: Cigarettes     Last attempt to quit: 1987     Years since quittin.8     Smokeless tobacco: Never Used   Substance Use Topics     Alcohol use: Yes     Comment: 1 nightly          Family History:     Family History   Problem Relation Age of Onset     Other Cancer Mother         panreatic     Diabetes Mother      Breast Cancer Sister      Skin Cancer Sister      Cerebrovascular Disease Father         CVA     Chronic Obstructive Pulmonary Disease Father      Aneurysm Paternal Grandmother      Unknown/Adopted Paternal Grandfather      Alzheimer Disease Brother          early onset     Diabetes Son      Thyroid Disease Son      Breast Cancer Paternal Aunt      Diabetes Son      Breast Cancer Sister            Allergies:      Allergies   Allergen Reactions     Cartia Xt [Diltiazem] Other (See Comments)     Heartburn whenever on this medication     Losartan Cough     Sulfa Drugs Rash          Medications:     Current Outpatient Medications   Medication Sig     albuterol (2.5 MG/3ML) 0.083% neb solution Take 1 vial (2.5 mg) by nebulization every 4 hours as needed for shortness of breath / dyspnea, wheezing or other (persistent cough)     albuterol (PROAIR HFA/PROVENTIL HFA/VENTOLIN HFA) 108 (90 Base) MCG/ACT Inhaler Inhale 2-4 puffs into the lungs every 4 hours as needed for shortness of breath / dyspnea or wheezing     azelastine (ASTELIN) 0.1 % nasal spray Spray 2 sprays into both nostrils 2 times daily as needed for rhinitis or allergies     calcipotriene (DOVONOX) 0.005 % external cream Apply twice daily to affected area on body as needed.     clobetasol (TEMOVATE) 0.05 % external cream Apply sparingly to affected area twice weekly as needed.  Do not apply to face.     clobetasol (TEMOVATE) 0.05 % external ointment Apply sparingly daily as needed to affected area on groin.     hydrochlorothiazide (HYDRODIURIL) 12.5 MG tablet TAKE 1 TABLET(12.5 MG) BY MOUTH DAILY     Respiratory Therapy Supplies (NEBULIZER/TUBING/MOUTHPIECE) KIT Use with albuterol neb solution     fluticasone-vilanterol (BREO ELLIPTA) 200-25 MCG/INH inhaler Inhale 1 puff into the lungs daily (Patient not taking: Reported on 3/9/2020)     tiotropium (SPIRIVA RESPIMAT) 1.25 MCG/ACT inhaler Inhale 2 puffs into the lungs daily (Patient not taking: Reported on 3/9/2020)     No current facility-administered medications for this visit.           Review of Systems:     CONSTITUTIONAL: negative for fever, chills, change in weight  INTEGUMENTARY/SKIN: no rash or obvious new lesions  ENT/MOUTH: no sore throat, new  sinus pain or nasal drainage  RESP: see interval history  CV: negative for chest pain, palpitations or peripheral edema  GI: no nausea, vomiting, change in stools  : no dysuria  MUSCULOSKELETAL: no myalgias, arthralgias  ENDOCRINE: blood sugars with adequate control  PSYCHIATRIC: mood stable  LYMPHATIC: no new lymphadenopathy  HEME: no bleeding or easy bruisability  NEURO: no numbness, weakness, headaches         Physical Exam:     Pulse:  [90] 90  Resp:  [18] 18  BP: (141)/(90) 141/90  SpO2:  [93 %] 93 %  Wt Readings from Last 4 Encounters:   03/09/20 95 kg (209 lb 8 oz)   02/06/20 93.4 kg (206 lb)   12/19/19 93.3 kg (205 lb 9.6 oz)   12/06/19 90.3 kg (199 lb)     Constitutional:   Awake, alert and in no apparent distress     Eyes:   Nonicteric, CAROLYN     ENT:    Trachea is midline. No gross neck abnormalities      Neck:   Supple without supraclavicular or cervical lymphadenopathy     Lungs:   Good air flow.  No crackles. No rhonchi.  No wheezes.     Cardiovascular:   Normal S1 and S2.  RRR.  No murmur, gallop or rub.  Radial, DP and PT pulses normal and symmetric     Abdomen:   NABS, soft, nontender, nondistended.  No HSM.     Musculoskeletal:   No edema.      Neurologic:   Alert and conversant. Cranial nerves  intact.       Skin:   Warm, dry.  No rash on limited exam.           Current Laboratory Data:   All laboratory and imaging data reviewed.    No results found for this or any previous visit (from the past 24 hour(s)).         Previous Pulmonary Function Testing     FVC-Pred   Date Value Ref Range Status   06/24/2019 3.08 L      FVC-Pre   Date Value Ref Range Status   06/24/2019 2.91 L      FVC-%Pred-Pre   Date Value Ref Range Status   06/24/2019 94 %      FEV1-Pre   Date Value Ref Range Status   06/24/2019 1.64 L      FEV1-%Pred-Pre   Date Value Ref Range Status   06/24/2019 68 %      FEV1FVC-Pred   Date Value Ref Range Status   06/24/2019 78 %      FEV1FVC-Pre   Date Value Ref Range Status   06/24/2019 57  %      No results found for: 20029  FEFMax-Pred   Date Value Ref Range Status   06/24/2019 6.09 L/sec      FEFMax-Pre   Date Value Ref Range Status   06/24/2019 4.99 L/sec      FEFMax-%Pred-Pre   Date Value Ref Range Status   06/24/2019 81 %      ExpTime-Pre   Date Value Ref Range Status   06/24/2019 7.52 sec      FIFMax-Pre   Date Value Ref Range Status   06/24/2019 3.36 L/sec      FEV1FEV6-Pred   Date Value Ref Range Status   06/24/2019 80 %      FEV1FEV6-Pre   Date Value Ref Range Status   06/24/2019 58 %      No results found for: 20055         Previous Chest Imaging   No images are attached to the encounter.  No images are attached to the encounter or orders placed in the encounter.         Previous Cardiology Imaging   No results found for this or any previous visit (from the past 8760 hour(s)).                 Sincerely,        Prince Manzanares MD

## 2020-03-09 NOTE — PATIENT INSTRUCTIONS
If you have had any blood work, imaging or other testing completed we will be in touch within 1-2 weeks regarding the results.     If you need refills please contact your pharmacy.     It was a pleasure meeting with you today. Please let us know if there is anything else we can do for you so that we can be sure you are leaving completely satisfied with your care experience.     If you have any questions, concerns or need to schedule a follow up, please contact us at [phone] and our fax [phone].    Your Pulmonology Team at [facility]

## 2020-03-09 NOTE — NURSING NOTE
"Luz Elena Cristina's goals for this visit include: Return  She requests these members of her care team be copied on today's visit information: PCP    PCP: Kiara Zazueta    Referring Provider:  No referring provider defined for this encounter.    BP (!) 141/90 (BP Location: Left arm, Patient Position: Sitting, Cuff Size: Adult Large)   Pulse 90   Resp 18   Ht 1.651 m (5' 5\")   Wt 95 kg (209 lb 8 oz)   SpO2 93%   BMI 34.86 kg/m      Do you need any medication refills at today's visit? N    "

## 2020-03-09 NOTE — PROGRESS NOTES
LUNG NODULE & INTERVENTIONAL PULMONARY CLINIC  [facility]     Luz Elena Cristina MRN# [id number]   Age: 65 year old YOB: 1955     Reason for Consultation: lung nodule(s)     Assessment and Plan:    1. Established multiple pulmonary lung nodule(s). Given the characteristics on current/previous imaging and risk factors; I would classify this to be Intermediate (6-65%) risk for cancer. Waxing and waning nodules since February 2019. New areas of haziness/tree-in-bud however recently had the flu. Will repeat CT in 6mo.      2. Asthma. recs per allergist.      Billing: I spent more than 30 minutes face to face and greater than 50% of time was for counseling and coordination of care about the issues above.     Prince Manzanares MD   of Medicine  Interventional Pulmonology  Department of Pulmonary, Allergy, Critical Care and Sleep Medicine   [facility]  Pager: [phone]          History:     Luz Elena Cristina is a 64 year old female with sig h/o for asthma, HTN, NEGRA who is here for evaluation/followup of lung nodule(s).     - No new resp sx or complaints. Denies dyspnea or cough. Getting over the flu  - here for followup of nodules  - Personal hx of cancer: No cancer. Up-to-date on mammo and c-scope  - Family hx of cancer: No lung cancer.   - Exposure hx: Denies asbestos or radon exposure   - Tobacco hx: Past Smoker: 2ppd for 15years. Quit 33yrs ago.   - My interpretation of the images relevant for this visit includes: nodules   - My interpretation of the PFT's relevant for this visit includes: Obstructive pattern      Culprit Nodule(s):   Multiple bilateral lung nodule(s) that are sub 13 mm. First seen by chest CT on 2/28/19. Prevoius RUL nodule is resolved. No interval change on other nodules.     Other active medical problems include:   - Has NEGRA. On CPAP  - Has asthma. On breo and spiriva.    - Has HTN.  Stable.           Past Medical History:      Past Medical History:   Diagnosis Date     Hypertension      Sleep apnea      Uncomplicated asthma            Past Surgical History:      Past Surgical History:   Procedure Laterality Date     BIOPSY      breast,     COLONOSCOPY       COLONOSCOPY N/A 2019    Procedure: Combined Colonoscopy, Single Or Multiple Biopsy/Polypectomy By Biopsy;  Surgeon: José Miguel Ochoa MD;  Location: [facility]     COLONOSCOPY WITH CO2 INSUFFLATION N/A 2019    Procedure: COLONOSCOPY WITH CO2 INSUFFLATION;  Surgeon: José Miguel Ochoa MD;  Location: [facility]     COMBINED ESOPHAGOSCOPY, GASTROSCOPY, DUODENOSCOPY (EGD) WITH CO2 INSUFFLATION N/A 2019    Procedure: COMBINED ESOPHAGOSCOPY, GASTROSCOPY, DUODENOSCOPY (EGD) WITH CO2 INSUFFLATION;  Surgeon: José Miguel Ochoa MD;  Location: [facility]     ESOPHAGOSCOPY, GASTROSCOPY, DUODENOSCOPY (EGD), COMBINED N/A 2019    Procedure: Combined Esophagoscopy, Gastroscopy, Duodenoscopy (Egd), Biopsy Single Or Multiple;  Surgeon: José Miguel Ochoa MD;  Location: [facility] OR     GYN SURGERY       TONSILLECTOMY            Social History:     Social History     Tobacco Use     Smoking status: Former Smoker     Packs/day: 2.00     Years: 20.00     Pack years: 40.00     Types: Cigarettes     Last attempt to quit: 1987     Years since quittin.8     Smokeless tobacco: Never Used   Substance Use Topics     Alcohol use: Yes     Comment: 1 nightly          Family History:     Family History   Problem Relation Age of Onset     Other Cancer Mother         panreatic     Diabetes Mother      Breast Cancer Sister      Skin Cancer Sister      Cerebrovascular Disease Father         CVA     Chronic Obstructive Pulmonary Disease Father      Aneurysm Paternal Grandmother      Unknown/Adopted Paternal Grandfather      Alzheimer Disease Brother         early onset     Diabetes Son      Thyroid Disease Son      Breast Cancer Paternal  Aunt      Diabetes Son      Breast Cancer Sister            Allergies:      Allergies   Allergen Reactions     Cartia Xt [Diltiazem] Other (See Comments)     Heartburn whenever on this medication     Losartan Cough     Sulfa Drugs Rash          Medications:     Current Outpatient Medications   Medication Sig     albuterol (2.5 MG/3ML) 0.083% neb solution Take 1 vial (2.5 mg) by nebulization every 4 hours as needed for shortness of breath / dyspnea, wheezing or other (persistent cough)     albuterol (PROAIR HFA/PROVENTIL HFA/VENTOLIN HFA) 108 (90 Base) MCG/ACT Inhaler Inhale 2-4 puffs into the lungs every 4 hours as needed for shortness of breath / dyspnea or wheezing     azelastine (ASTELIN) 0.1 % nasal spray Spray 2 sprays into both nostrils 2 times daily as needed for rhinitis or allergies     calcipotriene (DOVONOX) 0.005 % external cream Apply twice daily to affected area on body as needed.     clobetasol (TEMOVATE) 0.05 % external cream Apply sparingly to affected area twice weekly as needed.  Do not apply to face.     clobetasol (TEMOVATE) 0.05 % external ointment Apply sparingly daily as needed to affected area on groin.     hydrochlorothiazide (HYDRODIURIL) 12.5 MG tablet TAKE 1 TABLET(12.5 MG) BY MOUTH DAILY     Respiratory Therapy Supplies (NEBULIZER/TUBING/MOUTHPIECE) KIT Use with albuterol neb solution     fluticasone-vilanterol (BREO ELLIPTA) 200-25 MCG/INH inhaler Inhale 1 puff into the lungs daily (Patient not taking: Reported on 3/9/2020)     tiotropium (SPIRIVA RESPIMAT) 1.25 MCG/ACT inhaler Inhale 2 puffs into the lungs daily (Patient not taking: Reported on 3/9/2020)     No current facility-administered medications for this visit.           Review of Systems:     CONSTITUTIONAL: negative for fever, chills, change in weight  INTEGUMENTARY/SKIN: no rash or obvious new lesions  ENT/MOUTH: no sore throat, new sinus pain or nasal drainage  RESP: see interval history  CV: negative for chest pain,  palpitations or peripheral edema  GI: no nausea, vomiting, change in stools  : no dysuria  MUSCULOSKELETAL: no myalgias, arthralgias  ENDOCRINE: blood sugars with adequate control  PSYCHIATRIC: mood stable  LYMPHATIC: no new lymphadenopathy  HEME: no bleeding or easy bruisability  NEURO: no numbness, weakness, headaches         Physical Exam:     Pulse:  [90] 90  Resp:  [18] 18  BP: (141)/(90) 141/90  SpO2:  [93 %] 93 %  Wt Readings from Last 4 Encounters:   03/09/20 95 kg (209 lb 8 oz)   02/06/20 93.4 kg (206 lb)   12/19/19 93.3 kg (205 lb 9.6 oz)   12/06/19 90.3 kg (199 lb)     Constitutional:   Awake, alert and in no apparent distress     Eyes:   Nonicteric, CAROLYN     ENT:    Trachea is midline. No gross neck abnormalities      Neck:   Supple without supraclavicular or cervical lymphadenopathy     Lungs:   Good air flow.  No crackles. No rhonchi.  No wheezes.     Cardiovascular:   Normal S1 and S2.  RRR.  No murmur, gallop or rub.  Radial, DP and PT pulses normal and symmetric     Abdomen:   NABS, soft, nontender, nondistended.  No HSM.     Musculoskeletal:   No edema.      Neurologic:   Alert and conversant. Cranial nerves  intact.       Skin:   Warm, dry.  No rash on limited exam.           Current Laboratory Data:   All laboratory and imaging data reviewed.    No results found for this or any previous visit (from the past 24 hour(s)).         Previous Pulmonary Function Testing     FVC-Pred   Date Value Ref Range Status   06/24/2019 3.08 L      FVC-Pre   Date Value Ref Range Status   06/24/2019 2.91 L      FVC-%Pred-Pre   Date Value Ref Range Status   06/24/2019 94 %      FEV1-Pre   Date Value Ref Range Status   06/24/2019 1.64 L      FEV1-%Pred-Pre   Date Value Ref Range Status   06/24/2019 68 %      FEV1FVC-Pred   Date Value Ref Range Status   06/24/2019 78 %      FEV1FVC-Pre   Date Value Ref Range Status   06/24/2019 57 %      No results found for: 20029  FEFMax-Pred   Date Value Ref Range Status    06/24/2019 6.09 L/sec      FEFMax-Pre   Date Value Ref Range Status   06/24/2019 4.99 L/sec      FEFMax-%Pred-Pre   Date Value Ref Range Status   06/24/2019 81 %      ExpTime-Pre   Date Value Ref Range Status   06/24/2019 7.52 sec      FIFMax-Pre   Date Value Ref Range Status   06/24/2019 3.36 L/sec      FEV1FEV6-Pred   Date Value Ref Range Status   06/24/2019 80 %      FEV1FEV6-Pre   Date Value Ref Range Status   06/24/2019 58 %      No results found for: 20055         Previous Chest Imaging   No images are attached to the encounter.  No images are attached to the encounter or orders placed in the encounter.         Previous Cardiology Imaging   No results found for this or any previous visit (from the past 8760 hour(s)).

## 2020-03-31 ENCOUNTER — DOCUMENTATION ONLY (OUTPATIENT)
Dept: SLEEP MEDICINE | Facility: CLINIC | Age: 65
End: 2020-03-31

## 2020-03-31 NOTE — PROGRESS NOTES
Patient contacted regarding PAP usage that has either dropped off below an average of 20 minutes per night or device has stopped reporting into Epic or vendor site.    Diagnostic AHI: 23 PSG    Patient still has device : Yes    Last date device called in 10/04/2019    Last usage date 10/04/2019     Subjective measures:   Patient has not been using CPAP since October due to other medical issues. She will call back once she starts using CPAP again.      Current reporting of device:    Objective measures: 14 day rolling measures         Compliance   %     % of night spent in large leak   % last  upload    Leak   lpm  last  upload      AHI    last  upload      Average number of minutes       Action plan:  patient to restart machine

## 2020-04-13 DIAGNOSIS — J45.50 SEVERE PERSISTENT ASTHMA WITHOUT COMPLICATION (H): ICD-10-CM

## 2020-04-13 NOTE — TELEPHONE ENCOUNTER
Breo refill   Message [id number]   From   Quiana Segovia, SOCO  To   Gigi Cristina  Sent and Delivered   4/13/2020 10:03 AM    Last Read in [employer]   4/13/2020 11:02 AM by Luz Elena Cristina      Patient read [employer] message.  Quiana Segovia RN'

## 2020-04-13 NOTE — TELEPHONE ENCOUNTER
Last OV with Dr. Montano was 5/28/19.  6 month f/u appointment was advised.  Patient saw Pulmonology on 3/9/2020 regarding pulmonary nodules.  Routing refill request to provider for review/approval because:  Inhaled Steroids Protocol Agende5104/13/2020 07:51 AM   Asthma control assessment score within normal limits in last 6 months Protocol Details    Recent (6 mo) or future (30 days) visit within the authorizing provider's specialty      Please advise for Breo refill request.  Quiana Segovia RN

## 2020-04-13 NOTE — TELEPHONE ENCOUNTER
Sent patient a [employer] message notifying her of 1 month of Breo refilled and need to schedule f/u virtual visit for further refills.  Quiana Segovia RN

## 2020-04-13 NOTE — TELEPHONE ENCOUNTER
Requested Prescriptions   Pending Prescriptions Disp Refills     fluticasone-vilanterol (BREO ELLIPTA) 200-25 MCG/INH inhaler 1 Inhaler 1     Sig: Inhale 1 puff into the lungs daily       There is no refill protocol information for this order        Last office visit: 5/28/2019 with prescribing provider:  Dr. Montano   Future Office Visit:      Denise Behrendt  Specialty CSS

## 2020-05-01 ENCOUNTER — VIRTUAL VISIT (OUTPATIENT)
Dept: ALLERGY | Facility: CLINIC | Age: 65
End: 2020-05-01
Payer: MEDICARE

## 2020-05-01 DIAGNOSIS — J45.50 SEVERE PERSISTENT ASTHMA WITHOUT COMPLICATION (H): Primary | ICD-10-CM

## 2020-05-01 DIAGNOSIS — J31.0 CHRONIC RHINITIS: ICD-10-CM

## 2020-05-01 PROCEDURE — 99442 ZZC PHYSICIAN TELEPHONE EVALUATION 11-20 MIN: CPT | Performed by: ALLERGY & IMMUNOLOGY

## 2020-05-01 RX ORDER — AZELASTINE 1 MG/ML
2 SPRAY, METERED NASAL 2 TIMES DAILY PRN
Qty: 30 ML | Refills: 3 | Status: SHIPPED | OUTPATIENT
Start: 2020-05-01 | End: 2022-10-14

## 2020-05-01 RX ORDER — ALBUTEROL SULFATE 90 UG/1
2-4 AEROSOL, METERED RESPIRATORY (INHALATION) EVERY 4 HOURS PRN
Qty: 1 INHALER | Refills: 3 | Status: SHIPPED | OUTPATIENT
Start: 2020-05-01 | End: 2021-06-01

## 2020-05-01 ASSESSMENT — ENCOUNTER SYMPTOMS
DIARRHEA: 0
CHEST TIGHTNESS: 0
COUGH: 1
CHILLS: 0
ARTHRALGIAS: 0
RHINORRHEA: 1
EYE DISCHARGE: 0
VOMITING: 0
SINUS PRESSURE: 0
ADENOPATHY: 0
SHORTNESS OF BREATH: 0
JOINT SWELLING: 0
HEADACHES: 1
NAUSEA: 0
FEVER: 0
EYE ITCHING: 0
MYALGIAS: 0
ACTIVITY CHANGE: 0
WHEEZING: 0
EYE REDNESS: 0
FACIAL SWELLING: 0

## 2020-05-01 NOTE — PATIENT INSTRUCTIONS
-Continue Breo Ellipta 200/25 mcg 1 puff once daily.  -Continue Spiriva Respimat 1.25 mcg 2 inhalations once daily.  - Continue albuterol inhaler 2 to 4 puffs every 4 hours as needed for persistent cough/chest tightness/wheezing/shortness of breath.    -Start azelastine 2 sprays in each nostril twice daily as needed, for runny nose, nasal congestion, or postnasal drip.

## 2020-05-01 NOTE — PROGRESS NOTES
"Luz Elena Cristina is a 65 year old female who is being evaluated via a billable telephone visit.      The patient has been notified of following:     \"This telephone visit will be conducted via a call between you and your physician/provider. We have found that certain health care needs can be provided without the need for a physical exam.  This service lets us provide the care you need with a short phone conversation.  If a prescription is necessary we can send it directly to your pharmacy.  If lab work is needed we can place an order for that and you can then stop by our lab to have the test done at a later time.    Telephone visits are billed at different rates depending on your insurance coverage. During this emergency period, for some insurers they may be billed the same as an in-person visit.  Please reach out to your insurance provider with any questions.    If during the course of the call the physician/provider feels a telephone visit is not appropriate, you will not be charged for this service.\"    Patient has given verbal consent for Telephone visit?  Yes    How would you like to obtain your AVS? MyChart        This is a follow up telephone visit. Luz Elena Cristina  is a 65 year old female with severe persistent asthma and chronic rhinitis.     Asthma since childhood. November 2016: Negative serum IgE to regional aeroallergen panel. CBC with differential without eosinophilia.  Normal total serum IgE.  Negative ABPA panel.  Alpha-1 antitrypsin within normal limits.  Normal CBC with differential without hypereosinophilia.  The PSG sleep study demonstrated moderate, REM, and supine predominant NEGRA with some sleep associated hypoxemia.  In February 2019, due to decrease in FEV1, chest CT was ordered that showed some opacities and small nodules that could be infectious.  The patient was treated with azithromycin.  On a follow-up phone call, the patient was asymptomatic; However, chest CT in May 2019 with new " opacities.    In regards to her asthma, she continues doing well on Breo Ellipta 200/25 mcg 1 puff once daily, Spiriva Respimat daily, and albuterol inhaler as needed. She uses albuterol approximately once or twice a week for cough, chest tightness, and dyspnea. She needs it more when she works outside. She walks as much as she can in light of the COVID-19 pandemic. She started biking, and she seems to do well with it.  She sleeps well. She is waking up less than twice per month due to chest symptoms. There has been no use of oral steroids since the last visit. No ED/PCP/urgent care/other specialist visits for asthma flare since the last visit. The patient denies current chest tightness, wheeze, or shortness of breath. She has a cough daily, but it's mild and doesn't bother her a lot. She is not sure if it's related to the postnasal drip or her chest.    For the last several months, she has had a postnasal drip. There is some clear rhinorrhea only in the mornings.      Patient Active Problem List   Diagnosis     Severe persistent asthma     Lichen sclerosus     Benign essential hypertension     Elevated glucose     Family history of diabetes mellitus     Severe obesity (BMI 35.0-39.9) with comorbidity (H)     At risk for cardiovascular event       Past Medical History:   Diagnosis Date     Hypertension      Sleep apnea      Uncomplicated asthma       Problem (# of Occurrences) Relation (Name,Age of Onset)    Alzheimer Disease (1) Brother: early onset    Aneurysm (1) Paternal Grandmother    Breast Cancer (3) Sister, Paternal Aunt, Sister (bob mccrary)    Cerebrovascular Disease (1) Father (ben felder): CVA    Chronic Obstructive Pulmonary Disease (1) Father (bne felder)    Diabetes (3) Mother (fawn felder), Son, Son (oziel barnes)    Other Cancer (1) Mother (fawn felder): panreatic    Skin Cancer (1) Sister    Thyroid Disease (1) Son    Unknown/Adopted (1) Paternal Grandfather  (radha alicea)        Past Surgical History:   Procedure Laterality Date     BIOPSY      breast,     COLONOSCOPY       COLONOSCOPY N/A 2019    Procedure: Combined Colonoscopy, Single Or Multiple Biopsy/Polypectomy By Biopsy;  Surgeon: José Miguel Ochoa MD;  Location: [facility]     COLONOSCOPY WITH CO2 INSUFFLATION N/A 2019    Procedure: COLONOSCOPY WITH CO2 INSUFFLATION;  Surgeon: José Miguel Ochoa MD;  Location: [facility]     COMBINED ESOPHAGOSCOPY, GASTROSCOPY, DUODENOSCOPY (EGD) WITH CO2 INSUFFLATION N/A 2019    Procedure: COMBINED ESOPHAGOSCOPY, GASTROSCOPY, DUODENOSCOPY (EGD) WITH CO2 INSUFFLATION;  Surgeon: José Miguel Ochoa MD;  Location: [facility]     ESOPHAGOSCOPY, GASTROSCOPY, DUODENOSCOPY (EGD), COMBINED N/A 2019    Procedure: Combined Esophagoscopy, Gastroscopy, Duodenoscopy (Egd), Biopsy Single Or Multiple;  Surgeon: José Miguel Ochoa MD;  Location: [facility]     GYN SURGERY       TONSILLECTOMY       Social History     Socioeconomic History     Marital status:      Spouse name: None     Number of children: None     Years of education: None     Highest education level: None   Occupational History     Comment: Retired   Social Needs     Financial resource strain: None     Food insecurity     Worry: None     Inability: None     Transportation needs     Medical: None     Non-medical: None   Tobacco Use     Smoking status: Former Smoker     Packs/day: 2.00     Years: 20.00     Pack years: 40.00     Types: Cigarettes     Last attempt to quit: 1987     Years since quittin.0     Smokeless tobacco: Never Used   Substance and Sexual Activity     Alcohol use: Yes     Comment: 1 nightly     Drug use: No     Sexual activity: Not Currently     Partners: Male     Birth control/protection: Post-menopausal   Lifestyle     Physical activity     Days per week: None     Minutes per session: None     Stress: None   Relationships     Social connections     Talks  on phone: None     Gets together: None     Attends [employer] service: None     Active member of club or organization: None     Attends meetings of clubs or organizations: None     Relationship status: None     Intimate partner violence     Fear of current or ex partner: None     Emotionally abused: None     Physically abused: None     Forced sexual activity: None   Other Topics Concern     Parent/sibling w/ CABG, MI or angioplasty before 65F 55M? No   Social History Narrative    May 1, 2020        ENVIRONMENTAL HISTORY: [place] The home is heated with a forced air but does not use.  Has floor heating. They do have central air conditioning. The patient's bedroom is furnished with carpeting in bedroom.  No pets inside the house. But is exposed to chicken. There is no history of cockroach or mice infestation. There are no smokers in the house.  The house does not have a damp basement.            Review of Systems   Constitutional: Negative for activity change, chills and fever.   HENT: Positive for postnasal drip, rhinorrhea and sneezing. Negative for congestion, dental problem, ear pain, facial swelling, nosebleeds and sinus pressure.    Eyes: Negative for discharge, redness and itching.   Respiratory: Positive for cough. Negative for chest tightness, shortness of breath and wheezing.    Cardiovascular: Negative for chest pain.   Gastrointestinal: Negative for diarrhea, nausea and vomiting.   Musculoskeletal: Negative for arthralgias, joint swelling and myalgias.   Skin: Negative for rash.   Neurological: Positive for headaches.   Hematological: Negative for adenopathy.   Psychiatric/Behavioral: Negative for behavioral problems and self-injury.           Current Outpatient Medications:      albuterol (PROAIR HFA/PROVENTIL HFA/VENTOLIN HFA) 108 (90 Base) MCG/ACT inhaler, Inhale 2-4 puffs into the lungs every 4 hours as needed for shortness of breath / dyspnea or wheezing,  Disp: 1 Inhaler, Rfl: 3     azelastine (ASTELIN) 0.1 % nasal spray, Spray 2 sprays into both nostrils 2 times daily as needed for rhinitis or allergies, Disp: 30 mL, Rfl: 3     clobetasol (TEMOVATE) 0.05 % external cream, Apply sparingly to affected area twice weekly as needed.  Do not apply to face., Disp: 60 g, Rfl: 3     clobetasol (TEMOVATE) 0.05 % external ointment, Apply sparingly daily as needed to affected area on groin., Disp: 30 g, Rfl: 4     fluticasone-vilanterol (BREO ELLIPTA) 200-25 MCG/INH inhaler, Inhale 1 puff into the lungs daily, Disp: 1 Inhaler, Rfl: 1     hydrochlorothiazide (HYDRODIURIL) 12.5 MG tablet, TAKE 1 TABLET(12.5 MG) BY MOUTH DAILY, Disp: 90 tablet, Rfl: 0     tiotropium (SPIRIVA RESPIMAT) 1.25 MCG/ACT inhaler, Inhale 2 puffs into the lungs daily, Disp: 1 Inhaler, Rfl: 1     albuterol (2.5 MG/3ML) 0.083% neb solution, Take 1 vial (2.5 mg) by nebulization every 4 hours as needed for shortness of breath / dyspnea, wheezing or other (persistent cough) (Patient not taking: Reported on 5/1/2020), Disp: 50 vial, Rfl: 1     calcipotriene (DOVONOX) 0.005 % external cream, Apply twice daily to affected area on body as needed. (Patient not taking: Reported on 5/1/2020), Disp: 60 g, Rfl: 4     Respiratory Therapy Supplies (NEBULIZER/TUBING/MOUTHPIECE) KIT, Use with albuterol neb solution, Disp: 1 kit, Rfl: 0  Immunization History   Administered Date(s) Administered     Influenza (High Dose) 3 valent vaccine 02/27/2018, 09/18/2018     Influenza (IIV3) PF 11/08/2012     Influenza Vaccine IM > 6 months Valent IIV4 12/12/2016     Pneumo Conj 13-V (2010&after) 02/27/2018     TDAP Vaccine (Adacel) 11/08/2012       ASSESSMENT AND PLAN:    1. Severe persistent asthma without complication  It seems that her symptoms are well controlled with a combination of Breo Ellipta 200/25 mcg 1 puff once daily, Spiriva Respimat 2 inhalations once daily, and albuterol inhaler as needed.  -Continue as is.    -  albuterol (PROAIR HFA/PROVENTIL HFA/VENTOLIN HFA) 108 (90 Base) MCG/ACT inhaler  Dispense: 1 Inhaler; Refill: 3  - fluticasone-vilanterol (BREO ELLIPTA) 200-25 MCG/INH inhaler  Dispense: 1 Inhaler; Refill: 1  - tiotropium (SPIRIVA RESPIMAT) 1.25 MCG/ACT inhaler  Dispense: 1 Inhaler; Refill: 1    2. Chronic rhinitis  She was prescribed azelastine nasal spray in the past, but she did not try using it for current nasal symptoms.  -I recommend restarting it, 2 sprays in each nostril twice daily as needed.    - azelastine (ASTELIN) 0.1 % nasal spray  Dispense: 30 mL; Refill: 3           Follow up in 6 months or sooner if needed.      Telephone started: 10:29 am Telephone Ended: 10:40 am     Phone call duration: 11 minutes    This patient was evaluated virtually during the COVID-19 outbreak in attempts to keep healthy patients out of the clinic. I evaluated them by phone and this note reflects our conversation.    Disclaimer: This note consists of symbols derived from keyboarding, dictation and/or voice recognition software. As a result, there may be errors in the script that have gone undetected. Please consider this when interpreting information found in this chart.     Galindo Montano MD, FAAAAI, FACAAI  Allergy, Asthma and Immunology  [place], [place] and [place]

## 2020-05-12 DIAGNOSIS — I10 BENIGN ESSENTIAL HYPERTENSION: ICD-10-CM

## 2020-05-13 RX ORDER — HYDROCHLOROTHIAZIDE 12.5 MG/1
TABLET ORAL
Qty: 90 TABLET | Refills: 0 | Status: SHIPPED | OUTPATIENT
Start: 2020-05-13 | End: 2020-08-31

## 2020-06-03 ENCOUNTER — TELEPHONE (OUTPATIENT)
Dept: FAMILY MEDICINE | Facility: CLINIC | Age: 65
End: 2020-06-03

## 2020-06-03 NOTE — TELEPHONE ENCOUNTER
Reason for Call:  Other     Detailed comments: Patient has mouth sores  Under her upper lip.  She thinks its from her hydrochlorothiazide .  She stopped CPAP thinking it might be that - Please call patient    Phone Number Patient can be reached at: Home number on file [phone] (home)    Best Time:     Can we leave a detailed message on this number? YES    Call taken on 6/3/2020 at 12:02 PM by Andressa Boston

## 2020-06-03 NOTE — TELEPHONE ENCOUNTER
S-(situation): I talked with Gigi.  States has had the mouth sores for over a year, they come and go.   Sensitive to hot and cold when drinks something.      B-(background): thinks from the HCTZ.  I checked Up to Date and do not find this reaction    A-(assessment): mouth sores on and off    R-(recommendations): advised to schedule appointment to have them looked at.   Argelia Casas RN

## 2020-06-04 ENCOUNTER — OFFICE VISIT (OUTPATIENT)
Dept: FAMILY MEDICINE | Facility: CLINIC | Age: 65
End: 2020-06-04
Payer: MEDICARE

## 2020-06-04 VITALS
SYSTOLIC BLOOD PRESSURE: 122 MMHG | DIASTOLIC BLOOD PRESSURE: 72 MMHG | HEART RATE: 81 BPM | BODY MASS INDEX: 35.81 KG/M2 | WEIGHT: 215.2 LBS | OXYGEN SATURATION: 94 % | RESPIRATION RATE: 24 BRPM | TEMPERATURE: 98.4 F

## 2020-06-04 DIAGNOSIS — Z83.3 FAMILY HISTORY OF TYPE 1 DIABETES MELLITUS: ICD-10-CM

## 2020-06-04 DIAGNOSIS — J45.50 SEVERE PERSISTENT ASTHMA WITHOUT COMPLICATION (H): ICD-10-CM

## 2020-06-04 DIAGNOSIS — R68.2 DRY MOUTH: ICD-10-CM

## 2020-06-04 DIAGNOSIS — K12.0 APHTHOUS ULCER: ICD-10-CM

## 2020-06-04 DIAGNOSIS — I10 BENIGN ESSENTIAL HYPERTENSION: ICD-10-CM

## 2020-06-04 DIAGNOSIS — Z20.822 ENCOUNTER FOR LABORATORY TESTING FOR COVID-19 VIRUS: Primary | ICD-10-CM

## 2020-06-04 DIAGNOSIS — R73.03 PREDIABETES: ICD-10-CM

## 2020-06-04 LAB
ANION GAP SERPL CALCULATED.3IONS-SCNC: 5 MMOL/L (ref 3–14)
BUN SERPL-MCNC: 16 MG/DL (ref 7–30)
CALCIUM SERPL-MCNC: 9.5 MG/DL (ref 8.5–10.1)
CHLORIDE SERPL-SCNC: 105 MMOL/L (ref 94–109)
CO2 SERPL-SCNC: 31 MMOL/L (ref 20–32)
CREAT SERPL-MCNC: 0.78 MG/DL (ref 0.52–1.04)
FOLATE SERPL-MCNC: 11.2 NG/ML
GFR SERPL CREATININE-BSD FRML MDRD: 79 ML/MIN/{1.73_M2}
GLUCOSE SERPL-MCNC: 97 MG/DL (ref 70–99)
HBA1C MFR BLD: 6.3 % (ref 0–5.6)
POTASSIUM SERPL-SCNC: 4.1 MMOL/L (ref 3.4–5.3)
SODIUM SERPL-SCNC: 141 MMOL/L (ref 133–144)
TSH SERPL DL<=0.005 MIU/L-ACNC: 2.21 MU/L (ref 0.4–4)
VIT B12 SERPL-MCNC: 458 PG/ML (ref 193–986)

## 2020-06-04 PROCEDURE — 86038 ANTINUCLEAR ANTIBODIES: CPT | Performed by: PHYSICIAN ASSISTANT

## 2020-06-04 PROCEDURE — 36415 COLL VENOUS BLD VENIPUNCTURE: CPT | Performed by: PHYSICIAN ASSISTANT

## 2020-06-04 PROCEDURE — 83036 HEMOGLOBIN GLYCOSYLATED A1C: CPT | Performed by: PHYSICIAN ASSISTANT

## 2020-06-04 PROCEDURE — 80048 BASIC METABOLIC PNL TOTAL CA: CPT | Performed by: PHYSICIAN ASSISTANT

## 2020-06-04 PROCEDURE — 86235 NUCLEAR ANTIGEN ANTIBODY: CPT | Performed by: PHYSICIAN ASSISTANT

## 2020-06-04 PROCEDURE — 86431 RHEUMATOID FACTOR QUANT: CPT | Performed by: PHYSICIAN ASSISTANT

## 2020-06-04 PROCEDURE — 82746 ASSAY OF FOLIC ACID SERUM: CPT | Performed by: PHYSICIAN ASSISTANT

## 2020-06-04 PROCEDURE — 99214 OFFICE O/P EST MOD 30 MIN: CPT | Performed by: PHYSICIAN ASSISTANT

## 2020-06-04 PROCEDURE — 83516 IMMUNOASSAY NONANTIBODY: CPT | Performed by: PHYSICIAN ASSISTANT

## 2020-06-04 PROCEDURE — 83516 IMMUNOASSAY NONANTIBODY: CPT | Mod: 59 | Performed by: PHYSICIAN ASSISTANT

## 2020-06-04 PROCEDURE — 82607 VITAMIN B-12: CPT | Performed by: PHYSICIAN ASSISTANT

## 2020-06-04 PROCEDURE — 84443 ASSAY THYROID STIM HORMONE: CPT | Performed by: PHYSICIAN ASSISTANT

## 2020-06-04 RX ORDER — PILOCARPINE HYDROCHLORIDE 5 MG/1
5 TABLET, FILM COATED ORAL 4 TIMES DAILY PRN
Qty: 30 TABLET | Refills: 3 | Status: SHIPPED | OUTPATIENT
Start: 2020-06-04 | End: 2021-01-27

## 2020-06-04 RX ORDER — FLUOCINONIDE GEL 0.5 MG/G
GEL TOPICAL 2 TIMES DAILY
Qty: 15 G | Refills: 0 | Status: ON HOLD | OUTPATIENT
Start: 2020-06-04 | End: 2022-03-04

## 2020-06-04 NOTE — PATIENT INSTRUCTIONS
Labs today - possible autoimmune, but consistent locations make somewhat less likely, could be just dryness    Try L lysine as needed  Ointment steroid as needed to heal up   Try off SDS in toothpaste   See us if sore not healing     Can try pilocarpine if having day of really bad dry mouth, but side effects.    If still problematic, see me or call for rheumatology referral

## 2020-06-04 NOTE — PROGRESS NOTES
Subjective     Luz Elena Cristina is a 65 year old female who presents to clinic today for the following health issues:    HPI   Mouth sores      Duration: on and off for 1 year    Description (location/character/radiation): sores inside mouth and dry mouth    Intensity:  mild    Accompanying signs and symptoms: 0    History (similar episodes/previous evaluation): no    Precipitating or alleviating factors: has had history of thrush, but this seems different. Uses inhalers    Therapies tried and outcome: biotin helps with dry mouth but not the sores   Looked on -line and hydrochlorothiazide can cause mouth sores -patient is still taking it, feels it works great for her HTN.    Off and on x 1 yr, last about a week.  Never bleed.  Almost always on R side near incisor, but can also be L side.  Mouth dry, quit cpap for awhile, but comes and goes with dryness.  Eyes not dry. No certain food triggers.      Lichen sclerosis.  Severe asthma.  Son with DM1.   Pain in 1st metacarpal joints.  Otherwise ROS neg.      BP Readings from Last 3 Encounters:   06/04/20 122/72   03/09/20 (!) 141/90   02/06/20 130/88    Wt Readings from Last 3 Encounters:   06/04/20 97.6 kg (215 lb 3.2 oz)   03/09/20 95 kg (209 lb 8 oz)   02/06/20 93.4 kg (206 lb)         Reviewed and updated as needed this visit by Provider  Tobacco  Allergies  Meds  Problems  Med Hx  Surg Hx  Fam Hx         Review of Systems   Constitutional, HEENT, cardiovascular, pulmonary, GI, , musculoskeletal, neuro, skin, endocrine and psych systems are negative, except as otherwise noted.      Objective    /72   Pulse 81   Temp 98.4  F (36.9  C)   Resp 24   Wt 97.6 kg (215 lb 3.2 oz)   SpO2 94%   BMI 35.81 kg/m    Body mass index is 35.81 kg/m .  Physical Exam   GENERAL: healthy, alert and no distress  EYES: Eyes grossly normal to inspection, PERRL and conjunctivae and sclerae normal  HENT:  nose and mouth without ulcers or lesions, non atrophied tongue, no  dry mouth, ulcers 0.3 cm where upper incisor bilaterally makes contact with lip, gingival erythema and edema to upper front teeth areas only  Lungs: clear to ausculation bilaterally   Heart: Regular rate and rhythm, no murmurs or rubs     Diagnostic Test Results:  Labs reviewed in Epic        Assessment & Plan       ICD-10-CM    1. Aphthous ulcer  K12.0 fluocinonide (LIDEX) 0.05 % external gel     Tissue transglutaminase andres IgA and IgG     Vitamin B12     Folate     TSH with free T4 reflex   2. Dry mouth  R68.2 pilocarpine (SALAGEN) 5 MG tablet     SSA Ro JED Antibody IgG     SSB La JED Antibody IgG     Rheumatoid factor     Anti Nuclear Anrdes IgG by IFA with Reflex   3. Severe persistent asthma without complication  J45.50 SSA Ro JED Antibody IgG     SSB La JED Antibody IgG     Rheumatoid factor     Anti Nuclear Andres IgG by IFA with Reflex   4. Family history of type 1 diabetes mellitus  Z83.3 SSA Ro JED Antibody IgG     SSB La JED Antibody IgG     Rheumatoid factor     Anti Nuclear Andres IgG by IFA with Reflex     TSH with free T4 reflex   5. Benign essential hypertension  I10 Basic metabolic panel  (Ca, Cl, CO2, Creat, Gluc, K, Na, BUN)   6. Prediabetes  R73.03 Hemoglobin A1c     TSH with free T4 reflex     Patient Instructions   Labs today - possible autoimmune, but consistent locations make somewhat less likely, could be just dryness    Try L lysine as needed  Ointment steroid as needed to heal up   Try off SDS in toothpaste   See us if sore not healing     Can try pilocarpine if having day of really bad dry mouth, but side effects.    If still problematic, see me or call for rheumatology referral          No follow-ups on file.    Kiara Zazueta PA-C  [facility]

## 2020-06-05 LAB
ENA SS-A IGG SER IA-ACNC: <0.2 AI (ref 0–0.9)
ENA SS-B IGG SER IA-ACNC: <0.2 AI (ref 0–0.9)
RHEUMATOID FACT SER NEPH-ACNC: <7 IU/ML (ref 0–20)
TTG IGA SER-ACNC: 1 U/ML
TTG IGG SER-ACNC: 1 U/ML

## 2020-06-05 ASSESSMENT — ASTHMA QUESTIONNAIRES: ACT_TOTALSCORE: 24

## 2020-06-05 NOTE — RESULT ENCOUNTER NOTE
Luz Elena,    A1c shows blood sugars are still close to being diabetes level.  We'll continue to monitor.  Keep working on healthy lifestyle and weight loss.    Your test results for your mouth sores were all normal.  No sicca.  There is 1 lab still in process.  It won't change anything unless you develop other symptoms.    Kiara

## 2020-06-08 LAB — ANA SER QL IF: NEGATIVE

## 2020-06-16 ENCOUNTER — MYC MEDICAL ADVICE (OUTPATIENT)
Dept: FAMILY MEDICINE | Facility: CLINIC | Age: 65
End: 2020-06-16

## 2020-06-16 DIAGNOSIS — Z20.822 ENCOUNTER FOR LABORATORY TESTING FOR COVID-19 VIRUS: ICD-10-CM

## 2020-06-16 PROCEDURE — 36415 COLL VENOUS BLD VENIPUNCTURE: CPT | Performed by: PHYSICIAN ASSISTANT

## 2020-06-16 PROCEDURE — 86769 SARS-COV-2 COVID-19 ANTIBODY: CPT | Performed by: PHYSICIAN ASSISTANT

## 2020-06-17 LAB
COVID-19 ANTIBODY IGG: NEGATIVE
LAB TEST METHOD: NORMAL

## 2020-06-24 ENCOUNTER — OFFICE VISIT (OUTPATIENT)
Dept: FAMILY MEDICINE | Facility: CLINIC | Age: 65
End: 2020-06-24
Payer: MEDICARE

## 2020-06-24 VITALS
HEIGHT: 65 IN | RESPIRATION RATE: 24 BRPM | OXYGEN SATURATION: 95 % | TEMPERATURE: 97.5 F | DIASTOLIC BLOOD PRESSURE: 84 MMHG | WEIGHT: 221 LBS | BODY MASS INDEX: 36.82 KG/M2 | HEART RATE: 70 BPM | SYSTOLIC BLOOD PRESSURE: 139 MMHG

## 2020-06-24 DIAGNOSIS — R21 RASH: Primary | ICD-10-CM

## 2020-06-24 DIAGNOSIS — Z78.0 POST-MENOPAUSAL: ICD-10-CM

## 2020-06-24 DIAGNOSIS — Z00.00 MEDICARE ANNUAL WELLNESS VISIT, SUBSEQUENT: ICD-10-CM

## 2020-06-24 PROCEDURE — 90732 PPSV23 VACC 2 YRS+ SUBQ/IM: CPT | Performed by: PHYSICIAN ASSISTANT

## 2020-06-24 PROCEDURE — G0009 ADMIN PNEUMOCOCCAL VACCINE: HCPCS | Performed by: PHYSICIAN ASSISTANT

## 2020-06-24 PROCEDURE — 99213 OFFICE O/P EST LOW 20 MIN: CPT | Mod: 25 | Performed by: PHYSICIAN ASSISTANT

## 2020-06-24 PROCEDURE — G0438 PPPS, INITIAL VISIT: HCPCS | Performed by: PHYSICIAN ASSISTANT

## 2020-06-24 ASSESSMENT — MIFFLIN-ST. JEOR: SCORE: 1538.33

## 2020-06-24 NOTE — NURSING NOTE
"Chief Complaint   Patient presents with     Derm Problem     rt torso rash and pain x 1 week       Initial /84 (BP Location: Right arm)   Pulse 70   Temp 97.5  F (36.4  C) (Tympanic)   Resp 24   Ht 1.651 m (5' 5\")   Wt 100.2 kg (221 lb)   SpO2 95%   BMI 36.78 kg/m   Estimated body mass index is 36.78 kg/m  as calculated from the following:    Height as of this encounter: 1.651 m (5' 5\").    Weight as of this encounter: 100.2 kg (221 lb).    Patient presents to the clinic using No DME    Health Maintenance that is potentially due pending provider review:  NONE    n/a    Is there anyone who you would like to be able to receive your results? No  If yes have patient fill out SOHAM    "

## 2020-06-24 NOTE — PROGRESS NOTES
"Subjective     Luz Elena Cristina is a 65 year old female who presents to clinic today for the following health issues:    HPI   Rash      Duration: 1 week    Description  Location: rt torso  Itching: severe    Intensity:  severe    Accompanying signs and symptoms: None    History (similar episodes/previous evaluation): None    Precipitating or alleviating factors:  New exposures:  grasses  Recent travel: no      Therapies tried and outcome: hydrocortisone cream -  not effective and after bite     Was out in field with tractor, no recalled exposures  Rash slowly improving, but itchy and somewhat painful  Past history of [facility]    Annual Wellness Visit    Are you in the first 12 months of your Medicare Part B coverage?  No    Physical Health:    In general, how would you rate your overall physical health? fair    Outside of work, how many days during the week do you exercise?6-7 days/week    Outside of work, approximately how many minutes a day do you exercise?greater than 60 minutes    If you drink alcohol do you typically have >3 drinks per day or >7 drinks per week? No    Do you usually eat at least 4 servings of fruit and vegetables a day, include whole grains & fiber and avoid regularly eating high fat or \"junk\" foods? Yes    Do you have any problems taking medications regularly? YES    Do you have any side effects from medications? dry skin    Needs assistance for the following daily activities: no assistance needed    Which of the following safety concerns are present in your home?  none identified     Hearing impairment: No    In the past 6 months, have you been bothered by leaking of urine? yes    Mental Health:    In general, how would you rate your overall mental or emotional health? good  PHQ-2 Score:      Do you feel safe in your environment? Yes    Have you ever done Advance Care Planning? (For example, a Health Directive, POLST, or a discussion with a medical provider or your loved ones about your " "wishes)? No, advance care planning information given to patient to review.  Patient plans to discuss their wishes with loved ones or provider.      Fall risk:  2 falls with injury    Cognitive Screenin) Repeat 3 items (Leader, Season, Table)    2) Clock draw: NORMAL  3) 3 item recall: Recalls 3 objects  Results: 3 items recalled: COGNITIVE IMPAIRMENT LESS LIKELY    Mini-CogTM Copyright S Dontrell. Licensed by the author for use in [facility]; reprinted with permission (carleen@[facility]). All rights reserved.      Do you have sleep apnea, excessive snoring or daytime drowsiness?: yes    Current providers sharing in care for this patient include:   Patient Care Team:  Kiara Zazueta PA-C as PCP - General (Physician Assistant)  Hugo Lantigua MD as Assigned PCP    BP Readings from Last 3 Encounters:   20 139/84   20 122/72   20 (!) 141/90    Wt Readings from Last 3 Encounters:   20 100.2 kg (221 lb)   20 97.6 kg (215 lb 3.2 oz)   20 95 kg (209 lb 8 oz)         Reviewed and updated as needed this visit by Provider  Tobacco  Allergies  Meds  Problems  Med Hx  Surg Hx  Fam Hx         Review of Systems   Constitutional, HEENT, cardiovascular, pulmonary, GI, , musculoskeletal, neuro, skin, endocrine and psych systems are negative, except as otherwise noted.      Objective    /84 (BP Location: Right arm)   Pulse 70   Temp 97.5  F (36.4  C) (Tympanic)   Resp 24   Ht 1.651 m (5' 5\")   Wt 100.2 kg (221 lb)   SpO2 95%   BMI 36.78 kg/m    Body mass index is 36.78 kg/m .  Physical Exam   GENERAL: healthy, alert and no distress  CV: regular rate and rhythm, normal S1 S2, no S3 or S4, no murmur, click or rub  SKIN: very linear horizontal rash R side above hip and lateral chest wall  PSYCH: mentation appears normal, affect normal/bright    Diagnostic Test Results:  Labs reviewed in Epic        Assessment & Plan       ICD-10-CM    1. Rash  R21    2. " Post-menopausal  Z78.0 DX Hip/Pelvis/Spine   3. Medicare annual wellness visit, subsequent  Z00.00        Patient Instructions   Pneumovax today   Talk to pharmacy about shingles vaccine     Doubt shingles  Possible contact dermatitis   Decided to use steroid topicals.    Call if need something further - lidocaine ointment, steroid pills, etc    Call [phone] to set up your imaging testing.        Return if symptoms worsen or fail to improve.    Kiara Zazueta PA-C  [facility]

## 2020-06-24 NOTE — PATIENT INSTRUCTIONS
Pneumovax today   Talk to pharmacy about shingles vaccine     Doubt shingles  Possible contact dermatitis   Decided to use steroid topicals.    Call if need something further - lidocaine ointment, steroid pills, etc    Call [phone] to set up your imaging testing.

## 2020-07-21 ENCOUNTER — TELEPHONE (OUTPATIENT)
Dept: PULMONOLOGY | Facility: CLINIC | Age: 65
End: 2020-07-21

## 2020-07-21 NOTE — TELEPHONE ENCOUNTER
Patient is due for pulmonary follow up with Dr. Manzanares as well as a chest CT scan in September 2020. VM left for pt informing her of this information.     Patient may be scheduled via telephone or video in any open [facility] spots on Monday 09-21 or 09-28. CT chest must be scheduled prior to virtual visit.    Requested call back to the clinic in order to schedule follow up.       Brandon Sheppard LPN    Rheumatology / Pulmonology  [facility]

## 2020-07-23 NOTE — PROGRESS NOTES
SUBJECTIVE   Luz Elena Cristina is a 65 year old female who presents with     SYMPTOMS      Duration: 1 week     Description  Raised red area Lt ankle     Severity: severe itching    Accompanying signs and symptoms: Enlarging and warm to the touch     History (predisposing factors):  Insect bite or contact with weeds    Precipitating or alleviating factors: None    Therapies tried and outcome:  Calcipotriene cream, ice,  with no relief         PCP   Kiara Zazueta PA-C [phone]    Health Maintenance        Health Maintenance Due   Topic Date Due     DEXA  1955     ZOSTER IMMUNIZATION (1 of 2) 02/22/2003     ASTHMA ACTION PLAN  06/25/2019     MAMMO SCREENING  06/16/2020       HPI        Patient Active Problem List   Diagnosis     Severe persistent asthma     Lichen sclerosus     Benign essential hypertension     Elevated glucose     Family history of diabetes mellitus     Severe obesity (BMI 35.0-39.9) with comorbidity (H)     At risk for cardiovascular event     Current Outpatient Medications   Medication     albuterol (2.5 MG/3ML) 0.083% neb solution     albuterol (PROAIR HFA/PROVENTIL HFA/VENTOLIN HFA) 108 (90 Base) MCG/ACT inhaler     azelastine (ASTELIN) 0.1 % nasal spray     calcipotriene (DOVONOX) 0.005 % external cream     clobetasol (TEMOVATE) 0.05 % external cream     clobetasol (TEMOVATE) 0.05 % external ointment     fluocinonide (LIDEX) 0.05 % external gel     fluticasone-vilanterol (BREO ELLIPTA) 200-25 MCG/INH inhaler     hydrochlorothiazide (HYDRODIURIL) 12.5 MG tablet     Respiratory Therapy Supplies (NEBULIZER/TUBING/MOUTHPIECE) KIT     tiotropium (SPIRIVA RESPIMAT) 1.25 MCG/ACT inhaler     pilocarpine (SALAGEN) 5 MG tablet     No current facility-administered medications for this visit.        Patient Active Problem List   Diagnosis     Severe persistent asthma     Lichen sclerosus     Benign essential hypertension     Elevated glucose     Family history of diabetes mellitus     Severe  obesity (BMI 35.0-39.9) with comorbidity (H)     At risk for cardiovascular event     Past Surgical History:   Procedure Laterality Date     BIOPSY      breast,     COLONOSCOPY       COLONOSCOPY N/A 2019    Procedure: Combined Colonoscopy, Single Or Multiple Biopsy/Polypectomy By Biopsy;  Surgeon: José Miguel Ochoa MD;  Location: [facility]     COLONOSCOPY WITH CO2 INSUFFLATION N/A 2019    Procedure: COLONOSCOPY WITH CO2 INSUFFLATION;  Surgeon: José Miguel Ochoa MD;  Location: [facility]     COMBINED ESOPHAGOSCOPY, GASTROSCOPY, DUODENOSCOPY (EGD) WITH CO2 INSUFFLATION N/A 2019    Procedure: COMBINED ESOPHAGOSCOPY, GASTROSCOPY, DUODENOSCOPY (EGD) WITH CO2 INSUFFLATION;  Surgeon: José Miguel Ochoa MD;  Location: [facility]     ESOPHAGOSCOPY, GASTROSCOPY, DUODENOSCOPY (EGD), COMBINED N/A 2019    Procedure: Combined Esophagoscopy, Gastroscopy, Duodenoscopy (Egd), Biopsy Single Or Multiple;  Surgeon: José Miguel Ochoa MD;  Location: [facility]     GYN SURGERY       TONSILLECTOMY         Social History     Tobacco Use     Smoking status: Former Smoker     Packs/day: 2.00     Years: 20.00     Pack years: 40.00     Types: Cigarettes     Last attempt to quit: 1987     Years since quittin.2     Smokeless tobacco: Never Used   Substance Use Topics     Alcohol use: Yes     Comment: 1 nightly     Family History   Problem Relation Age of Onset     Other Cancer Mother         panreatic     Diabetes Mother         unsure type; in 40s     Breast Cancer Sister      Skin Cancer Sister      Cerebrovascular Disease Father         CVA     Chronic Obstructive Pulmonary Disease Father      Skin Cancer Father      Aneurysm Paternal Grandmother      Unknown/Adopted Paternal Grandfather      Alzheimer Disease Brother         early onset     Diabetes Son         type 1     Thyroid Disease Son      Arthritis Brother      Stomach Problem Son         polyps, diverticulitis, heartburn, passes blood      Alcoholism Son      Breast Cancer Paternal Aunt          Current Outpatient Medications   Medication Sig Dispense Refill     albuterol (2.5 MG/3ML) 0.083% neb solution Take 1 vial (2.5 mg) by nebulization every 4 hours as needed for shortness of breath / dyspnea, wheezing or other (persistent cough) 50 vial 1     albuterol (PROAIR HFA/PROVENTIL HFA/VENTOLIN HFA) 108 (90 Base) MCG/ACT inhaler Inhale 2-4 puffs into the lungs every 4 hours as needed for shortness of breath / dyspnea or wheezing 1 Inhaler 3     azelastine (ASTELIN) 0.1 % nasal spray Spray 2 sprays into both nostrils 2 times daily as needed for rhinitis or allergies 30 mL 3     calcipotriene (DOVONOX) 0.005 % external cream Apply twice daily to affected area on body as needed. 60 g 4     clobetasol (TEMOVATE) 0.05 % external cream Apply sparingly to affected area twice weekly as needed.  Do not apply to face. 60 g 3     clobetasol (TEMOVATE) 0.05 % external ointment Apply sparingly daily as needed to affected area on groin. 30 g 4     fluocinonide (LIDEX) 0.05 % external gel Apply topically 2 times daily As needed for mouth sores 15 g 0     fluticasone-vilanterol (BREO ELLIPTA) 200-25 MCG/INH inhaler Inhale 1 puff into the lungs daily 1 Inhaler 1     hydrochlorothiazide (HYDRODIURIL) 12.5 MG tablet TAKE 1 TABLET(12.5 MG) BY MOUTH DAILY 90 tablet 0     Respiratory Therapy Supplies (NEBULIZER/TUBING/MOUTHPIECE) KIT Use with albuterol neb solution 1 kit 0     tiotropium (SPIRIVA RESPIMAT) 1.25 MCG/ACT inhaler Inhale 2 puffs into the lungs daily 1 Inhaler 1     pilocarpine (SALAGEN) 5 MG tablet Take 1 tablet (5 mg) by mouth 4 times daily as needed (dry mouth) (Patient not taking: Reported on 6/24/2020) 30 tablet 3     Allergies   Allergen Reactions     Cartia Xt [Diltiazem] Other (See Comments)     Heartburn whenever on this medication     Losartan Cough     Sulfa Drugs Rash     Recent Labs   Lab Test 06/04/20  1424 06/07/19  1330 02/27/18  1203  "04/28/16  1108  08/13/15   A1C 6.3*  --  6.3* 6.4*  --  5.8   LDL  --   --  98 100*  --  116   HDL  --   --  71 72  --  63   TRIG  --   --  92 84  --  99   CR 0.78 0.90 0.85 0.85   < >  --    GFRESTIMATED 79 67 67 68   < >  --    GFRESTBLACK >90 77 81 82   < >  --    POTASSIUM 4.1 3.7 4.5 4.4   < >  --    TSH 2.21  --  2.23  --   --   --     < > = values in this interval not displayed.      BP Readings from Last 3 Encounters:   07/24/20 128/84   06/24/20 139/84   06/04/20 122/72    Wt Readings from Last 3 Encounters:   07/24/20 98.9 kg (218 lb)   06/24/20 100.2 kg (221 lb)   06/04/20 97.6 kg (215 lb 3.2 oz)                    Reviewed and updated:  Tobacco  Allergies  Meds  Med Hx  Surg Hx  Fam Hx  Soc Hx     ROS:  Constitutional, HEENT, cardiovascular, pulmonary, gi and gu systems are negative, except as otherwise noted.    PHYSICAL EXAM   /84   Pulse 65   Temp 97.8  F (36.6  C) (Tympanic)   Resp 20   Ht 1.651 m (5' 5\")   Wt 98.9 kg (218 lb)   SpO2 96%   BMI 36.28 kg/m    Body mass index is 36.28 kg/m .  GENERAL: alert and no distress  NECK: no adenopathy, no asymmetry, masses, or scars and thyroid normal to palpation  RESP: lungs clear to auscultation - no rales, rhonchi or wheezes  CV: regular rates and rhythm, normal S1 S2, no S3 or S4 and no murmur, click or rub  MS: no gross musculoskeletal defects noted, no edema  SKIN: Erythematous skin involving left lower extremity as shown below, few excoriations, no blistering or discharge noted, warmth and tender on palpation, Homans sign negative, pedal pulses 3+, sensation to touch and pressure intact          Assessment & Plan     (L03.116) Cellulitis of left lower extremity  (primary encounter diagnosis)  Comment: Suspect symptoms secondary to left lower extremity cellulitis.  Differentials discussed in detail.  Doxycycline prescribed, common side effect discussed.  Suggested leg elevation, icing, Tylenol and Benadryl.  Follow-up if symptoms " persist or worsen.  All question answered.  Plan: doxycycline hyclate (VIBRA-TABS) 100 MG tablet          Patient Instructions     Patient Education     Cellulitis  Cellulitis is an infection of the deep layers of skin. A break in the skin, such as a cut or scratch, can let bacteria under the skin. If the bacteria get to deep layers of the skin, it can be serious. If not treated, cellulitis can get into the bloodstream and lymph nodes. The infection can then spread throughout the body. This causes serious illness.  Cellulitis causes the affected skin to become red, swollen, warm, and sore. The reddened areas have a visible border. An open sore may leak fluid (pus). You may have a fever, chills, and pain.  Cellulitis is treated with antibiotics taken for 7 to 10 days. An open sore may be cleaned and covered with cool wet gauze. Symptoms should get better 1 to 2 days after treatment is started. Make sure to take all the antibiotics for the full number of days until they are gone. Keep taking the medicine even if your symptoms go away.  Home care  Follow these tips:    Limit the use of the part of your body with cellulitis.     If the infection is on your leg, keep your leg raised while sitting. This will help to reduce swelling.    Take all of the antibiotic medicine exactly as directed until it is gone. Do not miss any doses, especially during the first 7 days. Don t stop taking the medicine when your symptoms get better.    Keep the affected area clean and dry.    Wash your hands with soap and warm water before and after touching your skin. Anyone else who touches your skin should also wash his or her hands. Don't share towels.  Follow-up care  Follow up with your healthcare provider, or as advised. If your infection does not go away on the first antibiotic, your healthcare provider will prescribe a different one.  When to seek medical advice  Call your healthcare provider right away if any of these occur:    Red  areas that spread    Swelling or pain that gets worse    Fluid leaking from the skin (pus)    Fever higher of 100.4  F (38.0  C) or higher after 2 days on antibiotics  Date Last Reviewed: 9/1/2016 2000-2019 [employer]). [address] [place] [address]. All rights reserved. This information is not intended as a substitute for professional medical care. Always follow your healthcare professional's instructions.               Eliazar Boland MD  [facility]

## 2020-07-24 ENCOUNTER — OFFICE VISIT (OUTPATIENT)
Dept: FAMILY MEDICINE | Facility: CLINIC | Age: 65
End: 2020-07-24
Payer: MEDICARE

## 2020-07-24 VITALS
HEART RATE: 65 BPM | SYSTOLIC BLOOD PRESSURE: 128 MMHG | DIASTOLIC BLOOD PRESSURE: 84 MMHG | WEIGHT: 218 LBS | RESPIRATION RATE: 20 BRPM | BODY MASS INDEX: 36.32 KG/M2 | HEIGHT: 65 IN | TEMPERATURE: 97.8 F | OXYGEN SATURATION: 96 %

## 2020-07-24 DIAGNOSIS — L03.116 CELLULITIS OF LEFT LOWER EXTREMITY: Primary | ICD-10-CM

## 2020-07-24 PROCEDURE — 99213 OFFICE O/P EST LOW 20 MIN: CPT | Performed by: FAMILY MEDICINE

## 2020-07-24 RX ORDER — DOXYCYCLINE HYCLATE 100 MG
100 TABLET ORAL 2 TIMES DAILY
Qty: 20 TABLET | Refills: 0 | Status: SHIPPED | OUTPATIENT
Start: 2020-07-24 | End: 2020-08-03

## 2020-07-24 ASSESSMENT — MIFFLIN-ST. JEOR: SCORE: 1524.72

## 2020-07-24 NOTE — NURSING NOTE
"Chief Complaint   Patient presents with     Insect Bite       Initial /84   Pulse 65   Temp 97.8  F (36.6  C) (Tympanic)   Resp 20   Ht 1.651 m (5' 5\")   Wt 98.9 kg (218 lb)   SpO2 96%   BMI 36.28 kg/m   Estimated body mass index is 36.28 kg/m  as calculated from the following:    Height as of this encounter: 1.651 m (5' 5\").    Weight as of this encounter: 98.9 kg (218 lb).    Patient presents to the clinic using No DME    Health Maintenance that is potentially due pending provider review:  Mammogram    Pt is already scheduled for mammogram. 08/03/2020    Is there anyone who you would like to be able to receive your results? No  If yes have patient fill out SOHAM    "

## 2020-07-24 NOTE — PATIENT INSTRUCTIONS
Patient Education     Cellulitis  Cellulitis is an infection of the deep layers of skin. A break in the skin, such as a cut or scratch, can let bacteria under the skin. If the bacteria get to deep layers of the skin, it can be serious. If not treated, cellulitis can get into the bloodstream and lymph nodes. The infection can then spread throughout the body. This causes serious illness.  Cellulitis causes the affected skin to become red, swollen, warm, and sore. The reddened areas have a visible border. An open sore may leak fluid (pus). You may have a fever, chills, and pain.  Cellulitis is treated with antibiotics taken for 7 to 10 days. An open sore may be cleaned and covered with cool wet gauze. Symptoms should get better 1 to 2 days after treatment is started. Make sure to take all the antibiotics for the full number of days until they are gone. Keep taking the medicine even if your symptoms go away.  Home care  Follow these tips:    Limit the use of the part of your body with cellulitis.     If the infection is on your leg, keep your leg raised while sitting. This will help to reduce swelling.    Take all of the antibiotic medicine exactly as directed until it is gone. Do not miss any doses, especially during the first 7 days. Don t stop taking the medicine when your symptoms get better.    Keep the affected area clean and dry.    Wash your hands with soap and warm water before and after touching your skin. Anyone else who touches your skin should also wash his or her hands. Don't share towels.  Follow-up care  Follow up with your healthcare provider, or as advised. If your infection does not go away on the first antibiotic, your healthcare provider will prescribe a different one.  When to seek medical advice  Call your healthcare provider right away if any of these occur:    Red areas that spread    Swelling or pain that gets worse    Fluid leaking from the skin (pus)    Fever higher of 100.4  F (38.0  C) or  higher after 2 days on antibiotics  Date Last Reviewed: 9/1/2016 2000-2019 [employer]. [address] [place] [address], [place] [address]. All rights reserved. This information is not intended as a substitute for professional medical care. Always follow your healthcare professional's instructions.

## 2020-07-29 NOTE — TELEPHONE ENCOUNTER
Recall letter has been mailed to the patient reminding pt that she is due for a follow up visit  with  and chest CT scan.     Patient has been removed from the pulmonary recall list.      Brandon Sheppard LPN    Rheumatology / Pulmonology  [facility]

## 2020-08-03 ENCOUNTER — ANCILLARY PROCEDURE (OUTPATIENT)
Dept: MAMMOGRAPHY | Facility: CLINIC | Age: 65
End: 2020-08-03
Payer: MEDICARE

## 2020-08-03 DIAGNOSIS — Z12.31 VISIT FOR SCREENING MAMMOGRAM: ICD-10-CM

## 2020-08-03 DIAGNOSIS — J45.50 SEVERE PERSISTENT ASTHMA WITHOUT COMPLICATION (H): ICD-10-CM

## 2020-08-03 PROCEDURE — 77067 SCR MAMMO BI INCL CAD: CPT | Mod: TC

## 2020-08-03 PROCEDURE — 77063 BREAST TOMOSYNTHESIS BI: CPT | Mod: TC

## 2020-08-03 NOTE — TELEPHONE ENCOUNTER
Requested Prescriptions   Pending Prescriptions Disp Refills     fluticasone-vilanterol (BREO ELLIPTA) 200-25 MCG/INH inhaler 1 Inhaler 1     Sig: Inhale 1 puff into the lungs daily       There is no refill protocol information for this order      Last Written Prescription Date:    Last Fill Quantity: ,  # refills:    Last office visit: 5/28/2019 with prescribing provider:     Future Office Visit:

## 2020-08-03 NOTE — TELEPHONE ENCOUNTER
Prescription approved per [facility] Refill Protocol.    Praveena Valentino RN

## 2020-08-18 ENCOUNTER — HOSPITAL ENCOUNTER (OUTPATIENT)
Dept: BONE DENSITY | Facility: CLINIC | Age: 65
Discharge: HOME | End: 2020-08-18
Attending: PHYSICIAN ASSISTANT | Admitting: PHYSICIAN ASSISTANT
Payer: MEDICARE

## 2020-08-18 DIAGNOSIS — Z78.0 POST-MENOPAUSAL: ICD-10-CM

## 2020-08-18 PROCEDURE — 77080 DXA BONE DENSITY AXIAL: CPT

## 2020-08-19 PROBLEM — M85.80 OSTEOPENIA: Status: ACTIVE | Noted: 2020-08-19

## 2020-08-31 DIAGNOSIS — I10 BENIGN ESSENTIAL HYPERTENSION: ICD-10-CM

## 2020-08-31 DIAGNOSIS — J45.50 SEVERE PERSISTENT ASTHMA WITHOUT COMPLICATION (H): ICD-10-CM

## 2020-08-31 NOTE — TELEPHONE ENCOUNTER
Prescription approved per [facility] Refill Protocol.  Virtual visit on 5/1/2020, 6 month follow-up    Catalina Bragg RN

## 2020-08-31 NOTE — TELEPHONE ENCOUNTER
Requested Prescriptions   Pending Prescriptions Disp Refills     tiotropium (SPIRIVA RESPIMAT) 1.25 MCG/ACT inhaler 1 Inhaler 1     Sig: Inhale 2 puffs into the lungs daily       There is no refill protocol information for this order        Last office visit: 5/28/2019 with prescribing provider:  Dr. Montano   Future Office Visit:          Denise Behrendt  Specialty CSS

## 2020-09-01 RX ORDER — HYDROCHLOROTHIAZIDE 12.5 MG/1
TABLET ORAL
Qty: 90 TABLET | Refills: 3 | Status: SHIPPED | OUTPATIENT
Start: 2020-09-01 | End: 2021-09-10

## 2020-10-13 ENCOUNTER — VIRTUAL VISIT (OUTPATIENT)
Dept: FAMILY MEDICINE | Facility: OTHER | Age: 65
End: 2020-10-13
Payer: COMMERCIAL

## 2020-10-13 PROCEDURE — 99421 OL DIG E/M SVC 5-10 MIN: CPT | Performed by: FAMILY MEDICINE

## 2020-10-13 NOTE — PROGRESS NOTES
"Date: 10/13/2020 10:57:37  Clinician: Tre Kinsey  Clinician NPI: [id number]  Patient: Luz Elena Cristina  Patient : 1955  Patient Address: [address] [place] [address]  Patient Phone: [phone]  Visit Protocol: URI  Patient Summary:  Luz Elena is a 65 year old ( : 1955 ) female who initiated a [facility] Visit for COVID-19 (Coronavirus) evaluation and screening. When asked the question \"Please sign me up to receive news, health information and promotions from [facility].\", Luz Elena responded \"No\".    Luz Elena states her symptoms started suddenly 7-9 days ago.   Her symptoms consist of a headache, a cough, nasal congestion, anosmia, and ageusia. She is experiencing difficulty breathing due to nasal congestion but she is not short of breath.   Symptom details     Nasal secretions: The color of her mucus is white, yellow, and green.    Cough: Luz Elena coughs a few times an hour and her cough is not more bothersome at night. Phlegm comes into her throat when she coughs. She believes her cough is caused by post-nasal drip. The color of the phlegm is white, green, and yellow.     Headache: She states the headache is mild (1-3 on a 10 point pain scale).      Luz Elena denies having ear pain, wheezing, fever, enlarged lymph nodes, vomiting, rhinitis, nausea, facial pain or pressure, myalgias, chills, malaise, sore throat, teeth pain, and diarrhea. She also denies double sickening (worsening symptoms after initial improvement), having a sinus infection within the past year, taking antibiotic medication in the past month, and having recent facial or sinus surgery in the past 60 days.   Precipitating events  She has not recently been exposed to someone with influenza. Luz Elena has been in close contact with the following high risk individuals: adults 65 or older and people with asthma, heart disease or diabetes.   Pertinent COVID-19 (Coronavirus) information  In the past 14 days, Luz Elena has not worked in a " congregate living setting.   She does not work or volunteer as healthcare worker or a  and does not work or volunteer in a healthcare facility.   Luz Elena also has not lived in a congregate living setting in the past 14 days. She does not live with a healthcare worker.   Luz Elena has not had a close contact with a laboratory-confirmed COVID-19 patient within 14 days of symptom onset.   Since December 2019, Luz Elena and has had upper respiratory infection (URI) or influenza-like illness. Has not been diagnosed with lab-confirmed COVID-19 test      Date(s) of previous URI or influenza-like illness (free-text): end of February     Symptoms Luz Elena experienced during previous URI or influenza-like illness as reported by the patient (free-text): flu, fever, vomiting, diarrhea        Pertinent medical history  Luz Elena does not get yeast infections when she takes antibiotics.   Luz Elena does not need a return to work/school note.   Weight: 218 lbs   Luz Elena does not smoke or use smokeless tobacco.   Additional information as reported by the patient (free text): I am living with a 73 year old male showing same symptoms but has not loss sense of smell   Weight: 218 lbs    MEDICATIONS: Spiriva Respimat inhalation, Breo Ellipta inhalation, hydrochlorothiazide oral, ALLERGIES: Sulfa (Sulfonamide Antibiotics)  Clinician Response:  Dear Luz Elena,   Your symptoms show that you may have coronavirus (COVID-19). This illness can cause fever, cough and trouble breathing. Many people get a mild case and get better on their own. Some people can get very sick.  What should I do?  We would like to test you for this virus.   1. Please call [phone] to schedule your visit. Explain that you were referred by [facility] to have a COVID-19 test. Be ready to share your [facility] visit ID number.  The following will serve as your written order for this COVID Test, ordered by me, for the indication of suspected COVID [Z20.828]: The test  "will be ordered in [facility], our electronic health record, after you are scheduled. It will show as ordered and authorized by Jon Lorenzo MD.  Order: COVID-19 (Coronavirus) PCR for SYMPTOMATIC testing from [facility].      2. When it's time for your COVID test:  Stay at least 6 feet away from others. (If someone will drive you to your test, stay in the backseat, as far away from the  as you can.)   Cover your mouth and nose with a mask, tissue or washcloth.  Go straight to the testing site. Don't make any stops on the way there or back.      3.Starting now: Stay home and away from others (self-isolate) until:   You've had no fever---and no medicine that reduces fever---for one full day (24 hours). And...   Your other symptoms have gotten better. For example, your cough or breathing has improved. And...   At least 10 days have passed since your symptoms started.       During this time, don't leave the house except for testing or medical care.   Stay in your own room, even for meals. Use your own bathroom if you can.   Stay away from others in your home. No hugging, kissing or shaking hands. No visitors.  Don't go to work, school or anywhere else.    Clean \"high touch\" surfaces often (doorknobs, counters, handles, etc.). Use a household cleaning spray or wipes. You'll find a full list of  on the EPA website: www.epa.gov/pesticide-registration/list-n-disinfectants-use-against-sars-cov-2.   Cover your mouth and nose with a mask, tissue or washcloth to avoid spreading germs.  Wash your hands and face often. Use soap and water.  Caregivers in these groups are at risk for severe illness due to COVID-19:  o People 65 years and older  o People who live in a nursing home or long-term care facility  o People with chronic disease (lung, heart, cancer, diabetes, kidney, liver, immunologic)  o People who have a weakened immune system, including those who:   Are in cancer treatment  Take medicine that weakens the immune " system, such as corticosteroids  Had a bone marrow or organ transplant  Have an immune deficiency  Have poorly controlled HIV or AIDS  Are obese (body mass index of 40 or higher)  Smoke regularly   o Caregivers should wear gloves while washing dishes, handling laundry and cleaning bedrooms and bathrooms.  o Use caution when washing and drying laundry: Don't shake dirty laundry, and use the warmest water setting that you can.  o For more tips, go to www.cdc.gov/coronavirus/2019-ncov/downloads/10Things.pdf.    4.Sign up for [employer]. We know it's scary to hear that you might have COVID-19. We want to track your symptoms to make sure you're okay over the next 2 weeks. Please look for an email from [employer]---this is a free, online program that we'll use to keep in touch. To sign up, follow the link in the email. Learn more at [email or web address]  How can I take care of myself?   Get lots of rest. Drink extra fluids (unless a doctor has told you not to).   Take Tylenol (acetaminophen) for fever or pain. If you have liver or kidney problems, ask your family doctor if it's okay to take Tylenol.   Adults can take either:    650 mg (two 325 mg pills) every 4 to 6 hours, or...   1,000 mg (two 500 mg pills) every 8 hours as needed.    Note: Don't take more than 3,000 mg in one day. Acetaminophen is found in many medicines (both prescribed and over-the-counter medicines). Read all labels to be sure you don't take too much.   For children, check the Tylenol bottle for the right dose. The dose is based on the child's age or weight.    If you have other health problems (like cancer, heart failure, an organ transplant or severe kidney disease): Call your specialty clinic if you don't feel better in the next 2 days.       Know when to call 911. Emergency warning signs include:    Trouble breathing or shortness of breath Pain or pressure in the chest that doesn't go away Feeling confused like you haven't felt  before, or not being able to wake up Bluish-colored lips or face.  Where can I get more information?   [facility] -- About COVID-19: [email or web address]   CDC -- What to Do If You're Sick: www.cdc.gov/coronavirus/2019-ncov/about/steps-when-sick.html   CDC -- Ending Home Isolation: www.cdc.gov/coronavirus/2019-ncov/hcp/disposition-in-home-patients.html   [facility] -- Caring for Someone: www.cdc.gov/coronavirus/2019-ncov/if-you-are-sick/care-for-someone.html   [facility] -- Interim Guidance for Hospital Discharge to Home: www.[facility].mn.us/diseases/coronavirus/hcp/hospdischarge.pdf   [facility] clinical trials (COVID-19 research studies): clinicalaffairs.[facility]-clinical-trials    Below are the COVID-19 hotlines at the [facility] ([facility]). Interpreters are available.    For health questions: Call 332-503-9689 or 1-207.841.2761 (7 a.m. to 7 p.m.) For questions about schools and childcare: Call 279-520-7304 or 1-829.400.2648 (7 a.m. to 7 p.m.)    Diagnosis: Acute upper respiratory infection, unspecified  Diagnosis ICD: J06.9

## 2020-10-14 ENCOUNTER — AMBULATORY - HEALTHEAST (OUTPATIENT)
Dept: FAMILY MEDICINE | Facility: CLINIC | Age: 65
End: 2020-10-14

## 2020-10-14 DIAGNOSIS — Z20.822 SUSPECTED COVID-19 VIRUS INFECTION: ICD-10-CM

## 2020-10-15 ENCOUNTER — AMBULATORY - HEALTHEAST (OUTPATIENT)
Dept: FAMILY MEDICINE | Facility: CLINIC | Age: 65
End: 2020-10-15

## 2020-10-15 DIAGNOSIS — Z20.822 SUSPECTED COVID-19 VIRUS INFECTION: ICD-10-CM

## 2020-10-17 ENCOUNTER — COMMUNICATION - HEALTHEAST (OUTPATIENT)
Dept: SCHEDULING | Facility: CLINIC | Age: 65
End: 2020-10-17

## 2020-10-17 ENCOUNTER — COMMUNICATION - HEALTHEAST (OUTPATIENT)
Dept: LAB | Facility: CLINIC | Age: 65
End: 2020-10-17

## 2020-10-27 ENCOUNTER — TELEPHONE (OUTPATIENT)
Dept: ALLERGY | Facility: CLINIC | Age: 65
End: 2020-10-27

## 2020-10-27 DIAGNOSIS — J45.50 SEVERE PERSISTENT ASTHMA WITHOUT COMPLICATION (H): ICD-10-CM

## 2020-10-27 NOTE — TELEPHONE ENCOUNTER
Central Prior Authorization Team   Phone: [phone]      PA Initiation    Medication: tiotropium (SPIRIVA RESPIMAT) 1.25 MCG/ACT inhaler  Insurance Company: [employer] [facility] - Phone [phone] Fax [phone]  Pharmacy Filling the Rx: [id number] - [place] [id number] [address]  Filling Pharmacy Phone: [phone]  Filling Pharmacy Fax:    Start Date: 10/27/2020

## 2020-10-27 NOTE — TELEPHONE ENCOUNTER
Prescription approved per [facility] Refill Protocol.    Praveena VALE RN  Specialty/Allergy Clinics

## 2020-10-27 NOTE — TELEPHONE ENCOUNTER
Prior Authorization Approval    Authorization Effective Date: 7/29/2020  Authorization Expiration Date: 10/27/2021  Medication: tiotropium (SPIRIVA RESPIMAT) 1.25 MCG/ACT inhaler  Approved Dose/Quantity:    Reference #:     Insurance Company: [employer] - Phone [phone] Fax [phone]  Expected CoPay:       CoPay Card Available:      Foundation Assistance Needed:    Which Pharmacy is filling the prescription (Not needed for infusion/clinic administered): [id number] - [place] [id number] [facility] [address]  Pharmacy Notified: Yes  Patient Notified: Yes  **Instructed pharmacy to notify patient when script is ready to /ship.**

## 2020-10-27 NOTE — TELEPHONE ENCOUNTER
Prior Authorization Retail Medication Request    Medication/Dose: tiotropium (SPIRIVA RESPIMAT) 1.25 MCG/ACT inhaler  ICD code (if different than what is on RX):    Previously Tried and Failed:    Rationale:      Insurance Name:  Medicare  Insurance ID:  [id number]       Pharmacy Information (if different than what is on RX)  Name:  Angela  Phone:  [phone]

## 2020-12-04 DIAGNOSIS — J45.50 SEVERE PERSISTENT ASTHMA WITHOUT COMPLICATION (H): ICD-10-CM

## 2020-12-04 NOTE — TELEPHONE ENCOUNTER
Routing refill request to provider for review/approval because:  No current ACT on file. Pt was last seen 5/1/2020 virtually. Follow up in 6 months or sooner if needed. No appointment scheduled at this time.     Praveena VALE RN  Specialty/Allergy Clinics

## 2020-12-04 NOTE — TELEPHONE ENCOUNTER
Requested Prescriptions   Pending Prescriptions Disp Refills     fluticasone-vilanterol (BREO ELLIPTA) 200-25 MCG/INH inhaler 1 Inhaler 1     Sig: Inhale 1 puff into the lungs daily       There is no refill protocol information for this order        Last Written Prescription Date:    Last Fill Quantity: ,  # refills:    Last office visit: 5/28/2019 with prescribing provider:  Dusty Coker Office Visit:

## 2020-12-10 ENCOUNTER — HOSPITAL ENCOUNTER (OUTPATIENT)
Dept: CT IMAGING | Facility: CLINIC | Age: 65
Discharge: HOME | End: 2020-12-10
Attending: INTERNAL MEDICINE | Admitting: INTERNAL MEDICINE
Payer: MEDICARE

## 2020-12-10 DIAGNOSIS — R91.8 PULMONARY NODULES: ICD-10-CM

## 2020-12-10 PROCEDURE — 71250 CT THORAX DX C-: CPT

## 2021-01-25 ENCOUNTER — VIRTUAL VISIT (OUTPATIENT)
Dept: PULMONOLOGY | Facility: CLINIC | Age: 66
End: 2021-01-25
Payer: MEDICARE

## 2021-01-25 DIAGNOSIS — R91.8 PULMONARY NODULES: Primary | ICD-10-CM

## 2021-01-25 PROCEDURE — 99214 OFFICE O/P EST MOD 30 MIN: CPT | Mod: 95 | Performed by: INTERNAL MEDICINE

## 2021-01-25 NOTE — PROGRESS NOTES
"LUNG NODULE & INTERVENTIONAL PULMONARY CLINIC  CLINICS & SURGERY CENTER, [facility], [facility]   VIDEO VISIT    Luz Elena Cristina MRN# [id number]   Age: 65 year old YOB: 1955     Reason for Consultation: lung nodule(s)    Requesting Physician: No referring provider defined for this encounter.       The patient has been notified of following:   \"This video visit will be conducted via a call between you and your physician/provider. We have found that certain health care needs can be provided without the need for an in-person physical exam.  This service lets us provide the care you need with a video conversation.  If a prescription is necessary we can send it directly to your pharmacy.  If lab work is needed we can place an order for that and you can then stop by our lab to have the test done at a later time.  Video visits are billed at different rates depending on your insurance coverage.  Please reach out to your insurance provider with any questions.  If during the course of the call the physician/provider feels a video visit is not appropriate, you will not be charged for this service.\"  Patient has given verbal consent for Video visit? Yes  How would you like to obtain your AVS? Please refer to rooming staff note  Patient would like the video invitation sent by: Please refer to rooming staff note   Will anyone else be joining your video visit? Please refer to rooming staff note       Video-Visit Details     Type of service:  Video Visit  Video Start Time: 130pm   Video End Time: 145pm  Originating Location (pt. Location): Home  Distant Location (provider location): Home/Clinic  Platform used for Video Visit: [facility]     Prince Manzanares MD      Assessment and Plan:    1. Established multiple pulmonary lung nodule(s). Near resolution of large nodules and likely related to infection/inflammation. No further surveillance indicated by CT.      2. Asthma. No recent " exacerbations and stable on inhalers.      Billing: I spent more than 30 minutes face to face and greater than 50% of time was for counseling and coordination of care about the issues above.     Prince Manzanares MD   of Medicine  Interventional Pulmonology  Department of Pulmonary, Allergy, Critical Care and Sleep Medicine   [facility]  Pager: [phone]           History:      Luz Elena Cristina is a 64 year old female with sig h/o for asthma, HTN, NEGRA who is here for evaluation/followup of lung nodule(s).     - No new resp sx or complaints. Denies dyspnea or cough. Getting over the flu  - here for followup of nodules  - Personal hx of cancer: No cancer. Up-to-date on mammo and c-scope  - Family hx of cancer: No lung cancer.   - Exposure hx: Denies asbestos or radon exposure   - Tobacco hx: Past Smoker: 2ppd for 15years. Quit 33yrs ago.   - My interpretation of the images relevant for this visit includes: nodules   - My interpretation of the PFT's relevant for this visit includes: Obstructive pattern      Culprit Nodule(s):   Multiple bilateral lung nodule(s) that are sub 13 mm. First seen by chest CT on 2/28/19. Prevoius RUL nodule is resolved. Near resolution of the nodules.     Other active medical problems include:   - Has NEGRA. On CPAP  - Has asthma. On breo and spiriva.    - Has HTN. Stable.             Past Medical History:      Past Medical History:   Diagnosis Date     Hypertension      Rheumatic fever      Sleep apnea      Uncomplicated asthma              Past Surgical History:      Past Surgical History:   Procedure Laterality Date     BIOPSY      breast,     COLONOSCOPY       COLONOSCOPY N/A 2/8/2019    Procedure: Combined Colonoscopy, Single Or Multiple Biopsy/Polypectomy By Biopsy;  Surgeon: José Miguel Ochoa MD;  Location: [facility] OR     COLONOSCOPY WITH CO2 INSUFFLATION N/A 2/8/2019    Procedure: COLONOSCOPY WITH CO2 INSUFFLATION;  Surgeon: Don  José Miguel Hansen MD;  Location: [facility] OR     COMBINED ESOPHAGOSCOPY, GASTROSCOPY, DUODENOSCOPY (EGD) WITH CO2 INSUFFLATION N/A 2019    Procedure: COMBINED ESOPHAGOSCOPY, GASTROSCOPY, DUODENOSCOPY (EGD) WITH CO2 INSUFFLATION;  Surgeon: José Miguel Ochoa MD;  Location: [facility] OR     ESOPHAGOSCOPY, GASTROSCOPY, DUODENOSCOPY (EGD), COMBINED N/A 2019    Procedure: Combined Esophagoscopy, Gastroscopy, Duodenoscopy (Egd), Biopsy Single Or Multiple;  Surgeon: José Miguel Ochoa MD;  Location: [facility] OR     GYN SURGERY       TONSILLECTOMY              Social History:     Social History     Tobacco Use     Smoking status: Former Smoker     Packs/day: 2.00     Years: 20.00     Pack years: 40.00     Types: Cigarettes     Quit date: 1987     Years since quittin.7     Smokeless tobacco: Never Used   Substance Use Topics     Alcohol use: Yes     Comment: 1 nightly            Family History:     Family History   Problem Relation Age of Onset     Other Cancer Mother         panreatic     Diabetes Mother         unsure type; in 40s     Breast Cancer Sister      Skin Cancer Sister      Cerebrovascular Disease Father         CVA     Chronic Obstructive Pulmonary Disease Father      Skin Cancer Father      Aneurysm Paternal Grandmother      Unknown/Adopted Paternal Grandfather      Alzheimer Disease Brother         early onset     Diabetes Son         type 1     Thyroid Disease Son      Arthritis Brother      Stomach Problem Son         polyps, diverticulitis, heartburn, passes blood     Alcoholism Son      Breast Cancer Paternal Aunt              Allergies:      Allergies   Allergen Reactions     Cartia Xt [Diltiazem] Other (See Comments)     Heartburn whenever on this medication     Losartan Cough     Sulfa Drugs Rash            Medications:     Current Outpatient Medications   Medication Sig     albuterol (2.5 MG/3ML) 0.083% neb solution Take 1 vial (2.5 mg) by nebulization every 4 hours as needed for  shortness of breath / dyspnea, wheezing or other (persistent cough)     albuterol (PROAIR HFA/PROVENTIL HFA/VENTOLIN HFA) 108 (90 Base) MCG/ACT inhaler Inhale 2-4 puffs into the lungs every 4 hours as needed for shortness of breath / dyspnea or wheezing     azelastine (ASTELIN) 0.1 % nasal spray Spray 2 sprays into both nostrils 2 times daily as needed for rhinitis or allergies     calcipotriene (DOVONOX) 0.005 % external cream Apply twice daily to affected area on body as needed.     clobetasol (TEMOVATE) 0.05 % external cream Apply sparingly to affected area twice weekly as needed.  Do not apply to face.     clobetasol (TEMOVATE) 0.05 % external ointment Apply sparingly daily as needed to affected area on groin.     fluocinonide (LIDEX) 0.05 % external gel Apply topically 2 times daily As needed for mouth sores     fluticasone-vilanterol (BREO ELLIPTA) 200-25 MCG/INH inhaler Inhale 1 puff into the lungs daily     hydrochlorothiazide (HYDRODIURIL) 12.5 MG tablet TAKE 1 TABLET(12.5 MG) BY MOUTH DAILY     Respiratory Therapy Supplies (NEBULIZER/TUBING/MOUTHPIECE) KIT Use with albuterol neb solution     tiotropium (SPIRIVA RESPIMAT) 1.25 MCG/ACT inhaler Inhale 2 puffs into the lungs daily     pilocarpine (SALAGEN) 5 MG tablet Take 1 tablet (5 mg) by mouth 4 times daily as needed (dry mouth) (Patient not taking: Reported on 6/24/2020)     No current facility-administered medications for this visit.             Review of Systems:     CONSTITUTIONAL: negative for fever, chills, change in weight  INTEGUMENTARY/SKIN: no rash or obvious new lesions  ENT/MOUTH: no sore throat, new sinus pain or nasal drainage  RESP: see interval history  CV: negative for chest pain, palpitations or peripheral edema  GI: no nausea, vomiting, change in stools  : no dysuria  MUSCULOSKELETAL: no myalgias, arthralgias  ENDOCRINE: blood sugars with adequate control  PSYCHIATRIC: mood stable  LYMPHATIC: no new lymphadenopathy  HEME: no bleeding  or easy bruisability  NEURO: no numbness, weakness, headaches         Physical Exam:     Constitutional - looks well, in no apparent distress  Eyes - no redness or discharge  Respiratory -breathing appears comfortable.  No cough.  Skin - No appreciable discoloration or lesions (very limited exam)  Neurological - No apparent tremors. Speech fluent and articlate  Psychiatric - no signs of delirium or anxiety     Exam limited to that easily identified on a virtual visit. The rest of a comprehensive physical examination is deferred due to PHE (public health emergency) video visit restrictions.         Current Laboratory Data:   All laboratory and imaging data reviewed.           Previous Cardiology Imaging   No results found for this or any previous visit (from the past 8760 hour(s)).

## 2021-01-25 NOTE — PROGRESS NOTES
Gigi is a 65 year old who is being evaluated via a billable video visit.      How would you like to obtain your AVS? MyChart  If the video visit is dropped, the invitation should be resent by: Text to cell phone: [phone] (H)  Will anyone else be joining your video visit? No        Brandon Sheppard LPN  Pulmonary Division:  [facility]  Phone: [phone] Fax: [phone]            Video Start Time: 125  Video-Visit Details    Type of service:  Video Visit    Video End Time:1:33 PM    Originating Location (pt. Location): Home    Distant Location (provider location):  [facility]     Platform used for Video Visit: Ronnie

## 2021-01-27 ENCOUNTER — OFFICE VISIT (OUTPATIENT)
Dept: FAMILY MEDICINE | Facility: CLINIC | Age: 66
End: 2021-01-27
Payer: MEDICARE

## 2021-01-27 VITALS
OXYGEN SATURATION: 96 % | RESPIRATION RATE: 20 BRPM | TEMPERATURE: 97.9 F | HEIGHT: 65 IN | BODY MASS INDEX: 38.49 KG/M2 | DIASTOLIC BLOOD PRESSURE: 84 MMHG | SYSTOLIC BLOOD PRESSURE: 136 MMHG | WEIGHT: 231 LBS | HEART RATE: 74 BPM

## 2021-01-27 DIAGNOSIS — H93.8X1 EAR PRESSURE, RIGHT: ICD-10-CM

## 2021-01-27 DIAGNOSIS — R51.9 UNILATERAL HEADACHE: Primary | ICD-10-CM

## 2021-01-27 DIAGNOSIS — R12 HEARTBURN: ICD-10-CM

## 2021-01-27 PROCEDURE — 99214 OFFICE O/P EST MOD 30 MIN: CPT | Performed by: PHYSICIAN ASSISTANT

## 2021-01-27 RX ORDER — AMOXICILLIN 875 MG
875 TABLET ORAL 2 TIMES DAILY
Qty: 14 TABLET | Refills: 0 | Status: SHIPPED | OUTPATIENT
Start: 2021-01-27 | End: 2021-02-06

## 2021-01-27 ASSESSMENT — MIFFLIN-ST. JEOR: SCORE: 1583.69

## 2021-01-27 NOTE — PROGRESS NOTES
"  Assessment & Plan     Unilateral headache  Unclear dx  - amoxicillin (AMOXIL) 875 MG tablet; Take 1 tablet (875 mg) by mouth 2 times daily for 10 days    Ear pressure, right  New problem  - amoxicillin (AMOXIL) 875 MG tablet; Take 1 tablet (875 mg) by mouth 2 times daily for 10 days    Heartburn  Famotidine 10    Patient Instructions   Try a day of sudafed   Try treating for a sinus infection - unsure if will benefit  See eye doctor   Call me if symptoms continue, would consider head imaging     Try pepcid (famotidine) in place of ranitidine      Return if symptoms worsen or fail to improve.    TERE Marshall [facility]    Dominick Yu is a 65 year old who presents to clinic today for the following health issues     HPI     Concern - Ear pain  Onset: 2 weeks   Description: Rt ear feels plugged  Intensity: No pain  Progression of Symptoms:  same and constant  Accompanying Signs & Symptoms: Rt sided head pain and eye pressure  Previous history of similar problem: None  Precipitating factors:        Worsened by: Getting angry, watching Television   Alleviating factors:        Improved by: Nothing  Therapies tried and outcome:  IBU and heat with no relief     No recent URI or allergy symptoms   Just started with ear pressure   No jaw symptoms   Pain behind eye and into R frontal parietal- steady pressure. Worse towards end of day.  Slight balance issue momentarily today, hasn't been having issues  No change in memory, personality  HA bothering more than the ear.    Heartburn.        Objective    /84   Pulse 74   Temp 97.9  F (36.6  C) (Tympanic)   Resp 20   Ht 1.651 m (5' 5\")   Wt 104.8 kg (231 lb)   SpO2 96%   BMI 38.44 kg/m    Body mass index is 38.44 kg/m .  Physical Exam   GENERAL: healthy, alert and no distress  HEENT: TM retracted bilaterally, nasal turbinates without mucus, appear dry red and slightly swollen  NECK: no adenopathy, no asymmetry, no " spasms  NEURO: Grossly normal strength and tone, mentation intact and speech normal, PEERLA  MS: No tenderness or ropy palpable artery on temple

## 2021-01-27 NOTE — LETTER
"My Asthma Action Plan    Name: Luz Elena Cristina   YOB: 1955  Date: 1/27/2021   My doctor: Kiara Zazueta PA-C   My clinic: [facility]        My Control Medicine: { :878617}  My Rescue Medicine: { :374072::\"Albuterol (Proair/Ventolin/Proventil HFA) 2-4 puffs EVERY 4 HOURS as needed. Use a spacer if recommended by your provider.\"}  {AAP include Oral Steroid (Optional) :133900} My Asthma Severity:   { :587064}  Know your asthma triggers: { :298211}               GREEN ZONE   Good Control    I feel good    No cough or wheeze    Can work, sleep and play without asthma symptoms       Take your asthma control medicine every day.     1. If exercise triggers your asthma, take your rescue medication    15 minutes before exercise or sports, and    During exercise if you have asthma symptoms  2. Spacer to use with inhaler: If you have a spacer, make sure to use it with your inhaler             YELLOW ZONE Getting Worse  I have ANY of these:    I do not feel good    Cough or wheeze    Chest feels tight    Wake up at night   1. Keep taking your Green Zone medications  2. Start taking your rescue medicine:    every 20 minutes for up to 1 hour. Then every 4 hours for 24-48 hours.  3. If you stay in the Yellow Zone for more than 12-24 hours, contact your doctor.  4. If you do not return to the Green Zone in 12-24 hours or you get worse, start taking your oral steroid medicine if prescribed by your provider.           RED ZONE Medical Alert - Get Help  I have ANY of these:    I feel awful    Medicine is not helping    Breathing getting harder    Trouble walking or talking    Nose opens wide to breathe       1. Take your rescue medicine NOW  2. If your provider has prescribed an oral steroid medicine, start taking it NOW  3. Call your doctor NOW  4. If you are still in the Red Zone after 20 minutes and you have not reached your doctor:    Take your rescue medicine again and    Call " 911 or go to the emergency room right away    See your regular doctor within 2 weeks of an Emergency Room or Urgent Care visit for follow-up treatment.          Annual Reminders:  Meet with Asthma Educator,  Flu Shot in the Fall, consider Pneumonia Vaccination for patients with asthma (aged 19 and older).    Pharmacy:    [id number] - [place], [place] - [address]  [id number] - [place], [place] - [address]    Electronically signed by Kiara Zazueta PA-C   Date: 01/27/21                      Asthma Triggers  How To Control Things That Make Your Asthma Worse    Triggers are things that make your asthma worse.  Look at the list below to help you find your triggers and what you can do about them.  You can help prevent asthma flare-ups by staying away from your triggers.      Trigger                                                          What you can do   Cigarette Smoke  Tobacco smoke can make asthma worse. Do not allow smoking in your home, car or around you.  Be sure no one smokes at a child s day care or school.  If you smoke, ask your health care provider for ways to help you quit.  Ask family members to quit too.  Ask your health care provider for a referral to Quit Plan to help you quit smoking, or call 8-676-692-PLAN.     Colds, Flu, Bronchitis  These are common triggers of asthma. Wash your hands often.  Don t touch your eyes, nose or mouth.  Get a flu shot every year.     Dust Mites  These are tiny bugs that live in cloth or carpet. They are too small to see. Wash sheets and blankets in hot water every week.   Encase pillows and mattress in dust mite proof covers.  Avoid having carpet if you can. If you have carpet, vacuum weekly.   Use a dust mask and HEPA vacuum.   Pollen and Outdoor Mold  Some people are allergic to trees, grass, or weed pollen, or molds. Try to keep your windows closed.  Limit time out doors when pollen count is high.   Ask you health  care provider about taking medicine during allergy season.     Animal Dander  Some people are allergic to skin flakes, urine or saliva from pets with fur or feathers. Keep pets with fur or feathers out of your home.    If you can t keep the pet outdoors, then keep the pet out of your bedroom.  Keep the bedroom door closed.  Keep pets off cloth furniture and away from stuffed toys.     Mice, Rats, and Cockroaches   Some people are allergic to the waste from these pests.   Cover food and garbage.  Clean up spills and food crumbs.  Store grease in the refrigerator.   Keep food out of the bedroom.   Indoor Mold  This can be a trigger if your home has high moisture. Fix leaking faucets, pipes, or other sources of water.   Clean moldy surfaces.  Dehumidify basement if it is damp and smelly.   Smoke, Strong Odors, and Sprays  These can reduce air quality. Stay away from strong odors and sprays, such as perfume, powder, hair spray, paints, smoke incense, paint, cleaning products, candles and new carpet.   Exercise or Sports  Some people with asthma have this trigger. Be active!  Ask your doctor about taking medicine before sports or exercise to prevent symptoms.    Warm up for 5-10 minutes before and after sports or exercise.     Other Triggers of Asthma  Cold air:  Cover your nose and mouth with a scarf.  Sometimes laughing or crying can be a trigger.  Some medicines and food can trigger asthma.

## 2021-01-27 NOTE — PATIENT INSTRUCTIONS
Try a day of sudafed   Try treating for a sinus infection - unsure if will benefit  See eye doctor   Call me if symptoms continue, would consider head imaging     Try pepcid (famotidine) in place of ranitidine

## 2021-01-27 NOTE — NURSING NOTE
"Chief Complaint   Patient presents with     Ear Problem       Initial /84   Pulse 74   Temp 97.9  F (36.6  C) (Tympanic)   Resp 20   Ht 1.651 m (5' 5\")   Wt 104.8 kg (231 lb)   SpO2 96%   BMI 38.44 kg/m   Estimated body mass index is 38.44 kg/m  as calculated from the following:    Height as of this encounter: 1.651 m (5' 5\").    Weight as of this encounter: 104.8 kg (231 lb).    Patient presents to the clinic using No DME    Health Maintenance that is potentially due pending provider review:  ACT    Done today.    Is there anyone who you would like to be able to receive your results? No  If yes have patient fill out SOHAM    "

## 2021-01-28 ASSESSMENT — ASTHMA QUESTIONNAIRES: ACT_TOTALSCORE: 23

## 2021-02-17 ENCOUNTER — OFFICE VISIT (OUTPATIENT)
Dept: DERMATOLOGY | Facility: CLINIC | Age: 66
End: 2021-02-17
Payer: MEDICARE

## 2021-02-17 VITALS — SYSTOLIC BLOOD PRESSURE: 146 MMHG | OXYGEN SATURATION: 99 % | HEART RATE: 70 BPM | DIASTOLIC BLOOD PRESSURE: 82 MMHG

## 2021-02-17 DIAGNOSIS — L82.0 INFLAMED SEBORRHEIC KERATOSIS: Primary | ICD-10-CM

## 2021-02-17 DIAGNOSIS — L81.4 LENTIGO: ICD-10-CM

## 2021-02-17 DIAGNOSIS — L82.1 SEBORRHEIC KERATOSIS: ICD-10-CM

## 2021-02-17 PROCEDURE — 99213 OFFICE O/P EST LOW 20 MIN: CPT | Mod: 25 | Performed by: PHYSICIAN ASSISTANT

## 2021-02-17 PROCEDURE — 17110 DESTRUCTION B9 LES UP TO 14: CPT | Performed by: PHYSICIAN ASSISTANT

## 2021-02-17 NOTE — LETTER
2/17/2021         RE: Luz Elena Cristina  [address] [facility] [address]        Dear Colleague,    Thank you for referring your patient, Luz Elena Cristina, to the [facility]. Please see a copy of my visit note below.    Luz Elena Cristina is an extremely pleasant 65 year old year old female patient here today for spot on back. She notes area is new and will rub on clothing. Patient has no other skin complaints today.  Remainder of the HPI, Meds, PMH, Allergies, FH, and SH was reviewed in chart.    Pertinent Hx:   No personal history of skin cancer.   Past Medical History:   Diagnosis Date     Hypertension      Rheumatic fever      Sleep apnea      Uncomplicated asthma        Past Surgical History:   Procedure Laterality Date     BIOPSY      breast,     COLONOSCOPY       COLONOSCOPY N/A 2/8/2019    Procedure: Combined Colonoscopy, Single Or Multiple Biopsy/Polypectomy By Biopsy;  Surgeon: José Miguel Ochoa MD;  Location: [facility]     COLONOSCOPY WITH CO2 INSUFFLATION N/A 2/8/2019    Procedure: COLONOSCOPY WITH CO2 INSUFFLATION;  Surgeon: José Miguel Ochoa MD;  Location: [facility]     COMBINED ESOPHAGOSCOPY, GASTROSCOPY, DUODENOSCOPY (EGD) WITH CO2 INSUFFLATION N/A 2/8/2019    Procedure: COMBINED ESOPHAGOSCOPY, GASTROSCOPY, DUODENOSCOPY (EGD) WITH CO2 INSUFFLATION;  Surgeon: José Miguel Ochoa MD;  Location: [facility]     ESOPHAGOSCOPY, GASTROSCOPY, DUODENOSCOPY (EGD), COMBINED N/A 2/8/2019    Procedure: Combined Esophagoscopy, Gastroscopy, Duodenoscopy (Egd), Biopsy Single Or Multiple;  Surgeon: José Miguel Ochoa MD;  Location: [facility]     GYN SURGERY       TONSILLECTOMY          Family History   Problem Relation Age of Onset     Other Cancer Mother         panreatic     Diabetes Mother         unsure type; in 40s     Breast Cancer Sister      Skin Cancer Sister      Cerebrovascular Disease Father         CVA     Chronic Obstructive Pulmonary Disease Father       Skin Cancer Father      Aneurysm Paternal Grandmother      Unknown/Adopted Paternal Grandfather      Alzheimer Disease Brother         early onset     Diabetes Son         type 1     Thyroid Disease Son      Arthritis Brother      Stomach Problem Son         polyps, diverticulitis, heartburn, passes blood     Alcoholism Son      Breast Cancer Paternal Aunt        Social History     Socioeconomic History     Marital status:      Spouse name: Not on file     Number of children: Not on file     Years of education: Not on file     Highest education level: Not on file   Occupational History     Comment: Retired   Social Needs     Financial resource strain: Not on file     Food insecurity     Worry: Not on file     Inability: Not on file     Transportation needs     Medical: Not on file     Non-medical: Not on file   Tobacco Use     Smoking status: Former Smoker     Packs/day: 2.00     Years: 20.00     Pack years: 40.00     Types: Cigarettes     Quit date: 1987     Years since quittin.8     Smokeless tobacco: Never Used   Substance and Sexual Activity     Alcohol use: Yes     Comment: 1 nightly     Drug use: No     Sexual activity: Not Currently     Partners: Male     Birth control/protection: Post-menopausal   Lifestyle     Physical activity     Days per week: Not on file     Minutes per session: Not on file     Stress: Not on file   Relationships     Social connections     Talks on phone: Not on file     Gets together: Not on file     Attends [employer] service: Not on file     Active member of club or organization: Not on file     Attends meetings of clubs or organizations: Not on file     Relationship status: Not on file     Intimate partner violence     Fear of current or ex partner: Not on file     Emotionally abused: Not on file     Physically abused: Not on file     Forced sexual activity: Not on file   Other Topics Concern     Parent/sibling w/ CABG, MI or angioplasty before 65F 55M? No    Social History Narrative    May 1, 2020        ENVIRONMENTAL HISTORY: [place] home is heated with a forced air but does not use.  Has floor heating. They do have central air conditioning. The patient's bedroom is furnished with carpeting in bedroom.  No pets inside the house. But is exposed to chicken. There is no history of cockroach or mice infestation. There are no smokers in the house.  The house does not have a damp basement.        Outpatient Encounter Medications as of 2/17/2021   Medication Sig Dispense Refill     albuterol (2.5 MG/3ML) 0.083% neb solution Take 1 vial (2.5 mg) by nebulization every 4 hours as needed for shortness of breath / dyspnea, wheezing or other (persistent cough) 50 vial 1     albuterol (PROAIR HFA/PROVENTIL HFA/VENTOLIN HFA) 108 (90 Base) MCG/ACT inhaler Inhale 2-4 puffs into the lungs every 4 hours as needed for shortness of breath / dyspnea or wheezing 1 Inhaler 3     azelastine (ASTELIN) 0.1 % nasal spray Spray 2 sprays into both nostrils 2 times daily as needed for rhinitis or allergies 30 mL 3     calcipotriene (DOVONOX) 0.005 % external cream Apply twice daily to affected area on body as needed. 60 g 4     clobetasol (TEMOVATE) 0.05 % external cream Apply sparingly to affected area twice weekly as needed.  Do not apply to face. 60 g 3     clobetasol (TEMOVATE) 0.05 % external ointment Apply sparingly daily as needed to affected area on groin. 30 g 4     fluocinonide (LIDEX) 0.05 % external gel Apply topically 2 times daily As needed for mouth sores 15 g 0     fluticasone-vilanterol (BREO ELLIPTA) 200-25 MCG/INH inhaler Inhale 1 puff into the lungs daily 1 Inhaler 3     hydrochlorothiazide (HYDRODIURIL) 12.5 MG tablet TAKE 1 TABLET(12.5 MG) BY MOUTH DAILY 90 tablet 3     Respiratory Therapy Supplies (NEBULIZER/TUBING/MOUTHPIECE) KIT Use with albuterol neb solution 1 kit 0     tiotropium (SPIRIVA RESPIMAT) 1.25 MCG/ACT inhaler  Inhale 2 puffs into the lungs daily 1 Inhaler 3     No facility-administered encounter medications on file as of 2/17/2021.              Review Of Systems  Skin: As above  Eyes: negative  Ears/Nose/Throat: negative  Respiratory: No shortness of breath, dyspnea on exertion, cough      O:   NAD, WDWN, Alert & Oriented, Mood & Affect wnl, Vitals stable   Here today alone   BP (!) 146/82   Pulse 70   SpO2 99%    General appearance normal   Vitals stable   Alert, oriented and in no acute distress     Brown stuck on papules, brown macules on torso       Eyes: Conjunctivae/lids:Normal     ENT: Lips: normal    MSK:Normal    Cardiovascular: peripheral edema none    Pulm: Breathing Normal     Neuro/Psych: Orientation:Alert and Orientedx3 ; Mood/Affect:normal   A/P:  1. Inflamed seborrheic keratosis x 3 on upper back  LN2:  Treated with LN2 for 5s for 1-2 cycles. Warned risks of blistering, pain, pigment change, scarring, and incomplete resolution.  Advised patient to return if lesions do not completely resolve.  Wound care sheet given.  2. Seborrheic keratosis, lentigo   BENIGN LESIONS DISCUSSED WITH PATIENT:  I discussed the specifics of tumor, prognosis, and genetics of benign lesions.  I explained that treatment of these lesions would be purely cosmetic and not medically neccessary.  I discussed with patient different removal options including excision, cautery and /or laser.      Nature and genetics of benign skin lesions dicussed with patient.  Signs and Symptoms of skin cancer discussed with patient.  ABCDEs of melanoma reviewed with patient.  Patient encouraged to perform monthly skin exams.  UV precautions reviewed with patient.  Risks of non-melanoma skin cancer discussed with patient   Return to clinic as needed.         Again, thank you for allowing me to participate in the care of your patient.        Sincerely,        Marlen Lawrence PA-C

## 2021-02-17 NOTE — PROGRESS NOTES
Luz Elena Cristina is an extremely pleasant 65 year old year old female patient here today for spot on back. She notes area is new and will rub on clothing. Patient has no other skin complaints today.  Remainder of the HPI, Meds, PMH, Allergies, FH, and SH was reviewed in chart.    Pertinent Hx:   No personal history of skin cancer.   Past Medical History:   Diagnosis Date     Hypertension      Rheumatic fever      Sleep apnea      Uncomplicated asthma        Past Surgical History:   Procedure Laterality Date     BIOPSY      breast,     COLONOSCOPY       COLONOSCOPY N/A 2/8/2019    Procedure: Combined Colonoscopy, Single Or Multiple Biopsy/Polypectomy By Biopsy;  Surgeon: José Miguel Ochoa MD;  Location: [facility]     COLONOSCOPY WITH CO2 INSUFFLATION N/A 2/8/2019    Procedure: COLONOSCOPY WITH CO2 INSUFFLATION;  Surgeon: José Miguel Ochoa MD;  Location: [facility]     COMBINED ESOPHAGOSCOPY, GASTROSCOPY, DUODENOSCOPY (EGD) WITH CO2 INSUFFLATION N/A 2/8/2019    Procedure: COMBINED ESOPHAGOSCOPY, GASTROSCOPY, DUODENOSCOPY (EGD) WITH CO2 INSUFFLATION;  Surgeon: José Miguel Ochoa MD;  Location[facility]     ESOPHAGOSCOPY, GASTROSCOPY, DUODENOSCOPY (EGD), COMBINED N/A 2/8/2019    Procedure: Combined Esophagoscopy, Gastroscopy, Duodenoscopy (Egd), Biopsy Single Or Multiple;  Surgeon: José Miguel Ochoa MD;  Location: [facility]     GYN SURGERY       TONSILLECTOMY          Family History   Problem Relation Age of Onset     Other Cancer Mother         panreatic     Diabetes Mother         unsure type; in 40s     Breast Cancer Sister      Skin Cancer Sister      Cerebrovascular Disease Father         CVA     Chronic Obstructive Pulmonary Disease Father      Skin Cancer Father      Aneurysm Paternal Grandmother      Unknown/Adopted Paternal Grandfather      Alzheimer Disease Brother         early onset     Diabetes Son         type 1     Thyroid Disease Son      Arthritis Brother      Stomach Problem Son          polyps, diverticulitis, heartburn, passes blood     Alcoholism Son      Breast Cancer Paternal Aunt        Social History     Socioeconomic History     Marital status:      Spouse name: Not on file     Number of children: Not on file     Years of education: Not on file     Highest education level: Not on file   Occupational History     Comment: Retired   Social Needs     Financial resource strain: Not on file     Food insecurity     Worry: Not on file     Inability: Not on file     Transportation needs     Medical: Not on file     Non-medical: Not on file   Tobacco Use     Smoking status: Former Smoker     Packs/day: 2.00     Years: 20.00     Pack years: 40.00     Types: Cigarettes     Quit date: 1987     Years since quittin.8     Smokeless tobacco: Never Used   Substance and Sexual Activity     Alcohol use: Yes     Comment: 1 nightly     Drug use: No     Sexual activity: Not Currently     Partners: Male     Birth control/protection: Post-menopausal   Lifestyle     Physical activity     Days per week: Not on file     Minutes per session: Not on file     Stress: Not on file   Relationships     Social connections     Talks on phone: Not on file     Gets together: Not on file     Attends [employer] service: Not on file     Active member of club or organization: Not on file     Attends meetings of clubs or organizations: Not on file     Relationship status: Not on file     Intimate partner violence     Fear of current or ex partner: Not on file     Emotionally abused: Not on file     Physically abused: Not on file     Forced sexual activity: Not on file   Other Topics Concern     Parent/sibling w/ CABG, MI or angioplasty before 65F 55M? No   Social History Narrative    May 1, 2020        ENVIRONMENTAL HISTORY: [place] The home is heated with a forced air but does not use.  Has floor heating. They do have central air conditioning. The patient's bedroom  is furnished with carpeting in bedroom.  No pets inside the house. But is exposed to chicken. There is no history of cockroach or mice infestation. There are no smokers in the house.  The house does not have a damp basement.        Outpatient Encounter Medications as of 2/17/2021   Medication Sig Dispense Refill     albuterol (2.5 MG/3ML) 0.083% neb solution Take 1 vial (2.5 mg) by nebulization every 4 hours as needed for shortness of breath / dyspnea, wheezing or other (persistent cough) 50 vial 1     albuterol (PROAIR HFA/PROVENTIL HFA/VENTOLIN HFA) 108 (90 Base) MCG/ACT inhaler Inhale 2-4 puffs into the lungs every 4 hours as needed for shortness of breath / dyspnea or wheezing 1 Inhaler 3     azelastine (ASTELIN) 0.1 % nasal spray Spray 2 sprays into both nostrils 2 times daily as needed for rhinitis or allergies 30 mL 3     calcipotriene (DOVONOX) 0.005 % external cream Apply twice daily to affected area on body as needed. 60 g 4     clobetasol (TEMOVATE) 0.05 % external cream Apply sparingly to affected area twice weekly as needed.  Do not apply to face. 60 g 3     clobetasol (TEMOVATE) 0.05 % external ointment Apply sparingly daily as needed to affected area on groin. 30 g 4     fluocinonide (LIDEX) 0.05 % external gel Apply topically 2 times daily As needed for mouth sores 15 g 0     fluticasone-vilanterol (BREO ELLIPTA) 200-25 MCG/INH inhaler Inhale 1 puff into the lungs daily 1 Inhaler 3     hydrochlorothiazide (HYDRODIURIL) 12.5 MG tablet TAKE 1 TABLET(12.5 MG) BY MOUTH DAILY 90 tablet 3     Respiratory Therapy Supplies (NEBULIZER/TUBING/MOUTHPIECE) KIT Use with albuterol neb solution 1 kit 0     tiotropium (SPIRIVA RESPIMAT) 1.25 MCG/ACT inhaler Inhale 2 puffs into the lungs daily 1 Inhaler 3     No facility-administered encounter medications on file as of 2/17/2021.              Review Of Systems  Skin: As above  Eyes: negative  Ears/Nose/Throat: negative  Respiratory: No shortness of breath, dyspnea on  exertion, cough      O:   NAD, WDWN, Alert & Oriented, Mood & Affect wnl, Vitals stable   Here today alone   BP (!) 146/82   Pulse 70   SpO2 99%    General appearance normal   Vitals stable   Alert, oriented and in no acute distress     Brown stuck on papules, brown macules on torso       Eyes: Conjunctivae/lids:Normal     ENT: Lips: normal    MSK:Normal    Cardiovascular: peripheral edema none    Pulm: Breathing Normal     Neuro/Psych: Orientation:Alert and Orientedx3 ; Mood/Affect:normal   A/P:  1. Inflamed seborrheic keratosis x 3 on upper back  LN2:  Treated with LN2 for 5s for 1-2 cycles. Warned risks of blistering, pain, pigment change, scarring, and incomplete resolution.  Advised patient to return if lesions do not completely resolve.  Wound care sheet given.  2. Seborrheic keratosis, lentigo   BENIGN LESIONS DISCUSSED WITH PATIENT:  I discussed the specifics of tumor, prognosis, and genetics of benign lesions.  I explained that treatment of these lesions would be purely cosmetic and not medically neccessary.  I discussed with patient different removal options including excision, cautery and /or laser.      Nature and genetics of benign skin lesions dicussed with patient.  Signs and Symptoms of skin cancer discussed with patient.  ABCDEs of melanoma reviewed with patient.  Patient encouraged to perform monthly skin exams.  UV precautions reviewed with patient.  Risks of non-melanoma skin cancer discussed with patient   Return to clinic as needed.

## 2021-03-15 NOTE — TELEPHONE ENCOUNTER
"Requested Prescriptions   Pending Prescriptions Disp Refills     fluticasone-vilanterol (BREO ELLIPTA) 200-25 MCG/INH inhaler 1 Inhaler 1     Sig: Inhale 1 puff into the lungs daily       Inhaled Steroids Protocol Failed - 1/6/2020  1:29 PM        Failed - Asthma control assessment score within normal limits in last 6 months     Please review ACT score.           Failed - Recent (6 mo) or future (30 days) visit within the authorizing provider's specialty     Patient had office visit in the last 6 months or has a visit in the next 30 days with authorizing provider or within the authorizing provider's specialty.  See \"Patient Info\" tab in inbasket, or \"Choose Columns\" in Meds & Orders section of the refill encounter.            Passed - Patient is age 12 or older        Passed - Medication is active on med list        tiotropium (SPIRIVA RESPIMAT) 1.25 MCG/ACT inhaler 1 Inhaler 1     Sig: Inhale 2 puffs into the lungs daily       There is no refill protocol information for this order          Last office visit: 5/28/2019 with prescribing provider:    Future Office Visit:   Next 5 appointments (look out 90 days)    Mar 09, 2020  2:00 PM CDT  Return Visit with Prince Manzanares MD  [facility] ([facility]) [address] [address] [place] [id number] [phone]           RT    "
Last office visit 5/28/2019. No future appointment scheduled at this time. Unable to refill per protocol.    Inhaled Steroids Protocol Failed1/6 1:35 PM   Asthma control assessment score within normal limits in last 6 months    Recent (6 mo) or future (30 days) visit within the authorizing provider's specialty     Please advise. Thank you.    Praveena VALE   Allergy RN        
Requested Prescriptions   Pending Prescriptions Disp Refills     fluticasone-vilanterol (BREO ELLIPTA) 200-25 MCG/INH inhaler 1 Inhaler 1     Sig: Inhale 1 puff into the lungs daily       There is no refill protocol information for this order          Last office visit: 5/28/2019 with prescribing provider:   Future Office Visit:   Next 5 appointments (look out 90 days)    Mar 09, 2020  2:00 PM CDT  Return Visit with Prince Manzanares MD  [facility] ([facility]) [address] [place] [id number]  [phone]             
The patient is a 67y Female complaining of chest pressure.

## 2021-05-10 DIAGNOSIS — J45.50 SEVERE PERSISTENT ASTHMA WITHOUT COMPLICATION (H): ICD-10-CM

## 2021-05-10 NOTE — TELEPHONE ENCOUNTER
Requested Prescriptions   Pending Prescriptions Disp Refills     tiotropium (SPIRIVA RESPIMAT) 1.25 MCG/ACT inhaler       Sig: Inhale 2 puffs into the lungs daily       There is no refill protocol information for this order      Last Written Prescription Date:    Last Fill Quantity: ,  # refills:    Last office visit: 5/28/2019 with prescribing provider:     Future Office Visit:

## 2021-05-11 NOTE — TELEPHONE ENCOUNTER
Routing refill request to provider for review/approval because:  Patient needs to be seen because it has been more than 1 year since last office visit. LOV 5/1/2020      Praveena VALE RN  Specialty/Allergy Clinics

## 2021-05-11 NOTE — TELEPHONE ENCOUNTER
I will make 1 refill.  The patient needs a follow-up appointment for further refills.    Galindo Montano MD

## 2021-06-01 DIAGNOSIS — J45.50 SEVERE PERSISTENT ASTHMA WITHOUT COMPLICATION (H): ICD-10-CM

## 2021-06-01 RX ORDER — ALBUTEROL SULFATE 90 UG/1
AEROSOL, METERED RESPIRATORY (INHALATION)
Qty: 54 G | Refills: 0 | Status: SHIPPED | OUTPATIENT
Start: 2021-06-01 | End: 2021-06-02

## 2021-06-02 ENCOUNTER — OFFICE VISIT (OUTPATIENT)
Dept: ALLERGY | Facility: CLINIC | Age: 66
End: 2021-06-02
Payer: MEDICARE

## 2021-06-02 VITALS
BODY MASS INDEX: 33.24 KG/M2 | WEIGHT: 199.74 LBS | SYSTOLIC BLOOD PRESSURE: 134 MMHG | TEMPERATURE: 97.5 F | HEART RATE: 81 BPM | OXYGEN SATURATION: 94 % | DIASTOLIC BLOOD PRESSURE: 85 MMHG

## 2021-06-02 DIAGNOSIS — J31.0 CHRONIC RHINITIS: ICD-10-CM

## 2021-06-02 DIAGNOSIS — J45.50 SEVERE PERSISTENT ASTHMA WITHOUT COMPLICATION (H): Primary | ICD-10-CM

## 2021-06-02 PROCEDURE — 99214 OFFICE O/P EST MOD 30 MIN: CPT | Performed by: ALLERGY & IMMUNOLOGY

## 2021-06-02 RX ORDER — ALBUTEROL SULFATE 90 UG/1
2-4 AEROSOL, METERED RESPIRATORY (INHALATION) EVERY 4 HOURS PRN
Qty: 18 G | Refills: 3 | Status: SHIPPED | OUTPATIENT
Start: 2021-06-02 | End: 2023-05-24

## 2021-06-02 ASSESSMENT — ENCOUNTER SYMPTOMS
WHEEZING: 0
CHEST TIGHTNESS: 0
SHORTNESS OF BREATH: 0
EYE ITCHING: 0
RHINORRHEA: 1
EYE DISCHARGE: 0
SINUS PAIN: 1

## 2021-06-02 NOTE — LETTER
6/2/2021         RE: Luz Elena Cristina  [address] [facility] [address]        Dear Colleague,    Thank you for referring your patient, Luz Elena Cristina, to the [facility]. Please see a copy of my visit note below.    SUBJECTIVE:                                                                   Luz Elena Cristina presents today to our Allergy Clinic at [facility] for a follow up visit.  She is a 66 year old female with severe persistent asthma and chronic rhinitis.     Asthma since childhood. November 2016: Negative serum IgE to regional aeroallergen panel. CBC with differential without eosinophilia.  Normal total serum IgE.  Negative ABPA panel.  Alpha-1 antitrypsin within normal limits.  Normal CBC with differential without hypereosinophilia.  The PSG sleep study demonstrated moderate, REM, and supine predominant NEGRA with some sleep associated hypoxemia.  In February 2019, due to decrease in FEV1, chest CT was ordered that showed some opacities and small nodules that could be infectious.  The patient was treated with azithromycin.  On a follow-up phone call, the patient was asymptomatic; However, chest CT in May 2019 with new opacities.Per January'21 Pulm note: Established multiple pulmonary lung nodule(s). Near resolution of large nodules and likely related to infection/inflammation. No further surveillance indicated by CT.   In regards to her asthma, she continues doing well on Breo Ellipta 200/25 mcg 1 puff once daily, Spiriva Respimat daily, and albuterol inhaler as needed. She uses albuterol approximately twice a week for cough. It is mainly during the day.  She denies chest tightness, wheezing, or dyspnea. Had COVID-19 infection in October of 2020.    She can exercise, and she seems to do well with it.  She sleeps well. She is waking up less than twice per month due to chest symptoms. There has been no use of oral steroids since the last visit. No  "ED/PCP/urgent care/other specialist visits for asthma flare since the previous visit. The patient denies current chest tightness, wheeze, or shortness of breath.       She uses azelastine for nasal symptoms in Spring, and it works \"like a charm.\"      Patient Active Problem List   Diagnosis     Severe persistent asthma     Lichen sclerosus     Benign essential hypertension     Elevated glucose     Family history of diabetes mellitus     Severe obesity (BMI 35.0-39.9) with comorbidity (H)     At risk for cardiovascular event     Osteopenia       Past Medical History:   Diagnosis Date     Hypertension      Rheumatic fever      Sleep apnea      Uncomplicated asthma       Problem (# of Occurrences) Relation (Name,Age of Onset)    Alcoholism (1) Son    Alzheimer Disease (1) Brother: early onset    Aneurysm (1) Paternal Grandmother    Arthritis (1) Brother    Breast Cancer (2) Sister, Paternal Aunt    Cerebrovascular Disease (1) Father (ben felder): CVA    Chronic Obstructive Pulmonary Disease (1) Father (ben felder)    Diabetes (2) Mother (fawn felder): unsure type; in 40s, Son: type 1    Other Cancer (1) Mother (fawn felder): panreatic    Skin Cancer (2) Sister, Father (ben felder)    Stomach Problem (1) Son: polyps, diverticulitis, heartburn, passes blood    Thyroid Disease (1) Son    Unknown/Adopted (1) Paternal Grandfather (radha alicea)        Past Surgical History:   Procedure Laterality Date     BIOPSY      breast,     COLONOSCOPY       COLONOSCOPY N/A 2/8/2019    Procedure: Combined Colonoscopy, Single Or Multiple Biopsy/Polypectomy By Biopsy;  Surgeon: José Miguel Ochoa MD;  Location: [facility] OR     COLONOSCOPY WITH CO2 INSUFFLATION N/A 2/8/2019    Procedure: COLONOSCOPY WITH CO2 INSUFFLATION;  Surgeon: José Miguel Ochoa MD;  Location: [facility] OR     COMBINED ESOPHAGOSCOPY, GASTROSCOPY, DUODENOSCOPY (EGD) WITH CO2 INSUFFLATION N/A 2/8/2019    Procedure: COMBINED " ESOPHAGOSCOPY, GASTROSCOPY, DUODENOSCOPY (EGD) WITH CO2 INSUFFLATION;  Surgeon: José Miguel Ochoa MD;  Location: [facility] OR     ESOPHAGOSCOPY, GASTROSCOPY, DUODENOSCOPY (EGD), COMBINED N/A 2019    Procedure: Combined Esophagoscopy, Gastroscopy, Duodenoscopy (Egd), Biopsy Single Or Multiple;  Surgeon: José Miguel Ochoa MD;  Location: [facility] OR     GYN SURGERY       TONSILLECTOMY       Social History     Socioeconomic History     Marital status:      Spouse name: None     Number of children: None     Years of education: None     Highest education level: None   Occupational History     Comment: Retired   Social Needs     Financial resource strain: None     Food insecurity     Worry: None     Inability: None     Transportation needs     Medical: None     Non-medical: None   Tobacco Use     Smoking status: Former Smoker     Packs/day: 2.00     Years: 20.00     Pack years: 40.00     Types: Cigarettes     Quit date: 1987     Years since quittin.1     Smokeless tobacco: Never Used   Substance and Sexual Activity     Alcohol use: Yes     Comment: 1 nightly     Drug use: No     Sexual activity: Not Currently     Partners: Male     Birth control/protection: Post-menopausal   Lifestyle     Physical activity     Days per week: None     Minutes per session: None     Stress: None   Relationships     Social connections     Talks on phone: None     Gets together: None     Attends Orthodox service: None     Active member of club or organization: None     Attends meetings of clubs or organizations: None     Relationship status: None     Intimate partner violence     Fear of current or ex partner: None     Emotionally abused: None     Physically abused: None     Forced sexual activity: None   Other Topics Concern     Parent/sibling w/ CABG, MI or angioplasty before 65F 55M? No   Social History Narrative    2021    ENVIRONMENTAL HISTORY: [place]  home is heated with a forced air but does not use.  Has floor heating. They do have central air conditioning. The patient's bedroom is furnished with carpeting in bedroom.  No pets inside the house. But is exposed to chicken. There is no history of cockroach or mice infestation. There are no smokers in the house.  The house does not have a damp basement.            Review of Systems   HENT: Positive for congestion, rhinorrhea and sinus pain.    Eyes: Negative for discharge and itching.   Respiratory: Negative for chest tightness, shortness of breath and wheezing.            Current Outpatient Medications:      albuterol (2.5 MG/3ML) 0.083% neb solution, Take 1 vial (2.5 mg) by nebulization every 4 hours as needed for shortness of breath / dyspnea, wheezing or other (persistent cough), Disp: 50 vial, Rfl: 1     albuterol (PROAIR HFA/PROVENTIL HFA/VENTOLIN HFA) 108 (90 Base) MCG/ACT inhaler, Inhale 2-4 puffs into the lungs every 4 hours as needed for shortness of breath / dyspnea or wheezing, Disp: 18 g, Rfl: 3     azelastine (ASTELIN) 0.1 % nasal spray, Spray 2 sprays into both nostrils 2 times daily as needed for rhinitis or allergies, Disp: 30 mL, Rfl: 3     fluticasone-vilanterol (BREO ELLIPTA) 200-25 MCG/INH inhaler, Inhale 1 puff into the lungs daily, Disp: 180 each, Rfl: 3     hydrochlorothiazide (HYDRODIURIL) 12.5 MG tablet, TAKE 1 TABLET(12.5 MG) BY MOUTH DAILY, Disp: 90 tablet, Rfl: 3     tiotropium (SPIRIVA RESPIMAT) 1.25 MCG/ACT inhaler, Inhale 2 puffs into the lungs daily, Disp: 12 g, Rfl: 3     calcipotriene (DOVONOX) 0.005 % external cream, Apply twice daily to affected area on body as needed. (Patient not taking: Reported on 6/2/2021), Disp: 60 g, Rfl: 4     clobetasol (TEMOVATE) 0.05 % external cream, Apply sparingly to affected area twice weekly as needed.  Do not apply to face. (Patient not taking: Reported on 6/2/2021), Disp: 60 g, Rfl: 3     clobetasol (TEMOVATE) 0.05 % external ointment, Apply  sparingly daily as needed to affected area on groin. (Patient not taking: Reported on 6/2/2021), Disp: 30 g, Rfl: 4     fluocinonide (LIDEX) 0.05 % external gel, Apply topically 2 times daily As needed for mouth sores (Patient not taking: Reported on 6/2/2021), Disp: 15 g, Rfl: 0     Respiratory Therapy Supplies (NEBULIZER/TUBING/MOUTHPIECE) KIT, Use with albuterol neb solution (Patient not taking: Reported on 6/2/2021), Disp: 1 kit, Rfl: 0  Immunization History   Administered Date(s) Administered     COVID-19,PF,Moderna 02/11/2021, 03/11/2021     Influenza (High Dose) 3 valent vaccine 02/27/2018, 09/18/2018     Influenza (IIV3) PF 11/08/2012     Influenza Vaccine IM > 6 months Valent IIV4 12/12/2016, 11/20/2020     Pneumo Conj 13-V (2010&after) 02/27/2018     Pneumococcal 23 valent 06/24/2020     TDAP Vaccine (Boostrix) 11/08/2012     Tdap (Adult) Unspecified Formulation 06/30/1995     Allergies   Allergen Reactions     Cartia Xt [Diltiazem] Other (See Comments)     Heartburn whenever on this medication     Losartan Cough     Miconazole      Sulfa Drugs Rash     OBJECTIVE:                                                                 /85 (BP Location: Left arm, Patient Position: Sitting, Cuff Size: Adult Regular)   Pulse 81   Temp 97.5  F (36.4  C) (Tympanic)   Wt 90.6 kg (199 lb 11.8 oz)   SpO2 94%   BMI 33.24 kg/m          Physical Exam  Vitals signs and nursing note reviewed.   Constitutional:       General: She is not in acute distress.     Appearance: She is not ill-appearing, toxic-appearing or diaphoretic.   HENT:      Head: Normocephalic and atraumatic.      Right Ear: Tympanic membrane, ear canal and external ear normal.      Left Ear: Tympanic membrane, ear canal and external ear normal.      Nose: Septal deviation (left) present. No mucosal edema, congestion or rhinorrhea.      Right Turbinates: Not enlarged, swollen or pale.      Left Turbinates: Not enlarged, swollen or pale.       Mouth/Throat:      Lips: Pink.      Mouth: Mucous membranes are moist.      Pharynx: Oropharynx is clear. No pharyngeal swelling, oropharyngeal exudate, posterior oropharyngeal erythema or uvula swelling.   Eyes:      General:         Right eye: No discharge.         Left eye: No discharge.      Conjunctiva/sclera: Conjunctivae normal.   Neck:      Musculoskeletal: Normal range of motion.   Cardiovascular:      Rate and Rhythm: Normal rate and regular rhythm.      Heart sounds: Normal heart sounds. No murmur.   Pulmonary:      Effort: Pulmonary effort is normal. No respiratory distress.      Breath sounds: Normal breath sounds and air entry. No stridor, decreased air movement or transmitted upper airway sounds. No decreased breath sounds, wheezing, rhonchi or rales.   Musculoskeletal: Normal range of motion.   Skin:     General: Skin is warm.      Capillary Refill: Capillary refill takes less than 2 seconds.      Findings: No rash.   Neurological:      Mental Status: She is alert and oriented to person, place, and time.   Psychiatric:         Mood and Affect: Mood normal.         Behavior: Behavior normal.         WORKUP:   ACT Score:  22    ASSESSMENT/PLAN:    Severe persistent asthma without complication  Well-controlled with Breo Ellipta 200/25 mcg 1 puff once daily, Spiriva daily, and albuterol inhaler as needed.  -Continue as is.  Ordered interval PFT.    - fluticasone-vilanterol (BREO ELLIPTA) 200-25 MCG/INH inhaler  Dispense: 180 each; Refill: 3  - tiotropium (SPIRIVA RESPIMAT) 1.25 MCG/ACT inhaler  Dispense: 12 g; Refill: 3  - albuterol (PROAIR HFA/PROVENTIL HFA/VENTOLIN HFA) 108 (90 Base) MCG/ACT inhaler  Dispense: 18 g; Refill: 3  - General PFT Lab (Please always keep checked)  - Pulmonary Function Test    Chronic rhinitis  Well-controlled with azelastine as needed, primarily in the Spring.  -Continue as is.         Return in about 1 year (around 6/2/2022), or if symptoms worsen or fail to  improve.    Thank you for allowing us to participate in the care of this patient. Please feel free to contact us if there are any questions or concerns about the patient.    Disclaimer: This note consists of symbols derived from keyboarding, dictation and/or voice recognition software. As a result, there may be errors in the script that have gone undetected. Please consider this when interpreting information found in this chart.    Galindo Montano MD, FAAAAI, FACAAI  Allergy, Asthma and Immunology    [facility]         Again, thank you for allowing me to participate in the care of your patient.        Sincerely,        Galindo Montano MD

## 2021-06-02 NOTE — PROGRESS NOTES
"SUBJECTIVE:                                                                   Luz Elena Cristina presents today to our Allergy Clinic at [facility] for a follow up visit.  She is a 66 year old female with severe persistent asthma and chronic rhinitis.     Asthma since childhood. November 2016: Negative serum IgE to regional aeroallergen panel. CBC with differential without eosinophilia.  Normal total serum IgE.  Negative ABPA panel.  Alpha-1 antitrypsin within normal limits.  Normal CBC with differential without hypereosinophilia.  The PSG sleep study demonstrated moderate, REM, and supine predominant NEGRA with some sleep associated hypoxemia.  In February 2019, due to decrease in FEV1, chest CT was ordered that showed some opacities and small nodules that could be infectious.  The patient was treated with azithromycin.  On a follow-up phone call, the patient was asymptomatic; However, chest CT in May 2019 with new opacities.Per January'21 Pulm note: Established multiple pulmonary lung nodule(s). Near resolution of large nodules and likely related to infection/inflammation. No further surveillance indicated by CT.   In regards to her asthma, she continues doing well on Breo Ellipta 200/25 mcg 1 puff once daily, Spiriva Respimat daily, and albuterol inhaler as needed. She uses albuterol approximately twice a week for cough. It is mainly during the day.  She denies chest tightness, wheezing, or dyspnea. Had COVID-19 infection in October of 2020.    She can exercise, and she seems to do well with it.  She sleeps well. She is waking up less than twice per month due to chest symptoms. There has been no use of oral steroids since the last visit. No ED/PCP/urgent care/other specialist visits for asthma flare since the previous visit. The patient denies current chest tightness, wheeze, or shortness of breath.       She uses azelastine for nasal symptoms in Spring, and it works \"like a " "charm.\"      Patient Active Problem List   Diagnosis     Severe persistent asthma     Lichen sclerosus     Benign essential hypertension     Elevated glucose     Family history of diabetes mellitus     Severe obesity (BMI 35.0-39.9) with comorbidity (H)     At risk for cardiovascular event     Osteopenia       Past Medical History:   Diagnosis Date     Hypertension      Rheumatic fever      Sleep apnea      Uncomplicated asthma       Problem (# of Occurrences) Relation (Name,Age of Onset)    Alcoholism (1) Son    Alzheimer Disease (1) Brother: early onset    Aneurysm (1) Paternal Grandmother    Arthritis (1) Brother    Breast Cancer (2) Sister, Paternal Aunt    Cerebrovascular Disease (1) Father (ben felder): CVA    Chronic Obstructive Pulmonary Disease (1) Father (ben felder)    Diabetes (2) Mother (fawn felder): unsure type; in 40s, Son: type 1    Other Cancer (1) Mother (fawn felder): panreatic    Skin Cancer (2) Sister, Father (ben felder)    Stomach Problem (1) Son: polyps, diverticulitis, heartburn, passes blood    Thyroid Disease (1) Son    Unknown/Adopted (1) Paternal Grandfather (radha alicea)        Past Surgical History:   Procedure Laterality Date     BIOPSY      breast,     COLONOSCOPY       COLONOSCOPY N/A 2/8/2019    Procedure: Combined Colonoscopy, Single Or Multiple Biopsy/Polypectomy By Biopsy;  Surgeon: José Miguel Ochoa MD;  Location: [facility] OR     COLONOSCOPY WITH CO2 INSUFFLATION N/A 2/8/2019    Procedure: COLONOSCOPY WITH CO2 INSUFFLATION;  Surgeon: José Miguel Ochoa MD;  Location: [facility]     COMBINED ESOPHAGOSCOPY, GASTROSCOPY, DUODENOSCOPY (EGD) WITH CO2 INSUFFLATION N/A 2/8/2019    Procedure: COMBINED ESOPHAGOSCOPY, GASTROSCOPY, DUODENOSCOPY (EGD) WITH CO2 INSUFFLATION;  Surgeon: José Miguel Ochoa MD;  Location: [facility] OR     ESOPHAGOSCOPY, GASTROSCOPY, DUODENOSCOPY (EGD), COMBINED N/A 2/8/2019    Procedure: Combined " Esophagoscopy, Gastroscopy, Duodenoscopy (Egd), Biopsy Single Or Multiple;  Surgeon: José Miguel Ochoa MD;  Location: [facility] OR     GYN SURGERY       TONSILLECTOMY       Social History     Socioeconomic History     Marital status:      Spouse name: None     Number of children: None     Years of education: None     Highest education level: None   Occupational History     Comment: Retired   Social Needs     Financial resource strain: None     Food insecurity     Worry: None     Inability: None     Transportation needs     Medical: None     Non-medical: None   Tobacco Use     Smoking status: Former Smoker     Packs/day: 2.00     Years: 20.00     Pack years: 40.00     Types: Cigarettes     Quit date: 1987     Years since quittin.1     Smokeless tobacco: Never Used   Substance and Sexual Activity     Alcohol use: Yes     Comment: 1 nightly     Drug use: No     Sexual activity: Not Currently     Partners: Male     Birth control/protection: Post-menopausal   Lifestyle     Physical activity     Days per week: None     Minutes per session: None     Stress: None   Relationships     Social connections     Talks on phone: None     Gets together: None     Attends [employer] service: None     Active member of club or organization: None     Attends meetings of clubs or organizations: None     Relationship status: None     Intimate partner violence     Fear of current or ex partner: None     Emotionally abused: None     Physically abused: None     Forced sexual activity: None   Other Topics Concern     Parent/sibling w/ CABG, MI or angioplasty before 65F 55M? No   Social History Narrative    2021    ENVIRONMENTAL HISTORY: [place] The home is heated with a forced air but does not use.  Has floor heating. They do have central air conditioning. The patient's bedroom is furnished with carpeting in bedroom.  No pets inside the house. But is exposed to chicken.  There is no history of cockroach or mice infestation. There are no smokers in the house.  The house does not have a damp basement.            Review of Systems   HENT: Positive for congestion, rhinorrhea and sinus pain.    Eyes: Negative for discharge and itching.   Respiratory: Negative for chest tightness, shortness of breath and wheezing.            Current Outpatient Medications:      albuterol (2.5 MG/3ML) 0.083% neb solution, Take 1 vial (2.5 mg) by nebulization every 4 hours as needed for shortness of breath / dyspnea, wheezing or other (persistent cough), Disp: 50 vial, Rfl: 1     albuterol (PROAIR HFA/PROVENTIL HFA/VENTOLIN HFA) 108 (90 Base) MCG/ACT inhaler, Inhale 2-4 puffs into the lungs every 4 hours as needed for shortness of breath / dyspnea or wheezing, Disp: 18 g, Rfl: 3     azelastine (ASTELIN) 0.1 % nasal spray, Spray 2 sprays into both nostrils 2 times daily as needed for rhinitis or allergies, Disp: 30 mL, Rfl: 3     fluticasone-vilanterol (BREO ELLIPTA) 200-25 MCG/INH inhaler, Inhale 1 puff into the lungs daily, Disp: 180 each, Rfl: 3     hydrochlorothiazide (HYDRODIURIL) 12.5 MG tablet, TAKE 1 TABLET(12.5 MG) BY MOUTH DAILY, Disp: 90 tablet, Rfl: 3     tiotropium (SPIRIVA RESPIMAT) 1.25 MCG/ACT inhaler, Inhale 2 puffs into the lungs daily, Disp: 12 g, Rfl: 3     calcipotriene (DOVONOX) 0.005 % external cream, Apply twice daily to affected area on body as needed. (Patient not taking: Reported on 6/2/2021), Disp: 60 g, Rfl: 4     clobetasol (TEMOVATE) 0.05 % external cream, Apply sparingly to affected area twice weekly as needed.  Do not apply to face. (Patient not taking: Reported on 6/2/2021), Disp: 60 g, Rfl: 3     clobetasol (TEMOVATE) 0.05 % external ointment, Apply sparingly daily as needed to affected area on groin. (Patient not taking: Reported on 6/2/2021), Disp: 30 g, Rfl: 4     fluocinonide (LIDEX) 0.05 % external gel, Apply topically 2 times daily As needed for mouth sores (Patient  not taking: Reported on 6/2/2021), Disp: 15 g, Rfl: 0     Respiratory Therapy Supplies (NEBULIZER/TUBING/MOUTHPIECE) KIT, Use with albuterol neb solution (Patient not taking: Reported on 6/2/2021), Disp: 1 kit, Rfl: 0  Immunization History   Administered Date(s) Administered     COVID-19,PF,Moderna 02/11/2021, 03/11/2021     Influenza (High Dose) 3 valent vaccine 02/27/2018, 09/18/2018     Influenza (IIV3) PF 11/08/2012     Influenza Vaccine IM > 6 months Valent IIV4 12/12/2016, 11/20/2020     Pneumo Conj 13-V (2010&after) 02/27/2018     Pneumococcal 23 valent 06/24/2020     TDAP Vaccine (Boostrix) 11/08/2012     Tdap (Adult) Unspecified Formulation 06/30/1995     Allergies   Allergen Reactions     Cartia Xt [Diltiazem] Other (See Comments)     Heartburn whenever on this medication     Losartan Cough     Miconazole      Sulfa Drugs Rash     OBJECTIVE:                                                                 /85 (BP Location: Left arm, Patient Position: Sitting, Cuff Size: Adult Regular)   Pulse 81   Temp 97.5  F (36.4  C) (Tympanic)   Wt 90.6 kg (199 lb 11.8 oz)   SpO2 94%   BMI 33.24 kg/m          Physical Exam  Vitals signs and nursing note reviewed.   Constitutional:       General: She is not in acute distress.     Appearance: She is not ill-appearing, toxic-appearing or diaphoretic.   HENT:      Head: Normocephalic and atraumatic.      Right Ear: Tympanic membrane, ear canal and external ear normal.      Left Ear: Tympanic membrane, ear canal and external ear normal.      Nose: Septal deviation (left) present. No mucosal edema, congestion or rhinorrhea.      Right Turbinates: Not enlarged, swollen or pale.      Left Turbinates: Not enlarged, swollen or pale.      Mouth/Throat:      Lips: Pink.      Mouth: Mucous membranes are moist.      Pharynx: Oropharynx is clear. No pharyngeal swelling, oropharyngeal exudate, posterior oropharyngeal erythema or uvula swelling.   Eyes:      General:          Right eye: No discharge.         Left eye: No discharge.      Conjunctiva/sclera: Conjunctivae normal.   Neck:      Musculoskeletal: Normal range of motion.   Cardiovascular:      Rate and Rhythm: Normal rate and regular rhythm.      Heart sounds: Normal heart sounds. No murmur.   Pulmonary:      Effort: Pulmonary effort is normal. No respiratory distress.      Breath sounds: Normal breath sounds and air entry. No stridor, decreased air movement or transmitted upper airway sounds. No decreased breath sounds, wheezing, rhonchi or rales.   Musculoskeletal: Normal range of motion.   Skin:     General: Skin is warm.      Capillary Refill: Capillary refill takes less than 2 seconds.      Findings: No rash.   Neurological:      Mental Status: She is alert and oriented to person, place, and time.   Psychiatric:         Mood and Affect: Mood normal.         Behavior: Behavior normal.         WORKUP:   ACT Score:  22    ASSESSMENT/PLAN:    Severe persistent asthma without complication  Well-controlled with Breo Ellipta 200/25 mcg 1 puff once daily, Spiriva daily, and albuterol inhaler as needed.  -Continue as is.  Ordered interval PFT.    - fluticasone-vilanterol (BREO ELLIPTA) 200-25 MCG/INH inhaler  Dispense: 180 each; Refill: 3  - tiotropium (SPIRIVA RESPIMAT) 1.25 MCG/ACT inhaler  Dispense: 12 g; Refill: 3  - albuterol (PROAIR HFA/PROVENTIL HFA/VENTOLIN HFA) 108 (90 Base) MCG/ACT inhaler  Dispense: 18 g; Refill: 3  - General PFT Lab (Please always keep checked)  - Pulmonary Function Test    Chronic rhinitis  Well-controlled with azelastine as needed, primarily in the Spring.  -Continue as is.         Return in about 1 year (around 6/2/2022), or if symptoms worsen or fail to improve.    Thank you for allowing us to participate in the care of this patient. Please feel free to contact us if there are any questions or concerns about the patient.    Disclaimer: This note consists of symbols derived from keyboarding, dictation  and/or voice recognition software. As a result, there may be errors in the script that have gone undetected. Please consider this when interpreting information found in this chart.    Galindo Montano MD, FAAAAI, FACAAI  Allergy, Asthma and Immunology    [facility]

## 2021-06-03 ASSESSMENT — ASTHMA QUESTIONNAIRES: ACT_TOTALSCORE: 22

## 2021-06-04 ENCOUNTER — HOSPITAL ENCOUNTER (OUTPATIENT)
Dept: RESPIRATORY THERAPY | Facility: CLINIC | Age: 66
Discharge: HOME | End: 2021-06-04
Attending: INTERNAL MEDICINE | Admitting: INTERNAL MEDICINE
Payer: MEDICARE

## 2021-06-04 DIAGNOSIS — J45.50 SEVERE PERSISTENT ASTHMA WITHOUT COMPLICATION (H): ICD-10-CM

## 2021-06-04 PROCEDURE — 999N000105 HC STATISTIC NO DOCUMENTATION TO SUPPORT CHARGE

## 2021-06-04 PROCEDURE — 94060 EVALUATION OF WHEEZING: CPT

## 2021-06-04 PROCEDURE — 94640 AIRWAY INHALATION TREATMENT: CPT

## 2021-06-04 PROCEDURE — 94060 EVALUATION OF WHEEZING: CPT | Mod: 26 | Performed by: INTERNAL MEDICINE

## 2021-06-04 PROCEDURE — 250N000009 HC RX 250: Performed by: ALLERGY & IMMUNOLOGY

## 2021-06-04 RX ORDER — ALBUTEROL SULFATE 0.83 MG/ML
2.5 SOLUTION RESPIRATORY (INHALATION) ONCE
Status: COMPLETED | OUTPATIENT
Start: 2021-06-04 | End: 2021-06-04

## 2021-06-04 RX ADMIN — ALBUTEROL SULFATE 2.5 MG: 2.5 SOLUTION RESPIRATORY (INHALATION) at 14:45

## 2021-06-06 LAB
EXPTIME-PRE: 6.99 SEC
FEF2575-%PRED-POST: 47 %
FEF2575-%PRED-PRE: 41 %
FEF2575-POST: 0.94 L/SEC
FEF2575-PRE: 0.83 L/SEC
FEF2575-PRED: 1.99 L/SEC
FEFMAX-%PRED-PRE: 77 %
FEFMAX-PRE: 4.63 L/SEC
FEFMAX-PRED: 5.95 L/SEC
FEV1-%PRED-PRE: 71 %
FEV1-PRE: 1.67 L
FEV1FEV6-PRE: 61 %
FEV1FEV6-PRED: 79 %
FEV1FVC-PRE: 59 %
FEV1FVC-PRED: 78 %
FIFMAX-PRE: 4.05 L/SEC
FVC-%PRED-PRE: 93 %
FVC-PRE: 2.82 L
FVC-PRED: 3.02 L

## 2021-06-07 NOTE — RESULT ENCOUNTER NOTE
Folloyuhart message sent:    Fairly stable mild to moderate obstruction without reversibility.    Continue as is.

## 2021-06-12 NOTE — TELEPHONE ENCOUNTER
"Coronavirus (COVID-19) Notification    Caller Name (Patient, parent, daughter/sone, grandparent, etc)  Luz Elena    Reason for call  Notify of Positive Coronavirus (COVID-19) lab results, assess symptoms,  review [facility] recommendations    Lab Result    Lab test:  2019-nCoV rRt-PCR or SARS-CoV-2 PCR    Oropharyngeal AND/OR nasopharyngeal swabs is POSITIVE for 2019-nCoV RNA/SARS-COV-2 PCR (COVID-19 virus)    RN Recommendations/Instructions per [facility] Coronavirus COVID-19 recommendations    Brief introduction script  Introduce self and then review script:  \"I am calling on behalf of [employer].  We were notified that your Coronavirus test (COVID-19) for was POSITIVE for the virus.  I have some information to relay to you but first I wanted to mention that the [place] Dept of Health will be contacting you shortly [it's possible MD already called Patient] to talk to you more about how you are feeling and other people you have had contact with who might now also have the virus.  Also, [facility] is Partnering with the [facility] for Covid-19 research, you may be contacted directly by research staff.\"    ssessment (Inquire about Patient's current symptoms)   Assessment   Current Symptoms at time of phone call: (if no symptoms, document No symptoms] HA, congestion, cough   Symptom onset (if applicable) Onset 10/7     If at time of call, Patients symptoms hare worsened, the Patient should contact 911 or have someone drive them to Emergency Dept promptly:      If Patient calling 911, inform 911 personal that you have tested positive for the Coronavirus (COVID-19).  Place mask on and await 911 to arrive.    If Emergency Dept, If possible, please have another adult drive you to the Emergency Dept but you need to wear mask when in contact with other people.      Review information with Patient    Your result was positive. This means you have COVID-19 (coronavirus).  We have sent you a letter " that reviews the information that I'll be reviewing with you now.    How can I protect others?    If you have symptoms: stay home and away from others (self-isolate) until:    You've had no fever--and no medicine that reduces fever--for 1 full day (24 hours). And      Your other symptoms have gotten better. For example, your cough or breathing has improved. And     At least 10 days have passed since your symptoms started. (If you ve been told by a doctor that you have a weak immune system, wait 20 days.)     If you don't have symptoms: Stay home and away from others (self-isolate) until at least 10 days have passed since your first positive COVID-19 test. (Date test collected).    During this time:    Stay in your own room, including for meals. Use your own bathroom if you can.    Stay away from others in your home. No hugging, kissing or shaking hands. No visitors.     Don't go to work, school or anywhere else.     Clean  high touch  surfaces often (doorknobs, counters, handles, etc.). Use a household cleaning spray or wipes. You'll find a full list on the EPA website at www.epa.gov/pesticide-registration/list-n-disinfectants-use-against-sars-cov-2.     Cover your mouth and nose with a mask, tissue or other face covering to avoid spreading germs.    Wash your hands and face often with soap and water.    Caregivers in these groups are at risk for severe illness due to COVID-19:  o People 65 years and older  o People who live in a nursing home or long-term care facility  o People with chronic disease (lung, heart, cancer, diabetes, kidney, liver, immunologic)  o People who have a weakened immune system, including those who:  - Are in cancer treatment  - Take medicine that weakens the immune system, such as corticosteroids  - Had a bone marrow or organ transplant  - Have an immune deficiency  - Have poorly controlled HIV or AIDS  - Are obese (body mass index of 40 or higher)  - Smoke regularly    Caregivers should  wear gloves while washing dishes, handling laundry and cleaning bedrooms and bathrooms.    Wash and dry laundry with special caution. Don't shake dirty laundry, and use the warmest water setting you can.    If you have a weakened immune system, ask your doctor about other actions you should take.    For more tips, go to www.cdc.gov/coronavirus/2019-ncov/downloads/10Things.pdf.    You should not go back to work until you meet the guidelines above for ending your home isolation. You don't need to be retested for COVID-19 before going back to work--studies show that you won't spread the virus if it's been at least 10 days since your symptoms started (or 20 days, if you have a weak immune system).    Employers: This document serves as formal notice of your employee's medical guidelines for going back to work. They must meet the above guidelines before going back to work in person.    How can I take care of myself?    1. Get lots of rest. Drink extra fluids (unless a doctor has told you not to).    2. Take Tylenol (acetaminophen) for fever or pain. If you have liver or kidney problems, ask your family doctor if it's okay to take Tylenol.     Take either:     650 mg (two 325 mg pills) every 4 to 6 hours, or     1,000 mg (two 500 mg pills) every 8 hours as needed.     Note: Don't take more than 3,000 mg in one day. Acetaminophen is found in many medicines (both prescribed and over-the-counter medicines). Read all labels to be sure you don't take too much.    For children, check the Tylenol bottle for the right dose (based on their age or weight).    3. If you have other health problems (like cancer, heart failure, an organ transplant or severe kidney disease): Call your specialty clinic if you don't feel better in the next 2 days.    4. Know when to call 911: Emergency warning signs include:    Trouble breathing or shortness of breath    Pain or pressure in the chest that doesn't go away    Feeling confused like you  haven't felt before, or not being able to wake up    Bluish-colored lips or face    5. Sign up for [employer]. We know it's scary to hear that you have COVID-19. We want to track your symptoms to make sure you're okay over the next 2 weeks. Please look for an email from [employer]--this is a free, online program that we'll use to keep in touch. To sign up, follow the link in the email. Learn more at [email or web address].    Where can I get more information?    [facility]: [email or web address]    Coronavirus Basics: www.health.[email or web address].[place]./diseases/coronavirus/basics.html    What to Do If You're Sick: www.cdc.gov/coronavirus/2019-ncov/about/steps-when-sick.html    Ending Home Isolation: www.cdc.gov/coronavirus/2019-ncov/hcp/disposition-in-home-patients.html     Caring for Someone with COVID-19: www.cdc.gov/coronavirus/2019-ncov/if-you-are-sick/care-for-someone.html     [facility] clinical trials (COVID-19 research studies): clinicalaffairs.[facility]-clinical-trials     A Positive COVID-19 letter will be sent via [employer] or the Mail.  (Exception, no letters sent to Presurgerical/Preprocedure Patients)    [Name]Carly Duenas RN

## 2021-06-18 ENCOUNTER — OFFICE VISIT (OUTPATIENT)
Dept: DERMATOLOGY | Facility: CLINIC | Age: 66
End: 2021-06-18
Payer: MEDICARE

## 2021-06-18 VITALS — DIASTOLIC BLOOD PRESSURE: 67 MMHG | SYSTOLIC BLOOD PRESSURE: 115 MMHG | HEART RATE: 73 BPM | OXYGEN SATURATION: 97 %

## 2021-06-18 DIAGNOSIS — D48.5 NEOPLASM OF UNCERTAIN BEHAVIOR OF SKIN: Primary | ICD-10-CM

## 2021-06-18 PROCEDURE — 88305 TISSUE EXAM BY PATHOLOGIST: CPT | Performed by: PATHOLOGY

## 2021-06-18 PROCEDURE — 99213 OFFICE O/P EST LOW 20 MIN: CPT | Mod: 25 | Performed by: PHYSICIAN ASSISTANT

## 2021-06-18 PROCEDURE — 11102 TANGNTL BX SKIN SINGLE LES: CPT | Performed by: PHYSICIAN ASSISTANT

## 2021-06-18 PROCEDURE — 11103 TANGNTL BX SKIN EA SEP/ADDL: CPT | Performed by: PHYSICIAN ASSISTANT

## 2021-06-18 NOTE — PATIENT INSTRUCTIONS
Wound Care Instructions     FOR SUPERFICIAL WOUNDS     [facility] [phone]    [facility] [phone]                       AFTER 24 HOURS YOU SHOULD REMOVE THE BANDAGE AND BEGIN DAILY DRESSING CHANGES AS FOLLOWS:     1) Remove Dressing.     2) Clean and dry the area with tap water using a Q-tip or sterile gauze pad.     3) Apply Vaseline, Aquaphor, Polysporin ointment or Bacitracin ointment over entire wound.  Do NOT use Neosporin ointment.     4) Cover the wound with a band-aid, or a sterile non-stick gauze pad and micropore paper tape      REPEAT THESE INSTRUCTIONS AT LEAST ONCE A DAY UNTIL THE WOUND HAS COMPLETELY HEALED.    It is an old wives tale that a wound heals better when it is exposed to air and allowed to dry out. The wound will heal faster with a better cosmetic result if it is kept moist with ointment and covered with a bandage.    **Do not let the wound dry out.**      Supplies Needed:      *Cotton tipped applicators (Q-tips)    *Polysporin Ointment or Bacitracin Ointment (NOT NEOSPORIN)    *Band-aids or non-stick gauze pads and micropore paper tape.      PATIENT INFORMATION:    During the healing process you will notice a number of changes. All wounds develop a small halo of redness surrounding the wound.  This means healing is occurring. Severe itching with extensive redness usually indicates sensitivity to the ointment or bandage tape used to dress the wound.  You should call our office if this develops.      Swelling  and/or discoloration around your surgical site is common, particularly when performed around the eye.    All wounds normally drain.  The larger the wound the more drainage there will be.  After 7-10 days, you will notice the wound beginning to shrink and new skin will begin to grow.  The wound is healed when you can see skin has formed over the entire area.  A healed wound has a healthy, shiny look to the surface and is red to dark pink in color  to normalize.  Wounds may take approximately 4-6 weeks to heal.  Larger wounds may take 6-8 weeks.  After the wound is healed you may discontinue dressing changes.    You may experience a sensation of tightness as your wound heals. This is normal and will gradually subside.    Your healed wound may be sensitive to temperature changes. This sensitivity improves with time, but if you re having a lot of discomfort, try to avoid temperature extremes.    Patients frequently experience itching after their wound appears to have healed because of the continue healing under the skin.  Plain Vaseline will help relieve the itching.        POSSIBLE COMPLICATIONS    BLEEDIN. Leave the bandage in place.  2. Use tightly rolled up gauze or a cloth to apply direct pressure over the bandage for 30  minutes.  3. Reapply pressure for an additional 30 minutes if necessary  4. Use additional gauze and tape to maintain pressure once the bleeding has stopped.

## 2021-06-18 NOTE — LETTER
6/18/2021         RE: Luz Elena Cristina  [address] [facility] [address]        Dear Colleague,    Thank you for referring your patient, Luz Elena Cristina, to the [facility]. Please see a copy of my visit note below.    Luz Elena Cristina is an extremely pleasant 66 year old year old female patient here today for spots on chest. Present for a few months, itchy. She notes that one has resolve but two still remain.  Patient has no other skin complaints today.  Remainder of the HPI, Meds, PMH, Allergies, FH, and SH was reviewed in chart.    Pertinent Hx:   No personal history of skin cancer.   Past Medical History:   Diagnosis Date     Hypertension      Rheumatic fever      Sleep apnea      Uncomplicated asthma        Past Surgical History:   Procedure Laterality Date     BIOPSY      breast,     COLONOSCOPY       COLONOSCOPY N/A 2/8/2019    Procedure: Combined Colonoscopy, Single Or Multiple Biopsy/Polypectomy By Biopsy;  Surgeon: José Miguel Ochoa MD;  Location: [facility] OR     COLONOSCOPY WITH CO2 INSUFFLATION N/A 2/8/2019    Procedure: COLONOSCOPY WITH CO2 INSUFFLATION;  Surgeon: José Miguel Ochoa MD;  Location: [facility] OR     COMBINED ESOPHAGOSCOPY, GASTROSCOPY, DUODENOSCOPY (EGD) WITH CO2 INSUFFLATION N/A 2/8/2019    Procedure: COMBINED ESOPHAGOSCOPY, GASTROSCOPY, DUODENOSCOPY (EGD) WITH CO2 INSUFFLATION;  Surgeon: José Miguel Ochoa MD;  Location: [facility]     ESOPHAGOSCOPY, GASTROSCOPY, DUODENOSCOPY (EGD), COMBINED N/A 2/8/2019    Procedure: Combined Esophagoscopy, Gastroscopy, Duodenoscopy (Egd), Biopsy Single Or Multiple;  Surgeon: José Miguel Ochoa MD;  Location: [facility] OR     GYN SURGERY       TONSILLECTOMY          Family History   Problem Relation Age of Onset     Other Cancer Mother         panreatic     Diabetes Mother         unsure type; in 40s     Breast Cancer Sister      Skin Cancer Sister      Cerebrovascular Disease Father         CVA     Chronic  Obstructive Pulmonary Disease Father      Skin Cancer Father      Aneurysm Paternal Grandmother      Unknown/Adopted Paternal Grandfather      Alzheimer Disease Brother         early onset     Diabetes Son         type 1     Thyroid Disease Son      Arthritis Brother      Stomach Problem Son         polyps, diverticulitis, heartburn, passes blood     Alcoholism Son      Breast Cancer Paternal Aunt        Social History     Socioeconomic History     Marital status:      Spouse name: Not on file     Number of children: Not on file     Years of education: Not on file     Highest education level: Not on file   Occupational History     Comment: Retired   Social Needs     Financial resource strain: Not on file     Food insecurity     Worry: Not on file     Inability: Not on file     Transportation needs     Medical: Not on file     Non-medical: Not on file   Tobacco Use     Smoking status: Former Smoker     Packs/day: 2.00     Years: 20.00     Pack years: 40.00     Types: Cigarettes     Quit date: 1987     Years since quittin.1     Smokeless tobacco: Never Used   Substance and Sexual Activity     Alcohol use: Yes     Comment: 1 nightly     Drug use: No     Sexual activity: Not Currently     Partners: Male     Birth control/protection: Post-menopausal   Lifestyle     Physical activity     Days per week: Not on file     Minutes per session: Not on file     Stress: Not on file   Relationships     Social connections     Talks on phone: Not on file     Gets together: Not on file     Attends Christianity service: Not on file     Active member of club or organization: Not on file     Attends meetings of clubs or organizations: Not on file     Relationship status: Not on file     Intimate partner violence     Fear of current or ex partner: Not on file     Emotionally abused: Not on file     Physically abused: Not on file     Forced sexual activity: Not on file   Other Topics Concern     Parent/sibling w/ CABG, MI  or angioplasty before 65F 55M? No   Social History Narrative    June 2, 2021    ENVIRONMENTAL HISTORY: [place] The home is heated with a forced air but does not use.  Has floor heating. They do have central air conditioning. The patient's bedroom is furnished with carpeting in bedroom.  No pets inside the house. But is exposed to chicken. There is no history of cockroach or mice infestation. There are no smokers in the house.  The house does not have a damp basement.        Outpatient Encounter Medications as of 6/18/2021   Medication Sig Dispense Refill     albuterol (2.5 MG/3ML) 0.083% neb solution Take 1 vial (2.5 mg) by nebulization every 4 hours as needed for shortness of breath / dyspnea, wheezing or other (persistent cough) 50 vial 1     albuterol (PROAIR HFA/PROVENTIL HFA/VENTOLIN HFA) 108 (90 Base) MCG/ACT inhaler Inhale 2-4 puffs into the lungs every 4 hours as needed for shortness of breath / dyspnea or wheezing 18 g 3     azelastine (ASTELIN) 0.1 % nasal spray Spray 2 sprays into both nostrils 2 times daily as needed for rhinitis or allergies 30 mL 3     fluticasone-vilanterol (BREO ELLIPTA) 200-25 MCG/INH inhaler Inhale 1 puff into the lungs daily 180 each 3     hydrochlorothiazide (HYDRODIURIL) 12.5 MG tablet TAKE 1 TABLET(12.5 MG) BY MOUTH DAILY 90 tablet 3     tiotropium (SPIRIVA RESPIMAT) 1.25 MCG/ACT inhaler Inhale 2 puffs into the lungs daily 12 g 3     calcipotriene (DOVONOX) 0.005 % external cream Apply twice daily to affected area on body as needed. (Patient not taking: Reported on 6/2/2021) 60 g 4     clobetasol (TEMOVATE) 0.05 % external cream Apply sparingly to affected area twice weekly as needed.  Do not apply to face. (Patient not taking: Reported on 6/2/2021) 60 g 3     clobetasol (TEMOVATE) 0.05 % external ointment Apply sparingly daily as needed to affected area on groin. (Patient not taking: Reported on 6/2/2021) 30 g 4     fluocinonide  (LIDEX) 0.05 % external gel Apply topically 2 times daily As needed for mouth sores (Patient not taking: Reported on 6/2/2021) 15 g 0     Respiratory Therapy Supplies (NEBULIZER/TUBING/MOUTHPIECE) KIT Use with albuterol neb solution (Patient not taking: Reported on 6/2/2021) 1 kit 0     No facility-administered encounter medications on file as of 6/18/2021.              O:   NAD, WDWN, Alert & Oriented, Mood & Affect wnl, Vitals stable   Here today alone   /67 (BP Location: Left arm, Cuff Size: Adult Large)   Pulse 73   SpO2 97%    General appearance normal   Vitals stable   Alert, oriented and in no acute distress     1.3 pink scaly plaque on right upper chest  0.6 cm pink papule on mid sternum        Eyes: Conjunctivae/lids:Normal     ENT: Lips: normal    MSK:Normal    Pulm: Breathing Normal    Neuro/Psych: Orientation:Alert and Orientedx3 ; Mood/Affect:normal   A/P:  1. R/O NMSC vs ISK on right upper chest and mid sternum  TANGENTIAL BIOPSY SENT OUT:  After consent, anesthesia with LEC and prep, tangential excision performed and specimen sent out for permanent section histology.  No complications and routine wound care. Patient told to call our office in 1-2 weeks for result.      Signs and Symptoms of skin cancer discussed with patient.  ABCDEs of melanoma reviewed with patient.  Patient encouraged to perform monthly skin exams.  UV precautions reviewed with patient.  Risks of non-melanoma skin cancer discussed with patient   Return to clinic pending biopsy results.       Again, thank you for allowing me to participate in the care of your patient.        Sincerely,        Marlen Lawrence PA-C

## 2021-06-18 NOTE — PROGRESS NOTES
Luz Elena Cristina is an extremely pleasant 66 year old year old female patient here today for spots on chest. Present for a few months, itchy. She notes that one has resolve but two still remain.  Patient has no other skin complaints today.  Remainder of the HPI, Meds, PMH, Allergies, FH, and SH was reviewed in chart.    Pertinent Hx:   No personal history of skin cancer.   Past Medical History:   Diagnosis Date     Hypertension      Rheumatic fever      Sleep apnea      Uncomplicated asthma        Past Surgical History:   Procedure Laterality Date     BIOPSY      breast,     COLONOSCOPY       COLONOSCOPY N/A 2/8/2019    Procedure: Combined Colonoscopy, Single Or Multiple Biopsy/Polypectomy By Biopsy;  Surgeon: José Miguel Ochoa MD;  [facility]     COLONOSCOPY WITH CO2 INSUFFLATION N/A 2/8/2019    Procedure: COLONOSCOPY WITH CO2 INSUFFLATION;  Surgeon: José Miguel Ochoa MD;  [facility]     COMBINED ESOPHAGOSCOPY, GASTROSCOPY, DUODENOSCOPY (EGD) WITH CO2 INSUFFLATION N/A 2/8/2019    Procedure: COMBINED ESOPHAGOSCOPY, GASTROSCOPY, DUODENOSCOPY (EGD) WITH CO2 INSUFFLATION;  Surgeon: José Miguel Ochoa MD;  [facility]     ESOPHAGOSCOPY, GASTROSCOPY, DUODENOSCOPY (EGD), COMBINED N/A 2/8/2019    Procedure: Combined Esophagoscopy, Gastroscopy, Duodenoscopy (Egd), Biopsy Single Or Multiple;  Surgeon: José Miguel Ochoa MD;  Location: [facility]     GYN SURGERY       TONSILLECTOMY          Family History   Problem Relation Age of Onset     Other Cancer Mother         panreatic     Diabetes Mother         unsure type; in 40s     Breast Cancer Sister      Skin Cancer Sister      Cerebrovascular Disease Father         CVA     Chronic Obstructive Pulmonary Disease Father      Skin Cancer Father      Aneurysm Paternal Grandmother      Unknown/Adopted Paternal Grandfather      Alzheimer Disease Brother         early onset     Diabetes Son         type 1     Thyroid Disease Son       Arthritis Brother      Stomach Problem Son         polyps, diverticulitis, heartburn, passes blood     Alcoholism Son      Breast Cancer Paternal Aunt        Social History     Socioeconomic History     Marital status:      Spouse name: Not on file     Number of children: Not on file     Years of education: Not on file     Highest education level: Not on file   Occupational History     Comment: Retired   Social Needs     Financial resource strain: Not on file     Food insecurity     Worry: Not on file     Inability: Not on file     Transportation needs     Medical: Not on file     Non-medical: Not on file   Tobacco Use     Smoking status: Former Smoker     Packs/day: 2.00     Years: 20.00     Pack years: 40.00     Types: Cigarettes     Quit date: 1987     Years since quittin.1     Smokeless tobacco: Never Used   Substance and Sexual Activity     Alcohol use: Yes     Comment: 1 nightly     Drug use: No     Sexual activity: Not Currently     Partners: Male     Birth control/protection: Post-menopausal   Lifestyle     Physical activity     Days per week: Not on file     Minutes per session: Not on file     Stress: Not on file   Relationships     Social connections     Talks on phone: Not on file     Gets together: Not on file     Attends Evangelical service: Not on file     Active member of club or organization: Not on file     Attends meetings of clubs or organizations: Not on file     Relationship status: Not on file     Intimate partner violence     Fear of current or ex partner: Not on file     Emotionally abused: Not on file     Physically abused: Not on file     Forced sexual activity: Not on file   Other Topics Concern     Parent/sibling w/ CABG, MI or angioplasty before 65F 55M? No   Social History Narrative    2021    ENVIRONMENTAL HISTORY: [place] The home is heated with a forced air but does not use.  Has floor heating. They do have central  air conditioning. The patient's bedroom is furnished with carpeting in bedroom.  No pets inside the house. But is exposed to chicken. There is no history of cockroach or mice infestation. There are no smokers in the house.  The house does not have a damp basement.        Outpatient Encounter Medications as of 6/18/2021   Medication Sig Dispense Refill     albuterol (2.5 MG/3ML) 0.083% neb solution Take 1 vial (2.5 mg) by nebulization every 4 hours as needed for shortness of breath / dyspnea, wheezing or other (persistent cough) 50 vial 1     albuterol (PROAIR HFA/PROVENTIL HFA/VENTOLIN HFA) 108 (90 Base) MCG/ACT inhaler Inhale 2-4 puffs into the lungs every 4 hours as needed for shortness of breath / dyspnea or wheezing 18 g 3     azelastine (ASTELIN) 0.1 % nasal spray Spray 2 sprays into both nostrils 2 times daily as needed for rhinitis or allergies 30 mL 3     fluticasone-vilanterol (BREO ELLIPTA) 200-25 MCG/INH inhaler Inhale 1 puff into the lungs daily 180 each 3     hydrochlorothiazide (HYDRODIURIL) 12.5 MG tablet TAKE 1 TABLET(12.5 MG) BY MOUTH DAILY 90 tablet 3     tiotropium (SPIRIVA RESPIMAT) 1.25 MCG/ACT inhaler Inhale 2 puffs into the lungs daily 12 g 3     calcipotriene (DOVONOX) 0.005 % external cream Apply twice daily to affected area on body as needed. (Patient not taking: Reported on 6/2/2021) 60 g 4     clobetasol (TEMOVATE) 0.05 % external cream Apply sparingly to affected area twice weekly as needed.  Do not apply to face. (Patient not taking: Reported on 6/2/2021) 60 g 3     clobetasol (TEMOVATE) 0.05 % external ointment Apply sparingly daily as needed to affected area on groin. (Patient not taking: Reported on 6/2/2021) 30 g 4     fluocinonide (LIDEX) 0.05 % external gel Apply topically 2 times daily As needed for mouth sores (Patient not taking: Reported on 6/2/2021) 15 g 0     Respiratory Therapy Supplies (NEBULIZER/TUBING/MOUTHPIECE) KIT Use with albuterol neb solution (Patient not taking:  Reported on 6/2/2021) 1 kit 0     No facility-administered encounter medications on file as of 6/18/2021.              O:   NAD, WDWN, Alert & Oriented, Mood & Affect wnl, Vitals stable   Here today alone   /67 (BP Location: Left arm, Cuff Size: Adult Large)   Pulse 73   SpO2 97%    General appearance normal   Vitals stable   Alert, oriented and in no acute distress     1.3 pink scaly plaque on right upper chest  0.6 cm pink papule on mid sternum        Eyes: Conjunctivae/lids:Normal     ENT: Lips: normal    MSK:Normal    Pulm: Breathing Normal    Neuro/Psych: Orientation:Alert and Orientedx3 ; Mood/Affect:normal   A/P:  1. R/O NMSC vs ISK on right upper chest and mid sternum  TANGENTIAL BIOPSY SENT OUT:  After consent, anesthesia with LEC and prep, tangential excision performed and specimen sent out for permanent section histology.  No complications and routine wound care. Patient told to call our office in 1-2 weeks for result.      Signs and Symptoms of skin cancer discussed with patient.  ABCDEs of melanoma reviewed with patient.  Patient encouraged to perform monthly skin exams.  UV precautions reviewed with patient.  Risks of non-melanoma skin cancer discussed with patient   Return to clinic pending biopsy results.

## 2021-06-22 LAB — COPATH REPORT: NORMAL

## 2021-08-05 ENCOUNTER — HOSPITAL ENCOUNTER (OUTPATIENT)
Dept: MAMMOGRAPHY | Facility: CLINIC | Age: 66
Discharge: HOME | End: 2021-08-05
Attending: PHYSICIAN ASSISTANT | Admitting: PHYSICIAN ASSISTANT
Payer: MEDICARE

## 2021-08-05 DIAGNOSIS — Z12.31 VISIT FOR SCREENING MAMMOGRAM: ICD-10-CM

## 2021-08-05 PROCEDURE — 77067 SCR MAMMO BI INCL CAD: CPT

## 2021-09-04 ENCOUNTER — HEALTH MAINTENANCE LETTER (OUTPATIENT)
Age: 66
End: 2021-09-04

## 2021-09-07 DIAGNOSIS — I10 BENIGN ESSENTIAL HYPERTENSION: ICD-10-CM

## 2021-09-10 RX ORDER — HYDROCHLOROTHIAZIDE 12.5 MG/1
TABLET ORAL
Qty: 90 TABLET | Refills: 0 | Status: SHIPPED | OUTPATIENT
Start: 2021-09-10 | End: 2021-12-08

## 2021-11-02 ENCOUNTER — E-VISIT (OUTPATIENT)
Dept: FAMILY MEDICINE | Facility: CLINIC | Age: 66
End: 2021-11-02
Payer: MEDICARE

## 2021-11-02 DIAGNOSIS — Z20.822 SUSPECTED COVID-19 VIRUS INFECTION: Primary | ICD-10-CM

## 2021-11-02 PROCEDURE — 99421 OL DIG E/M SVC 5-10 MIN: CPT | Performed by: PHYSICIAN ASSISTANT

## 2021-11-03 NOTE — PATIENT INSTRUCTIONS
Dear Luz Elena Cristina,    Your symptoms show that you may have coronavirus (COVID-19). This illness can cause fever, cough and trouble breathing. Many people get a mild case and get better on their own. Some people can get very sick.    Will I be tested for COVID-19?  We would like to test you for Covid-19 virus. I have placed orders for this test.     To schedule: go to your [employer] home page and scroll down to the section that says  You have an appointment that needs to be scheduled  and click the large green button that says  Schedule Now  and follow the steps to find the next available openings.    If you are unable to complete these [employer] scheduling steps, please call [phone] to schedule your testing.     Return to work/school/ guidance:  Please let your workplace manager and staffing office know when your quarantine ends     We can t give you an exact date as it depends on the above. You can calculate this on your own or work with your manager/staffing office to calculate this. (For example if you were exposed on 10/4, you would have to quarantine for 14 full days. That would be through 10/18. You could return on 10/19.)      If you receive a positive COVID-19 test result, follow the guidance of the those who are giving you the results. Usually the return to work is 10 (or in some cases 20 days from symptom onset.) If you work at [facility], you must also be cleared by Employee Occupational Health and Safety to return to work.        If you receive a negative COVID-19 test result and did not have a high risk exposure to someone with a known positive COVID-19 test, you can return to work once you're free of fever for 24 hours without fever-reducing medication and your symptoms are improving or resolved.      If you receive a negative COVID-19 test and If you had a high risk exposure to someone who has tested positive for COVID-19 then you can return to work 14 days after your last contact  with the positive individual    Note: If you have ongoing exposure to the covid positive person, this quarantine period may be more than 14 days. (For example, if you are continued to be exposed to your child who tested positive and cannot isolate from them, then the quarantine of 7-14 days can't start until your child is no longer contagious. This is typically 10 days from onset of the child's symptoms. So the total duration may be 17-24 days in this case.)    Sign up for [employer].   We know it's scary to hear that you might have COVID-19. We want to track your symptoms to make sure you're okay over the next 2 weeks. Please look for an email from [employer]--this is a free, online program that we'll use to keep in touch. To sign up, follow the link in the email you will receive. Learn more at [email or web address]    How can I take care of myself?    Get lots of rest. Drink extra fluids (unless a doctor has told you not to)    Take Tylenol (acetaminophen) or ibuprofen for fever or pain. If you have liver or kidney problems, ask your family doctor if it's okay to take Tylenol o ibuprofen    If you have other health problems (like cancer, heart failure, an organ transplant or severe kidney disease): Call your specialty clinic if you don't feel better in the next 2 days.    Know when to call 911. Emergency warning signs include:  o Trouble breathing or shortness of breath  o Pain or pressure in the chest that doesn't go away  o Feeling confused like you haven't felt before, or not being able to wake up  o Bluish-colored lips or face    Where can I get more information?  [facility] [employer] [place] - About COVID-19:   [email or web address]    CDC - What to Do If You're Sick:   www.cdc.gov/coronavirus/2019-ncov/about/steps-when-sick.html

## 2021-11-04 ENCOUNTER — LAB (OUTPATIENT)
Dept: FAMILY MEDICINE | Facility: CLINIC | Age: 66
End: 2021-11-04
Attending: PHYSICIAN ASSISTANT
Payer: MEDICARE

## 2021-11-04 DIAGNOSIS — Z20.822 SUSPECTED COVID-19 VIRUS INFECTION: ICD-10-CM

## 2021-11-04 PROCEDURE — U0003 INFECTIOUS AGENT DETECTION BY NUCLEIC ACID (DNA OR RNA); SEVERE ACUTE RESPIRATORY SYNDROME CORONAVIRUS 2 (SARS-COV-2) (CORONAVIRUS DISEASE [COVID-19]), AMPLIFIED PROBE TECHNIQUE, MAKING USE OF HIGH THROUGHPUT TECHNOLOGIES AS DESCRIBED BY CMS-2020-01-R: HCPCS

## 2021-11-04 PROCEDURE — U0005 INFEC AGEN DETEC AMPLI PROBE: HCPCS

## 2021-11-05 LAB — SARS-COV-2 RNA RESP QL NAA+PROBE: NEGATIVE

## 2021-11-09 ENCOUNTER — OFFICE VISIT (OUTPATIENT)
Dept: URGENT CARE | Facility: URGENT CARE | Age: 66
End: 2021-11-09
Payer: MEDICARE

## 2021-11-09 VITALS
SYSTOLIC BLOOD PRESSURE: 136 MMHG | HEART RATE: 86 BPM | TEMPERATURE: 97.9 F | RESPIRATION RATE: 20 BRPM | OXYGEN SATURATION: 95 % | WEIGHT: 192 LBS | BODY MASS INDEX: 31.95 KG/M2 | DIASTOLIC BLOOD PRESSURE: 56 MMHG

## 2021-11-09 DIAGNOSIS — J01.90 ACUTE SINUSITIS WITH SYMPTOMS > 10 DAYS: Primary | ICD-10-CM

## 2021-11-09 DIAGNOSIS — J45.901 EXACERBATION OF PERSISTENT ASTHMA, UNSPECIFIED ASTHMA SEVERITY: ICD-10-CM

## 2021-11-09 PROCEDURE — 99214 OFFICE O/P EST MOD 30 MIN: CPT | Performed by: PHYSICIAN ASSISTANT

## 2021-11-09 RX ORDER — PREDNISONE 20 MG/1
40 TABLET ORAL DAILY
Qty: 10 TABLET | Refills: 0 | Status: SHIPPED | OUTPATIENT
Start: 2021-11-09 | End: 2021-11-14

## 2021-11-09 NOTE — PROGRESS NOTES
"  Assessment & Plan     Acute sinusitis with symptoms > 10 days  Will treat with amoxicillin-clavulanate (AUGMENTIN) 875-125 MG tablet; Take 1 tablet by mouth 2 times daily for 10 days. Continue with supportive care. Return to clinic if symptoms worsen or do not improve; otherwise follow up as needed      Exacerbation of persistent asthma, unspecified asthma severity  Will treat with predniSONE (DELTASONE) 20 MG tablet; Take 2 tablets (40 mg) by mouth daily for 5 days. Continue with albuterol every 4-6 hrs as needed. Return to clinic if symptoms worsen or do not improve; otherwise follow up as needed               BMI:   Estimated body mass index is 31.95 kg/m  as calculated from the following:    Height as of 1/27/21: 1.651 m (5' 5\").    Weight as of this encounter: 87.1 kg (192 lb).           Return in about 2 days (around 11/11/2021), or if symptoms worsen or fail to improve.    Francine Merlos PA-C  [facility] [place]            Subjective   Chief Complaint   Patient presents with     Cough     x 3 weeks, sinus congestion, sob, wheezing, negative covid 11/4       HPI     URI Adult    Onset of symptoms was 3 week(s) ago.  Course of illness is same.    Severity moderate  Current and Associated symptoms: cough, congestion, sinus pain/pressure, shortness of breath  Treatment measures tried include OTC Cough med, muccinex,   Predisposing factors include HX of asthma.                Review of Systems   Constitutional, HEENT, cardiovascular, pulmonary, gi and gu systems are negative, except as otherwise noted.      Objective    /56 (BP Location: Right arm, Patient Position: Sitting, Cuff Size: Adult Large)   Pulse 86   Temp 97.9  F (36.6  C) (Tympanic)   Resp 20   Wt 87.1 kg (192 lb)   SpO2 95%   BMI 31.95 kg/m    Body mass index is 31.95 kg/m .     Physical Exam  Constitutional:       Appearance: She is well-developed.   HENT:      Head: Normocephalic.      Right Ear: Tympanic " membrane and ear canal normal.      Left Ear: Tympanic membrane and ear canal normal.   Eyes:      Conjunctiva/sclera: Conjunctivae normal.   Cardiovascular:      Rate and Rhythm: Normal rate.      Heart sounds: Normal heart sounds.   Pulmonary:      Effort: Pulmonary effort is normal.      Breath sounds: Normal breath sounds.      Comments: Frequent reactive cough   Skin:     General: Skin is warm and dry.      Findings: No rash.   Psychiatric:         Behavior: Behavior normal.

## 2021-12-06 ENCOUNTER — TELEPHONE (OUTPATIENT)
Dept: ALLERGY | Facility: CLINIC | Age: 66
End: 2021-12-06
Payer: COMMERCIAL

## 2021-12-06 DIAGNOSIS — I10 BENIGN ESSENTIAL HYPERTENSION: ICD-10-CM

## 2021-12-06 NOTE — TELEPHONE ENCOUNTER
Prior Authorization Retail Medication Request    Medication/Dose: tiotropium (SPIRIVA RESPIMAT) 1.25 MCG/ACT inhaler  ICD code (if different than what is on RX):    Previously Tried and Failed:    Rationale:      Insurance Name:  MEDICARE  Insurance ID:  [id number]       Pharmacy Information (if different than what is on RX)  Name:  [place], [place]   Phone:  [phone]

## 2021-12-06 NOTE — TELEPHONE ENCOUNTER
Central Prior Authorization Team   Phone: [phone]    PA Initiation    Medication: tiotropium (SPIRIVA RESPIMAT) 1.25 MCG/ACT inhaler  Insurance Company: [facility] - Phone [phone] Fax [phone]  Pharmacy Filling the Rx: [id number] - [facility] [address]  Filling Pharmacy Phone: [phone]  Filling Pharmacy Fax:    Start Date: 12/6/2021

## 2021-12-06 NOTE — LETTER
[facility]  [address] [place] [id number]  Phone: [phone]  Fax: [phone]        December 8, 2021      Luz Elenaleanna Cristina                                                                                                                                [address] [place], [place] [address]E [facility] [address]            Dear Ms. Cristina,    We are concerned about your health care.  We recently provided you with a medication refill.  Many medications require routine follow-up with your Doctor.      At this time we ask that: You schedule an appointment for your annual physical.    Your prescription: Has been refilled for 1 month so you may have time for the above noted follow-up.      Thank you,      Kiara Zazueta PA-C/ Argelia Casas RN

## 2021-12-07 NOTE — TELEPHONE ENCOUNTER
Prior Authorization Approval    Authorization Effective Date: 9/7/2021  Authorization Expiration Date: 12/6/2022  Medication: tiotropium (SPIRIVA RESPIMAT) 1.25 MCG/ACT inhaler  Approved Dose/Quantity:    Reference #:     Insurance Company: [employer] [place] - Phone [phone] Fax [phone]  Expected CoPay:       CoPay Card Available:      Foundation Assistance Needed:    Which Pharmacy is filling the prescription (Not needed for infusion/clinic administered): [id number] - [place] [id number] [facility] [address] [facility] [address]  Pharmacy Notified: Yes  Patient Notified: Yes  **Instructed pharmacy to notify patient when script is ready to /ship.**

## 2021-12-08 ENCOUNTER — MYC MEDICAL ADVICE (OUTPATIENT)
Dept: FAMILY MEDICINE | Facility: CLINIC | Age: 66
End: 2021-12-08
Payer: COMMERCIAL

## 2021-12-08 RX ORDER — HYDROCHLOROTHIAZIDE 12.5 MG/1
TABLET ORAL
Qty: 30 TABLET | Refills: 0 | Status: SHIPPED | OUTPATIENT
Start: 2021-12-08 | End: 2022-01-12

## 2021-12-08 NOTE — TELEPHONE ENCOUNTER
Creatinine   Date Value Ref Range Status   06/04/2020 0.78 0.52 - 1.04 mg/dL Final     Potassium   Date Value Ref Range Status   06/04/2020 4.1 3.4 - 5.3 mmol/L Final     Sodium   Date Value Ref Range Status   06/04/2020 141 133 - 144 mmol/L Final     Last Written Prescription Date:  91021  Last Fill Quantity: 90,  # refills: 0   Last office visit: 1/27/2021 with prescribing provider:  Kiara Zazueta PA-C     Future Office Visit:  None scheduled  Routing refill request to provider for review/approval because:  Yenny given x1 and patient did not follow up  Sent letter and My Chart not needs appointment  Argelia Casas RN

## 2022-01-12 ENCOUNTER — OFFICE VISIT (OUTPATIENT)
Dept: FAMILY MEDICINE | Facility: CLINIC | Age: 67
End: 2022-01-12
Payer: MEDICARE

## 2022-01-12 VITALS
RESPIRATION RATE: 20 BRPM | HEIGHT: 64 IN | OXYGEN SATURATION: 99 % | SYSTOLIC BLOOD PRESSURE: 132 MMHG | TEMPERATURE: 97 F | BODY MASS INDEX: 32.13 KG/M2 | HEART RATE: 83 BPM | WEIGHT: 188.2 LBS | DIASTOLIC BLOOD PRESSURE: 84 MMHG

## 2022-01-12 DIAGNOSIS — R73.03 PREDIABETES: ICD-10-CM

## 2022-01-12 DIAGNOSIS — Z00.00 ENCOUNTER FOR MEDICARE ANNUAL WELLNESS EXAM: Primary | ICD-10-CM

## 2022-01-12 DIAGNOSIS — Z13.6 CARDIOVASCULAR SCREENING; LDL GOAL LESS THAN 130: ICD-10-CM

## 2022-01-12 DIAGNOSIS — Z12.11 SPECIAL SCREENING FOR MALIGNANT NEOPLASMS, COLON: ICD-10-CM

## 2022-01-12 DIAGNOSIS — I10 BENIGN ESSENTIAL HYPERTENSION: ICD-10-CM

## 2022-01-12 DIAGNOSIS — H69.93 DYSFUNCTION OF BOTH EUSTACHIAN TUBES: ICD-10-CM

## 2022-01-12 PROBLEM — Z91.89 AT RISK FOR CARDIOVASCULAR EVENT: Status: RESOLVED | Noted: 2018-02-28 | Resolved: 2022-01-12

## 2022-01-12 PROBLEM — E66.01 SEVERE OBESITY (BMI 35.0-39.9) WITH COMORBIDITY (H): Status: RESOLVED | Noted: 2018-02-27 | Resolved: 2022-01-12

## 2022-01-12 LAB
ANION GAP SERPL CALCULATED.3IONS-SCNC: 6 MMOL/L (ref 3–14)
BUN SERPL-MCNC: 17 MG/DL (ref 7–30)
CALCIUM SERPL-MCNC: 9.5 MG/DL (ref 8.5–10.1)
CHLORIDE BLD-SCNC: 101 MMOL/L (ref 94–109)
CHOLEST SERPL-MCNC: 200 MG/DL
CO2 SERPL-SCNC: 29 MMOL/L (ref 20–32)
CREAT SERPL-MCNC: 0.8 MG/DL (ref 0.52–1.04)
FASTING STATUS PATIENT QL REPORTED: ABNORMAL
GFR SERPL CREATININE-BSD FRML MDRD: 80 ML/MIN/1.73M2
GLUCOSE BLD-MCNC: 94 MG/DL (ref 70–99)
HBA1C MFR BLD: 5.5 % (ref 0–5.6)
HDLC SERPL-MCNC: 70 MG/DL
LDLC SERPL CALC-MCNC: 114 MG/DL
NONHDLC SERPL-MCNC: 130 MG/DL
POTASSIUM BLD-SCNC: 3.6 MMOL/L (ref 3.4–5.3)
SODIUM SERPL-SCNC: 136 MMOL/L (ref 133–144)
TRIGL SERPL-MCNC: 79 MG/DL

## 2022-01-12 PROCEDURE — G0439 PPPS, SUBSEQ VISIT: HCPCS | Performed by: PHYSICIAN ASSISTANT

## 2022-01-12 PROCEDURE — 80048 BASIC METABOLIC PNL TOTAL CA: CPT | Performed by: PHYSICIAN ASSISTANT

## 2022-01-12 PROCEDURE — 99214 OFFICE O/P EST MOD 30 MIN: CPT | Mod: 25 | Performed by: PHYSICIAN ASSISTANT

## 2022-01-12 PROCEDURE — 36415 COLL VENOUS BLD VENIPUNCTURE: CPT | Performed by: PHYSICIAN ASSISTANT

## 2022-01-12 PROCEDURE — 80061 LIPID PANEL: CPT | Performed by: PHYSICIAN ASSISTANT

## 2022-01-12 PROCEDURE — 83036 HEMOGLOBIN GLYCOSYLATED A1C: CPT | Performed by: PHYSICIAN ASSISTANT

## 2022-01-12 RX ORDER — HYDROCHLOROTHIAZIDE 12.5 MG/1
TABLET ORAL
Qty: 90 TABLET | Refills: 3 | Status: SHIPPED | OUTPATIENT
Start: 2022-01-12 | End: 2023-02-21

## 2022-01-12 ASSESSMENT — ENCOUNTER SYMPTOMS
EYE PAIN: 0
NERVOUS/ANXIOUS: 0
JOINT SWELLING: 0
SHORTNESS OF BREATH: 0
COUGH: 0
PALPITATIONS: 0
DYSURIA: 0
DIZZINESS: 0
PARESTHESIAS: 0
HEMATURIA: 0
ABDOMINAL PAIN: 0
CONSTIPATION: 0
FEVER: 0
BREAST MASS: 0
HEARTBURN: 1
ARTHRALGIAS: 0
SORE THROAT: 0
WEAKNESS: 0
CHILLS: 0
MYALGIAS: 0
DIARRHEA: 0
NAUSEA: 0
FREQUENCY: 0

## 2022-01-12 ASSESSMENT — MIFFLIN-ST. JEOR: SCORE: 1372.64

## 2022-01-12 ASSESSMENT — ACTIVITIES OF DAILY LIVING (ADL): CURRENT_FUNCTION: NO ASSISTANCE NEEDED

## 2022-01-12 ASSESSMENT — PAIN SCALES - GENERAL: PAINLEVEL: NO PAIN (0)

## 2022-01-12 NOTE — PROGRESS NOTES
"SUBJECTIVE:   Luz Elena Cristina is a 66 year old female who presents for Preventive Visit.    Patient has been advised of split billing requirements and indicates understanding: Yes     Are you in the first 12 months of your Medicare coverage?  No    Healthy Habits:     In general, how would you rate your overall health?  Good    Frequency of exercise:  4-5 days/week    Duration of exercise:  30-45 minutes    Do you usually eat at least 4 servings of fruit and vegetables a day, include whole grains    & fiber and avoid regularly eating high fat or \"junk\" foods?  Yes    Taking medications regularly:  Yes    Medication side effects:  None    Ability to successfully perform activities of daily living:  No assistance needed    Home Safety:  No safety concerns identified    Hearing Impairment:  Difficulty following a conversation in a noisy restaurant or crowded room    In the past 6 months, have you been bothered by leaking of urine? Yes    In general, how would you rate your overall mental or emotional health?  Good      PHQ-2 Total Score: 0    Additional concerns today:  No    Do you feel safe in your environment? Yes    Have you ever done Advance Care Planning? (For example, a Health Directive, POLST, or a discussion with a medical provider or your loved ones about your wishes): No, advance care planning information given to patient to review.  Patient plans to discuss their wishes with loved ones or provider.        Fall risk  Fallen 2 or more times in the past year?: No  Any fall with injury in the past year?: No    Cognitive Screening   1) Repeat 3 items (Leader, Season, Table)    2) Clock draw: NORMAL  3) 3 item recall: Recalls 3 objects  Results: 3 items recalled: COGNITIVE IMPAIRMENT LESS LIKELY    Mini-CogTM Copyright KAVIN Jon. Licensed by the author for use in [facility]; reprinted with permission (carleen@.[facility]). All rights reserved.      Do you have sleep apnea, excessive snoring or daytime " drowsiness?: yes    Reviewed and updated as needed this visit by clinical staff   Allergies  Meds             Reviewed and updated as needed this visit by Provider               Social History     Tobacco Use     Smoking status: Former Smoker     Packs/day: 2.00     Years: 20.00     Pack years: 40.00     Types: Cigarettes     Quit date: 1987     Years since quittin.7     Smokeless tobacco: Never Used   Substance Use Topics     Alcohol use: Yes     Comment: 1 nightly     If you drink alcohol do you typically have >3 drinks per day or >7 drinks per week? No    Alcohol Use 2022   Prescreen: >3 drinks/day or >7 drinks/week? No   Prescreen: >3 drinks/day or >7 drinks/week? -   No flowsheet data found.    Hypertension Follow-up      Do you check your blood pressure regularly outside of the clinic? No     Are you following a low salt diet? Yes    Are your blood pressures ever more than 140 on the top number (systolic) OR more   than 90 on the bottom number (diastolic), for example 140/90? No      Current providers sharing in care for this patient include:   Patient Care Team:  Kiara Zazueta PA-C as PCP - General (Physician Assistant)  Kiara Zazueta PA-C as Assigned PCP  Prince Manzanares MD as Assigned Pulmonology Provider  Galindo Montano MD as Assigned Allergy Provider  Marlen Godoy PA-C as Physician Assistant (Dermatology)    The following health maintenance items are reviewed in Epic and correct as of today:  Health Maintenance Due   Topic Date Due     ANNUAL REVIEW OF HM ORDERS  Never done     ASTHMA ACTION PLAN  2019     MEDICARE ANNUAL WELLNESS VISIT  2021     FALL RISK ASSESSMENT  2021     ASTHMA CONTROL TEST  2021     COLORECTAL CANCER SCREENING  2022     Lab work is in process  Any new diagnosis of family breast, ovarian, or bowel cancer? No    FHS-7:   Breast CA Risk Assessment (FHS-7) 2021   Did any of your first-degree  "relatives have breast or ovarian cancer? Yes Yes   Did any of your relatives have bilateral breast cancer? No No   Did any man in your family have breast cancer? No No   Did any woman in your family have breast and ovarian cancer? No Yes   Did any woman in your family have breast cancer before age 50 y? Yes Yes   Do you have 2 or more relatives with breast and/or ovarian cancer? Yes Yes   Do you have 2 or more relatives with breast and/or bowel cancer? No Yes     Pertinent mammograms are reviewed under the imaging tab.    Review of Systems   Constitutional: Negative for chills and fever.   HENT: Positive for hearing loss. Negative for congestion, ear pain and sore throat.    Eyes: Negative for pain and visual disturbance.   Respiratory: Negative for cough and shortness of breath.    Cardiovascular: Negative for chest pain, palpitations and peripheral edema.   Gastrointestinal: Positive for heartburn. Negative for abdominal pain, constipation, diarrhea and nausea.   Breasts:  Negative for tenderness, breast mass and discharge.   Genitourinary: Negative for dysuria, frequency, genital sores, hematuria, pelvic pain, urgency, vaginal bleeding and vaginal discharge.   Musculoskeletal: Negative for arthralgias, joint swelling and myalgias.   Skin: Negative for rash.   Neurological: Negative for dizziness, weakness and paresthesias.   Psychiatric/Behavioral: Negative for mood changes. The patient is not nervous/anxious.        OBJECTIVE:   /84 (BP Location: Right arm, Patient Position: Sitting, Cuff Size: Adult Large)   Pulse 83   Temp 97  F (36.1  C) (Tympanic)   Resp 20   Ht 1.632 m (5' 4.25\")   Wt 85.4 kg (188 lb 3.2 oz)   SpO2 99%   BMI 32.05 kg/m   Estimated body mass index is 32.05 kg/m  as calculated from the following:    Height as of this encounter: 1.632 m (5' 4.25\").    Weight as of this encounter: 85.4 kg (188 lb 3.2 oz).  Physical Exam  Constitutional: healthy, alert, and no distress  Head: " Normocephalic. Atraumatic  Eyes: No conjunctival injection, sclera anicteric  Neck: supple, no thyromegaly, nodules or asymmetry of the thyroid. No cervical LAD.  Cardiovascular: RRR. No murmurs, clicks, gallops, or rubs. No peripheral edema.   Respiratory: No resp distress. Lungs CTAB bilaterally.   Musculoskeletal: extremities normal- no gross deformities noted, and normal muscle tone  Skin: no suspicious lesions or rashes  Neurologic: Gait normal. CN 2-12 grossly intact  Psychiatric: mentation appears normal and affect normal/bright    ASSESSMENT / PLAN:   Encounter for Medicare annual wellness exam  Generally healthy 66 yr old here for physical exam. Last physical exam done 1 year ago. Discussed preventative screenings which are updated below. Counseled on immunizations and healthy lifestyle. Follow-up in 1 year for repeat physical exam.     CARDIOVASCULAR SCREENING; LDL GOAL LESS THAN 130  Due for screening. Ordered today.   - Lipid panel reflex to direct LDL Fasting; Future    Special screening for malignant neoplasms, colon  Due for screening. Colonoscopy ordered.   - Adult Gastro Ref - Procedure Only; Future    Benign essential hypertension  Normotensive here in clinic. Check BMP. Refilled hydrochlorothiazide.   - Basic metabolic panel  (Ca, Cl, CO2, Creat, Gluc, K, Na, BUN); Future  - hydrochlorothiazide (HYDRODIURIL) 12.5 MG tablet; TAKE 1 TABLET BY MOUTH ONCE DAILY    Prediabetes  Due for recheck. Body mass index is 32.05 kg/m . A1c ordered today.   - Hemoglobin A1c; Future    Dysfunction of both eustachian tubes  Persistent ear fullness after a viral URI. Discussed treatment with OTC allergy medications, Ibuprofen. RTC prn for any new, changing or worsening symptoms.      Patient has been advised of split billing requirements and indicates understanding: Yes  COUNSELING:  Reviewed preventive health counseling, as reflected in patient instructions       Regular exercise       Healthy diet/nutrition        "Vision screening       Dental care       Colon cancer screening    Estimated body mass index is 32.05 kg/m  as calculated from the following:    Height as of this encounter: 1.632 m (5' 4.25\").    Weight as of this encounter: 85.4 kg (188 lb 3.2 oz).    Weight management plan: Discussed healthy diet and exercise guidelines    She reports that she quit smoking about 34 years ago. Her smoking use included cigarettes. She has a 40.00 pack-year smoking history. She has never used smokeless tobacco.    Appropriate preventive services were discussed with this patient, including applicable screening as appropriate for cardiovascular disease, diabetes, osteopenia/osteoporosis, and glaucoma.  As appropriate for age/gender, discussed screening for colorectal cancer, prostate cancer, breast cancer, and cervical cancer. Checklist reviewing preventive services available has been given to the patient.    Reviewed patients plan of care and provided an AVS. The Basic Care Plan (routine screening as documented in Health Maintenance) for Luz Elena meets the Care Plan requirement. This Care Plan has been established and reviewed with the Patient.    Silvino Quezada PA-C  [facility]    Identified Health Risks:    "

## 2022-01-12 NOTE — PATIENT INSTRUCTIONS
Try Claritin, Zyrtec for your ears. Even a little Ibuprofen consistently can help.     Patient Education   Personalized Prevention Plan  You are due for the preventive services outlined below.  Your care team is available to assist you in scheduling these services.  If you have already completed any of these items, please share that information with your care team to update in your medical record.  Health Maintenance Due   Topic Date Due     ANNUAL REVIEW OF HM ORDERS  Never done     Asthma Action Plan - yearly  06/25/2019     FALL RISK ASSESSMENT  06/24/2021     Asthma Control Test  12/02/2021     Colorectal Cancer Screening  02/08/2022       Signs of Hearing Loss      Hearing much better with one ear can be a sign of hearing loss.   Hearing loss is a problem shared by many people. In fact, it is one of the most common health problems, particularly as people age. Most people age 65 and older have some hearing loss. By age 80, almost everyone does. Hearing loss often occurs slowly over the years. So you may not realize your hearing has gotten worse.  Have your hearing checked  Call your healthcare provider if you:    Have to strain to hear normal conversation    Have to watch other people s faces very carefully to follow what they re saying    Need to ask people to repeat what they ve said    Often misunderstand what people are saying    Turn the volume of the television or radio up so high that others complain    Feel that people are mumbling when they re talking to you    Find that the effort to hear leaves you feeling tired and irritated    Notice, when using the phone, that you hear better with one ear than the other  [employer] last reviewed this educational content on 1/1/2020 2000-2021 [employer]. All rights reserved. This information is not intended as a substitute for professional medical care. Always follow your healthcare professional's instructions.          Urinary Incontinence, Female  (Adult)   Urinary incontinence means loss of bladder control. This problem affects many women, especially as they get older. If you have incontinence, you may be embarrassed to ask for help. But know that this problem can be treated.   Types of Incontinence  There are different types of incontinence. Two of the main types are described here. You can have more than one type.     Stress incontinence. With this type, urine leaks when pressure (stress) is put on the bladder. This may happen when you cough, sneeze, or laugh. Stress incontinence most often occurs because the pelvic floor muscles that support the bladder and urethra are weak. This can happen after pregnancy and vaginal childbirth or a hysterectomy. It can also be due to excess body weight or hormone changes.    Urge incontinence (also called overactive bladder). With this type, a sudden urge to urinate is felt often. This may happen even though there may not be much urine in the bladder. The need to urinate often during the night is common. Urge incontinence most often occurs because of bladder spasms. This may be due to bladder irritation or infection. Damage to bladder nerves or pelvic muscles, constipation, and certain medicines can also lead to urge incontinence.  Treatment depends on the cause. Further evaluation is needed to find the type you have. This will likely include an exam and certain tests. Based on the results, you and your healthcare provider can then plan treatment. Until a diagnosis is made, the home care tips below can help ease symptoms.   Home care    Do pelvic floor muscle exercises, if they are prescribed. The pelvic floor muscles help support the bladder and urethra. Many women find that their symptoms improve when doing special exercises that strengthen these muscles. To do the exercises, contract the muscles you would use to stop your stream of urine. But do this when you re not urinating. Hold for 10 seconds, then relax. Repeat  10 to 20 times in a row, at least 3 times a day. Your healthcare provider may give you other instructions for how to do the exercises and how often.    Keep a bladder diary. This helps track how often and how much you urinate over a set period of time. Bring this diary with you to your next visit with the provider. The information can help your provider learn more about your bladder problem.    Lose weight, if advised to by your provider. Extra weight puts pressure on the bladder. Your provider can help you create a weight-loss plan that s right for you. This may include exercising more and making certain diet changes.    Don't have foods and drinks that may irritate the bladder. These can include alcohol and caffeinated drinks.    Quit smoking. Smoking and other tobacco use can lead to a long-term (chronic) cough that strains the pelvic floor muscles. Smoking may also damage the bladder and urethra. Talk with your provider about treatments or methods you can use to quit smoking.    If drinking large amounts of fluid makes you have symptoms, you may be advised to limit your fluid intake. You may also be advised to drink most of your fluids during the day and to limit fluids at night.    If you re worried about urine leakage or accidents, you may wear absorbent pads to catch urine. Change the pads often. This helps reduce discomfort. It may also reduce the risk of skin or bladder infections.    Follow-up care  Follow up with your healthcare provider, or as directed. It may take some to find the right treatment for your problem. But healthy lifestyle changes can be made right away. These include such things as exercising on a regular basis, eating a healthy diet, losing weight (if needed), and quitting smoking. Your treatment plan may include special therapies or medicines. Certain procedures or surgery may also be options. Talk about any questions you have with your provider.   When to seek medical advice  Call the  healthcare provider right away if any of these occur:    Fever of 100.4 F (38 C) or higher, or as directed by your provider    Bladder pain or fullness    Belly swelling    Nausea or vomiting    Back pain    Weakness, dizziness, or fainting  [employer] last reviewed this educational content on 1/1/2020 2000-2021 [employer]. All rights reserved. This information is not intended as a substitute for professional medical care. Always follow your healthcare professional's instructions.

## 2022-01-13 ASSESSMENT — ASTHMA QUESTIONNAIRES: ACT_TOTALSCORE: 23

## 2022-02-10 DIAGNOSIS — Z11.59 ENCOUNTER FOR SCREENING FOR OTHER VIRAL DISEASES: Primary | ICD-10-CM

## 2022-03-01 ENCOUNTER — LAB (OUTPATIENT)
Dept: LAB | Facility: CLINIC | Age: 67
End: 2022-03-01
Payer: MEDICARE

## 2022-03-01 DIAGNOSIS — Z11.59 ENCOUNTER FOR SCREENING FOR OTHER VIRAL DISEASES: ICD-10-CM

## 2022-03-01 PROCEDURE — U0003 INFECTIOUS AGENT DETECTION BY NUCLEIC ACID (DNA OR RNA); SEVERE ACUTE RESPIRATORY SYNDROME CORONAVIRUS 2 (SARS-COV-2) (CORONAVIRUS DISEASE [COVID-19]), AMPLIFIED PROBE TECHNIQUE, MAKING USE OF HIGH THROUGHPUT TECHNOLOGIES AS DESCRIBED BY CMS-2020-01-R: HCPCS

## 2022-03-01 PROCEDURE — U0005 INFEC AGEN DETEC AMPLI PROBE: HCPCS

## 2022-03-02 LAB — SARS-COV-2 RNA RESP QL NAA+PROBE: NEGATIVE

## 2022-03-03 ENCOUNTER — ANESTHESIA EVENT (OUTPATIENT)
Dept: GASTROENTEROLOGY | Facility: CLINIC | Age: 67
End: 2022-03-03
Payer: MEDICARE

## 2022-03-03 NOTE — ANESTHESIA PREPROCEDURE EVALUATION
Anesthesia Pre-Procedure Evaluation    Patient: Luz Elena Cristina   MRN: [id number] : 1955        Procedure : Procedure(s):  COLONOSCOPY          Past Medical History:   Diagnosis Date     Hypertension      Rheumatic fever      Sleep apnea      Uncomplicated asthma       Past Surgical History:   Procedure Laterality Date     BIOPSY      breast,     BIOPSY BREAST       COLONOSCOPY       COLONOSCOPY N/A 2019    Procedure: Combined Colonoscopy, Single Or Multiple Biopsy/Polypectomy By Biopsy;  Surgeon: José Miguel Ochoa MD;  Location: [facility] OR     COLONOSCOPY WITH CO2 INSUFFLATION N/A 2019    Procedure: COLONOSCOPY WITH CO2 INSUFFLATION;  Surgeon: José Miguel Ochoa MD;  Location: [facility] OR     COMBINED ESOPHAGOSCOPY, GASTROSCOPY, DUODENOSCOPY (EGD) WITH CO2 INSUFFLATION N/A 2019    Procedure: COMBINED ESOPHAGOSCOPY, GASTROSCOPY, DUODENOSCOPY (EGD) WITH CO2 INSUFFLATION;  Surgeon: José Miguel Ochoa MD;  Location: [facility] OR     ESOPHAGOSCOPY, GASTROSCOPY, DUODENOSCOPY (EGD), COMBINED N/A 2019    Procedure: Combined Esophagoscopy, Gastroscopy, Duodenoscopy (Egd), Biopsy Single Or Multiple;  Surgeon: José Miguel Ochoa MD;  Location: [facility] OR     GYN SURGERY       TONSILLECTOMY        Allergies   Allergen Reactions     Cartia Xt [Diltiazem] Other (See Comments)     Heartburn whenever on this medication     Losartan Cough     Miconazole      Sulfa Drugs Rash      Social History     Tobacco Use     Smoking status: Former Smoker     Packs/day: 2.00     Years: 20.00     Pack years: 40.00     Types: Cigarettes     Quit date: 1987     Years since quittin.8     Smokeless tobacco: Never Used   Substance Use Topics     Alcohol use: Yes     Comment: 1 nightly      Wt Readings from Last 1 Encounters:   22 85.4 kg (188 lb 3.2 oz)        Anesthesia Evaluation   Pt has had prior anesthetic. Type: General and MAC.        ROS/MED HX  ENT/Pulmonary:     (+) sleep apnea,  doesn't use CPAP, Severe Persistent, asthma Treatment: Inhaler prn and Inhaler daily,      Neurologic:  - neg neurologic ROS     Cardiovascular:     (+) Dyslipidemia hypertension-----    METS/Exercise Tolerance:     Hematologic:  - neg hematologic  ROS     Musculoskeletal:  - neg musculoskeletal ROS     GI/Hepatic:     (+) GERD (lost weight and no longer has issues), hiatal hernia, bowel prep,     Renal/Genitourinary:  - neg Renal ROS     Endo:  - neg endo ROS     Psychiatric/Substance Use:  - neg psychiatric ROS     Infectious Disease:  - neg infectious disease ROS     Malignancy:  - neg malignancy ROS     Other:            Physical Exam    Airway        Mallampati: II   TM distance: > 3 FB   Neck ROM: full   Mouth opening: > 3 cm    Respiratory Devices and Support         Dental  no notable dental history         Cardiovascular   cardiovascular exam normal          Pulmonary   pulmonary exam normal                OUTSIDE LABS:  CBC:   Lab Results   Component Value Date    WBC 7.8 02/28/2019    WBC 10.5 07/23/2018    HGB 15.2 02/28/2019    HGB 15.0 07/23/2018    HCT 45.4 02/28/2019    HCT 44.7 07/23/2018     02/28/2019     07/23/2018     BMP:   Lab Results   Component Value Date     01/12/2022     06/04/2020    POTASSIUM 3.6 01/12/2022    POTASSIUM 4.1 06/04/2020    CHLORIDE 101 01/12/2022    CHLORIDE 105 06/04/2020    CO2 29 01/12/2022    CO2 31 06/04/2020    BUN 17 01/12/2022    BUN 16 06/04/2020    CR 0.80 01/12/2022    CR 0.78 06/04/2020    GLC 94 01/12/2022    GLC 97 06/04/2020     COAGS: No results found for: PTT, INR, FIBR  POC: No results found for: BGM, HCG, HCGS  HEPATIC: No results found for: ALBUMIN, PROTTOTAL, ALT, AST, GGT, ALKPHOS, BILITOTAL, BILIDIRECT, RAY  OTHER:   Lab Results   Component Value Date    PH 7.39 08/27/2018    A1C 5.5 01/12/2022    CHARMAINE 9.5 01/12/2022    TSH 2.21 06/04/2020       Anesthesia Plan    ASA Status:  3   NPO Status:  NPO Appropriate    Anesthesia  Type: General.              Consents    Anesthesia Plan(s) and associated risks, benefits, and realistic alternatives discussed. Questions answered and patient/representative(s) expressed understanding.     - Discussed: Risks, Benefits and Alternatives for BOTH SEDATION and the PROCEDURE were discussed     - Discussed with:  Patient      - Extended Intubation/Ventilatory Support Discussed: No.      - Patient is DNR/DNI Status: No    Use of blood products discussed: No .     Postoperative Care            Comments:                JOSE Kelley CRNA

## 2022-03-04 ENCOUNTER — HOSPITAL ENCOUNTER (OUTPATIENT)
Facility: CLINIC | Age: 67
Discharge: HOME | End: 2022-03-04
Attending: SURGERY | Admitting: SURGERY
Payer: MEDICARE

## 2022-03-04 ENCOUNTER — ANESTHESIA (OUTPATIENT)
Dept: GASTROENTEROLOGY | Facility: CLINIC | Age: 67
End: 2022-03-04
Payer: MEDICARE

## 2022-03-04 VITALS
HEIGHT: 68 IN | TEMPERATURE: 98.1 F | SYSTOLIC BLOOD PRESSURE: 89 MMHG | OXYGEN SATURATION: 96 % | HEART RATE: 80 BPM | BODY MASS INDEX: 27.28 KG/M2 | DIASTOLIC BLOOD PRESSURE: 53 MMHG | RESPIRATION RATE: 14 BRPM | WEIGHT: 180 LBS

## 2022-03-04 LAB — COLONOSCOPY: NORMAL

## 2022-03-04 PROCEDURE — 258N000003 HC RX IP 258 OP 636: Performed by: SURGERY

## 2022-03-04 PROCEDURE — 250N000009 HC RX 250: Performed by: SURGERY

## 2022-03-04 PROCEDURE — G0105 COLORECTAL SCRN; HI RISK IND: HCPCS | Performed by: SURGERY

## 2022-03-04 PROCEDURE — 45378 DIAGNOSTIC COLONOSCOPY: CPT | Performed by: SURGERY

## 2022-03-04 PROCEDURE — 370N000017 HC ANESTHESIA TECHNICAL FEE, PER MIN: Performed by: SURGERY

## 2022-03-04 PROCEDURE — 250N000009 HC RX 250: Performed by: NURSE ANESTHETIST, CERTIFIED REGISTERED

## 2022-03-04 PROCEDURE — 250N000011 HC RX IP 250 OP 636: Performed by: NURSE ANESTHETIST, CERTIFIED REGISTERED

## 2022-03-04 RX ORDER — SODIUM CHLORIDE, SODIUM LACTATE, POTASSIUM CHLORIDE, CALCIUM CHLORIDE 600; 310; 30; 20 MG/100ML; MG/100ML; MG/100ML; MG/100ML
INJECTION, SOLUTION INTRAVENOUS CONTINUOUS
Status: DISCONTINUED | OUTPATIENT
Start: 2022-03-04 | End: 2022-03-04 | Stop reason: HOSPADM

## 2022-03-04 RX ORDER — PROPOFOL 10 MG/ML
INJECTION, EMULSION INTRAVENOUS CONTINUOUS PRN
Status: DISCONTINUED | OUTPATIENT
Start: 2022-03-04 | End: 2022-03-04

## 2022-03-04 RX ORDER — LIDOCAINE HYDROCHLORIDE 10 MG/ML
INJECTION, SOLUTION INFILTRATION; PERINEURAL PRN
Status: DISCONTINUED | OUTPATIENT
Start: 2022-03-04 | End: 2022-03-04

## 2022-03-04 RX ORDER — PROPOFOL 10 MG/ML
INJECTION, EMULSION INTRAVENOUS PRN
Status: DISCONTINUED | OUTPATIENT
Start: 2022-03-04 | End: 2022-03-04

## 2022-03-04 RX ORDER — ONDANSETRON 2 MG/ML
4 INJECTION INTRAMUSCULAR; INTRAVENOUS
Status: DISCONTINUED | OUTPATIENT
Start: 2022-03-04 | End: 2022-03-04 | Stop reason: HOSPADM

## 2022-03-04 RX ORDER — LIDOCAINE 40 MG/G
CREAM TOPICAL
Status: DISCONTINUED | OUTPATIENT
Start: 2022-03-04 | End: 2022-03-04 | Stop reason: HOSPADM

## 2022-03-04 RX ADMIN — PROPOFOL 50 MG: 10 INJECTION, EMULSION INTRAVENOUS at 09:32

## 2022-03-04 RX ADMIN — LIDOCAINE HYDROCHLORIDE 0.2 ML: 10 INJECTION, SOLUTION EPIDURAL; INFILTRATION; INTRACAUDAL; PERINEURAL at 08:47

## 2022-03-04 RX ADMIN — SODIUM CHLORIDE, POTASSIUM CHLORIDE, SODIUM LACTATE AND CALCIUM CHLORIDE: 600; 310; 30; 20 INJECTION, SOLUTION INTRAVENOUS at 08:56

## 2022-03-04 RX ADMIN — PROPOFOL 150 MCG/KG/MIN: 10 INJECTION, EMULSION INTRAVENOUS at 09:27

## 2022-03-04 RX ADMIN — LIDOCAINE HYDROCHLORIDE 50 MG: 10 INJECTION, SOLUTION INFILTRATION; PERINEURAL at 09:27

## 2022-03-04 RX ADMIN — PROPOFOL 100 MG: 10 INJECTION, EMULSION INTRAVENOUS at 09:27

## 2022-03-04 NOTE — BRIEF OP NOTE
[facility]   Brief Operative Note    Pre-operative diagnosis: Special screening for malignant neoplasm of colon [Z12.11]   Post-operative diagnosis extensive diverticulosis, no polyps seen     Procedure: Procedure(s):  COLONOSCOPY   Surgeon(s): Surgeon(s) and Role:     * Nirmal Rm MD - Primary   Estimated blood loss: * No values recorded between 3/4/2022  9:29 AM and 3/4/2022  9:48 AM *    Specimens: * No specimens in log *   Findings: 1. Extensive left-sided diverticulosis  2. No polyps or tumors seen  3. Repeat exam in 5 years

## 2022-03-04 NOTE — ANESTHESIA CARE TRANSFER NOTE
Patient: Luz Elena Cristina    Procedure: Procedure(s):  COLONOSCOPY       Diagnosis: Special screening for malignant neoplasm of colon [Z12.11]  Diagnosis Additional Information: No value filed.    Anesthesia Type:   General     Note:    Oropharynx: oropharynx clear of all foreign objects and spontaneously breathing  Level of Consciousness: awake and drowsy  Oxygen Supplementation: room air    Independent Airway: airway patency satisfactory and stable  Dentition: dentition unchanged  Vital Signs Stable: post-procedure vital signs reviewed and stable  Report to RN Given: handoff report given  Patient transferred to: Phase II    Handoff Report: Identifed the Patient, Identified the Reponsible Provider, Reviewed the pertinent medical history, Discussed the surgical course, Reviewed Intra-OP anesthesia mangement and issues during anesthesia, Set expectations for post-procedure period and Allowed opportunity for questions and acknowledgement of understanding      Vitals:  Vitals Value Taken Time   BP     Temp     Pulse     Resp     SpO2         Electronically Signed By: Temo Garzon CRNA, JOSE GALEAS  March 4, 2022  9:51 AM

## 2022-03-04 NOTE — ANESTHESIA POSTPROCEDURE EVALUATION
Patient: Luz Elena Cristina    Procedure: Procedure(s):  COLONOSCOPY       Anesthesia Type:  General    Note:  Disposition: Outpatient   Postop Pain Control: Uneventful            Sign Out: Well controlled pain   PONV: No   Neuro/Psych: Uneventful            Sign Out: Acceptable/Baseline neuro status   Airway/Respiratory: Uneventful            Sign Out: Acceptable/Baseline resp. status   CV/Hemodynamics: Uneventful            Sign Out: Acceptable CV status; No obvious hypovolemia; No obvious fluid overload   Other NRE: NONE   DID A NON-ROUTINE EVENT OCCUR? No           Last vitals:  Vitals Value Taken Time   BP     Temp     Pulse     Resp     SpO2         Electronically Signed By: Temo Garzon CRNA, JOSE GALEAS  March 4, 2022  9:51 AM

## 2022-03-04 NOTE — H&P
67 year old year old female here for colonoscopy for personal history of polyps.    Patient Active Problem List   Diagnosis     Severe persistent asthma     Lichen sclerosus     Benign essential hypertension     Elevated glucose     Family history of diabetes mellitus     Osteopenia     Prediabetes       Past Medical History:   Diagnosis Date     Hypertension      Rheumatic fever      Sleep apnea      Uncomplicated asthma        Past Surgical History:   Procedure Laterality Date     BIOPSY      breast,     BIOPSY BREAST       COLONOSCOPY       COLONOSCOPY N/A 2/8/2019    Procedure: Combined Colonoscopy, Single Or Multiple Biopsy/Polypectomy By Biopsy;  Surgeon: José Miguel Ochoa MD;  Location: [facility] OR     COLONOSCOPY WITH CO2 INSUFFLATION N/A 2/8/2019    Procedure: COLONOSCOPY WITH CO2 INSUFFLATION;  Surgeon: José Miguel Ochoa MD;  Location: [facility] OR     COMBINED ESOPHAGOSCOPY, GASTROSCOPY, DUODENOSCOPY (EGD) WITH CO2 INSUFFLATION N/A 2/8/2019    Procedure: COMBINED ESOPHAGOSCOPY, GASTROSCOPY, DUODENOSCOPY (EGD) WITH CO2 INSUFFLATION;  Surgeon: José Miguel Ochoa MD;  Location: [facility] OR     ESOPHAGOSCOPY, GASTROSCOPY, DUODENOSCOPY (EGD), COMBINED N/A 2/8/2019    Procedure: Combined Esophagoscopy, Gastroscopy, Duodenoscopy (Egd), Biopsy Single Or Multiple;  Surgeon: José Miguel Ochoa MD;  Location:  OR     GYN SURGERY       TONSILLECTOMY         @[facility]@    No current outpatient medications on file.       Allergies   Allergen Reactions     Cartia Xt [Diltiazem] Other (See Comments)     Heartburn whenever on this medication     Losartan Cough     Miconazole      Sulfa Drugs Rash       Pt reports that she quit smoking about 34 years ago. Her smoking use included cigarettes. She has a 40.00 pack-year smoking history. She has never used smokeless tobacco. She reports current alcohol use. She reports that she does not use drugs.    Exam:  /76 (BP Location: Left arm)   Pulse 91   Temp  "98.1  F (36.7  C) (Oral)   Resp 16   Ht 1.727 m (5' 8\")   Wt 81.6 kg (180 lb)   SpO2 96%   BMI 27.37 kg/m      Awake, Alert OX3  Lungs - CTA bilaterally  CV - RRR, no murmurs, distal pulses intact  Abd - soft, non-distended, non-tender, +BS  Extr - No cyanosis or edema    A/P 67 year old year old female in need of colonoscopy for personal history of polyps. Risks, benefits, alternatives, and complications were discussed including the possibility of perforation and the patient agreed to proceed.    Nirmal Rm MD   "

## 2022-06-01 ENCOUNTER — OFFICE VISIT (OUTPATIENT)
Dept: FAMILY MEDICINE | Facility: CLINIC | Age: 67
End: 2022-06-01
Payer: MEDICARE

## 2022-06-01 VITALS
WEIGHT: 183 LBS | HEIGHT: 68 IN | TEMPERATURE: 96.7 F | BODY MASS INDEX: 27.74 KG/M2 | DIASTOLIC BLOOD PRESSURE: 72 MMHG | RESPIRATION RATE: 16 BRPM | HEART RATE: 80 BPM | SYSTOLIC BLOOD PRESSURE: 118 MMHG

## 2022-06-01 DIAGNOSIS — S99.921A INJURY OF TOE ON RIGHT FOOT, INITIAL ENCOUNTER: Primary | ICD-10-CM

## 2022-06-01 DIAGNOSIS — B37.2 INTERTRIGINOUS CANDIDIASIS: ICD-10-CM

## 2022-06-01 PROCEDURE — 99214 OFFICE O/P EST MOD 30 MIN: CPT | Performed by: FAMILY MEDICINE

## 2022-06-01 RX ORDER — NYSTATIN 100000 U/G
CREAM TOPICAL 2 TIMES DAILY
Qty: 30 G | Refills: 1 | Status: SHIPPED | OUTPATIENT
Start: 2022-06-01 | End: 2022-06-15

## 2022-06-01 NOTE — PROGRESS NOTES
Assessment & Plan     Injury of toe on right foot, initial encounter  Differential discussed in detail including right foot second toe sprain.  X-ray findings reviewed independently, arthritic changes noted, no obvious fracture.  Ongoing numbness since injury.  Suggested activity as tolerated, over-the-counter analgesia as needed and podiatry referral placed  - REVIEW OF HEALTH MAINTENANCE PROTOCOL ORDERS  - XR Toe Right G/E 2 Views; Future  - Orthopedic  Referral; Future    Intertriginous candidiasis  Nystatin prescribed.  Suggested to keep the area dry and clean  - nystatin (MYCOSTATIN) 118418 UNIT/GM external cream; Apply topically 2 times daily for 14 days      Eliazar Boland MD  [facility]    Dominick Yu is a 67 year old who presents for the following health issues   Musculoskeletal Problem    History of Present Illness       Reason for visit:  Right 2nd toe   Symptom onset:  3-4 weeks ago  Symptoms include:  Numbness in toe and pain when walking  Symptom intensity:  Moderate  Symptom progression:  Staying the same  Had these symptoms before:  No    She eats 2-3 servings of fruits and vegetables daily.She consumes 0 sweetened beverage(s) daily.She exercises with enough effort to increase her heart rate 30 to 60 minutes per day.  She exercises with enough effort to increase her heart rate 7 days per week. She is missing 1 dose(s) of medications per week.  She is not taking prescribed medications regularly due to other.     Rash       Duration: 1 week     Description (location/character/radiation): Rash under tummy fold     Intensity:  moderate    Accompanying signs and symptoms: itchy, bringing, raw     History (similar episodes/previous evaluation): wt loss     Precipitating or alleviating factors: None    Therapies tried and outcome: deodorant, baby wipes.           Review of Systems   Constitutional, HEENT, cardiovascular, pulmonary, GI, , musculoskeletal,  "neuro, skin, endocrine and psych systems are negative, except as otherwise noted.      Objective    /72 (Cuff Size: Adult Regular)   Pulse 80   Temp (!) 96.7  F (35.9  C) (Tympanic)   Resp 16   Ht 1.727 m (5' 8\")   Wt 83 kg (183 lb)   BMI 27.83 kg/m    Body mass index is 27.83 kg/m .  Physical Exam   GENERAL: alert and no distress  NECK: no adenopathy, no asymmetry, masses, or scars and thyroid normal to palpation  RESP: lungs clear to auscultation - no rales, rhonchi or wheezes  CV: regular rate and rhythm, normal S1 S2, no S3 or S4, no murmur, click or rub  ABDOMEN: soft, nontender, no hepatosplenomegaly  MS: Right second toe mild swelling, no tenderness, warmth or skin discoloration noted, range of movement normal, normal capillary refills  SKIN: Erythematous rash under lower abdominal fold with few satellite lesions, no vesicles or discharge noted  NEURO: Normal strength and tone, mentation intact and speech normal  PSYCH: mentation appears normal, affect normal/bright            "

## 2022-06-13 ENCOUNTER — OFFICE VISIT (OUTPATIENT)
Dept: PODIATRY | Facility: CLINIC | Age: 67
End: 2022-06-13
Payer: MEDICARE

## 2022-06-13 VITALS
SYSTOLIC BLOOD PRESSURE: 122 MMHG | BODY MASS INDEX: 27.74 KG/M2 | HEIGHT: 68 IN | DIASTOLIC BLOOD PRESSURE: 76 MMHG | WEIGHT: 183 LBS

## 2022-06-13 DIAGNOSIS — M20.11 HALLUX VALGUS, RIGHT: Primary | ICD-10-CM

## 2022-06-13 DIAGNOSIS — S99.921A INJURY OF TOE ON RIGHT FOOT, INITIAL ENCOUNTER: ICD-10-CM

## 2022-06-13 PROCEDURE — 99203 OFFICE O/P NEW LOW 30 MIN: CPT | Performed by: PODIATRIST

## 2022-06-13 NOTE — LETTER
6/13/2022         RE: Luz Elena Cristina  [address] [facility] [address]        Dear Colleague,    Thank you for referring your patient, Luz Elena Cristina, to the [facility] [place]. Please see a copy of my visit note below.    PATIENT HISTORY:  Luz Elena Cristina is a 67 year old female who presents to clinic in consultation at the request of  lEiazar Boland M.D. with a chief complaint of right second two injury.  The patient is seen by themselves.  The patient relates the pain is primarily located around the base of second toe.  Patient describes injury as stubbed toe that has been going on for ~ 6 week(s).  The patient has previously tried alternate footwear with little relief.  Denies any prior history of similar issues..  The patient is retired, walks ~ 5 miles/day.  Any previous notes and studies that pertain to the patient's condition were reviewed.    Pertinent medical, surgical and family history was reviewed in the Epic chart.    Past Medical History:   Past Medical History:   Diagnosis Date     Hypertension      Rheumatic fever      Sleep apnea      Uncomplicated asthma        Medications:   Current Outpatient Medications:      albuterol (2.5 MG/3ML) 0.083% neb solution, Take 1 vial (2.5 mg) by nebulization every 4 hours as needed for shortness of breath / dyspnea, wheezing or other (persistent cough), Disp: 50 vial, Rfl: 1     albuterol (PROAIR HFA/PROVENTIL HFA/VENTOLIN HFA) 108 (90 Base) MCG/ACT inhaler, Inhale 2-4 puffs into the lungs every 4 hours as needed for shortness of breath / dyspnea or wheezing, Disp: 18 g, Rfl: 3     azelastine (ASTELIN) 0.1 % nasal spray, Spray 2 sprays into both nostrils 2 times daily as needed for rhinitis or allergies, Disp: 30 mL, Rfl: 3     clobetasol (TEMOVATE) 0.05 % external cream, Apply sparingly to affected area twice weekly as needed.  Do not apply to face., Disp: 60 g, Rfl: 3     clobetasol (TEMOVATE) 0.05 % external ointment,  "Apply sparingly daily as needed to affected area on groin., Disp: 30 g, Rfl: 4     fluticasone-vilanterol (BREO ELLIPTA) 200-25 MCG/INH inhaler, Inhale 1 puff into the lungs daily, Disp: 180 each, Rfl: 3     hydrochlorothiazide (HYDRODIURIL) 12.5 MG tablet, TAKE 1 TABLET BY MOUTH ONCE DAILY, Disp: 90 tablet, Rfl: 3     Respiratory Therapy Supplies (NEBULIZER/TUBING/MOUTHPIECE) KIT, Use with albuterol neb solution, Disp: 1 kit, Rfl: 0     tiotropium (SPIRIVA RESPIMAT) 1.25 MCG/ACT inhaler, Inhale 2 puffs into the lungs daily, Disp: 12 g, Rfl: 3     Allergies:    Allergies   Allergen Reactions     Cartia Xt [Diltiazem] Other (See Comments)     Heartburn whenever on this medication     Losartan Cough     Miconazole      Sulfa Drugs Rash       Vitals: /76   Ht 1.727 m (5' 8\")   Wt 83 kg (183 lb)   BMI 27.83 kg/m    BMI= Body mass index is 27.83 kg/m .    LOWER EXTREMITY PHYSICAL EXAM    Dermatologic: Skin is intact to right lower extremity without significant lesions, rash or abrasion.        Vascular: DP & PT pulses are intact & regular on the right.   CFT and skin temperature is normal to the right lower extremity.     Neurologic: Lower extremity sensation is intact to light touch.  No evidence of weakness in the right lower extremity.        Musculoskeletal: Patient is ambulatory without assistive device or brace.  No gross ankle deformity noted.  No foot or ankle joint effusion is noted.  Noted bunion deformity on the right.  Noted pain on palpation over the proximal interphalangeal joint of the second toe on the right.  Mild edema around the joint.  No contracture noted.  No ecchymosis noted.    Diagnostics:  Radiographs included 2 views of the right forefoot demonstrating a moderate bunion deformity with no cortical erosions or periosteal elevation.  No evidence of fracture or dislocation or contracture of the second toe.  All joint margins appear stable.  There is no apparent fracture or tumor formation " noted.  There is no evidence of foreign body.  The images were independently reviewed by myself along with the patient explaining the findings.      ASSESSMENT / PLAN:     ICD-10-CM    1. Hallux valgus, right  M20.11    2. Injury of toe on right foot, initial encounter  S99.921A Orthopedic  Referral    second toe       I have explained to Luz Elena about the conditions.  We discussed the underlying contributing factors to the condition as well as treatment options along with expected length of recovery.  At this time, the patient was instructed to continue wearing rigid offloading shoes to protect the second toe from any further injury.  Patient was instructed on warm soaks with Epson salt water.  The patient may return to the office if problems persist.    Luz Elena verbalized agreement with and understanding of the rational for the diagnosis and treatment plan.  All questions were answered to best of my ability and the patient's satisfaction. The patient was advised to contact the clinic with any questions that may arise after the clinic visit.      Disclaimer: This note consists of symbols derived from keyboarding, dictation and/or voice recognition software. As a result, there may be errors in the script that have gone undetected. Please consider this when interpreting information found in this chart.       DAGOBERTO Norman D.P.M., F.A.C.F.A.S.        Again, thank you for allowing me to participate in the care of your patient.        Sincerely,        Balta Norman DPM

## 2022-06-13 NOTE — PATIENT INSTRUCTIONS

## 2022-06-13 NOTE — PROGRESS NOTES
PATIENT HISTORY:  Luz Elena Cristina is a 67 year old female who presents to clinic in consultation at the request of  Eliazar Boland M.D. with a chief complaint of right second two injury.  The patient is seen by themselves.  The patient relates the pain is primarily located around the base of second toe.  Patient describes injury as stubbed toe that has been going on for ~ 6 week(s).  The patient has previously tried alternate footwear with little relief.  Denies any prior history of similar issues..  The patient is retired, walks ~ 5 miles/day.  Any previous notes and studies that pertain to the patient's condition were reviewed.    Pertinent medical, surgical and family history was reviewed in the [facility] chart.    Past Medical History:   Past Medical History:   Diagnosis Date     Hypertension      Rheumatic fever      Sleep apnea      Uncomplicated asthma        Medications:   Current Outpatient Medications:      albuterol (2.5 MG/3ML) 0.083% neb solution, Take 1 vial (2.5 mg) by nebulization every 4 hours as needed for shortness of breath / dyspnea, wheezing or other (persistent cough), Disp: 50 vial, Rfl: 1     albuterol (PROAIR HFA/PROVENTIL HFA/VENTOLIN HFA) 108 (90 Base) MCG/ACT inhaler, Inhale 2-4 puffs into the lungs every 4 hours as needed for shortness of breath / dyspnea or wheezing, Disp: 18 g, Rfl: 3     azelastine (ASTELIN) 0.1 % nasal spray, Spray 2 sprays into both nostrils 2 times daily as needed for rhinitis or allergies, Disp: 30 mL, Rfl: 3     clobetasol (TEMOVATE) 0.05 % external cream, Apply sparingly to affected area twice weekly as needed.  Do not apply to face., Disp: 60 g, Rfl: 3     clobetasol (TEMOVATE) 0.05 % external ointment, Apply sparingly daily as needed to affected area on groin., Disp: 30 g, Rfl: 4     fluticasone-vilanterol (BREO ELLIPTA) 200-25 MCG/INH inhaler, Inhale 1 puff into the lungs daily, Disp: 180 each, Rfl: 3     hydrochlorothiazide (HYDRODIURIL) 12.5 MG tablet, TAKE  "1 TABLET BY MOUTH ONCE DAILY, Disp: 90 tablet, Rfl: 3     Respiratory Therapy Supplies (NEBULIZER/TUBING/MOUTHPIECE) KIT, Use with albuterol neb solution, Disp: 1 kit, Rfl: 0     tiotropium (SPIRIVA RESPIMAT) 1.25 MCG/ACT inhaler, Inhale 2 puffs into the lungs daily, Disp: 12 g, Rfl: 3     Allergies:    Allergies   Allergen Reactions     Cartia Xt [Diltiazem] Other (See Comments)     Heartburn whenever on this medication     Losartan Cough     Miconazole      Sulfa Drugs Rash       Vitals: /76   Ht 1.727 m (5' 8\")   Wt 83 kg (183 lb)   BMI 27.83 kg/m    BMI= Body mass index is 27.83 kg/m .    LOWER EXTREMITY PHYSICAL EXAM    Dermatologic: Skin is intact to right lower extremity without significant lesions, rash or abrasion.        Vascular: DP & PT pulses are intact & regular on the right.   CFT and skin temperature is normal to the right lower extremity.     Neurologic: Lower extremity sensation is intact to light touch.  No evidence of weakness in the right lower extremity.        Musculoskeletal: Patient is ambulatory without assistive device or brace.  No gross ankle deformity noted.  No foot or ankle joint effusion is noted.  Noted bunion deformity on the right.  Noted pain on palpation over the proximal interphalangeal joint of the second toe on the right.  Mild edema around the joint.  No contracture noted.  No ecchymosis noted.    Diagnostics:  Radiographs included 2 views of the right forefoot demonstrating a moderate bunion deformity with no cortical erosions or periosteal elevation.  No evidence of fracture or dislocation or contracture of the second toe.  All joint margins appear stable.  There is no apparent fracture or tumor formation noted.  There is no evidence of foreign body.  The images were independently reviewed by myself along with the patient explaining the findings.      ASSESSMENT / PLAN:     ICD-10-CM    1. Hallux valgus, right  M20.11    2. Injury of toe on right foot, initial " encounter  S99.921A Orthopedic  Referral    second toe       I have explained to Luz Elena about the conditions.  We discussed the underlying contributing factors to the condition as well as treatment options along with expected length of recovery.  At this time, the patient was instructed to continue wearing rigid offloading shoes to protect the second toe from any further injury.  Patient was instructed on warm soaks with Epson salt water.  The patient may return to the office if problems persist.    Luz Elena verbalized agreement with and understanding of the rational for the diagnosis and treatment plan.  All questions were answered to best of my ability and the patient's satisfaction. The patient was advised to contact the clinic with any questions that may arise after the clinic visit.      Disclaimer: This note consists of symbols derived from keyboarding, dictation and/or voice recognition software. As a result, there may be errors in the script that have gone undetected. Please consider this when interpreting information found in this chart.       DAGOBERTO Norman D.P.M., F.A.C.F.A.S.

## 2022-07-25 DIAGNOSIS — J45.50 SEVERE PERSISTENT ASTHMA WITHOUT COMPLICATION (H): ICD-10-CM

## 2022-07-25 NOTE — TELEPHONE ENCOUNTER
Routing refill request to provider for review/approval because:  Patient needs to be seen because it has been more than 1 year since last office visit.     Called and spoke pt. Appointment scheduled for 10/14 next available. Cued up [facility] for 90 days with no refills until appointment.       Praveena VALE RN  Specialty/Allergy Clinics

## 2022-07-25 NOTE — TELEPHONE ENCOUNTER
Last Written Prescription Date:  6/2/21  Last Fill Quantity: 180,  # refills: 3   Last office visit: 6/2/2021 with prescribing provider:  6/2/21   Future Office Visit: N/A

## 2022-10-14 ENCOUNTER — OFFICE VISIT (OUTPATIENT)
Dept: ALLERGY | Facility: CLINIC | Age: 67
End: 2022-10-14
Payer: MEDICARE

## 2022-10-14 VITALS — HEART RATE: 89 BPM | TEMPERATURE: 98.7 F | OXYGEN SATURATION: 97 %

## 2022-10-14 DIAGNOSIS — Z23 NEED FOR PROPHYLACTIC VACCINATION AND INOCULATION AGAINST INFLUENZA: Primary | ICD-10-CM

## 2022-10-14 DIAGNOSIS — J45.50 SEVERE PERSISTENT ASTHMA WITHOUT COMPLICATION (H): ICD-10-CM

## 2022-10-14 DIAGNOSIS — J31.0 CHRONIC RHINITIS: ICD-10-CM

## 2022-10-14 PROCEDURE — 90662 IIV NO PRSV INCREASED AG IM: CPT | Performed by: ALLERGY & IMMUNOLOGY

## 2022-10-14 PROCEDURE — 90471 IMMUNIZATION ADMIN: CPT | Performed by: ALLERGY & IMMUNOLOGY

## 2022-10-14 PROCEDURE — 99214 OFFICE O/P EST MOD 30 MIN: CPT | Mod: 25 | Performed by: ALLERGY & IMMUNOLOGY

## 2022-10-14 RX ORDER — AZELASTINE 1 MG/ML
2 SPRAY, METERED NASAL 2 TIMES DAILY PRN
Qty: 30 ML | Refills: 3 | Status: SHIPPED | OUTPATIENT
Start: 2022-10-14 | End: 2024-05-23

## 2022-10-14 ASSESSMENT — ENCOUNTER SYMPTOMS
FEVER: 0
DYSURIA: 0
SORE THROAT: 0
SEIZURES: 0
EYE PAIN: 0
PALPITATIONS: 0
CHILLS: 0
WHEEZING: 0
COLOR CHANGE: 0
ABDOMINAL PAIN: 0
BACK PAIN: 0
HEMATURIA: 0
VOMITING: 0
CHEST TIGHTNESS: 0
ARTHRALGIAS: 0
SHORTNESS OF BREATH: 0
COUGH: 0

## 2022-10-14 ASSESSMENT — ASTHMA QUESTIONNAIRES: ACT_TOTALSCORE: 24

## 2022-10-14 NOTE — PROGRESS NOTES
SUBJECTIVE:                                                                   Luz Elena Cristina presents today to our Allergy Clinic at [facility] for a follow up visit. She is a 67 year old female with severe persistent asthma and chronic rhinitis.     Asthma since childhood. November 2016: Negative serum IgE to regional aeroallergen panel. CBC with differential without eosinophilia.  Normal total serum IgE.  Negative ABPA panel.  Alpha-1 antitrypsin within normal limits.  Normal CBC with differential without hypereosinophilia.  The PSG sleep study demonstrated moderate, REM, and supine predominant NEGRA with some sleep associated hypoxemia.  In February 2019, due to decrease in FEV1, chest CT was ordered that showed some opacities and small nodules that could be infectious.  The patient was treated with azithromycin.  On a follow-up phone call, the patient was asymptomatic; However, chest CT in May 2019 with new opacities.Per January'21 Pulm note: Established multiple pulmonary lung nodule(s). Near resolution of large nodules and likely related to infection/inflammation. No further surveillance indicated by CT.   In June 2021, spirometry with mild obstruction without postbronchodilator reversibility.    In regards to her asthma, she continues doing well on Breo Ellipta 200/25 mcg 1 puff once daily. She admits not being consistent with Spiriva Respimat.  She uses albuterol inhaler less than twice a week.  She denies chest tightness, wheezing, or dyspnea.  May develop some cough triggered by strong odors. Uses azelastine and it helps.  Denies frequent nocturnal chest symptoms.    She uses azelastine as needed, and it helps with nasal symptoms, primarily in Spring.  She had a sinus infection in November 2021. Was treated with Augmentin. At the same time, she was diagnosed with an asthma exacerbation due to frequent reactive cough. No wheezing was noted on auscultation. SPO2 was 95%. Was  prescribed prednisone, but she did not take it because she did not think that her chest was affected.      Patient Active Problem List   Diagnosis     Severe persistent asthma     Lichen sclerosus     Benign essential hypertension     Elevated glucose     Family history of diabetes mellitus     Osteopenia     Prediabetes       Past Medical History:   Diagnosis Date     Hypertension      Rheumatic fever      Sleep apnea      Uncomplicated asthma       Problem (# of Occurrences) Relation (Name,Age of Onset)    Unknown/Adopted (1) Paternal Grandfather (radha alicea)    Alzheimer Disease (1) Brother: early onset    Arthritis (1) Brother    Diabetes (2) Mother (fawn felder): unsure type; in 40s, Son: type 1    Cerebrovascular Disease (1) Father (ben felder): CVA    Thyroid Disease (1) Son    Breast Cancer (2) Sister, Paternal Aunt    Aneurysm (1) Paternal Grandmother    Other Cancer (1) Mother (fawn felder): panreatic    Chronic Obstructive Pulmonary Disease (1) Father (ben felder)    Skin Cancer (2) Sister, Father (ben felder)    Alcoholism (1) Son    Stomach Problem (1) Son: polyps, diverticulitis, heartburn, passes blood        Past Surgical History:   Procedure Laterality Date     BIOPSY      breast,     BIOPSY BREAST       COLONOSCOPY       COLONOSCOPY N/A 2/8/2019    Procedure: Combined Colonoscopy, Single Or Multiple Biopsy/Polypectomy By Biopsy;  Surgeon: José Miguel Ochoa MD;  Location: [facility]     COLONOSCOPY N/A 3/4/2022    Procedure: COLONOSCOPY;  Surgeon: Nirmal Rm MD;  Location: [facility]     COLONOSCOPY WITH CO2 INSUFFLATION N/A 2/8/2019    Procedure: COLONOSCOPY WITH CO2 INSUFFLATION;  Surgeon: José Miguel Ochoa MD;  Location: [facility]     COMBINED ESOPHAGOSCOPY, GASTROSCOPY, DUODENOSCOPY (EGD) WITH CO2 INSUFFLATION N/A 2/8/2019    Procedure: COMBINED ESOPHAGOSCOPY, GASTROSCOPY, DUODENOSCOPY (EGD) WITH CO2 INSUFFLATION;  Surgeon: Don  José Miguel Hansen MD;  Location: [facility]     ESOPHAGOSCOPY, GASTROSCOPY, DUODENOSCOPY (EGD), COMBINED N/A 2019    Procedure: Combined Esophagoscopy, Gastroscopy, Duodenoscopy (Egd), Biopsy Single Or Multiple;  Surgeon: José Miguel Ochoa MD;  Location: [facility]     GYN SURGERY       TONSILLECTOMY       Social History     Socioeconomic History     Marital status:      Spouse name: None     Number of children: None     Years of education: None     Highest education level: None   Occupational History     Comment: Retired   Tobacco Use     Smoking status: Former     Packs/day: 2.00     Years: 20.00     Pack years: 40.00     Types: Cigarettes     Quit date: 1987     Years since quittin.4     Smokeless tobacco: Never   Substance and Sexual Activity     Alcohol use: Yes     Comment: 1 nightly     Drug use: No     Sexual activity: Not Currently     Partners: Male     Birth control/protection: Post-menopausal   Other Topics Concern     Parent/sibling w/ CABG, MI or angioplasty before 65F 55M? No   Social History Narrative    2021    ENVIRONMENTAL HISTORY: [place] The home is heated with a forced air but does not use.  Has floor heating. They do have central air conditioning. The patient's bedroom is furnished with carpeting in bedroom.  No pets inside the house. But is exposed to chicken. There is no history of cockroach or mice infestation. There are no smokers in the house.  The house does not have a damp basement.            Review of Systems   Constitutional: Negative for chills and fever.   HENT: Positive for congestion and postnasal drip. Negative for ear pain and sore throat.    Eyes: Negative for pain and visual disturbance.   Respiratory: Negative for cough, chest tightness, shortness of breath and wheezing.    Cardiovascular: Negative for chest pain and palpitations.   Gastrointestinal: Negative for abdominal pain and vomiting.    Genitourinary: Negative for dysuria and hematuria.   Musculoskeletal: Negative for arthralgias and back pain.   Skin: Negative for color change and rash.   Neurological: Negative for seizures and syncope.   All other systems reviewed and are negative.          Current Outpatient Medications:      albuterol (2.5 MG/3ML) 0.083% neb solution, Take 1 vial (2.5 mg) by nebulization every 4 hours as needed for shortness of breath / dyspnea, wheezing or other (persistent cough), Disp: 50 vial, Rfl: 1     albuterol (PROAIR HFA/PROVENTIL HFA/VENTOLIN HFA) 108 (90 Base) MCG/ACT inhaler, Inhale 2-4 puffs into the lungs every 4 hours as needed for shortness of breath / dyspnea or wheezing, Disp: 18 g, Rfl: 3     azelastine (ASTELIN) 0.1 % nasal spray, Spray 2 sprays into both nostrils 2 times daily as needed for rhinitis or allergies, Disp: 30 mL, Rfl: 3     clobetasol (TEMOVATE) 0.05 % external cream, Apply sparingly to affected area twice weekly as needed.  Do not apply to face., Disp: 60 g, Rfl: 3     clobetasol (TEMOVATE) 0.05 % external ointment, Apply sparingly daily as needed to affected area on groin., Disp: 30 g, Rfl: 4     Fluticasone-Umeclidin-Vilanterol (TRELEGY ELLIPTA) 200-62.5-25 MCG/INH oral inhaler, Inhale 1 puff into the lungs daily, Disp: 60 each, Rfl: 3     hydrochlorothiazide (HYDRODIURIL) 12.5 MG tablet, TAKE 1 TABLET BY MOUTH ONCE DAILY, Disp: 90 tablet, Rfl: 3     Respiratory Therapy Supplies (NEBULIZER/TUBING/MOUTHPIECE) KIT, Use with albuterol neb solution, Disp: 1 kit, Rfl: 0  Immunization History   Administered Date(s) Administered     COVID-19,PF,Moderna 02/11/2021, 03/11/2021, 11/22/2021     FLUAD(HD)65+ QUAD 12/09/2021     Influenza (High Dose) 3 valent vaccine 02/27/2018, 09/18/2018     Influenza (IIV3) PF 11/08/2012     Influenza Vaccine IM > 6 months Valent IIV4 (Alfuria,Fluzone) 12/12/2016, 11/20/2020     Pneumo Conj 13-V (2010&after) 02/27/2018     Pneumococcal 23 valent 06/24/2020     TDAP  Vaccine (Boostrix) 11/08/2012     Tdap (Adult) Unspecified Formulation 06/30/1995     Zoster vaccine recombinant adjuvanted (SHINGRIX) 12/09/2021, 04/14/2022     Allergies   Allergen Reactions     Cartia Xt [Diltiazem] Other (See Comments)     Heartburn whenever on this medication     Losartan Cough     Miconazole      Sulfa Drugs Rash     OBJECTIVE:                                                                 Pulse 89   Temp 98.7  F (37.1  C)   SpO2 97%         Physical Exam  Vitals and nursing note reviewed.   Constitutional:       General: She is not in acute distress.     Appearance: She is not ill-appearing, toxic-appearing or diaphoretic.   HENT:      Head: Normocephalic and atraumatic.      Right Ear: Tympanic membrane, ear canal and external ear normal.      Left Ear: Tympanic membrane, ear canal and external ear normal.      Nose: Septal deviation (left) present. No mucosal edema, congestion or rhinorrhea.      Right Turbinates: Not enlarged, swollen or pale.      Left Turbinates: Not enlarged, swollen or pale.      Mouth/Throat:      Lips: Pink.      Mouth: Mucous membranes are moist.      Pharynx: Oropharynx is clear. No pharyngeal swelling, oropharyngeal exudate, posterior oropharyngeal erythema or uvula swelling.   Eyes:      General:         Right eye: No discharge.         Left eye: No discharge.      Conjunctiva/sclera: Conjunctivae normal.   Cardiovascular:      Rate and Rhythm: Normal rate and regular rhythm.      Heart sounds: Normal heart sounds. No murmur heard.  Pulmonary:      Effort: Pulmonary effort is normal. No respiratory distress.      Breath sounds: Normal breath sounds and air entry. No stridor, decreased air movement or transmitted upper airway sounds. No decreased breath sounds, wheezing, rhonchi or rales.   Musculoskeletal:         General: Normal range of motion.   Neurological:      Mental Status: She is alert and oriented to person, place, and time.   Psychiatric:          Mood and Affect: Mood normal.         Behavior: Behavior normal.               WORKUP: ACT 24    ASSESSMENT/PLAN:    Severe persistent asthma without complication  Well-controlled with Breo Ellipta 200/25 mcg 1 puff once daily, inconsistent use of Spiriva, and albuterol inhaler as needed basis.  - According to epic, Breo and Spiriva are not covered well.  - Stop both.  - Start Trelegy Ellipta 200/62.5/25 mcg 1 puff once daily.  -Use albuterol inhaler 2-4 puffs every 4 hours as needed for chest tightness/wheezing/shortness of breath/persistent cough. Use it with a spacer/chamber device.    - Ordered interval pre and postbronchodilator spirometry.  I will see her back in 3 months.  If she continues doing well, I will see her yearly.    - Fluticasone-Umeclidin-Vilanterol (TRELEGY ELLIPTA) 200-62.5-25 MCG/INH oral inhaler  Dispense: 60 each; Refill: 3  - General PFT Lab (Please always keep checked)  - Pulmonary Function Test    Chronic rhinitis  Well-controlled with azelastine as needed, primarily in the Spring.  -Continue as is.    - azelastine (ASTELIN) 0.1 % nasal spray  Dispense: 30 mL; Refill: 3    Need for prophylactic vaccination and inoculation against influenza    - INFLUENZA, QUAD, HIGH DOSE, PF, 65YR + (FLUZONE HD)         Return in about 3 months (around 1/14/2023), or if symptoms worsen or fail to improve.    Thank you for allowing us to participate in the care of this patient. Please feel free to contact us if there are any questions or concerns about the patient.    Disclaimer: This note consists of symbols derived from keyboarding, dictation and/or voice recognition software. As a result, there may be errors in the script that have gone undetected. Please consider this when interpreting information found in this chart.    Galindo Montano MD, FAAAAI, FACAAI  Allergy, Asthma and Immunology     [facility]

## 2022-10-14 NOTE — PATIENT INSTRUCTIONS
Stop Breo. No more Spiriva.  Start Trelegy 200/62.5/25 mcg 1 puff once daily. rinse/gargle/spit water after use.    -Continue using albuterol inhaler 2-4 puffs every 4 hours as needed for chest tightness/wheezing/shortness of breath/persistent cough.    Continue using azelastine 2 sprays in each nostril twice daily as needed       Call to schedule breathing test      [phone]    Do not use albuterol on the day of the breathing test if possible.

## 2022-10-14 NOTE — LETTER
10/14/2022         RE: Luz Elena Shipley  [address] [facility] [address]        Dear Colleague,    Thank you for referring your patient, Luz Elena Shipley, to the [facility]. Please see a copy of my visit note below.    SUBJECTIVE:                                                                   Luz Elena Cristina presents today to our Allergy Clinic at [facility] for a follow up visit. She is a 67 year old female with severe persistent asthma and chronic rhinitis.     Asthma since childhood. November 2016: Negative serum IgE to regional aeroallergen panel. CBC with differential without eosinophilia.  Normal total serum IgE.  Negative ABPA panel.  Alpha-1 antitrypsin within normal limits.  Normal CBC with differential without hypereosinophilia.  The PSG sleep study demonstrated moderate, REM, and supine predominant NEGRA with some sleep associated hypoxemia.  In February 2019, due to decrease in FEV1, chest CT was ordered that showed some opacities and small nodules that could be infectious.  The patient was treated with azithromycin.  On a follow-up phone call, the patient was asymptomatic; However, chest CT in May 2019 with new opacities.Per January'21 Pulm note: Established multiple pulmonary lung nodule(s). Near resolution of large nodules and likely related to infection/inflammation. No further surveillance indicated by CT.   In June 2021, spirometry with mild obstruction without postbronchodilator reversibility.    In regards to her asthma, she continues doing well on Breo Ellipta 200/25 mcg 1 puff once daily. She admits not being consistent with Spiriva Respimat.  She uses albuterol inhaler less than twice a week.  She denies chest tightness, wheezing, or dyspnea.  May develop some cough triggered by strong odors. Uses azelastine and it helps.  Denies frequent nocturnal chest symptoms.    She uses azelastine as needed, and it helps with nasal symptoms,  primarily in Spring.  She had a sinus infection in November 2021. Was treated with Augmentin. At the same time, she was diagnosed with an asthma exacerbation due to frequent reactive cough. No wheezing was noted on auscultation. SPO2 was 95%. Was prescribed prednisone, but she did not take it because she did not think that her chest was affected.      Patient Active Problem List   Diagnosis     Severe persistent asthma     Lichen sclerosus     Benign essential hypertension     Elevated glucose     Family history of diabetes mellitus     Osteopenia     Prediabetes       Past Medical History:   Diagnosis Date     Hypertension      Rheumatic fever      Sleep apnea      Uncomplicated asthma       Problem (# of Occurrences) Relation (Name,Age of Onset)    Unknown/Adopted (1) Paternal Grandfather (radha alicea)    Alzheimer Disease (1) Brother: early onset    Arthritis (1) Brother    Diabetes (2) Mother (fawn felder): unsure type; in 40s, Son: type 1    Cerebrovascular Disease (1) Father (ben felder): CVA    Thyroid Disease (1) Son    Breast Cancer (2) Sister, Paternal Aunt    Aneurysm (1) Paternal Grandmother    Other Cancer (1) Mother (fawn felder): panreatic    Chronic Obstructive Pulmonary Disease (1) Father (ben felder)    Skin Cancer (2) Sister, Father (ben felder)    Alcoholism (1) Son    Stomach Problem (1) Son: polyps, diverticulitis, heartburn, passes blood        Past Surgical History:   Procedure Laterality Date     BIOPSY      breast,     BIOPSY BREAST       COLONOSCOPY       COLONOSCOPY N/A 2/8/2019    Procedure: Combined Colonoscopy, Single Or Multiple Biopsy/Polypectomy By Biopsy;  Surgeon: José Miguel Ochoa MD;  Location: [facility]     COLONOSCOPY N/A 3/4/2022    Procedure: COLONOSCOPY;  Surgeon: Nirmal Rm MD;  Location: [facility]     COLONOSCOPY WITH CO2 INSUFFLATION N/A 2/8/2019    Procedure: COLONOSCOPY WITH CO2 INSUFFLATION;  Surgeon: Don  José Miguel Hansen MD;  Location: [facility]     COMBINED ESOPHAGOSCOPY, GASTROSCOPY, DUODENOSCOPY (EGD) WITH CO2 INSUFFLATION N/A 2019    Procedure: COMBINED ESOPHAGOSCOPY, GASTROSCOPY, DUODENOSCOPY (EGD) WITH CO2 INSUFFLATION;  Surgeon: José Miguel Ochoa MD;  Location: [facility]     ESOPHAGOSCOPY, GASTROSCOPY, DUODENOSCOPY (EGD), COMBINED N/A 2019    Procedure: Combined Esophagoscopy, Gastroscopy, Duodenoscopy (Egd), Biopsy Single Or Multiple;  Surgeon: José Miguel Ochoa MD;  Location: [facility]     GYN SURGERY       TONSILLECTOMY       Social History     Socioeconomic History     Marital status:      Spouse name: None     Number of children: None     Years of education: None     Highest education level: None   Occupational History     Comment: Retired   Tobacco Use     Smoking status: Former     Packs/day: 2.00     Years: 20.00     Pack years: 40.00     Types: Cigarettes     Quit date: 1987     Years since quittin.4     Smokeless tobacco: Never   Substance and Sexual Activity     Alcohol use: Yes     Comment: 1 nightly     Drug use: No     Sexual activity: Not Currently     Partners: Male     Birth control/protection: Post-menopausal   Other Topics Concern     Parent/sibling w/ CABG, MI or angioplasty before 65F 55M? No   Social History Narrative    2021    ENVIRONMENTAL HISTORY: [place] The home is heated with a forced air but does not use.  Has floor heating. They do have central air conditioning. The patient's bedroom is furnished with carpeting in bedroom.  No pets inside the house. But is exposed to chicken. There is no history of cockroach or mice infestation. There are no smokers in the house.  The house does not have a damp basement.            Review of Systems   Constitutional: Negative for chills and fever.   HENT: Positive for congestion and postnasal drip. Negative for ear pain and sore throat.    Eyes: Negative for  pain and visual disturbance.   Respiratory: Negative for cough, chest tightness, shortness of breath and wheezing.    Cardiovascular: Negative for chest pain and palpitations.   Gastrointestinal: Negative for abdominal pain and vomiting.   Genitourinary: Negative for dysuria and hematuria.   Musculoskeletal: Negative for arthralgias and back pain.   Skin: Negative for color change and rash.   Neurological: Negative for seizures and syncope.   All other systems reviewed and are negative.          Current Outpatient Medications:      albuterol (2.5 MG/3ML) 0.083% neb solution, Take 1 vial (2.5 mg) by nebulization every 4 hours as needed for shortness of breath / dyspnea, wheezing or other (persistent cough), Disp: 50 vial, Rfl: 1     albuterol (PROAIR HFA/PROVENTIL HFA/VENTOLIN HFA) 108 (90 Base) MCG/ACT inhaler, Inhale 2-4 puffs into the lungs every 4 hours as needed for shortness of breath / dyspnea or wheezing, Disp: 18 g, Rfl: 3     azelastine (ASTELIN) 0.1 % nasal spray, Spray 2 sprays into both nostrils 2 times daily as needed for rhinitis or allergies, Disp: 30 mL, Rfl: 3     clobetasol (TEMOVATE) 0.05 % external cream, Apply sparingly to affected area twice weekly as needed.  Do not apply to face., Disp: 60 g, Rfl: 3     clobetasol (TEMOVATE) 0.05 % external ointment, Apply sparingly daily as needed to affected area on groin., Disp: 30 g, Rfl: 4     Fluticasone-Umeclidin-Vilanterol (TRELEGY ELLIPTA) 200-62.5-25 MCG/INH oral inhaler, Inhale 1 puff into the lungs daily, Disp: 60 each, Rfl: 3     hydrochlorothiazide (HYDRODIURIL) 12.5 MG tablet, TAKE 1 TABLET BY MOUTH ONCE DAILY, Disp: 90 tablet, Rfl: 3     Respiratory Therapy Supplies (NEBULIZER/TUBING/MOUTHPIECE) KIT, Use with albuterol neb solution, Disp: 1 kit, Rfl: 0  Immunization History   Administered Date(s) Administered     COVID-19,PF,Moderna 02/11/2021, 03/11/2021, 11/22/2021     FLUAD(HD)65+ QUAD 12/09/2021     Influenza (High Dose) 3 valent vaccine  02/27/2018, 09/18/2018     Influenza (IIV3) PF 11/08/2012     Influenza Vaccine IM > 6 months Valent IIV4 (Alfuria,Fluzone) 12/12/2016, 11/20/2020     Pneumo Conj 13-V (2010&after) 02/27/2018     Pneumococcal 23 valent 06/24/2020     TDAP Vaccine (Boostrix) 11/08/2012     Tdap (Adult) Unspecified Formulation 06/30/1995     Zoster vaccine recombinant adjuvanted (SHINGRIX) 12/09/2021, 04/14/2022     Allergies   Allergen Reactions     Cartia Xt [Diltiazem] Other (See Comments)     Heartburn whenever on this medication     Losartan Cough     Miconazole      Sulfa Drugs Rash     OBJECTIVE:                                                                 Pulse 89   Temp 98.7  F (37.1  C)   SpO2 97%         Physical Exam  Vitals and nursing note reviewed.   Constitutional:       General: She is not in acute distress.     Appearance: She is not ill-appearing, toxic-appearing or diaphoretic.   HENT:      Head: Normocephalic and atraumatic.      Right Ear: Tympanic membrane, ear canal and external ear normal.      Left Ear: Tympanic membrane, ear canal and external ear normal.      Nose: Septal deviation (left) present. No mucosal edema, congestion or rhinorrhea.      Right Turbinates: Not enlarged, swollen or pale.      Left Turbinates: Not enlarged, swollen or pale.      Mouth/Throat:      Lips: Pink.      Mouth: Mucous membranes are moist.      Pharynx: Oropharynx is clear. No pharyngeal swelling, oropharyngeal exudate, posterior oropharyngeal erythema or uvula swelling.   Eyes:      General:         Right eye: No discharge.         Left eye: No discharge.      Conjunctiva/sclera: Conjunctivae normal.   Cardiovascular:      Rate and Rhythm: Normal rate and regular rhythm.      Heart sounds: Normal heart sounds. No murmur heard.  Pulmonary:      Effort: Pulmonary effort is normal. No respiratory distress.      Breath sounds: Normal breath sounds and air entry. No stridor, decreased air movement or transmitted upper airway  sounds. No decreased breath sounds, wheezing, rhonchi or rales.   Musculoskeletal:         General: Normal range of motion.   Neurological:      Mental Status: She is alert and oriented to person, place, and time.   Psychiatric:         Mood and Affect: Mood normal.         Behavior: Behavior normal.               WORKUP: ACT 24    ASSESSMENT/PLAN:    Severe persistent asthma without complication  Well-controlled with Breo Ellipta 200/25 mcg 1 puff once daily, inconsistent use of Spiriva, and albuterol inhaler as needed basis.  - According to epic, Breo and Spiriva are not covered well.  - Stop both.  - Start Trelegy Ellipta 200/62.5/25 mcg 1 puff once daily.  -Use albuterol inhaler 2-4 puffs every 4 hours as needed for chest tightness/wheezing/shortness of breath/persistent cough. Use it with a spacer/chamber device.    - Ordered interval pre and postbronchodilator spirometry.  I will see her back in 3 months.  If she continues doing well, I will see her yearly.    - Fluticasone-Umeclidin-Vilanterol (TRELEGY ELLIPTA) 200-62.5-25 MCG/INH oral inhaler  Dispense: 60 each; Refill: 3  - General PFT Lab (Please always keep checked)  - Pulmonary Function Test    Chronic rhinitis  Well-controlled with azelastine as needed, primarily in the Spring.  -Continue as is.    - azelastine (ASTELIN) 0.1 % nasal spray  Dispense: 30 mL; Refill: 3    Need for prophylactic vaccination and inoculation against influenza    - INFLUENZA, QUAD, HIGH DOSE, PF, 65YR + (FLUZONE HD)         Return in about 3 months (around 1/14/2023), or if symptoms worsen or fail to improve.    Thank you for allowing us to participate in the care of this patient. Please feel free to contact us if there are any questions or concerns about the patient.    Disclaimer: This note consists of symbols derived from keyboarding, dictation and/or voice recognition software. As a result, there may be errors in the script that have gone undetected. Please consider this  when interpreting information found in this chart.    Galindo Montano MD, FAAPERLAI, FRANKI  Allergy, Asthma and Immunology     [facility]         Again, thank you for allowing me to participate in the care of your patient.        Sincerely,        Galindo Montano MD

## 2022-10-25 ENCOUNTER — DOCUMENTATION ONLY (OUTPATIENT)
Dept: OTHER | Facility: CLINIC | Age: 67
End: 2022-10-25

## 2022-11-11 ENCOUNTER — HOSPITAL ENCOUNTER (EMERGENCY)
Facility: CLINIC | Age: 67
Discharge: HOME | End: 2022-11-11
Attending: FAMILY MEDICINE | Admitting: FAMILY MEDICINE
Payer: MEDICARE

## 2022-11-11 VITALS
SYSTOLIC BLOOD PRESSURE: 121 MMHG | HEIGHT: 65 IN | RESPIRATION RATE: 18 BRPM | HEART RATE: 69 BPM | WEIGHT: 183 LBS | BODY MASS INDEX: 30.49 KG/M2 | OXYGEN SATURATION: 95 % | DIASTOLIC BLOOD PRESSURE: 79 MMHG | TEMPERATURE: 97.9 F

## 2022-11-11 DIAGNOSIS — H81.392 PERIPHERAL VERTIGO INVOLVING LEFT EAR: ICD-10-CM

## 2022-11-11 LAB
HOLD SPECIMEN: NORMAL

## 2022-11-11 PROCEDURE — 99284 EMERGENCY DEPT VISIT MOD MDM: CPT | Performed by: FAMILY MEDICINE

## 2022-11-11 RX ORDER — MECLIZINE HYDROCHLORIDE 25 MG/1
25 TABLET ORAL 3 TIMES DAILY PRN
Qty: 12 TABLET | Refills: 0 | Status: SHIPPED | OUTPATIENT
Start: 2022-11-11 | End: 2023-03-09

## 2022-11-11 RX ORDER — ONDANSETRON 4 MG/1
4-8 TABLET, ORALLY DISINTEGRATING ORAL EVERY 8 HOURS PRN
Qty: 12 TABLET | Refills: 0 | Status: SHIPPED | OUTPATIENT
Start: 2022-11-11 | End: 2023-03-09

## 2022-11-11 NOTE — ED PROVIDER NOTES
HPI   The patient is a 67-year-old female presenting with dizziness.  Symptoms began yesterday evening at about 10:00 PM.  She had been feeling well up until that point.  Then, when she was getting up from her recliner in the living room she recognized that she felt off balance and that things were moving.  She had trouble walking down the hallway because of this.  She felt mildly nauseous.  No vomiting.  Her symptoms improved and resolved when she held still or sat down and stopped moving.  She was able to lie flat and sleep through the night without difficulty.  However, this morning when she got up she felt severe dizziness once again.  She describes vertigo and associated nausea.  It is reproducible with movement.  No one-sided weakness or incoordination.  No difficulty with speech.  No facial weakness.        Allergies:  Allergies   Allergen Reactions     Cartia Xt [Diltiazem] Other (See Comments)     Heartburn whenever on this medication     Losartan Cough     Miconazole      Sulfa Drugs Rash     Problem List:    Patient Active Problem List    Diagnosis Date Noted     Prediabetes 01/12/2022     Priority: Medium     Osteopenia 08/19/2020     Priority: Medium     Recheck [id number].  Aug 2020: 1. Mild osteopenia in the left femoral neck.  2. Mild-moderate osteopenia in the right femoral neck.  3. The L1 and L4 bone density is borderline between lower normal and  mild osteopenia.       Severe persistent asthma 04/29/2016     Priority: Medium     Symptoms since childhood. Smoked since age of 11 until 31 years.  Until seen in November 2014, used albuterol once in her life.       Lichen sclerosus 04/29/2016     Priority: Medium     Benign essential hypertension 04/29/2016     Priority: Medium     Elevated glucose 04/29/2016     Priority: Medium     Family history of diabetes mellitus 04/29/2016     Priority: Medium      Past Medical History:    Past Medical History:   Diagnosis Date     Hypertension      Rheumatic  fever      Sleep apnea      Uncomplicated asthma      Past Surgical History:    Past Surgical History:   Procedure Laterality Date     BIOPSY      breast,     BIOPSY BREAST       COLONOSCOPY       COLONOSCOPY N/A 2/8/2019    Procedure: Combined Colonoscopy, Single Or Multiple Biopsy/Polypectomy By Biopsy;  Surgeon: José Miguel Ochoa MD;  Location: [facility]     COLONOSCOPY N/A 3/4/2022    Procedure: COLONOSCOPY;  Surgeon: Nirmal Rm MD;  Location: [facility]     COLONOSCOPY WITH CO2 INSUFFLATION N/A 2/8/2019    Procedure: COLONOSCOPY WITH CO2 INSUFFLATION;  Surgeon: José Miguel Ochoa MD;  Location: [facility]     COMBINED ESOPHAGOSCOPY, GASTROSCOPY, DUODENOSCOPY (EGD) WITH CO2 INSUFFLATION N/A 2/8/2019    Procedure: COMBINED ESOPHAGOSCOPY, GASTROSCOPY, DUODENOSCOPY (EGD) WITH CO2 INSUFFLATION;  Surgeon: José Miguel Ochoa MD;  Location: [facility]     ESOPHAGOSCOPY, GASTROSCOPY, DUODENOSCOPY (EGD), COMBINED N/A 2/8/2019    Procedure: Combined Esophagoscopy, Gastroscopy, Duodenoscopy (Egd), Biopsy Single Or Multiple;  Surgeon: José Miguel Ochoa MD;  Location: [facility]     GYN SURGERY       TONSILLECTOMY       Family History:    Family History   Problem Relation Age of Onset     Other Cancer Mother         panreatic     Diabetes Mother         unsure type; in 40s     Breast Cancer Sister      Skin Cancer Sister      Cerebrovascular Disease Father         CVA     Chronic Obstructive Pulmonary Disease Father      Skin Cancer Father      Aneurysm Paternal Grandmother      Unknown/Adopted Paternal Grandfather      Alzheimer Disease Brother         early onset     Diabetes Son         type 1     Thyroid Disease Son      Arthritis Brother      Stomach Problem Son         polyps, diverticulitis, heartburn, passes blood     Alcoholism Son      Breast Cancer Paternal Aunt      Social History:  Marital Status:   [4]  Social History     Tobacco Use     Smoking status: Former     Packs/day: 2.00      "Years: 20.00     Pack years: 40.00     Types: Cigarettes     Quit date: 1987     Years since quittin.5     Smokeless tobacco: Never   Substance Use Topics     Alcohol use: Yes     Comment: 1 nightly     Drug use: No      Medications:    meclizine (ANTIVERT) 25 MG tablet  ondansetron (ZOFRAN ODT) 4 MG ODT tab  albuterol (2.5 MG/3ML) 0.083% neb solution  albuterol (PROAIR HFA/PROVENTIL HFA/VENTOLIN HFA) 108 (90 Base) MCG/ACT inhaler  azelastine (ASTELIN) 0.1 % nasal spray  clobetasol (TEMOVATE) 0.05 % external cream  clobetasol (TEMOVATE) 0.05 % external ointment  Fluticasone-Umeclidin-Vilanterol (TRELEGY ELLIPTA) 200-62.5-25 MCG/INH oral inhaler  hydrochlorothiazide (HYDRODIURIL) 12.5 MG tablet  Respiratory Therapy Supplies (NEBULIZER/TUBING/MOUTHPIECE) KIT      Review of Systems   All other systems reviewed and are negative.      PE   BP: 121/79  Pulse: 69  Temp: 97.9  F (36.6  C)  Resp: 18  Height: 165.1 cm (5' 5\")  Weight: 83 kg (183 lb)  SpO2: 95 %  Physical Exam  Vitals reviewed.   Constitutional:       General: She is not in acute distress.     Appearance: She is well-developed.   HENT:      Head: Normocephalic and atraumatic.      Right Ear: Tympanic membrane and external ear normal.      Left Ear: Tympanic membrane and external ear normal.      Nose: Nose normal. No congestion or rhinorrhea.      Mouth/Throat:      Mouth: Mucous membranes are moist.      Pharynx: Oropharynx is clear.   Eyes:      Extraocular Movements: Extraocular movements intact.      Conjunctiva/sclera: Conjunctivae normal.      Pupils: Pupils are equal, round, and reactive to light.   Cardiovascular:      Rate and Rhythm: Normal rate and regular rhythm.      Heart sounds: Normal heart sounds.   Pulmonary:      Effort: Pulmonary effort is normal.      Breath sounds: Normal breath sounds.   Abdominal:      Palpations: Abdomen is soft.      Tenderness: There is no abdominal tenderness.   Musculoskeletal:         General: Normal " range of motion.      Cervical back: Normal range of motion.   Skin:     General: Skin is warm and dry.   Neurological:      General: No focal deficit present.      Mental Status: She is alert and oriented to person, place, and time.      Comments: No dysarthria or dysphasia.  CN II-VIII intact grossly.  Moving U/L extremities B.  Strength 5/5 U/L extremities B.  Sensation intact grossly to touch (equal).  Rapid alternating movements intact.  Negative Rhomberg.  Normal finger-to-nose movments.  I was able to reproduce her vertigo symptoms by laying her flat and having her turn her head to the left.  There was no evidence for obvious nystagmus when this happened.   Psychiatric:         Behavior: Behavior normal.         ED COURSE and MDM   1047.  Reproducible vertigo with position change.  This will extinguish while holding still.  No nystagmus obvious.  No other neurological deficit.  Benign positional vertigo likely.  I did try to provide an Epley maneuver in hopes that it would help.  I will provide meclizine and Zofran if it did not help.  Physical therapy referral order placed.  Patient agrees with the plan and would like to be discharged home.    1048.  The patient, their parent if applicable, and/or their medical decision maker(s) and I have reviewed all of the available historical information, applicable PMH, physical exam findings, and objective diagnostic data gathered during this ED visit.  We then discussed all work-up options and then together agreed upon the course taken during this visit.  The ultimate disposition and plan was a cooperative decision made between myself and the patient, their parent if applicable, and/or their legal decision maker(s).  The risks and benefits of all decisions made during this visit were discussed to the best of my abilities given the circumstances, and all parties are understanding of the pertinent ramifications of these decisions.      LABS  Labs Ordered and Resulted  from Time of ED Arrival to Time of ED Departure - No data to display    IMAGING  Images reviewed by me.  Radiology report also reviewed.  No orders to display       Procedures    Medications - No data to display      IMPRESSION       ICD-10-CM    1. Peripheral vertigo involving left ear  H81.392 Physical Therapy Referral               Medication List      Started    meclizine 25 MG tablet  Commonly known as: ANTIVERT  25 mg, Oral, 3 TIMES DAILY PRN     ondansetron 4 MG ODT tab  Commonly known as: ZOFRAN ODT  4-8 mg, Oral, EVERY 8 HOURS PRN                        Thanh Patel MD  11/11/22 1048

## 2022-11-11 NOTE — DISCHARGE INSTRUCTIONS
RETURN TO THE EMERGENCY ROOM FOR THE FOLLOWING:    Severely worsened dizziness with vomiting and dehydration, new severe headache, one-sided weakness or incoordination/difficulty with speech/facial droop, or at anytime for any concern.    FOLLOW UP:    Physical therapy referral order placed at the time of your discharge.  Expect a phone call within the next 24 to 48 hours to help with scheduling.    TREATMENT RECOMMENDATIONS:    Meclizine as needed for dizziness.  Zofran as needed for nausea.    NURSE ADVICE LINE:  [phone] or [phone]

## 2022-11-11 NOTE — ED TRIAGE NOTES
Pt here with dizziness that started last night around 2200. Slept ok. It was still there when she got up. Dizzy only with movement.      Triage Assessment     Row Name 11/11/22 0957       Triage Assessment (Adult)    Airway WDL WDL       Respiratory WDL    Respiratory WDL WDL       Skin Circulation/Temperature WDL    Skin Circulation/Temperature WDL WDL       Cardiac WDL    Cardiac WDL WDL       Peripheral/Neurovascular WDL    Peripheral Neurovascular WDL WDL       Cognitive/Neuro/Behavioral WDL    Cognitive/Neuro/Behavioral WDL X    Level of Consciousness alert    Arousal Level opens eyes spontaneously    Orientation oriented x 4    Speech clear;spontaneous;logical    Mood/Behavior calm;cooperative       [place] Coma Scale    Best Eye Response 4-->(E4) spontaneous    Best Motor Response 6-->(M6) obeys commands    Best Verbal Response 5-->(V5) oriented    Jennifer Coma Scale Score 15

## 2023-01-09 ENCOUNTER — HOSPITAL ENCOUNTER (OUTPATIENT)
Dept: RESPIRATORY THERAPY | Facility: CLINIC | Age: 68
Discharge: HOME | End: 2023-01-09
Attending: PHYSICIAN ASSISTANT | Admitting: PHYSICIAN ASSISTANT
Payer: MEDICARE

## 2023-01-09 DIAGNOSIS — J45.50 SEVERE PERSISTENT ASTHMA WITHOUT COMPLICATION (H): ICD-10-CM

## 2023-01-09 PROCEDURE — 94060 EVALUATION OF WHEEZING: CPT

## 2023-01-09 PROCEDURE — 250N000009 HC RX 250: Performed by: ALLERGY & IMMUNOLOGY

## 2023-01-09 RX ORDER — ALBUTEROL SULFATE 0.83 MG/ML
2.5 SOLUTION RESPIRATORY (INHALATION) ONCE
Status: COMPLETED | OUTPATIENT
Start: 2023-01-09 | End: 2023-01-09

## 2023-01-09 RX ADMIN — ALBUTEROL SULFATE 2.5 MG: 2.5 SOLUTION RESPIRATORY (INHALATION) at 11:05

## 2023-01-09 NOTE — PROGRESS NOTES
Luz Elena Shipley is a 67 year old female patient.  1. Severe persistent asthma without complication      Past Medical History:   Diagnosis Date     Hypertension      Rheumatic fever      Sleep apnea      Uncomplicated asthma      Current Outpatient Medications   Medication Sig Dispense Refill     albuterol (2.5 MG/3ML) 0.083% neb solution Take 1 vial (2.5 mg) by nebulization every 4 hours as needed for shortness of breath / dyspnea, wheezing or other (persistent cough) 50 vial 1     albuterol (PROAIR HFA/PROVENTIL HFA/VENTOLIN HFA) 108 (90 Base) MCG/ACT inhaler Inhale 2-4 puffs into the lungs every 4 hours as needed for shortness of breath / dyspnea or wheezing 18 g 3     azelastine (ASTELIN) 0.1 % nasal spray Spray 2 sprays into both nostrils 2 times daily as needed for rhinitis or allergies 30 mL 3     clobetasol (TEMOVATE) 0.05 % external cream Apply sparingly to affected area twice weekly as needed.  Do not apply to face. 60 g 3     clobetasol (TEMOVATE) 0.05 % external ointment Apply sparingly daily as needed to affected area on groin. 30 g 4     Fluticasone-Umeclidin-Vilanterol (TRELEGY ELLIPTA) 200-62.5-25 MCG/INH oral inhaler Inhale 1 puff into the lungs daily 60 each 3     hydrochlorothiazide (HYDRODIURIL) 12.5 MG tablet TAKE 1 TABLET BY MOUTH ONCE DAILY 90 tablet 3     meclizine (ANTIVERT) 25 MG tablet Take 1 tablet (25 mg) by mouth 3 times daily as needed for dizziness 12 tablet 0     ondansetron (ZOFRAN ODT) 4 MG ODT tab Take 1-2 tablets (4-8 mg) by mouth every 8 hours as needed for nausea 12 tablet 0     Respiratory Therapy Supplies (NEBULIZER/TUBING/MOUTHPIECE) KIT Use with albuterol neb solution 1 kit 0     Allergies   Allergen Reactions     Cartia Xt [Diltiazem] Other (See Comments)     Heartburn whenever on this medication     Losartan Cough     Miconazole      Sulfa Drugs Rash     Active Problems:    * No active hospital problems. *    There were no vitals taken for this  visit.    Subjective  Objective  Assessment & Plan    SAMEERA LAROSE, RT  1/9/2023

## 2023-01-09 NOTE — PROGRESS NOTES
Luz Elena Shipley is a 67 year old female patient.  1. Severe persistent asthma without complication      Past Medical History:   Diagnosis Date     Hypertension      Rheumatic fever      Sleep apnea      Uncomplicated asthma      Current Outpatient Medications   Medication Sig Dispense Refill     albuterol (2.5 MG/3ML) 0.083% neb solution Take 1 vial (2.5 mg) by nebulization every 4 hours as needed for shortness of breath / dyspnea, wheezing or other (persistent cough) 50 vial 1     albuterol (PROAIR HFA/PROVENTIL HFA/VENTOLIN HFA) 108 (90 Base) MCG/ACT inhaler Inhale 2-4 puffs into the lungs every 4 hours as needed for shortness of breath / dyspnea or wheezing 18 g 3     azelastine (ASTELIN) 0.1 % nasal spray Spray 2 sprays into both nostrils 2 times daily as needed for rhinitis or allergies 30 mL 3     clobetasol (TEMOVATE) 0.05 % external cream Apply sparingly to affected area twice weekly as needed.  Do not apply to face. 60 g 3     clobetasol (TEMOVATE) 0.05 % external ointment Apply sparingly daily as needed to affected area on groin. 30 g 4     Fluticasone-Umeclidin-Vilanterol (TRELEGY ELLIPTA) 200-62.5-25 MCG/INH oral inhaler Inhale 1 puff into the lungs daily 60 each 3     hydrochlorothiazide (HYDRODIURIL) 12.5 MG tablet TAKE 1 TABLET BY MOUTH ONCE DAILY 90 tablet 3     meclizine (ANTIVERT) 25 MG tablet Take 1 tablet (25 mg) by mouth 3 times daily as needed for dizziness 12 tablet 0     ondansetron (ZOFRAN ODT) 4 MG ODT tab Take 1-2 tablets (4-8 mg) by mouth every 8 hours as needed for nausea 12 tablet 0     Respiratory Therapy Supplies (NEBULIZER/TUBING/MOUTHPIECE) KIT Use with albuterol neb solution 1 kit 0     Allergies   Allergen Reactions     Cartia Xt [Diltiazem] Other (See Comments)     Heartburn whenever on this medication     Losartan Cough     Miconazole      Sulfa Drugs Rash     Active Problems:    * No active hospital problems. *    There were no vitals taken for this  visit.    Subjective  Objective  Assessment & Plan    SAMEERA LARSOE, RT  1/9/2023

## 2023-01-10 LAB
EXPTIME-PRE: 8.21 SEC
FEF2575-%PRED-POST: 41 %
FEF2575-%PRED-PRE: 36 %
FEF2575-POST: 0.81 L/SEC
FEF2575-PRE: 0.71 L/SEC
FEF2575-PRED: 1.95 L/SEC
FEFMAX-%PRED-PRE: 87 %
FEFMAX-PRE: 5.15 L/SEC
FEFMAX-PRED: 5.88 L/SEC
FEV1-%PRED-PRE: 70 %
FEV1-PRE: 1.64 L
FEV1FEV6-PRE: 59 %
FEV1FEV6-PRED: 79 %
FEV1FVC-PRE: 57 %
FEV1FVC-PRED: 78 %
FIFMAX-PRE: 4.02 L/SEC
FVC-%PRED-PRE: 95 %
FVC-PRE: 2.86 L
FVC-PRED: 2.98 L

## 2023-01-11 NOTE — RESULT ENCOUNTER NOTE
myTips message sent:     Spirometry with mild obstruction and no postbronchodilator reversibility.  No significant changed compared to 2021.  Stable.  Will discuss symptoms in 2 days.

## 2023-02-18 DIAGNOSIS — I10 BENIGN ESSENTIAL HYPERTENSION: ICD-10-CM

## 2023-02-20 DIAGNOSIS — J45.50 SEVERE PERSISTENT ASTHMA WITHOUT COMPLICATION (H): ICD-10-CM

## 2023-02-20 NOTE — TELEPHONE ENCOUNTER
Requested Prescriptions   Pending Prescriptions Disp Refills     Fluticasone-Umeclidin-Vilanterol (TRELEGY ELLIPTA) 200-62.5-25 MCG/ACT oral inhaler       Sig: Inhale 1 puff into the lungs daily       There is no refill protocol information for this order          Last office visit: 10/14/2022 with prescribing provider: Galindo Montano   Future Office Visit:   Next 5 appointments (look out 90 days)    Mar 07, 2023 10:45 AM  (Arrive by 10:30 AM)  Return Visit with Marlen Godoy PA-C  [facility] [address] [address]  [phone]   Mar 24, 2023  2:30 PM  Return Visit with Galindo Montano MD  [facility] [address] [address] [phone]         Thank you,  Andressa Young  Specialty Clinic -   [facility]

## 2023-02-21 RX ORDER — HYDROCHLOROTHIAZIDE 12.5 MG/1
TABLET ORAL
Qty: 90 TABLET | Refills: 0 | Status: SHIPPED | OUTPATIENT
Start: 2023-02-21 | End: 2023-05-04

## 2023-02-21 RX ORDER — FLUTICASONE FUROATE, UMECLIDINIUM BROMIDE AND VILANTEROL TRIFENATATE 200; 62.5; 25 UG/1; UG/1; UG/1
1 POWDER RESPIRATORY (INHALATION) DAILY
Qty: 60 EACH | Refills: 4 | Status: SHIPPED | OUTPATIENT
Start: 2023-02-21 | End: 2023-03-24

## 2023-02-21 NOTE — TELEPHONE ENCOUNTER
Routing refill request to provider for review/approval because:  Drug not on the [facility] refill protocol.    Last office visit 10/14/2022. Pt has follow up scheduled for 3/24/23.      Praveena VALE RN  Specialty/Allergy Clinics

## 2023-03-09 ENCOUNTER — TELEPHONE (OUTPATIENT)
Dept: FAMILY MEDICINE | Facility: CLINIC | Age: 68
End: 2023-03-09
Payer: MEDICARE

## 2023-03-09 DIAGNOSIS — R05.1 ACUTE COUGH: ICD-10-CM

## 2023-03-09 DIAGNOSIS — U07.1 INFECTION DUE TO 2019 NOVEL CORONAVIRUS: ICD-10-CM

## 2023-03-09 NOTE — TELEPHONE ENCOUNTER
COVID Positive/Requesting COVID treatment    Patient is positive for COVID and requesting treatment options.    Date of positive COVID test (PCR or at home)? Today 3/9/23  Current COVID symptoms: cough,headache,sore throat, congestion  Date COVID symptoms began: Monday 3/7/23    Message should be routed to clinic RN pool. Best phone number to use for call back:[phone]      Pt tested at home on Monday and it was negative but is positive today He  had Covid recently. She has asthma.

## 2023-03-09 NOTE — TELEPHONE ENCOUNTER
RN COVID TREATMENT VISIT  03/09/23      The patient has been triaged and does not require a higher level of care.    Luz Elena CASTILLO Quinten  68 year old  Current weight?  Wt Readings from Last 2 Encounters:   11/11/22 83 kg (183 lb)   06/13/22 83 kg (183 lb)       Has the patient been seen by a primary care provider at an [facility] or [facility] within the past two years? Yes.   Have you been in close proximity to/do you have a known exposure to a person with a confirmed case of influenza? No.     General treatment eligibility:  Date of positive COVID test (PCR or at home)?  03-09-23    Are you or have you been hospitalized for this COVID-19 infection? No.   Have you received monoclonal antibodies or antiviral treatment for COVID-19 since this positive test? No.   Do you have any of the following conditions that place you at risk of being very sick from COVID-19?   - Age 50 years or older  - Chronic lung diseases such as asthma, bronchiectasis, COPD, interstitial lung disease, pulmonary embolism, pulmonary hypertension   - Diabetes mellitus, type 1 and type 2  - Overweight or Obesity (BMI >85th percentile or BMI 25 or higher)  - Current or former smoker  Yes, patient has at least one high risk condition as noted above.     Current COVID symptoms:   - cough  - fatigue  - headache  - sore throat  - congestion or runny nose  Yes. Patient has at least one symptom as selected.     How many days since symptoms started? 5 days or less. Established patient, 12 years or older weighing at least 88.2 lbs, who has symptoms that started in the past 5 days, has not been hospitalized nor received treatment already, and is at risk for being very sick from COVID-19.     Treatment eligibility by RN:    Are you currently pregnant or nursing? No    Do you have a clinically significant hypersensitivity to nirmatrelvir or ritonavir, or toxic epidermal necrolysis (TEN) or Ceja-Dejan Syndrome? No    Do you have a  history of hepatitis, any hepatic impairment on the Problem List (such as Child-Patricia Class C, cirrhosis, fatty liver disease, alcoholic liver disease), or was the last liver lab (hepatic panel, ALT, AST, ALK Phos, bilirubin) elevated in the past 6 months? No    Do you have any history of severe renal impairment (eGFR < 30mL/min)? No    Is patient eligible to continue?   Yes, patient meets all eligibility requirements for the RN COVID treatment (as denoted by all no responses above).     Current Outpatient Medications   Medication Sig Dispense Refill     albuterol (2.5 MG/3ML) 0.083% neb solution Take 1 vial (2.5 mg) by nebulization every 4 hours as needed for shortness of breath / dyspnea, wheezing or other (persistent cough) 50 vial 1     albuterol (PROAIR HFA/PROVENTIL HFA/VENTOLIN HFA) 108 (90 Base) MCG/ACT inhaler Inhale 2-4 puffs into the lungs every 4 hours as needed for shortness of breath / dyspnea or wheezing 18 g 3     azelastine (ASTELIN) 0.1 % nasal spray Spray 2 sprays into both nostrils 2 times daily as needed for rhinitis or allergies 30 mL 3     clobetasol (TEMOVATE) 0.05 % external cream Apply sparingly to affected area twice weekly as needed.  Do not apply to face. 60 g 3     clobetasol (TEMOVATE) 0.05 % external ointment Apply sparingly daily as needed to affected area on groin. 30 g 4     Fluticasone-Umeclidin-Vilanterol (TRELEGY ELLIPTA) 200-62.5-25 MCG/ACT oral inhaler Inhale 1 puff into the lungs daily 60 each 4     hydrochlorothiazide (HYDRODIURIL) 12.5 MG tablet TAKE 1 TABLET BY MOUTH ONCE DAILY 90 tablet 0     meclizine (ANTIVERT) 25 MG tablet Take 1 tablet (25 mg) by mouth 3 times daily as needed for dizziness 12 tablet 0     ondansetron (ZOFRAN ODT) 4 MG ODT tab Take 1-2 tablets (4-8 mg) by mouth every 8 hours as needed for nausea 12 tablet 0     Respiratory Therapy Supplies (NEBULIZER/TUBING/MOUTHPIECE) KIT Use with albuterol neb solution 1 kit 0       Medications from List 1 of the  standing order (on medications that exclude the use of Paxlovid) that patient is taking: NONE. Is patient taking Sheila's Wort? No  Is patient taking Sheila's Wort or any meds from List 1? No.   Medications from List 2 of the standing order (on meds that provider needs to adjust) that patient is taking: NONE. Is patient on any of the meds from List 2? No.   Medications from List 3 of standing order (on meds that a RN needs to adjust) that patient is taking: NONE. Is patient on any meds from List 3? No.     Paxlovid has an approximate 90% reduction in hospitalization. Paxlovid can possibly cause altered sense of taste, diarrhea (loose, watery stools), high blood pressure, muscle aches.     Would patient like a Paxlovid prescription?   Yes.   Lab Results   Component Value Date    GFRESTIMATED 80 01/12/2022       Was last eGFR reduced? No, eGFR 60 or greater/ No Result on record. Patient can receive the normal renal function dose. Paxlovid Rx sent to [facility] [phone]    [address] [place] [id number]    Hours:  Mon-Fri: 9:00a - 7:00p  Sat-Sun: 9:00a - 1:00p     Drive Thru available    Temporary change to home medications: None    All medication adjustments (holds, etc) were discussed with the patient and patient was asked to repeat back (teachback) their med adjustment.  Did patient understand med adjustment? No medication adjustments needed.         Reviewed the following instructions with the patient:    Paxlovid (nimatrelvir and ritonavir)    How it works  Two medicines (nirmatrelvir and ritonavir) are taken together. They stop the virus from growing. Less amount of virus is easier for your body to fight.    How to take    Medicine comes in a daily container with both medicine tablets. Take by mouth twice daily (once in the morning, once at night) for 5 days.    The number of tablets to take varies by patient.    Don't chew or break capsules. Swallow  whole.    When to take  Take as soon as possible after positive COVID-19 test result, and within 5 days of your first symptoms.    Possible side effects  Can cause altered sense of taste, diarrhea (loose, watery stools), high blood pressure, muscle aches.      Rx pended. Forwarded to [facility], for review, Rx auth due to last BMP > 1 yr ago WNCHRISTEL Martinez RN

## 2023-03-24 ENCOUNTER — OFFICE VISIT (OUTPATIENT)
Dept: ALLERGY | Facility: CLINIC | Age: 68
End: 2023-03-24
Payer: MEDICARE

## 2023-03-24 VITALS
BODY MASS INDEX: 33.32 KG/M2 | WEIGHT: 200 LBS | DIASTOLIC BLOOD PRESSURE: 73 MMHG | HEIGHT: 65 IN | OXYGEN SATURATION: 96 % | HEART RATE: 75 BPM | SYSTOLIC BLOOD PRESSURE: 124 MMHG

## 2023-03-24 DIAGNOSIS — J45.50 SEVERE PERSISTENT ASTHMA WITHOUT COMPLICATION (H): Primary | ICD-10-CM

## 2023-03-24 DIAGNOSIS — J01.90 ACUTE SINUSITIS WITH SYMPTOMS > 10 DAYS: ICD-10-CM

## 2023-03-24 DIAGNOSIS — J31.0 CHRONIC RHINITIS: ICD-10-CM

## 2023-03-24 DIAGNOSIS — J34.2 DNS (DEVIATED NASAL SEPTUM): ICD-10-CM

## 2023-03-24 PROCEDURE — 99214 OFFICE O/P EST MOD 30 MIN: CPT | Performed by: ALLERGY & IMMUNOLOGY

## 2023-03-24 RX ORDER — FLUTICASONE PROPIONATE 50 MCG
2 SPRAY, SUSPENSION (ML) NASAL DAILY
Qty: 16 G | Refills: 3 | Status: SHIPPED | OUTPATIENT
Start: 2023-03-24 | End: 2023-10-26

## 2023-03-24 RX ORDER — FLUTICASONE FUROATE, UMECLIDINIUM BROMIDE AND VILANTEROL TRIFENATATE 200; 62.5; 25 UG/1; UG/1; UG/1
1 POWDER RESPIRATORY (INHALATION) DAILY
Qty: 60 EACH | Refills: 6 | Status: SHIPPED | OUTPATIENT
Start: 2023-03-24 | End: 2024-04-04

## 2023-03-24 ASSESSMENT — ENCOUNTER SYMPTOMS
NAUSEA: 0
WHEEZING: 0
ADENOPATHY: 0
CHILLS: 0
ACTIVITY CHANGE: 0
EYE ITCHING: 0
MYALGIAS: 0
FACIAL SWELLING: 0
SINUS PRESSURE: 1
EYE REDNESS: 0
ARTHRALGIAS: 0
FEVER: 0
CHEST TIGHTNESS: 0
DIARRHEA: 0
RHINORRHEA: 1
EYE DISCHARGE: 0
VOMITING: 0
HEADACHES: 0
COUGH: 0
SHORTNESS OF BREATH: 0

## 2023-03-24 ASSESSMENT — ASTHMA QUESTIONNAIRES: ACT_TOTALSCORE: 23

## 2023-03-24 ASSESSMENT — PAIN SCALES - GENERAL: PAINLEVEL: MODERATE PAIN (4)

## 2023-03-24 NOTE — PATIENT INSTRUCTIONS
Continue Trelegy 1 puff once daily.   -Continue using albuterol inhaler 2-4 puffs every 4 hours as needed for chest tightness/wheezing/shortness of breath/persistent cough.  Continue saline rinses once daily-twice daily. DO NOT USE TAP WATER.     Continue azelastine 2 sprays in each nostril[ twice daily as needed.   While you are sick use Flonase 2 sprays in each nostril once daily.     Start Augmentin 875/125 twice daily for 14 days.       See ENT.               If labs have been ordered/completed, please allow 7-14 business days for final interpretation of results to be sent on My Chart, phone or mail. Some lab results can take up to 28 days for results.       Allergy Staff Appt Hours Shot Hours Locations    Physician     Galindo Montano MD       Support Staff     Praveena Bloom [facility]    Tuesday:   [place] :  [place]: 7:         :  [facility]     [place]        Tuesday: 7- :20        Wednesday: 7-:20     : 7-4:10        Tuesday: 7-4:10        Thursday: 7-3:10    [facility]       Tues & Wed: 7-:10       Thurs: 12-4:10       Fri:            [facility]  [address] [place] [address]  Appt Line: [phone]      [facility]  [address] [place] [address]  Appt Line: [phone]    Pulmonary Function Scheduling:  [place]: [phone]  [place]: [phone]  [phone]

## 2023-03-24 NOTE — LETTER
3/24/2023         RE: Luz Elena Shipley  [address] [facility] [address]        Dear Colleague,    Thank you for referring your patient, Luz Elena Shipley, to the [facility]. Please see a copy of my visit note below.    SUBJECTIVE:                                                                   Luz Elena Shipley presents today to our Allergy Clinic at [facility] for a follow up visit. She is a 68 year old female with severe persistent asthma and chronic rhinitis.  Asthma since childhood. November 2016: Negative serum IgE to regional aeroallergen panel. CBC with differential without eosinophilia.  Normal total serum IgE.  Negative ABPA panel.  Alpha-1 antitrypsin within normal limits.  Normal CBC with differential without hypereosinophilia.  The PSG sleep study demonstrated moderate, REM, and supine predominant NEGRA with some sleep associated hypoxemia.  In February 2019, due to decrease in FEV1, chest CT was ordered that showed some opacities and small nodules that could be infectious.  The patient was treated with azithromycin.  On a follow-up phone call, the patient was asymptomatic; However, chest CT in May 2019 with new opacities. Per January'21 Pulm note: Established multiple pulmonary lung nodule(s). Near resolution of large nodules and likely related to infection/inflammation. No further surveillance indicated by CT.   In June 2021, spirometry with mild obstruction without postbronchodilator response. In January 2023 spirometry him with mild obstruction without postbronchodilator response.  Stable when compared to 2021.  Asthma symptoms are well controlled with Trelegy Ellipta 200/62.5/25 mcg, one puff once daily, and albuterol as needed. Luz Elena uses albuterol HFA less than twice weekly for rescue from chest symptoms. she wakes up less than twice per month due to chest symptoms. There has been no use of oral steroids since the last visit. No ED/PCP/urgent  care/other specialist visits for asthma flare since the previous visit. The patient denies current chest tightness, cough, wheezing, or shortness of breath.      Regarding nasal symptoms, she has been using azelastine 2 sprays in each nostril twice daily as needed.  She has had nasal congestion, postnasal drip between white and green phlegm, rhinorrhea between clear and green with streaks of blood, and sinus pressure since December 2022. She has used saline rinses at bedtime once symptoms stared. She admits to having left nasal congestion even when she doesn't have sinus symptoms.   Patient Active Problem List   Diagnosis     Severe persistent asthma     Lichen sclerosus     Benign essential hypertension     Elevated glucose     Family history of diabetes mellitus     Osteopenia     Prediabetes       Past Medical History:   Diagnosis Date     Hypertension      Rheumatic fever      Sleep apnea      Uncomplicated asthma       Problem (# of Occurrences) Relation (Name,Age of Onset)    Unknown/Adopted (1) Paternal Grandfather (radha alicea)    Alzheimer Disease (1) Brother: early onset    Arthritis (1) Brother    Diabetes (2) Mother (fawn felder): unsure type; in 40s, Son: type 1    Cerebrovascular Disease (1) Father (ben felder): CVA    Thyroid Disease (1) Son    Breast Cancer (2) Sister, Paternal Aunt    Aneurysm (1) Paternal Grandmother    Other Cancer (1) Mother (fawn felder): panreatic    Chronic Obstructive Pulmonary Disease (1) Father (ben felder)    Skin Cancer (2) Sister, Father (ben felder)    Alcoholism (1) Son    Stomach Problem (1) Son: polyps, diverticulitis, heartburn, passes blood        Past Surgical History:   Procedure Laterality Date     BIOPSY      breast,     BIOPSY BREAST       COLONOSCOPY       COLONOSCOPY N/A 2/8/2019    Procedure: Combined Colonoscopy, Single Or Multiple Biopsy/Polypectomy By Biopsy;  Surgeon: José Miguel Ochoa MD;  Location:   OR     COLONOSCOPY N/A 3/4/2022    Procedure: COLONOSCOPY;  Surgeon: Nirmal Rm MD;  Location: [facility]     COLONOSCOPY WITH CO2 INSUFFLATION N/A 2019    Procedure: COLONOSCOPY WITH CO2 INSUFFLATION;  Surgeon: José Miguel Ochoa MD;  Location: [facility]     COMBINED ESOPHAGOSCOPY, GASTROSCOPY, DUODENOSCOPY (EGD) WITH CO2 INSUFFLATION N/A 2019    Procedure: COMBINED ESOPHAGOSCOPY, GASTROSCOPY, DUODENOSCOPY (EGD) WITH CO2 INSUFFLATION;  Surgeon: José Miguel Ochoa MD;  Location: [facility] OR     ESOPHAGOSCOPY, GASTROSCOPY, DUODENOSCOPY (EGD), COMBINED N/A 2019    Procedure: Combined Esophagoscopy, Gastroscopy, Duodenoscopy (Egd), Biopsy Single Or Multiple;  Surgeon: José Miguel Ochoa MD;  Location: [facility] OR     GYN SURGERY       TONSILLECTOMY       Social History     Socioeconomic History     Marital status:      Spouse name: None     Number of children: None     Years of education: None     Highest education level: None   Occupational History     Comment: Retired   Tobacco Use     Smoking status: Former     Packs/day: 2.00     Years: 20.00     Pack years: 40.00     Types: Cigarettes     Quit date: 1987     Years since quittin.9     Smokeless tobacco: Never   Substance and Sexual Activity     Alcohol use: Yes     Comment: 1 nightly     Drug use: No     Sexual activity: Not Currently     Partners: Male     Birth control/protection: Post-menopausal   Other Topics Concern     Parent/sibling w/ CABG, MI or angioplasty before 65F 55M? No   Social History Narrative    3/24/23        ENVIRONMENTAL HISTORY: [place] The home is heated with a forced air but does not use.  Has floor heating. They do have central air conditioning. The patient's bedroom is furnished with carpeting in bedroom. 1 dog at home. There is no history of cockroach or mice infestation. There are no smokers in the house.  The house does not have a damp basement.             Review of Systems   Constitutional: Negative for activity change, chills and fever.   HENT: Positive for congestion, rhinorrhea and sinus pressure. Negative for ear discharge, facial swelling, nosebleeds, postnasal drip and sneezing.    Eyes: Negative for discharge, redness and itching.   Respiratory: Negative for cough, chest tightness, shortness of breath and wheezing.    Cardiovascular: Negative for chest pain.   Gastrointestinal: Negative for diarrhea, nausea and vomiting.   Musculoskeletal: Negative for arthralgias and myalgias.   Skin: Negative for rash.   Allergic/Immunologic: Positive for environmental allergies.   Neurological: Negative for headaches.   Hematological: Negative for adenopathy.   Psychiatric/Behavioral: Negative for self-injury.           Current Outpatient Medications:      albuterol (2.5 MG/3ML) 0.083% neb solution, Take 1 vial (2.5 mg) by nebulization every 4 hours as needed for shortness of breath / dyspnea, wheezing or other (persistent cough), Disp: 50 vial, Rfl: 1     amoxicillin-clavulanate (AUGMENTIN) 875-125 MG tablet, Take 1 tablet by mouth 2 times daily for 14 days, Disp: 28 tablet, Rfl: 0     azelastine (ASTELIN) 0.1 % nasal spray, Spray 2 sprays into both nostrils 2 times daily as needed for rhinitis or allergies, Disp: 30 mL, Rfl: 3     fluticasone (FLONASE) 50 MCG/ACT nasal spray, Spray 2 sprays into both nostrils daily, Disp: 16 g, Rfl: 3     Fluticasone-Umeclidin-Vilanterol (TRELEGY ELLIPTA) 200-62.5-25 MCG/ACT oral inhaler, Inhale 1 puff into the lungs daily, Disp: 60 each, Rfl: 6     hydrochlorothiazide (HYDRODIURIL) 12.5 MG tablet, TAKE 1 TABLET BY MOUTH ONCE DAILY, Disp: 90 tablet, Rfl: 0     Respiratory Therapy Supplies (NEBULIZER/TUBING/MOUTHPIECE) KIT, Use with albuterol neb solution, Disp: 1 kit, Rfl: 0     albuterol (PROAIR HFA/PROVENTIL HFA/VENTOLIN HFA) 108 (90 Base) MCG/ACT inhaler, Inhale 2-4 puffs into the lungs every 4 hours as needed for shortness  "of breath / dyspnea or wheezing, Disp: 18 g, Rfl: 3     clobetasol (TEMOVATE) 0.05 % external cream, Apply sparingly to affected area twice weekly as needed.  Do not apply to face., Disp: 60 g, Rfl: 3     clobetasol (TEMOVATE) 0.05 % external ointment, Apply sparingly daily as needed to affected area on groin., Disp: 30 g, Rfl: 4  Immunization History   Administered Date(s) Administered     COVID-19 Vaccine 18+ (Moderna) 02/11/2021, 03/11/2021, 11/22/2021     COVID-19 Vaccine Bivalent Booster 18+ (Moderna) 12/02/2022     FLUAD(HD)65+ QUAD 12/09/2021     Influenza (High Dose) 3 valent vaccine 02/27/2018, 09/18/2018     Influenza (IIV3) PF 11/08/2012     Influenza Vaccine 65+ (Fluzone HD) 10/14/2022     Influenza Vaccine >6 months (Alfuria,Fluzone) 12/12/2016, 11/20/2020     Pneumo Conj 13-V (2010&after) 02/27/2018     Pneumococcal 23 valent 06/24/2020     TDAP Vaccine (Boostrix) 11/08/2012     Tdap (Adult) Unspecified Formulation 06/30/1995     Zoster recombinant adjuvanted (SHINGRIX) 12/09/2021, 04/14/2022     Allergies   Allergen Reactions     Cartia Xt [Diltiazem] Other (See Comments)     Heartburn whenever on this medication     Losartan Cough     Miconazole      Sulfa Drugs Rash     OBJECTIVE:                                                                 /73   Pulse 75   Ht 1.651 m (5' 5\")   Wt 90.7 kg (200 lb)   SpO2 96%   BMI 33.28 kg/m          Physical Exam  Vitals and nursing note reviewed.   Constitutional:       General: She is not in acute distress.     Appearance: She is not ill-appearing, toxic-appearing or diaphoretic.   HENT:      Head: Normocephalic and atraumatic.      Right Ear: Tympanic membrane, ear canal and external ear normal.      Left Ear: Tympanic membrane, ear canal and external ear normal.      Nose: Septal deviation (left) present. No mucosal edema, congestion or rhinorrhea.      Right Turbinates: Not enlarged, swollen or pale.      Left Turbinates: Not enlarged, swollen or " pale.      Mouth/Throat:      Lips: Pink.      Mouth: Mucous membranes are moist.      Pharynx: Oropharynx is clear. No pharyngeal swelling, oropharyngeal exudate, posterior oropharyngeal erythema or uvula swelling.   Eyes:      General:         Right eye: No discharge.         Left eye: No discharge.      Conjunctiva/sclera: Conjunctivae normal.   Cardiovascular:      Rate and Rhythm: Normal rate and regular rhythm.      Heart sounds: Normal heart sounds. No murmur heard.  Pulmonary:      Effort: Pulmonary effort is normal. No respiratory distress.      Breath sounds: Normal breath sounds and air entry. No stridor, decreased air movement or transmitted upper airway sounds. No decreased breath sounds, wheezing, rhonchi or rales.   Musculoskeletal:         General: Normal range of motion.   Neurological:      Mental Status: She is alert and oriented to person, place, and time.   Psychiatric:         Mood and Affect: Mood normal.         Behavior: Behavior normal.               WORKUP: ACT 23      ASSESSMENT/PLAN:    Severe persistent asthma without complication    Well controlled with Trelegy Ellipta 200/62.5/25 mcg one puff once daily, and albuterol as needed.  -Continue as is.    - Fluticasone-Umeclidin-Vilanterol (TRELEGY ELLIPTA) 200-62.5-25 MCG/ACT oral inhaler  Dispense: 60 each; Refill: 6    Chronic rhinitis  Acute sinusitis with symptoms > 10 days  DNS (deviated nasal septum)     Chronic rhinitis symptoms are suboptimally controlled and complicated by a sinus infection. She also has deviated nasal septum.  - Continue saline rinses once daily-twice daily.  - Continue azelastine 2 sprays in each nostril[ twice daily as needed.   - Use intranasal fluticasone 2 sprays in each nostril once daily while she has sinus symptoms.  - Start Augmentin.   - Referred to ENT to evaluate candidacy for septoplasty.    - fluticasone (FLONASE) 50 MCG/ACT nasal spray  Dispense: 16 g; Refill: 3  - amoxicillin-clavulanate  (AUGMENTIN) 875-125 MG tablet  Dispense: 28 tablet; Refill: 0  - Adult ENT  Referral       Return in about 6 months (around 9/24/2023), or if symptoms worsen or fail to improve.    Thank you for allowing us to participate in the care of this patient. Please feel free to contact us if there are any questions or concerns about the patient.    Disclaimer: This note consists of symbols derived from keyboarding, dictation and/or voice recognition software. As a result, there may be errors in the script that have gone undetected. Please consider this when interpreting information found in this chart.    Galindo Montano MD, FAAAAI, FACAAI  Allergy, Asthma and Immunology     [facility]        Again, thank you for allowing me to participate in the care of your patient.        Sincerely,        Galindo Montano MD

## 2023-03-24 NOTE — PROGRESS NOTES
SUBJECTIVE:                                                                   Luz Elena Shipley presents today to our Allergy Clinic at [facility] for a follow up visit. She is a 68 year old female with severe persistent asthma and chronic rhinitis.  Asthma since childhood. November 2016: Negative serum IgE to regional aeroallergen panel. CBC with differential without eosinophilia.  Normal total serum IgE.  Negative ABPA panel.  Alpha-1 antitrypsin within normal limits.  Normal CBC with differential without hypereosinophilia.  The PSG sleep study demonstrated moderate, REM, and supine predominant NEGRA with some sleep associated hypoxemia.  In February 2019, due to decrease in FEV1, chest CT was ordered that showed some opacities and small nodules that could be infectious.  The patient was treated with azithromycin.  On a follow-up phone call, the patient was asymptomatic; However, chest CT in May 2019 with new opacities. Per January'21 Pulm note: Established multiple pulmonary lung nodule(s). Near resolution of large nodules and likely related to infection/inflammation. No further surveillance indicated by CT.   In June 2021, spirometry with mild obstruction without postbronchodilator response. In January 2023 spirometry him with mild obstruction without postbronchodilator response.  Stable when compared to 2021.  Asthma symptoms are well controlled with Trelegy Ellipta 200/62.5/25 mcg, one puff once daily, and albuterol as needed. Luz Elena uses albuterol HFA less than twice weekly for rescue from chest symptoms. she wakes up less than twice per month due to chest symptoms. There has been no use of oral steroids since the last visit. No ED/PCP/urgent care/other specialist visits for asthma flare since the previous visit. The patient denies current chest tightness, cough, wheezing, or shortness of breath.      Regarding nasal symptoms, she has been using azelastine 2 sprays in each nostril twice  daily as needed.  She has had nasal congestion, postnasal drip between white and green phlegm, rhinorrhea between clear and green with streaks of blood, and sinus pressure since December 2022. She has used saline rinses at bedtime once symptoms stared. She admits to having left nasal congestion even when she doesn't have sinus symptoms.   Patient Active Problem List   Diagnosis     Severe persistent asthma     Lichen sclerosus     Benign essential hypertension     Elevated glucose     Family history of diabetes mellitus     Osteopenia     Prediabetes       Past Medical History:   Diagnosis Date     Hypertension      Rheumatic fever      Sleep apnea      Uncomplicated asthma       Problem (# of Occurrences) Relation (Name,Age of Onset)    Unknown/Adopted (1) Paternal Grandfather (radha alicea)    Alzheimer Disease (1) Brother: early onset    Arthritis (1) Brother    Diabetes (2) Mother (fawn felder): unsure type; in 40s, Son: type 1    Cerebrovascular Disease (1) Father (ben felder): CVA    Thyroid Disease (1) Son    Breast Cancer (2) Sister, Paternal Aunt    Aneurysm (1) Paternal Grandmother    Other Cancer (1) Mother (fawn felder): panreatic    Chronic Obstructive Pulmonary Disease (1) Father (ben felder)    Skin Cancer (2) Sister, Father (ben felder)    Alcoholism (1) Son    Stomach Problem (1) Son: polyps, diverticulitis, heartburn, passes blood        Past Surgical History:   Procedure Laterality Date     BIOPSY      breast,     BIOPSY BREAST       COLONOSCOPY       COLONOSCOPY N/A 2/8/2019    Procedure: Combined Colonoscopy, Single Or Multiple Biopsy/Polypectomy By Biopsy;  Surgeon: José Miguel Ochoa MD;  Location: [facility]     COLONOSCOPY N/A 3/4/2022    Procedure: COLONOSCOPY;  Surgeon: Nirmal Rm MD;  Location: [facility]     COLONOSCOPY WITH CO2 INSUFFLATION N/A 2/8/2019    Procedure: COLONOSCOPY WITH CO2 INSUFFLATION;  Surgeon: José Miguel Ochoa MD;   Location: [facility]     COMBINED ESOPHAGOSCOPY, GASTROSCOPY, DUODENOSCOPY (EGD) WITH CO2 INSUFFLATION N/A 2019    Procedure: COMBINED ESOPHAGOSCOPY, GASTROSCOPY, DUODENOSCOPY (EGD) WITH CO2 INSUFFLATION;  Surgeon: José Miguel Ochoa MD;  Location: [facility]     ESOPHAGOSCOPY, GASTROSCOPY, DUODENOSCOPY (EGD), COMBINED N/A 2019    Procedure: Combined Esophagoscopy, Gastroscopy, Duodenoscopy (Egd), Biopsy Single Or Multiple;  Surgeon: José Miguel Ochoa MD;  Location: [facility]     GYN SURGERY       TONSILLECTOMY       Social History     Socioeconomic History     Marital status:      Spouse name: None     Number of children: None     Years of education: None     Highest education level: None   Occupational History     Comment: Retired   Tobacco Use     Smoking status: Former     Packs/day: 2.00     Years: 20.00     Pack years: 40.00     Types: Cigarettes     Quit date: 1987     Years since quittin.9     Smokeless tobacco: Never   Substance and Sexual Activity     Alcohol use: Yes     Comment: 1 nightly     Drug use: No     Sexual activity: Not Currently     Partners: Male     Birth control/protection: Post-menopausal   Other Topics Concern     Parent/sibling w/ CABG, MI or angioplasty before 65F 55M? No   Social History Narrative    3/24/23        ENVIRONMENTAL HISTORY: [place] The home is heated with a forced air but does not use.  Has floor heating. They do have central air conditioning. The patient's bedroom is furnished with carpeting in bedroom. 1 dog at home. There is no history of cockroach or mice infestation. There are no smokers in the house.  The house does not have a damp basement.            Review of Systems   Constitutional: Negative for activity change, chills and fever.   HENT: Positive for congestion, rhinorrhea and sinus pressure. Negative for ear discharge, facial swelling, nosebleeds, postnasal drip and sneezing.    Eyes:  Negative for discharge, redness and itching.   Respiratory: Negative for cough, chest tightness, shortness of breath and wheezing.    Cardiovascular: Negative for chest pain.   Gastrointestinal: Negative for diarrhea, nausea and vomiting.   Musculoskeletal: Negative for arthralgias and myalgias.   Skin: Negative for rash.   Allergic/Immunologic: Positive for environmental allergies.   Neurological: Negative for headaches.   Hematological: Negative for adenopathy.   Psychiatric/Behavioral: Negative for self-injury.           Current Outpatient Medications:      albuterol (2.5 MG/3ML) 0.083% neb solution, Take 1 vial (2.5 mg) by nebulization every 4 hours as needed for shortness of breath / dyspnea, wheezing or other (persistent cough), Disp: 50 vial, Rfl: 1     amoxicillin-clavulanate (AUGMENTIN) 875-125 MG tablet, Take 1 tablet by mouth 2 times daily for 14 days, Disp: 28 tablet, Rfl: 0     azelastine (ASTELIN) 0.1 % nasal spray, Spray 2 sprays into both nostrils 2 times daily as needed for rhinitis or allergies, Disp: 30 mL, Rfl: 3     fluticasone (FLONASE) 50 MCG/ACT nasal spray, Spray 2 sprays into both nostrils daily, Disp: 16 g, Rfl: 3     Fluticasone-Umeclidin-Vilanterol (TRELEGY ELLIPTA) 200-62.5-25 MCG/ACT oral inhaler, Inhale 1 puff into the lungs daily, Disp: 60 each, Rfl: 6     hydrochlorothiazide (HYDRODIURIL) 12.5 MG tablet, TAKE 1 TABLET BY MOUTH ONCE DAILY, Disp: 90 tablet, Rfl: 0     Respiratory Therapy Supplies (NEBULIZER/TUBING/MOUTHPIECE) KIT, Use with albuterol neb solution, Disp: 1 kit, Rfl: 0     albuterol (PROAIR HFA/PROVENTIL HFA/VENTOLIN HFA) 108 (90 Base) MCG/ACT inhaler, Inhale 2-4 puffs into the lungs every 4 hours as needed for shortness of breath / dyspnea or wheezing, Disp: 18 g, Rfl: 3     clobetasol (TEMOVATE) 0.05 % external cream, Apply sparingly to affected area twice weekly as needed.  Do not apply to face., Disp: 60 g, Rfl: 3     clobetasol (TEMOVATE) 0.05 % external ointment,  "Apply sparingly daily as needed to affected area on groin., Disp: 30 g, Rfl: 4  Immunization History   Administered Date(s) Administered     COVID-19 Vaccine 18+ (Moderna) 02/11/2021, 03/11/2021, 11/22/2021     COVID-19 Vaccine Bivalent Booster 18+ (Moderna) 12/02/2022     FLUAD(HD)65+ QUAD 12/09/2021     Influenza (High Dose) 3 valent vaccine 02/27/2018, 09/18/2018     Influenza (IIV3) PF 11/08/2012     Influenza Vaccine 65+ (Fluzone HD) 10/14/2022     Influenza Vaccine >6 months (Alfuria,Fluzone) 12/12/2016, 11/20/2020     Pneumo Conj 13-V (2010&after) 02/27/2018     Pneumococcal 23 valent 06/24/2020     TDAP Vaccine (Boostrix) 11/08/2012     Tdap (Adult) Unspecified Formulation 06/30/1995     Zoster recombinant adjuvanted (SHINGRIX) 12/09/2021, 04/14/2022     Allergies   Allergen Reactions     Cartia Xt [Diltiazem] Other (See Comments)     Heartburn whenever on this medication     Losartan Cough     Miconazole      Sulfa Drugs Rash     OBJECTIVE:                                                                 /73   Pulse 75   Ht 1.651 m (5' 5\")   Wt 90.7 kg (200 lb)   SpO2 96%   BMI 33.28 kg/m          Physical Exam  Vitals and nursing note reviewed.   Constitutional:       General: She is not in acute distress.     Appearance: She is not ill-appearing, toxic-appearing or diaphoretic.   HENT:      Head: Normocephalic and atraumatic.      Right Ear: Tympanic membrane, ear canal and external ear normal.      Left Ear: Tympanic membrane, ear canal and external ear normal.      Nose: Septal deviation (left) present. No mucosal edema, congestion or rhinorrhea.      Right Turbinates: Not enlarged, swollen or pale.      Left Turbinates: Not enlarged, swollen or pale.      Mouth/Throat:      Lips: Pink.      Mouth: Mucous membranes are moist.      Pharynx: Oropharynx is clear. No pharyngeal swelling, oropharyngeal exudate, posterior oropharyngeal erythema or uvula swelling.   Eyes:      General:         Right " eye: No discharge.         Left eye: No discharge.      Conjunctiva/sclera: Conjunctivae normal.   Cardiovascular:      Rate and Rhythm: Normal rate and regular rhythm.      Heart sounds: Normal heart sounds. No murmur heard.  Pulmonary:      Effort: Pulmonary effort is normal. No respiratory distress.      Breath sounds: Normal breath sounds and air entry. No stridor, decreased air movement or transmitted upper airway sounds. No decreased breath sounds, wheezing, rhonchi or rales.   Musculoskeletal:         General: Normal range of motion.   Neurological:      Mental Status: She is alert and oriented to person, place, and time.   Psychiatric:         Mood and Affect: Mood normal.         Behavior: Behavior normal.               WORKUP: ACT 23      ASSESSMENT/PLAN:    Severe persistent asthma without complication    Well controlled with Trelegy Ellipta 200/62.5/25 mcg one puff once daily, and albuterol as needed.  -Continue as is.    - Fluticasone-Umeclidin-Vilanterol (TRELEGY ELLIPTA) 200-62.5-25 MCG/ACT oral inhaler  Dispense: 60 each; Refill: 6    Chronic rhinitis  Acute sinusitis with symptoms > 10 days  DNS (deviated nasal septum)     Chronic rhinitis symptoms are suboptimally controlled and complicated by a sinus infection. She also has deviated nasal septum.  - Continue saline rinses once daily-twice daily.  - Continue azelastine 2 sprays in each nostril[ twice daily as needed.   - Use intranasal fluticasone 2 sprays in each nostril once daily while she has sinus symptoms.  - Start Augmentin.   - Referred to ENT to evaluate candidacy for septoplasty.    - fluticasone (FLONASE) 50 MCG/ACT nasal spray  Dispense: 16 g; Refill: 3  - amoxicillin-clavulanate (AUGMENTIN) 875-125 MG tablet  Dispense: 28 tablet; Refill: 0  - Adult ENT  Referral       Return in about 6 months (around 9/24/2023), or if symptoms worsen or fail to improve.    Thank you for allowing us to participate in the care of this patient.  Please feel free to contact us if there are any questions or concerns about the patient.    Disclaimer: This note consists of symbols derived from keyboarding, dictation and/or voice recognition software. As a result, there may be errors in the script that have gone undetected. Please consider this when interpreting information found in this chart.    Galindo Montano MD, FAAAAI, FACAAI  Allergy, Asthma and Immunology     [facility]

## 2023-04-01 ENCOUNTER — HEALTH MAINTENANCE LETTER (OUTPATIENT)
Age: 68
End: 2023-04-01

## 2023-04-26 DIAGNOSIS — I10 BENIGN ESSENTIAL HYPERTENSION: ICD-10-CM

## 2023-04-26 RX ORDER — HYDROCHLOROTHIAZIDE 12.5 MG/1
TABLET ORAL
Qty: 90 TABLET | Refills: 0 | OUTPATIENT
Start: 2023-04-26

## 2023-05-04 DIAGNOSIS — I10 BENIGN ESSENTIAL HYPERTENSION: ICD-10-CM

## 2023-05-04 RX ORDER — HYDROCHLOROTHIAZIDE 12.5 MG/1
12.5 TABLET ORAL DAILY
Qty: 30 TABLET | Refills: 0 | Status: SHIPPED | OUTPATIENT
Start: 2023-05-04 | End: 2023-05-24

## 2023-05-04 NOTE — TELEPHONE ENCOUNTER
Medication Question or Refill        What medication are you calling about (include dose and sig)?: Hydrochlorothiazide 12.5 mg    Preferred Pharmacy:       [id number] - [place], [place] - [address] [address]  [address] [address]  Phone: [phone] Fax: [phone]      Controlled Substance Agreement on file:   CSA -- Patient Level:    CSA: None found at the patient level.       Who prescribed the medication?: Kiara Zazueta PA-C    Do you need a refill? Yes    When did you use the medication last? 05/04/23    Patient offered an appointment? Yes: Pt is scheduled for 05/24/23    Do you have any questions or concerns?  No      Could we send this information to you in [facility] or would you prefer to receive a phone call?:   No preference   Okay to leave a detailed message?: Yes at Cell number on file:    Telephone Information:   Mobile [phone]

## 2023-05-24 ENCOUNTER — OFFICE VISIT (OUTPATIENT)
Dept: FAMILY MEDICINE | Facility: CLINIC | Age: 68
End: 2023-05-24
Payer: MEDICARE

## 2023-05-24 ENCOUNTER — ANCILLARY PROCEDURE (OUTPATIENT)
Dept: MAMMOGRAPHY | Facility: CLINIC | Age: 68
End: 2023-05-24
Payer: MEDICARE

## 2023-05-24 VITALS
RESPIRATION RATE: 20 BRPM | TEMPERATURE: 97.4 F | SYSTOLIC BLOOD PRESSURE: 120 MMHG | DIASTOLIC BLOOD PRESSURE: 82 MMHG | WEIGHT: 202 LBS | HEIGHT: 65 IN | HEART RATE: 85 BPM | BODY MASS INDEX: 33.66 KG/M2 | OXYGEN SATURATION: 94 %

## 2023-05-24 DIAGNOSIS — Z23 NEED FOR DIPHTHERIA-TETANUS-PERTUSSIS (TDAP) VACCINE: ICD-10-CM

## 2023-05-24 DIAGNOSIS — Z12.31 VISIT FOR SCREENING MAMMOGRAM: ICD-10-CM

## 2023-05-24 DIAGNOSIS — I10 BENIGN ESSENTIAL HYPERTENSION: ICD-10-CM

## 2023-05-24 DIAGNOSIS — Z78.0 POST-MENOPAUSAL: ICD-10-CM

## 2023-05-24 DIAGNOSIS — J45.50 SEVERE PERSISTENT ASTHMA WITHOUT COMPLICATION (H): ICD-10-CM

## 2023-05-24 DIAGNOSIS — M85.80 OSTEOPENIA, UNSPECIFIED LOCATION: ICD-10-CM

## 2023-05-24 DIAGNOSIS — L90.0 LICHEN SCLEROSUS: ICD-10-CM

## 2023-05-24 DIAGNOSIS — R73.03 PREDIABETES: ICD-10-CM

## 2023-05-24 DIAGNOSIS — Z00.00 ENCOUNTER FOR MEDICARE ANNUAL WELLNESS EXAM: Primary | ICD-10-CM

## 2023-05-24 LAB
ANION GAP SERPL CALCULATED.3IONS-SCNC: 9 MMOL/L (ref 7–15)
BUN SERPL-MCNC: 16.1 MG/DL (ref 8–23)
CALCIUM SERPL-MCNC: 9.7 MG/DL (ref 8.8–10.2)
CHLORIDE SERPL-SCNC: 102 MMOL/L (ref 98–107)
CREAT SERPL-MCNC: 0.89 MG/DL (ref 0.51–0.95)
DEPRECATED HCO3 PLAS-SCNC: 27 MMOL/L (ref 22–29)
GFR SERPL CREATININE-BSD FRML MDRD: 69 ML/MIN/1.73M2
GLUCOSE SERPL-MCNC: 115 MG/DL (ref 70–99)
HBA1C MFR BLD: 6 % (ref 0–5.6)
POTASSIUM SERPL-SCNC: 4 MMOL/L (ref 3.4–5.3)
SODIUM SERPL-SCNC: 138 MMOL/L (ref 136–145)

## 2023-05-24 PROCEDURE — 80048 BASIC METABOLIC PNL TOTAL CA: CPT | Performed by: PHYSICIAN ASSISTANT

## 2023-05-24 PROCEDURE — 99214 OFFICE O/P EST MOD 30 MIN: CPT | Mod: 25 | Performed by: PHYSICIAN ASSISTANT

## 2023-05-24 PROCEDURE — 77067 SCR MAMMO BI INCL CAD: CPT | Mod: TC | Performed by: RADIOLOGY

## 2023-05-24 PROCEDURE — 77063 BREAST TOMOSYNTHESIS BI: CPT | Mod: TC | Performed by: RADIOLOGY

## 2023-05-24 PROCEDURE — 36415 COLL VENOUS BLD VENIPUNCTURE: CPT | Performed by: PHYSICIAN ASSISTANT

## 2023-05-24 PROCEDURE — 83036 HEMOGLOBIN GLYCOSYLATED A1C: CPT | Performed by: PHYSICIAN ASSISTANT

## 2023-05-24 PROCEDURE — G0439 PPPS, SUBSEQ VISIT: HCPCS | Performed by: PHYSICIAN ASSISTANT

## 2023-05-24 RX ORDER — HYDROCHLOROTHIAZIDE 12.5 MG/1
12.5 TABLET ORAL DAILY
Qty: 90 TABLET | Refills: 3 | Status: SHIPPED | OUTPATIENT
Start: 2023-05-24 | End: 2024-07-09

## 2023-05-24 RX ORDER — CLOBETASOL PROPIONATE 0.5 MG/G
CREAM TOPICAL
Qty: 60 G | Refills: 3 | Status: SHIPPED | OUTPATIENT
Start: 2023-05-24

## 2023-05-24 RX ORDER — ALBUTEROL SULFATE 90 UG/1
2-4 AEROSOL, METERED RESPIRATORY (INHALATION) EVERY 4 HOURS PRN
Qty: 18 G | Refills: 3 | Status: SHIPPED | OUTPATIENT
Start: 2023-05-24 | End: 2024-05-23

## 2023-05-24 ASSESSMENT — PAIN SCALES - GENERAL: PAINLEVEL: NO PAIN (0)

## 2023-05-24 NOTE — PATIENT INSTRUCTIONS
Lab today     Work on wt loss again - consider My Plate    Due for covid booster anytime     See pharmacy for tetanus shot    Patient Education   Personalized Prevention Plan  You are due for the preventive services outlined below.  Your care team is available to assist you in scheduling these services.  If you have already completed any of these items, please share that information with your care team to update in your medical record.  Health Maintenance Due   Topic Date Due    Asthma Action Plan - yearly  06/25/2019    Diptheria Tetanus Pertussis (DTAP/TDAP/TD) Vaccine (3 - Td or Tdap) 11/08/2022    Annual Wellness Visit  01/12/2023    COVID-19 Vaccine (5 - Moderna series) 04/02/2023    ANNUAL REVIEW OF HM ORDERS  06/01/2023     Your Health Risk Assessment indicates you feel you are not in good health    A healthy lifestyle helps keep the body fit and the mind alert. It helps protect you from disease, helps you fight disease, and helps prevent chronic disease (disease that doesn't go away) from getting worse. This is important as you get older and begin to notice twinges in muscles and joints and a decline in the strength and stamina you once took for granted. A healthy lifestyle includes good healthcare, good nutrition, weight control, recreation, and regular exercise. Avoid harmful substances and do what you can to keep safe. Another part of a healthy lifestyle is stay mentally active and socially involved.    Good healthcare   Have a wellness visit every year.   If you have new symptoms, let us know right away. Don't wait until the next checkup.   Take medicines exactly as prescribed and keep your medicines in a safe place. Tell us if your medicine causes problems.   Healthy diet and weight control   Eat 3 or 4 small, nutritious, low-fat, high-fiber meals a day. Include a variety of fruits, vegetables, and whole-grain foods.   Make sure you get enough calcium in your diet. Calcium, vitamin D, and exercise help  prevent osteoporosis (bone thinning).   If you live alone, try eating with others when you can. That way you get a good meal and have company while you eat it.   Try to keep a healthy weight. If you eat more calories than your body uses for energy, it will be stored as fat and you will gain weight.     Recreation   Recreation is not limited to sports and team events. It includes any activity that provides relaxation, interest, enjoyment, and exercise. Recreation provides an outlet for physical, mental, and social energy. It can give a sense of worth and achievement. It can help you stay healthy.    Mental Exercise and Social Involvement  Mental and emotional health is as important as physical health. Keep in touch with friends and family. Stay as active as possible. Continue to learn and challenge yourself.   Things you can do to stay mentally active are:  Learn something new, like a foreign language or musical instrument.   Play SCRABBLE or do crossword puzzles. If you cannot find people to play these games with you at home, you can play them with others on your computer through the Internet.   Join a games club--anything from card games to chess or checkers or lawn bowling.   Start a new hobby.   Go back to school.   Volunteer.   Read.   Keep up with world events.    Exercise for a Healthier Heart  You may wonder how you can improve the health of your heart. If you re thinking about exercise, you re on the right track. You don t need to become an athlete. But you do need a certain amount of brisk exercise to help strengthen your heart. If you have been diagnosed with a heart condition, your healthcare provider may advise exercise to help your condition. To help make exercise a habit, choose safe, fun activities.      Exercise with a friend. When activity is fun, you're more likely to stick with it.     Before you start  Check with your healthcare provider before starting an exercise program. This is especially  important if you haven't been active for a while. It's also important if you have a long-term (chronic) health problem such as heart disease, diabetes, or obesity. Also check with your provider if you're at high risk for having these problems.   Why exercise?  Exercising regularly offers many healthy rewards. It can help you do all of these:   Improve your blood cholesterol level to help prevent further heart trouble.  Lower your blood pressure to help prevent a stroke or heart attack.  Control diabetes or reduce your risk of getting this disease.  Improve your heart and lung function.  Reach and stay at a healthy weight.  Make your muscles stronger so you can stay active.  Prevent falls and fractures by slowing the loss of bone mass (osteoporosis).  Manage stress better.  Improve your sense of self and your body image.  Exercise tips    Ease into your routine. Set small goals. Then build on them. Talk with your healthcare provider first before starting an exercise routine if you're not sure what your activity level should be.  Exercise on most days. Aim for a total of at least 150 minutes (2 hours and 30 minutes) or more of moderate-intensity aerobic activity each week. You could also do 75 minutes (1 hour and 15 minutes) or more of vigorous-intensity aerobic activity each week. Or try for a combination of both. Moderate activity means that you breathe heavier and your heart rate increases, but you can still talk. Think about doing at least 30 minutes of moderate exercise, 5 times a week. It's OK to work up to the 30-minute period over time. Examples of moderate-intensity activity are brisk walking, gardening, and water aerobics.  Step up your daily activity level.  Along with your exercise program, try being more active the whole day. Walk instead of drive. Or park further away so that you take more steps each day. Do more household tasks or yard work. You may not be able to meet the advised amount of physical  activity. But doing some moderate- or vigorous-intensity aerobic activity can help reduce your risk for heart disease. Your healthcare provider can help you figure out what is best for you.  Choose 1 or more activities you enjoy.  Walking is one of the easiest things you can do. You can also try swimming, riding a bike, dancing, or taking an exercise class.    Call 911  Call 911 right away if any of these occur:   Chest pain that doesn't go away quickly with rest  New burning, tightness, pressure, or heaviness in your chest, neck, shoulders, back, or arms  Abnormal or severe shortness of breath  A very fast or irregular heartbeat (palpitations)  Fainting  When to call your healthcare provider  Call your healthcare provider if you have any of these:   Dizziness or lightheadedness  Mild shortness of breath or chest pain  Increased or new joint or muscle pain    Allan last reviewed this educational content on 7/1/2022 2000-2022 [employer]. All rights reserved. This information is not intended as a substitute for professional medical care. Always follow your healthcare professional's instructions.          Understanding USDA MyPlate  The USDA has guidelines to help you make healthy food choices. These are called MyPlate. MyPlate shows the food groups that make up healthy meals using the image of a place setting. Before you eat, think about the healthiest choices for what to put on your plate or in your cup or bowl. To learn more about building a healthy plate, visit www.choosemyplate.gov.     The food groups  Fruits. Any fruit or 100% fruit juice counts as part of the Fruit Group. Fruits may be fresh, canned, frozen, or dried, and may be whole, cut-up, or pureed. Make 1/2 of your plate fruits and vegetables.  Vegetables. Any vegetable or 100% vegetable juice counts as a member of the Vegetable Group. Vegetables may be fresh, frozen, canned, or dried. They can be served raw or cooked and may be  whole, cut-up, or mashed. Make 1/2 of your plate fruits and vegetables.  Grains. All foods made from grains are part of the Grains Group. These include wheat, rice, oats, cornmeal, and barley. Grains are often used to make foods such as bread, pasta, oatmeal, cereal, tortillas, and grits. Grains should be no more than 1/4 of your plate. At least half of your grains should be whole grains.  Protein. This group includes meat, poultry, seafood, beans and peas, eggs, processed soy products (such as tofu), nuts (including nut butters), and seeds. Make protein choices no more than 1/4 of your plate. Meat and poultry choices should be lean or low fat.  Dairy. The Dairy Group includes all fluid milk products and foods made from milk that contain calcium, such as yogurt and cheese. (Foods that have little calcium, such as cream, butter, and cream cheese, are not part of this group.) Most dairy choices should be low-fat or fat-free.  Oils. Oils aren't a food group, but they do contain essential nutrients. However it's important to watch your intake of oils. These are fats that are liquid at room temperature. They include canola, corn, olive, soybean, vegetable, and sunflower oil. Foods that are mainly oil include mayonnaise, certain salad dressings, and soft margarines. You likely already get your daily oil allowance from the foods you eat.  Things to limit  Eating healthy also means limiting these things in your diet:  Salt (sodium). Many processed foods have a lot of sodium. To keep sodium intake down, eat fresh vegetables, meats, poultry, and seafood when possible. Purchase low-sodium, reduced-sodium, or no-salt-added food products at the store. And don't add salt to your meals at home. Instead, season them with herbs and spices such as dill, oregano, cumin, and paprika. Or try adding flavor with lemon or lime zest and juice.  Saturated fat. Saturated fats are most often found in animal products such as beef, pork, and  chicken. They are often solid at room temperature, such as butter. To reduce your saturated fat intake, choose leaner cuts of meat and poultry. And try healthier cooking methods such as grilling, broiling, roasting, or baking. For a simple lower-fat swap, use plain nonfat yogurt instead of mayonnaise when making potato salad or macaroni salad.  Added sugars. These are sugars added to foods. They are in foods such as ice cream, candy, soda, fruit drinks, sports drinks, energy drinks, cookies, pastries, jams, and syrups. Cut down on added sugars by sharing sweet treats with a family member or friend. You can also choose fruit for dessert, and drink water or other unsweetened beverages.  "[employer] last reviewed this educational content on 6/1/2020 2000-2022 [employer]. All rights reserved. This information is not intended as a substitute for professional medical care. Always follow your healthcare professional's instructions.          Urinary Incontinence, Female (Adult)   Urinary incontinence means loss of bladder control. This problem affects many women, especially as they get older. If you have incontinence, you may be embarrassed to ask for help. But know that this problem can be treated.   Types of Incontinence  There are different types of incontinence. Two of the main types are described here. You can have more than one type.   Stress incontinence. With this type, urine leaks when pressure (stress) is put on the bladder. This may happen when you cough, sneeze, or laugh. Stress incontinence most often occurs because the pelvic floor muscles that support the bladder and urethra are weak. This can happen after pregnancy and vaginal childbirth or a hysterectomy. It can also be due to excess body weight or hormone changes.  Urge incontinence (also called overactive bladder). With this type, a sudden urge to urinate is felt often. This may happen even though there may not be much urine in the bladder.  The need to urinate often during the night is common. Urge incontinence most often occurs because of bladder spasms. This may be due to bladder irritation or infection. Damage to bladder nerves or pelvic muscles, constipation, and certain medicines can also lead to urge incontinence.  Treatment depends on the cause. Further evaluation is needed to find the type you have. This will likely include an exam and certain tests. Based on the results, you and your healthcare provider can then plan treatment. Until a diagnosis is made, the home care tips below can help ease symptoms.   Home care  Do pelvic floor muscle exercises, if they are prescribed. The pelvic floor muscles help support the bladder and urethra. Many women find that their symptoms improve when doing special exercises that strengthen these muscles. To do the exercises, contract the muscles you would use to stop your stream of urine. But do this when you re not urinating. Hold for 10 seconds, then relax. Repeat 10 to 20 times in a row, at least 3 times a day. Your healthcare provider may give you other instructions for how to do the exercises and how often.  Keep a bladder diary. This helps track how often and how much you urinate over a set period of time. Bring this diary with you to your next visit with the provider. The information can help your provider learn more about your bladder problem.  Lose weight, if advised to by your provider. Extra weight puts pressure on the bladder. Your provider can help you create a weight-loss plan that s right for you. This may include exercising more and making certain diet changes.  Don't have foods and drinks that may irritate the bladder. These can include alcohol and caffeinated drinks.  Quit smoking. Smoking and other tobacco use can lead to a long-term (chronic) cough that strains the pelvic floor muscles. Smoking may also damage the bladder and urethra. Talk with your provider about treatments or methods you  can use to quit smoking.  If drinking large amounts of fluid makes you have symptoms, you may be advised to limit your fluid intake. You may also be advised to drink most of your fluids during the day and to limit fluids at night.  If you re worried about urine leakage or accidents, you may wear absorbent pads to catch urine. Change the pads often. This helps reduce discomfort. It may also reduce the risk of skin or bladder infections.    Follow-up care  Follow up with your healthcare provider, or as directed. It may take some to find the right treatment for your problem. But healthy lifestyle changes can be made right away. These include such things as exercising on a regular basis, eating a healthy diet, losing weight (if needed), and quitting smoking. Your treatment plan may include special therapies or medicines. Certain procedures or surgery may also be options. Talk about any questions you have with your provider.   When to seek medical advice  Call the healthcare provider right away if any of these occur:  Fever of 100.4 F (38 C) or higher, or as directed by your provider  Bladder pain or fullness  Belly swelling  Nausea or vomiting  Back pain  Weakness, dizziness, or fainting  Allan last reviewed this educational content on 1/1/2020 2000-2022 [employer]. All rights reserved. This information is not intended as a substitute for professional medical care. Always follow your healthcare professional's instructions.

## 2023-05-24 NOTE — NURSING NOTE
"Chief Complaint   Patient presents with     Hypertension       Initial /82   Pulse 85   Temp 97.4  F (36.3  C) (Tympanic)   Resp 20   Ht 1.651 m (5' 5\")   Wt 91.6 kg (202 lb)   SpO2 94%   BMI 33.61 kg/m   Estimated body mass index is 33.61 kg/m  as calculated from the following:    Height as of this encounter: 1.651 m (5' 5\").    Weight as of this encounter: 91.6 kg (202 lb).    Patient presents to the clinic using No DME    Is there anyone who you would like to be able to receive your results? No  If yes have patient fill out SOHAM    "

## 2023-05-24 NOTE — PROGRESS NOTES
"  Assessment & Plan     Encounter for Medicare annual wellness exam    Benign essential hypertension  controlled  - hydrochlorothiazide (HYDRODIURIL) 12.5 MG tablet  Dispense: 90 tablet; Refill: 3  - Basic metabolic panel  (Ca, Cl, CO2, Creat, Gluc, K, Na, BUN)    Prediabetes  monitor  - Hemoglobin A1c    Severe persistent asthma without complication  Doing well  - albuterol (PROAIR HFA/PROVENTIL HFA/VENTOLIN HFA) 108 (90 Base) MCG/ACT inhaler  Dispense: 18 g; Refill: 3    Lichen sclerosus  Refill stable  - clobetasol (TEMOVATE) 0.05 % external cream  Dispense: 60 g; Refill: 3    Osteopenia, unspecified location  Post-menopausal  Recheck due  - DX Hip/Pelvis/Spine    Need for diphtheria-tetanus-pertussis (Tdap) vaccine    BMI:   Estimated body mass index is 33.61 kg/m  as calculated from the following:    Height as of this encounter: 1.651 m (5' 5\").    Weight as of this encounter: 91.6 kg (202 lb).   Weight management plan: Discussed healthy diet and exercise guidelines    Patient Instructions     Lab today     Work on wt loss again - consider My Plate    Due for covid booster anytime     See pharmacy for tetanus shot    Patient Education  Personalized Prevention Plan  You are due for the preventive services outlined below.  Your care team is available to assist you in scheduling these services.  If you have already completed any of these items, please share that information with your care team to update in your medical record.  Health Maintenance Due   Topic Date Due     Asthma Action Plan - yearly  06/25/2019     Diptheria Tetanus Pertussis (DTAP/TDAP/TD) Vaccine (3 - Td or Tdap) 11/08/2022     Annual Wellness Visit  01/12/2023     COVID-19 Vaccine (5 - Moderna series) 04/02/2023     ANNUAL REVIEW OF HM ORDERS  06/01/2023     Your Health Risk Assessment indicates you feel you are not in good health    A healthy lifestyle helps keep the body fit and the mind alert. It helps protect you from disease, helps you " fight disease, and helps prevent chronic disease (disease that doesn't go away) from getting worse. This is important as you get older and begin to notice twinges in muscles and joints and a decline in the strength and stamina you once took for granted. A healthy lifestyle includes good healthcare, good nutrition, weight control, recreation, and regular exercise. Avoid harmful substances and do what you can to keep safe. Another part of a healthy lifestyle is stay mentally active and socially involved.    Good healthcare     Have a wellness visit every year.     If you have new symptoms, let us know right away. Don't wait until the next checkup.     Take medicines exactly as prescribed and keep your medicines in a safe place. Tell us if your medicine causes problems.   Healthy diet and weight control     Eat 3 or 4 small, nutritious, low-fat, high-fiber meals a day. Include a variety of fruits, vegetables, and whole-grain foods.     Make sure you get enough calcium in your diet. Calcium, vitamin D, and exercise help prevent osteoporosis (bone thinning).     If you live alone, try eating with others when you can. That way you get a good meal and have company while you eat it.     Try to keep a healthy weight. If you eat more calories than your body uses for energy, it will be stored as fat and you will gain weight.     Recreation   Recreation is not limited to sports and team events. It includes any activity that provides relaxation, interest, enjoyment, and exercise. Recreation provides an outlet for physical, mental, and social energy. It can give a sense of worth and achievement. It can help you stay healthy.    Mental Exercise and Social Involvement  Mental and emotional health is as important as physical health. Keep in touch with friends and family. Stay as active as possible. Continue to learn and challenge yourself.   Things you can do to stay mentally active are:    Learn something new, like a foreign  language or musical instrument.     Play SCRABBLE or do crossword puzzles. If you cannot find people to play these games with you at home, you can play them with others on your computer through the Internet.     Join a games club--anything from card games to chess or checkers or lawn bowling.     Start a new hobby.     Go back to school.     Volunteer.     Read.   Keep up with world events.    Exercise for a Healthier Heart  You may wonder how you can improve the health of your heart. If you re thinking about exercise, you re on the right track. You don t need to become an athlete. But you do need a certain amount of brisk exercise to help strengthen your heart. If you have been diagnosed with a heart condition, your healthcare provider may advise exercise to help your condition. To help make exercise a habit, choose safe, fun activities.      Exercise with a friend. When activity is fun, you're more likely to stick with it.     Before you start  Check with your healthcare provider before starting an exercise program. This is especially important if you haven't been active for a while. It's also important if you have a long-term (chronic) health problem such as heart disease, diabetes, or obesity. Also check with your provider if you're at high risk for having these problems.   Why exercise?  Exercising regularly offers many healthy rewards. It can help you do all of these:     Improve your blood cholesterol level to help prevent further heart trouble.    Lower your blood pressure to help prevent a stroke or heart attack.    Control diabetes or reduce your risk of getting this disease.    Improve your heart and lung function.    Reach and stay at a healthy weight.    Make your muscles stronger so you can stay active.    Prevent falls and fractures by slowing the loss of bone mass (osteoporosis).    Manage stress better.    Improve your sense of self and your body image.  Exercise tips      Ease into your routine.  Set small goals. Then build on them. Talk with your healthcare provider first before starting an exercise routine if you're not sure what your activity level should be.    Exercise on most days. Aim for a total of at least 150 minutes (2 hours and 30 minutes) or more of moderate-intensity aerobic activity each week. You could also do 75 minutes (1 hour and 15 minutes) or more of vigorous-intensity aerobic activity each week. Or try for a combination of both. Moderate activity means that you breathe heavier and your heart rate increases, but you can still talk. Think about doing at least 30 minutes of moderate exercise, 5 times a week. It's OK to work up to the 30-minute period over time. Examples of moderate-intensity activity are brisk walking, gardening, and water aerobics.    Step up your daily activity level.  Along with your exercise program, try being more active the whole day. Walk instead of drive. Or park further away so that you take more steps each day. Do more household tasks or yard work. You may not be able to meet the advised amount of physical activity. But doing some moderate- or vigorous-intensity aerobic activity can help reduce your risk for heart disease. Your healthcare provider can help you figure out what is best for you.    Choose 1 or more activities you enjoy.  Walking is one of the easiest things you can do. You can also try swimming, riding a bike, dancing, or taking an exercise class.    Call 911  Call 911 right away if any of these occur:     Chest pain that doesn't go away quickly with rest    New burning, tightness, pressure, or heaviness in your chest, neck, shoulders, back, or arms    Abnormal or severe shortness of breath    A very fast or irregular heartbeat (palpitations)    Fainting  When to call your healthcare provider  Call your healthcare provider if you have any of these:     Dizziness or lightheadedness    Mild shortness of breath or chest pain    Increased or new joint  or muscle pain    Allan last reviewed this educational content on 7/1/2022 2000-2022 [employer]. All rights reserved. This information is not intended as a substitute for professional medical care. Always follow your healthcare professional's instructions.          Understanding USDA MyPlate  The USDA has guidelines to help you make healthy food choices. These are called MyPlate. MyPlate shows the food groups that make up healthy meals using the image of a place setting. Before you eat, think about the healthiest choices for what to put on your plate or in your cup or bowl. To learn more about building a healthy plate, visit www.choosemyplate.gov.     The food groups    Fruits. Any fruit or 100% fruit juice counts as part of the Fruit Group. Fruits may be fresh, canned, frozen, or dried, and may be whole, cut-up, or pureed. Make 1/2 of your plate fruits and vegetables.    Vegetables. Any vegetable or 100% vegetable juice counts as a member of the Vegetable Group. Vegetables may be fresh, frozen, canned, or dried. They can be served raw or cooked and may be whole, cut-up, or mashed. Make 1/2 of your plate fruits and vegetables.    Grains. All foods made from grains are part of the Grains Group. These include wheat, rice, oats, cornmeal, and barley. Grains are often used to make foods such as bread, pasta, oatmeal, cereal, tortillas, and grits. Grains should be no more than 1/4 of your plate. At least half of your grains should be whole grains.    Protein. This group includes meat, poultry, seafood, beans and peas, eggs, processed soy products (such as tofu), nuts (including nut butters), and seeds. Make protein choices no more than 1/4 of your plate. Meat and poultry choices should be lean or low fat.    Dairy. The Dairy Group includes all fluid milk products and foods made from milk that contain calcium, such as yogurt and cheese. (Foods that have little calcium, such as cream, butter, and cream  cheese, are not part of this group.) Most dairy choices should be low-fat or fat-free.    Oils. Oils aren't a food group, but they do contain essential nutrients. However it's important to watch your intake of oils. These are fats that are liquid at room temperature. They include canola, corn, olive, soybean, vegetable, and sunflower oil. Foods that are mainly oil include mayonnaise, certain salad dressings, and soft margarines. You likely already get your daily oil allowance from the foods you eat.  Things to limit  Eating healthy also means limiting these things in your diet:    Salt (sodium). Many processed foods have a lot of sodium. To keep sodium intake down, eat fresh vegetables, meats, poultry, and seafood when possible. Purchase low-sodium, reduced-sodium, or no-salt-added food products at the store. And don't add salt to your meals at home. Instead, season them with herbs and spices such as dill, oregano, cumin, and paprika. Or try adding flavor with lemon or lime zest and juice.    Saturated fat. Saturated fats are most often found in animal products such as beef, pork, and chicken. They are often solid at room temperature, such as butter. To reduce your saturated fat intake, choose leaner cuts of meat and poultry. And try healthier cooking methods such as grilling, broiling, roasting, or baking. For a simple lower-fat swap, use plain nonfat yogurt instead of mayonnaise when making potato salad or macaroni salad.    Added sugars. These are sugars added to foods. They are in foods such as ice cream, candy, soda, fruit drinks, sports drinks, energy drinks, cookies, pastries, jams, and syrups. Cut down on added sugars by sharing sweet treats with a family member or friend. You can also choose fruit for dessert, and drink water or other unsweetened beverages.  [employer] last reviewed this educational content on 6/1/2020 2000-2022 [employer]. All rights reserved. This information is not  intended as a substitute for professional medical care. Always follow your healthcare professional's instructions.          Urinary Incontinence, Female (Adult)   Urinary incontinence means loss of bladder control. This problem affects many women, especially as they get older. If you have incontinence, you may be embarrassed to ask for help. But know that this problem can be treated.   Types of Incontinence  There are different types of incontinence. Two of the main types are described here. You can have more than one type.     Stress incontinence. With this type, urine leaks when pressure (stress) is put on the bladder. This may happen when you cough, sneeze, or laugh. Stress incontinence most often occurs because the pelvic floor muscles that support the bladder and urethra are weak. This can happen after pregnancy and vaginal childbirth or a hysterectomy. It can also be due to excess body weight or hormone changes.    Urge incontinence (also called overactive bladder). With this type, a sudden urge to urinate is felt often. This may happen even though there may not be much urine in the bladder. The need to urinate often during the night is common. Urge incontinence most often occurs because of bladder spasms. This may be due to bladder irritation or infection. Damage to bladder nerves or pelvic muscles, constipation, and certain medicines can also lead to urge incontinence.  Treatment depends on the cause. Further evaluation is needed to find the type you have. This will likely include an exam and certain tests. Based on the results, you and your healthcare provider can then plan treatment. Until a diagnosis is made, the home care tips below can help ease symptoms.   Home care    Do pelvic floor muscle exercises, if they are prescribed. The pelvic floor muscles help support the bladder and urethra. Many women find that their symptoms improve when doing special exercises that strengthen these muscles. To do the  exercises, contract the muscles you would use to stop your stream of urine. But do this when you re not urinating. Hold for 10 seconds, then relax. Repeat 10 to 20 times in a row, at least 3 times a day. Your healthcare provider may give you other instructions for how to do the exercises and how often.    Keep a bladder diary. This helps track how often and how much you urinate over a set period of time. Bring this diary with you to your next visit with the provider. The information can help your provider learn more about your bladder problem.    Lose weight, if advised to by your provider. Extra weight puts pressure on the bladder. Your provider can help you create a weight-loss plan that s right for you. This may include exercising more and making certain diet changes.    Don't have foods and drinks that may irritate the bladder. These can include alcohol and caffeinated drinks.    Quit smoking. Smoking and other tobacco use can lead to a long-term (chronic) cough that strains the pelvic floor muscles. Smoking may also damage the bladder and urethra. Talk with your provider about treatments or methods you can use to quit smoking.    If drinking large amounts of fluid makes you have symptoms, you may be advised to limit your fluid intake. You may also be advised to drink most of your fluids during the day and to limit fluids at night.    If you re worried about urine leakage or accidents, you may wear absorbent pads to catch urine. Change the pads often. This helps reduce discomfort. It may also reduce the risk of skin or bladder infections.    Follow-up care  Follow up with your healthcare provider, or as directed. It may take some to find the right treatment for your problem. But healthy lifestyle changes can be made right away. These include such things as exercising on a regular basis, eating a healthy diet, losing weight (if needed), and quitting smoking. Your treatment plan may include special therapies or  "medicines. Certain procedures or surgery may also be options. Talk about any questions you have with your provider.   When to seek medical advice  Call the healthcare provider right away if any of these occur:    Fever of 100.4 F (38 C) or higher, or as directed by your provider    Bladder pain or fullness    Belly swelling    Nausea or vomiting    Back pain    Weakness, dizziness, or fainting  Allan last reviewed this educational content on 1/1/2020 2000-2022 [employer]. All rights reserved. This information is not intended as a substitute for professional medical care. Always follow your healthcare professional's instructions.               Kiara Zazueta PA-C  [facility]    Dominick Yu is a 68 year old, presenting for the following health issues:  Hypertension        5/24/2023     8:21 AM   Additional Questions   Roomed by Felisa     History of Present Illness       Hypertension: She presents for follow up of hypertension.  She does not check blood pressure  regularly outside of the clinic. Outpatient blood pressures have not been over 140/90. She does not follow a low salt diet.     She eats 0-1 servings of fruits and vegetables daily.She consumes 0 sweetened beverage(s) daily.She exercises with enough effort to increase her heart rate 10 to 19 minutes per day.  She exercises with enough effort to increase her heart rate 4 days per week. She is missing 1 dose(s) of medications per week.     HTN - doesn't monitor except when seen at clinic but those readings look nice.  Regained 20 pounds, plans to resume keto.      Asthma - \"the best its been\", not even flaring with illness.  Trelegy.  Sees allergist.    Lichen fairly in check.    Annual Wellness Visit    Patient has been advised of split billing requirements and indicates understanding: Yes     Are you in the first 12 months of your Medicare Part B coverage?  No    Physical Health:    In general, how " "would you rate your overall physical health? fair    Outside of work, how many days during the week do you exercise?none    Outside of work, approximately how many minutes a day do you exercise?not applicable    If you drink alcohol do you typically have >3 drinks per day or >7 drinks per week? No    Do you usually eat at least 4 servings of fruit and vegetables a day, include whole grains & fiber and avoid regularly eating high fat or \"junk\" foods? No    Do you have any problems taking medications regularly? YES 1 a week    Do you have any side effects from medications? none    Needs assistance for the following daily activities: no assistance needed    Which of the following safety concerns are present in your home?  none identified     Hearing impairment: No    In the past 6 months, have you been bothered by leaking of urine? yes    Mental Health:    In general, how would you rate your overall mental or emotional health? good  PHQ-2 Score: 0    Do you feel safe in your environment? Yes    Have you ever done Advance Care Planning? (For example, a Health Directive, POLST, or a discussion with a medical provider or your loved ones about your wishes)? No, advance care planning information given to patient to review.  Advanced care planning was discussed at today's visit.    Fall risk:  Fallen 2 or more times in the past year?: No  Any fall with injury in the past year?: No    Cognitive Screenin) Repeat 3 items (Leader, Season, Table)    2) Clock draw: NORMAL  3) 3 item recall: Recalls 3 objects  Results: 3 items recalled: COGNITIVE IMPAIRMENT LESS LIKELY    Mini-CogTM Copyright KAVIN Jon. Licensed by the author for use in [facility]; reprinted with permission (carleen@.[email or web address]). All rights reserved.      Do you have sleep apnea, excessive snoring or daytime drowsiness?: yes    Social History     Tobacco Use     Smoking status: Former     Packs/day: 2.00     Years: 20.00     Pack years: 40.00     Types: " "Cigarettes     Quit date: 1987     Years since quittin.0     Smokeless tobacco: Never   Vaping Use     Vaping status: Not on file   Substance Use Topics     Alcohol use: Yes     Comment: 1 nightly             2022     2:55 PM   Alcohol Use   Prescreen: >3 drinks/day or >7 drinks/week? No     Do you have a current opioid prescription? No  Do you use any other controlled substances or medications that are not prescribed by a provider? None    Current providers sharing in care for this patient include:  Patient Care Team:  Kiara Zazueta PA-C as PCP - General (Physician Assistant)  Galindo Montano MD as Assigned Allergy Provider  Marlen Godoy PA-C as Physician Assistant (Dermatology)  Silvino Quezada PA-C as Assigned PCP  Balta Norman DPM as Assigned Surgical Provider  Jorge Garza MD as MD (Otolaryngology)    Patient has been advised of split billing requirements and indicates understanding: Yes    Objective    /82   Pulse 85   Temp 97.4  F (36.3  C) (Tympanic)   Resp 20   Ht 1.651 m (5' 5\")   Wt 91.6 kg (202 lb)   SpO2 94%   BMI 33.61 kg/m    Body mass index is 33.61 kg/m .      The patient was provided with suggestions to help her develop a healthy physical lifestyle.  She is at risk for lack of exercise and has been provided with information to increase physical activity for the benefit of her well-being.  The patient was counseled and encouraged to consider modifying their diet and eating habits. She was provided with information on recommended healthy diet options.  Information on urinary incontinence and treatment options given to patient.  "

## 2023-05-25 NOTE — RESULT ENCOUNTER NOTE
Gigi  Prediabetes has not worsened.  Labs otherwise look fine.  Let me know if you have questions.  Kiara

## 2023-05-26 ENCOUNTER — HOSPITAL ENCOUNTER (OUTPATIENT)
Dept: BONE DENSITY | Facility: CLINIC | Age: 68
Discharge: HOME | End: 2023-05-26
Attending: PHYSICIAN ASSISTANT | Admitting: PHYSICIAN ASSISTANT
Payer: MEDICARE

## 2023-05-26 DIAGNOSIS — M85.80 OSTEOPENIA, UNSPECIFIED LOCATION: ICD-10-CM

## 2023-05-26 DIAGNOSIS — Z78.0 POST-MENOPAUSAL: ICD-10-CM

## 2023-05-26 PROCEDURE — 77080 DXA BONE DENSITY AXIAL: CPT

## 2023-05-31 NOTE — RESULT ENCOUNTER NOTE
Gigi  Your bone density shows osteopenia.  This is an intermediate category of bone thinning - not bad enough to be osteoporosis.  Typically we do not treat until risk of fracture is higher than yours currently is.  Get 1200 mg calcium per day and at least 800 units of vitamin D per day.  Exercise for bone health is also recommended:  According to the National Osteoporosis Foundation, the best exercises for building and maintaining bone density are:  Weight-bearing exercise, such as walking, that makes you work against gravity while staying upright.   Muscle-strengthening exercise, such as weight lifting, that makes you work against gravity in a standing, sitting, or prone position.  Nonimpact activities such as balance, functional, and posture exercises also may benefit people with osteoporosis. Although these exercises don't build or maintain bone density, they may increase muscle strength and decrease the risk of falls and fractures.    Let me know if you have questions.  Kiara

## 2023-06-16 ENCOUNTER — TELEPHONE (OUTPATIENT)
Dept: FAMILY MEDICINE | Facility: CLINIC | Age: 68
End: 2023-06-16

## 2023-06-16 NOTE — TELEPHONE ENCOUNTER
Symptoms    Describe your symptoms: Rash ( red raised welts dots) in line left side of waist.   Pt is wondering if this is shingles and should she be seen.  This started 5 days ago not worse but not better.     Preferred Pharmacy:     [id number] - [facility] [address] [address]  [address] [address]  Phone: [phone] Fax: [phone]      Could we send this information to you in [employer] or would you prefer to receive a phone call?:   Patient would prefer a phone call   Okay to leave a detailed message?: Yes at Home number on file [phone] (home)    [facility]

## 2023-06-30 ENCOUNTER — OFFICE VISIT (OUTPATIENT)
Dept: DERMATOLOGY | Facility: CLINIC | Age: 68
End: 2023-06-30
Payer: MEDICARE

## 2023-06-30 DIAGNOSIS — D48.5 NEOPLASM OF UNCERTAIN BEHAVIOR OF SKIN: ICD-10-CM

## 2023-06-30 DIAGNOSIS — L82.1 SEBORRHEIC KERATOSIS: ICD-10-CM

## 2023-06-30 DIAGNOSIS — L90.0 LICHEN SCLEROSUS: Primary | ICD-10-CM

## 2023-06-30 DIAGNOSIS — D22.9 MULTIPLE BENIGN NEVI: ICD-10-CM

## 2023-06-30 DIAGNOSIS — L57.0 AK (ACTINIC KERATOSIS): ICD-10-CM

## 2023-06-30 DIAGNOSIS — D18.01 CHERRY ANGIOMA: ICD-10-CM

## 2023-06-30 DIAGNOSIS — L81.4 LENTIGO: ICD-10-CM

## 2023-06-30 PROCEDURE — 11102 TANGNTL BX SKIN SINGLE LES: CPT | Performed by: PHYSICIAN ASSISTANT

## 2023-06-30 PROCEDURE — 99213 OFFICE O/P EST LOW 20 MIN: CPT | Mod: 25 | Performed by: PHYSICIAN ASSISTANT

## 2023-06-30 PROCEDURE — 17003 DESTRUCT PREMALG LES 2-14: CPT | Performed by: PHYSICIAN ASSISTANT

## 2023-06-30 PROCEDURE — 17000 DESTRUCT PREMALG LESION: CPT | Mod: 59 | Performed by: PHYSICIAN ASSISTANT

## 2023-06-30 PROCEDURE — 88305 TISSUE EXAM BY PATHOLOGIST: CPT | Performed by: DERMATOLOGY

## 2023-06-30 ASSESSMENT — PAIN SCALES - GENERAL: PAINLEVEL: NO PAIN (0)

## 2023-06-30 NOTE — LETTER
6/30/2023         RE: Luz Elena Shipley  [address] [facility] [address]        Dear Colleague,    Thank you for referring your patient, Luz Elena Shipley, to the [facility]. Please see a copy of my visit note below.    Luz Elena Shipley is an extremely pleasant 68 year old year old female patient here today for skin check. She notes some rough areas on face. She notes have a spot that doesn't heal on right shoulder, itchy. She does have a history of Lichen sclerosus and extragenital lichen sclerosus.  Patient has no other skin complaints today.  Remainder of the HPI, Meds, PMH, Allergies, FH, and SH was reviewed in chart.    Pertinent Hx:   No personal history of skin cancer.   Past Medical History:   Diagnosis Date     Hypertension      Rheumatic fever      Sleep apnea      Uncomplicated asthma        Past Surgical History:   Procedure Laterality Date     BIOPSY      breast,     BIOPSY BREAST       COLONOSCOPY       COLONOSCOPY N/A 2/8/2019    Procedure: Combined Colonoscopy, Single Or Multiple Biopsy/Polypectomy By Biopsy;  Surgeon: José Miguel Ochoa MD;  Location: [facility]     COLONOSCOPY N/A 3/4/2022    Procedure: COLONOSCOPY;  Surgeon: Nirmal Rm MD;  Location: [facility]     COLONOSCOPY WITH CO2 INSUFFLATION N/A 2/8/2019    Procedure: COLONOSCOPY WITH CO2 INSUFFLATION;  Surgeon: José Miguel Ochoa MD;  Location: [facility]     COMBINED ESOPHAGOSCOPY, GASTROSCOPY, DUODENOSCOPY (EGD) WITH CO2 INSUFFLATION N/A 2/8/2019    Procedure: COMBINED ESOPHAGOSCOPY, GASTROSCOPY, DUODENOSCOPY (EGD) WITH CO2 INSUFFLATION;  Surgeon: José Miguel Ochoa MD;  Location: [facility]     ESOPHAGOSCOPY, GASTROSCOPY, DUODENOSCOPY (EGD), COMBINED N/A 2/8/2019    Procedure: Combined Esophagoscopy, Gastroscopy, Duodenoscopy (Egd), Biopsy Single Or Multiple;  Surgeon: José Miguel Ochoa MD;  Location: [facility]     GYN SURGERY       TONSILLECTOMY          Family History   Problem Relation Age of  Onset     Other Cancer Mother         panreatic     Diabetes Mother         unsure type; in 40s     Breast Cancer Sister      Skin Cancer Sister      Cerebrovascular Disease Father         CVA     Chronic Obstructive Pulmonary Disease Father      Skin Cancer Father      Aneurysm Paternal Grandmother      Unknown/Adopted Paternal Grandfather      Alzheimer Disease Brother         early onset     Diabetes Son         type 1     Thyroid Disease Son      Arthritis Brother      Stomach Problem Son         polyps, diverticulitis, heartburn, passes blood     Alcoholism Son      Breast Cancer Paternal Aunt        Social History     Socioeconomic History     Marital status:      Spouse name: Not on file     Number of children: Not on file     Years of education: Not on file     Highest education level: Not on file   Occupational History     Comment: Retired   Tobacco Use     Smoking status: Former     Packs/day: 2.00     Years: 20.00     Pack years: 40.00     Types: Cigarettes     Quit date: 1987     Years since quittin.1     Smokeless tobacco: Never   Vaping Use     Vaping Use: Never used   Substance and Sexual Activity     Alcohol use: Yes     Comment: 1 nightly     Drug use: No     Sexual activity: Not Currently     Partners: Male     Birth control/protection: Post-menopausal   Other Topics Concern     Parent/sibling w/ CABG, MI or angioplasty before 65F 55M? No   Social History Narrative    3/24/23        ENVIRONMENTAL HISTORY: [place] The home is heated with a forced air but does not use.  Has floor heating. They do have central air conditioning. The patient's bedroom is furnished with carpeting in bedroom. 1 dog at home. There is no history of cockroach or mice infestation. There are no smokers in the house.  The house does not have a damp basement.      Social Determinants of Health     Financial Resource Strain: Not on file   Food Insecurity: Not on file    Transportation Needs: Not on file   Physical Activity: Not on file   Stress: Not on file   Social Connections: Not on file   Intimate Partner Violence: Not on file   Housing Stability: Not on file       Outpatient Encounter Medications as of 6/30/2023   Medication Sig Dispense Refill     clobetasol (TEMOVATE) 0.05 % external cream Apply sparingly to affected area twice weekly as needed.  Do not apply to face. 60 g 3     clobetasol (TEMOVATE) 0.05 % external ointment Apply sparingly daily as needed to affected area on groin. 30 g 4     albuterol (PROAIR HFA/PROVENTIL HFA/VENTOLIN HFA) 108 (90 Base) MCG/ACT inhaler Inhale 2-4 puffs into the lungs every 4 hours as needed for shortness of breath or wheezing 18 g 3     azelastine (ASTELIN) 0.1 % nasal spray Spray 2 sprays into both nostrils 2 times daily as needed for rhinitis or allergies 30 mL 3     fluticasone (FLONASE) 50 MCG/ACT nasal spray Spray 2 sprays into both nostrils daily (Patient not taking: Reported on 5/24/2023) 16 g 3     Fluticasone-Umeclidin-Vilanterol (TRELEGY ELLIPTA) 200-62.5-25 MCG/ACT oral inhaler Inhale 1 puff into the lungs daily 60 each 6     hydrochlorothiazide (HYDRODIURIL) 12.5 MG tablet Take 1 tablet (12.5 mg) by mouth daily 90 tablet 3     Respiratory Therapy Supplies (NEBULIZER/TUBING/MOUTHPIECE) KIT Use with albuterol neb solution (Patient not taking: Reported on 5/24/2023) 1 kit 0     No facility-administered encounter medications on file as of 6/30/2023.             O:   NAD, WDWN, Alert & Oriented, Mood & Affect wnl, Vitals stable   Here today alone   There were no vitals taken for this visit.   General appearance normal   Vitals stable   Alert, oriented and in no acute distress     Gritty papule left cheek, right cheek  White scar like plaque on right shoulder    Stuck on papules and brown macules on trunk and ext   Red papules on trunk  Brown papules and macule with regular pigment network and borders on torso and extremities       The remainder of skin exam is normal       Eyes: Conjunctivae/lids:Normal     ENT: Lips: normal    MSK:Normal    Cardiovascular: peripheral edema none    Pulm: Breathing Normal    Neuro/Psych: Orientation:Alert and Orientedx3 ; Mood/Affect:normal   A/P:  1. Actinic keratoses on right and left cheek   LN2:  Treated with LN2 for 5s for 1-2 cycles. Warned risks of blistering, pain, pigment change, scarring, and incomplete resolution.  Advised patient to return if lesions do not completely resolve.  Wound care sheet given.  2. R/O extragenital lichen sclerosus  TANGENTIAL BIOPSY SENT OUT:  After consent, anesthesia with LEC and prep, tangential excision performed and specimen sent out for permanent section histology.  No complications and routine wound care. Patient told to call our office in 1-2 weeks for result.      3. Lichen sclerosus- genital   Apply clobetasol ointment daily for one week then can decrease to 1-2 times weekly.   4. Seborrheic keratosis, lentigo, angioma, benign nevi   It was a pleasure speaking to Luz Elena Shipley today.  BENIGN LESIONS DISCUSSED WITH PATIENT:  I discussed the specifics of tumor, prognosis, and genetics of benign lesions.  I explained that treatment of these lesions would be purely cosmetic and not medically neccessary.  I discussed with patient different removal options including excision, cautery and /or laser.      Nature and genetics of benign skin lesions dicussed with patient.  Signs and Symptoms of skin cancer discussed with patient.  ABCDEs of melanoma reviewed with patient.  Patient encouraged to perform monthly skin exams.  UV precautions reviewed with patient.  Risks of non-melanoma skin cancer discussed with patient   Return to clinic in pending biopsy results.       Again, thank you for allowing me to participate in the care of your patient.        Sincerely,        Marlen Lawrence PA-C

## 2023-06-30 NOTE — PATIENT INSTRUCTIONS
Wound Care Instructions     FOR SUPERFICIAL WOUNDS     [facility] [phone]    [facility] [phone]                       AFTER 24 HOURS YOU SHOULD REMOVE THE BANDAGE AND BEGIN DAILY DRESSING CHANGES AS FOLLOWS:     1) Remove Dressing.     2) Clean and dry the area with tap water using a Q-tip or sterile gauze pad.     3) Apply Vaseline, Aquaphor, Polysporin ointment or Bacitracin ointment over entire wound.  Do NOT use Neosporin ointment.     4) Cover the wound with a band-aid, or a sterile non-stick gauze pad and micropore paper tape      REPEAT THESE INSTRUCTIONS AT LEAST ONCE A DAY UNTIL THE WOUND HAS COMPLETELY HEALED.    It is an old wives tale that a wound heals better when it is exposed to air and allowed to dry out. The wound will heal faster with a better cosmetic result if it is kept moist with ointment and covered with a bandage.    **Do not let the wound dry out.**      Supplies Needed:      *Cotton tipped applicators (Q-tips)    *Polysporin Ointment or Bacitracin Ointment (NOT NEOSPORIN)    *Band-aids or non-stick gauze pads and micropore paper tape.      PATIENT INFORMATION:    During the healing process you will notice a number of changes. All wounds develop a small halo of redness surrounding the wound.  This means healing is occurring. Severe itching with extensive redness usually indicates sensitivity to the ointment or bandage tape used to dress the wound.  You should call our office if this develops.      Swelling  and/or discoloration around your surgical site is common, particularly when performed around the eye.    All wounds normally drain.  The larger the wound the more drainage there will be.  After 7-10 days, you will notice the wound beginning to shrink and new skin will begin to grow.  The wound is healed when you can see skin has formed over the entire area.  A healed wound has a healthy, shiny look to the surface and is red to dark pink in color  to normalize.  Wounds may take approximately 4-6 weeks to heal.  Larger wounds may take 6-8 weeks.  After the wound is healed you may discontinue dressing changes.    You may experience a sensation of tightness as your wound heals. This is normal and will gradually subside.    Your healed wound may be sensitive to temperature changes. This sensitivity improves with time, but if you re having a lot of discomfort, try to avoid temperature extremes.    Patients frequently experience itching after their wound appears to have healed because of the continue healing under the skin.  Plain Vaseline will help relieve the itching.        POSSIBLE COMPLICATIONS    BLEEDING:    Leave the bandage in place.  Use tightly rolled up gauze or a cloth to apply direct pressure over the bandage for 30  minutes.  Reapply pressure for an additional 30 minutes if necessary  Use additional gauze and tape to maintain pressure once the bleeding has stopped.   WOUND CARE INSTRUCTIONS   FOR CRYOSURGERY   This area treated with liquid nitrogen should form a blister (areas treated may or may not blister-skin may just turn dark and slough off). You do not need to bandage the area unless a blister forms and breaks (which may be a few days). When the blister breaks, begin daily dressing changes as follows:  1) Clean and dry the area with tap water using clean Q-tip or sterile gauze pad.   2) Apply Polysporin ointment or Bacitracin ointment over entire wound. Do NOT use Neosporin ointment.   3) Cover the wound with a band-aid or sterile non-stick gauze pad and micropore paper tape.   REPEAT THESE INSTRUCTIONS AT LEAST ONCE A DAY UNTIL THE WOUND HAS COMPLETELY HEALED.   It is an old wives tale that a wound heals better when it is exposed to air and allowed to dry out. The wound will heal faster with a better cosmetic result if it is kept moist with ointment and covered with a bandage.   Do not let the wound dry out.   IMPORTANT INFORMATION ON REVERSE  SIDE   Supplies Needed:   *Cotton tipped applicators (Q-tips)   *Polysporin ointment or Bacitracin ointment (NOT NEOSPORIN)   *Band-aids, or non stick gauze pads and micropore paper tape   PATIENT INFORMATION   During the healing process you will notice a number of changes. All wounds develop a small halo of redness surrounding the wound. This means healing is occurring. Severe itching with extensive redness usually indicates sensitivity to the ointment or bandage tape used to dress the wound. You should call our office if this develops.   Swelling and/or discoloration around your surgical site is common, particularly when performed around the eye.   All wounds normally drain. The larger the wound the more drainage there will be. After 7-10 days, you will notice the wound beginning to shrink and new skin will begin to grow. The wound is healed when you can see skin has formed over the entire area. A healed wound has a healthy, shiny look to the surface and is red to dark pink in color to normalize. Wounds may take approximately 4-6 weeks to heal. Larger wounds may take 6-8 weeks. After the wound is healed you may discontinue dressing changes.   You may experience a sensation of tightness as your wound heals. This is normal and will gradually subside.   Your healed wound may be sensitive to temperature changes. This sensitivity improves with time, but if you re having a lot of discomfort, try to avoid temperature extremes.   Patients frequently experience itching after their wound appears to have healed because of the continue healing under the skin. Plain Vaseline will help relieve the itching.            Patient Education       Proper skin care from [facility]:    -Eliminate harsh soaps as they strip the natural oils from the skin, often resulting in dry itchy skin ( i.e. Dial, Zest, Lizzeth Spring)  -Use mild soaps such as Cetaphil or Dove Sensitive Skin in the shower. You do not need to use soap on arms,  legs, and trunk every time you shower unless visibly soiled.   -Avoid hot or cold showers.  -After showering, lightly dry off and apply moisturizing within 2-3 minutes. This will help trap moisture in the skin.   -Aggressive use of a moisturizer at least 1-2 times a day to the entire body (including -Vanicream, Cetaphil, Aquaphor or Cerave) and moisturize hands after every washing.  -We recommend using moisturizers that come in a tub that needs to be scooped out, not a pump. This has more of an oil base. It will hold moisture in your skin much better than a water base moisturizer. The above recommended are non-pore clogging.      Wear a sunscreen with at least SPF 30 on your face, ears, neck and V of the chest daily. Wear sunscreen on other areas of the body if those areas are exposed to the sun throughout the day. Sunscreens can contain physical and/or chemical blockers. Physical blockers are less likely to clog pores, these include zinc oxide and titanium dioxide. Reapply every two hour and after swimming.     Sunscreen examples: https://www.ewg.org/sunscreen/    UV radiation  UVA radiation remains constant throughout the day and throughout the year. It is a longer wavelength than UVB and therefore penetrates deeper into the skin leading to immediate and delayed tanning, photoaging, and skin cancer. 70-80% of UVA and UVB radiation occurs between the hours of 10am-2pm.  UVB radiation  UVB radiation causes the most harmful effects and is more significant during the summer months. However, snow and ice can reflect UVB radiation leading to skin damage during the winter months as well. UVB radiation is responsible for tanning, burning, inflammation, delayed erythema (pinkness), pigmentation (brown spots), and skin cancer.     I recommend self monthly full body exams and yearly full body exams with a dermatology provider. If you develop a new or changing lesion please follow up for examination. Most skin cancers are  pink and scaly or pink and pearly. However, we do see blue/brown/black skin cancers.  Consider the ABCDEs of melanoma when giving yourself your monthly full body exam ( don't forget the groin, buttocks, feet, toes, etc). A-asymmetry, B-borders, C-color, D-diameter, E-elevation or evolving. If you see any of these changes please follow up in clinic. If you cannot see your back I recommend purchasing a hand held mirror to use with a larger wall mirror.       Checking for Skin Cancer  You can find cancer early by checking your skin each month. There are 3 kinds of skin cancer. They are melanoma, basal cell carcinoma, and squamous cell carcinoma. Doing monthly skin checks is the best way to find new marks or skin changes. Follow the instructions below for checking your skin.   The ABCDEs of checking moles for melanoma   Check your moles or growths for signs of melanoma using ABCDE:   Asymmetry: the sides of the mole or growth don t match  Border: the edges are ragged, notched, or blurred  Color: the color within the mole or growth varies  Diameter: the mole or growth is larger than 6 mm (size of a pencil eraser)  Evolving: the size, shape, or color of the mole or growth is changing (evolving is not shown in the images below)    Checking for other types of skin cancer  Basal cell carcinoma or squamous cell carcinoma have symptoms such as:     A spot or mole that looks different from all other marks on your skin  Changes in how an area feels, such as itching, tenderness, or pain  Changes in the skin's surface, such as oozing, bleeding, or scaliness  A sore that does not heal  New swelling or redness beyond the border of a mole    Who s at risk?  Anyone can get skin cancer. But you are at greater risk if you have:   Fair skin, light-colored hair, or light-colored eyes  Many moles or abnormal moles on your skin  A history of sunburns from sunlight or tanning beds  A family history of skin cancer  A history of exposure to  radiation or chemicals  A weakened immune system  If you have had skin cancer in the past, you are at risk for recurring skin cancer.   How to check your skin  Do your monthly skin checkups in front of a full-length mirror. Check all parts of your body, including your:   Head (ears, face, neck, and scalp)  Torso (front, back, and sides)  Arms (tops, undersides, upper, and lower armpits)  Hands (palms, backs, and fingers, including under the nails)  Buttocks and genitals  Legs (front, back, and sides)  Feet (tops, soles, toes, including under the nails, and between toes)  If you have a lot of moles, take digital photos of them each month. Make sure to take photos both up close and from a distance. These can help you see if any moles change over time.   Most skin changes are not cancer. But if you see any changes in your skin, call your doctor right away. Only he or she can diagnose a problem. If you have skin cancer, seeing your doctor can be the first step toward getting the treatment that could save your life.   [employer] last reviewed this educational content on 4/1/2019 2000-2020 [employer]. [address] [place] [address]. All rights reserved. This information is not intended as a substitute for professional medical care. Always follow your healthcare professional's instructions.       When should I call my doctor?  If you are worsening or not improving, please, contact us or seek urgent care as noted below.     Who should I call with questions (adults)?  [facility] (adult and pediatric): [phone]  [facility] (adult): [phone]  [facility] ([place], [place], [place] and [place]) [phone]  For urgent needs outside of business hours call the [facility] at [phone] and ask for the dermatology resident on call to be paged  If this is a medical emergency and you are unable  to reach an ER, Call 911      If you need a prescription refill, please contact your pharmacy. Refills are approved or denied by our Physicians during normal business hours, Monday through Fridays  Per office policy, refills will not be granted if you have not been seen within the past year (or sooner depending on your child's condition)

## 2023-06-30 NOTE — PROGRESS NOTES
Luz Elena Shipley is an extremely pleasant 68 year old year old female patient here today for skin check. She notes some rough areas on face. She notes have a spot that doesn't heal on right shoulder, itchy. She does have a history of Lichen sclerosus and extragenital lichen sclerosus.  Patient has no other skin complaints today.  Remainder of the HPI, Meds, PMH, Allergies, FH, and SH was reviewed in chart.    Pertinent Hx:   No personal history of skin cancer.   Past Medical History:   Diagnosis Date     Hypertension      Rheumatic fever      Sleep apnea      Uncomplicated asthma        Past Surgical History:   Procedure Laterality Date     BIOPSY      breast,     BIOPSY BREAST       COLONOSCOPY       COLONOSCOPY N/A 2/8/2019    Procedure: Combined Colonoscopy, Single Or Multiple Biopsy/Polypectomy By Biopsy;  Surgeon: José Miguel Ochoa MD;  Location: [facility]     COLONOSCOPY N/A 3/4/2022    Procedure: COLONOSCOPY;  Surgeon: Nirmal Rm MD;  Location: [facility]     COLONOSCOPY WITH CO2 INSUFFLATION N/A 2/8/2019    Procedure: COLONOSCOPY WITH CO2 INSUFFLATION;  Surgeon: José Miguel Ochoa MD;  Location: [facility]     COMBINED ESOPHAGOSCOPY, GASTROSCOPY, DUODENOSCOPY (EGD) WITH CO2 INSUFFLATION N/A 2/8/2019    Procedure: COMBINED ESOPHAGOSCOPY, GASTROSCOPY, DUODENOSCOPY (EGD) WITH CO2 INSUFFLATION;  Surgeon: José Miguel Ochoa MD;  Location: [facility]     ESOPHAGOSCOPY, GASTROSCOPY, DUODENOSCOPY (EGD), COMBINED N/A 2/8/2019    Procedure: Combined Esophagoscopy, Gastroscopy, Duodenoscopy (Egd), Biopsy Single Or Multiple;  Surgeon: José Miguel Ochoa MD;  Location:  OR     GYN SURGERY       TONSILLECTOMY          Family History   Problem Relation Age of Onset     Other Cancer Mother         panreatic     Diabetes Mother         unsure type; in 40s     Breast Cancer Sister      Skin Cancer Sister      Cerebrovascular Disease Father         CVA     Chronic Obstructive Pulmonary Disease Father       Skin Cancer Father      Aneurysm Paternal Grandmother      Unknown/Adopted Paternal Grandfather      Alzheimer Disease Brother         early onset     Diabetes Son         type 1     Thyroid Disease Son      Arthritis Brother      Stomach Problem Son         polyps, diverticulitis, heartburn, passes blood     Alcoholism Son      Breast Cancer Paternal Aunt        Social History     Socioeconomic History     Marital status:      Spouse name: Not on file     Number of children: Not on file     Years of education: Not on file     Highest education level: Not on file   Occupational History     Comment: Retired   Tobacco Use     Smoking status: Former     Packs/day: 2.00     Years: 20.00     Pack years: 40.00     Types: Cigarettes     Quit date: 1987     Years since quittin.1     Smokeless tobacco: Never   Vaping Use     Vaping Use: Never used   Substance and Sexual Activity     Alcohol use: Yes     Comment: 1 nightly     Drug use: No     Sexual activity: Not Currently     Partners: Male     Birth control/protection: Post-menopausal   Other Topics Concern     Parent/sibling w/ CABG, MI or angioplasty before 65F 55M? No   Social History Narrative    3/24/23        ENVIRONMENTAL HISTORY: [place] The home is heated with a forced air but does not use.  Has floor heating. They do have central air conditioning. The patient's bedroom is furnished with carpeting in bedroom. 1 dog at home. There is no history of cockroach or mice infestation. There are no smokers in the house.  The house does not have a damp basement.      Social Determinants of Health     Financial Resource Strain: Not on file   Food Insecurity: Not on file   Transportation Needs: Not on file   Physical Activity: Not on file   Stress: Not on file   Social Connections: Not on file   Intimate Partner Violence: Not on file   Housing Stability: Not on file       Outpatient Encounter Medications as of  6/30/2023   Medication Sig Dispense Refill     clobetasol (TEMOVATE) 0.05 % external cream Apply sparingly to affected area twice weekly as needed.  Do not apply to face. 60 g 3     clobetasol (TEMOVATE) 0.05 % external ointment Apply sparingly daily as needed to affected area on groin. 30 g 4     albuterol (PROAIR HFA/PROVENTIL HFA/VENTOLIN HFA) 108 (90 Base) MCG/ACT inhaler Inhale 2-4 puffs into the lungs every 4 hours as needed for shortness of breath or wheezing 18 g 3     azelastine (ASTELIN) 0.1 % nasal spray Spray 2 sprays into both nostrils 2 times daily as needed for rhinitis or allergies 30 mL 3     fluticasone (FLONASE) 50 MCG/ACT nasal spray Spray 2 sprays into both nostrils daily (Patient not taking: Reported on 5/24/2023) 16 g 3     Fluticasone-Umeclidin-Vilanterol (TRELEGY ELLIPTA) 200-62.5-25 MCG/ACT oral inhaler Inhale 1 puff into the lungs daily 60 each 6     hydrochlorothiazide (HYDRODIURIL) 12.5 MG tablet Take 1 tablet (12.5 mg) by mouth daily 90 tablet 3     Respiratory Therapy Supplies (NEBULIZER/TUBING/MOUTHPIECE) KIT Use with albuterol neb solution (Patient not taking: Reported on 5/24/2023) 1 kit 0     No facility-administered encounter medications on file as of 6/30/2023.             O:   NAD, WDWN, Alert & Oriented, Mood & Affect wnl, Vitals stable   Here today alone   There were no vitals taken for this visit.   General appearance normal   Vitals stable   Alert, oriented and in no acute distress     Gritty papule left cheek, right cheek  White scar like plaque on right shoulder    Stuck on papules and brown macules on trunk and ext   Red papules on trunk  Brown papules and macule with regular pigment network and borders on torso and extremities      The remainder of skin exam is normal       Eyes: Conjunctivae/lids:Normal     ENT: Lips: normal    MSK:Normal    Cardiovascular: peripheral edema none    Pulm: Breathing Normal    Neuro/Psych: Orientation:Alert and Orientedx3 ;  Mood/Affect:normal   A/P:  1. Actinic keratoses on right and left cheek   LN2:  Treated with LN2 for 5s for 1-2 cycles. Warned risks of blistering, pain, pigment change, scarring, and incomplete resolution.  Advised patient to return if lesions do not completely resolve.  Wound care sheet given.  2. R/O extragenital lichen sclerosus  TANGENTIAL BIOPSY SENT OUT:  After consent, anesthesia with LEC and prep, tangential excision performed and specimen sent out for permanent section histology.  No complications and routine wound care. Patient told to call our office in 1-2 weeks for result.      3. Lichen sclerosus- genital   Apply clobetasol ointment daily for one week then can decrease to 1-2 times weekly.   4. Seborrheic keratosis, lentigo, angioma, benign nevi   It was a pleasure speaking to Luz Elena Shipley today.  BENIGN LESIONS DISCUSSED WITH PATIENT:  I discussed the specifics of tumor, prognosis, and genetics of benign lesions.  I explained that treatment of these lesions would be purely cosmetic and not medically neccessary.  I discussed with patient different removal options including excision, cautery and /or laser.      Nature and genetics of benign skin lesions dicussed with patient.  Signs and Symptoms of skin cancer discussed with patient.  ABCDEs of melanoma reviewed with patient.  Patient encouraged to perform monthly skin exams.  UV precautions reviewed with patient.  Risks of non-melanoma skin cancer discussed with patient   Return to clinic in pending biopsy results.

## 2023-07-05 LAB
PATH REPORT.COMMENTS IMP SPEC: NORMAL
PATH REPORT.COMMENTS IMP SPEC: NORMAL
PATH REPORT.FINAL DX SPEC: NORMAL
PATH REPORT.GROSS SPEC: NORMAL
PATH REPORT.MICROSCOPIC SPEC OTHER STN: NORMAL
PATH REPORT.RELEVANT HX SPEC: NORMAL

## 2023-07-28 ENCOUNTER — OFFICE VISIT (OUTPATIENT)
Dept: OTOLARYNGOLOGY | Facility: CLINIC | Age: 68
End: 2023-07-28
Attending: ALLERGY & IMMUNOLOGY
Payer: MEDICARE

## 2023-07-28 VITALS
WEIGHT: 200 LBS | TEMPERATURE: 97.6 F | HEART RATE: 63 BPM | SYSTOLIC BLOOD PRESSURE: 128 MMHG | DIASTOLIC BLOOD PRESSURE: 75 MMHG | BODY MASS INDEX: 33.32 KG/M2 | HEIGHT: 65 IN | OXYGEN SATURATION: 97 %

## 2023-07-28 DIAGNOSIS — J34.829 NASAL VALVE COLLAPSE: ICD-10-CM

## 2023-07-28 DIAGNOSIS — M95.0 ACQUIRED NASAL DEFORMITY: Primary | ICD-10-CM

## 2023-07-28 DIAGNOSIS — J34.2 DNS (DEVIATED NASAL SEPTUM): ICD-10-CM

## 2023-07-28 PROCEDURE — 99203 OFFICE O/P NEW LOW 30 MIN: CPT | Performed by: OTOLARYNGOLOGY

## 2023-07-28 ASSESSMENT — PAIN SCALES - GENERAL: PAINLEVEL: NO PAIN (0)
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
## 2023-09-11 ENCOUNTER — OFFICE VISIT (OUTPATIENT)
Dept: OTOLARYNGOLOGY | Facility: CLINIC | Age: 68
End: 2023-09-11
Payer: MEDICARE

## 2023-09-11 DIAGNOSIS — J34.89 NASAL OBSTRUCTION: Primary | ICD-10-CM

## 2023-09-11 PROCEDURE — 99214 OFFICE O/P EST MOD 30 MIN: CPT | Mod: 25 | Performed by: STUDENT IN AN ORGANIZED HEALTH CARE EDUCATION/TRAINING PROGRAM

## 2023-09-11 PROCEDURE — 31231 NASAL ENDOSCOPY DX: CPT | Performed by: STUDENT IN AN ORGANIZED HEALTH CARE EDUCATION/TRAINING PROGRAM

## 2023-09-11 RX ORDER — FLUTICASONE PROPIONATE 50 MCG
1 SPRAY, SUSPENSION (ML) NASAL DAILY
Qty: 15.8 ML | Refills: 1 | Status: SHIPPED | OUTPATIENT
Start: 2023-09-11 | End: 2024-05-23
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
## 2023-10-09 ENCOUNTER — TELEPHONE (OUTPATIENT)
Dept: FAMILY MEDICINE | Facility: CLINIC | Age: 68
End: 2023-10-09
Payer: MEDICARE

## 2023-10-09 ENCOUNTER — HOSPITAL ENCOUNTER (EMERGENCY)
Facility: CLINIC | Age: 68
Discharge: HOME | End: 2023-10-09
Attending: FAMILY MEDICINE | Admitting: FAMILY MEDICINE
Payer: MEDICARE

## 2023-10-09 VITALS
SYSTOLIC BLOOD PRESSURE: 144 MMHG | BODY MASS INDEX: 33.32 KG/M2 | TEMPERATURE: 96.8 F | RESPIRATION RATE: 18 BRPM | HEART RATE: 70 BPM | DIASTOLIC BLOOD PRESSURE: 88 MMHG | OXYGEN SATURATION: 95 % | WEIGHT: 200 LBS | HEIGHT: 65 IN

## 2023-10-09 DIAGNOSIS — H50.30 ESOTROPIA, INTERMITTENT: ICD-10-CM

## 2023-10-09 DIAGNOSIS — R42 DIZZINESS: ICD-10-CM

## 2023-10-09 DIAGNOSIS — H53.2 DIPLOPIA: ICD-10-CM

## 2023-10-09 DIAGNOSIS — R55 NEAR SYNCOPE: ICD-10-CM

## 2023-10-09 LAB
ANION GAP SERPL CALCULATED.3IONS-SCNC: 7 MMOL/L (ref 7–15)
BASO+EOS+MONOS # BLD AUTO: NORMAL 10*3/UL
BASO+EOS+MONOS NFR BLD AUTO: NORMAL %
BASOPHILS # BLD AUTO: 0.1 10E3/UL (ref 0–0.2)
BASOPHILS NFR BLD AUTO: 1 %
BUN SERPL-MCNC: 14.3 MG/DL (ref 8–23)
CALCIUM SERPL-MCNC: 9.3 MG/DL (ref 8.8–10.2)
CHLORIDE SERPL-SCNC: 105 MMOL/L (ref 98–107)
CREAT SERPL-MCNC: 0.85 MG/DL (ref 0.51–0.95)
DEPRECATED HCO3 PLAS-SCNC: 28 MMOL/L (ref 22–29)
EGFRCR SERPLBLD CKD-EPI 2021: 73 ML/MIN/1.73M2
EOSINOPHIL # BLD AUTO: 0.1 10E3/UL (ref 0–0.7)
EOSINOPHIL NFR BLD AUTO: 2 %
ERYTHROCYTE [DISTWIDTH] IN BLOOD BY AUTOMATED COUNT: 12.9 % (ref 10–15)
GLUCOSE SERPL-MCNC: 87 MG/DL (ref 70–99)
HCT VFR BLD AUTO: 46.7 % (ref 35–47)
HGB BLD-MCNC: 15.3 G/DL (ref 11.7–15.7)
IMM GRANULOCYTES # BLD: 0 10E3/UL
IMM GRANULOCYTES NFR BLD: 0 %
LYMPHOCYTES # BLD AUTO: 2.3 10E3/UL (ref 0.8–5.3)
LYMPHOCYTES NFR BLD AUTO: 27 %
MCH RBC QN AUTO: 31.5 PG (ref 26.5–33)
MCHC RBC AUTO-ENTMCNC: 32.8 G/DL (ref 31.5–36.5)
MCV RBC AUTO: 96 FL (ref 78–100)
MONOCYTES # BLD AUTO: 0.7 10E3/UL (ref 0–1.3)
MONOCYTES NFR BLD AUTO: 8 %
NEUTROPHILS # BLD AUTO: 5.1 10E3/UL (ref 1.6–8.3)
NEUTROPHILS NFR BLD AUTO: 62 %
NRBC # BLD AUTO: 0 10E3/UL
NRBC BLD AUTO-RTO: 0 /100
PLATELET # BLD AUTO: 223 10E3/UL (ref 150–450)
POTASSIUM SERPL-SCNC: 4.3 MMOL/L (ref 3.4–5.3)
RBC # BLD AUTO: 4.86 10E6/UL (ref 3.8–5.2)
SODIUM SERPL-SCNC: 140 MMOL/L (ref 135–145)
WBC # BLD AUTO: 8.3 10E3/UL (ref 4–11)

## 2023-10-09 PROCEDURE — 93010 ELECTROCARDIOGRAM REPORT: CPT | Performed by: FAMILY MEDICINE

## 2023-10-09 PROCEDURE — 93005 ELECTROCARDIOGRAM TRACING: CPT | Performed by: FAMILY MEDICINE

## 2023-10-09 PROCEDURE — 99284 EMERGENCY DEPT VISIT MOD MDM: CPT | Mod: 25 | Performed by: FAMILY MEDICINE

## 2023-10-09 PROCEDURE — 80048 BASIC METABOLIC PNL TOTAL CA: CPT | Performed by: FAMILY MEDICINE

## 2023-10-09 PROCEDURE — 36415 COLL VENOUS BLD VENIPUNCTURE: CPT | Performed by: FAMILY MEDICINE

## 2023-10-09 PROCEDURE — 85025 COMPLETE CBC W/AUTO DIFF WBC: CPT | Performed by: FAMILY MEDICINE

## 2023-10-09 RX ORDER — LORAZEPAM 0.5 MG/1
.5-1 TABLET ORAL
Qty: 2 TABLET | Refills: 0 | Status: SHIPPED | OUTPATIENT
Start: 2023-10-09 | End: 2023-10-26

## 2023-10-09 ASSESSMENT — ENCOUNTER SYMPTOMS
FREQUENCY: 0
NAUSEA: 0
DIAPHORESIS: 0
CHILLS: 0
SHORTNESS OF BREATH: 0
HEADACHES: 1
CONSTIPATION: 0
PALPITATIONS: 0
VOMITING: 0
SORE THROAT: 0
SINUS PRESSURE: 0
WHEEZING: 0
BLOOD IN STOOL: 0
DIZZINESS: 1
DIARRHEA: 0
ABDOMINAL PAIN: 0
FEVER: 0
DYSURIA: 0
COUGH: 0

## 2023-10-09 ASSESSMENT — ACTIVITIES OF DAILY LIVING (ADL): ADLS_ACUITY_SCORE: 35

## 2023-10-09 NOTE — ED TRIAGE NOTES
Pt reports he head got weird yesterday kind of like vertigo, pt reports it still feels off today.

## 2023-10-09 NOTE — TELEPHONE ENCOUNTER
Gigi says in the past few weeks she has had several episodes of blurred  vision and feeling of crossed eyes, this lasted just a few seconds. Yesterday she had an episode lasting several minutes where she had tunnel vision and a feeling of pressure in her head, she then slept for several hours, says today she still does not feel right, like her head is heavy. She says she has a history of migraine but this does not feel the same at all. Advised she be seen in the ED today, she agrees and will have someone take her there now

## 2023-10-09 NOTE — ED PROVIDER NOTES
History     Chief Complaint   Patient presents with    Dizziness     Pt reports he head got weird yesterday kind of like vertigo, pt reports it still feels off today.      HPI  Luz Elena Shipley is a 68 year old female who presents with a history of hypertension and prediabetes severe persistent asthma on Trelegy.  On hydrochlorothiazide  She presents with several symptoms recently 1 is that she has had eyes that will spontaneously cross and esotropia fashion last a minute or 2 and then resolve.  She notes this is occurred more frequently recently.  She also notes a blurred vision that is a potential diplopia horizontal and primarily in the left eye that does not occur when the right eye is closed but also at times it is worse when both eyes are open.  Yesterday she had an event in which she had a overall head pressure and atypical for her prior migraines not thunderclap in nature.  Felt that she was incoordinated for several minutes.  Unclear if she was dizzy at the time.  Symptoms resolved and there was no residual neurologic deficit.  She had a mild headache bilaterally.  She is not on anticoagulation.  No associated weakness.  There may have been near syncopal symptoms with the episode.  It was hard for her to describe what occurred.  This occurred at noon yesterday.  No residual symptoms or signs at this time.            Allergies:  Allergies   Allergen Reactions    Cartia Xt [Diltiazem] Other (See Comments)     Heartburn whenever on this medication    Losartan Cough    Miconazole     Sulfa Antibiotics Rash       Problem List:    Patient Active Problem List    Diagnosis Date Noted    Prediabetes 01/12/2022     Priority: Medium    Osteopenia 08/19/2020     Priority: Medium     Recheck [id number].  Aug 2020: 1. Mild osteopenia in the left femoral neck.  2. Mild-moderate osteopenia in the right femoral neck.  3. The L1 and L4 bone density is borderline between lower normal and  mild osteopenia.      Severe persistent  asthma (H28) 04/29/2016     Priority: Medium     Symptoms since childhood. Smoked since age of 11 until 31 years.  Until seen in November 2014, used albuterol once in her life.      Lichen sclerosus 04/29/2016     Priority: Medium    Benign essential hypertension 04/29/2016     Priority: Medium    Family history of diabetes mellitus 04/29/2016     Priority: Medium        Past Medical History:    Past Medical History:   Diagnosis Date    Hypertension     Rheumatic fever     Sleep apnea     Uncomplicated asthma        Past Surgical History:    Past Surgical History:   Procedure Laterality Date    BIOPSY      breast,    BIOPSY BREAST      COLONOSCOPY      COLONOSCOPY N/A 2/8/2019    Procedure: Combined Colonoscopy, Single Or Multiple Biopsy/Polypectomy By Biopsy;  Surgeon: José Miguel Ochoa MD;  Location: [facility]    COLONOSCOPY N/A 3/4/2022    Procedure: COLONOSCOPY;  Surgeon: Nirmal Rm MD;  Location: [facility]    COLONOSCOPY WITH CO2 INSUFFLATION N/A 2/8/2019    Procedure: COLONOSCOPY WITH CO2 INSUFFLATION;  Surgeon: José Miguel Ochoa MD;  Location: [facility]    COMBINED ESOPHAGOSCOPY, GASTROSCOPY, DUODENOSCOPY (EGD) WITH CO2 INSUFFLATION N/A 2/8/2019    Procedure: COMBINED ESOPHAGOSCOPY, GASTROSCOPY, DUODENOSCOPY (EGD) WITH CO2 INSUFFLATION;  Surgeon: José Miguel Ochoa MD;  Location: [facility]    ESOPHAGOSCOPY, GASTROSCOPY, DUODENOSCOPY (EGD), COMBINED N/A 2/8/2019    Procedure: Combined Esophagoscopy, Gastroscopy, Duodenoscopy (Egd), Biopsy Single Or Multiple;  Surgeon: José Miguel Ochoa MD;  Location: [facility]    GYN SURGERY      TONSILLECTOMY         Family History:    Family History   Problem Relation Age of Onset    Other Cancer Mother         panreatic    Diabetes Mother         unsure type; in 40s    Breast Cancer Sister     Skin Cancer Sister     Cerebrovascular Disease Father         CVA    Chronic Obstructive Pulmonary Disease Father     Skin Cancer Father     Aneurysm Paternal  Grandmother     Unknown/Adopted Paternal Grandfather     Alzheimer Disease Brother         early onset    Diabetes Son         type 1    Thyroid Disease Son     Arthritis Brother     Stomach Problem Son         polyps, diverticulitis, heartburn, passes blood    Alcoholism Son     Breast Cancer Paternal Aunt        Social History:  Marital Status:   [4]  Social History     Tobacco Use    Smoking status: Former     Packs/day: 2.00     Years: 20.00     Pack years: 40.00     Types: Cigarettes     Quit date: 1987     Years since quittin.4    Smokeless tobacco: Never   Vaping Use    Vaping Use: Never used   Substance Use Topics    Alcohol use: Yes     Comment: 1 nightly    Drug use: No        Medications:    LORazepam (ATIVAN) 0.5 MG tablet  albuterol (PROAIR HFA/PROVENTIL HFA/VENTOLIN HFA) 108 (90 Base) MCG/ACT inhaler  azelastine (ASTELIN) 0.1 % nasal spray  clobetasol (TEMOVATE) 0.05 % external cream  clobetasol (TEMOVATE) 0.05 % external ointment  fluticasone (FLONASE) 50 MCG/ACT nasal spray  fluticasone (FLONASE) 50 MCG/ACT nasal spray  Fluticasone-Umeclidin-Vilanterol (TRELEGY ELLIPTA) 200-62.5-25 MCG/ACT oral inhaler  hydrochlorothiazide (HYDRODIURIL) 12.5 MG tablet  Respiratory Therapy Supplies (NEBULIZER/TUBING/MOUTHPIECE) KIT          Review of Systems   Constitutional:  Negative for chills, diaphoresis and fever.   HENT:  Negative for ear pain, sinus pressure and sore throat.    Eyes:  Positive for visual disturbance.   Respiratory:  Negative for cough, shortness of breath and wheezing.    Cardiovascular:  Negative for chest pain and palpitations.   Gastrointestinal:  Negative for abdominal pain, blood in stool, constipation, diarrhea, nausea and vomiting.   Genitourinary:  Negative for dysuria, frequency and urgency.   Skin:  Negative for rash.   Neurological:  Positive for dizziness and headaches.   All other systems reviewed and are negative.      Physical Exam   BP: 124/88  Pulse:  "70  Temp: 96.8  F (36  C)  Resp: 18  Height: 165.1 cm (5' 5\")  Weight: 90.7 kg (200 lb)  SpO2: 95 %      Physical Exam  Constitutional:       General: She is in acute distress.      Appearance: She is not diaphoretic.   HENT:      Head: Atraumatic.   Eyes:      Extraocular Movements: Extraocular movements intact.      Conjunctiva/sclera: Conjunctivae normal.   Cardiovascular:      Rate and Rhythm: Normal rate and regular rhythm.      Heart sounds: No murmur heard.  Pulmonary:      Effort: Pulmonary effort is normal. No respiratory distress.      Breath sounds: Normal breath sounds. No stridor. No wheezing or rhonchi.   Abdominal:      General: There is no distension.      Palpations: There is no mass.      Tenderness: There is no abdominal tenderness. There is no guarding.   Musculoskeletal:      Cervical back: Neck supple.      Right lower leg: No edema.      Left lower leg: No edema.   Skin:     Coloration: Skin is not pale.      Findings: No rash.   Neurological:      General: No focal deficit present.      Mental Status: She is alert and oriented to person, place, and time.      Cranial Nerves: No cranial nerve deficit.      Sensory: No sensory deficit.      Motor: No weakness.      Coordination: Coordination normal.       Ambulated with the patient and she did well including a Romberg test.  I see no obvious nystagmus or extraocular movement deficits.  Cover uncover testing made it difficult to tell if her diplopia was monocular versus binocular but it does appear to be monocular only affecting the left eye.  I did see not see any esotropia  Her speech is fluent.  Her GCS 15.  No signs of trauma.    ED Course                 Procedures                EKG Interpretation:      Interpreted by Dyllan Felix MD     EKG done at 1639 hrs. demonstrates a sinus rhythm 67 bpm normal axis.  There is no ST change.  There is no T wave changes.  There is a normal R progression.  No significant Q waves.  Normal intervals.  " Normal conduction.  PAC  Impression sinus rhythm 67 bpm single PAC.  No other change.    Critical Care time:  none               Results for orders placed or performed during the hospital encounter of 10/09/23 (from the past 24 hour(s))   CBC with platelets differential    Narrative    The following orders were created for panel order CBC with platelets differential.  Procedure                               Abnormality         Status                     ---------                               -----------         ------                     CBC with platelets and d...[[id number]]                      Final result                 Please view results for these tests on the individual orders.   Basic metabolic panel   Result Value Ref Range    Sodium 140 135 - 145 mmol/L    Potassium 4.3 3.4 - 5.3 mmol/L    Chloride 105 98 - 107 mmol/L    Carbon Dioxide (CO2) 28 22 - 29 mmol/L    Anion Gap 7 7 - 15 mmol/L    Urea Nitrogen 14.3 8.0 - 23.0 mg/dL    Creatinine 0.85 0.51 - 0.95 mg/dL    GFR Estimate 73 >60 mL/min/1.73m2    Calcium 9.3 8.8 - 10.2 mg/dL    Glucose 87 70 - 99 mg/dL   CBC with platelets and differential   Result Value Ref Range    WBC Count 8.3 4.0 - 11.0 10e3/uL    RBC Count 4.86 3.80 - 5.20 10e6/uL    Hemoglobin 15.3 11.7 - 15.7 g/dL    Hematocrit 46.7 35.0 - 47.0 %    MCV 96 78 - 100 fL    MCH 31.5 26.5 - 33.0 pg    MCHC 32.8 31.5 - 36.5 g/dL    RDW 12.9 10.0 - 15.0 %    Platelet Count 223 150 - 450 10e3/uL    % Neutrophils 62 %    % Lymphocytes 27 %    % Monocytes 8 %    Mids % (Monos, Eos, Basos)      % Eosinophils 2 %    % Basophils 1 %    % Immature Granulocytes 0 %    NRBCs per 100 WBC 0 <1 /100    Absolute Neutrophils 5.1 1.6 - 8.3 10e3/uL    Absolute Lymphocytes 2.3 0.8 - 5.3 10e3/uL    Absolute Monocytes 0.7 0.0 - 1.3 10e3/uL    Mids Abs (Monos, Eos, Basos)      Absolute Eosinophils 0.1 0.0 - 0.7 10e3/uL    Absolute Basophils 0.1 0.0 - 0.2 10e3/uL    Absolute Immature Granulocytes 0.0 <=0.4 10e3/uL     Absolute NRBCs 0.0 10e3/uL       Medications - No data to display    Assessments & Plan (with Medical Decision Making)     MDM: Luz Elena Shipley is a 68 year old female presents with atypical visual symptoms recently with intermittent esotropia over the last weeks to month.  Possible monocular diplopia that she seen eye clinic for they saw no significant abnormalities.  Episode yesterday of unclear etiology brief and resolved.  Could have been near syncope.  Could have been TIA like symptoms although less likely.  EKG is without atrial fibrillation.  No obvious changes on syncopal labs such as electrolyte abnormalities.  I would recommend that she undergo MRI MRA and also obtain ZIO monitor with follow-up in clinic.  We discussed that I would not be seeing her MRI MRA results and these would need to be seen in follow-up if she were to return home tonight and have the MRI outpatient.  Alternatively they could remain in the emergency department until MRI could be performed at a later time tonight.  Either 1 is reasonable at this point with her returning to normal and last symptoms more than 24 hours ago and with many of her symptoms starting weeks ago.  She would like to go home and I ordered everything for outpatient including stat MRI.  There was no ASAP option.  Discussed precautions for return.  Additional recommendations as below.    I have reviewed the nursing notes.    I have reviewed the findings, diagnosis, plan and need for follow up with the patient.           Medical Decision Making  The patient's presentation was of moderate complexity (an undiagnosed new problem with uncertain diagnosis).    The patient's evaluation involved:  ordering and/or review of 3+ test(s) in this encounter (see separate area of note for details)    The patient's management necessitated moderate risk (a decision regarding minor procedure (MRI) with risk factors of none).        New Prescriptions    LORAZEPAM (ATIVAN) 0.5 MG TABLET     Take 1-2 tablets (0.5-1 mg) by mouth once as needed for anxiety (prior to procedure) Take 30 minutes prior to departure.  Do not operate a vehicle after taking this medication.       Final diagnoses:   Dizziness   Diplopia   Esotropia, intermittent   Near syncope       10/9/2023   [facility] EMERGENCY DEPT       Dyllan Felix MD  10/09/23 [id number]

## 2023-10-09 NOTE — TELEPHONE ENCOUNTER
Symptoms    Describe your symptoms: 10/9/23 Pt was at a Restaurant had an event not sure how to describe it.  Pt said no pain but could not focus her eyes. This happened 24 hrs ago and today she still has tenderness in her head.   Please Advise.     Preferred Pharmacy:     [id number] - [facility] [address] [address]  [address] [place] [id number]  Phone: [phone] Fax: [phone]      Could we send this information to you in [employer] or would you prefer to receive a phone call?:   Patient would prefer a phone call   Okay to leave a detailed message?: Yes at Home number on file [phone] (home)    [facility]

## 2023-10-09 NOTE — DISCHARGE INSTRUCTIONS
ICD-10-CM    1. Dizziness  R42 MR Brain w/o & w Contrast     MRA Brain ([place]) wo Contrast     MRA Neck (Carotids) wo & w Contrast    unclear event yesterday.  reassuring eval here., return immed for new neurologic changes, significant headache, new weakness or palpitations, chest pain, shortness breath.  obtainm MRI and outpatient zio,      2. Diplopia  H53.2 MR Brain w/o & w Contrast     MRA Brain ([place]) wo Contrast     MRA Neck (Carotids) wo & w Contrast      3. Esotropia, intermittent  H50.30 MR Brain w/o & w Contrast     MRA Brain ([place]) wo Contrast     MRA Neck (Carotids) wo & w Contrast      4. Near syncope  R55 Leadless EKG Monitor 8 to 14 Days

## 2023-10-16 ENCOUNTER — HOSPITAL ENCOUNTER (OUTPATIENT)
Dept: MRI IMAGING | Facility: CLINIC | Age: 68
Discharge: HOME | End: 2023-10-16
Attending: FAMILY MEDICINE
Payer: MEDICARE

## 2023-10-16 ENCOUNTER — HOSPITAL ENCOUNTER (OUTPATIENT)
Dept: CARDIOLOGY | Facility: CLINIC | Age: 68
Discharge: HOME | End: 2023-10-16
Attending: FAMILY MEDICINE
Payer: MEDICARE

## 2023-10-16 DIAGNOSIS — H50.30 ESOTROPIA, INTERMITTENT: ICD-10-CM

## 2023-10-16 DIAGNOSIS — R42 DIZZINESS: ICD-10-CM

## 2023-10-16 DIAGNOSIS — H53.2 DIPLOPIA: ICD-10-CM

## 2023-10-16 DIAGNOSIS — R55 NEAR SYNCOPE: ICD-10-CM

## 2023-10-16 PROCEDURE — 255N000002 HC RX 255 OP 636: Performed by: FAMILY MEDICINE

## 2023-10-16 PROCEDURE — 70549 MR ANGIOGRAPH NECK W/O&W/DYE: CPT | Mod: MG

## 2023-10-16 PROCEDURE — 70553 MRI BRAIN STEM W/O & W/DYE: CPT | Mod: MG

## 2023-10-16 PROCEDURE — A9585 GADOBUTROL INJECTION: HCPCS | Performed by: FAMILY MEDICINE

## 2023-10-16 PROCEDURE — 70544 MR ANGIOGRAPHY HEAD W/O DYE: CPT | Mod: MG

## 2023-10-16 PROCEDURE — 93246 EXT ECG>7D<15D RECORDING: CPT

## 2023-10-16 PROCEDURE — 258N000003 HC RX IP 258 OP 636: Performed by: FAMILY MEDICINE

## 2023-10-16 PROCEDURE — 93248 EXT ECG>7D<15D REV&INTERPJ: CPT | Performed by: INTERNAL MEDICINE

## 2023-10-16 RX ORDER — GADOBUTROL 604.72 MG/ML
10 INJECTION INTRAVENOUS ONCE
Status: COMPLETED | OUTPATIENT
Start: 2023-10-16 | End: 2023-10-16

## 2023-10-16 RX ADMIN — GADOBUTROL 10 ML: 604.72 INJECTION INTRAVENOUS at 08:53

## 2023-10-16 RX ADMIN — SODIUM CHLORIDE 20 ML: 9 INJECTION, SOLUTION INTRAVENOUS at 08:54

## 2023-10-18 ENCOUNTER — TELEPHONE (OUTPATIENT)
Dept: FAMILY MEDICINE | Facility: CLINIC | Age: 68
End: 2023-10-18
Payer: MEDICARE

## 2023-10-18 NOTE — TELEPHONE ENCOUNTER
Patient calling. Had MRI on 10/16. Patient is wondering when she will receive results? Patient states that she had to cancel her cataract surgery due to lack of results.       Could we send this information to you in [employer] or would you prefer to receive a phone call?:   Patient would prefer a phone call   Okay to leave a detailed message?: Yes at Home number on file [phone] (home)     negative...

## 2023-10-23 NOTE — TELEPHONE ENCOUNTER
Please call her - unsure if this was handled in my absence.  MRIs from 10/16 in ED showed  age-related changes of the brain -- just make sure to have good BPs, exercise, eat healthy, and keep brain active.  No cause seen for her symptoms.  It appears zio monitor has no results yet - unsure if she was looking for these results.  If its been over 7-10 days since she turned it in, then could check further on status.

## 2023-10-25 ASSESSMENT — ASTHMA QUESTIONNAIRES: ACT_TOTALSCORE: 25

## 2023-10-26 ENCOUNTER — OFFICE VISIT (OUTPATIENT)
Dept: FAMILY MEDICINE | Facility: CLINIC | Age: 68
End: 2023-10-26
Payer: MEDICARE

## 2023-10-26 VITALS
HEIGHT: 65 IN | TEMPERATURE: 97.5 F | HEART RATE: 72 BPM | DIASTOLIC BLOOD PRESSURE: 64 MMHG | RESPIRATION RATE: 20 BRPM | SYSTOLIC BLOOD PRESSURE: 126 MMHG | WEIGHT: 218 LBS | OXYGEN SATURATION: 97 % | BODY MASS INDEX: 36.32 KG/M2

## 2023-10-26 DIAGNOSIS — Z29.11 NEED FOR VACCINATION AGAINST RESPIRATORY SYNCYTIAL VIRUS: ICD-10-CM

## 2023-10-26 DIAGNOSIS — H53.2 DIPLOPIA: ICD-10-CM

## 2023-10-26 DIAGNOSIS — R42 DIZZINESS: Primary | ICD-10-CM

## 2023-10-26 DIAGNOSIS — E66.812 CLASS 2 SEVERE OBESITY DUE TO EXCESS CALORIES WITH SERIOUS COMORBIDITY IN ADULT, UNSPECIFIED BMI (H): ICD-10-CM

## 2023-10-26 DIAGNOSIS — E66.01 CLASS 2 SEVERE OBESITY DUE TO EXCESS CALORIES WITH SERIOUS COMORBIDITY IN ADULT, UNSPECIFIED BMI (H): ICD-10-CM

## 2023-10-26 DIAGNOSIS — Z23 NEED FOR TDAP VACCINATION: ICD-10-CM

## 2023-10-26 PROCEDURE — G0008 ADMIN INFLUENZA VIRUS VAC: HCPCS | Performed by: PHYSICIAN ASSISTANT

## 2023-10-26 PROCEDURE — 99213 OFFICE O/P EST LOW 20 MIN: CPT | Mod: 25 | Performed by: PHYSICIAN ASSISTANT

## 2023-10-26 PROCEDURE — 90662 IIV NO PRSV INCREASED AG IM: CPT | Performed by: PHYSICIAN ASSISTANT

## 2023-10-26 RX ORDER — RESPIRATORY SYNCYTIAL VIRUS VACCINE 120MCG/0.5
0.5 KIT INTRAMUSCULAR ONCE
Qty: 1 EACH | Refills: 0 | Status: CANCELLED | OUTPATIENT
Start: 2023-10-26 | End: 2023-10-26

## 2023-10-26 ASSESSMENT — PAIN SCALES - GENERAL: PAINLEVEL: NO PAIN (0)

## 2023-10-26 NOTE — PROGRESS NOTES
"  Assessment & Plan     Dizziness  Diplopia  Resolved, not consistent with TIA.  if recurs refer to neurology    Class 2 severe obesity due to excess calories with serious comorbidity in adult, unspecified BMI (H)  With comorbid HTN and prediabetes    MED REC REQUIRED  No med changes    Patient Instructions   If get more of the double vision episodes will refer on to neurology     Flu shot today since have to drive tomorrow  But then get covid vaccine within next week or so - covid is going around  RSV vaccine important sometime due to asthma - do not do same day as covid vaccine   Tetanus shot sometime - at pharmacy     Kiara Zazueta PA-C  [facility]    Dominick Yu is a 68 year old, presenting for the following health issues:  Emergency Room Visit Notes        10/26/2023     4:04 PM   Additional Questions   Roomed by mendy zepeda cma       [facility]   ED/UC Followup:    Facility:  [facility] Emergency Dept  Date of visit: 10/9/2023 (1 hours)   Reason for visit:   Dizziness        Diplopia       Esotropia, intermittent       Near syncope  Current Status: improving     Double vision horizontal for 1 min 3 times over a few weeks  Then at a different time had weird head feeling - not dizzy or lightheaded but \"felt odd in the head, a little off kilter\" x a week.   ended up with similar off kilter the week later.  Migraines a bit more frequent since then.  If has it one day, tends to have it the next.  Aura but not severe pain like when she was a kid.      Objective    /64 (BP Location: Right arm, Patient Position: Sitting, Cuff Size: Adult Large)   Pulse 72   Temp 97.5  F (36.4  C) (Tympanic)   Resp 20   Ht 1.651 m (5' 5\")   Wt 98.9 kg (218 lb)   SpO2 97%   BMI 36.28 kg/m    Body mass index is 36.28 kg/m .            "

## 2023-10-26 NOTE — NURSING NOTE
"Chief Complaint   Patient presents with    Emergency Room Visit Notes       Initial There were no vitals taken for this visit. Estimated body mass index is 33.28 kg/m  as calculated from the following:    Height as of 10/9/23: 1.651 m (5' 5\").    Weight as of 10/9/23: 90.7 kg (200 lb).    Patient presents to the clinic using No DME    Is there anyone who you would like to be able to receive your results? No  If yes have patient fill out SOHAM      "
none

## 2023-10-30 PROBLEM — E66.812 CLASS 2 SEVERE OBESITY DUE TO EXCESS CALORIES WITH SERIOUS COMORBIDITY IN ADULT (H): Status: ACTIVE | Noted: 2023-10-30

## 2023-10-30 PROBLEM — E66.01 CLASS 2 SEVERE OBESITY DUE TO EXCESS CALORIES WITH SERIOUS COMORBIDITY IN ADULT (H): Status: ACTIVE | Noted: 2023-10-30

## 2023-11-13 ENCOUNTER — TELEPHONE (OUTPATIENT)
Dept: FAMILY MEDICINE | Facility: CLINIC | Age: 68
End: 2023-11-13
Payer: MEDICARE

## 2023-11-13 NOTE — TELEPHONE ENCOUNTER
Patient stopped at  and spoke with Kusum KEARNEY     Requesting Heart Monitor results and a letter clearing her for cataract surgery. Patient stated letter can be in [employer] and she will print it off.         Could we send this information to you in [employer] or would you prefer to receive a phone call?:   Patient would prefer a phone call   Okay to leave a detailed message?: Yes at Home number on file [phone] (home)

## 2023-11-15 ENCOUNTER — OFFICE VISIT (OUTPATIENT)
Dept: FAMILY MEDICINE | Facility: CLINIC | Age: 68
End: 2023-11-15
Payer: MEDICARE

## 2023-11-15 VITALS
RESPIRATION RATE: 20 BRPM | TEMPERATURE: 97.6 F | HEART RATE: 76 BPM | DIASTOLIC BLOOD PRESSURE: 88 MMHG | HEIGHT: 65 IN | SYSTOLIC BLOOD PRESSURE: 136 MMHG | BODY MASS INDEX: 36.52 KG/M2 | OXYGEN SATURATION: 97 % | WEIGHT: 219.2 LBS

## 2023-11-15 DIAGNOSIS — H26.9 CATARACT, UNSPECIFIED CATARACT TYPE, UNSPECIFIED LATERALITY: ICD-10-CM

## 2023-11-15 DIAGNOSIS — H53.2 DIPLOPIA: ICD-10-CM

## 2023-11-15 DIAGNOSIS — E66.01 CLASS 2 SEVERE OBESITY DUE TO EXCESS CALORIES WITH SERIOUS COMORBIDITY IN ADULT, UNSPECIFIED BMI (H): ICD-10-CM

## 2023-11-15 DIAGNOSIS — E66.812 CLASS 2 SEVERE OBESITY DUE TO EXCESS CALORIES WITH SERIOUS COMORBIDITY IN ADULT, UNSPECIFIED BMI (H): ICD-10-CM

## 2023-11-15 DIAGNOSIS — R73.03 PREDIABETES: ICD-10-CM

## 2023-11-15 DIAGNOSIS — I49.3 PVC'S (PREMATURE VENTRICULAR CONTRACTIONS): ICD-10-CM

## 2023-11-15 DIAGNOSIS — J45.50 SEVERE PERSISTENT ASTHMA WITHOUT COMPLICATION (H): ICD-10-CM

## 2023-11-15 DIAGNOSIS — I10 BENIGN ESSENTIAL HYPERTENSION: ICD-10-CM

## 2023-11-15 DIAGNOSIS — Z01.818 PRE-OP EXAM: Primary | ICD-10-CM

## 2023-11-15 DIAGNOSIS — Z23 NEED FOR TDAP VACCINATION: ICD-10-CM

## 2023-11-15 DIAGNOSIS — Z29.11 NEED FOR VACCINATION AGAINST RESPIRATORY SYNCYTIAL VIRUS: ICD-10-CM

## 2023-11-15 PROCEDURE — 99214 OFFICE O/P EST MOD 30 MIN: CPT | Performed by: PHYSICIAN ASSISTANT

## 2023-11-15 RX ORDER — RESPIRATORY SYNCYTIAL VIRUS VACCINE 120MCG/0.5
0.5 KIT INTRAMUSCULAR ONCE
Qty: 1 EACH | Refills: 0 | Status: SHIPPED | OUTPATIENT
Start: 2023-11-15 | End: 2023-11-15

## 2023-11-15 ASSESSMENT — PAIN SCALES - GENERAL: PAINLEVEL: NO PAIN (0)

## 2023-11-15 NOTE — PATIENT INSTRUCTIONS
Cleared for surgery  All meds are ok for surgery  Preparing for Your Surgery  Getting started  A nurse will call you to review your health history and instructions. They will give you an arrival time based on your scheduled surgery time. Please be ready to share:  Your doctor's clinic name and phone number  Your medical, surgical, and anesthesia history  A list of allergies and sensitivities  A list of medicines, including herbal treatments and over-the-counter drugs  Whether the patient has a legal guardian (ask how to send us the papers in advance)  Please tell us if you're pregnant--or if there's any chance you might be pregnant. Some surgeries may injure a fetus (unborn baby), so they require a pregnancy test. Surgeries that are safe for a fetus don't always need a test, and you can choose whether to have one.   If you have a child who's having surgery, please ask for a copy of Preparing for Your Child's Surgery.    Preparing for surgery  Within 10 to 30 days of surgery: Have a pre-op exam (sometimes called an H&P, or History and Physical). This can be done at a clinic or pre-operative center.  If you're having a , you may not need this exam. Talk to your care team.  At your pre-op exam, talk to your care team about all medicines you take. If you need to stop any medicines before surgery, ask when to start taking them again.  We do this for your safety. Many medicines can make you bleed too much during surgery. Some change how well surgery (anesthesia) drugs work.  Call your insurance company to let them know you're having surgery. (If you don't have insurance, call [phone].)  Call your clinic if there's any change in your health. This includes signs of a cold or flu (sore throat, runny nose, cough, rash, fever). It also includes a scrape or scratch near the surgery site.  If you have questions on the day of surgery, call your hospital or surgery center.  Eating and drinking guidelines  For your  safety: Unless your surgeon tells you otherwise, follow the guidelines below.  Eat and drink as usual until 8 hours before you arrive for surgery. After that, no food or milk.  Drink clear liquids until 2 hours before you arrive. These are liquids you can see through, like water, Gatorade, and Propel Water. They also include plain black coffee and tea (no cream or milk), candy, and breath mints. You can spit out gum when you arrive.  If you drink alcohol: Stop drinking it the night before surgery.  If your care team tells you to take medicine on the morning of surgery, it's okay to take it with a sip of water.  Preventing infection  Shower or bathe the night before and morning of your surgery. Follow the instructions your clinic gave you. (If no instructions, use regular soap.)  Don't shave or clip hair near your surgery site. We'll remove the hair if needed.  Don't smoke or vape the morning of surgery. You may chew nicotine gum up to 2 hours before surgery. A nicotine patch is okay.  Note: Some surgeries require you to completely quit smoking and nicotine. Check with your surgeon.  Your care team will make every effort to keep you safe from infection. We will:  Clean our hands often with soap and water (or an alcohol-based hand rub).  Clean the skin at your surgery site with a special soap that kills germs.  Give you a special gown to keep you warm. (Cold raises the risk of infection.)  Wear special hair covers, masks, gowns and gloves during surgery.  Give antibiotic medicine, if prescribed. Not all surgeries need antibiotics.  What to bring on the day of surgery  Photo ID and insurance card  Copy of your health care directive, if you have one  Glasses and hearing aids (bring cases)  You can't wear contacts during surgery  Inhaler and eye drops, if you use them (tell us about these when you arrive)  CPAP machine or breathing device, if you use them  A few personal items, if spending the night  If you have . . .  A  pacemaker, ICD (cardiac defibrillator) or other implant: Bring the ID card.  An implanted stimulator: Bring the remote control.  A legal guardian: Bring a copy of the certified (court-stamped) guardianship papers.  Please remove any jewelry, including body piercings. Leave jewelry and other valuables at home.  If you're going home the day of surgery  You must have a responsible adult drive you home. They should stay with you overnight as well.  If you don't have someone to stay with you, and you aren't safe to go home alone, we may keep you overnight. Insurance often won't pay for this.  After surgery  If it's hard to control your pain or you need more pain medicine, please call your surgeon's office.  Questions?   If you have any questions for your care team, list them here: _________________________________________________________________________________________________________________________________________________________________________ ____________________________________ ____________________________________ ____________________________________  For informational purposes only. Not to replace the advice of your health care provider. Copyright   2003, 2019 [employer]. All rights reserved. Clinically reviewed by Haley Joshi MD. [employer] [id number] - REV 12/22.

## 2023-11-15 NOTE — PROGRESS NOTES
[facility]  [address] [place] [id number]  Phone: [phone]  Fax: [phone]  Primary Provider: Stephen Zazueta  Pre-op Performing Provider: STEPHEN ZAZUETA    PREOPERATIVE EVALUATION:  Today's date: 11/15/2023    Gigi is a 68 year old, presenting for the following:  Pre-Op Exam (Patient stated she went into [facility] to get her cataract surgery done and due to having a Zio patch they would not do the surgery.  She was told she needed the results of that test before they can do her surgery.  When she came into clinic she was told she needed to do a pre-op and the eye doctors office said she did not need one, just needs a clearance letter from Zio patch to have surgery. ) and Eye Problem (States she goes cross sided and feels disorientated when this happens.  This happened in clinic today.  Had patient sit and did her blood pressure 151/84. She states she is feeling better now. Very anxious when this happened. /)        11/15/2023    11:18 AM   Additional Questions   Roomed by Kaykay NOEL       Surgical Information:  Surgery/Procedure: Cataract left eye  Surgery Location: [facility]   Surgeon: Dr. Calhoun  Surgery Date: 11/22/2023  Time of Surgery: 9:45 AM  Where patient plans to recover: At home with family  Fax number for surgical facility: [facility] @ [phone]    Assessment & Plan     The proposed surgical procedure is considered LOW risk.    Pre-op exam  Cataract, unspecified cataract type, unspecified laterality    Diplopia  Spells of diplopia and dizziness, unclear cause.  These have been 4-5 times since 10/9/23.  I am referring to neurologist for work up.  Eye surgeon needs awareness of this risk for eyes to suddenly move during procedure.  Proceeding with procedure at discretion of surgeon.  Risk discussed with Gigi today.  - Adult Neurology  Referral    Severe persistent asthma without complication (H28)  Doing  well    Class 2 severe obesity due to excess calories with serious comorbidity in adult, unspecified BMI (H) with HTN  monitoring    Benign essential hypertension  controlled    Prediabetes  Controlled per A1c in May 2023, also normal glucose 10/9/23    PVC's (premature ventricular contractions)  Had zio monitor to work up her diplopia spells.  This showed PVCs but nothing dangerous.  While PVCs could cause dizziness, they would not cause diplopia.      Need for Tdap vaccination  To get at pharmacy  - Tdap, tetanus-diptheria-acell pertussis, (BOOSTRIX) 5-2.5-18.5 LF-MCG/0.5 NATHANIEL injection  Dispense: 0.5 mL; Refill: 0    Need for vaccination against respiratory syncytial virus  To get at pharmacy  - respiratory syncytial virus vaccine, bivalent (ABRYSVO) injection  Dispense: 1 each; Refill: 0     - No identified additional risk factors other than previously addressed    Antiplatelet or Anticoagulation Medication Instructions:   - Patient is on no antiplatelet or anticoagulation medications.    Additional Medication Instructions:  Patient is to take all scheduled medications on the day of surgery    RECOMMENDATION:  APPROVAL GIVEN to proceed with proposed procedure, without further diagnostic evaluation, so long as patient and surgeon aware of risk of spontaneous eye movement.  Risk of occurrence during procedure is likely low given how infrequently these eye movements are occurring, however I question if there is unacceptable risk of undesired surgical outcome should this movement occur during surgery.      Subjective     HPI related to upcoming procedure: cataracts  1. No - Have you ever had a heart attack or stroke?  2. No - Have you ever had surgery on your heart or blood vessels, such as a stent, coronary (heart) bypass, or surgery on an artery in the head, neck, heart, or legs?  3. No - Do you have chest pain when you are physically active?  4. No - Do you have a history of heart failure?  5. No - Do you  currently have a cold, bronchitis, or symptoms of other respiratory (head and chest) infections?  6. No - Do you have a cough, shortness of breath, or wheezing?  7. No - Do you or anyone in your family have a history of blood clots?  8. No - Do you or anyone in your family have a serious bleeding problem, such as long-lasting bleeding after surgeries or cuts?  9. No - Have you ever had anemia or been told to take iron pills?  10. No - Have you had any abnormal blood loss such as black, tarry or bloody stools, or abnormal vaginal bleeding?  11. No - Have you ever had a blood transfusion?  12. Yes - Are you willing to have a blood transfusion if it is medically needed before, during, or after your surgery?  13. No - Have you or anyone in your family ever had problems with anesthesia (sedation for surgery)?  14. YES - Do you have sleep apnea, excessive snoring, or daytime drowsiness?   15. No - Do you have any artifical heart valves or other implanted medical devices, such as a pacemaker, defibrillator, or continuous glucose monitor?  16. No - Do you have any artifical joints?  17. No - Are you allergic to latex?  18. No - Is there any chance that you may be pregnant?     Health Care Directive:  Patient does not have a Health Care Directive or Living Will: Discussed advance care planning with patient; however, patient declined at this time.    Preoperative Review of :   reviewed - no record of controlled substances prescribed.    Status of Chronic Conditions:  See problem list for active medical problems.  Problems all longstanding and stable, except as noted/documented.  See ROS for pertinent symptoms related to these conditions.    Review of Systems  Constitutional, neuro, ENT, endocrine, pulmonary, cardiac, gastrointestinal, genitourinary, musculoskeletal, integument and psychiatric systems are negative, except as otherwise noted.  Spells of horizontal diplopia coinciding with dizziness.  Chronic nasal  congestion.  Migraines a bit better.  Patient Active Problem List    Diagnosis Date Noted    Class 2 severe obesity due to excess calories with serious comorbidity in adult (H) 10/30/2023     Priority: Medium    Prediabetes 01/12/2022     Priority: Medium    Osteopenia 08/19/2020     Priority: Medium     Recheck [id number].  Aug 2020: 1. Mild osteopenia in the left femoral neck.  2. Mild-moderate osteopenia in the right femoral neck.  3. The L1 and L4 bone density is borderline between lower normal and  mild osteopenia.      Severe persistent asthma (H28) 04/29/2016     Priority: Medium     Symptoms since childhood. Smoked since age of 11 until 31 years.  Until seen in November 2014, used albuterol once in her life.      Lichen sclerosus 04/29/2016     Priority: Medium    Benign essential hypertension 04/29/2016     Priority: Medium    Family history of diabetes mellitus 04/29/2016     Priority: Medium      Past Medical History:   Diagnosis Date    Hypertension     Rheumatic fever     Sleep apnea     Uncomplicated asthma      Past Surgical History:   Procedure Laterality Date    BIOPSY      breast,    BIOPSY BREAST      COLONOSCOPY      COLONOSCOPY N/A 2/8/2019    Procedure: Combined Colonoscopy, Single Or Multiple Biopsy/Polypectomy By Biopsy;  Surgeon: José Miguel Ochoa MD;  Location: [facility]    COLONOSCOPY N/A 3/4/2022    Procedure: COLONOSCOPY;  Surgeon: Nirmal Rm MD;  Location: [facility]    COLONOSCOPY WITH CO2 INSUFFLATION N/A 2/8/2019    Procedure: COLONOSCOPY WITH CO2 INSUFFLATION;  Surgeon: José Miguel Ochoa MD;  Location: [facility]    COMBINED ESOPHAGOSCOPY, GASTROSCOPY, DUODENOSCOPY (EGD) WITH CO2 INSUFFLATION N/A 2/8/2019    Procedure: COMBINED ESOPHAGOSCOPY, GASTROSCOPY, DUODENOSCOPY (EGD) WITH CO2 INSUFFLATION;  Surgeon: José Miguel Ochoa MD;  Location: [facility]    ESOPHAGOSCOPY, GASTROSCOPY, DUODENOSCOPY (EGD), COMBINED N/A 2/8/2019    Procedure: Combined Esophagoscopy,  Gastroscopy, Duodenoscopy (Egd), Biopsy Single Or Multiple;  Surgeon: José Miguel Ochoa MD;  Location: [facility]    GYN SURGERY      TONSILLECTOMY       Current Outpatient Medications   Medication Sig Dispense Refill    albuterol (PROAIR HFA/PROVENTIL HFA/VENTOLIN HFA) 108 (90 Base) MCG/ACT inhaler Inhale 2-4 puffs into the lungs every 4 hours as needed for shortness of breath or wheezing 18 g 3    azelastine (ASTELIN) 0.1 % nasal spray Spray 2 sprays into both nostrils 2 times daily as needed for rhinitis or allergies 30 mL 3    clobetasol (TEMOVATE) 0.05 % external cream Apply sparingly to affected area twice weekly as needed.  Do not apply to face. 60 g 3    fluticasone (FLONASE) 50 MCG/ACT nasal spray Spray 1 spray into both nostrils daily 15.8 mL 1    Fluticasone-Umeclidin-Vilanterol (TRELEGY ELLIPTA) 200-62.5-25 MCG/ACT oral inhaler Inhale 1 puff into the lungs daily 60 each 6    hydrochlorothiazide (HYDRODIURIL) 12.5 MG tablet Take 1 tablet (12.5 mg) by mouth daily 90 tablet 3    respiratory syncytial virus vaccine, bivalent (ABRYSVO) injection Inject 0.5 mLs into the muscle once for 1 dose 1 each 0    Respiratory Therapy Supplies (NEBULIZER/TUBING/MOUTHPIECE) KIT Use with albuterol neb solution 1 kit 0    Tdap, tetanus-diptheria-acell pertussis, (BOOSTRIX) 5-2.5-18.5 LF-MCG/0.5 NATHANIEL injection Inject 0.5 mLs into the muscle once for 1 dose 0.5 mL 0       Allergies   Allergen Reactions    Cartia Xt [Diltiazem] Other (See Comments)     Heartburn whenever on this medication    Losartan Cough    Miconazole     Sulfa Antibiotics Rash        Social History     Tobacco Use    Smoking status: Former     Packs/day: 2.00     Years: 20.00     Additional pack years: 0.00     Total pack years: 40.00     Types: Cigarettes     Quit date: 1987     Years since quittin.5    Smokeless tobacco: Never   Substance Use Topics    Alcohol use: Yes     Comment: 1 nightly     Family History   Problem Relation Age of  "Onset    Other Cancer Mother         pancreatic    Diabetes Mother         unsure type; in 40s    Cerebrovascular Disease Father         CVA    Chronic Obstructive Pulmonary Disease Father     Skin Cancer Father     Breast Cancer Sister     Skin Cancer Sister     Alzheimer Disease Brother         early onset    Arthritis Brother     Aneurysm Paternal Grandmother     Unknown/Adopted Paternal Grandfather     Diabetes Son         type 1    Thyroid Disease Son     Stomach Problem Son         polyps, diverticulitis, heartburn, passes blood    Alcoholism Son     Breast Cancer Paternal Aunt      History   Drug Use No         Objective     /88   Pulse 76   Temp 97.6  F (36.4  C) (Tympanic)   Resp 20   Ht 1.651 m (5' 5\")   Wt 99.4 kg (219 lb 3.2 oz)   SpO2 97%   BMI 36.48 kg/m      Physical Exam    GENERAL APPEARANCE: healthy, alert and no distress     EYES: EOMI, PERRL     HENT: ear canals and TM's normal and nose and mouth without ulcers or lesions     NECK: no adenopathy, no asymmetry, masses, or scars and thyroid normal to palpation     RESP: lungs clear to auscultation - no rales, rhonchi or wheezes     CV: regular rates and rhythm, normal S1 S2, no S3 or S4 and no murmur, click or rub     ABDOMEN:  soft, nontender, no HSM or masses and bowel sounds normal     MS: extremities normal- no gross deformities noted, no evidence of inflammation in joints, FROM in all extremities.     SKIN: no suspicious lesions or rashes     NEURO: Normal strength and tone, sensory exam grossly normal, mentation intact and speech normal     PSYCH: mentation appears normal. and affect normal/bright     LYMPHATICS: No cervical adenopathy    Recent Labs   Lab Test 10/09/23  1652 05/24/23  0907 01/12/22  1520   HGB 15.3  --   --      --   --     138 136   POTASSIUM 4.3 4.0 3.6   CR 0.85 0.89 0.80   A1C  --  6.0* 5.5        Diagnostics:  No labs were ordered during this visit.   No EKG required for low risk surgery " (cataract, skin procedure, breast biopsy, etc).    Revised Cardiac Risk Index (RCRI):  The patient has the following serious cardiovascular risks for perioperative complications:   - No serious cardiac risks = 0 points     RCRI Interpretation: 0 points: Class I (very low risk - 0.4% complication rate)         Signed Electronically by: Kiara Zazueta PA-C  Copy of this evaluation report is provided to requesting physician.    Answers submitted by the patient for this visit:  General Questionnaire (Submitted on 11/15/2023)  Chief Complaint: Chronic problems general questions HPI Form  What is the reason for your visit today? : test results and pre op  How many servings of fruits and vegetables do you eat daily?: 0-1  On average, how many sweetened beverages do you drink each day (Examples: soda, juice, sweet tea, etc.  Do NOT count diet or artificially sweetened beverages)?: 0  How many minutes a day do you exercise enough to make your heart beat faster?: 9 or less  How many days a week do you exercise enough to make your heart beat faster?: 4  How many days per week do you miss taking your medication?: 1  What makes it hard for you to take your medication every day?: remembering to take

## 2023-11-15 NOTE — COMMUNITY RESOURCES LIST (ENGLISH)
11/15/2023   [facility]  N/A  For questions about this resource list or additional care needs, please contact your primary care clinic or care manager.  Phone: [phone]   Email: N/A   Address: [address] [place] [id number]   Hours: N/A        Financial Stability       Utility payment assistance  1  [place] [facility] Office - Energy Assistance Program Distance: 3.99 miles      Phone/Virtual   [address] [place] [address]  Language: English  Hours: Mon - Fri 8:00 AM - 4:30 PM  Fees: Free   Phone: (135) 302-1116 Email: jerri@[facility] Website: https://www.[facility]/agency-information     2  [facility] Distance: 11.91 miles      In-Person, Phone/Virtual   [address] [facility] [address] [place] [address] [place] [address]  Language: English  Hours: Mon - Fri 8:00 AM - 4:30 PM  Fees: Free   Phone: (562) 601-5617 Email: elier@[email or web address]. Website: [email or web address]          Important Numbers & Websites       Emergency Services   911  City Services   311  Poison Control   (730) 857-4751  Suicide Prevention Lifeline   (370) 580-5673 (TALK)  Child Abuse Hotline   (777) 209-8151 (4-A-Child)  Sexual Assault Hotline   (573) 135-6600 (HOPE)  National Runaway Safeline   (787) 779-5694 (RUNAWAY)  All-Options Talkline   (102) 819-5206  Substance Abuse Referral   (699) 741-4278 (HELP)

## 2023-11-23 ENCOUNTER — TRANSFERRED RECORDS (OUTPATIENT)
Dept: MULTI SPECIALTY CLINIC | Facility: CLINIC | Age: 68
End: 2023-11-23

## 2023-11-23 LAB — RETINOPATHY: NORMAL

## 2024-04-04 DIAGNOSIS — J45.50 SEVERE PERSISTENT ASTHMA WITHOUT COMPLICATION (H): ICD-10-CM

## 2024-04-04 RX ORDER — FLUTICASONE FUROATE, UMECLIDINIUM BROMIDE AND VILANTEROL TRIFENATATE 200; 62.5; 25 UG/1; UG/1; UG/1
1 POWDER RESPIRATORY (INHALATION) DAILY
Qty: 60 EACH | Refills: 0 | Status: SHIPPED | OUTPATIENT
Start: 2024-04-04 | End: 2024-05-08

## 2024-04-04 NOTE — TELEPHONE ENCOUNTER
Requested Prescriptions   Pending Prescriptions Disp Refills    Fluticasone-Umeclidin-Vilanterol (TRELEGY ELLIPTA) 200-62.5-25 MCG/ACT oral inhaler 60 each 6     Sig: Inhale 1 puff into the lungs daily       There is no refill protocol information for this order          Last office visit: 3/24/2023 ; last virtual visit: Visit date not found with prescribing provider:  Galindo Montano   Future Office Visit:      Thank you,  Andressa SARMIENTO [facility]  Specialty Clinic - [facility]

## 2024-04-04 NOTE — TELEPHONE ENCOUNTER
Routing refill request to provider for review/approval because:  Patient needs to be seen because it has been more than 1 year since last office visit. And  Huong is not on [facility] protocol.    Pt was last seen on 3/24/23 and was due for follow up on 9/24/23. No appointment scheduled at this time.     Praveena VALE RN  Specialty/Allergy Clinics

## 2024-04-09 NOTE — TELEPHONE ENCOUNTER
Pt has viewed My chart message.     Last read by Gigi Shipley at  9:21 AM on 4/9/2024.     Praveena VALE RN  Specialty/Allergy Clinics

## 2024-04-24 ENCOUNTER — PATIENT OUTREACH (OUTPATIENT)
Dept: CARE COORDINATION | Facility: CLINIC | Age: 69
End: 2024-04-24
Payer: MEDICARE

## 2024-05-08 ENCOUNTER — PATIENT OUTREACH (OUTPATIENT)
Dept: CARE COORDINATION | Facility: CLINIC | Age: 69
End: 2024-05-08
Payer: MEDICARE

## 2024-05-08 DIAGNOSIS — J45.50 SEVERE PERSISTENT ASTHMA WITHOUT COMPLICATION (H): ICD-10-CM

## 2024-05-08 RX ORDER — FLUTICASONE FUROATE, UMECLIDINIUM BROMIDE AND VILANTEROL TRIFENATATE 200; 62.5; 25 UG/1; UG/1; UG/1
1 POWDER RESPIRATORY (INHALATION) DAILY
Qty: 60 EACH | Refills: 0 | Status: SHIPPED | OUTPATIENT
Start: 2024-05-08 | End: 2024-05-23

## 2024-05-08 NOTE — TELEPHONE ENCOUNTER
Requested Prescriptions   Pending Prescriptions Disp Refills    Fluticasone-Umeclidin-Vilanterol (TRELEGY ELLIPTA) 200-62.5-25 MCG/ACT oral inhaler 60 each 0     Sig: Inhale 1 puff into the lungs daily       There is no refill protocol information for this order          Last office visit: 3/24/2023 ; last virtual visit: Visit date not found with prescribing provider:  Galindo Montano      Future Office Visit:        Kelsey Salazar   [facility] [place]   [facility]  [phone]

## 2024-05-08 NOTE — TELEPHONE ENCOUNTER
Pt has appointment scheduled for 5/23/24. Unable to refill due to Trelegy not being on protocol.     Forwarding to provider for review.     Praveena VALE RN  Specialty/Allergy Clinics

## 2024-05-13 ENCOUNTER — OFFICE VISIT (OUTPATIENT)
Dept: URGENT CARE | Facility: URGENT CARE | Age: 69
End: 2024-05-13
Payer: MEDICARE

## 2024-05-13 ENCOUNTER — ANCILLARY PROCEDURE (OUTPATIENT)
Dept: GENERAL RADIOLOGY | Facility: CLINIC | Age: 69
End: 2024-05-13
Attending: NURSE PRACTITIONER
Payer: MEDICARE

## 2024-05-13 VITALS
OXYGEN SATURATION: 95 % | SYSTOLIC BLOOD PRESSURE: 138 MMHG | WEIGHT: 224 LBS | RESPIRATION RATE: 16 BRPM | BODY MASS INDEX: 37.28 KG/M2 | HEART RATE: 79 BPM | DIASTOLIC BLOOD PRESSURE: 79 MMHG | TEMPERATURE: 97.5 F

## 2024-05-13 DIAGNOSIS — M25.551 HIP PAIN, RIGHT: ICD-10-CM

## 2024-05-13 DIAGNOSIS — H91.91 CHANGE IN HEARING, RIGHT: Primary | ICD-10-CM

## 2024-05-13 PROCEDURE — 99213 OFFICE O/P EST LOW 20 MIN: CPT | Performed by: NURSE PRACTITIONER

## 2024-05-13 PROCEDURE — 73502 X-RAY EXAM HIP UNI 2-3 VIEWS: CPT | Mod: TC | Performed by: RADIOLOGY

## 2024-05-13 NOTE — PATIENT INSTRUCTIONS
Try Claritin for fluid in the ears.  Next would be Zyrtec, then Allegra.   If these are not successful follow up with ENT for next steps on the decreased hearing.   Continue to follow up with Neurology as planned for the dizziness, and eyes crossing.       Hip xray looks good, no evidence of fracture.

## 2024-05-13 NOTE — PROGRESS NOTES
SUBJECTIVE:   Luz Elena Shipley is a 69 year old female presenting with a chief complaint of   Chief Complaint   Patient presents with    Ear Problem     Right ear plugged x 3 weeks    Eye Problem     Pt reports having eyes crossed multiple times since January 2024, side effects are vomit, dizziness, fatigue sometimes after eyes cross    Fall     Pt spun around, fell and hurt right hip. Right hip still hurts when she walks or put on her pants. This fall happened in February, 3 months ago.   Right ear won't open, now constantly has to ask people to repeat. Has tried nasal spray, chewing gum and blowing air into sinuses.  -has neurology appointment for eye issue.  - pt wondering if there is damage to right hip as she fell months ago and continues to have pain, noted worse pain when walking up steps at her sons house yesterday. Hasn't had to walk up steps since the injury as they do not have steps at their home.    Past Medical History:   Diagnosis Date    Hypertension     Rheumatic fever     Sleep apnea     Uncomplicated asthma      Family History   Problem Relation Age of Onset    Other Cancer Mother         pancreatic    Diabetes Mother         unsure type; in 40s    Cerebrovascular Disease Father         CVA    Chronic Obstructive Pulmonary Disease Father     Skin Cancer Father     Breast Cancer Sister     Skin Cancer Sister     Alzheimer Disease Brother         early onset    Arthritis Brother     Aneurysm Paternal Grandmother     Unknown/Adopted Paternal Grandfather     Diabetes Son         type 1    Thyroid Disease Son     Stomach Problem Son         polyps, diverticulitis, heartburn, passes blood    Alcoholism Son     Breast Cancer Paternal Aunt      Current Outpatient Medications   Medication Sig Dispense Refill    albuterol (PROAIR HFA/PROVENTIL HFA/VENTOLIN HFA) 108 (90 Base) MCG/ACT inhaler Inhale 2-4 puffs into the lungs every 4 hours as needed for shortness of breath or wheezing 18 g 3    azelastine (ASTELIN)  0.1 % nasal spray Spray 2 sprays into both nostrils 2 times daily as needed for rhinitis or allergies 30 mL 3    clobetasol (TEMOVATE) 0.05 % external cream Apply sparingly to affected area twice weekly as needed.  Do not apply to face. 60 g 3    fluticasone (FLONASE) 50 MCG/ACT nasal spray Spray 1 spray into both nostrils daily 15.8 mL 1    Fluticasone-Umeclidin-Vilanterol (TRELEGY ELLIPTA) 200-62.5-25 MCG/ACT oral inhaler Inhale 1 puff into the lungs daily 60 each 0    hydrochlorothiazide (HYDRODIURIL) 12.5 MG tablet Take 1 tablet (12.5 mg) by mouth daily 90 tablet 3    Respiratory Therapy Supplies (NEBULIZER/TUBING/MOUTHPIECE) KIT Use with albuterol neb solution 1 kit 0     Social History     Tobacco Use    Smoking status: Former     Current packs/day: 0.00     Average packs/day: 2.0 packs/day for 20.0 years (40.0 ttl pk-yrs)     Types: Cigarettes     Start date: 1967     Quit date: 1987     Years since quittin.0    Smokeless tobacco: Never   Substance Use Topics    Alcohol use: Yes     Comment: 1 nightly       OBJECTIVE  /79   Pulse 79   Temp 97.5  F (36.4  C) (Tympanic)   Resp 16   Wt 101.6 kg (224 lb)   SpO2 95%   BMI 37.28 kg/m      Physical Exam  Constitutional:       Appearance: Normal appearance.   HENT:      Right Ear: Decreased hearing noted. A middle ear effusion is present.      Left Ear: A middle ear effusion is present.      Ears:      Comments: Both ears with fluid worse on the right, no redness or signs of infection.  Cardiovascular:      Rate and Rhythm: Normal rate and regular rhythm.      Heart sounds: Normal heart sounds.   Pulmonary:      Effort: Pulmonary effort is normal.      Breath sounds: Normal breath sounds.   Neurological:      Mental Status: She is alert.     Right hip Xray: I have personally ordered and preliminarily reviewed the following xray, I have noted no acute abnormalities, no evidence of fracture.      ASSESSMENT:  1. Change in hearing, right    -  Adult ENT  Referral; Future    2. Hip pain, right    - XR Hip Right 2-3 Views; Future      PLAN:  Try Claritin for fluid in the ears.  Next would be Zyrtec, then Allegra.   If these are not successful follow up with ENT for next steps on the decreased hearing.   Continue to follow up with Neurology as planned for the dizziness, and eyes crossing.       Hip xray looks good, no evidence of fracture.

## 2024-05-22 NOTE — PROGRESS NOTES
SUBJECTIVE:                                                                   Luz Elena Shipley presents today to our Allergy Clinic at [facility] for a follow up visit. She is a 69 year old female with  severe persistent asthma and chronic rhinitis.  Asthma since childhood. November 2016: Negative serum IgE to regional aeroallergen panel. CBC with differential without eosinophilia.  Normal total serum IgE.  Negative ABPA panel.  Alpha-1 antitrypsin within normal limits.  Normal CBC with differential without hypereosinophilia.  The PSG sleep study demonstrated moderate, REM, and supine predominant NEGRA with some sleep associated hypoxemia.  In February 2019, due to decrease in FEV1, chest CT was ordered that showed some opacities and small nodules that could be infectious.  The patient was treated with azithromycin.  On a follow-up phone call, the patient was asymptomatic; However, chest CT in May 2019 with new opacities. Per January'21 Pulm note: Established multiple pulmonary lung nodule(s). Near resolution of large nodules and likely related to infection/inflammation. No further surveillance indicated by CT.   In June 2021, spirometry with mild obstruction without postbronchodilator response. In January 2023 spirometry him with mild obstruction without postbronchodilator response.  Stable when compared to 2021.    History of Present Illness      Asthma symptoms very well-controlled with Trelegy Ellipta 200/62.5/25 mcg 1 puff once daily and albuterol as needed.  She cannot recall the last time she required the use of albuterol inhaler because of an obvious trigger.  On the other hand, she missed a few days of Trelegy following her 's surgery and started having asthma symptoms.  Since the last visit, she has not required prednisone.  She denies interval ED/PCP/urgent care visits for asthma flare since the previous visit.  She is very satisfied with asthma control.    She reports that  rhinitis symptoms well-controlled with frequent use of azelastine.  She rarely needs to use intranasal fluticasone.  Primarily, she uses azelastine in the Spring when she mows lawn.  She saw ENT, who referred the patient to plastic surgery due to deviated nasal septum and valve collapse.  The patient opted out.    She reports frequent aural fullness in the right ear for the past several weeks.  She has been taking loratadine without benefit.      Patient Active Problem List   Diagnosis    Severe persistent asthma (H28)    Lichen sclerosus    Benign essential hypertension    Family history of diabetes mellitus    Osteopenia    Prediabetes    Class 2 severe obesity due to excess calories with serious comorbidity in adult (H)       Past Medical History:   Diagnosis Date    Hypertension     Rheumatic fever     Sleep apnea     Uncomplicated asthma       Problem (# of Occurrences) Relation (Name,Age of Onset)    Unknown/Adopted (1) Paternal Grandfather (radha alicea)    Alzheimer Disease (1) Brother: early onset    Arthritis (1) Brother    Diabetes (2) Mother (fawn felder): unsure type; in 40s, Son: type 1    Cerebrovascular Disease (1) Father (ben felder): CVA    Thyroid Disease (1) Son    Breast Cancer (2) Sister, Paternal Aunt    Aneurysm (1) Paternal Grandmother    Other Cancer (1) Mother (fawn felder): pancreatic    Chronic Obstructive Pulmonary Disease (1) Father (ben felder)    Skin Cancer (2) Father (ben felder), Sister    Alcoholism (1) Son    Stomach Problem (1) Son: polyps, diverticulitis, heartburn, passes blood          Past Surgical History:   Procedure Laterality Date    BIOPSY      breast,    BIOPSY BREAST      COLONOSCOPY      COLONOSCOPY N/A 2/8/2019    Procedure: Combined Colonoscopy, Single Or Multiple Biopsy/Polypectomy By Biopsy;  Surgeon: José Miguel Ochoa MD;  Location: [facility]    COLONOSCOPY N/A 3/4/2022    Procedure: COLONOSCOPY;  Surgeon: Jag  Nirmal CASTILLO MD;  Location: [facility]    COLONOSCOPY WITH CO2 INSUFFLATION N/A 2019    Procedure: COLONOSCOPY WITH CO2 INSUFFLATION;  Surgeon: José Miguel Ochoa MD;  Location: [facility]    COMBINED ESOPHAGOSCOPY, GASTROSCOPY, DUODENOSCOPY (EGD) WITH CO2 INSUFFLATION N/A 2019    Procedure: COMBINED ESOPHAGOSCOPY, GASTROSCOPY, DUODENOSCOPY (EGD) WITH CO2 INSUFFLATION;  Surgeon: José Miguel Ochoa MD;  Location: [facility]    ESOPHAGOSCOPY, GASTROSCOPY, DUODENOSCOPY (EGD), COMBINED N/A 2019    Procedure: Combined Esophagoscopy, Gastroscopy, Duodenoscopy (Egd), Biopsy Single Or Multiple;  Surgeon: José Miguel Ochoa MD;  Location: [facility]    GYN SURGERY      TONSILLECTOMY       Social History     Socioeconomic History    Marital status:      Spouse name: None    Number of children: None    Years of education: None    Highest education level: None   Occupational History     Comment: Retired   Tobacco Use    Smoking status: Former     Current packs/day: 0.00     Average packs/day: 2.0 packs/day for 20.0 years (40.0 ttl pk-yrs)     Types: Cigarettes     Start date: 1967     Quit date: 1987     Years since quittin.0    Smokeless tobacco: Never   Vaping Use    Vaping status: Never Used   Substance and Sexual Activity    Alcohol use: Yes     Comment: 1 nightly    Drug use: No    Sexual activity: Not Currently     Partners: Male     Birth control/protection: Post-menopausal   Other Topics Concern    Parent/sibling w/ CABG, MI or angioplasty before 65F 55M? No   Social History Narrative    24        ENVIRONMENTAL HISTORY: [place] The home is heated with a forced air but does not use.  Has floor heating. They do have central air conditioning. The patient's bedroom is furnished with carpeting in bedroom. 1 dog at home. There is no history of cockroach or mice infestation. There are no smokers in the house.  The house does not have a damp  basement.      Social Determinants of Health     Financial Resource Strain: High Risk (11/15/2023)    Financial Resource Strain     Within the past 12 months, have you or your family members you live with been unable to get utilities (heat, electricity) when it was really needed?: Yes   Food Insecurity: Low Risk  (11/15/2023)    Food Insecurity     Within the past 12 months, did you worry that your food would run out before you got money to buy more?: No     Within the past 12 months, did the food you bought just not last and you didn t have money to get more?: No   Transportation Needs: Low Risk  (11/15/2023)    Transportation Needs     Within the past 12 months, has lack of transportation kept you from medical appointments, getting your medicines, non-medical meetings or appointments, work, or from getting things that you need?: No   Interpersonal Safety: Low Risk  (10/26/2023)    Interpersonal Safety     Do you feel physically and emotionally safe where you currently live?: Yes     Within the past 12 months, have you been hit, slapped, kicked or otherwise physically hurt by someone?: No     Within the past 12 months, have you been humiliated or emotionally abused in other ways by your partner or ex-partner?: No   Housing Stability: Low Risk  (11/15/2023)    Housing Stability     Do you have housing? : Yes     Are you worried about losing your housing?: No           Current Outpatient Medications:     albuterol (PROAIR HFA/PROVENTIL HFA/VENTOLIN HFA) 108 (90 Base) MCG/ACT inhaler, Inhale 2-4 puffs into the lungs every 4 hours as needed for shortness of breath or wheezing, Disp: 18 g, Rfl: 3    azelastine (ASTELIN) 0.1 % nasal spray, Spray 2 sprays into both nostrils 2 times daily as needed for rhinitis or allergies, Disp: 30 mL, Rfl: 3    clobetasol (TEMOVATE) 0.05 % external cream, Apply sparingly to affected area twice weekly as needed.  Do not apply to face., Disp: 60 g, Rfl: 3    fluticasone (FLONASE) 50  "MCG/ACT nasal spray, Spray 1 spray into both nostrils daily, Disp: 15.8 mL, Rfl: 1    Fluticasone-Umeclidin-Vilanterol (TRELEGY ELLIPTA) 200-62.5-25 MCG/ACT oral inhaler, Inhale 1 puff into the lungs daily, Disp: 180 each, Rfl: 3    hydrochlorothiazide (HYDRODIURIL) 12.5 MG tablet, Take 1 tablet (12.5 mg) by mouth daily, Disp: 90 tablet, Rfl: 3    Respiratory Therapy Supplies (NEBULIZER/TUBING/MOUTHPIECE) KIT, Use with albuterol neb solution, Disp: 1 kit, Rfl: 0  Immunization History   Administered Date(s) Administered    COVID-19 12+ (2023-24) (MODERNA) 11/01/2023    COVID-19 Bivalent 18+ (Moderna) 12/02/2022    COVID-19 Monovalent 18+ (Moderna) 02/11/2021, 03/11/2021, 11/22/2021    Influenza (High Dose) 3 valent vaccine 02/27/2018, 09/18/2018    Influenza (IIV3) PF 11/08/2012    Influenza Vaccine 65+ (FLUAD) 12/09/2021    Influenza Vaccine 65+ (Fluzone HD) 10/14/2022, 10/26/2023    Influenza Vaccine >6 months,quad, PF 12/12/2016, 11/20/2020    Pneumo Conj 13-V (2010&after) 02/27/2018    Pneumococcal 23 valent 06/24/2020    TDAP Vaccine (Boostrix) 11/08/2012    Tdap (Adult) Unspecified Formulation 06/30/1995    Zoster recombinant adjuvanted (SHINGRIX) 12/09/2021, 04/14/2022     Allergies   Allergen Reactions    Cartia Xt [Diltiazem] Other (See Comments)     Heartburn whenever on this medication    Losartan Cough    Miconazole     Sulfa Antibiotics Rash     OBJECTIVE:                                                                 /80 (BP Location: Left arm, Patient Position: Sitting, Cuff Size: Adult Regular)   Pulse 83   Temp 97  F (36.1  C) (Tympanic)   Ht 1.651 m (5' 5\")   Wt 99.2 kg (218 lb 9.6 oz)   SpO2 97%   BMI 36.38 kg/m          Physical Exam  Vitals and nursing note reviewed.   Constitutional:       General: She is not in acute distress.     Appearance: She is not ill-appearing, toxic-appearing or diaphoretic.   HENT:      Head: Normocephalic and atraumatic.      Right Ear: Tympanic membrane, " ear canal and external ear normal.      Left Ear: Tympanic membrane, ear canal and external ear normal.      Nose: Septal deviation (left) present. No mucosal edema, congestion or rhinorrhea.      Right Turbinates: Not enlarged, swollen or pale.      Left Turbinates: Not enlarged, swollen or pale.      Mouth/Throat:      Lips: Pink.      Mouth: Mucous membranes are moist.      Pharynx: Oropharynx is clear. No pharyngeal swelling, oropharyngeal exudate, posterior oropharyngeal erythema or uvula swelling.   Eyes:      General:         Right eye: No discharge.         Left eye: No discharge.      Conjunctiva/sclera: Conjunctivae normal.   Cardiovascular:      Rate and Rhythm: Normal rate and regular rhythm.      Heart sounds: Normal heart sounds. No murmur heard.  Pulmonary:      Effort: Pulmonary effort is normal. No respiratory distress.      Breath sounds: Normal breath sounds and air entry. No stridor, decreased air movement or transmitted upper airway sounds. No decreased breath sounds, wheezing, rhonchi or rales.   Musculoskeletal:         General: Normal range of motion.   Neurological:      Mental Status: She is alert and oriented to person, place, and time.   Psychiatric:         Mood and Affect: Mood normal.         Behavior: Behavior normal.               WORKUP:       Asthma Control Test (ACT) total score: 25   Unable to perform spirometry due to software glitch.      ASSESSMENT/PLAN:    Assessment & Plan  1. Asthma.  Well-controlled with Trelegy Ellipta 200/2.5/25 mcg 1 puff once daily and albuterol as needed.  - Continue as is.    - Fluticasone-Umeclidin-Vilanterol (TRELEGY ELLIPTA) 200-62.5-25 MCG/ACT oral inhaler  Dispense: 180 each; Refill: 3  - albuterol (PROAIR HFA/PROVENTIL HFA/VENTOLIN HFA) 108 (90 Base) MCG/ACT inhaler  Dispense: 18 g; Refill: 3    The patient is advised to persist with the current regimen of Trelegy and albuterol as required. A prescription refill for Trelegy has been  provided.    2.  Chronic rhinitis    Well-controlled with azelastine nasal spray as needed and occasional use of intranasal fluticasone.  - Continue as is.    - azelastine (ASTELIN) 0.1 % nasal spray  Dispense: 30 mL; Refill: 3    3. Eustachian tube dysfunction, right ear    This issue has been ongoing for the past couple of weeks. There is no visible effusion.    The patient has tried loratadine without success.  She can discontinue loratadine.  Recommend a trial of oxymetazoline for 3 days, along with more continuous use of both intranasal fluticasone and azelastine.  If symptoms persist, the patient should see an ENT specialist. She has an appointment scheduled with ENT at the beginning of July.      Follow-up in 1 year or sooner if needed.           Thank you for allowing us to participate in the care of this patient. Please feel free to contact us if there are any questions or concerns about the patient.    Disclaimer: This note consists of symbols derived from keyboarding, dictation and/or voice recognition software. As a result, there may be errors in the script that have gone undetected. Please consider this when interpreting information found in this chart.    Consent was obtained from the patient to use an AI documentation tool in the creation of this note.     Galindo Montano MD, FAAAAI, FACAAI  Allergy, Asthma and Immunology     [facility]

## 2024-05-23 ENCOUNTER — OFFICE VISIT (OUTPATIENT)
Dept: ALLERGY | Facility: CLINIC | Age: 69
End: 2024-05-23
Payer: MEDICARE

## 2024-05-23 VITALS
HEIGHT: 65 IN | TEMPERATURE: 97 F | HEART RATE: 83 BPM | BODY MASS INDEX: 36.42 KG/M2 | WEIGHT: 218.6 LBS | OXYGEN SATURATION: 97 % | SYSTOLIC BLOOD PRESSURE: 124 MMHG | DIASTOLIC BLOOD PRESSURE: 80 MMHG

## 2024-05-23 DIAGNOSIS — H69.91 ETD (EUSTACHIAN TUBE DYSFUNCTION), RIGHT: ICD-10-CM

## 2024-05-23 DIAGNOSIS — J45.50 SEVERE PERSISTENT ASTHMA WITHOUT COMPLICATION (H): Primary | ICD-10-CM

## 2024-05-23 DIAGNOSIS — J31.0 CHRONIC RHINITIS: ICD-10-CM

## 2024-05-23 PROCEDURE — 99214 OFFICE O/P EST MOD 30 MIN: CPT | Performed by: ALLERGY & IMMUNOLOGY

## 2024-05-23 RX ORDER — AZELASTINE 1 MG/ML
2 SPRAY, METERED NASAL 2 TIMES DAILY PRN
Qty: 30 ML | Refills: 3 | Status: SHIPPED | OUTPATIENT
Start: 2024-05-23

## 2024-05-23 RX ORDER — FLUTICASONE FUROATE, UMECLIDINIUM BROMIDE AND VILANTEROL TRIFENATATE 200; 62.5; 25 UG/1; UG/1; UG/1
1 POWDER RESPIRATORY (INHALATION) DAILY
Qty: 180 EACH | Refills: 3 | Status: SHIPPED | OUTPATIENT
Start: 2024-05-23

## 2024-05-23 RX ORDER — FLUTICASONE PROPIONATE 50 MCG
1 SPRAY, SUSPENSION (ML) NASAL DAILY
Qty: 15.8 ML | Refills: 1 | Status: SHIPPED | OUTPATIENT
Start: 2024-05-23 | End: 2024-08-09

## 2024-05-23 RX ORDER — ALBUTEROL SULFATE 90 UG/1
2-4 AEROSOL, METERED RESPIRATORY (INHALATION) EVERY 4 HOURS PRN
Qty: 18 G | Refills: 3 | Status: SHIPPED | OUTPATIENT
Start: 2024-05-23 | End: 2024-09-11

## 2024-05-23 ASSESSMENT — ASTHMA QUESTIONNAIRES
QUESTION_1 LAST FOUR WEEKS HOW MUCH OF THE TIME DID YOUR ASTHMA KEEP YOU FROM GETTING AS MUCH DONE AT WORK, SCHOOL OR AT HOME: NONE OF THE TIME
ACT_TOTALSCORE: 25
QUESTION_2 LAST FOUR WEEKS HOW OFTEN HAVE YOU HAD SHORTNESS OF BREATH: NOT AT ALL
QUESTION_4 LAST FOUR WEEKS HOW OFTEN HAVE YOU USED YOUR RESCUE INHALER OR NEBULIZER MEDICATION (SUCH AS ALBUTEROL): NOT AT ALL
ACT_TOTALSCORE: 25
QUESTION_5 LAST FOUR WEEKS HOW WOULD YOU RATE YOUR ASTHMA CONTROL: COMPLETELY CONTROLLED
QUESTION_3 LAST FOUR WEEKS HOW OFTEN DID YOUR ASTHMA SYMPTOMS (WHEEZING, COUGHING, SHORTNESS OF BREATH, CHEST TIGHTNESS OR PAIN) WAKE YOU UP AT NIGHT OR EARLIER THAN USUAL IN THE MORNING: NOT AT ALL

## 2024-05-23 NOTE — LETTER
5/23/2024         RE: Luz Elena Shipley  [address] [facility] [address]        Dear Colleague,    Thank you for referring your patient, Luz Elena Shipley, to the [facility]. Please see a copy of my visit note below.    SUBJECTIVE:                                                                   Luz Elena Shipley presents today to our Allergy Clinic at [facility] for a follow up visit. She is a 69 year old female with  severe persistent asthma and chronic rhinitis.  Asthma since childhood. November 2016: Negative serum IgE to regional aeroallergen panel. CBC with differential without eosinophilia.  Normal total serum IgE.  Negative ABPA panel.  Alpha-1 antitrypsin within normal limits.  Normal CBC with differential without hypereosinophilia.  The PSG sleep study demonstrated moderate, REM, and supine predominant NEGRA with some sleep associated hypoxemia.  In February 2019, due to decrease in FEV1, chest CT was ordered that showed some opacities and small nodules that could be infectious.  The patient was treated with azithromycin.  On a follow-up phone call, the patient was asymptomatic; However, chest CT in May 2019 with new opacities. Per January'21 Pulm note: Established multiple pulmonary lung nodule(s). Near resolution of large nodules and likely related to infection/inflammation. No further surveillance indicated by CT.   In June 2021, spirometry with mild obstruction without postbronchodilator response. In January 2023 spirometry him with mild obstruction without postbronchodilator response.  Stable when compared to 2021.    History of Present Illness      Asthma symptoms very well-controlled with Trelegy Ellipta 200/62.5/25 mcg 1 puff once daily and albuterol as needed.  She cannot recall the last time she required the use of albuterol inhaler because of an obvious trigger.  On the other hand, she missed a few days of Trelegy following her 's surgery  and started having asthma symptoms.  Since the last visit, she has not required prednisone.  She denies interval ED/PCP/urgent care visits for asthma flare since the previous visit.  She is very satisfied with asthma control.    She reports that rhinitis symptoms well-controlled with frequent use of azelastine.  She rarely needs to use intranasal fluticasone.  Primarily, she uses azelastine in the Spring when she mows lawn.  She saw ENT, who referred the patient to plastic surgery due to deviated nasal septum and valve collapse.  The patient opted out.    She reports frequent aural fullness in the right ear for the past several weeks.  She has been taking loratadine without benefit.      Patient Active Problem List   Diagnosis     Severe persistent asthma (H28)     Lichen sclerosus     Benign essential hypertension     Family history of diabetes mellitus     Osteopenia     Prediabetes     Class 2 severe obesity due to excess calories with serious comorbidity in adult (H)       Past Medical History:   Diagnosis Date     Hypertension      Rheumatic fever      Sleep apnea      Uncomplicated asthma       Problem (# of Occurrences) Relation (Name,Age of Onset)    Unknown/Adopted (1) Paternal Grandfather (radha alicea)    Alzheimer Disease (1) Brother: early onset    Arthritis (1) Brother    Diabetes (2) Mother (fawn felder): unsure type; in 40s, Son: type 1    Cerebrovascular Disease (1) Father (ben felder): CVA    Thyroid Disease (1) Son    Breast Cancer (2) Sister, Paternal Aunt    Aneurysm (1) Paternal Grandmother    Other Cancer (1) Mother (fanw felder): pancreatic    Chronic Obstructive Pulmonary Disease (1) Father (ben felder)    Skin Cancer (2) Father (ben felder), Sister    Alcoholism (1) Son    Stomach Problem (1) Son: polyps, diverticulitis, heartburn, passes blood          Past Surgical History:   Procedure Laterality Date     BIOPSY      breast,     BIOPSY BREAST        COLONOSCOPY       COLONOSCOPY N/A 2019    Procedure: Combined Colonoscopy, Single Or Multiple Biopsy/Polypectomy By Biopsy;  Surgeon: José Miguel Ochoa MD;  Location: [facility]     COLONOSCOPY N/A 3/4/2022    Procedure: COLONOSCOPY;  Surgeon: Nirmal Rm MD;  Location: [facility]     COLONOSCOPY WITH CO2 INSUFFLATION N/A 2019    Procedure: COLONOSCOPY WITH CO2 INSUFFLATION;  Surgeon: José Miguel Ochoa MD;  Location: [facility]     COMBINED ESOPHAGOSCOPY, GASTROSCOPY, DUODENOSCOPY (EGD) WITH CO2 INSUFFLATION N/A 2019    Procedure: COMBINED ESOPHAGOSCOPY, GASTROSCOPY, DUODENOSCOPY (EGD) WITH CO2 INSUFFLATION;  Surgeon: José Miguel Ochoa MD;  Location: [facility]     ESOPHAGOSCOPY, GASTROSCOPY, DUODENOSCOPY (EGD), COMBINED N/A 2019    Procedure: Combined Esophagoscopy, Gastroscopy, Duodenoscopy (Egd), Biopsy Single Or Multiple;  Surgeon: José Miguel Ochoa MD;  Location: [facility]     GYN SURGERY       TONSILLECTOMY       Social History     Socioeconomic History     Marital status:      Spouse name: None     Number of children: None     Years of education: None     Highest education level: None   Occupational History     Comment: Retired   Tobacco Use     Smoking status: Former     Current packs/day: 0.00     Average packs/day: 2.0 packs/day for 20.0 years (40.0 ttl pk-yrs)     Types: Cigarettes     Start date: 1967     Quit date: 1987     Years since quittin.0     Smokeless tobacco: Never   Vaping Use     Vaping status: Never Used   Substance and Sexual Activity     Alcohol use: Yes     Comment: 1 nightly     Drug use: No     Sexual activity: Not Currently     Partners: Male     Birth control/protection: Post-menopausal   Other Topics Concern     Parent/sibling w/ CABG, MI or angioplasty before 65F 55M? No   Social History Narrative    24        ENVIRONMENTAL HISTORY: [place] The home is heated with a forced  air but does not use.  Has floor heating. They do have central air conditioning. The patient's bedroom is furnished with carpeting in bedroom. 1 dog at home. There is no history of cockroach or mice infestation. There are no smokers in the house.  The house does not have a damp basement.      Social Determinants of Health     Financial Resource Strain: High Risk (11/15/2023)    Financial Resource Strain      Within the past 12 months, have you or your family members you live with been unable to get utilities (heat, electricity) when it was really needed?: Yes   Food Insecurity: Low Risk  (11/15/2023)    Food Insecurity      Within the past 12 months, did you worry that your food would run out before you got money to buy more?: No      Within the past 12 months, did the food you bought just not last and you didn t have money to get more?: No   Transportation Needs: Low Risk  (11/15/2023)    Transportation Needs      Within the past 12 months, has lack of transportation kept you from medical appointments, getting your medicines, non-medical meetings or appointments, work, or from getting things that you need?: No   Interpersonal Safety: Low Risk  (10/26/2023)    Interpersonal Safety      Do you feel physically and emotionally safe where you currently live?: Yes      Within the past 12 months, have you been hit, slapped, kicked or otherwise physically hurt by someone?: No      Within the past 12 months, have you been humiliated or emotionally abused in other ways by your partner or ex-partner?: No   Housing Stability: Low Risk  (11/15/2023)    Housing Stability      Do you have housing? : Yes      Are you worried about losing your housing?: No           Current Outpatient Medications:      albuterol (PROAIR HFA/PROVENTIL HFA/VENTOLIN HFA) 108 (90 Base) MCG/ACT inhaler, Inhale 2-4 puffs into the lungs every 4 hours as needed for shortness of breath or wheezing, Disp: 18 g, Rfl: 3     azelastine (ASTELIN) 0.1 % nasal  "spray, Spray 2 sprays into both nostrils 2 times daily as needed for rhinitis or allergies, Disp: 30 mL, Rfl: 3     clobetasol (TEMOVATE) 0.05 % external cream, Apply sparingly to affected area twice weekly as needed.  Do not apply to face., Disp: 60 g, Rfl: 3     fluticasone (FLONASE) 50 MCG/ACT nasal spray, Spray 1 spray into both nostrils daily, Disp: 15.8 mL, Rfl: 1     Fluticasone-Umeclidin-Vilanterol (TRELEGY ELLIPTA) 200-62.5-25 MCG/ACT oral inhaler, Inhale 1 puff into the lungs daily, Disp: 180 each, Rfl: 3     hydrochlorothiazide (HYDRODIURIL) 12.5 MG tablet, Take 1 tablet (12.5 mg) by mouth daily, Disp: 90 tablet, Rfl: 3     Respiratory Therapy Supplies (NEBULIZER/TUBING/MOUTHPIECE) KIT, Use with albuterol neb solution, Disp: 1 kit, Rfl: 0  Immunization History   Administered Date(s) Administered     COVID-19 12+ (2023-24) (MODERNA) 11/01/2023     COVID-19 Bivalent 18+ (Moderna) 12/02/2022     COVID-19 Monovalent 18+ (Moderna) 02/11/2021, 03/11/2021, 11/22/2021     Influenza (High Dose) 3 valent vaccine 02/27/2018, 09/18/2018     Influenza (IIV3) PF 11/08/2012     Influenza Vaccine 65+ (FLUAD) 12/09/2021     Influenza Vaccine 65+ (Fluzone HD) 10/14/2022, 10/26/2023     Influenza Vaccine >6 months,quad, PF 12/12/2016, 11/20/2020     Pneumo Conj 13-V (2010&after) 02/27/2018     Pneumococcal 23 valent 06/24/2020     TDAP Vaccine (Boostrix) 11/08/2012     Tdap (Adult) Unspecified Formulation 06/30/1995     Zoster recombinant adjuvanted (SHINGRIX) 12/09/2021, 04/14/2022     Allergies   Allergen Reactions     Cartia Xt [Diltiazem] Other (See Comments)     Heartburn whenever on this medication     Losartan Cough     Miconazole      Sulfa Antibiotics Rash     OBJECTIVE:                                                                 /80 (BP Location: Left arm, Patient Position: Sitting, Cuff Size: Adult Regular)   Pulse 83   Temp 97  F (36.1  C) (Tympanic)   Ht 1.651 m (5' 5\")   Wt 99.2 kg (218 lb 9.6 " oz)   SpO2 97%   BMI 36.38 kg/m          Physical Exam  Vitals and nursing note reviewed.   Constitutional:       General: She is not in acute distress.     Appearance: She is not ill-appearing, toxic-appearing or diaphoretic.   HENT:      Head: Normocephalic and atraumatic.      Right Ear: Tympanic membrane, ear canal and external ear normal.      Left Ear: Tympanic membrane, ear canal and external ear normal.      Nose: Septal deviation (left) present. No mucosal edema, congestion or rhinorrhea.      Right Turbinates: Not enlarged, swollen or pale.      Left Turbinates: Not enlarged, swollen or pale.      Mouth/Throat:      Lips: Pink.      Mouth: Mucous membranes are moist.      Pharynx: Oropharynx is clear. No pharyngeal swelling, oropharyngeal exudate, posterior oropharyngeal erythema or uvula swelling.   Eyes:      General:         Right eye: No discharge.         Left eye: No discharge.      Conjunctiva/sclera: Conjunctivae normal.   Cardiovascular:      Rate and Rhythm: Normal rate and regular rhythm.      Heart sounds: Normal heart sounds. No murmur heard.  Pulmonary:      Effort: Pulmonary effort is normal. No respiratory distress.      Breath sounds: Normal breath sounds and air entry. No stridor, decreased air movement or transmitted upper airway sounds. No decreased breath sounds, wheezing, rhonchi or rales.   Musculoskeletal:         General: Normal range of motion.   Neurological:      Mental Status: She is alert and oriented to person, place, and time.   Psychiatric:         Mood and Affect: Mood normal.         Behavior: Behavior normal.               WORKUP:       Asthma Control Test (ACT) total score: 25   Unable to perform spirometry due to software glitch.      ASSESSMENT/PLAN:    Assessment & Plan  1. Asthma.  Well-controlled with Trelegy Ellipta 200/2.5/25 mcg 1 puff once daily and albuterol as needed.  - Continue as is.    - Fluticasone-Umeclidin-Vilanterol (TRELEGY ELLIPTA) 200-62.5-25  MCG/ACT oral inhaler  Dispense: 180 each; Refill: 3  - albuterol (PROAIR HFA/PROVENTIL HFA/VENTOLIN HFA) 108 (90 Base) MCG/ACT inhaler  Dispense: 18 g; Refill: 3    The patient is advised to persist with the current regimen of Trelegy and albuterol as required. A prescription refill for Trelegy has been provided.    2.  Chronic rhinitis    Well-controlled with azelastine nasal spray as needed and occasional use of intranasal fluticasone.  - Continue as is.    - azelastine (ASTELIN) 0.1 % nasal spray  Dispense: 30 mL; Refill: 3    3. Eustachian tube dysfunction, right ear    This issue has been ongoing for the past couple of weeks. There is no visible effusion.    The patient has tried loratadine without success.  She can discontinue loratadine.  Recommend a trial of oxymetazoline for 3 days, along with more continuous use of both intranasal fluticasone and azelastine.  If symptoms persist, the patient should see an ENT specialist. She has an appointment scheduled with ENT at the beginning of July.      Follow-up in 1 year or sooner if needed.           Thank you for allowing us to participate in the care of this patient. Please feel free to contact us if there are any questions or concerns about the patient.    Disclaimer: This note consists of symbols derived from keyboarding, dictation and/or voice recognition software. As a result, there may be errors in the script that have gone undetected. Please consider this when interpreting information found in this chart.    Consent was obtained from the patient to use an AI documentation tool in the creation of this note.     Galindo Montano MD, FAAAAI, FACAAI  Allergy, Asthma and Immunology     [facility]        Again, thank you for allowing me to participate in the care of your patient.        Sincerely,        Galindo Montano MD

## 2024-05-23 NOTE — PATIENT INSTRUCTIONS
Continue Trelegy daily and albuterol as needed.     Try Afrin for 3 day, and see if a combination of Flonase and azelastine is more helpful if not, see ENT.           Prescription Assistance  If you need assistance with your prescriptions (cost, coverage, etc) please contact: [place] [facility] [phone]           If labs have been ordered/completed, please allow 7-14 business days for final interpretation of results to be sent on My Chart, phone or mail. Some lab results can take up to 28 days for results.         Allergy Staff Appt Hours Shot Hours Locations    Physician      Galindo Montano MD         Support Staff      Praveena Pyle MA     Tuesday:   [place] :  [place]: :         :  [place] 7-3     [place]        Tuesday: 7:20        Wednesday: :: 7:10        Tuesday: 7:10        Thursday: 7-3:10     [facility]       Tues & Wed: :10       Thurs: 12-4:10       Fri:             [facility]  [address] [place] [address]  Appt Line: [phone]        [facility]  [address] [place] [address]  Appt Line: [phone]     Pulmonary Function Scheduling:  [place]: [phone]  [place]: [phone]  [phone]

## 2024-05-29 ENCOUNTER — OFFICE VISIT (OUTPATIENT)
Dept: URGENT CARE | Facility: URGENT CARE | Age: 69
End: 2024-05-29
Payer: MEDICARE

## 2024-05-29 ENCOUNTER — ANCILLARY PROCEDURE (OUTPATIENT)
Dept: GENERAL RADIOLOGY | Facility: CLINIC | Age: 69
End: 2024-05-29
Payer: MEDICARE

## 2024-05-29 VITALS
HEART RATE: 84 BPM | TEMPERATURE: 97.8 F | SYSTOLIC BLOOD PRESSURE: 145 MMHG | OXYGEN SATURATION: 96 % | DIASTOLIC BLOOD PRESSURE: 85 MMHG | BODY MASS INDEX: 36.28 KG/M2 | RESPIRATION RATE: 16 BRPM | WEIGHT: 218 LBS

## 2024-05-29 DIAGNOSIS — Z23 NEED FOR TDAP VACCINATION: ICD-10-CM
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
## 2024-05-29 DIAGNOSIS — S61.512A LACERATION OF LEFT WRIST, INITIAL ENCOUNTER: ICD-10-CM

## 2024-05-29 DIAGNOSIS — S99.912A ANKLE INJURY, LEFT, INITIAL ENCOUNTER: ICD-10-CM

## 2024-05-29 PROCEDURE — 73610 X-RAY EXAM OF ANKLE: CPT | Mod: TC | Performed by: RADIOLOGY

## 2024-05-29 PROCEDURE — 90471 IMMUNIZATION ADMIN: CPT

## 2024-05-29 PROCEDURE — 12002 RPR S/N/AX/GEN/TRNK2.6-7.5CM: CPT

## 2024-05-29 PROCEDURE — 90715 TDAP VACCINE 7 YRS/> IM: CPT

## 2024-05-29 PROCEDURE — 99214 OFFICE O/P EST MOD 30 MIN: CPT | Mod: 25

## 2024-05-29 RX ORDER — MELOXICAM 15 MG/1
15 TABLET ORAL DAILY
Qty: 30 TABLET | Refills: 0 | Status: SHIPPED | OUTPATIENT
Start: 2024-05-29 | End: 2024-07-10

## 2024-05-29 NOTE — PROGRESS NOTES
URGENT CARE  Assessment & Plan   Assessment:   Luz Elena Shipley is a 69 year old female who's clinical presentation today is consistent with:   1. Fall, initial encounter  - XR Ankle Left G/E 3 Views; Future  2. Ankle injury, left, initial encounter  - Ankle/Foot Bracing Supplies Order Walking Boot; Left; Pneumatic; Tall  - meloxicam (MOBIC) 15 MG tablet;   - Orthopedic  Referral; Future  Plan:  Radiologic films today were negative for fractures or dislocation, but did show some irregularity at the tip of the lateral malleolus suggesting ligamentous avulsion injury, for this reason I recommend patient follow-up with Ortho in the next 3 to 5 days for further evaluation and treatment, referral placed; patient was placed in a walking boot for stability until she is seen by Ortho.  will also encourage using NSAIDs/Tylenol to help decrease pain and inflammation, resting, applying ice/heat as needed, and elevation}  No alarm signs or symptoms present   Differential Diagnoses for this patient's CC include: fracture, dislocation, Ligamentous vs tendon pathology, musculoskeletal injury, soft tissue injury     3. Laceration of left wrist, initial encounter  4. Need for Tdap vaccination  - REPAIR SUPERFICIAL, WOUND BODY 2.6-7.5 CM  Plan:  Patient's lacerations were closed today with a suture procedure. Patient tolerated well. Educated patient to monitor for signs/symptoms of infection, no antibiotics indicated at this time,  Discussed with patient to observe for signs of bleeding}. Discussed with patient to return for suture removal in 7-10 days and for pain management she may use OTC tylenol as needed   Additionally we discussed if symptoms do not improve after starting today's treatment  to follow up in 2-3 days, sooner if symptoms worsen, return precautions given  Tetanus updated today  No alarm signs or symptoms present   Differential Diagnoses for this patient's chief complaint that I considered include:  fracture,  dislocation, foreign body, infected /contaminated wound, Ligamentous vs tendon pathology,     Patient is} agreeable to treatment plan and state they will follow-up if symptoms do not improve and/or if symptoms worsen   see patient's AVS 'monitor for' section for specific patient instructions given and discussed regarding what to watch for and when to follow up    JOSE [facility] CARE [place]      ______________________________________________________________________      Subjective     HPI: Luz Elena Shipley  is a 69 year old  female who presents today for evaluation the following concerns:   Patient presents today endorsing they sustained a fall while outside, a few  hours ago, today    Patient states they rolled their left ankle and hurt her left wrist, patient endorses lacerations to her left wrist (x2)   Patient localizes their pain to the left ankle and left wrist    Patient reports some mild bruising, and a moderate amount of  swelling to their left ankle   Patient denies any numbness or tingling  Patient's last tetanus was 2012 per [facility]     Review of Systems:  Pertinent review of systems as reflected in HPI, otherwise negative.     Objective    Physical Exam:  Vitals:    05/29/24 1418   BP: (!) 145/85   Pulse: 84   Resp: 16   Temp: 97.8  F (36.6  C)   TempSrc: Tympanic   SpO2: 96%   Weight: 98.9 kg (218 lb)      General:   alert and oriented, no acute distress, non ill-appearing   Vital signs reviewed: afebrile and normotensive    SKIN:   Left wrist, volar aspect :   Two Superficial laceration lesions noted, one measures 3cm, the other is 1.5cm   Slight erythremia present, no inflammation, ecchymosis, puss, colored discharge, or red streaks present.  No current signs of cellulitis-type infection observed.  Increased tenderness/pain with palpation surrounding wound. No decreased range of motion with flexion, extension, inversion, and eversion} at wrist site    Temperature equal  to body temperature. Neurovascularity intact with pulse +2    Msk:   Left  ankle:   No erythema, ecchymosis noted, but some moderate inflammation present on the lateral aspect   Temperature equal to body temperature.    No crepitus,    skin has a mild abrasion noted to the lateral aspect of ankle   Moderate to severe  tenderness/pain with palpation and  Decreased range of motion with dorsiflexion, plantar flexion, inversion, and eversion.  Neovascularity intact distally and pulse +2    Wound Closure Procedure:   Laceration repair as described:  -Risks and benefits discussed with patient.   -After informed verbal consent obtained, skin was sterilely prepped with chlorhexidine and draped in usual fashion .  -Analgesia was obtained using LET cream;  Xylocaine 1% without epinepherine 3 mls} applied   -Irrigated wound with 0.9% normal saline. Full length and depth of wound explored with no foreign material, necrotic tissue, or underlying affected structures.  -two lacerations of left wrist , which was sustained less than 8 hours ago was appreciated with clean wound edges noted, edges were able to be approximated during closure.  -Neurovascular and tendon structures are grossly intact.   -The approximately 1.5cm and 3 cm wounds were closed with six (total) ethilon (non dissolvable) sutures without complication.    A wound dressing applied.   Patient tolerated procedure well.      Imaging:   All images were personally read by this provider (myself).   Per my independent interpretation the xray shows no fracture or dislocation seen    ______________________________________________________________________    I explained my diagnostic considerations and recommendations to the patient, who voiced understanding and agreement with the treatment plan.   All questions were answered.   We discussed potential side effects, risks and benefits of any prescribed or recommended therapies, as well as expectations for response to  treatments.  Please see AVS for any patient instructions & handouts given.   Patient was advised to contact the Nurse Care Line, their Primary Care provider, Urgent Care, or the Emergency Department if there are new or worsening symptoms, or call 911 for emergencies.

## 2024-05-29 NOTE — PATIENT INSTRUCTIONS
Diagnosis: laceration of left wrist with wound repair    Today we did:  Sutured wound closed   Updated tetanus booster     Plan:   Antibiotics not needed, but monitor for infection   Tylenol and ibuprofen for pain and swelling   Follow instructions below     Monitor for:   If bleeding starts - apply pressure to the wound to stop the bleeding.  Monitor for signs of infection:   redness, swelling, purulent drainage, warmth at the site, pus   Fevers   Increased pain, worsening pain   Changes in sensation to the site  Inability to move the extremity where injury occurred   Wound area becoming hot or warm to touch.  Pus or fluid leaking out of the wound.  Red streaks that run towards the heart from the wound.      If Sutures are Used:  Keep dressing on until tomorrow, 24 hours.   Then can keep sutures open to air.    Can wash with warm soapy water as needed,  Wash hands before and after all wound care.  Showering is fine but do not submerge wound under water (bath or lake) for 2-3 days   *Take antibiotics if prescribed.   Continue to monitor suture(s) site.   Follow up with your Urgent Care or primary care provider in 7-10 days for suture removal.   Seek care with new or worsening symptoms, including:   fevers, chills, increased redness, increased pain, increased swelling, or increased or colored drainage from the suture site.  }         Diagnosis: Orthopedic injury;      Today we did:  Xray:  negative for fracture or dislocation  Irregularity at the tip of the lateral malleolus suggestive of ligamentous avulsion injury    Plan:   Avoid or restrict any activities that may aggravate your symptoms.   Cease exacerbating activity for 2 to 6 weeks, then gradually return to exercise/sport as tolerated.   wear brace for the next few weeks to assist with support.}  light stretching (when able to tolerate) to reduce your risks of developing a frozen joint or stiffness in joint   This will also help to increase strength and  "flexibility over time related to injury   Recovery expected within 2-6 weeks depending on severity, but some may take up to 6-12 months.  If symptoms fail to resolve or worsen recommending follow-up with orthopedics/physical therapy after allowing adequate time for healing. - will place referral today     P.R.I.C.E.  For musculoskeletal injuries including: sprains, strains, bruises - use the acronym P.R.I.C.E. for symptomatic treatment:   Prevent & Protect further injury.  Rest affected area   Ice applied (or heat, or can alternate)   Compression of injured area (ACE bandage/splint).  Elevation of injured area.    Pain  Ibuprofen / tylenol for pain   - Ibuprofen 600mg Q6hr as needed  (can use celebrex if GI upset or GI bleed risk)    - tylenol 500mg Q8hr   - Can use aleve / naproxen / naprosyn also   - if no contraindications recommend naproxen and or Gels/creams such as a Voltaren, and lidocaine patches or creams   No narcotics - no evidence to support this use and people tend to over use \"injured\" extremity when not having pain    (Pain is body's way of making you take it easy for awhile)   - orthopedic speciality will discuss stronger medications if needed indicated at that time    Monitor for:   Worsening pain   Worsening numbness and tingling   Loss of range of motion or strength   Redness, warmth or infection at site   Fevers, chills    "

## 2024-05-31 ENCOUNTER — OFFICE VISIT (OUTPATIENT)
Dept: ORTHOPEDICS | Facility: CLINIC | Age: 69
End: 2024-05-31
Payer: MEDICARE

## 2024-05-31 VITALS
WEIGHT: 218 LBS | HEIGHT: 65 IN | SYSTOLIC BLOOD PRESSURE: 129 MMHG | BODY MASS INDEX: 36.32 KG/M2 | DIASTOLIC BLOOD PRESSURE: 83 MMHG | TEMPERATURE: 96.9 F

## 2024-05-31 DIAGNOSIS — S82.62XA CLOSED AVULSION FRACTURE OF LATERAL MALLEOLUS OF LEFT FIBULA, INITIAL ENCOUNTER: Primary | ICD-10-CM

## 2024-05-31 PROCEDURE — 27786 TREATMENT OF ANKLE FRACTURE: CPT | Mod: LT | Performed by: ORTHOPAEDIC SURGERY

## 2024-05-31 PROCEDURE — 99203 OFFICE O/P NEW LOW 30 MIN: CPT | Mod: 57 | Performed by: ORTHOPAEDIC SURGERY

## 2024-05-31 NOTE — PROGRESS NOTES
ORTHOPEDIC CONSULT      Chief Complaint: Luz Elena Shipley is a 69 year old female who is being seen for   Chief Complaint   Patient presents with    Right Wrist - Pain    Left Ankle - Pain       History of Present Illness:   Here with her  today.  She reports May 29 she was stepping on a pallet when she broke through causing her to fall and a subsequent inversion injury.  Immediate pain.  Subsequent seen.  Diagnosed with a lateral malleolus avulsion fracture.  Placed in a fracture boot.  She also suffered a abrasion along the lateral ankle.  She has been walking in the boot.  Taken ibuprofen for pain.  No pre-existing issues.      Patient's past medical, surgical, social and family histories reviewed.     Past Medical History:   Diagnosis Date    Hypertension     Rheumatic fever     Sleep apnea     Uncomplicated asthma        Past Surgical History:   Procedure Laterality Date    BIOPSY      breast,    BIOPSY BREAST      COLONOSCOPY      COLONOSCOPY N/A 2/8/2019    Procedure: Combined Colonoscopy, Single Or Multiple Biopsy/Polypectomy By Biopsy;  Surgeon: José Miguel Ochoa MD;  Location: [facility]    COLONOSCOPY N/A 3/4/2022    Procedure: COLONOSCOPY;  Surgeon: Nirmal Rm MD;  Location: [facility]    COLONOSCOPY WITH CO2 INSUFFLATION N/A 2/8/2019    Procedure: COLONOSCOPY WITH CO2 INSUFFLATION;  Surgeon: José Miguel Ochoa MD;  Location: [facility]    COMBINED ESOPHAGOSCOPY, GASTROSCOPY, DUODENOSCOPY (EGD) WITH CO2 INSUFFLATION N/A 2/8/2019    Procedure: COMBINED ESOPHAGOSCOPY, GASTROSCOPY, DUODENOSCOPY (EGD) WITH CO2 INSUFFLATION;  Surgeon: José Miguel Ochoa MD;  Location: [facility]    ESOPHAGOSCOPY, GASTROSCOPY, DUODENOSCOPY (EGD), COMBINED N/A 2/8/2019    Procedure: Combined Esophagoscopy, Gastroscopy, Duodenoscopy (Egd), Biopsy Single Or Multiple;  Surgeon: José Miguel Ochoa MD;  Location: [facility]    GYN SURGERY      TONSILLECTOMY         Medications:  Current Outpatient Medications    Medication Sig Dispense Refill    albuterol (PROAIR HFA/PROVENTIL HFA/VENTOLIN HFA) 108 (90 Base) MCG/ACT inhaler Inhale 2-4 puffs into the lungs every 4 hours as needed for shortness of breath or wheezing 18 g 3    azelastine (ASTELIN) 0.1 % nasal spray Spray 2 sprays into both nostrils 2 times daily as needed for rhinitis or allergies 30 mL 3    clobetasol (TEMOVATE) 0.05 % external cream Apply sparingly to affected area twice weekly as needed.  Do not apply to face. 60 g 3    fluticasone (FLONASE) 50 MCG/ACT nasal spray Spray 1 spray into both nostrils daily 15.8 mL 1    Fluticasone-Umeclidin-Vilanterol (TRELEGY ELLIPTA) 200-62.5-25 MCG/ACT oral inhaler Inhale 1 puff into the lungs daily 180 each 3    hydrochlorothiazide (HYDRODIURIL) 12.5 MG tablet Take 1 tablet (12.5 mg) by mouth daily 90 tablet 3    meloxicam (MOBIC) 15 MG tablet Take 1 tablet (15 mg) by mouth daily 30 tablet 0    Respiratory Therapy Supplies (NEBULIZER/TUBING/MOUTHPIECE) KIT Use with albuterol neb solution 1 kit 0     No current facility-administered medications for this visit.       Allergies   Allergen Reactions    Cartia Xt [Diltiazem] Other (See Comments)     Heartburn whenever on this medication    Losartan Cough    Miconazole     Sulfa Antibiotics Rash       Social History     Occupational History     Comment: Retired   Tobacco Use    Smoking status: Former     Current packs/day: 0.00     Average packs/day: 2.0 packs/day for 20.0 years (40.0 ttl pk-yrs)     Types: Cigarettes     Start date: 1967     Quit date: 1987     Years since quittin.1    Smokeless tobacco: Never   Vaping Use    Vaping status: Never Used   Substance and Sexual Activity    Alcohol use: Yes     Comment: 1 nightly    Drug use: No    Sexual activity: Not Currently     Partners: Male     Birth control/protection: Post-menopausal       Family History   Problem Relation Age of Onset    Other Cancer Mother         pancreatic    Diabetes Mother          "unsure type; in 40s    Cerebrovascular Disease Father         CVA    Chronic Obstructive Pulmonary Disease Father     Skin Cancer Father     Breast Cancer Sister     Skin Cancer Sister     Alzheimer Disease Brother         early onset    Arthritis Brother     Aneurysm Paternal Grandmother     Unknown/Adopted Paternal Grandfather     Diabetes Son         type 1    Thyroid Disease Son     Stomach Problem Son         polyps, diverticulitis, heartburn, passes blood    Alcoholism Son     Breast Cancer Paternal Aunt        REVIEW OF SYSTEMS  10 point review systems performed otherwise negative as noted as per history of present illness.    Physical Exam:  Vitals: /83   Temp 96.9  F (36.1  C)   Ht 1.651 m (5' 5\")   Wt 98.9 kg (218 lb)   BMI 36.28 kg/m    BMI= Body mass index is 36.28 kg/m .  Constitutional: healthy, alert and no acute distress   Psychiatric: mentation appears normal and affect normal/bright  NEURO: no focal deficits  RESP: Normal with easy respirations and no use of accessory muscles to breathe, no audible wheezing or retractions  CV: LLE:  ankle and down-  non-pitting edema         Regular rate and rhythm by palpation  SKIN: Superficial abrasion over the lateral malleolus.  No evidence infection.  No induration or erythema  JOINT/EXTREMITIES:left ankle: No gross deformity other than some generalized swelling.  Tenderness directly over the lateral malleolus.  No foot pain with palpation.  She also does have some tenderness along the medial malleolus.  Foot is well-perfused with good cap refill.  Range of motion not tested.  Achilles intact     GAIT: not tested     Diagnostic Modalities:  Left ankle x-ray: Taken May 29, 2024 nonweightbearing shows a small avulsion fracture off the distal tip of the lateral malleolus.  The mortise is well-maintained with no evidence of instability or dislocation.  Independent visualization of the images was performed.      Impression: left ankle lateral malleolus " avulsion fracture with superficial abrasion    Plan:  All of the above pertinent physical exam and imaging modalities findings was reviewed with Luz Elena and her .    Options discussed.  Recommend conservative care.  Recommend since she already has at the fracture boot.  Keep the abrasion covered to help with the irritation within the boot.  Over-the-counter medications for discomfort.  Can be weightbearing as tolerated although I encouraged her to elevate and rest often.  Plan to see her back in 2 weeks.  Probable transition to a stirrup brace.        Return to clinic 2, week(s), or sooner as needed for changes.  Re-x-ray on return: Yes.  Three-view weightbearing left ankle    Mikhail Encarnacion D.O.

## 2024-05-31 NOTE — LETTER
5/31/2024         RE: Luz Elena Shipley  [address] [facility] [address]        Dear Colleague,    Thank you for referring your patient, Luz Elena Shipley, to the [facility]. Please see a copy of my visit note below.    ORTHOPEDIC CONSULT      Chief Complaint: Luz Elena Shipley is a 69 year old female who is being seen for   Chief Complaint   Patient presents with     Right Wrist - Pain     Left Ankle - Pain       History of Present Illness:   Here with her  today.  She reports May 29 she was stepping on a pallet when she broke through causing her to fall and a subsequent inversion injury.  Immediate pain.  Subsequent seen.  Diagnosed with a lateral malleolus avulsion fracture.  Placed in a fracture boot.  She also suffered a abrasion along the lateral ankle.  She has been walking in the boot.  Taken ibuprofen for pain.  No pre-existing issues.      Patient's past medical, surgical, social and family histories reviewed.     Past Medical History:   Diagnosis Date     Hypertension      Rheumatic fever      Sleep apnea      Uncomplicated asthma        Past Surgical History:   Procedure Laterality Date     BIOPSY      breast,     BIOPSY BREAST       COLONOSCOPY       COLONOSCOPY N/A 2/8/2019    Procedure: Combined Colonoscopy, Single Or Multiple Biopsy/Polypectomy By Biopsy;  Surgeon: José Miguel Ochoa MD;  Location: [facility]     COLONOSCOPY N/A 3/4/2022    Procedure: COLONOSCOPY;  Surgeon: Nirmal Rm MD;  Location: [facility]     COLONOSCOPY WITH CO2 INSUFFLATION N/A 2/8/2019    Procedure: COLONOSCOPY WITH CO2 INSUFFLATION;  Surgeon: José Miguel Ochoa MD;  Location: [facility]     COMBINED ESOPHAGOSCOPY, GASTROSCOPY, DUODENOSCOPY (EGD) WITH CO2 INSUFFLATION N/A 2/8/2019    Procedure: COMBINED ESOPHAGOSCOPY, GASTROSCOPY, DUODENOSCOPY (EGD) WITH CO2 INSUFFLATION;  Surgeon: José Miguel Ochoa MD;  Location: [facility]     ESOPHAGOSCOPY, GASTROSCOPY, DUODENOSCOPY (EGD),  COMBINED N/A 2019    Procedure: Combined Esophagoscopy, Gastroscopy, Duodenoscopy (Egd), Biopsy Single Or Multiple;  Surgeon: José Miguel Ochoa MD;  Location: [facility]     GYN SURGERY       TONSILLECTOMY         Medications:  Current Outpatient Medications   Medication Sig Dispense Refill     albuterol (PROAIR HFA/PROVENTIL HFA/VENTOLIN HFA) 108 (90 Base) MCG/ACT inhaler Inhale 2-4 puffs into the lungs every 4 hours as needed for shortness of breath or wheezing 18 g 3     azelastine (ASTELIN) 0.1 % nasal spray Spray 2 sprays into both nostrils 2 times daily as needed for rhinitis or allergies 30 mL 3     clobetasol (TEMOVATE) 0.05 % external cream Apply sparingly to affected area twice weekly as needed.  Do not apply to face. 60 g 3     fluticasone (FLONASE) 50 MCG/ACT nasal spray Spray 1 spray into both nostrils daily 15.8 mL 1     Fluticasone-Umeclidin-Vilanterol (TRELEGY ELLIPTA) 200-62.5-25 MCG/ACT oral inhaler Inhale 1 puff into the lungs daily 180 each 3     hydrochlorothiazide (HYDRODIURIL) 12.5 MG tablet Take 1 tablet (12.5 mg) by mouth daily 90 tablet 3     meloxicam (MOBIC) 15 MG tablet Take 1 tablet (15 mg) by mouth daily 30 tablet 0     Respiratory Therapy Supplies (NEBULIZER/TUBING/MOUTHPIECE) KIT Use with albuterol neb solution 1 kit 0     No current facility-administered medications for this visit.       Allergies   Allergen Reactions     Cartia Xt [Diltiazem] Other (See Comments)     Heartburn whenever on this medication     Losartan Cough     Miconazole      Sulfa Antibiotics Rash       Social History     Occupational History     Comment: Retired   Tobacco Use     Smoking status: Former     Current packs/day: 0.00     Average packs/day: 2.0 packs/day for 20.0 years (40.0 ttl pk-yrs)     Types: Cigarettes     Start date: 1967     Quit date: 1987     Years since quittin.1     Smokeless tobacco: Never   Vaping Use     Vaping status: Never Used   Substance and Sexual Activity  "    Alcohol use: Yes     Comment: 1 nightly     Drug use: No     Sexual activity: Not Currently     Partners: Male     Birth control/protection: Post-menopausal       Family History   Problem Relation Age of Onset     Other Cancer Mother         pancreatic     Diabetes Mother         unsure type; in 40s     Cerebrovascular Disease Father         CVA     Chronic Obstructive Pulmonary Disease Father      Skin Cancer Father      Breast Cancer Sister      Skin Cancer Sister      Alzheimer Disease Brother         early onset     Arthritis Brother      Aneurysm Paternal Grandmother      Unknown/Adopted Paternal Grandfather      Diabetes Son         type 1     Thyroid Disease Son      Stomach Problem Son         polyps, diverticulitis, heartburn, passes blood     Alcoholism Son      Breast Cancer Paternal Aunt        REVIEW OF SYSTEMS  10 point review systems performed otherwise negative as noted as per history of present illness.    Physical Exam:  Vitals: /83   Temp 96.9  F (36.1  C)   Ht 1.651 m (5' 5\")   Wt 98.9 kg (218 lb)   BMI 36.28 kg/m    BMI= Body mass index is 36.28 kg/m .  Constitutional: healthy, alert and no acute distress   Psychiatric: mentation appears normal and affect normal/bright  NEURO: no focal deficits  RESP: Normal with easy respirations and no use of accessory muscles to breathe, no audible wheezing or retractions  CV: LLE:  ankle and down-  non-pitting edema         Regular rate and rhythm by palpation  SKIN: Superficial abrasion over the lateral malleolus.  No evidence infection.  No induration or erythema  JOINT/EXTREMITIES:left ankle: No gross deformity other than some generalized swelling.  Tenderness directly over the lateral malleolus.  No foot pain with palpation.  She also does have some tenderness along the medial malleolus.  Foot is well-perfused with good cap refill.  Range of motion not tested.  Achilles intact     GAIT: not tested     Diagnostic Modalities:  Left ankle " x-ray: Taken May 29, 2024 nonweightbearing shows a small avulsion fracture off the distal tip of the lateral malleolus.  The mortise is well-maintained with no evidence of instability or dislocation.  Independent visualization of the images was performed.      Impression: left ankle lateral malleolus avulsion fracture with superficial abrasion    Plan:  All of the above pertinent physical exam and imaging modalities findings was reviewed with Luz Elena and her .    Options discussed.  Recommend conservative care.  Recommend since she already has at the fracture boot.  Keep the abrasion covered to help with the irritation within the boot.  Over-the-counter medications for discomfort.  Can be weightbearing as tolerated although I encouraged her to elevate and rest often.  Plan to see her back in 2 weeks.  Probable transition to a stirrup brace.        Return to clinic 2, week(s), or sooner as needed for changes.  Re-x-ray on return: Yes.  Three-view weightbearing left ankle    Mikhail Encarnacion D.O.      Again, thank you for allowing me to participate in the care of your patient.        Sincerely,        Cj Encarnacion, DO

## 2024-06-05 ENCOUNTER — ALLIED HEALTH/NURSE VISIT (OUTPATIENT)
Dept: FAMILY MEDICINE | Facility: CLINIC | Age: 69
End: 2024-06-05
Payer: MEDICARE

## 2024-06-05 DIAGNOSIS — Z48.02 VISIT FOR SUTURE REMOVAL: Primary | ICD-10-CM

## 2024-06-05 PROCEDURE — 99207 PR NO CHARGE NURSE ONLY: CPT

## 2024-06-05 NOTE — PROGRESS NOTES
Luz Elena JONATHAN Shipley presents to the clinic for removal of sutures. The patient has had sutures in place for 7 days. There has been no patient reported signs or symptoms of infection or drainage. 6  sutures and sutures are seen and located on the left wrist. Tetanus status is up to date. All sutures were easily removed today. Routine wound care discussed by the RN or provider. The patient will follow up as needed.  Argelia Casas RN

## 2024-06-06 DIAGNOSIS — S82.62XA CLOSED AVULSION FRACTURE OF LATERAL MALLEOLUS OF LEFT FIBULA, INITIAL ENCOUNTER: Primary | ICD-10-CM

## 2024-06-10 ENCOUNTER — APPOINTMENT (OUTPATIENT)
Dept: MRI IMAGING | Facility: CLINIC | Age: 69
End: 2024-06-10
Attending: FAMILY MEDICINE
Payer: MEDICARE

## 2024-06-10 ENCOUNTER — HOSPITAL ENCOUNTER (EMERGENCY)
Facility: CLINIC | Age: 69
Discharge: HOME | End: 2024-06-10
Attending: FAMILY MEDICINE | Admitting: FAMILY MEDICINE
Payer: MEDICARE

## 2024-06-10 VITALS
RESPIRATION RATE: 20 BRPM | HEIGHT: 65 IN | HEART RATE: 93 BPM | TEMPERATURE: 96.1 F | DIASTOLIC BLOOD PRESSURE: 77 MMHG | OXYGEN SATURATION: 91 % | BODY MASS INDEX: 36.28 KG/M2 | SYSTOLIC BLOOD PRESSURE: 133 MMHG

## 2024-06-10 DIAGNOSIS — G43.809 OTHER MIGRAINE WITHOUT STATUS MIGRAINOSUS, NOT INTRACTABLE: ICD-10-CM

## 2024-06-10 LAB
ANION GAP SERPL CALCULATED.3IONS-SCNC: 18 MMOL/L (ref 7–15)
BASOPHILS # BLD AUTO: 0.1 10E3/UL (ref 0–0.2)
BASOPHILS NFR BLD AUTO: 1 %
BUN SERPL-MCNC: 14.6 MG/DL (ref 8–23)
CALCIUM SERPL-MCNC: 9.1 MG/DL (ref 8.8–10.2)
CHLORIDE SERPL-SCNC: 101 MMOL/L (ref 98–107)
CREAT SERPL-MCNC: 0.84 MG/DL (ref 0.51–0.95)
DEPRECATED HCO3 PLAS-SCNC: 23 MMOL/L (ref 22–29)
EGFRCR SERPLBLD CKD-EPI 2021: 74 ML/MIN/1.73M2
EOSINOPHIL # BLD AUTO: 0.2 10E3/UL (ref 0–0.7)
EOSINOPHIL NFR BLD AUTO: 2 %
ERYTHROCYTE [DISTWIDTH] IN BLOOD BY AUTOMATED COUNT: 12.5 % (ref 10–15)
GLUCOSE SERPL-MCNC: 171 MG/DL (ref 70–99)
HCT VFR BLD AUTO: 49.6 % (ref 35–47)
HGB BLD-MCNC: 16.7 G/DL (ref 11.7–15.7)
HOLD SPECIMEN: NORMAL
HOLD SPECIMEN: NORMAL
IMM GRANULOCYTES # BLD: 0.1 10E3/UL
IMM GRANULOCYTES NFR BLD: 1 %
LYMPHOCYTES # BLD AUTO: 3.3 10E3/UL (ref 0.8–5.3)
LYMPHOCYTES NFR BLD AUTO: 34 %
MCH RBC QN AUTO: 31.6 PG (ref 26.5–33)
MCHC RBC AUTO-ENTMCNC: 33.7 G/DL (ref 31.5–36.5)
MCV RBC AUTO: 94 FL (ref 78–100)
MONOCYTES # BLD AUTO: 1 10E3/UL (ref 0–1.3)
MONOCYTES NFR BLD AUTO: 10 %
NEUTROPHILS # BLD AUTO: 4.9 10E3/UL (ref 1.6–8.3)
NEUTROPHILS NFR BLD AUTO: 52 %
NRBC # BLD AUTO: 0 10E3/UL
NRBC BLD AUTO-RTO: 0 /100
PLATELET # BLD AUTO: 281 10E3/UL (ref 150–450)
POTASSIUM SERPL-SCNC: 3.9 MMOL/L (ref 3.4–5.3)
RBC # BLD AUTO: 5.28 10E6/UL (ref 3.8–5.2)
SODIUM SERPL-SCNC: 142 MMOL/L (ref 135–145)
WBC # BLD AUTO: 9.6 10E3/UL (ref 4–11)

## 2024-06-10 PROCEDURE — 250N000011 HC RX IP 250 OP 636: Performed by: FAMILY MEDICINE

## 2024-06-10 PROCEDURE — 258N000003 HC RX IP 258 OP 636: Mod: JZ | Performed by: FAMILY MEDICINE

## 2024-06-10 PROCEDURE — 80048 BASIC METABOLIC PNL TOTAL CA: CPT | Performed by: FAMILY MEDICINE

## 2024-06-10 PROCEDURE — 70553 MRI BRAIN STEM W/O & W/DYE: CPT | Mod: MG

## 2024-06-10 PROCEDURE — 93010 ELECTROCARDIOGRAM REPORT: CPT | Performed by: FAMILY MEDICINE

## 2024-06-10 PROCEDURE — 250N000011 HC RX IP 250 OP 636: Mod: JZ | Performed by: FAMILY MEDICINE

## 2024-06-10 PROCEDURE — 93005 ELECTROCARDIOGRAM TRACING: CPT

## 2024-06-10 PROCEDURE — A9585 GADOBUTROL INJECTION: HCPCS | Mod: JZ | Performed by: FAMILY MEDICINE

## 2024-06-10 PROCEDURE — 36415 COLL VENOUS BLD VENIPUNCTURE: CPT | Performed by: FAMILY MEDICINE

## 2024-06-10 PROCEDURE — 96375 TX/PRO/DX INJ NEW DRUG ADDON: CPT | Mod: 59

## 2024-06-10 PROCEDURE — 255N000002 HC RX 255 OP 636: Mod: JZ | Performed by: FAMILY MEDICINE

## 2024-06-10 PROCEDURE — 99285 EMERGENCY DEPT VISIT HI MDM: CPT | Performed by: FAMILY MEDICINE

## 2024-06-10 PROCEDURE — 96361 HYDRATE IV INFUSION ADD-ON: CPT

## 2024-06-10 PROCEDURE — 99285 EMERGENCY DEPT VISIT HI MDM: CPT | Mod: 25

## 2024-06-10 PROCEDURE — 96374 THER/PROPH/DIAG INJ IV PUSH: CPT | Mod: 59

## 2024-06-10 PROCEDURE — 85025 COMPLETE CBC W/AUTO DIFF WBC: CPT | Performed by: FAMILY MEDICINE

## 2024-06-10 RX ORDER — ONDANSETRON 2 MG/ML
4 INJECTION INTRAMUSCULAR; INTRAVENOUS ONCE
Status: COMPLETED | OUTPATIENT
Start: 2024-06-10 | End: 2024-06-10

## 2024-06-10 RX ORDER — DIPHENHYDRAMINE HYDROCHLORIDE 50 MG/ML
12.5 INJECTION INTRAMUSCULAR; INTRAVENOUS ONCE
Status: COMPLETED | OUTPATIENT
Start: 2024-06-10 | End: 2024-06-10

## 2024-06-10 RX ORDER — GADOBUTROL 604.72 MG/ML
10 INJECTION INTRAVENOUS ONCE
Status: COMPLETED | OUTPATIENT
Start: 2024-06-10 | End: 2024-06-10

## 2024-06-10 RX ORDER — KETOROLAC TROMETHAMINE 15 MG/ML
10 INJECTION, SOLUTION INTRAMUSCULAR; INTRAVENOUS ONCE
Status: COMPLETED | OUTPATIENT
Start: 2024-06-10 | End: 2024-06-10

## 2024-06-10 RX ADMIN — PROMETHAZINE HYDROCHLORIDE 12.5 MG: 25 INJECTION INTRAMUSCULAR; INTRAVENOUS at 13:47

## 2024-06-10 RX ADMIN — DIPHENHYDRAMINE HYDROCHLORIDE 12.5 MG: 50 INJECTION INTRAMUSCULAR; INTRAVENOUS at 13:26

## 2024-06-10 RX ADMIN — ONDANSETRON 4 MG: 2 INJECTION INTRAMUSCULAR; INTRAVENOUS at 11:45

## 2024-06-10 RX ADMIN — KETOROLAC TROMETHAMINE 10 MG: 15 INJECTION, SOLUTION INTRAMUSCULAR; INTRAVENOUS at 13:28

## 2024-06-10 RX ADMIN — SODIUM CHLORIDE 1000 ML: 9 INJECTION, SOLUTION INTRAVENOUS at 13:32

## 2024-06-10 RX ADMIN — GADOBUTROL 10 ML: 604.72 INJECTION INTRAVENOUS at 14:28

## 2024-06-10 ASSESSMENT — ACTIVITIES OF DAILY LIVING (ADL)
ADLS_ACUITY_SCORE: 35

## 2024-06-10 ASSESSMENT — COLUMBIA-SUICIDE SEVERITY RATING SCALE - C-SSRS
6. HAVE YOU EVER DONE ANYTHING, STARTED TO DO ANYTHING, OR PREPARED TO DO ANYTHING TO END YOUR LIFE?: NO
1. IN THE PAST MONTH, HAVE YOU WISHED YOU WERE DEAD OR WISHED YOU COULD GO TO SLEEP AND NOT WAKE UP?: NO
2. HAVE YOU ACTUALLY HAD ANY THOUGHTS OF KILLING YOURSELF IN THE PAST MONTH?: NO

## 2024-06-10 NOTE — ED TRIAGE NOTES
Pt states that she has been dx with vertigo and is having another episode today. She was sitting in her chair and felt it coming on. Went and got dressed then came in. Feels like the room is spinning and has been vomiting     Triage Assessment (Adult)       Row Name 06/10/24 1129          Triage Assessment    Airway WDL WDL        Respiratory WDL    Respiratory WDL WDL        Skin Circulation/Temperature WDL    Skin Circulation/Temperature WDL WDL        Cardiac WDL    Cardiac WDL WDL        Peripheral/Neurovascular WDL    Peripheral Neurovascular WDL WDL        Cognitive/Neuro/Behavioral WDL    Cognitive/Neuro/Behavioral WDL WDL

## 2024-06-10 NOTE — DISCHARGE INSTRUCTIONS
RETURN TO THE EMERGENCY ROOM FOR THE FOLLOWING:    Severely worsened nausea with vomiting, severely worsened pain, one-sided weakness or incoordination, facial droop, trouble with speech, or at anytime for any concern.    FOLLOW UP:    With your neurology appointment as scheduled.  Or, I placed a neurology referral order in case you need a new appointment.  Expect a phone call within the next few days to help with scheduling.    TREATMENT RECOMMENDATIONS:    There have been no new medications provided and there are no prescription medication changes recommended.     NURSE ADVICE LINE:  [phone] or [phone]

## 2024-06-10 NOTE — ED PROVIDER NOTES
HPI   Patient is a 69-year-old female presenting with dizziness, light sensitivity, nausea with vomiting.  Per my chart review, the patient has a history of asthma, taking azelastine, albuterol, and Flonase.  She has hypertension, taking hydrochlorothiazide.  She has obesity.  She was seen on 10/9/2023 for an episode during which she had double vision and transient dizziness.  She had an MRI/MRA of her brain, head, and neck on 10/16.  These were unremarkable for acute pathology.  She had follow-up on 10/26/2023, note reviewed.  She has not had follow-up with neurology.  Most recently she has been seen for an avulsion fracture involving her left fibula/lateral malleolus.    The patient has new light sensitivity, vertigo, nausea with vomiting starting at about 10:00 AM today.  She was sitting on the couch when symptoms came on.  Movement does not elicit worsened vertigo.  The vertigo does not resolve on its own but is constant.  She is nauseous and she threw up when holding still in bed.  No headache or neck pain.  No chest pain.  No shortness of breath.  No recent trauma or injury.  She has had decreased hearing on her right side over the past month with tinnitus described.  She denies a history of tinnitus in the past.  She does have a history of vertigo in the past, improved with Epley maneuvers.      Allergies:  Allergies   Allergen Reactions    Cartia Xt [Diltiazem] Other (See Comments)     Heartburn whenever on this medication    Losartan Cough    Miconazole     Sulfa Antibiotics Rash     Problem List:    Patient Active Problem List    Diagnosis Date Noted    Class 2 severe obesity due to excess calories with serious comorbidity in adult (H) 10/30/2023     Priority: Medium    Prediabetes 01/12/2022     Priority: Medium    Osteopenia 08/19/2020     Priority: Medium     Recheck [id number].  Aug 2020: 1. Mild osteopenia in the left femoral neck.  2. Mild-moderate osteopenia in the right femoral neck.  3. The L1  and L4 bone density is borderline between lower normal and  mild osteopenia.      Severe persistent asthma (H28) 04/29/2016     Priority: Medium     Symptoms since childhood. Smoked since age of 11 until 31 years.  Until seen in November 2014, used albuterol once in her life.      Lichen sclerosus 04/29/2016     Priority: Medium    Benign essential hypertension 04/29/2016     Priority: Medium    Family history of diabetes mellitus 04/29/2016     Priority: Medium      Past Medical History:    Past Medical History:   Diagnosis Date    Hypertension     Rheumatic fever     Sleep apnea     Uncomplicated asthma      Past Surgical History:    Past Surgical History:   Procedure Laterality Date    BIOPSY      breast,    BIOPSY BREAST      COLONOSCOPY      COLONOSCOPY N/A 2/8/2019    Procedure: Combined Colonoscopy, Single Or Multiple Biopsy/Polypectomy By Biopsy;  Surgeon: José Miguel Ochoa MD;  Location: [facility]    COLONOSCOPY N/A 3/4/2022    Procedure: COLONOSCOPY;  Surgeon: Nirmal Rm MD;  Location: [facility]    COLONOSCOPY WITH CO2 INSUFFLATION N/A 2/8/2019    Procedure: COLONOSCOPY WITH CO2 INSUFFLATION;  Surgeon: José Miguel Ochoa MD;  Location: [facility]    COMBINED ESOPHAGOSCOPY, GASTROSCOPY, DUODENOSCOPY (EGD) WITH CO2 INSUFFLATION N/A 2/8/2019    Procedure: COMBINED ESOPHAGOSCOPY, GASTROSCOPY, DUODENOSCOPY (EGD) WITH CO2 INSUFFLATION;  Surgeon: José Miguel Ochoa MD;  Location: [facility]    ESOPHAGOSCOPY, GASTROSCOPY, DUODENOSCOPY (EGD), COMBINED N/A 2/8/2019    Procedure: Combined Esophagoscopy, Gastroscopy, Duodenoscopy (Egd), Biopsy Single Or Multiple;  Surgeon: José Miguel Ochoa MD;  Location:  OR    GYN SURGERY      TONSILLECTOMY       Family History:    Family History   Problem Relation Age of Onset    Other Cancer Mother         pancreatic    Diabetes Mother         unsure type; in 40s    Cerebrovascular Disease Father         CVA    Chronic Obstructive Pulmonary Disease  "Father     Skin Cancer Father     Breast Cancer Sister     Skin Cancer Sister     Alzheimer Disease Brother         early onset    Arthritis Brother     Aneurysm Paternal Grandmother     Unknown/Adopted Paternal Grandfather     Diabetes Son         type 1    Thyroid Disease Son     Stomach Problem Son         polyps, diverticulitis, heartburn, passes blood    Alcoholism Son     Breast Cancer Paternal Aunt      Social History:  Marital Status:   [2]  Social History     Tobacco Use    Smoking status: Former     Current packs/day: 0.00     Average packs/day: 2.0 packs/day for 20.0 years (40.0 ttl pk-yrs)     Types: Cigarettes     Start date: 1967     Quit date: 1987     Years since quittin.1    Smokeless tobacco: Never   Vaping Use    Vaping status: Never Used   Substance Use Topics    Alcohol use: Yes     Comment: 1 nightly    Drug use: No      Medications:    albuterol (PROAIR HFA/PROVENTIL HFA/VENTOLIN HFA) 108 (90 Base) MCG/ACT inhaler  azelastine (ASTELIN) 0.1 % nasal spray  clobetasol (TEMOVATE) 0.05 % external cream  fluticasone (FLONASE) 50 MCG/ACT nasal spray  Fluticasone-Umeclidin-Vilanterol (TRELEGY ELLIPTA) 200-62.5-25 MCG/ACT oral inhaler  hydrochlorothiazide (HYDRODIURIL) 12.5 MG tablet  meloxicam (MOBIC) 15 MG tablet  Respiratory Therapy Supplies (NEBULIZER/TUBING/MOUTHPIECE) KIT      Review of Systems   All other systems reviewed and are negative.      PE   BP: 117/75  Pulse: 90  Temp: (!) 96.1  F (35.6  C)  Resp: 18  Height: 165.1 cm (5' 5\")  SpO2: 96 %  Physical Exam  Vitals reviewed.   Constitutional:       General: She is not in acute distress.     Appearance: She is well-developed.   HENT:      Head: Normocephalic and atraumatic.      Right Ear: External ear normal.      Left Ear: External ear normal.      Nose: Nose normal.      Mouth/Throat:      Mouth: Mucous membranes are moist.      Pharynx: Oropharynx is clear.   Eyes:      Extraocular Movements: Extraocular movements " intact.      Conjunctiva/sclera: Conjunctivae normal.      Pupils: Pupils are equal, round, and reactive to light.   Cardiovascular:      Rate and Rhythm: Normal rate and regular rhythm.   Pulmonary:      Effort: Pulmonary effort is normal.   Musculoskeletal:         General: Normal range of motion.      Cervical back: Normal range of motion.   Skin:     General: Skin is warm and dry.   Neurological:      Mental Status: She is alert and oriented to person, place, and time.      Comments: No dysarthria or dysphasia.  CN II-VIII intact grossly.  Moving U/L extremities B.  Strength 5/5 U/L extremities B.  Sensation intact grossly to touch (equal).  Rapid alternating movements intact.  Negative Rhomberg.  Normal finger-to-nose movments.  Patient has tinnitus at baseline, fast movement to the right, perhaps rotatory.   Psychiatric:         Behavior: Behavior normal.         ED COURSE and [facility]   1329.  Patient has light sensitivity but no headache or neck pain.  She has nausea with vomiting and vertigo.  Patient received Zofran on arrival.  Phenergan and Benadryl ordered.  Fluid bolus.  MRI of her brain is ordered given the constant unremitting vertigo and associated nystagmus.    1513.  MRI unremarkable for acute pathology.  Low concern for stroke or hemorrhage or malignancy.  Low concern for infection.  No further workup here in the ED.  The patient does have improvement of symptoms.  She feels overall better, however she continues to have some vertigo feelings, even at rest.  She has neurology follow-up in September.  I offered a referral order in case we could get something sooner.  I suspect her symptoms are related to a migraine.  Going home and resting is encouraged.  Patient agrees with plan.      EKG  (1400)   Interpretation performed by me.  Rate: 81     Rhythm: sinus     Axis: nl  Intervals: ME (12-2) 138, QRS (<12) 92, QTc (>5) nl  P wave: down in V1     QRS complex: nl   ST segment / T-wave: nl  Conclusion:  Left atrial enlargement, occasional ventricular ectopic beat.    Electronic medical chart reviewed, including medical problems, medications, medical allergies, social history.  Recent hospitalizations and surgical procedures reviewed.  Recent clinic visits and consultations reviewed.  Recent labs and test results reviewed.  Nursing notes reviewed.    The patient, their parent if applicable, and/or their medical decision maker(s) and I have reviewed all of the available historical information, applicable PMH, physical exam findings, and objective diagnostic data gathered during this ED visit.  We then discussed all work-up options and then together agreed upon the course taken during this visit.  The ultimate disposition and plan was a cooperative decision made between myself and the patient, their parent if applicable, and/or their legal decision maker(s).  The risks and benefits of all decisions made during this visit were discussed to the best of my abilities given the circumstances, and all parties are understanding of the pertinent ramifications of these decisions.      LABS  Labs Ordered and Resulted from Time of ED Arrival to Time of ED Departure   BASIC METABOLIC PANEL - Abnormal       Result Value    Sodium 142      Potassium 3.9      Chloride 101      Carbon Dioxide (CO2) 23      Anion Gap 18 (*)     Urea Nitrogen 14.6      Creatinine 0.84      GFR Estimate 74      Calcium 9.1      Glucose 171 (*)    CBC WITH PLATELETS AND DIFFERENTIAL - Abnormal    WBC Count 9.6      RBC Count 5.28 (*)     Hemoglobin 16.7 (*)     Hematocrit 49.6 (*)     MCV 94      MCH 31.6      MCHC 33.7      RDW 12.5      Platelet Count 281      % Neutrophils 52      % Lymphocytes 34      % Monocytes 10      % Eosinophils 2      % Basophils 1      % Immature Granulocytes 1      NRBCs per 100 WBC 0      Absolute Neutrophils 4.9      Absolute Lymphocytes 3.3      Absolute Monocytes 1.0      Absolute Eosinophils 0.2      Absolute Basophils  0.1      Absolute Immature Granulocytes 0.1      Absolute NRBCs 0.0         IMAGING  Images reviewed by me.  Radiology report also reviewed.  MR Brain w/o & w Contrast   Final Result   Impression:   1. No acute intracranial pathology.   2. No abnormal contrast enhancement.   2. Chronic small vessel ischemic disease and generalized cerebral   volume loss.       JENNIFER HURST MD            SYSTEM ID:  [id number]          Procedures    Medications   ondansetron (ZOFRAN) injection 4 mg (4 mg Intravenous $Given 6/10/24 1145)   promethazine (PHENERGAN) 12.5 mg in sodium chloride 0.9 % 55 mL intermittent infusion (12.5 mg Intravenous $Given 6/10/24 1347)   diphenhydrAMINE (BENADRYL) injection 12.5 mg (12.5 mg Intravenous $Given 6/10/24 1326)   ketorolac (TORADOL) injection 10 mg (10 mg Intravenous $Given 6/10/24 1328)   sodium chloride 0.9% BOLUS 1,000 mL (1,000 mLs Intravenous $New Bag 6/10/24 1332)   gadobutrol (GADAVIST) injection 10 mL (10 mLs Intravenous $Given 6/10/24 1428)         IMPRESSION       ICD-10-CM    1. Other migraine without status migrainosus, not intractable  G43.809 Adult Neurology  Referral               Medication List      There are no discharge medications for this visit.                             Thanh Patel MD  06/10/24 [id number]

## 2024-06-11 ENCOUNTER — PATIENT OUTREACH (OUTPATIENT)
Dept: FAMILY MEDICINE | Facility: CLINIC | Age: 69
End: 2024-06-11
Payer: MEDICARE

## 2024-06-11 NOTE — TELEPHONE ENCOUNTER
Transitions of Care Outreach  Chief Complaint   Patient presents with    ER F/U       Most Recent Admission Date: 6/10/2024   Most Recent Admission Diagnosis:      Most Recent Discharge Date: 6/10/2024   Most Recent Discharge Diagnosis: Other migraine without status migrainosus, not intractable - G43.809     Transitions of Care Assessment    Discharge Assessment  How are you doing now that you are home?: Better  How are your symptoms? (Red Flag symptoms escalate to triage hotline per guidelines): Improved  Do you know how to contact your clinic care team if you have future questions or changes to your health status? : Yes  Does the patient have their discharge instructions? : Yes  Does the patient have questions regarding their discharge instructions? : No  Were you started on any new medications or were there changes to any of your previous medications? : No  Does the patient have all of their medications?: Yes  Do you have questions regarding any of your medications? : No  Do you have all of your needed medical supplies or equipment (DME)?  (i.e. oxygen tank, CPAP, cane, etc.): Yes (NA)    Follow up Plan          Future Appointments   Date Time Provider Department Center   6/14/2024 10:30 AM Cj Encarnacion DO [facility]   6/14/2024 10:30 AM [email or web address] [facility]   6/25/2024  9:00 AM [id number] [facility]   7/8/2024 11:15 AM Rebekah Olivas [facility]   7/8/2024 12:45 PM Jorge Garza MD [facility]   9/11/2024 10:00 AM [facility]ind[facility] Baer [facility]       Outpatient Plan as outlined on AVS reviewed with patient.  Patient has an appt already scheduled with neurology for the soonest available in September. She is also on a cancellation list. She states she has discussed her symptoms with PCP already who advised neuro. She declines F/U visit with PCP at this time.    For any urgent concerns, please contact our 24 hour nurse triage line: [phone] ([phone])      ER follow up call inadvertently documented in a triage encounter.   Bea Singh RN

## 2024-06-13 NOTE — PROGRESS NOTES
"Office Visit-Follow up    Chief Complaint: Luz Elena Shipley is a 69 year old female who is being seen for   Chief Complaint   Patient presents with    Left Ankle - Fracture Followup     Stepped on a pallet, foot broke roslula, DOI: 2024 (2w2d)       History of Present Illness:   Today's visit:  Pain is improving.  Mobility is better.  She is feeling some left hip and buttock discomfort she feels was related to her gait with the boot.    May 31, 2024 visit:  Here with her  today.  She reports May 29 she was stepping on a pallet when she broke through causing her to fall and a subsequent inversion injury.  Immediate pain.  Subsequent seen.  Diagnosed with a lateral malleolus avulsion fracture.  Placed in a fracture boot.  She also suffered a abrasion along the lateral ankle.  She has been walking in the boot.  Taken ibuprofen for pain.  No pre-existing issues.       Social History     Occupational History     Comment: Retired   Tobacco Use    Smoking status: Former     Current packs/day: 0.00     Average packs/day: 2.0 packs/day for 20.0 years (40.0 ttl pk-yrs)     Types: Cigarettes     Start date: 1967     Quit date: 1987     Years since quittin.1    Smokeless tobacco: Never   Vaping Use    Vaping status: Never Used   Substance and Sexual Activity    Alcohol use: Yes     Comment: 1 nightly    Drug use: No    Sexual activity: Not Currently     Partners: Male     Birth control/protection: Post-menopausal       REVIEW OF SYSTEMS  General: negative for, night sweats, dizziness, fatigue  Resp: No shortness of breath and no cough  CV: negative for chest pain, syncope or near-syncope  GI: negative for nausea, vomiting and diarrhea  : negative for dysuria and hematuria  Musculoskeletal: as above  Neurologic: negative for syncope   Hematologic: negative for bleeding disorder    Physical Exam:  Vitals: /88   Pulse 78   Temp 97.3  F (36.3  C)   Ht 1.651 m (5' 5\")   Wt 98.9 kg (218 lb)   BMI " 36.28 kg/m    BMI= Body mass index is 36.28 kg/m .  Constitutional: healthy, alert and no acute distress   Psychiatric: mentation appears normal and affect normal/bright  NEURO: no focal deficits  RESP: Normal with easy respirations and no use of accessory muscles to breathe, no audible wheezing or retractions  CV: LLE:  no edema           SKIN: No erythema, rashes, excoriation, or breakdown. No evidence of infection.   JOINT/EXTREMITIES:left ankle: No gross deformity.  Swelling improved.  No medial sided or foot tenderness palpation.  Point tenderness over the tip of the lateral malleolus.  Foot is warm dry with good cap refill.  Sensation intact light touch  GAIT: not tested             Diagnostic Modalities:  Left ankle x-ray: Weightbearing x-rays: Small avulsion fracture again noted off the tip of the lateral malleolus.  No other fracture noted.  Mortise is well-maintained with no evidence subluxation or dislocation.  Independent visualization of the images was performed.      Impression: left ankle lateral malleolus avulsion fracture with superficial abrasion date of injury May 29, 2024  (2 weeks)  Plan:  All of the above pertinent physical exam and imaging modalities findings was reviewed with Luz Elena.    Doing well.  Making progress.  She is getting some hip pain she feels related to her boot and abnormal gait.  Will transition to a stirrup ankle brace with supportive shoe wear.  Plan to see her back in 3 weeks.  Can be weightbearing as tolerated.          Return to clinic 3, week(s), or sooner as needed for changes.  Re-x-ray on return: Yes.    Mikhail Encarnacion D.O.

## 2024-06-14 ENCOUNTER — OFFICE VISIT (OUTPATIENT)
Dept: ORTHOPEDICS | Facility: CLINIC | Age: 69
End: 2024-06-14
Payer: MEDICARE

## 2024-06-14 ENCOUNTER — ANCILLARY PROCEDURE (OUTPATIENT)
Dept: GENERAL RADIOLOGY | Facility: CLINIC | Age: 69
End: 2024-06-14
Attending: NURSE PRACTITIONER
Payer: MEDICARE

## 2024-06-14 VITALS
HEIGHT: 65 IN | TEMPERATURE: 97.3 F | WEIGHT: 218 LBS | HEART RATE: 78 BPM | BODY MASS INDEX: 36.32 KG/M2 | DIASTOLIC BLOOD PRESSURE: 88 MMHG | SYSTOLIC BLOOD PRESSURE: 134 MMHG

## 2024-06-14 DIAGNOSIS — S82.62XA CLOSED AVULSION FRACTURE OF LATERAL MALLEOLUS OF LEFT FIBULA, INITIAL ENCOUNTER: ICD-10-CM

## 2024-06-14 DIAGNOSIS — S82.62XA CLOSED AVULSION FRACTURE OF LATERAL MALLEOLUS OF LEFT FIBULA, INITIAL ENCOUNTER: Primary | ICD-10-CM

## 2024-06-14 PROCEDURE — 99207 PR FRACTURE CARE IN GLOBAL PERIOD: CPT | Performed by: ORTHOPAEDIC SURGERY

## 2024-06-14 PROCEDURE — 73610 X-RAY EXAM OF ANKLE: CPT | Mod: TC | Performed by: RADIOLOGY

## 2024-06-14 NOTE — LETTER
2024      Luz Elena Shipley  [address] [place] [address]      Dear Colleague,    Thank you for referring your patient, Luz Elena Shipley, to the [facility]. Please see a copy of my visit note below.    Office Visit-Follow up    Chief Complaint: Luz Elena Shipley is a 69 year old female who is being seen for   Chief Complaint   Patient presents with     Left Ankle - Fracture Followup     Stepped on a pallet, foot broke [facility], DOI: 2024 (2w2d)       History of Present Illness:   Today's visit:  Pain is improving.  Mobility is better.  She is feeling some left hip and buttock discomfort she feels was related to her gait with the boot.    May 31, 2024 visit:  Here with her  today.  She reports May 29 she was stepping on a pallet when she broke through causing her to fall and a subsequent inversion injury.  Immediate pain.  Subsequent seen.  Diagnosed with a lateral malleolus avulsion fracture.  Placed in a fracture boot.  She also suffered a abrasion along the lateral ankle.  She has been walking in the boot.  Taken ibuprofen for pain.  No pre-existing issues.       Social History     Occupational History     Comment: Retired   Tobacco Use     Smoking status: Former     Current packs/day: 0.00     Average packs/day: 2.0 packs/day for 20.0 years (40.0 ttl pk-yrs)     Types: Cigarettes     Start date: 1967     Quit date: 1987     Years since quittin.1     Smokeless tobacco: Never   Vaping Use     Vaping status: Never Used   Substance and Sexual Activity     Alcohol use: Yes     Comment: 1 nightly     Drug use: No     Sexual activity: Not Currently     Partners: Male     Birth control/protection: Post-menopausal       REVIEW OF SYSTEMS  General: negative for, night sweats, dizziness, fatigue  Resp: No shortness of breath and no cough  CV: negative for chest pain, syncope or near-syncope  GI: negative for nausea, vomiting and diarrhea  : negative for dysuria and  "hematuria  Musculoskeletal: as above  Neurologic: negative for syncope   Hematologic: negative for bleeding disorder    Physical Exam:  Vitals: /88   Pulse 78   Temp 97.3  F (36.3  C)   Ht 1.651 m (5' 5\")   Wt 98.9 kg (218 lb)   BMI 36.28 kg/m    BMI= Body mass index is 36.28 kg/m .  Constitutional: healthy, alert and no acute distress   Psychiatric: mentation appears normal and affect normal/bright  NEURO: no focal deficits  RESP: Normal with easy respirations and no use of accessory muscles to breathe, no audible wheezing or retractions  CV: LLE:  no edema           SKIN: No erythema, rashes, excoriation, or breakdown. No evidence of infection.   JOINT/EXTREMITIES:left ankle: No gross deformity.  Swelling improved.  No medial sided or foot tenderness palpation.  Point tenderness over the tip of the lateral malleolus.  Foot is warm dry with good cap refill.  Sensation intact light touch  GAIT: not tested             Diagnostic Modalities:  Left ankle x-ray: Weightbearing x-rays: Small avulsion fracture again noted off the tip of the lateral malleolus.  No other fracture noted.  Mortise is well-maintained with no evidence subluxation or dislocation.  Independent visualization of the images was performed.      Impression: left ankle lateral malleolus avulsion fracture with superficial abrasion date of injury May 29, 2024  (2 weeks)  Plan:  All of the above pertinent physical exam and imaging modalities findings was reviewed with Luz Elena.    Doing well.  Making progress.  She is getting some hip pain she feels related to her boot and abnormal gait.  Will transition to a stirrup ankle brace with supportive shoe wear.  Plan to see her back in 3 weeks.  Can be weightbearing as tolerated.          Return to clinic 3, week(s), or sooner as needed for changes.  Re-x-ray on return: Yes.    Mikhail Encarnacion D.O.          Again, thank you for allowing me to participate in the care of your patient.  "       Sincerely,        Cj Encarnacion, DO

## 2024-06-25 ENCOUNTER — ANCILLARY PROCEDURE (OUTPATIENT)
Dept: MAMMOGRAPHY | Facility: CLINIC | Age: 69
End: 2024-06-25
Attending: PHYSICIAN ASSISTANT
Payer: MEDICARE

## 2024-06-25 DIAGNOSIS — Z12.31 VISIT FOR SCREENING MAMMOGRAM: ICD-10-CM

## 2024-06-25 PROCEDURE — 77063 BREAST TOMOSYNTHESIS BI: CPT | Mod: TC | Performed by: RADIOLOGY

## 2024-06-25 PROCEDURE — 77067 SCR MAMMO BI INCL CAD: CPT | Mod: TC | Performed by: RADIOLOGY

## 2024-06-27 NOTE — PROGRESS NOTES
History of Present Illness - Luz Elena Shipley (Rene) is a very pleasant 69 year old female I have seen before for nasal obstruction following a fall, butt I referred her to plastics.  She decided not to do it.    But she is here for a new issue, due to persistent RIGHT  ear symptoms. She tells me that a few months ago she acutely got fullness on the RIGHT ear.  She was tried on Flonase and claritin. She was not sick at the time, but has had tinnitus in the RIGHT ear since then.    But in October 2023, then April 2024, and then most recently in June 2024 she had severe spinning vertigo.  She even went to the ER twice and the work up was negative, including negative MRI of the brain.    Otherwise no history of chronic ear disease.  No previous ear surgery.  No history of chemo or radiation therapy to the head and neck, and no major head trauma.  He denies a history of  service, no frequent firearm use.  She did work for a construction company for many years, but mostly in the office.      Past Medical History -   Patient Active Problem List   Diagnosis    Severe persistent asthma (H28)    Lichen sclerosus    Benign essential hypertension    Family history of diabetes mellitus    Osteopenia    Prediabetes    Class 2 severe obesity due to excess calories with serious comorbidity in adult (H)       Current Medications -   Current Outpatient Medications:     albuterol (PROAIR HFA/PROVENTIL HFA/VENTOLIN HFA) 108 (90 Base) MCG/ACT inhaler, Inhale 2-4 puffs into the lungs every 4 hours as needed for shortness of breath or wheezing, Disp: 18 g, Rfl: 3    azelastine (ASTELIN) 0.1 % nasal spray, Spray 2 sprays into both nostrils 2 times daily as needed for rhinitis or allergies, Disp: 30 mL, Rfl: 3    clobetasol (TEMOVATE) 0.05 % external cream, Apply sparingly to affected area twice weekly as needed.  Do not apply to face., Disp: 60 g, Rfl: 3    fluticasone (FLONASE) 50 MCG/ACT nasal spray, Spray 1 spray into both  nostrils daily, Disp: 15.8 mL, Rfl: 1    Fluticasone-Umeclidin-Vilanterol (TRELEGY ELLIPTA) 200-62.5-25 MCG/ACT oral inhaler, Inhale 1 puff into the lungs daily, Disp: 180 each, Rfl: 3    hydrochlorothiazide (HYDRODIURIL) 12.5 MG tablet, Take 1 tablet (12.5 mg) by mouth daily, Disp: 90 tablet, Rfl: 3    meloxicam (MOBIC) 15 MG tablet, Take 1 tablet (15 mg) by mouth daily, Disp: 30 tablet, Rfl: 0    Respiratory Therapy Supplies (NEBULIZER/TUBING/MOUTHPIECE) KIT, Use with albuterol neb solution, Disp: 1 kit, Rfl: 0    Allergies -   Allergies   Allergen Reactions    Cartia Xt [Diltiazem] Other (See Comments)     Heartburn whenever on this medication    Losartan Cough    Miconazole     Sulfa Antibiotics Rash       Social History -   Social History     Socioeconomic History    Marital status:    Occupational History     Comment: Retired   Tobacco Use    Smoking status: Former     Current packs/day: 0.00     Average packs/day: 2.0 packs/day for 20.0 years (40.0 ttl pk-yrs)     Types: Cigarettes     Start date: 1967     Quit date: 1987     Years since quittin.1    Smokeless tobacco: Never   Vaping Use    Vaping status: Never Used   Substance and Sexual Activity    Alcohol use: Yes     Comment: 1 nightly    Drug use: No    Sexual activity: Not Currently     Partners: Male     Birth control/protection: Post-menopausal   Other Topics Concern    Parent/sibling w/ CABG, MI or angioplasty before 65F 55M? No   Social History Narrative    22        ENVIRONMENTAL HISTORY: [place] The home is heated with a forced air but does not use.  Has floor heating. They do have central air conditioning. The patient's bedroom is furnished with carpeting in bedroom. 1 dog at home. There is no history of cockroach or mice infestation. There are no smokers in the house.  The house does not have a damp basement.      Social Determinants of Health     Financial Resource Strain:  High Risk (11/15/2023)    Financial Resource Strain     Within the past 12 months, have you or your family members you live with been unable to get utilities (heat, electricity) when it was really needed?: Yes   Food Insecurity: Low Risk  (11/15/2023)    Food Insecurity     Within the past 12 months, did you worry that your food would run out before you got money to buy more?: No     Within the past 12 months, did the food you bought just not last and you didn t have money to get more?: No   Transportation Needs: Low Risk  (11/15/2023)    Transportation Needs     Within the past 12 months, has lack of transportation kept you from medical appointments, getting your medicines, non-medical meetings or appointments, work, or from getting things that you need?: No   Interpersonal Safety: Low Risk  (10/26/2023)    Interpersonal Safety     Do you feel physically and emotionally safe where you currently live?: Yes     Within the past 12 months, have you been hit, slapped, kicked or otherwise physically hurt by someone?: No     Within the past 12 months, have you been humiliated or emotionally abused in other ways by your partner or ex-partner?: No   Housing Stability: Low Risk  (11/15/2023)    Housing Stability     Do you have housing? : Yes     Are you worried about losing your housing?: No       Family History -   Family History   Problem Relation Age of Onset    Other Cancer Mother         pancreatic    Diabetes Mother         unsure type; in 40s    Cerebrovascular Disease Father         CVA    Chronic Obstructive Pulmonary Disease Father     Skin Cancer Father     Breast Cancer Sister     Skin Cancer Sister     Alzheimer Disease Brother         early onset    Arthritis Brother     Aneurysm Paternal Grandmother     Unknown/Adopted Paternal Grandfather     Diabetes Son         type 1    Thyroid Disease Son     Stomach Problem Son         polyps, diverticulitis, heartburn, passes blood    Alcoholism Son     Breast Cancer  Paternal Aunt        Review of Systems - As per HPI and PMHx, otherwise 10+ system review of the head and neck, and general constitution is negative.    Physical Exam  /79 (BP Location: Left arm, Patient Position: Sitting, Cuff Size: Adult Large)   Pulse 79   SpO2 96%       General - The patient is well nourished and well developed, and appears to have good nutritional status.  Alert and oriented to person and place, answers questions and cooperates with examination appropriately.   Head and Face - Normocephalic and atraumatic, with no gross asymmetry noted of the contour of the facial features.  The facial nerve is intact, with strong symmetric movements.  Voice and Breathing - The patient was breathing comfortably without the use of accessory muscles. There was no wheezing, stridor, or stertor.  The patients voice was clear and strong, and had appropriate pitch and quality.  Ears - The tympanic membranes are normal in appearance, bony landmarks are intact.  No retraction, perforation, or masses.  No fluid or purulence was seen in the external canal or the middle ear. No evidence of infection of the middle ear or external canal, cerumen was normal in appearance.  Eyes - Extraocular movements intact, and the pupils were reactive to light.  Sclera were not icteric or injected, conjunctiva were pink and moist.  Mouth - Examination of the oral cavity showed pink, healthy oral mucosa. No lesions or ulcerations noted.  The tongue was mobile and midline, and the dentition were in good condition.    Throat - The walls of the oropharynx were smooth, pink, moist, symmetric, and had no lesions or ulcerations.  The tonsillar pillars and soft palate were symmetric.  The uvula was midline on elevation.    Neck - Normal midline excursion of the laryngotracheal complex during swallowing.  Full range of motion on passive movement.  Palpation of the occipital, submental, submandibular, internal jugular chain, and  supraclavicular nodes did not demonstrate any abnormal lymph nodes or masses.  The carotid pulse was palpable bilaterally.  Palpation of the thyroid was soft and smooth, with no nodules or goiter appreciated.  The trachea was mobile and midline.  Nose - External contour is symmetric, no gross deflection or scars.  Nasal mucosa is pink and moist with no abnormal mucus.  The septum was midline and non-obstructive, turbinates of normal size and position.  No polyps, masses, or purulence noted on examination.    Audiologic Studies - An audiogram and tympanogram were performed today as part of the evaluation and personally reviewed. The tympanogram shows a normal Type A curve, with normal canal volume and middle ear pressure.  There is no sign of eustachian tube dysfunction or middle ear effusion.  The audiogram was also normal.  The sensorineural hearing was age-appropriate with a gently down sloping symmetric mild to moderate loss, with no evidence of conductive hearing loss or significant asymmetry.    A/P - Luz Elena Shipley is a 69 year old female  (H81.01) Meniere's disease, right  (primary encounter diagnosis)  (H91.91) Change in hearing, right    I spent the remainder of today's visit educating the patient about the current theory on the cause of Meniere's Disease and the reasoning behind its treatment.  We discussed the CATS (Caffeine, Alcohol, Tobacco, Salt/Stress) Avoidance Diet, as well as safety measures to be done at home to avoid injury.  Finally, the variability, potential permanent hearing loss, and natural course of Meniere's disease, including 30% incidence of eventual bilateral involvement, was also discussed.    The use of a half tablet of Maxzide will be started and I will have her take her blood pressure at home and report back to me in 1 month to see if things are better..    She does have a history of Migraine, but I do not think that this is Vestibular Migraine

## 2024-07-01 ENCOUNTER — TRANSFERRED RECORDS (OUTPATIENT)
Dept: MULTI SPECIALTY CLINIC | Facility: CLINIC | Age: 69
End: 2024-07-01

## 2024-07-01 LAB — RETINOPATHY: NORMAL

## 2024-07-03 DIAGNOSIS — S82.62XA CLOSED AVULSION FRACTURE OF LATERAL MALLEOLUS OF LEFT FIBULA, INITIAL ENCOUNTER: Primary | ICD-10-CM

## 2024-07-08 ENCOUNTER — OFFICE VISIT (OUTPATIENT)
Dept: OTOLARYNGOLOGY | Facility: CLINIC | Age: 69
End: 2024-07-08
Payer: MEDICARE

## 2024-07-08 ENCOUNTER — OFFICE VISIT (OUTPATIENT)
Dept: AUDIOLOGY | Facility: CLINIC | Age: 69
End: 2024-07-08
Payer: MEDICARE

## 2024-07-08 VITALS — HEART RATE: 79 BPM | DIASTOLIC BLOOD PRESSURE: 79 MMHG | SYSTOLIC BLOOD PRESSURE: 134 MMHG | OXYGEN SATURATION: 96 %

## 2024-07-08 DIAGNOSIS — H81.01 MENIERE'S DISEASE, RIGHT: Primary | ICD-10-CM

## 2024-07-08 DIAGNOSIS — H90.3 SENSORINEURAL HEARING LOSS, BILATERAL: Primary | ICD-10-CM

## 2024-07-08 DIAGNOSIS — H91.91 CHANGE IN HEARING, RIGHT: ICD-10-CM

## 2024-07-08 DIAGNOSIS — I10 BENIGN ESSENTIAL HYPERTENSION: ICD-10-CM

## 2024-07-08 PROCEDURE — 92550 TYMPANOMETRY & REFLEX THRESH: CPT

## 2024-07-08 PROCEDURE — 99214 OFFICE O/P EST MOD 30 MIN: CPT | Performed by: OTOLARYNGOLOGY

## 2024-07-08 PROCEDURE — 92557 COMPREHENSIVE HEARING TEST: CPT

## 2024-07-08 RX ORDER — TRIAMTERENE/HYDROCHLOROTHIAZID 37.5-25 MG
0.5 TABLET ORAL DAILY
Qty: 30 TABLET | Refills: 3 | Status: SHIPPED | OUTPATIENT
Start: 2024-07-08 | End: 2024-08-09

## 2024-07-08 NOTE — PROGRESS NOTES
AUDIOLOGY REPORT:    Patient was referred to [facility] from ENT by Dr. Garza for a hearing examination. Patient is here today with concerns regarding a change in right ear hearing and persistent aural fullness and tinnitus that began 2 months ago. Today, Gigi denies any pain, drainage, history of ear surgeries and family history of hearing loss. 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 Gigi was not accompanied to today's appointment     Testing:    Otoscopy:   Otoscopic exam indicates ears are clear of cerumen bilaterally     Tympanograms:    RIGHT: normal eardrum mobility     LEFT:   normal eardrum mobility    Reflexes (reported by stimulus ear): 1000 Hz  RIGHT: Ipsilateral is present at normal levels  RIGHT: Contralateral is absent at frequencies tested  LEFT:   Ipsilateral is present at normal levels  LEFT:   Contralateral is absent at frequencies tested    Thresholds:   Pure Tone Thresholds assessed using conventional audiometry with good  reliability from 250-8000 Hz bilaterally using insert earphones and circumaural headphones     RIGHT:  mild sloping to moderately-severe sensorineural hearing loss    LEFT:    normal sloping to moderately-severe sensorineural hearing loss    Speech Reception Threshold:    RIGHT: 25 dB HL    LEFT:   25 dB HL  Speech Reception Thresholds are in good agreement with pure tone thresholds    Word Recognition Score:     RIGHT: 96% at 65 dB HL using NU-6 recorded word list.    LEFT:   88% at 65 dB HL using NU-6 recorded word list.    Discussed results with the patient.     Patient was returned to ENT for follow up.     Wilmer Noel, [facility]  Licensed Audiologist  [place] #[id number]    07/08/24

## 2024-07-08 NOTE — LETTER
7/8/2024      Luz Elena Shipley  [address] [place] [facility] [address]      Dear Colleague,    Thank you for referring your patient, Luz Elena Shipley, to the [facility]. Please see a copy of my visit note below.    History of Present Illness - Luz Elena Shipley (Rene) is a very pleasant 69 year old female I have seen before for nasal obstruction following a fall, butt I referred her to plastics.  She decided not to do it.    But she is here for a new issue, due to persistent RIGHT  ear symptoms. She tells me that a few months ago she acutely got fullness on the RIGHT ear.  She was tried on Flonase and claritin. She was not sick at the time, but has had tinnitus in the RIGHT ear since then.    But in October 2023, then April 2024, and then most recently in June 2024 she had severe spinning vertigo.  She even went to the ER twice and the work up was negative, including negative MRI of the brain.    Otherwise no history of chronic ear disease.  No previous ear surgery.  No history of chemo or radiation therapy to the head and neck, and no major head trauma.  He denies a history of  service, no frequent firearm use.  She did work for a [employer] company for many years, but mostly in the office.      Past Medical History -   Patient Active Problem List   Diagnosis     Severe persistent asthma (H28)     Lichen sclerosus     Benign essential hypertension     Family history of diabetes mellitus     Osteopenia     Prediabetes     Class 2 severe obesity due to excess calories with serious comorbidity in adult (H)       Current Medications -   Current Outpatient Medications:      albuterol (PROAIR HFA/PROVENTIL HFA/VENTOLIN HFA) 108 (90 Base) MCG/ACT inhaler, Inhale 2-4 puffs into the lungs every 4 hours as needed for shortness of breath or wheezing, Disp: 18 g, Rfl: 3     azelastine (ASTELIN) 0.1 % nasal spray, Spray 2 sprays into both nostrils 2 times daily as needed for rhinitis or allergies, Disp:  30 mL, Rfl: 3     clobetasol (TEMOVATE) 0.05 % external cream, Apply sparingly to affected area twice weekly as needed.  Do not apply to face., Disp: 60 g, Rfl: 3     fluticasone (FLONASE) 50 MCG/ACT nasal spray, Spray 1 spray into both nostrils daily, Disp: 15.8 mL, Rfl: 1     Fluticasone-Umeclidin-Vilanterol (TRELEGY ELLIPTA) 200-62.5-25 MCG/ACT oral inhaler, Inhale 1 puff into the lungs daily, Disp: 180 each, Rfl: 3     hydrochlorothiazide (HYDRODIURIL) 12.5 MG tablet, Take 1 tablet (12.5 mg) by mouth daily, Disp: 90 tablet, Rfl: 3     meloxicam (MOBIC) 15 MG tablet, Take 1 tablet (15 mg) by mouth daily, Disp: 30 tablet, Rfl: 0     Respiratory Therapy Supplies (NEBULIZER/TUBING/MOUTHPIECE) KIT, Use with albuterol neb solution, Disp: 1 kit, Rfl: 0    Allergies -   Allergies   Allergen Reactions     Cartia Xt [Diltiazem] Other (See Comments)     Heartburn whenever on this medication     Losartan Cough     Miconazole      Sulfa Antibiotics Rash       Social History -   Social History     Socioeconomic History     Marital status:    Occupational History     Comment: Retired   Tobacco Use     Smoking status: Former     Current packs/day: 0.00     Average packs/day: 2.0 packs/day for 20.0 years (40.0 ttl pk-yrs)     Types: Cigarettes     Start date: 1967     Quit date: 1987     Years since quittin.1     Smokeless tobacco: Never   Vaping Use     Vaping status: Never Used   Substance and Sexual Activity     Alcohol use: Yes     Comment: 1 nightly     Drug use: No     Sexual activity: Not Currently     Partners: Male     Birth control/protection: Post-menopausal   Other Topics Concern     Parent/sibling w/ CABG, MI or angioplasty before 65F 55M? No   Social History Narrative    22        ENVIRONMENTAL HISTORY: [place] The home is heated with a forced air but does not use.  Has floor heating. They do have central air conditioning. The patient's  bedroom is furnished with carpeting in bedroom. 1 dog at home. There is no history of cockroach or mice infestation. There are no smokers in the house.  The house does not have a damp basement.      Social Determinants of Health     Financial Resource Strain: High Risk (11/15/2023)    Financial Resource Strain      Within the past 12 months, have you or your family members you live with been unable to get utilities (heat, electricity) when it was really needed?: Yes   Food Insecurity: Low Risk  (11/15/2023)    Food Insecurity      Within the past 12 months, did you worry that your food would run out before you got money to buy more?: No      Within the past 12 months, did the food you bought just not last and you didn t have money to get more?: No   Transportation Needs: Low Risk  (11/15/2023)    Transportation Needs      Within the past 12 months, has lack of transportation kept you from medical appointments, getting your medicines, non-medical meetings or appointments, work, or from getting things that you need?: No   Interpersonal Safety: Low Risk  (10/26/2023)    Interpersonal Safety      Do you feel physically and emotionally safe where you currently live?: Yes      Within the past 12 months, have you been hit, slapped, kicked or otherwise physically hurt by someone?: No      Within the past 12 months, have you been humiliated or emotionally abused in other ways by your partner or ex-partner?: No   Housing Stability: Low Risk  (11/15/2023)    Housing Stability      Do you have housing? : Yes      Are you worried about losing your housing?: No       Family History -   Family History   Problem Relation Age of Onset     Other Cancer Mother         pancreatic     Diabetes Mother         unsure type; in 40s     Cerebrovascular Disease Father         CVA     Chronic Obstructive Pulmonary Disease Father      Skin Cancer Father      Breast Cancer Sister      Skin Cancer Sister      Alzheimer Disease Brother          early onset     Arthritis Brother      Aneurysm Paternal Grandmother      Unknown/Adopted Paternal Grandfather      Diabetes Son         type 1     Thyroid Disease Son      Stomach Problem Son         polyps, diverticulitis, heartburn, passes blood     Alcoholism Son      Breast Cancer Paternal Aunt        Review of Systems - As per HPI and PMHx, otherwise 10+ system review of the head and neck, and general constitution is negative.    Physical Exam  /79 (BP Location: Left arm, Patient Position: Sitting, Cuff Size: Adult Large)   Pulse 79   SpO2 96%       General - The patient is well nourished and well developed, and appears to have good nutritional status.  Alert and oriented to person and place, answers questions and cooperates with examination appropriately.   Head and Face - Normocephalic and atraumatic, with no gross asymmetry noted of the contour of the facial features.  The facial nerve is intact, with strong symmetric movements.  Voice and Breathing - The patient was breathing comfortably without the use of accessory muscles. There was no wheezing, stridor, or stertor.  The patients voice was clear and strong, and had appropriate pitch and quality.  Ears - The tympanic membranes are normal in appearance, bony landmarks are intact.  No retraction, perforation, or masses.  No fluid or purulence was seen in the external canal or the middle ear. No evidence of infection of the middle ear or external canal, cerumen was normal in appearance.  Eyes - Extraocular movements intact, and the pupils were reactive to light.  Sclera were not icteric or injected, conjunctiva were pink and moist.  Mouth - Examination of the oral cavity showed pink, healthy oral mucosa. No lesions or ulcerations noted.  The tongue was mobile and midline, and the dentition were in good condition.    Throat - The walls of the oropharynx were smooth, pink, moist, symmetric, and had no lesions or ulcerations.  The tonsillar pillars and  soft palate were symmetric.  The uvula was midline on elevation.    Neck - Normal midline excursion of the laryngotracheal complex during swallowing.  Full range of motion on passive movement.  Palpation of the occipital, submental, submandibular, internal jugular chain, and supraclavicular nodes did not demonstrate any abnormal lymph nodes or masses.  The carotid pulse was palpable bilaterally.  Palpation of the thyroid was soft and smooth, with no nodules or goiter appreciated.  The trachea was mobile and midline.  Nose - External contour is symmetric, no gross deflection or scars.  Nasal mucosa is pink and moist with no abnormal mucus.  The septum was midline and non-obstructive, turbinates of normal size and position.  No polyps, masses, or purulence noted on examination.    Audiologic Studies - An audiogram and tympanogram were performed today as part of the evaluation and personally reviewed. The tympanogram shows a normal Type A curve, with normal canal volume and middle ear pressure.  There is no sign of eustachian tube dysfunction or middle ear effusion.  The audiogram was also normal.  The sensorineural hearing was age-appropriate with a gently down sloping symmetric mild to moderate loss, with no evidence of conductive hearing loss or significant asymmetry.    A/P - Luz Elena Shipley is a 69 year old female  (H81.01) Meniere's disease, right  (primary encounter diagnosis)  (H91.91) Change in hearing, right    I spent the remainder of today's visit educating the patient about the current theory on the cause of Meniere's Disease and the reasoning behind its treatment.  We discussed the CATS (Caffeine, Alcohol, Tobacco, Salt/Stress) Avoidance Diet, as well as safety measures to be done at home to avoid injury.  Finally, the variability, potential permanent hearing loss, and natural course of Meniere's disease, including 30% incidence of eventual bilateral involvement, was also discussed.    The use of a half  tablet of Maxzide will be started and I will have her take her blood pressure at home and report back to me in 1 month to see if things are better..    She does have a history of Migraine, but I do not think that this is Vestibular Migraine      Again, thank you for allowing me to participate in the care of your patient.        Sincerely,        Jorge Garza MD

## 2024-07-09 RX ORDER — HYDROCHLOROTHIAZIDE 12.5 MG/1
12.5 TABLET ORAL DAILY
Qty: 90 TABLET | Refills: 0 | Status: SHIPPED | OUTPATIENT
Start: 2024-07-09 | End: 2024-08-09

## 2024-07-09 NOTE — PROGRESS NOTES
Office Visit-Follow up    Chief Complaint: Luz Elena Shipley is a 69 year old female who is being seen for   Chief Complaint   Patient presents with    Left Ankle - Fracture Followup     Distal fibula avulsion fracture  Stepped on a pallet and fell through, DOI: 2024 (6w)       History of Present Illness:   Today's visit:  Doing well. Some soreness but pain largely resolved.  Going well with the stirrup brace. Reports has been weaning out of and no pain with ambulation and light activity.  Feels like making good progress.     2024 visit:  Pain is improving.  Mobility is better.  She is feeling some left hip and buttock discomfort she feels was related to her gait with the boot.     May 31, 2024 visit:  Here with her  today.  She reports May 29 she was stepping on a pallet when she broke through causing her to fall and a subsequent inversion injury.  Immediate pain.  Subsequent seen.  Diagnosed with a lateral malleolus avulsion fracture.  Placed in a fracture boot.  She also suffered a abrasion along the lateral ankle.  She has been walking in the boot.  Taken ibuprofen for pain.  No pre-existing issues.         Social History     Occupational History     Comment: Retired   Tobacco Use    Smoking status: Former     Current packs/day: 0.00     Average packs/day: 2.0 packs/day for 20.0 years (40.0 ttl pk-yrs)     Types: Cigarettes     Start date: 1967     Quit date: 1987     Years since quittin.2    Smokeless tobacco: Never   Vaping Use    Vaping status: Never Used   Substance and Sexual Activity    Alcohol use: Yes     Comment: 1 nightly    Drug use: No    Sexual activity: Not Currently     Partners: Male     Birth control/protection: Post-menopausal       REVIEW OF SYSTEMS  General: negative for, night sweats, dizziness, fatigue  Resp: No shortness of breath and no cough  CV: negative for chest pain, syncope or near-syncope  GI: negative for nausea, vomiting and diarrhea  : negative  "for dysuria and hematuria  Musculoskeletal: as above  Neurologic: negative for syncope   Hematologic: negative for bleeding disorder    Physical Exam:  Vitals: Temp 97.7  F (36.5  C)   Ht 1.651 m (5' 5\")   Wt 98.9 kg (218 lb)   BMI 36.28 kg/m    BMI= Body mass index is 36.28 kg/m .  Constitutional: healthy, alert and no acute distress   Psychiatric: mentation appears normal and affect normal/bright  NEURO: no focal deficits  RESP: Normal with easy respirations and no use of accessory muscles to breathe, no audible wheezing or retractions  CV: No peripheral edema  SKIN: No erythema, rashes, excoriation, or breakdown. No evidence of infection.   JOINT/EXTREMITIES:left ankle: mildly tender along lateral mallelous. Ankle motion largely intact. No pain with this. No instability. Sensation intact. Skin pink warm dry.   GAIT: not tested             Diagnostic Modalities:  Left ankle x-ray: Weightbearing x-rays: Small avulsion fracture again noted off the tip of the lateral malleolus. Decreased lucency. Evidence of healing. More difficult to visualize when compared to previous. No other fracture noted. Mortise is well-maintained with no evidence subluxation or dislocation.   Independent visualization of the images was performed.      Impression: left ankle lateral malleolus avulsion fracture with superficial abrasion date of injury May 29, 2024 6 weeks    Plan:  All of the above pertinent physical exam and imaging modalities findings was reviewed with Luz Elena.  Doing well. Ambulating some without the brace without pain.  Recommended to continue to wean out of the stirrup brace. Ok to slowly resume as tolerated. Did discuss physical therapy. She would like to hold off on this, given the near full motion currently this is reasonable. Did discuss some at home exercises and demonstrated in clinic. If she changes her mind can call back.  Slow resumption of activity.  If doing well in 4 weeks and ankle back to baseline does " need to come back in, otherwise if still having any residual symptoms will see her back in 4 weeks.       Return to clinic 4, week(s) PRN, or sooner as needed for changes.  Re-x-ray on return: Yes.    Dr. Encarnacion was present in the office.  He personally discussed the care plan and reviewed all appropriate imaging.    JOSE Cerda, CNP  Orthopedic Surgery

## 2024-07-10 ENCOUNTER — ANCILLARY PROCEDURE (OUTPATIENT)
Dept: GENERAL RADIOLOGY | Facility: CLINIC | Age: 69
End: 2024-07-10
Attending: NURSE PRACTITIONER
Payer: MEDICARE

## 2024-07-10 ENCOUNTER — OFFICE VISIT (OUTPATIENT)
Dept: ORTHOPEDICS | Facility: CLINIC | Age: 69
End: 2024-07-10
Payer: MEDICARE

## 2024-07-10 VITALS — HEIGHT: 65 IN | BODY MASS INDEX: 36.32 KG/M2 | WEIGHT: 218 LBS | TEMPERATURE: 97.7 F

## 2024-07-10 DIAGNOSIS — S82.62XD CLOSED AVULSION FRACTURE OF LATERAL MALLEOLUS OF LEFT FIBULA WITH ROUTINE HEALING, SUBSEQUENT ENCOUNTER: Primary | ICD-10-CM

## 2024-07-10 DIAGNOSIS — S82.62XA CLOSED AVULSION FRACTURE OF LATERAL MALLEOLUS OF LEFT FIBULA, INITIAL ENCOUNTER: ICD-10-CM

## 2024-07-10 PROCEDURE — 73610 X-RAY EXAM OF ANKLE: CPT | Mod: TC | Performed by: RADIOLOGY

## 2024-07-10 PROCEDURE — 99207 PR FRACTURE CARE IN GLOBAL PERIOD: CPT | Performed by: ORTHOPAEDIC SURGERY

## 2024-07-10 NOTE — LETTER
7/10/2024      Luz Elena Shipley  [address] [facility] [address]      Dear Colleague,    Thank you for referring your patient, Luz Elena Shipley, to the [facility]. Please see a copy of my visit note below.    Office Visit-Follow up    Chief Complaint: Luz Elena Shipley is a 69 year old female who is being seen for   Chief Complaint   Patient presents with     Left Ankle - Fracture Followup     Distal fibula avulsion fracture  Stepped on a pallet and fell through, DOI: 2024 (6w)       History of Present Illness:   Today's visit:  Doing well. Some soreness but pain largely resolved.  Going well with the stirrup brace. Reports has been weaning out of and no pain with ambulation and light activity.  Feels like making good progress.     2024 visit:  Pain is improving.  Mobility is better.  She is feeling some left hip and buttock discomfort she feels was related to her gait with the boot.     May 31, 2024 visit:  Here with her  today.  She reports May 29 she was stepping on a pallet when she broke through causing her to fall and a subsequent inversion injury.  Immediate pain.  Subsequent seen.  Diagnosed with a lateral malleolus avulsion fracture.  Placed in a fracture boot.  She also suffered a abrasion along the lateral ankle.  She has been walking in the boot.  Taken ibuprofen for pain.  No pre-existing issues.         Social History     Occupational History     Comment: Retired   Tobacco Use     Smoking status: Former     Current packs/day: 0.00     Average packs/day: 2.0 packs/day for 20.0 years (40.0 ttl pk-yrs)     Types: Cigarettes     Start date: 1967     Quit date: 1987     Years since quittin.2     Smokeless tobacco: Never   Vaping Use     Vaping status: Never Used   Substance and Sexual Activity     Alcohol use: Yes     Comment: 1 nightly     Drug use: No     Sexual activity: Not Currently     Partners: Male     Birth control/protection:  "Post-menopausal       REVIEW OF SYSTEMS  General: negative for, night sweats, dizziness, fatigue  Resp: No shortness of breath and no cough  CV: negative for chest pain, syncope or near-syncope  GI: negative for nausea, vomiting and diarrhea  : negative for dysuria and hematuria  Musculoskeletal: as above  Neurologic: negative for syncope   Hematologic: negative for bleeding disorder    Physical Exam:  Vitals: Temp 97.7  F (36.5  C)   Ht 1.651 m (5' 5\")   Wt 98.9 kg (218 lb)   BMI 36.28 kg/m    BMI= Body mass index is 36.28 kg/m .  Constitutional: healthy, alert and no acute distress   Psychiatric: mentation appears normal and affect normal/bright  NEURO: no focal deficits  RESP: Normal with easy respirations and no use of accessory muscles to breathe, no audible wheezing or retractions  CV: No peripheral edema  SKIN: No erythema, rashes, excoriation, or breakdown. No evidence of infection.   JOINT/EXTREMITIES:left ankle: mildly tender along lateral mallelous. Ankle motion largely intact. No pain with this. No instability. Sensation intact. Skin pink warm dry.   GAIT: not tested             Diagnostic Modalities:  Left ankle x-ray: Weightbearing x-rays: Small avulsion fracture again noted off the tip of the lateral malleolus. Decreased lucency. Evidence of healing. More difficult to visualize when compared to previous. No other fracture noted. Mortise is well-maintained with no evidence subluxation or dislocation.   Independent visualization of the images was performed.      Impression: left ankle lateral malleolus avulsion fracture with superficial abrasion date of injury May 29, 2024 6 weeks    Plan:  All of the above pertinent physical exam and imaging modalities findings was reviewed with Luz Elena.  Doing well. Ambulating some without the brace without pain.  Recommended to continue to wean out of the stirrup brace. Ok to slowly resume as tolerated. Did discuss physical therapy. She would like to hold off on " this, given the near full motion currently this is reasonable. Did discuss some at home exercises and demonstrated in clinic. If she changes her mind can call back.  Slow resumption of activity.  If doing well in 4 weeks and ankle back to baseline does need to come back in, otherwise if still having any residual symptoms will see her back in 4 weeks.       Return to clinic 4, week(s) PRN, or sooner as needed for changes.  Re-x-ray on return: Yes.    Dr. Encarnacion was present in the office.  He personally discussed the care plan and reviewed all appropriate imaging.    JOSE Cerda, CNP  Orthopedic Surgery      Again, thank you for allowing me to participate in the care of your patient.        Sincerely,        Cj Encarnacion, DO

## 2024-08-09 ENCOUNTER — TELEPHONE (OUTPATIENT)
Dept: FAMILY MEDICINE | Facility: CLINIC | Age: 69
End: 2024-08-09

## 2024-08-09 ENCOUNTER — OFFICE VISIT (OUTPATIENT)
Dept: FAMILY MEDICINE | Facility: CLINIC | Age: 69
End: 2024-08-09
Payer: MEDICARE

## 2024-08-09 VITALS
HEIGHT: 65 IN | WEIGHT: 215 LBS | DIASTOLIC BLOOD PRESSURE: 71 MMHG | HEART RATE: 94 BPM | TEMPERATURE: 98.4 F | SYSTOLIC BLOOD PRESSURE: 123 MMHG | RESPIRATION RATE: 18 BRPM | BODY MASS INDEX: 35.82 KG/M2 | OXYGEN SATURATION: 96 %

## 2024-08-09 DIAGNOSIS — H02.409 PTOSIS OF EYELID, UNSPECIFIED LATERALITY: ICD-10-CM

## 2024-08-09 DIAGNOSIS — E66.01 CLASS 2 SEVERE OBESITY DUE TO EXCESS CALORIES WITH SERIOUS COMORBIDITY IN ADULT, UNSPECIFIED BMI (H): ICD-10-CM

## 2024-08-09 DIAGNOSIS — J45.50 SEVERE PERSISTENT ASTHMA WITHOUT COMPLICATION (H): ICD-10-CM

## 2024-08-09 DIAGNOSIS — R73.03 PREDIABETES: ICD-10-CM

## 2024-08-09 DIAGNOSIS — I10 BENIGN ESSENTIAL HYPERTENSION: ICD-10-CM

## 2024-08-09 DIAGNOSIS — H81.01 MENIERE'S DISEASE, RIGHT: ICD-10-CM

## 2024-08-09 DIAGNOSIS — E66.812 CLASS 2 SEVERE OBESITY DUE TO EXCESS CALORIES WITH SERIOUS COMORBIDITY IN ADULT, UNSPECIFIED BMI (H): ICD-10-CM

## 2024-08-09 DIAGNOSIS — G47.33 OSA (OBSTRUCTIVE SLEEP APNEA): ICD-10-CM

## 2024-08-09 DIAGNOSIS — R11.0 NAUSEA: ICD-10-CM

## 2024-08-09 DIAGNOSIS — Z01.818 PREOP GENERAL PHYSICAL EXAM: Primary | ICD-10-CM

## 2024-08-09 PROBLEM — E11.9 TYPE 2 DIABETES MELLITUS WITHOUT COMPLICATION, WITHOUT LONG-TERM CURRENT USE OF INSULIN (H): Status: ACTIVE | Noted: 2024-08-09

## 2024-08-09 PROBLEM — H91.91: Status: ACTIVE | Noted: 2024-08-09

## 2024-08-09 LAB
ANION GAP SERPL CALCULATED.3IONS-SCNC: 8 MMOL/L (ref 7–15)
BUN SERPL-MCNC: 16.1 MG/DL (ref 8–23)
CALCIUM SERPL-MCNC: 9.8 MG/DL (ref 8.8–10.4)
CHLORIDE SERPL-SCNC: 102 MMOL/L (ref 98–107)
CREAT SERPL-MCNC: 0.92 MG/DL (ref 0.51–0.95)
EGFRCR SERPLBLD CKD-EPI 2021: 66 ML/MIN/1.73M2
ERYTHROCYTE [DISTWIDTH] IN BLOOD BY AUTOMATED COUNT: 12.7 % (ref 10–15)
GLUCOSE SERPL-MCNC: 164 MG/DL (ref 70–99)
HBA1C MFR BLD: 6.6 % (ref 0–5.6)
HCO3 SERPL-SCNC: 31 MMOL/L (ref 22–29)
HCT VFR BLD AUTO: 47.7 % (ref 35–47)
HGB BLD-MCNC: 15.6 G/DL (ref 11.7–15.7)
MCH RBC QN AUTO: 31.1 PG (ref 26.5–33)
MCHC RBC AUTO-ENTMCNC: 32.7 G/DL (ref 31.5–36.5)
MCV RBC AUTO: 95 FL (ref 78–100)
PLATELET # BLD AUTO: 269 10E3/UL (ref 150–450)
POTASSIUM SERPL-SCNC: 4.1 MMOL/L (ref 3.4–5.3)
RBC # BLD AUTO: 5.01 10E6/UL (ref 3.8–5.2)
SODIUM SERPL-SCNC: 141 MMOL/L (ref 135–145)
WBC # BLD AUTO: 10.8 10E3/UL (ref 4–11)

## 2024-08-09 PROCEDURE — 85027 COMPLETE CBC AUTOMATED: CPT | Performed by: PHYSICIAN ASSISTANT

## 2024-08-09 PROCEDURE — 80048 BASIC METABOLIC PNL TOTAL CA: CPT | Performed by: PHYSICIAN ASSISTANT

## 2024-08-09 PROCEDURE — 36415 COLL VENOUS BLD VENIPUNCTURE: CPT | Performed by: PHYSICIAN ASSISTANT

## 2024-08-09 PROCEDURE — 99214 OFFICE O/P EST MOD 30 MIN: CPT | Performed by: PHYSICIAN ASSISTANT

## 2024-08-09 PROCEDURE — G2211 COMPLEX E/M VISIT ADD ON: HCPCS | Performed by: PHYSICIAN ASSISTANT

## 2024-08-09 PROCEDURE — 83036 HEMOGLOBIN GLYCOSYLATED A1C: CPT | Performed by: PHYSICIAN ASSISTANT

## 2024-08-09 RX ORDER — ONDANSETRON 4 MG/1
4-8 TABLET, ORALLY DISINTEGRATING ORAL EVERY 8 HOURS PRN
Qty: 20 TABLET | Refills: 1 | Status: SHIPPED | OUTPATIENT
Start: 2024-08-09

## 2024-08-09 RX ORDER — DIAZEPAM 2 MG
TABLET ORAL
Qty: 10 TABLET | Refills: 0 | Status: SHIPPED | OUTPATIENT
Start: 2024-08-09

## 2024-08-09 RX ORDER — HYDROCHLOROTHIAZIDE 12.5 MG/1
12.5 TABLET ORAL DAILY
Qty: 90 TABLET | Refills: 3 | Status: SHIPPED | OUTPATIENT
Start: 2024-08-09

## 2024-08-09 RX ORDER — DIAZEPAM 2 MG
TABLET ORAL
Qty: 10 TABLET | Refills: 0 | Status: SHIPPED | OUTPATIENT
Start: 2024-08-09 | End: 2024-08-09

## 2024-08-09 RX ORDER — TRIAMTERENE/HYDROCHLOROTHIAZID 37.5-25 MG
0.5 TABLET ORAL DAILY
Qty: 30 TABLET | Refills: 3 | Status: SHIPPED | OUTPATIENT
Start: 2024-08-09

## 2024-08-09 ASSESSMENT — PAIN SCALES - GENERAL: PAINLEVEL: MILD PAIN (2)

## 2024-08-09 NOTE — PROGRESS NOTES
Preoperative Evaluation  [facility]  [address] [place] [id number]  Phone: [phone]  Fax: [phone]  Primary Provider: Kiara Zazueta PA-C  Pre-op Performing Provider: Kiara Zazueta PA-C  Aug 9, 2024         8/4/2024   Surgical Information   What procedure is being done? Eye lid surgery   Facility or Hospital where procedure/surgery will be performed: [facility]   Who is doing the procedure / surgery? Dr Naomy Decker MD   Date of surgery / procedure: 09/14/2024   Time of surgery / procedure: Not sure yet   Where do you plan to recover after surgery? at home with family      Fax number for surgical facility: will call to get fax    Assessment & Plan     The proposed surgical procedure is considered LOW risk.    Preop general physical exam  Ptosis of eyelid, unspecified laterality  - CBC with platelets; Future  - Basic metabolic panel  (Ca, Cl, CO2, Creat, Gluc, K, Na, BUN); Future    Class 2 severe obesity due to excess calories with serious comorbidity in adult, unspecified BMI (H)  With HTN     Severe persistent asthma without complication (H28)  Currently well controlled    Benign essential hypertension  - hydroCHLOROthiazide 12.5 MG tablet; Take 1 tablet (12.5 mg) by mouth daily    Meniere's disease, right  She reports typically her BP is running in the 90s systolic, without associated lightheadness or presyncopal symptoms.  Is reluctant to reduce BP medications as they are largely for her Menieres.  She has upcoming follow up with her ENT and will discuss options.  Reviewed staying hydrated and what symptoms would be associated with problematic hypotension.  Cautioned on taking meds on days she is feeling weak or ill.  I will give diazepam and ondasetron to have on hand to try for when she has severe vertigo attacks.  - triamterene-HCTZ (MAXZIDE-25) 37.5-25 MG tablet; Take 0.5 tablets by mouth daily  - ondansetron (ZOFRAN  ODT) 4 MG ODT tab; Take 1-2 tablets (4-8 mg) by mouth every 8 hours as needed for nausea  - triamterene-HCTZ (MAXZIDE-25) 37.5-25 MG tablet; Take 0.5 tablets by mouth daily- diazepam (VALIUM) 2 MG tablet; 0.5-2 tabs as needed for severe vertigo due to menieres.  - Basic metabolic panel  (Ca, Cl, CO2, Creat, Gluc, K, Na, BUN); Future    Prediabetes  - Hemoglobin A1c; Future    Nausea  - ondansetron (ZOFRAN ODT) 4 MG ODT tab; Take 1-2 tablets (4-8 mg) by mouth every 8 hours as needed for nausea    Possible Sleep Apnea: known NEGRA     Risks and Recommendations  The patient has the following additional risks and recommendations for perioperative complications:   - No identified additional risk factors other than previously addressed    Antiplatelet or Anticoagulation Medication Instructions   - Patient is on no antiplatelet or anticoagulation medications.    Additional Medication Instructions  Ok to take Trelegy morning of procedure but suggest HOLDING (not taking) triamterene-hydrochlorothiazide and the additional hydrochlorothiazide pill due to risk of hypotension    Recommendation  Approval given to proceed with proposed procedure, without further diagnostic evaluation.    The longitudinal plan of care for the diagnosis(es)/condition(s) as documented were addressed during this visit. Due to the added complexity in care, I will continue to support Gigi in the subsequent management and with ongoing continuity of care.      Dominick Yu is a 69 year old, presenting for the following:  Pre-Op Exam        11/15/2023    11:18 AM   Additional Questions   Roomed by Kaykay AGUDELO related to upcoming procedure: eyelid ptosis         8/4/2024   Pre-Op Questionnaire   Have you ever had a heart attack or stroke? No   Have you ever had surgery on your heart or blood vessels, such as a stent placement, a coronary artery bypass, or surgery on an artery in your head, neck, heart, or legs? No   Do you have chest pain with  activity? No   Do you have a history of heart failure? No   Do you currently have a cold, bronchitis or symptoms of other infection? No   Do you have a cough, shortness of breath, or wheezing? No   Do you or anyone in your family have previous history of blood clots? No   Do you or does anyone in your family have a serious bleeding problem such as prolonged bleeding following surgeries or cuts? No   Have you ever had problems with anemia or been told to take iron pills? No   Have you had any abnormal blood loss such as black, tarry or bloody stools, or abnormal vaginal bleeding? No   Have you ever had a blood transfusion? No   Are you willing to have a blood transfusion if it is medically needed before, during, or after your surgery? Yes   Have you or any of your relatives ever had problems with anesthesia? (!) YES - PONV   Do you have sleep apnea, excessive snoring or daytime drowsiness? (!) YES   Do you have a CPAP machine? (!) NO - she chose not to use CPAP   Do you have any artifical heart valves or other implanted medical devices like a pacemaker, defibrillator, or continuous glucose monitor? No   Do you have artificial joints? No   Are you allergic to latex? No      Health Care Directive  Patient does not have a Health Care Directive or Living Will: Discussed advance care planning with patient; information given to patient to review.    Preoperative Review of    reviewed - no record of controlled substances prescribed.      Status of Chronic Conditions:  See problem list for active medical problems.  Problems all longstanding and stable, except as noted/documented.  See ROS for pertinent symptoms related to these conditions.    Patient Active Problem List    Diagnosis Date Noted    Meniere's disease, right 08/09/2024     Priority: Medium    Change in hearing, right 08/09/2024     Priority: Medium    NEGRA (obstructive sleep apnea) 08/09/2024     Priority: Medium     AHI 23.  Not using CPAP - she associates  it with the next year having nodules in her lungs.      Closed avulsion fracture of lateral malleolus of left fibula with routine healing, subsequent encounter 07/10/2024     Priority: Medium    Class 2 severe obesity due to excess calories with serious comorbidity in adult (H) 10/30/2023     Priority: Medium    Prediabetes 01/12/2022     Priority: Medium    Osteopenia 08/19/2020     Priority: Medium     Recheck [id number].  Aug 2020: 1. Mild osteopenia in the left femoral neck.  2. Mild-moderate osteopenia in the right femoral neck.  3. The L1 and L4 bone density is borderline between lower normal and  mild osteopenia.      Severe persistent asthma (H28) 04/29/2016     Priority: Medium     Symptoms since childhood. Smoked since age of 11 until 31 years.  Until seen in November 2014, used albuterol once in her life.      Lichen sclerosus 04/29/2016     Priority: Medium    Benign essential hypertension 04/29/2016     Priority: Medium    Family history of diabetes mellitus 04/29/2016     Priority: Medium      Past Medical History:   Diagnosis Date    Hypertension     Rheumatic fever     Sleep apnea     Uncomplicated asthma      Past Surgical History:   Procedure Laterality Date    BIOPSY      breast,    BIOPSY BREAST      COLONOSCOPY      COLONOSCOPY N/A 2/8/2019    Procedure: Combined Colonoscopy, Single Or Multiple Biopsy/Polypectomy By Biopsy;  Surgeon: José Miguel Ochoa MD;  Location: [facility]    COLONOSCOPY N/A 3/4/2022    Procedure: COLONOSCOPY;  Surgeon: Nirmal Rm MD;  Location: [facility]    COLONOSCOPY WITH CO2 INSUFFLATION N/A 2/8/2019    Procedure: COLONOSCOPY WITH CO2 INSUFFLATION;  Surgeon: José Miguel Ochoa MD;  Location: [facility]    COMBINED ESOPHAGOSCOPY, GASTROSCOPY, DUODENOSCOPY (EGD) WITH CO2 INSUFFLATION N/A 2/8/2019    Procedure: COMBINED ESOPHAGOSCOPY, GASTROSCOPY, DUODENOSCOPY (EGD) WITH CO2 INSUFFLATION;  Surgeon: José Miguel Ochoa MD;  Location: [facility]    ESOPHAGOSCOPY,  GASTROSCOPY, DUODENOSCOPY (EGD), COMBINED N/A 2019    Procedure: Combined Esophagoscopy, Gastroscopy, Duodenoscopy (Egd), Biopsy Single Or Multiple;  Surgeon: José Miguel Ochoa MD;  Location: [facility]    GYN SURGERY      TONSILLECTOMY       Current Outpatient Medications   Medication Sig Dispense Refill    azelastine (ASTELIN) 0.1 % nasal spray Spray 2 sprays into both nostrils 2 times daily as needed for rhinitis or allergies 30 mL 3    clobetasol (TEMOVATE) 0.05 % external cream Apply sparingly to affected area twice weekly as needed.  Do not apply to face. 60 g 3    diazepam (VALIUM) 2 MG tablet 0.5-2 tabs as needed for severe vertigo due to menieres. 10 tablet 0    Fluticasone-Umeclidin-Vilanterol (TRELEGY ELLIPTA) 200-62.5-25 MCG/ACT oral inhaler Inhale 1 puff into the lungs daily 180 each 3    hydroCHLOROthiazide 12.5 MG tablet Take 1 tablet (12.5 mg) by mouth daily 90 tablet 3    ondansetron (ZOFRAN ODT) 4 MG ODT tab Take 1-2 tablets (4-8 mg) by mouth every 8 hours as needed for nausea 20 tablet 1    Respiratory Therapy Supplies (NEBULIZER/TUBING/MOUTHPIECE) KIT Use with albuterol neb solution 1 kit 0    triamterene-HCTZ (MAXZIDE-25) 37.5-25 MG tablet Take 0.5 tablets by mouth daily 30 tablet 3    albuterol (PROAIR HFA/PROVENTIL HFA/VENTOLIN HFA) 108 (90 Base) MCG/ACT inhaler Inhale 2-4 puffs into the lungs every 4 hours as needed for shortness of breath or wheezing (Patient not taking: Reported on 2024) 18 g 3       Allergies   Allergen Reactions    Cartia Xt [Diltiazem] Other (See Comments)     Heartburn whenever on this medication    Losartan Cough    Miconazole     Sulfa Antibiotics Rash        Social History     Tobacco Use    Smoking status: Former     Current packs/day: 0.00     Average packs/day: 2.0 packs/day for 20.0 years (40.0 ttl pk-yrs)     Types: Cigarettes     Start date: 1967     Quit date: 1987     Years since quittin.3    Smokeless tobacco: Never   Substance  "Use Topics    Alcohol use: Yes     Comment: 1 nightly     Family History   Problem Relation Age of Onset    Other Cancer Mother         pancreatic    Diabetes Mother         unsure type; in 40s    Cerebrovascular Disease Father         CVA    Chronic Obstructive Pulmonary Disease Father     Skin Cancer Father     Breast Cancer Sister     Skin Cancer Sister     Alzheimer Disease Brother         early onset    Arthritis Brother     Aneurysm Paternal Grandmother     Unknown/Adopted Paternal Grandfather     Diabetes Son         type 1    Thyroid Disease Son     Stomach Problem Son         polyps, diverticulitis, heartburn, passes blood    Alcoholism Son     Breast Cancer Paternal Aunt      History   Drug Use No           Review of Systems  Constitutional, HEENT, cardiovascular, pulmonary, GI, , musculoskeletal, neuro, skin, endocrine and psych systems are negative, except as otherwise noted.  BALANCE, HEARING DIFFICULTIES AND VERTIGO ATTACKS, NAUSEA ASSOCIATED WITH MENIERES.    Objective    /71 (BP Location: Right arm, Patient Position: Sitting, Cuff Size: Adult Regular)   Pulse 94   Temp 98.4  F (36.9  C)   Resp 18   Ht 1.651 m (5' 5\")   Wt 97.5 kg (215 lb)   SpO2 96%   BMI 35.78 kg/m     Estimated body mass index is 35.78 kg/m  as calculated from the following:    Height as of this encounter: 1.651 m (5' 5\").    Weight as of this encounter: 97.5 kg (215 lb).  Physical Exam  GENERAL: alert and no distress  EYES: Eyes grossly normal to inspection, PERRL and conjunctivae and sclerae normal  HENT: ear canals and TM's normal, nose and mouth without ulcers or lesions  NECK: no adenopathy, no asymmetry, masses, or scars  RESP: lungs clear to auscultation - no rales, rhonchi or wheezes  CV: regular rate and rhythm, normal S1 S2, no S3 or S4, no murmur, click or rub, no peripheral edema  ABDOMEN: soft, nontender, no hepatosplenomegaly, no masses and bowel sounds normal  MS: no gross musculoskeletal defects " noted, no edema  SKIN: no suspicious lesions or rashes  NEURO: Normal strength and tone, mentation intact and speech normal  PSYCH: mentation appears normal, affect normal/bright    Recent Labs   Lab Test 06/10/24  1140 10/09/23  1652   HGB 16.7* 15.3    223    140   POTASSIUM 3.9 4.3   CR 0.84 0.85        Diagnostics  Labs pending at this time.  Results will be reviewed when available.   No EKG this visit, completed in the last 90 days.    Revised Cardiac Risk Index (RCRI)  The patient has the following serious cardiovascular risks for perioperative complications:   - No serious cardiac risks = 0 points     RCRI Interpretation: 0 points: Class I (very low risk - 0.4% complication rate)         Signed Electronically by: Kiara Zazueta PA-C  A copy of this evaluation report is provided to the requesting physician.    Patient Instructions   Ok to take Trelegy morning of procedure but suggest HOLDING (not taking) triamterene-hydrochlorothiazide and the additional hydrochlorothiazide pill due to risk of problematically low BP during surgery    Discuss BP with your ENT     Drink plenty of fluids  When ill, extra caution on taking your meds as your BP may go down further    When people have too low of BP, then often feel very lightheaded when changing from laying to sitting or (more commonly) from sitting to standing.  They feel the need to sit back down for a moment.  They can become sweaty, vision can go in and out, ears can feel warm or being ringing, they can feel nausea.  Some symptoms may seem like vertigo attack.        Patient Education  Preparing for Your Surgery  Getting started  A nurse will call you to review your health history and instructions. They will give you an arrival time based on your scheduled surgery time. Please be ready to share:  Your doctor's clinic name and phone number  Your medical, surgical, and anesthesia history  A list of allergies and sensitivities  A list of  medicines, including herbal treatments and over-the-counter drugs  Whether the patient has a legal guardian (ask how to send us the papers in advance)  Please tell us if you're pregnant--or if there's any chance you might be pregnant. Some surgeries may injure a fetus (unborn baby), so they require a pregnancy test. Surgeries that are safe for a fetus don't always need a test, and you can choose whether to have one.   If you have a child who's having surgery, please ask for a copy of Preparing for Your Child's Surgery.    Preparing for surgery  Within 10 to 30 days of surgery: Have a pre-op exam (sometimes called an H&P, or History and Physical). This can be done at a clinic or pre-operative center.  If you're having a , you may not need this exam. Talk to your care team.  At your pre-op exam, talk to your care team about all medicines you take. If you need to stop any medicines before surgery, ask when to start taking them again.  We do this for your safety. Many medicines can make you bleed too much during surgery. Some change how well surgery (anesthesia) drugs work.  Call your insurance company to let them know you're having surgery. (If you don't have insurance, call [phone].)  Call your clinic if there's any change in your health. This includes signs of a cold or flu (sore throat, runny nose, cough, rash, fever). It also includes a scrape or scratch near the surgery site.  If you have questions on the day of surgery, call your hospital or surgery center.  Eating and drinking guidelines  For your safety: Unless your surgeon tells you otherwise, follow the guidelines below.  Eat and drink as usual until 8 hours before you arrive for surgery. After that, no food or milk.  Drink clear liquids until 2 hours before you arrive. These are liquids you can see through, like water, Gatorade, and Propel Water. They also include plain black coffee and tea (no cream or milk), candy, and breath mints. You can  spit out gum when you arrive.  If you drink alcohol: Stop drinking it the night before surgery.  If your care team tells you to take medicine on the morning of surgery, it's okay to take it with a sip of water.  Preventing infection  Shower or bathe the night before and morning of your surgery. Follow the instructions your clinic gave you. (If no instructions, use regular soap.)  Don't shave or clip hair near your surgery site. We'll remove the hair if needed.  Don't smoke or vape the morning of surgery. You may chew nicotine gum up to 2 hours before surgery. A nicotine patch is okay.  Note: Some surgeries require you to completely quit smoking and nicotine. Check with your surgeon.  Your care team will make every effort to keep you safe from infection. We will:  Clean our hands often with soap and water (or an alcohol-based hand rub).  Clean the skin at your surgery site with a special soap that kills germs.  Give you a special gown to keep you warm. (Cold raises the risk of infection.)  Wear special hair covers, masks, gowns and gloves during surgery.  Give antibiotic medicine, if prescribed. Not all surgeries need antibiotics.  What to bring on the day of surgery  Photo ID and insurance card  Copy of your health care directive, if you have one  Glasses and hearing aids (bring cases)  You can't wear contacts during surgery  Inhaler and eye drops, if you use them (tell us about these when you arrive)  CPAP machine or breathing device, if you use them  A few personal items, if spending the night  If you have . . .  A pacemaker, ICD (cardiac defibrillator) or other implant: Bring the ID card.  An implanted stimulator: Bring the remote control.  A legal guardian: Bring a copy of the certified (court-stamped) guardianship papers.  Please remove any jewelry, including body piercings. Leave jewelry and other valuables at home.  If you're going home the day of surgery  You must have a responsible adult drive you home.  They should stay with you overnight as well.  If you don't have someone to stay with you, and you aren't safe to go home alone, we may keep you overnight. Insurance often won't pay for this.  After surgery  If it's hard to control your pain or you need more pain medicine, please call your surgeon's office.  Questions?   If you have any questions for your care team, list them here: _________________________________________________________________________________________________________________________________________________________________________ ____________________________________ ____________________________________ ____________________________________  For informational purposes only. Not to replace the advice of your health care provider. Copyright   2003, 2019 [employer]. All rights reserved. Clinically reviewed by Haley Joshi MD. [employer] 217118 - REV 12/22.

## 2024-08-09 NOTE — PATIENT INSTRUCTIONS
Ok to take Trelegy morning of procedure but suggest HOLDING (not taking) triamterene-hydrochlorothiazide and the additional hydrochlorothiazide pill due to risk of problematically low BP during surgery    Discuss BP with your ENT     Drink plenty of fluids  When ill, extra caution on taking your meds as your BP may go down further    When people have too low of BP, then often feel very lightheaded when changing from laying to sitting or (more commonly) from sitting to standing.  They feel the need to sit back down for a moment.  They can become sweaty, vision can go in and out, ears can feel warm or being ringing, they can feel nausea.  Some symptoms may seem like vertigo attack.        Patient Education   Preparing for Your Surgery  Getting started  A nurse will call you to review your health history and instructions. They will give you an arrival time based on your scheduled surgery time. Please be ready to share:  Your doctor's clinic name and phone number  Your medical, surgical, and anesthesia history  A list of allergies and sensitivities  A list of medicines, including herbal treatments and over-the-counter drugs  Whether the patient has a legal guardian (ask how to send us the papers in advance)  Please tell us if you're pregnant--or if there's any chance you might be pregnant. Some surgeries may injure a fetus (unborn baby), so they require a pregnancy test. Surgeries that are safe for a fetus don't always need a test, and you can choose whether to have one.   If you have a child who's having surgery, please ask for a copy of Preparing for Your Child's Surgery.    Preparing for surgery  Within 10 to 30 days of surgery: Have a pre-op exam (sometimes called an H&P, or History and Physical). This can be done at a clinic or pre-operative center.  If you're having a , you may not need this exam. Talk to your care team.  At your pre-op exam, talk to your care team about all medicines you take. If you  need to stop any medicines before surgery, ask when to start taking them again.  We do this for your safety. Many medicines can make you bleed too much during surgery. Some change how well surgery (anesthesia) drugs work.  Call your insurance company to let them know you're having surgery. (If you don't have insurance, call [phone].)  Call your clinic if there's any change in your health. This includes signs of a cold or flu (sore throat, runny nose, cough, rash, fever). It also includes a scrape or scratch near the surgery site.  If you have questions on the day of surgery, call your hospital or surgery center.  Eating and drinking guidelines  For your safety: Unless your surgeon tells you otherwise, follow the guidelines below.  Eat and drink as usual until 8 hours before you arrive for surgery. After that, no food or milk.  Drink clear liquids until 2 hours before you arrive. These are liquids you can see through, like water, Gatorade, and Propel Water. They also include plain black coffee and tea (no cream or milk), candy, and breath mints. You can spit out gum when you arrive.  If you drink alcohol: Stop drinking it the night before surgery.  If your care team tells you to take medicine on the morning of surgery, it's okay to take it with a sip of water.  Preventing infection  Shower or bathe the night before and morning of your surgery. Follow the instructions your clinic gave you. (If no instructions, use regular soap.)  Don't shave or clip hair near your surgery site. We'll remove the hair if needed.  Don't smoke or vape the morning of surgery. You may chew nicotine gum up to 2 hours before surgery. A nicotine patch is okay.  Note: Some surgeries require you to completely quit smoking and nicotine. Check with your surgeon.  Your care team will make every effort to keep you safe from infection. We will:  Clean our hands often with soap and water (or an alcohol-based hand rub).  Clean the skin at your  surgery site with a special soap that kills germs.  Give you a special gown to keep you warm. (Cold raises the risk of infection.)  Wear special hair covers, masks, gowns and gloves during surgery.  Give antibiotic medicine, if prescribed. Not all surgeries need antibiotics.  What to bring on the day of surgery  Photo ID and insurance card  Copy of your health care directive, if you have one  Glasses and hearing aids (bring cases)  You can't wear contacts during surgery  Inhaler and eye drops, if you use them (tell us about these when you arrive)  CPAP machine or breathing device, if you use them  A few personal items, if spending the night  If you have . . .  A pacemaker, ICD (cardiac defibrillator) or other implant: Bring the ID card.  An implanted stimulator: Bring the remote control.  A legal guardian: Bring a copy of the certified (court-stamped) guardianship papers.  Please remove any jewelry, including body piercings. Leave jewelry and other valuables at home.  If you're going home the day of surgery  You must have a responsible adult drive you home. They should stay with you overnight as well.  If you don't have someone to stay with you, and you aren't safe to go home alone, we may keep you overnight. Insurance often won't pay for this.  After surgery  If it's hard to control your pain or you need more pain medicine, please call your surgeon's office.  Questions?   If you have any questions for your care team, list them here: _________________________________________________________________________________________________________________________________________________________________________ ____________________________________ ____________________________________ ____________________________________  For informational purposes only. Not to replace the advice of your health care provider. Copyright   2003, 2019 [employer]. All rights reserved. Clinically reviewed by Haley Joshi MD. [facility]  [id number] - REV 12/22.

## 2024-08-09 NOTE — TELEPHONE ENCOUNTER
Prior Authorization Retail Medication Request    Medication/Dose: diazepam 2 mg tablet  Diagnosis and ICD code (if different than what is on RX):    New/renewal/insurance change PA/secondary ins. PA:  Previously Tried and Failed:    Rationale:      Insurance   Primary: covermymeds: [id number]  Insurance ID:      Secondary (if applicable):  Insurance ID:      Pharmacy Information (if different than what is on RX)  Name:    Phone:    Fax:

## 2024-08-09 NOTE — TELEPHONE ENCOUNTER
General Call      Reason for Call:  Medication Request    What are your questions or concerns:  Pharmacy calling in regards to pt's diazepam (VALIUM) 2 MG tablet that Kiara sent today. They need this updated with frequency - how often a day pt should take. Wondering if Kiara can update and resend.      Okay to leave a detailed message?:  at Other phone number:  [phone]  [id number] [address]

## 2024-08-09 NOTE — RESULT ENCOUNTER NOTE
Gigi  CBC looks fine.  A1c test shows that your prediabetes has become diabetes.  Set up another appt with me to discuss diabetes.  However, it is well controlled at this time and it will not change your upcoming surgery.  BMP (blood salts, kidney function) lab is still in process.  If that looks fine, you'll be good for surgery.  Results will likely come in this afternoon but I'll see them on Monday.  Kiara

## 2024-08-09 NOTE — NURSING NOTE
"Chief Complaint   Patient presents with    Pre-Op Exam       Initial /71 (BP Location: Right arm, Patient Position: Sitting, Cuff Size: Adult Regular)   Pulse 94   Resp 18   Ht 1.651 m (5' 5\")   Wt 97.5 kg (215 lb)   SpO2 96%   BMI 35.78 kg/m   Estimated body mass index is 35.78 kg/m  as calculated from the following:    Height as of this encounter: 1.651 m (5' 5\").    Weight as of this encounter: 97.5 kg (215 lb).    Patient presents to the clinic using No DME    Is there anyone who you would like to be able to receive your results? No  If yes have patient fill out SOHAM      "

## 2024-08-11 ENCOUNTER — HEALTH MAINTENANCE LETTER (OUTPATIENT)
Age: 69
End: 2024-08-11

## 2024-08-12 PROBLEM — R73.03 PREDIABETES: Status: RESOLVED | Noted: 2022-01-12 | Resolved: 2024-08-12

## 2024-08-12 NOTE — RESULT ENCOUNTER NOTE
Bandar -Fax preop    Gigi  Cleared for surgery.  See me sometime to discuss the newly diagnosed type 2 diabetes mellitus.  Let me know if you have questions.  Kiara

## 2024-08-13 NOTE — TELEPHONE ENCOUNTER
Central Prior Authorization Team  Phone: [phone]    PA Initiation    Medication: DIAZEPAM 2 MG PO TABS  Insurance Company: [employer] Clinical Review - Phone [phone] Fax [phone]  Pharmacy Filling the Rx: [id number] [address]  Filling Pharmacy Phone: [phone]  Filling Pharmacy Fax: [phone]  Start Date: 8/13/2024

## 2024-08-13 NOTE — TELEPHONE ENCOUNTER
Prior Authorization Approval    Medication: DIAZEPAM 2 MG PO TABS  Authorization Effective Date: 5/15/2024  Authorization Expiration Date: 8/13/2025  Approved Dose/Quantity:   Reference #:     Insurance Company: [employer] Clinical Review - Phone [phone] Fax [phone]  Expected CoPay: $    CoPay Card Available:      Financial Assistance Needed:   Which Pharmacy is filling the prescription: [id number] - [address]  Pharmacy Notified: Yes    Patient Notified: No

## 2024-09-04 ENCOUNTER — MYC MEDICAL ADVICE (OUTPATIENT)
Dept: OTOLARYNGOLOGY | Facility: CLINIC | Age: 69
End: 2024-09-04
Payer: MEDICARE

## 2024-09-09 NOTE — PROGRESS NOTES
NEUROLOGY CONSULTATION NOTE       [facility] NEUROLOGY [place]  [address] [place] [address]  Tel: [phone]  Fax: [phone]  [email or web address]     Luz Elena Shipley,  1955, MRN [id number]  PCP: Kiara Zazueta  Date: 2024     ASSESSMENT & PLAN     Visit Diagnosis  Right Ménière's disease     Right Ménière's disease  Delon 69-year-old female with history of HTN, NEGRA, DM2 with 3 episodes of vertigo along with diplopia each lasting for 6 hours that resolved after she was treated in the ER.  It is reassuring that her MRI scan is normal.  She was seen by ENT and had a audiogram that was normal with sensorineural hearing loss that was age-appropriate.  She was started on Maxide with improvement in her symptoms.  I have informed her symptoms do not suggest the diagnosis of vestibular migraine and I agree with the diagnosis of Ménière's disease and I would recommend continuing to follow-up with ENT.  She was told to avoid caffeine, alcohol, tobacco.  Follow-up will be in as needed basis    Thank you again for this referral, please feel free to contact me if you have any questions.    Ravindra Baer MD  [facility]     REASON FOR CONSULTATION Headache        HISTORY OF PRESENT ILLNESS     We have been requested by Kiara Zazueta to evaluate Luz Elena Shipley who is a 69 year old  female for headache    Patient is a delon 69-year-old female with history of HTN, NEGRA, DM2 who was referred for evaluation of 3 episodes of intense vertigo along with nausea and the possibility of vestibular migraine was raised.  According to patient her first episode occurred in October when she was sitting and suddenly had a bout of dizziness.  This lasted for about 6 hours.  She had feeling of fullness in the right side.  She went to the ER and received some medication.  She had a similar episode in 2024 but she did not go to the hospital as her symptoms  improved after she slept through the night.  The third episode occurred in June when she again had feeling of fullness in the ear followed by [place] dizziness.  She was seen in the emergency room and had nystagmus.  She had MRI of the head and was normal.  She was treated symptomatically and was told to follow-up with ENT and neurology.  She had audiogram done that showed minimal sensorineural hearing loss but there was no asymmetric hearing loss.  She was started on Maxide and since then has not experienced any symptoms     PROBLEM LIST   Patient Active Problem List   Diagnosis    Severe persistent asthma (H28)    Lichen sclerosus    Benign essential hypertension    Osteopenia    Class 2 severe obesity due to excess calories with serious comorbidity in adult (H)    Closed avulsion fracture of lateral malleolus of left fibula with routine healing, subsequent encounter    Meniere's disease, right    Change in hearing, right    NEGRA (obstructive sleep apnea)    Type 2 diabetes mellitus without complication, without long-term current use of insulin (H)         PAST MEDICAL & SURGICAL HISTORY     Past Medical History:   Patient  has a past medical history of Hypertension, Rheumatic fever, Sleep apnea, Type 2 diabetes mellitus without complication, without long-term current use of insulin (H) (8/9/2024), and Uncomplicated asthma.    Surgical History:  She  has a past surgical history that includes tonsillectomy; biopsy; colonoscopy; GYN surgery; Colonoscopy with CO2 insufflation (N/A, 2/8/2019); Combined Esophagoscopy, Gastroscopy, Duodenoscopy (Egd) With Co2 Insufflation (N/A, 2/8/2019); Esophagoscopy, gastroscopy, duodenoscopy (EGD), combined (N/A, 2/8/2019); Colonoscopy (N/A, 2/8/2019); Biopsy breast; and Colonoscopy (N/A, 3/4/2022).     SOCIAL HISTORY     Reviewed, and she  reports that she quit smoking about 37 years ago. Her smoking use included cigarettes. She started smoking about 57 years ago. She has a 40  pack-year smoking history. She has never used smokeless tobacco. She reports current alcohol use. She reports that she does not use drugs.     FAMILY HISTORY     Reviewed, and family history includes Alcoholism in her son; Alzheimer Disease in her brother; Aneurysm in her paternal grandmother; Arthritis in her brother and mother; Breast Cancer in her paternal aunt and sister; Cerebrovascular Disease in her father; Chronic Obstructive Pulmonary Disease in her father; Diabetes in her mother and son; Migraines in her sister; Other Cancer in her mother; Skin Cancer in her father and sister; Stomach Problem in her son; Thyroid Disease in her son; Unknown/Adopted in her paternal grandfather.     ALLERGIES     Allergies   Allergen Reactions    Cartia Xt [Diltiazem] Other (See Comments)     Heartburn whenever on this medication    Losartan Cough    Miconazole     Sulfa Antibiotics Rash         REVIEW OF SYSTEMS     A 12 point review of system was performed and was negative except as outlined in the history of present illness.     HOME MEDICATIONS     Current Outpatient Rx   Medication Sig Dispense Refill    azelastine (ASTELIN) 0.1 % nasal spray Spray 2 sprays into both nostrils 2 times daily as needed for rhinitis or allergies 30 mL 3    clobetasol (TEMOVATE) 0.05 % external cream Apply sparingly to affected area twice weekly as needed.  Do not apply to face. 60 g 3    diazepam (VALIUM) 2 MG tablet 0.5-2 tabs once daily as needed for severe vertigo due to menieres. 10 tablet 0    Fluticasone-Umeclidin-Vilanterol (TRELEGY ELLIPTA) 200-62.5-25 MCG/ACT oral inhaler Inhale 1 puff into the lungs daily 180 each 3    hydroCHLOROthiazide 12.5 MG tablet Take 1 tablet (12.5 mg) by mouth daily 90 tablet 3    ondansetron (ZOFRAN ODT) 4 MG ODT tab Take 1-2 tablets (4-8 mg) by mouth every 8 hours as needed for nausea 20 tablet 1    Respiratory Therapy Supplies (NEBULIZER/TUBING/MOUTHPIECE) KIT Use with albuterol neb solution 1 kit 0     "triamterene-HCTZ (MAXZIDE-25) 37.5-25 MG tablet Take 0.5 tablets by mouth daily 30 tablet 3         PHYSICAL EXAM     Vital signs  /80 (BP Location: Right arm, Patient Position: Sitting)   Pulse 68   Ht 1.651 m (5' 5\")   Wt 102.5 kg (226 lb)   BMI 37.61 kg/m      Weight:   226 lbs 0 oz    Middle-age female who is alert and oriented vital signs are reviewed and documented in electronic medical record.  Neck supple.  Neurologically speech was normal she is hard of hearing bilaterally motor strength 5/5 reflexes 1+ toes downgoing.  No dysmetria noted on finger-nose testing gait normal     PERTINENT DIAGNOSTIC STUDIES     Following studies were reviewed:     MRI BRAIN 6/10/2024  1. No acute intracranial pathology.  2. No abnormal contrast enhancement.  2. Chronic small vessel ischemic disease and generalized cerebral  volume loss.      PERTINENT LABS  Following labs were reviewed:  Office Visit on 08/09/2024   Component Date Value Ref Range Status    WBC Count 08/09/2024 10.8  4.0 - 11.0 10e3/uL Final    RBC Count 08/09/2024 5.01  3.80 - 5.20 10e6/uL Final    Hemoglobin 08/09/2024 15.6  11.7 - 15.7 g/dL Final    Hematocrit 08/09/2024 47.7 (H)  35.0 - 47.0 % Final    MCV 08/09/2024 95  78 - 100 fL Final    MCH 08/09/2024 31.1  26.5 - 33.0 pg Final    MCHC 08/09/2024 32.7  31.5 - 36.5 g/dL Final    RDW 08/09/2024 12.7  10.0 - 15.0 % Final    Platelet Count 08/09/2024 269  150 - 450 10e3/uL Final    Sodium 08/09/2024 141  135 - 145 mmol/L Final    Potassium 08/09/2024 4.1  3.4 - 5.3 mmol/L Final    Chloride 08/09/2024 102  98 - 107 mmol/L Final    Carbon Dioxide (CO2) 08/09/2024 31 (H)  22 - 29 mmol/L Final    Anion Gap 08/09/2024 8  7 - 15 mmol/L Final    Urea Nitrogen 08/09/2024 16.1  8.0 - 23.0 mg/dL Final    Creatinine 08/09/2024 0.92  0.51 - 0.95 mg/dL Final    GFR Estimate 08/09/2024 66  >60 mL/min/1.73m2 Final    Calcium 08/09/2024 9.8  8.8 - 10.4 mg/dL Final    Glucose 08/09/2024 164 (H)  70 - 99 mg/dL " Final    Hemoglobin A1C 08/09/2024 6.6 (H)  0.0 - 5.6 % Final        Total time spent for face to face visit, reviewing labs/imaging studies, counseling and coordination of care was: 1 Hour spent on the date of the encounter doing chart review, review of outside records, review of test results, interpretation of tests, patient visit, and documentation     This note was dictated using voice recognition software.  Any grammatical or context distortions are unintentional and inherent to the software.    No orders of the defined types were placed in this encounter.     New Prescriptions    No medications on file      Modified Medications    No medications on file

## 2024-09-11 ENCOUNTER — OFFICE VISIT (OUTPATIENT)
Dept: NEUROLOGY | Facility: CLINIC | Age: 69
End: 2024-09-11
Attending: PHYSICIAN ASSISTANT
Payer: MEDICARE

## 2024-09-11 VITALS
SYSTOLIC BLOOD PRESSURE: 133 MMHG | BODY MASS INDEX: 37.65 KG/M2 | WEIGHT: 226 LBS | DIASTOLIC BLOOD PRESSURE: 80 MMHG | HEIGHT: 65 IN | HEART RATE: 68 BPM

## 2024-09-11 DIAGNOSIS — H81.01 MENIERE'S DISEASE, RIGHT: Primary | ICD-10-CM

## 2024-09-11 PROCEDURE — 99204 OFFICE O/P NEW MOD 45 MIN: CPT | Performed by: PSYCHIATRY & NEUROLOGY

## 2024-09-11 NOTE — LETTER
2024      Luz Elena Shipley  [address] [facility] [address]      Dear Colleague,    Thank you for referring your patient, Luz Elena Shipley, to the [facility] [place]. Please see a copy of my visit note below.    NEUROLOGY CONSULTATION NOTE       [facility] [place]  [address] [place] [address]  Tel: [phone]  Fax: [phone]  [email or web address]     Luz Elena Shipley,  1955, MRN [id number]  PCP: Kiara Zazueta  Date: 2024     ASSESSMENT & PLAN     Visit Diagnosis  Right Ménière's disease     Right Ménière's disease  Pleasant 69-year-old female with history of HTN, NEGRA, DM2 with 3 episodes of vertigo along with diplopia each lasting for 6 hours that resolved after she was treated in the ER.  It is reassuring that her MRI scan is normal.  She was seen by ENT and had a audiogram that was normal with sensorineural hearing loss that was age-appropriate.  She was started on Maxide with improvement in her symptoms.  I have informed her symptoms do not suggest the diagnosis of vestibular migraine and I agree with the diagnosis of Ménière's disease and I would recommend continuing to follow-up with ENT.  She was told to avoid caffeine, alcohol, tobacco.  Follow-up will be in as needed basis    Thank you again for this referral, please feel free to contact me if you have any questions.    Ravindra Baer MD  [facility]UR[facility], [place]     REASON FOR CONSULTATION Headache        HISTORY OF PRESENT ILLNESS     We have been requested by Kiara Zazueta to evaluate Luz Elena Shipley who is a 69 year old  female for headache    Patient is a pleasant 69-year-old female with history of HTN, NEGRA, DM2 who was referred for evaluation of 3 episodes of intense vertigo along with nausea and the possibility of vestibular migraine was raised.  According to patient her first episode occurred in October when she was sitting and suddenly  had a bout of dizziness.  This lasted for about 6 hours.  She had feeling of fullness in the right side.  She went to the ER and received some medication.  She had a similar episode in April 2024 but she did not go to the hospital as her symptoms improved after she slept through the night.  The third episode occurred in June when she again had feeling of fullness in the ear followed by [place] dizziness.  She was seen in the emergency room and had nystagmus.  She had MRI of the head and was normal.  She was treated symptomatically and was told to follow-up with ENT and neurology.  She had audiogram done that showed minimal sensorineural hearing loss but there was no asymmetric hearing loss.  She was started on Maxide and since then has not experienced any symptoms     PROBLEM LIST   Patient Active Problem List   Diagnosis     Severe persistent asthma (H28)     Lichen sclerosus     Benign essential hypertension     Osteopenia     Class 2 severe obesity due to excess calories with serious comorbidity in adult (H)     Closed avulsion fracture of lateral malleolus of left fibula with routine healing, subsequent encounter     Meniere's disease, right     Change in hearing, right     NEGRA (obstructive sleep apnea)     Type 2 diabetes mellitus without complication, without long-term current use of insulin (H)         PAST MEDICAL & SURGICAL HISTORY     Past Medical History:   Patient  has a past medical history of Hypertension, Rheumatic fever, Sleep apnea, Type 2 diabetes mellitus without complication, without long-term current use of insulin (H) (8/9/2024), and Uncomplicated asthma.    Surgical History:  She  has a past surgical history that includes tonsillectomy; biopsy; colonoscopy; GYN surgery; Colonoscopy with CO2 insufflation (N/A, 2/8/2019); Combined Esophagoscopy, Gastroscopy, Duodenoscopy (Egd) With Co2 Insufflation (N/A, 2/8/2019); Esophagoscopy, gastroscopy, duodenoscopy (EGD), combined (N/A, 2/8/2019);  Colonoscopy (N/A, 2/8/2019); Biopsy breast; and Colonoscopy (N/A, 3/4/2022).     SOCIAL HISTORY     Reviewed, and she  reports that she quit smoking about 37 years ago. Her smoking use included cigarettes. She started smoking about 57 years ago. She has a 40 pack-year smoking history. She has never used smokeless tobacco. She reports current alcohol use. She reports that she does not use drugs.     FAMILY HISTORY     Reviewed, and family history includes Alcoholism in her son; Alzheimer Disease in her brother; Aneurysm in her paternal grandmother; Arthritis in her brother and mother; Breast Cancer in her paternal aunt and sister; Cerebrovascular Disease in her father; Chronic Obstructive Pulmonary Disease in her father; Diabetes in her mother and son; Migraines in her sister; Other Cancer in her mother; Skin Cancer in her father and sister; Stomach Problem in her son; Thyroid Disease in her son; Unknown/Adopted in her paternal grandfather.     ALLERGIES     Allergies   Allergen Reactions     Cartia Xt [Diltiazem] Other (See Comments)     Heartburn whenever on this medication     Losartan Cough     Miconazole      Sulfa Antibiotics Rash         REVIEW OF SYSTEMS     A 12 point review of system was performed and was negative except as outlined in the history of present illness.     HOME MEDICATIONS     Current Outpatient Rx   Medication Sig Dispense Refill     azelastine (ASTELIN) 0.1 % nasal spray Spray 2 sprays into both nostrils 2 times daily as needed for rhinitis or allergies 30 mL 3     clobetasol (TEMOVATE) 0.05 % external cream Apply sparingly to affected area twice weekly as needed.  Do not apply to face. 60 g 3     diazepam (VALIUM) 2 MG tablet 0.5-2 tabs once daily as needed for severe vertigo due to menieres. 10 tablet 0     Fluticasone-Umeclidin-Vilanterol (TRELEGY ELLIPTA) 200-62.5-25 MCG/ACT oral inhaler Inhale 1 puff into the lungs daily 180 each 3     hydroCHLOROthiazide 12.5 MG tablet Take 1 tablet  "(12.5 mg) by mouth daily 90 tablet 3     ondansetron (ZOFRAN ODT) 4 MG ODT tab Take 1-2 tablets (4-8 mg) by mouth every 8 hours as needed for nausea 20 tablet 1     Respiratory Therapy Supplies (NEBULIZER/TUBING/MOUTHPIECE) KIT Use with albuterol neb solution 1 kit 0     triamterene-HCTZ (MAXZIDE-25) 37.5-25 MG tablet Take 0.5 tablets by mouth daily 30 tablet 3         PHYSICAL EXAM     Vital signs  /80 (BP Location: Right arm, Patient Position: Sitting)   Pulse 68   Ht 1.651 m (5' 5\")   Wt 102.5 kg (226 lb)   BMI 37.61 kg/m      Weight:   226 lbs 0 oz    Middle-age female who is alert and oriented vital signs are reviewed and documented in electronic medical record.  Neck supple.  Neurologically speech was normal she is hard of hearing bilaterally motor strength 5/5 reflexes 1+ toes downgoing.  No dysmetria noted on finger-nose testing gait normal     PERTINENT DIAGNOSTIC STUDIES     Following studies were reviewed:     MRI BRAIN 6/10/2024  1. No acute intracranial pathology.  2. No abnormal contrast enhancement.  2. Chronic small vessel ischemic disease and generalized cerebral  volume loss.      PERTINENT LABS  Following labs were reviewed:  Office Visit on 08/09/2024   Component Date Value Ref Range Status     WBC Count 08/09/2024 10.8  4.0 - 11.0 10e3/uL Final     RBC Count 08/09/2024 5.01  3.80 - 5.20 10e6/uL Final     Hemoglobin 08/09/2024 15.6  11.7 - 15.7 g/dL Final     Hematocrit 08/09/2024 47.7 (H)  35.0 - 47.0 % Final     MCV 08/09/2024 95  78 - 100 fL Final     MCH 08/09/2024 31.1  26.5 - 33.0 pg Final     MCHC 08/09/2024 32.7  31.5 - 36.5 g/dL Final     RDW 08/09/2024 12.7  10.0 - 15.0 % Final     Platelet Count 08/09/2024 269  150 - 450 10e3/uL Final     Sodium 08/09/2024 141  135 - 145 mmol/L Final     Potassium 08/09/2024 4.1  3.4 - 5.3 mmol/L Final     Chloride 08/09/2024 102  98 - 107 mmol/L Final     Carbon Dioxide (CO2) 08/09/2024 31 (H)  22 - 29 mmol/L Final     Anion Gap 08/09/2024 8 "  7 - 15 mmol/L Final     Urea Nitrogen 08/09/2024 16.1  8.0 - 23.0 mg/dL Final     Creatinine 08/09/2024 0.92  0.51 - 0.95 mg/dL Final     GFR Estimate 08/09/2024 66  >60 mL/min/1.73m2 Final     Calcium 08/09/2024 9.8  8.8 - 10.4 mg/dL Final     Glucose 08/09/2024 164 (H)  70 - 99 mg/dL Final     Hemoglobin A1C 08/09/2024 6.6 (H)  0.0 - 5.6 % Final        Total time spent for face to face visit, reviewing labs/imaging studies, counseling and coordination of care was: 1 Hour spent on the date of the encounter doing chart review, review of outside records, review of test results, interpretation of tests, patient visit, and documentation     This note was dictated using voice recognition software.  Any grammatical or context distortions are unintentional and inherent to the software.    No orders of the defined types were placed in this encounter.     New Prescriptions    No medications on file      Modified Medications    No medications on file                Again, thank you for allowing me to participate in the care of your patient.        Sincerely,        Ravindra Baer MD

## 2024-09-11 NOTE — NURSING NOTE
Chief Complaint   Patient presents with    Headache     Patient reports headaches but more concerned with dizzy spells and feeling like her eyes are crossing. 3 episodes of vertigo like symptoms where room spins (10/2/23, 4/2/24, 6/9/24)       Andra Fitzgerald CMA on 9/11/2024 at 9:50 AM  [facility]

## 2024-09-12 ENCOUNTER — OFFICE VISIT (OUTPATIENT)
Dept: FAMILY MEDICINE | Facility: CLINIC | Age: 69
End: 2024-09-12
Payer: MEDICARE

## 2024-09-12 ENCOUNTER — TELEPHONE (OUTPATIENT)
Dept: FAMILY MEDICINE | Facility: CLINIC | Age: 69
End: 2024-09-12

## 2024-09-12 VITALS
TEMPERATURE: 98.4 F | BODY MASS INDEX: 35.99 KG/M2 | HEIGHT: 65 IN | DIASTOLIC BLOOD PRESSURE: 84 MMHG | RESPIRATION RATE: 16 BRPM | WEIGHT: 216 LBS | SYSTOLIC BLOOD PRESSURE: 138 MMHG

## 2024-09-12 DIAGNOSIS — E66.01 CLASS 2 SEVERE OBESITY DUE TO EXCESS CALORIES WITH SERIOUS COMORBIDITY IN ADULT, UNSPECIFIED BMI (H): ICD-10-CM

## 2024-09-12 DIAGNOSIS — E11.9 TYPE 2 DIABETES MELLITUS WITHOUT COMPLICATION, WITHOUT LONG-TERM CURRENT USE OF INSULIN (H): Primary | ICD-10-CM

## 2024-09-12 DIAGNOSIS — E66.812 CLASS 2 SEVERE OBESITY DUE TO EXCESS CALORIES WITH SERIOUS COMORBIDITY IN ADULT, UNSPECIFIED BMI (H): ICD-10-CM

## 2024-09-12 DIAGNOSIS — I10 BENIGN ESSENTIAL HYPERTENSION: ICD-10-CM

## 2024-09-12 DIAGNOSIS — Z00.00 ENCOUNTER FOR MEDICARE ANNUAL WELLNESS EXAM: ICD-10-CM

## 2024-09-12 PROCEDURE — G0439 PPPS, SUBSEQ VISIT: HCPCS | Performed by: PHYSICIAN ASSISTANT

## 2024-09-12 PROCEDURE — 99214 OFFICE O/P EST MOD 30 MIN: CPT | Mod: 25 | Performed by: PHYSICIAN ASSISTANT

## 2024-09-12 RX ORDER — ASPIRIN 81 MG/1
81 TABLET ORAL DAILY
Status: SHIPPED
Start: 2024-09-12

## 2024-09-12 RX ORDER — ROSUVASTATIN CALCIUM 20 MG/1
20 TABLET, COATED ORAL DAILY
Qty: 90 TABLET | Refills: 0 | Status: SHIPPED | OUTPATIENT
Start: 2024-09-12

## 2024-09-12 RX ORDER — LANCETS
EACH MISCELLANEOUS
Qty: 100 EACH | Refills: 6 | Status: SHIPPED | OUTPATIENT
Start: 2024-09-12

## 2024-09-12 ASSESSMENT — PAIN SCALES - GENERAL: PAINLEVEL: NO PAIN (0)

## 2024-09-12 NOTE — TELEPHONE ENCOUNTER
Prior Authorization Retail Medication Request    Medication/Dose:   Diagnosis and ICD code (if different than what is on RX):  Ozempic (0.25 or 0.5 MG/dose)  New/renewal/insurance change PA/secondary ins. PA:  Previously Tried and Failed:     Rationale:       Insurance   Primary:    Insurance ID:       Secondary (if applicable):   Insurance ID:       Pharmacy Information (if different than what is on RX)  Name:     Phone:     Fax:     LAM Wong

## 2024-09-12 NOTE — PROGRESS NOTES
Preventive Care Visit  [facility]  Kiara Zazueta PA-C, Family Medicine  Sep 12, 2024      Assessment & Plan     Type 2 diabetes mellitus without complication, without long-term current use of insulin (H)  New diagnosis, A1c controlled however given her intense efforts at wt loss previously, she is interested in starting wt loss medication.  Will see if insurance will cover ozempic.  - Albumin Random Urine Quantitative with Creat Ratio; Future  - Lipid panel reflex to direct LDL Non-fasting; Future  - rosuvastatin (CRESTOR) 20 MG tablet; Take 1 tablet (20 mg) by mouth daily.  - aspirin 81 MG EC tablet; Take 1 tablet (81 mg) by mouth daily.  - Adult Diabetes Education  Referral; Future  - semaglutide (OZEMPIC) 2 MG/3ML pen; Inject 0.25 mg subcutaneously every 7 days for 28 days, THEN 0.5 mg every 7 days for 28 days. Then increase dose..  - blood glucose monitoring (NO BRAND SPECIFIED) meter device kit; Use to test blood sugar 1 times daily. Preferred blood glucose meter OR supplies to accompany: Blood Glucose Monitor Brands: per insurance.  - blood glucose (NO BRAND SPECIFIED) test strip; Use to test blood sugar 1 times daily. To accompany: Blood Glucose Monitor Brands: per insurance.  - thin (NO BRAND SPECIFIED) lancets; Use with lanceting device. To accompany: Blood Glucose Monitor Brands: per insurance.  - Hemoglobin A1c; Future  - FOOT EXAM    Class 2 severe obesity due to excess calories with serious comorbidity in adult, unspecified BMI (H)  Uncontrolled, with HTN and now new DM2, see above notes.    Benign essential hypertension  stable    Encounter for Medicare annual wellness exam      The longitudinal plan of care for the diagnosis(es)/condition(s) as documented were addressed during this visit. Due to the added complexity in care, I will continue to support Gigi in the subsequent management and with ongoing continuity of care.    BMI  Estimated body mass index is  "35.94 kg/m  as calculated from the following:    Height as of this encounter: 1.651 m (5' 5\").    Weight as of this encounter: 98 kg (216 lb).   Weight management plan: Discussed healthy diet and exercise guidelines    Counseling  Appropriate preventive services were addressed with this patient via screening, questionnaire, or discussion as appropriate for fall prevention, nutrition, physical activity, Tobacco-use cessation, social engagement, weight loss and cognition.  Checklist reviewing preventive services available has been given to the patient.  Reviewed patient's diet, addressing concerns and/or questions.   She is at risk for lack of exercise and has been provided with information to increase physical activity for the benefit of her well-being.   Discussed possible causes of fatigue. The patient was provided with written information regarding signs of hearing loss.   Information on urinary incontinence and treatment options given to patient.     Dominick Yu is a 69 year old, presenting for the following:  Diabetes and Physical        9/12/2024    10:26 AM   Additional Questions   Roomed by mendy zepeda cma     Via the Health Maintenance questionnaire, the patient has reported the following services have been completed -Eye Exam: [facility] eye [facility] 2024-10-10, this information has been sent to the abstraction team.    Health Care Directive  Patient does not have a Health Care Directive or Living Will: Discussed advance care planning with patient; information given to patient to review.    HPI    Diabetes Follow-up    How often are you checking your blood sugar? One time daily  What time of day are you checking your blood sugars (select all that apply)?  Before meals  Have you had any blood sugars above 200?  No  Have you had any blood sugars below 70?  No  What symptoms do you notice when your blood sugar is low?  Shaky, Dizzy, and Lethargy  What concerns do you have today about your diabetes? None   Do you " have any of these symptoms? (Select all that apply)  No numbness or tingling in feet.  No redness, sores or blisters on feet.  No complaints of excessive thirst.  No reports of blurry vision.  No significant changes to weight.    BP Readings from Last 2 Encounters:   09/12/24 138/84   09/11/24 133/80     Hemoglobin A1C (%)   Date Value   08/09/2024 6.6 (H)   05/24/2023 6.0 (H)   06/04/2020 6.3 (H)   02/27/2018 6.3 (H)     LDL Cholesterol Calculated (mg/dL)   Date Value   01/12/2022 114 (H)   02/27/2018 98   04/28/2016 100 (H)           9/12/2024   General Health   How would you rate your overall physical health? Good   Feel stress (tense, anxious, or unable to sleep) Rather much      (!) STRESS CONCERN      9/12/2024   Nutrition   Diet: Carbohydrate counting            9/12/2024   Exercise   Days per week of moderate/strenous exercise 3 days   Average minutes spent exercising at this level 30 min            9/12/2024   Social Factors   Frequency of gathering with friends or relatives Once a week   Worry food won't last until get money to buy more No   Food not last or not have enough money for food? No   Do you have housing? (Housing is defined as stable permanent housing and does not include staying ouside in a car, in a tent, in an abandoned building, in an overnight shelter, or couch-surfing.) Yes   Are you worried about losing your housing? No   Lack of transportation? No   Unable to get utilities (heat,electricity)? No            9/12/2024   Fall Risk   Fallen 2 or more times in the past year? Yes   Trouble with walking or balance? No      [place]        9/12/2024   Activities of Daily Living- Home Safety   Needs help with the following daily activites None of the above   Safety concerns in the home None of the above          9/12/2024   Dental   Dentist two times every year? Yes          9/12/2024   Hearing Screening   Hearing concerns? (!) I FEEL THAT PEOPLE ARE MUMBLING OR NOT SPEAKING CLEARLY.           2024   Driving Risk Screening   Patient/family members have concerns about driving No            2024   General Alertness/Fatigue Screening   Have you been more tired than usual lately? (!) YES          2024   Urinary Incontinence Screening   Bothered by leaking urine in past 6 months Yes      Today's PHQ-2 Score:       2024    10:31 AM   PHQ-2 (  [employer])   Q1: Little interest or pleasure in doing things 1   Q2: Feeling down, depressed or hopeless 1   PHQ-2 Score 2   Q1: Little interest or pleasure in doing things Several days   Q2: Feeling down, depressed or hopeless Several days   PHQ-2 Score 2         2024   Substance Use   Alcohol more than 3/day or more than 7/wk No   Do you have a current opioid prescription? No   How severe/bad is pain from 1 to 10? /10   Do you use any other substances recreationally? No        Social History     Tobacco Use    Smoking status: Former     Current packs/day: 0.00     Average packs/day: 2.0 packs/day for 20.0 years (40.0 ttl pk-yrs)     Types: Cigarettes     Start date: 1967     Quit date: 1987     Years since quittin.4    Smokeless tobacco: Never   Vaping Use    Vaping status: Never Used   Substance Use Topics    Alcohol use: Yes     Comment: 1 nightly    Drug use: No           2024   LAST FHS-7 RESULTS   1st degree relative breast or ovarian cancer Yes   Any relative bilateral breast cancer No   Any male have breast cancer No   Any ONE woman have BOTH breast AND ovarian cancer No   Any woman with breast cancer before 50yrs Yes   2 or more relatives with breast AND/OR ovarian cancer Yes   2 or more relatives with breast AND/OR bowel cancer No     ASCVD Risk   The 10-year ASCVD risk score (Lucita BUENROSTRO, et al., 2019) is: 27.9%    Values used to calculate the score:      Age: 69 years      Sex: Female      Is Non- : No      Diabetic: Yes      Tobacco smoker: No      Systolic Blood Pressure: 138  mmHg      Is BP treated: Yes      HDL Cholesterol: 70 mg/dL      Total Cholesterol: 200 mg/dL    Reviewed and updated as needed this visit by Provider   Tobacco  Allergies  Meds  Problems  Med Hx  Surg Hx  Fam Hx            Current providers sharing in care for this patient include:  Patient Care Team:  Kiara Zazueta PA-C as PCP - General (Physician Assistant)  Galindo Montano MD as Assigned Allergy Provider  Marlen Godoy PA-C as Physician Assistant (Dermatology)  Jorge Garza MD as MD (Otolaryngology)  Kiara Zazueta PA-C as Assigned PCP  Radha Vaughn MD as Physician (Otolaryngology)  Vicki Diaz MD as MD (Neurology)  Ravindra Baer MD as MD (Neurology)  Cj Encarnacion DO as Assigned Musculoskeletal Provider  Jorge Garza MD as Assigned Surgical Provider    The following health maintenance items are reviewed in Epic and correct as of today:  Health Maintenance   Topic Date Due    MICROALBUMIN  Never done    RSV VACCINE (1 - Risk 60-74 years 1-dose series) Never done    ASTHMA ACTION PLAN  06/25/2019    LIPID  01/12/2023    MEDICARE ANNUAL WELLNESS VISIT  05/24/2024    INFLUENZA VACCINE (1) 09/01/2024    COVID-19 Vaccine (6 - 2023-24 season) 09/01/2024    EYE EXAM  09/05/2024    A1C  11/09/2024    ASTHMA CONTROL TEST  11/23/2024    BMP  08/09/2025    DIABETIC FOOT EXAM  09/12/2025    ANNUAL REVIEW OF HM ORDERS  09/12/2025    FALL RISK ASSESSMENT  09/12/2025    MAMMO SCREENING  06/25/2026    COLORECTAL CANCER SCREENING  03/04/2027    ADVANCE CARE PLANNING  08/09/2029    DTAP/TDAP/TD IMMUNIZATION (4 - Td or Tdap) 05/29/2034    DEXA  05/26/2038    HEPATITIS C SCREENING  Completed    PHQ-2 (once per calendar year)  Completed    Pneumococcal Vaccine: 65+ Years  Completed    ZOSTER IMMUNIZATION  Completed    HPV IMMUNIZATION  Aged Out    MENINGITIS IMMUNIZATION  Aged Out    RSV MONOCLONAL ANTIBODY  Aged Out        Objective    Exam  BP  "138/84 (BP Location: Right arm, Patient Position: Sitting, Cuff Size: Adult Regular)   Temp 98.4  F (36.9  C)   Resp 16   Ht 1.651 m (5' 5\")   Wt 98 kg (216 lb)   BMI 35.94 kg/m     Estimated body mass index is 35.94 kg/m  as calculated from the following:    Height as of this encounter: 1.651 m (5' 5\").    Weight as of this encounter: 98 kg (216 lb).    Physical Exam  GENERAL: alert and no distress  Diabetic foot exam: normal DP and PT pulses, no trophic changes or ulcerative lesions, and normal monofilament exam         9/12/2024   Mini Cog   Clock Draw Score 2 Normal   3 Item Recall 3 objects recalled   Mini Cog Total Score 5                 Signed Electronically by: Kiara Zazueta PA-C    "

## 2024-09-12 NOTE — PATIENT INSTRUCTIONS
Yearly eye exam   Set up with diabetic educator  Prescription sent for ozempic to help with weight loss.  Will keep sugars under great control.  No routine sugar testing needed, but testing supplies prescribed.    Start daily baby aspirin 81 mg  Start rosuvastastin to reduce heart risk   Recheck with me in Dec.  Anticipate blood and urine labs  Patient Education   Preventive Care Advice   This is general advice given by our system to help you stay healthy. However, your care team may have specific advice just for you. Please talk to your care team about your preventive care needs.  Nutrition  Eat 5 or more servings of fruits and vegetables each day.  Try wheat bread, brown rice and whole grain pasta (instead of white bread, rice, and pasta).  Get enough calcium and vitamin D. Check the label on foods and aim for 100% of the RDA (recommended daily allowance).  Lifestyle  Exercise at least 150 minutes each week  (30 minutes a day, 5 days a week).  Do muscle strengthening activities 2 days a week. These help control your weight and prevent disease.  No smoking.  Wear sunscreen to prevent skin cancer.  Have a dental exam and cleaning every 6 months.  Yearly exams  See your health care team every year to talk about:  Any changes in your health.  Any medicines your care team has prescribed.  Preventive care, family planning, and ways to prevent chronic diseases.  Shots (vaccines)   HPV shots (up to age 26), if you've never had them before.  Hepatitis B shots (up to age 59), if you've never had them before.  COVID-19 shot: Get this shot when it's due.  Flu shot: Get a flu shot every year.  Tetanus shot: Get a tetanus shot every 10 years.  Pneumococcal, hepatitis A, and RSV shots: Ask your care team if you need these based on your risk.  Shingles shot (for age 50 and up)  General health tests  Diabetes screening:  Starting at age 35, Get screened for diabetes at least every 3 years.  If you are younger than age 35, ask your  care team if you should be screened for diabetes.  Cholesterol test: At age 39, start having a cholesterol test every 5 years, or more often if advised.  Bone density scan (DEXA): At age 50, ask your care team if you should have this scan for osteoporosis (brittle bones).  Hepatitis C: Get tested at least once in your life.  STIs (sexually transmitted infections)  Before age 24: Ask your care team if you should be screened for STIs.  After age 24: Get screened for STIs if you're at risk. You are at risk for STIs (including HIV) if:  You are sexually active with more than one person.  You don't use condoms every time.  You or a partner was diagnosed with a sexually transmitted infection.  If you are at risk for HIV, ask about PrEP medicine to prevent HIV.  Get tested for HIV at least once in your life, whether you are at risk for HIV or not.  Cancer screening tests  Cervical cancer screening: If you have a cervix, begin getting regular cervical cancer screening tests starting at age 21.  Breast cancer scan (mammogram): If you've ever had breasts, begin having regular mammograms starting at age 40. This is a scan to check for breast cancer.  Colon cancer screening: It is important to start screening for colon cancer at age 45.  Have a colonoscopy test every 10 years (or more often if you're at risk) Or, ask your provider about stool tests like a FIT test every year or Cologuard test every 3 years.  To learn more about your testing options, visit:   .  For help making a decision, visit:   https://bit.ly/xh04667.  Prostate cancer screening test: If you have a prostate, ask your care team if a prostate cancer screening test (PSA) at age 55 is right for you.  Lung cancer screening: If you are a current or former smoker ages 50 to 80, ask your care team if ongoing lung cancer screenings are right for you.  For informational purposes only. Not to replace the advice of your health care provider. Copyright   2023 [facility]. All rights reserved. Clinically reviewed by the [facility]. [employer] [id number] - REV 01/24.  Preventing Falls: Care Instructions  Injuries and health problems such as trouble walking or poor eyesight can increase your risk of falling. So can some medicines. But there are things you can do to help prevent falls. You can exercise to get stronger. You can also arrange your home to make it safer.    Talk to your doctor about the medicines you take. Ask if any of them increase the risk of falls and whether they can be changed or stopped.   Try to exercise regularly. It can help improve your strength and balance. This can help lower your risk of falling.     Practice fall safety and prevention.    Wear low-heeled shoes that fit well and give your feet good support. Talk to your doctor if you have foot problems that make this hard.  Carry a cellphone or wear a medical alert device that you can use to call for help.  Use stepladders instead of chairs to reach high objects. Don't climb if you're at risk for falls. Ask for help, if needed.  Wear the correct eyeglasses, if you need them.    Make your home safer.    Remove rugs, cords, clutter, and furniture from walkways.  Keep your house well lit. Use night-lights in hallways and bathrooms.  Install and use sturdy handrails on stairways.  Wear nonskid footwear, even inside. Don't walk barefoot or in socks without shoes.    Be safe outside.    Use handrails, curb cuts, and ramps whenever possible.  Keep your hands free by using a shoulder bag or backpack.  Try to walk in well-lit areas. Watch out for uneven ground, changes in pavement, and debris.  Be careful in the winter. Walk on the grass or gravel when sidewalks are slippery. Use de-icer on steps and walkways. Add non-slip devices to shoes.    Put grab bars and nonskid mats in your shower or tub and near the toilet. Try to use a shower chair or bath bench when bathing.   Get into  "a tub or shower by putting in your weaker leg first. Get out with your strong side first. Have a phone or medical alert device in the bathroom with you.   Where can you learn more?  Go to https://www.Hipcamp.net/patiented  Enter G117 in the search box to learn more about \"Preventing Falls: Care Instructions.\"  Current as of: July 17, 2023               Content Version: 14.0    3677-9080 [email or web address].   Care instructions adapted under license by your healthcare professional. If you have questions about a medical condition or this instruction, always ask your healthcare professional. [email or web address] disclaims any warranty or liability for your use of this information.      Hearing Loss: Care Instructions  Overview     Hearing loss is a sudden or slow decrease in how well you hear. It can range from slight to profound. Permanent hearing loss can occur with aging. It also can happen when you are exposed long-term to loud noise. Examples include listening to loud music, riding motorcycles, or being around other loud machines.  Hearing loss can affect your work and home life. It can make you feel lonely or depressed. You may feel that you have lost your independence. But hearing aids and other devices can help you hear better and feel connected to others.  Follow-up care is a key part of your treatment and safety. Be sure to make and go to all appointments, and call your doctor if you are having problems. It's also a good idea to know your test results and keep a list of the medicines you take.  How can you care for yourself at home?  Avoid loud noises whenever possible. This helps keep your hearing from getting worse.  Always wear hearing protection around loud noises.  Wear a hearing aid as directed.  A professional can help you pick a hearing aid that will work best for you.  You can also get hearing aids over the counter for mild to moderate hearing loss.  Have hearing tests as your doctor " "suggests. They can show whether your hearing has changed. Your hearing aid may need to be adjusted.  Use other devices as needed. These may include:  Telephone amplifiers and hearing aids that can connect to a television, stereo, radio, or microphone.  Devices that use lights or vibrations. These alert you to the doorbell, a ringing telephone, or a baby monitor.  Television closed-captioning. This shows the words at the bottom of the screen. Most new TVs can do this.  TTY (text telephone). This lets you type messages back and forth on the telephone instead of talking or listening. These devices are also called TDD. When messages are typed on the keyboard, they are sent over the phone line to a receiving TTY. The message is shown on a monitor.  Use text messaging, social media, and email if it is hard for you to communicate by telephone.  Try to learn a listening technique called speechreading. It is not lipreading. You pay attention to people's gestures, expressions, posture, and tone of voice. These clues can help you understand what a person is saying. Face the person you are talking to, and have them face you. Make sure the lighting is good. You need to see the other person's face clearly.  Think about counseling if you need help to adjust to your hearing loss.  When should you call for help?  Watch closely for changes in your health, and be sure to contact your doctor if:    You think your hearing is getting worse.     You have new symptoms, such as dizziness or nausea.   Where can you learn more?  Go to https://www.eWave Interactive.net/patiented  Enter R798 in the search box to learn more about \"Hearing Loss: Care Instructions.\"  Current as of: September 27, 2023               Content Version: 14.0    0123-2340 [employer].   Care instructions adapted under license by your healthcare professional. If you have questions about a medical condition or this instruction, always ask your healthcare " professional. [employer], [facility] disclaims any warranty or liability for your use of this information.      Learning About Stress  What is stress?     Stress is your body's response to a hard situation. Your body can have a physical, emotional, or mental response. Stress is a fact of life for most people, and it affects everyone differently. What causes stress for you may not be stressful for someone else.  A lot of things can cause stress. You may feel stress when you go on a job interview, take a test, or run a race. This kind of short-term stress is normal and even useful. It can help you if you need to work hard or react quickly. For example, stress can help you finish an important job on time.  Long-term stress is caused by ongoing stressful situations or events. Examples of long-term stress include long-term health problems, ongoing problems at work, or conflicts in your family. Long-term stress can harm your health.  How does stress affect your health?  When you are stressed, your body responds as though you are in danger. It makes hormones that speed up your heart, make you breathe faster, and give you a burst of energy. This is called the fight-or-flight stress response. If the stress is over quickly, your body goes back to normal and no harm is done.  But if stress happens too often or lasts too long, it can have bad effects. Long-term stress can make you more likely to get sick, and it can make symptoms of some diseases worse. If you tense up when you are stressed, you may develop neck, shoulder, or low back pain. Stress is linked to high blood pressure and heart disease.  Stress also harms your emotional health. It can make you wilson, tense, or depressed. Your relationships may suffer, and you may not do well at work or school.  What can you do to manage stress?  You can try these things to help manage stress:   Do something active. Exercise or activity can help reduce stress. Walking is a great  way to get started. Even everyday activities such as housecleaning or yard work can help.  Try yoga or yovany chi. These techniques combine exercise and meditation. You may need some training at first to learn them.  Do something you enjoy. For example, listen to music or go to a movie. Practice your hobby or do volunteer work.  Meditate. This can help you relax, because you are not worrying about what happened before or what may happen in the future.  Do guided imagery. Imagine yourself in any setting that helps you feel calm. You can use online videos, books, or a teacher to guide you.  Do breathing exercises. For example:  From a standing position, bend forward from the waist with your knees slightly bent. Let your arms dangle close to the floor.  Breathe in slowly and deeply as you return to a standing position. Roll up slowly and lift your head last.  Hold your breath for just a few seconds in the standing position.  Breathe out slowly and bend forward from the waist.  Let your feelings out. Talk, laugh, cry, and express anger when you need to. Talking with supportive friends or family, a counselor, or a eliz leader about your feelings is a healthy way to relieve stress. Avoid discussing your feelings with people who make you feel worse.  Write. It may help to write about things that are bothering you. This helps you find out how much stress you feel and what is causing it. When you know this, you can find better ways to cope.  What can you do to prevent stress?  You might try some of these things to help prevent stress:  Manage your time. This helps you find time to do the things you want and need to do.  Get enough sleep. Your body recovers from the stresses of the day while you are sleeping.  Get support. Your family, friends, and community can make a difference in how you experience stress.  Limit your news feed. Avoid or limit time on social media or news that may make you feel stressed.  Do something  "active. Exercise or activity can help reduce stress. Walking is a great way to get started.  Where can you learn more?  Go to https://www.iMapData.net/patiented  Enter N032 in the search box to learn more about \"Learning About Stress.\"  Current as of: October 24, 2023               Content Version: 14.0    4205-8845 [employer].   Care instructions adapted under license by your healthcare professional. If you have questions about a medical condition or this instruction, always ask your healthcare professional. [employer] disclaims any warranty or liability for your use of this information.      Learning About Sleeping Well  What does sleeping well mean?     Sleeping well means getting enough sleep to feel good and stay healthy. How much sleep is enough varies among people.  The number of hours you sleep and how you feel when you wake up are both important. If you do not feel refreshed, you probably need more sleep. Another sign of not getting enough sleep is feeling tired during the day.  Experts recommend that adults get at least 7 or more hours of sleep per day. Children and older adults need more sleep.  Why is getting enough sleep important?  Getting enough quality sleep is a basic part of good health. When your sleep suffers, your physical health, mood, and your thoughts can suffer too. You may find yourself feeling more grumpy or stressed. Not getting enough sleep also can lead to serious problems, including injury, accidents, anxiety, and depression.  What might cause poor sleeping?  Many things can cause sleep problems, including:  Changes to your sleep schedule.  Stress. Stress can be caused by fear about a single event, such as giving a speech. Or you may have ongoing stress, such as worry about work or school.  Depression, anxiety, and other mental or emotional conditions.  Changes in your sleep habits or surroundings. This includes changes that happen where you sleep, such as " "noise, light, or sleeping in a different bed. It also includes changes in your sleep pattern, such as having jet lag or working a late shift.  Health problems, such as pain, breathing problems, and restless legs syndrome.  Lack of regular exercise.  Using alcohol, nicotine, or caffeine before bed.  How can you help yourself?  Here are some tips that may help you sleep more soundly and wake up feeling more refreshed.  Your sleeping area   Use your bedroom only for sleeping and sex. A bit of light reading may help you fall asleep. But if it doesn't, do your reading elsewhere in the house. Try not to use your TV, computer, smartphone, or tablet while you are in bed.  Be sure your bed is big enough to stretch out comfortably, especially if you have a sleep partner.  Keep your bedroom quiet, dark, and cool. Use curtains, blinds, or a sleep mask to block out light. To block out noise, use earplugs, soothing music, or a \"white noise\" machine.  Your evening and bedtime routine   Create a relaxing bedtime routine. You might want to take a warm shower or bath, or listen to soothing music.  Go to bed at the same time every night. And get up at the same time every morning, even if you feel tired.  What to avoid   Limit caffeine (coffee, tea, caffeinated sodas) during the day, and don't have any for at least 6 hours before bedtime.  Avoid drinking alcohol before bedtime. Alcohol can cause you to wake up more often during the night.  Try not to smoke or use tobacco, especially in the evening. Nicotine can keep you awake.  Limit naps during the day, especially close to bedtime.  Avoid lying in bed awake for too long. If you can't fall asleep or if you wake up in the middle of the night and can't get back to sleep within about 20 minutes, get out of bed and go to another room until you feel sleepy.  Avoid taking medicine right before bed that may keep you awake or make you feel hyper or energized. Your doctor can tell you if your " "medicine may do this and if you can take it earlier in the day.  If you can't sleep   Imagine yourself in a peaceful, pleasant scene. Focus on the details and feelings of being in a place that is relaxing.  Get up and do a quiet or boring activity until you feel sleepy.  Avoid drinking any liquids before going to bed to help prevent waking up often to use the bathroom.  Where can you learn more?  Go to https://www.[employer]ven [employer].net/patiented  Enter J942 in the search box to learn more about \"Learning About Sleeping Well.\"  Current as of: July 10, 2023  Content Version: 14.1 2006-2024 [employer].   Care instructions adapted under license by your healthcare professional. If you have questions about a medical condition or this instruction, always ask your healthcare professional. [employer] disclaims any warranty or liability for your use of this information.    Bladder Training: Care Instructions  Your Care Instructions     Bladder training is used to treat urge incontinence and stress incontinence. Urge incontinence means that the need to urinate comes on so fast that you can't get to a toilet in time. Stress incontinence means that you leak urine because of pressure on your bladder. For example, it may happen when you laugh, cough, or lift something heavy.  Bladder training can increase how long you can wait before you have to urinate. It can also help your bladder hold more urine. And it can give you better control over the urge to urinate.  It is important to remember that bladder training takes a few weeks to a few months to make a difference. You may not see results right away, but don't give up.  Follow-up care is a key part of your treatment and safety. Be sure to make and go to all appointments, and call your doctor if you are having problems. It's also a good idea to know your test results and keep a list of the medicines you take.  How can you care for yourself at home?  Work " with your doctor to come up with a bladder training program that is right for you. You may use one or more of the following methods.  Delayed urination  In the beginning, try to keep from urinating for 5 minutes after you first feel the need to go.  While you wait, take deep, slow breaths to relax. Kegel exercises can also help you delay the need to go to the bathroom.  After some practice, when you can easily wait 5 minutes to urinate, try to wait 10 minutes before you urinate.  Slowly increase the waiting period until you are able to control when you have to urinate.  Scheduled urination  Empty your bladder when you first wake up in the morning.  Schedule times throughout the day when you will urinate.  Start by going to the bathroom every hour, even if you don't need to go.  Slowly increase the time between trips to the bathroom.  When you have found a schedule that works well for you, keep doing it.  If you wake up during the night and have to urinate, do it. Apply your schedule to waking hours only.  Kegel exercises  These tighten and strengthen pelvic muscles, which can help you control the flow of urine. (If doing these exercises causes pain, stop doing them and talk with your doctor.) To do Kegel exercises:  Squeeze your muscles as if you were trying not to pass gas. Or squeeze your muscles as if you were stopping the flow of urine. Your belly, legs, and buttocks shouldn't move.  Hold the squeeze for 3 seconds, then relax for 5 to 10 seconds.  Start with 3 seconds, then add 1 second each week until you are able to squeeze for 10 seconds.  Repeat the exercise 10 times a session. Do 3 to 8 sessions a day.  When should you call for help?  Watch closely for changes in your health, and be sure to contact your doctor if:    Your incontinence is getting worse.     You do not get better as expected.   Where can you learn more?  Go to https://www.healthwise.net/patiented  Enter V684 in the search box to learn more  "about \"Bladder Training: Care Instructions.\"  Current as of: November 15, 2023               Content Version: 14.0    3601-3138 [employer].   Care instructions adapted under license by your healthcare professional. If you have questions about a medical condition or this instruction, always ask your healthcare professional. [employer] disclaims any warranty or liability for your use of this information.         "

## 2024-09-17 ENCOUNTER — ALLIED HEALTH/NURSE VISIT (OUTPATIENT)
Dept: EDUCATION SERVICES | Facility: CLINIC | Age: 69
End: 2024-09-17
Attending: PHYSICIAN ASSISTANT
Payer: MEDICARE

## 2024-09-17 DIAGNOSIS — E11.9 TYPE 2 DIABETES MELLITUS WITHOUT COMPLICATION, WITHOUT LONG-TERM CURRENT USE OF INSULIN (H): ICD-10-CM

## 2024-09-17 PROCEDURE — G0108 DIAB MANAGE TRN  PER INDIV: HCPCS | Performed by: DIETITIAN, REGISTERED

## 2024-09-17 NOTE — TELEPHONE ENCOUNTER
Central Prior Authorization Team   Phone: [phone]    PA Initiation    Medication: Ozempic (0.25 or 0.5 MG/dose)   Insurance Company: [employer] - Phone [phone] Fax [phone]  Pharmacy Filling the Rx: [facility] [address] [facility] [address]  Filling Pharmacy Phone: [phone]  Filling Pharmacy Fax:    Start Date: 9/17/2024

## 2024-09-17 NOTE — LETTER
9/17/2024         RE: Luz Elena Shipley  [address] [facility] [address]        Dear Colleague,    Thank you for referring your patient, Luz Elena Shipley, to the [facility]. Please see a copy of my visit note below.    Diabetes Self-Management Education & Support    Presents for: Initial Assessment for new diagnosis    Type of Service: In Person Visit      ASSESSMENT:  -type 2 diabetes, new diagnosis (states has had prediabetes ~20 years)  -recent a1c of 6.6  -currently being managed with diet and exercise alone (Ozempic is not currently covered)  -on aspirin and statin therapy  -diabetes complications:  n/a  -last seen by diabetes education n/a  -home BG monitoring reveals:  not testing     Primary concern: general education.  Has prescription for meter, but needs to take it to a pharmacy that uses Medicare.      Discussed:  basic physiology, natural progression of type 2, mechanism of action of medication (Metformin and Ozempic), basic carbohydrate counting, meal plan, label reading, fiber, hypoglycemia symptoms and treatment, resources.  Patient expressed understanding.      Patient's most recent   Lab Results   Component Value Date    A1C 6.6 08/09/2024    A1C 6.3 06/04/2020     is not meeting goal of <7.0    Diabetes knowledge and skills assessment:   Patient is knowledgeable in diabetes management concepts related to: Healthy Eating    Continue education with the following diabetes management concepts: Healthy Eating, Being Active, Monitoring, Taking Medication, Problem Solving, Reducing Risks, and Healthy Coping    Based on learning assessment above, most appropriate setting for further diabetes education would be: Individual setting.      PLAN     Aim to eat 3 meals/day, no longer than 4-5 hours between meals (except for the 12 hour fast overnight).  Aim to be as active as possible.  Ideally 20-30 minutes 5 days/week.  Go  whatever meter is available to you.  Test your  "BG 1 time/day alternating testing times: fasting or before or 2 hrs after the start of the meal.  Record.    4.  Follow up with Kiara Zazueta as directed.  5.  Follow up with Diabetes Education 10/15/24 at 9am.  If you have questions you can send them through [facility] or call Diabetes Education Triage [phone].  For Scheduling call [phone].    See Care Plan for co-developed, patient-state behavior change goals.  AVS provided for patient today.    Education Materials Provided:  [facility] Understanding Diabetes Booklet, BG Log Sheet, and My Plate Planner      SUBJECTIVE/OBJECTIVE:  Presents for: Initial Assessment for new diagnosis  Accompanied by: Self  Diabetes education in the past 24mo: No  Focus of Visit: Patient Unsure  Diabetes type: Type 2  Date of diagnosis: 2024  Disease course: Worsening  How confident are you filling out medical forms by yourself:: Extremely  Diabetes management related comments/concerns: wants general information  Transportation concerns: No  Difficulty affording diabetes medication?: Yes  Difficulty affording diabetes testing supplies?: Yes (needs to go to a different pharmacy)  Cultural Influences/Ethnic Background:  Not  or     Diabetes Symptoms & Complications:  Diabetes Related Symptoms: Polyphagia (increased hunger)  Weight trend: Stable  Symptom course: Stable  Disease course: Worsening       Patient Problem List and Family Medical History reviewed for relevant medical history, current medical status, and diabetes risk factors.    Vitals:  There were no vitals taken for this visit.  Estimated body mass index is 35.94 kg/m  as calculated from the following:    Height as of 9/12/24: 1.651 m (5' 5\").    Weight as of 9/12/24: 98 kg (216 lb).   Last 3 BP:   BP Readings from Last 3 Encounters:   09/12/24 138/84   09/11/24 133/80   08/09/24 123/71       History   Smoking Status     Former     Types: Cigarettes   Smokeless Tobacco     Never " "      Labs:  Lab Results   Component Value Date    A1C 6.6 08/09/2024    A1C 6.3 06/04/2020     Lab Results   Component Value Date     08/09/2024    GLC 94 01/12/2022    GLC 97 06/04/2020     Lab Results   Component Value Date     01/12/2022    LDL 98 02/27/2018     HDL Cholesterol   Date Value Ref Range Status   02/27/2018 71 >49 mg/dL Final     Direct Measure HDL   Date Value Ref Range Status   01/12/2022 70 >=50 mg/dL Final   ]  GFR Estimate   Date Value Ref Range Status   08/09/2024 66 >60 mL/min/1.73m2 Final     Comment:     eGFR calculated using 2021 CKD-EPI equation.   06/04/2020 79 >60 mL/min/[1.73_m2] Final     Comment:     Non  GFR Calc  Starting 12/18/2018, serum creatinine based estimated GFR (eGFR) will be   calculated using the Chronic Kidney Disease Epidemiology Collaboration   (CKD-EPI) equation.       GFR Estimate If Black   Date Value Ref Range Status   06/04/2020 >90 >60 mL/min/[1.73_m2] Final     Comment:      GFR Calc  Starting 12/18/2018, serum creatinine based estimated GFR (eGFR) will be   calculated using the Chronic Kidney Disease Epidemiology Collaboration   (CKD-EPI) equation.       Lab Results   Component Value Date    CR 0.92 08/09/2024    CR 0.78 06/04/2020     No results found for: \"MICROALBUMIN\"    Healthy Eating:  Healthy Eating Assessed Today: Yes   Cultural/[place]anda[place] diet restrictions?: No  Meal planning habits: follows keto diet, low carbohydrate, \"net carbohydrate\"  How many times a week on average do you eat food made away from home (restaurant/take-out)?: 1  Meals include: Breakfast, Dinner  Breakfast: 7-8am:  refrigerator oatmeal, fruit, coffee-black  Lunch: 3pm:  graze: toast w/ cheese, water, diet soda  Dinner: 6-7pm: Chicken teryaki, chowmein noodles, canned chicken, water, ciruel  Snacks: 1 sl toast (also might drink juice in the evening when watching tv); alcohol occassionally  Beverages: Water, Coffee, Juice, Diet soda, " Alcohol    Being Active:  Being Active Assessed Today: Yes  Exercise:: Yes  Days per week of moderate to strenuous exercise (like a brisk walk): 7 (walking)  On average, minutes per day of exercise at this level: 10 (will walk 6 times/day for 10 minutes/time)  How intense was your typical exercise? : Light (like stretching or slow walking)  Exercise Minutes per Week: 70    Monitoring:  Monitoring Assessed Today: Yes  Did patient bring glucose meter to appointment? : No  Blood Glucose Meter: FreeStyle  Times checking blood sugar at home (number): 1  Times checking blood sugar at home (per): Day    Taking Medications:  Diabetes Medication(s)       Incretin Mimetic Agents       semaglutide (OZEMPIC) 2 MG/3ML pen Inject 0.25 mg subcutaneously every 7 days for 28 days, THEN 0.5 mg every 7 days for 28 days. Then increase dose..            Taking Medication Assessed Today: Yes  Current Treatments: Diet    Problem Solving:                 Reducing Risks:  Diabetes Risks: Family History, Age over 45 years  CAD Risks: Diabetes Mellitus  Has dilated eye exam at least once a year?: Yes  Sees dentist every 6 months?: Yes  Feet checked by healthcare provider in the last year?: Yes    Healthy Coping:  Healthy Coping Assessed Today: Yes  Emotional response to diabetes: Acceptance  Informal Support system:: Children, Spouse  Stage of change: ACTION (Actively working towards change)  Patient Activation Measure Survey Score:       No data to display                  Care Plan and Education Provided:  Healthy Eating: Balanced meals, Consistency in amount and timing of carbohydrate intake, and Plate planning method, Being Active: Finding a physical activity routine that works for you, Monitoring: Frequency of monitoring and Purpose, and Problem Solving: Low glucose - causes, signs/symptoms, treatment and prevention and Rule of 15 and carrying a carbohydrate source at all times in case of low glucose    Merna Chavira RD, CDCES, 12:43  PM, 9/17/2024    Time Spent: 60 minutes  Encounter Type: Individual    Any diabetes medication dose changes were made via the CDE Protocol per the patient's primary care provider. A copy of this encounter was shared with the provider.

## 2024-09-17 NOTE — PATIENT INSTRUCTIONS
PLAN     Aim to eat 3 meals/day, no longer than 4-5 hours between meals (except for the 12 hour fast overnight).  Aim to be as active as possible.  Ideally 20-30 minutes 5 days/week.  Go  whatever meter is available to you.  Test your BG 1 time/day alternating testing times: fasting or before or 2 hrs after the start of the meal.  Record.    4.  Follow up with Kiara Zazueta as directed.  5.  Follow up with Diabetes Education 10/15/24 at 9am.  If you have questions you can send them through [employer] or call Diabetes Education Triage [phone].  For Scheduling call [phone].      Merna Chavira RD, [facility], 11:09 AM, 9/17/2024

## 2024-09-17 NOTE — PROGRESS NOTES
Diabetes Self-Management Education & Support    Presents for: Initial Assessment for new diagnosis    Type of Service: In Person Visit      ASSESSMENT:  -type 2 diabetes, new diagnosis (states has had prediabetes ~20 years)  -recent a1c of 6.6  -currently being managed with diet and exercise alone (Ozempic is not currently covered)  -on aspirin and statin therapy  -diabetes complications:  n/a  -last seen by diabetes education n/a  -home BG monitoring reveals:  not testing     Primary concern: general education.  Has prescription for meter, but needs to take it to a pharmacy that uses Medicare.      Discussed:  basic physiology, natural progression of type 2, mechanism of action of medication (Metformin and Ozempic), basic carbohydrate counting, meal plan, label reading, fiber, hypoglycemia symptoms and treatment, resources.  Patient expressed understanding.      Patient's most recent   Lab Results   Component Value Date    A1C 6.6 08/09/2024    A1C 6.3 06/04/2020     is not meeting goal of <7.0    Diabetes knowledge and skills assessment:   Patient is knowledgeable in diabetes management concepts related to: Healthy Eating    Continue education with the following diabetes management concepts: Healthy Eating, Being Active, Monitoring, Taking Medication, Problem Solving, Reducing Risks, and Healthy Coping    Based on learning assessment above, most appropriate setting for further diabetes education would be: Individual setting.      PLAN     Aim to eat 3 meals/day, no longer than 4-5 hours between meals (except for the 12 hour fast overnight).  Aim to be as active as possible.  Ideally 20-30 minutes 5 days/week.  Go  whatever meter is available to you.  Test your BG 1 time/day alternating testing times: fasting or before or 2 hrs after the start of the meal.  Record.    4.  Follow up with Kiara Zazueta as directed.  5.  Follow up with Diabetes Education 10/15/24 at 9am.  If you have questions you can  "send them through [employer] or call Diabetes Education Triage [phone].  For Scheduling call [phone].    See Care Plan for co-developed, patient-state behavior change goals.  AVS provided for patient today.    Education Materials Provided:  [facility] Understanding Diabetes Booklet, BG Log Sheet, and My Plate Planner      SUBJECTIVE/OBJECTIVE:  Presents for: Initial Assessment for new diagnosis  Accompanied by: Self  Diabetes education in the past 24mo: No  Focus of Visit: Patient Unsure  Diabetes type: Type 2  Date of diagnosis: 2024  Disease course: Worsening  How confident are you filling out medical forms by yourself:: Extremely  Diabetes management related comments/concerns: wants general information  Transportation concerns: No  Difficulty affording diabetes medication?: Yes  Difficulty affording diabetes testing supplies?: Yes (needs to go to a different pharmacy)  Cultural Influences/Ethnic Background:  Not  or     Diabetes Symptoms & Complications:  Diabetes Related Symptoms: Polyphagia (increased hunger)  Weight trend: Stable  Symptom course: Stable  Disease course: Worsening       Patient Problem List and Family Medical History reviewed for relevant medical history, current medical status, and diabetes risk factors.    Vitals:  There were no vitals taken for this visit.  Estimated body mass index is 35.94 kg/m  as calculated from the following:    Height as of 9/12/24: 1.651 m (5' 5\").    Weight as of 9/12/24: 98 kg (216 lb).   Last 3 BP:   BP Readings from Last 3 Encounters:   09/12/24 138/84   09/11/24 133/80   08/09/24 123/71       History   Smoking Status    Former    Types: Cigarettes   Smokeless Tobacco    Never       Labs:  Lab Results   Component Value Date    A1C 6.6 08/09/2024    A1C 6.3 06/04/2020     Lab Results   Component Value Date     08/09/2024    GLC 94 01/12/2022    GLC 97 06/04/2020     Lab Results   Component Value Date     01/12/2022    LDL " "98 02/27/2018     HDL Cholesterol   Date Value Ref Range Status   02/27/2018 71 >49 mg/dL Final     Direct Measure HDL   Date Value Ref Range Status   01/12/2022 70 >=50 mg/dL Final   ]  GFR Estimate   Date Value Ref Range Status   08/09/2024 66 >60 mL/min/1.73m2 Final     Comment:     eGFR calculated using 2021 CKD-EPI equation.   06/04/2020 79 >60 mL/min/[1.73_m2] Final     Comment:     Non  GFR Calc  Starting 12/18/2018, serum creatinine based estimated GFR (eGFR) will be   calculated using the Chronic Kidney Disease Epidemiology Collaboration   (CKD-EPI) equation.       GFR Estimate If Black   Date Value Ref Range Status   06/04/2020 >90 >60 mL/min/[1.73_m2] Final     Comment:      GFR Calc  Starting 12/18/2018, serum creatinine based estimated GFR (eGFR) will be   calculated using the Chronic Kidney Disease Epidemiology Collaboration   (CKD-EPI) equation.       Lab Results   Component Value Date    CR 0.92 08/09/2024    CR 0.78 06/04/2020     No results found for: \"MICROALBUMIN\"    Healthy Eating:  Healthy Eating Assessed Today: Yes   Cultural/[place] diet restrictions?: No  Meal planning habits: follows keto diet, low carbohydrate, \"net carbohydrate\"  How many times a week on average do you eat food made away from home (restaurant/take-out)?: 1  Meals include: Breakfast, Dinner  Breakfast: 7-8am:  refrigerator oatmeal, fruit, coffee-black  Lunch: 3pm:  graze: toast w/ cheese, water, diet soda  Dinner: 6-7pm: Chicken teryaki, chowmein noodles, canned chicken, water, ciruel  Snacks: 1 sl toast (also might drink juice in the evening when watching tv); alcohol occassionally  Beverages: Water, Coffee, Juice, Diet soda, Alcohol    Being Active:  Being Active Assessed Today: Yes  Exercise:: Yes  Days per week of moderate to strenuous exercise (like a brisk walk): 7 (walking)  On average, minutes per day of exercise at this level: 10 (will walk 6 times/day for 10 minutes/time)  How " intense was your typical exercise? : Light (like stretching or slow walking)  Exercise Minutes per Week: 70    Monitoring:  Monitoring Assessed Today: Yes  Did patient bring glucose meter to appointment? : No  Blood Glucose Meter: FreeStyle  Times checking blood sugar at home (number): 1  Times checking blood sugar at home (per): Day    Taking Medications:  Diabetes Medication(s)       Incretin Mimetic Agents       semaglutide (OZEMPIC) 2 MG/3ML pen Inject 0.25 mg subcutaneously every 7 days for 28 days, THEN 0.5 mg every 7 days for 28 days. Then increase dose..            Taking Medication Assessed Today: Yes  Current Treatments: Diet    Problem Solving:                 Reducing Risks:  Diabetes Risks: Family History, Age over 45 years  CAD Risks: Diabetes Mellitus  Has dilated eye exam at least once a year?: Yes  Sees dentist every 6 months?: Yes  Feet checked by healthcare provider in the last year?: Yes    Healthy Coping:  Healthy Coping Assessed Today: Yes  Emotional response to diabetes: Acceptance  Informal Support system:: Children, Spouse  Stage of change: ACTION (Actively working towards change)  Patient Activation Measure Survey Score:       No data to display                  Care Plan and Education Provided:  Healthy Eating: Balanced meals, Consistency in amount and timing of carbohydrate intake, and Plate planning method, Being Active: Finding a physical activity routine that works for you, Monitoring: Frequency of monitoring and Purpose, and Problem Solving: Low glucose - causes, signs/symptoms, treatment and prevention and Rule of 15 and carrying a carbohydrate source at all times in case of low glucose    Merna Chavira RD, [facility], 12:43 PM, 9/17/2024    Time Spent: 60 minutes  Encounter Type: Individual    Any diabetes medication dose changes were made via the CDE Protocol per the patient's primary care provider. A copy of this encounter was shared with the provider.

## 2024-09-18 RX ORDER — METFORMIN HCL 500 MG
500 TABLET, EXTENDED RELEASE 24 HR ORAL
Qty: 120 TABLET | Refills: 2 | Status: SHIPPED | OUTPATIENT
Start: 2024-09-18

## 2024-09-18 NOTE — TELEPHONE ENCOUNTER
PRIOR AUTHORIZATION DENIED    Medication: Ozempic (0.25 or 0.5 MG/dose)    Denial Date: 9/18/2024    Denial Rational: Patient needs to try and fail alternatives listed below: Metformin, an agent containing metformin, or glipizide                Appeal Information: Review the plan's reasons for denial listed above. Please utilize that information to complete letter and provide specific, detailed clinical information/rationale of your patient's health status to address their denial reasons.

## 2024-09-26 ENCOUNTER — TELEPHONE (OUTPATIENT)
Dept: FAMILY MEDICINE | Facility: CLINIC | Age: 69
End: 2024-09-26
Payer: MEDICARE

## 2024-09-26 NOTE — TELEPHONE ENCOUNTER
Patient Quality Outreach    Patient is due for the following:   Diabetes -  Eye Exam    Next Steps:   No follow up needed at this time.    Type of outreach:    Sent [employer] message.      Questions for provider review:    None           Michelle Bolden CMA

## 2024-10-14 ENCOUNTER — TELEPHONE (OUTPATIENT)
Dept: FAMILY MEDICINE | Facility: CLINIC | Age: 69
End: 2024-10-14
Payer: MEDICARE

## 2024-10-14 DIAGNOSIS — E11.9 TYPE 2 DIABETES MELLITUS WITHOUT COMPLICATION, WITHOUT LONG-TERM CURRENT USE OF INSULIN (H): Primary | ICD-10-CM

## 2024-10-14 NOTE — TELEPHONE ENCOUNTER
"She wants you to know that she has had a headache since started Metformin, and she's having gas and burping all the time and she is \"just being a bitch.\" She states that is not a migraine strength but it's annoying, she feels in her sinuses toward the back of her. She cannot continue with this medication if she always has a headache like this and wants to know her other options. She is only take 2 metformin and has not increased the dose per your recommendation.    If medication change she prefers: [place].  "

## 2024-10-15 ENCOUNTER — ALLIED HEALTH/NURSE VISIT (OUTPATIENT)
Dept: EDUCATION SERVICES | Facility: CLINIC | Age: 69
End: 2024-10-15
Payer: MEDICARE

## 2024-10-15 DIAGNOSIS — E11.9 TYPE 2 DIABETES MELLITUS WITHOUT COMPLICATION, WITHOUT LONG-TERM CURRENT USE OF INSULIN (H): Primary | ICD-10-CM

## 2024-10-15 PROCEDURE — G0108 DIAB MANAGE TRN  PER INDIV: HCPCS | Performed by: DIETITIAN, REGISTERED

## 2024-10-15 RX ORDER — SEMAGLUTIDE 1.34 MG/ML
INJECTION, SOLUTION SUBCUTANEOUS
Qty: 3 ML | Refills: 0 | Status: SHIPPED | OUTPATIENT
Start: 2024-10-15 | End: 2024-12-24

## 2024-10-15 NOTE — LETTER
10/15/2024         RE: Luz Elena Shipley  [address] [facility] [address]        Dear Colleague,    Thank you for referring your patient, Luz Elena Shipley, to the [facility]. Please see a copy of my visit note below.    Diabetes Self-Management Education & Support    Presents for: Individual review    Type of Service: In Person Visit      ASSESSMENT:  --type 2 diabetes, diagnosed Aug 2024, had prediabetes for ~20 years  -recent a1c of 6.6  -currently being managed with Metformin(1 tab 2 times/day, but having side effects so didn't keep increasing)  -on aspirin and statin therapy  -diabetes complications:  n/a  -last seen by diabetes education 9/17/24  -home BG monitoring reveals:  78% in target     Primary concern: Dietary questions and wants food ideas.  Metformin is causing a headache.    Discussed:  target BG and a1c, foot care, meal plans/snack ideas/pattern of eating.  Patient expressed understanding.      Patient's most recent   Lab Results   Component Value Date    A1C 6.6 08/09/2024    A1C 6.3 06/04/2020     is meeting goal of <7.0    Diabetes knowledge and skills assessment:   Patient is knowledgeable in diabetes management concepts related to: Healthy Eating and Taking Medication    Continue education with the following diabetes management concepts: Healthy Eating, Being Active, Monitoring, Taking Medication, Problem Solving, Reducing Risks, and Healthy Coping    Based on learning assessment above, most appropriate setting for further diabetes education would be: Individual setting.      PLAN    Continue to allow some freedom in your eating.  Eating 30 grams of carbohydrate/meal is ok.  Aim to eat 3 meals/day, no longer than 4-5 hours between meals (except for the 12 hour fast overnight).  Aim to be as active as possible.  Ideally 20-30 minutes 5 days/week.  Start walking on treadmill again.    Reduce the Metformin to 1 tab and see if your headache improves.    Continue to  "test your BG 1 time/day alternating testing times: fasting or before or 2 hrs after the start of the meal.  Record.     5.  Follow up with Kiara Zazueta as directed.  6.  Follow up with Diabetes Education annually or sooner if needed.  If you have questions you can send them through [employer] or call Diabetes Education Triage [phone].  For Scheduling call [phone].      See Care Plan for co-developed, patient-state behavior change goals.  AVS provided for patient today.    Education Materials Provided:   [employer] Healthy Living with Diabetes Book3 carbohydrate choices/meal meal plan, 100 calorie snack, Six Small Meals/day      SUBJECTIVE/OBJECTIVE:  Presents for: Individual review  Accompanied by: Self  Diabetes education in the past 24mo: No  Focus of Visit: Patient Unsure  Diabetes type: Type 2  Date of diagnosis: 2024  Disease course: Worsening  How confident are you filling out medical forms by yourself:: Extremely  Diabetes management related comments/concerns: wants general information  Transportation concerns: No  Difficulty affording diabetes medication?: Yes  Difficulty affording diabetes testing supplies?: Yes (needs to go to a different pharmacy)  Cultural Influences/Ethnic Background:  Not  or     Diabetes Symptoms & Complications:  Weight trend: Stable  Symptom course: Stable  Disease course: Worsening       Patient Problem List and Family Medical History reviewed for relevant medical history, current medical status, and diabetes risk factors.    Vitals:  There were no vitals taken for this visit.  Estimated body mass index is 35.94 kg/m  as calculated from the following:    Height as of 9/12/24: 1.651 m (5' 5\").    Weight as of 9/12/24: 98 kg (216 lb).   Last 3 BP:   BP Readings from Last 3 Encounters:   09/12/24 138/84   09/11/24 133/80   08/09/24 123/71       History   Smoking Status     Former     Types: Cigarettes   Smokeless Tobacco     Never       Labs:  Lab " "Results   Component Value Date    A1C 6.6 08/09/2024    A1C 6.3 06/04/2020     Lab Results   Component Value Date     08/09/2024    GLC 94 01/12/2022    GLC 97 06/04/2020     Lab Results   Component Value Date     01/12/2022    LDL 98 02/27/2018     HDL Cholesterol   Date Value Ref Range Status   02/27/2018 71 >49 mg/dL Final     Direct Measure HDL   Date Value Ref Range Status   01/12/2022 70 >=50 mg/dL Final   ]  GFR Estimate   Date Value Ref Range Status   08/09/2024 66 >60 mL/min/1.73m2 Final     Comment:     eGFR calculated using 2021 CKD-EPI equation.   06/04/2020 79 >60 mL/min/[1.73_m2] Final     Comment:     Non  GFR Calc  Starting 12/18/2018, serum creatinine based estimated GFR (eGFR) will be   calculated using the Chronic Kidney Disease Epidemiology Collaboration   (CKD-EPI) equation.       GFR Estimate If Black   Date Value Ref Range Status   06/04/2020 >90 >60 mL/min/[1.73_m2] Final     Comment:      GFR Calc  Starting 12/18/2018, serum creatinine based estimated GFR (eGFR) will be   calculated using the Chronic Kidney Disease Epidemiology Collaboration   (CKD-EPI) equation.       Lab Results   Component Value Date    CR 0.92 08/09/2024    CR 0.78 06/04/2020     No results found for: \"MICROALBUMIN\"    Healthy Eating:  Healthy Eating Assessed Today: Yes  Cultural/[employer] diet restrictions?: No  Meal planning/habits: Low carb (hx: keto diet; keeping to 25 grams or less of carb/meal) (food record shows 10-40 grams, one meal of 80 grams and BG went to over 200 unless didn't wash hands before testing)  How many times a week on average do you eat food made away from home (restaurant/take-out)?: 1  Meals include: Breakfast, Dinner, Lunch  Breakfast: 7-8am:  Raudel wilson (chiata, egg white), coffee-black  Lunch: 3pm:  pumpernikl toast w/ provolone cheese, water, diet soda  Dinner: 6-7pm: pizza, zero cherry flavored 7 up  Snacks: HS: nothing last " night  Beverages: Water, Coffee, Juice, Diet soda, Alcohol    Being Active:  Being Active Assessed Today: Yes  Exercise:: Yes  Days per week of moderate to strenuous exercise (like a brisk walk): 7 (walking)  On average, minutes per day of exercise at this level: 10 (will walk 6 times/day for 10 minutes/time)  How intense was your typical exercise? : Light (like stretching or slow walking)  Exercise Minutes per Week: 70    Monitoring:  Monitoring Assessed Today: Yes  Did patient bring glucose meter to appointment? : No  Blood Glucose Meter: Accu-chek  Times checking blood sugar at home (number): 1  Times checking blood sugar at home (per): Day    Date Breakfast  Lunch  Dinner  Bedtime    Before After Before After Before After    10/7    276* 81     10/6 123         10/5 125         10/4    153      10/3  119        10/2  117        10/1 146 124        *maybe didn't have clean hands  78% in target      Taking Medications:  Diabetes Medication(s)       Biguanides       metFORMIN (GLUCOPHAGE XR) 500 MG 24 hr tablet Take 1 tablet (500 mg) by mouth daily (with dinner). Take with meal daily: 1 tab daily x 1 wk, then 2 tab daily x 1 wk, then 3 tab daily x 1 wk, then 4 tab daily.  Do not increase if not tolerating.            Taking Medication Assessed Today: Yes  Current Treatments: Oral Medication (taken by mouth)  Problems taking diabetes medications regularly?: No  Diabetes medication side effects?: Yes (having a headache since starting the Metformin, not the same as other headaches)    Problem Solving:  Problem Solving Assessed Today: Yes              Reducing Risks:  Reducing Risks Assessed Today: Yes  Diabetes Risks: Family History, Age over 45 years  CAD Risks: Diabetes Mellitus  Has dilated eye exam at least once a year?: Yes  Sees dentist every 6 months?: Yes  Feet checked by healthcare provider in the last year?: Yes    Healthy Coping:  Healthy Coping Assessed Today: Yes  Emotional response to diabetes:  Acceptance  Informal Support system:: Children, Spouse  Stage of change: ACTION (Actively working towards change)  Patient Activation Measure Survey Score:       No data to display                  Care Plan and Education Provided:  Healthy Eating: Carbohydrate Counting, Monitoring: Log and interpret results, Taking Medication: When to take medication(s), and Reducing Risks: Foot care    Merna Chavira RD, [facility], 4:01 PM, 10/15/2024    Time Spent: 60 minutes  Encounter Type: Individual    Any diabetes medication dose changes were made via the CDE Protocol per the patient's primary care provider. A copy of this encounter was shared with the provider.

## 2024-10-15 NOTE — PROGRESS NOTES
Diabetes Self-Management Education & Support    Presents for: Individual review    Type of Service: In Person Visit      ASSESSMENT:  --type 2 diabetes, diagnosed Aug 2024, had prediabetes for ~20 years  -recent a1c of 6.6  -currently being managed with Metformin(1 tab 2 times/day, but having side effects so didn't keep increasing)  -on aspirin and statin therapy  -diabetes complications:  n/a  -last seen by diabetes education 9/17/24  -home BG monitoring reveals:  78% in target     Primary concern: Dietary questions and wants food ideas.  Metformin is causing a headache.    Discussed:  target BG and a1c, foot care, meal plans/snack ideas/pattern of eating.  Patient expressed understanding.      Patient's most recent   Lab Results   Component Value Date    A1C 6.6 08/09/2024    A1C 6.3 06/04/2020     is meeting goal of <7.0    Diabetes knowledge and skills assessment:   Patient is knowledgeable in diabetes management concepts related to: Healthy Eating and Taking Medication    Continue education with the following diabetes management concepts: Healthy Eating, Being Active, Monitoring, Taking Medication, Problem Solving, Reducing Risks, and Healthy Coping    Based on learning assessment above, most appropriate setting for further diabetes education would be: Individual setting.      PLAN    Continue to allow some freedom in your eating.  Eating 30 grams of carbohydrate/meal is ok.  Aim to eat 3 meals/day, no longer than 4-5 hours between meals (except for the 12 hour fast overnight).  Aim to be as active as possible.  Ideally 20-30 minutes 5 days/week.  Start walking on treadmill again.    Reduce the Metformin to 1 tab and see if your headache improves.    Continue to test your BG 1 time/day alternating testing times: fasting or before or 2 hrs after the start of the meal.  Record.     5.  Follow up with Kiara Zazueta as directed.  6.  Follow up with Diabetes Education annually or sooner if needed.  If you  "have questions you can send them through [employer] or call Diabetes Education Triage [phone].  For Scheduling call [phone].      See Care Plan for co-developed, patient-state behavior change goals.  AVS provided for patient today.    Education Materials Provided:  [facility] Healthy Living with Diabetes Book3 carbohydrate choices/meal meal plan, 100 calorie snack, Six Small Meals/day      SUBJECTIVE/OBJECTIVE:  Presents for: Individual review  Accompanied by: Self  Diabetes education in the past 24mo: No  Focus of Visit: Patient Unsure  Diabetes type: Type 2  Date of diagnosis: 2024  Disease course: Worsening  How confident are you filling out medical forms by yourself:: Extremely  Diabetes management related comments/concerns: wants general information  Transportation concerns: No  Difficulty affording diabetes medication?: Yes  Difficulty affording diabetes testing supplies?: Yes (needs to go to a different pharmacy)  Cultural Influences/Ethnic Background:  Not  or     Diabetes Symptoms & Complications:  Weight trend: Stable  Symptom course: Stable  Disease course: Worsening       Patient Problem List and Family Medical History reviewed for relevant medical history, current medical status, and diabetes risk factors.    Vitals:  There were no vitals taken for this visit.  Estimated body mass index is 35.94 kg/m  as calculated from the following:    Height as of 9/12/24: 1.651 m (5' 5\").    Weight as of 9/12/24: 98 kg (216 lb).   Last 3 BP:   BP Readings from Last 3 Encounters:   09/12/24 138/84   09/11/24 133/80   08/09/24 123/71       History   Smoking Status    Former    Types: Cigarettes   Smokeless Tobacco    Never       Labs:  Lab Results   Component Value Date    A1C 6.6 08/09/2024    A1C 6.3 06/04/2020     Lab Results   Component Value Date     08/09/2024    GLC 94 01/12/2022    GLC 97 06/04/2020     Lab Results   Component Value Date     01/12/2022    LDL 98 " "02/27/2018     HDL Cholesterol   Date Value Ref Range Status   02/27/2018 71 >49 mg/dL Final     Direct Measure HDL   Date Value Ref Range Status   01/12/2022 70 >=50 mg/dL Final   ]  GFR Estimate   Date Value Ref Range Status   08/09/2024 66 >60 mL/min/1.73m2 Final     Comment:     eGFR calculated using 2021 CKD-EPI equation.   06/04/2020 79 >60 mL/min/[1.73_m2] Final     Comment:     Non  GFR Calc  Starting 12/18/2018, serum creatinine based estimated GFR (eGFR) will be   calculated using the Chronic Kidney Disease Epidemiology Collaboration   (CKD-EPI) equation.       GFR Estimate If Black   Date Value Ref Range Status   06/04/2020 >90 >60 mL/min/[1.73_m2] Final     Comment:      GFR Calc  Starting 12/18/2018, serum creatinine based estimated GFR (eGFR) will be   calculated using the Chronic Kidney Disease Epidemiology Collaboration   (CKD-EPI) equation.       Lab Results   Component Value Date    CR 0.92 08/09/2024    CR 0.78 06/04/2020     No results found for: \"MICROALBUMIN\"    Healthy Eating:  Healthy Eating Assessed Today: Yes  Cultural/[employer] diet restrictions?: No  Meal planning/habits: Low carb (hx: keto diet; keeping to 25 grams or less of carb/meal) (food record shows 10-40 grams, one meal of 80 grams and BG went to over 200 unless didn't wash hands before testing)  How many times a week on average do you eat food made away from home (restaurant/take-out)?: 1  Meals include: Breakfast, Dinner, Lunch  Breakfast: 7-8am:  Raudel wilson (chiata, egg white), coffee-black  Lunch: 3pm:  pumpernikl toast w/ provolone cheese, water, diet soda  Dinner: 6-7pm: pizza, zero cherry flavored 7 up  Snacks: HS: nothing last night  Beverages: Water, Coffee, Juice, Diet soda, Alcohol    Being Active:  Being Active Assessed Today: Yes  Exercise:: Yes  Days per week of moderate to strenuous exercise (like a brisk walk): 7 (walking)  On average, minutes per day of exercise at this " level: 10 (will walk 6 times/day for 10 minutes/time)  How intense was your typical exercise? : Light (like stretching or slow walking)  Exercise Minutes per Week: 70    Monitoring:  Monitoring Assessed Today: Yes  Did patient bring glucose meter to appointment? : No  Blood Glucose Meter: Accu-chek  Times checking blood sugar at home (number): 1  Times checking blood sugar at home (per): Day    Date Breakfast  Lunch  Dinner  Bedtime    Before After Before After Before After    10/7    276* 81     10/6 123         10/5 125         10/4    153      10/3  119        10/2  117        10/1 146 124        *maybe didn't have clean hands  78% in target      Taking Medications:  Diabetes Medication(s)       Biguanides       metFORMIN (GLUCOPHAGE XR) 500 MG 24 hr tablet Take 1 tablet (500 mg) by mouth daily (with dinner). Take with meal daily: 1 tab daily x 1 wk, then 2 tab daily x 1 wk, then 3 tab daily x 1 wk, then 4 tab daily.  Do not increase if not tolerating.            Taking Medication Assessed Today: Yes  Current Treatments: Oral Medication (taken by mouth)  Problems taking diabetes medications regularly?: No  Diabetes medication side effects?: Yes (having a headache since starting the Metformin, not the same as other headaches)    Problem Solving:  Problem Solving Assessed Today: Yes              Reducing Risks:  Reducing Risks Assessed Today: Yes  Diabetes Risks: Family History, Age over 45 years  CAD Risks: Diabetes Mellitus  Has dilated eye exam at least once a year?: Yes  Sees dentist every 6 months?: Yes  Feet checked by healthcare provider in the last year?: Yes    Healthy Coping:  Healthy Coping Assessed Today: Yes  Emotional response to diabetes: Acceptance  Informal Support system:: Children, Spouse  Stage of change: ACTION (Actively working towards change)  Patient Activation Measure Survey Score:       No data to display                  Care Plan and Education Provided:  Healthy Eating: Carbohydrate  Counting, Monitoring: Log and interpret results, Taking Medication: When to take medication(s), and Reducing Risks: Foot care    Merna Chavira RD, [facility], 4:01 PM, 10/15/2024    Time Spent: 60 minutes  Encounter Type: Individual    Any diabetes medication dose changes were made via the CDE Protocol per the patient's primary care provider. A copy of this encounter was shared with the provider.

## 2024-10-15 NOTE — PATIENT INSTRUCTIONS
PLAN    Continue to allow some freedom in your eating.  Eating 30 grams of carbohydrate/meal is ok.  Aim to eat 3 meals/day, no longer than 4-5 hours between meals (except for the 12 hour fast overnight).  Aim to be as active as possible.  Ideally 20-30 minutes 5 days/week.  Start walking on treadmill again.    Reduce the Metformin to 1 tab and see if your headache improves.    Continue to test your BG 1 time/day alternating testing times: fasting or before or 2 hrs after the start of the meal.  Record.     5.  Follow up with Kiara Zazueta as directed.  6.  Follow up with Diabetes Education annually or sooner if needed.  If you have questions you can send them through [email or web address] or call Diabetes Education Triage [phone].  For Scheduling call [phone].    Merna Chavira RD, [facility], 10:01 AM, 10/15/2024

## 2024-10-16 NOTE — TELEPHONE ENCOUNTER
Notify patient - Discontinue metformin.  Will send prescription for ozempic and see if we can get insurance to cover now that she has tried/failed metformin.

## 2024-10-16 NOTE — TELEPHONE ENCOUNTER
Called and spoke with pt. Informed of med refill. Pt understood, no questions.    Mona Zarco Patient

## 2024-11-04 DIAGNOSIS — E11.9 TYPE 2 DIABETES MELLITUS WITHOUT COMPLICATION, WITHOUT LONG-TERM CURRENT USE OF INSULIN (H): ICD-10-CM

## 2024-11-05 RX ORDER — ROSUVASTATIN CALCIUM 20 MG/1
20 TABLET, COATED ORAL DAILY
Qty: 90 TABLET | Refills: 0 | Status: SHIPPED | OUTPATIENT
Start: 2024-11-05

## 2024-11-13 ENCOUNTER — MEDICAL CORRESPONDENCE (OUTPATIENT)
Dept: HEALTH INFORMATION MANAGEMENT | Facility: CLINIC | Age: 69
End: 2024-11-13
Payer: MEDICARE

## 2024-11-13 ENCOUNTER — TELEPHONE (OUTPATIENT)
Dept: FAMILY MEDICINE | Facility: CLINIC | Age: 69
End: 2024-11-13
Payer: MEDICARE

## 2024-11-13 NOTE — TELEPHONE ENCOUNTER
General Call      Reason for Call:  Pt says she is out of her test strips. Rx is at [facility] in [place] but she says they requested some form to be completed due to her being on Medicare.   I spoke to pharmacy staff at [facility]. This would have come from the Medicare Dept.   They will have the Medicare Dept send a new CMN form. In the meantime, We can fax her recent diabetic office visit sent to the [facility] in [place]. It needs to be signed and dated by the provider.   Fax this to. [phone]  I printed her last office visit and an waiting Kiara's signature so I can fax back.         9/12/24 office visit was faxed to [facility]  Signed and dated by Kiara Zazueta

## 2024-11-15 NOTE — TELEPHONE ENCOUNTER
Patient calling stating this has not been received.     Please resend.     Fax confirmed.     LAM Wong

## 2024-11-19 ENCOUNTER — OFFICE VISIT (OUTPATIENT)
Dept: AUDIOLOGY | Facility: CLINIC | Age: 69
End: 2024-11-19
Payer: MEDICARE

## 2024-11-19 DIAGNOSIS — H93.11 TINNITUS OF RIGHT EAR: ICD-10-CM

## 2024-11-19 DIAGNOSIS — H93.8X1 EAR FULLNESS, RIGHT: ICD-10-CM

## 2024-11-19 DIAGNOSIS — H81.01 MENIERE'S DISEASE, RIGHT: Primary | ICD-10-CM

## 2024-11-19 DIAGNOSIS — H90.3 ASYMMETRICAL SENSORINEURAL HEARING LOSS: Primary | ICD-10-CM

## 2024-11-19 DIAGNOSIS — H81.01 MENIERE'S DISEASE, RIGHT: ICD-10-CM

## 2024-11-19 DIAGNOSIS — H91.21 SUDDEN HEARING LOSS, RIGHT: ICD-10-CM

## 2024-11-20 ENCOUNTER — OFFICE VISIT (OUTPATIENT)
Dept: OTOLARYNGOLOGY | Facility: CLINIC | Age: 69
End: 2024-11-20
Payer: MEDICARE

## 2024-11-20 ENCOUNTER — TELEPHONE (OUTPATIENT)
Dept: OTOLARYNGOLOGY | Facility: CLINIC | Age: 69
End: 2024-11-20

## 2024-11-20 VITALS
DIASTOLIC BLOOD PRESSURE: 83 MMHG | BODY MASS INDEX: 33.95 KG/M2 | SYSTOLIC BLOOD PRESSURE: 131 MMHG | WEIGHT: 204 LBS | TEMPERATURE: 98 F | HEART RATE: 88 BPM

## 2024-11-20 DIAGNOSIS — H81.01 MENIERE'S DISEASE, RIGHT: Primary | ICD-10-CM

## 2024-11-20 DIAGNOSIS — H90.5 SENSORY HEARING LOSS, UNILATERAL: ICD-10-CM

## 2024-11-20 RX ORDER — PREDNISONE 20 MG/1
60 TABLET ORAL DAILY
Qty: 21 TABLET | Refills: 0 | Status: SHIPPED | OUTPATIENT
Start: 2024-11-20 | End: 2024-11-27

## 2024-11-20 ASSESSMENT — PAIN SCALES - GENERAL: PAINLEVEL_OUTOF10: NO PAIN (0)

## 2024-11-20 NOTE — NURSING NOTE
"Initial /83 (BP Location: Left arm, Patient Position: Chair, Cuff Size: Adult Large)   Pulse 88   Temp 98  F (36.7  C) (Tympanic)   Wt 92.5 kg (204 lb)   BMI 33.95 kg/m   Estimated body mass index is 33.95 kg/m  as calculated from the following:    Height as of 9/12/24: 1.651 m (5' 5\").    Weight as of this encounter: 92.5 kg (204 lb). .  Jerilyn Prince LPN    "

## 2024-11-20 NOTE — TELEPHONE ENCOUNTER
Outgoing call: scheduled a same day appointment with patient. Was seen yesterday by audiology and told she should have been seen sooner.     Praveena Casey RN on 11/20/2024 at 8:14 AM

## 2024-11-20 NOTE — LETTER
11/20/2024      Luz Elena Shipley  [address] [facility] [address]      Dear Colleague,    Thank you for referring your patient, Luz Elena Shipley, to the [facility]. Please see a copy of my visit note below.    Luz Elena Shipley is a 69 year old female  Chief Complaint: Meniere's disease right ear, diagnosed about 6 months ago. Has been on Na restriction and diuretic since. Last vertigo spell in Septeber.  2 weeks ago pressure and decreased hearing in the right ear, no vertigo. Leonid has improved a little, but has not returned to baseline  History of Present Illness  Location: right ear  Quality:decline in hearing  Severity:moderate  Duration:2 w    Past Medical History -   Patient Active Problem List   Diagnosis     Severe persistent asthma (H)     Lichen sclerosus     Benign essential hypertension     Osteopenia     Class 2 severe obesity due to excess calories with serious comorbidity in adult (H)     Closed avulsion fracture of lateral malleolus of left fibula with routine healing, subsequent encounter     Meniere's disease, right     Change in hearing, right     NEGRA (obstructive sleep apnea)     Type 2 diabetes mellitus without complication, without long-term current use of insulin (H)       Current Medications -   Current Outpatient Medications:      aspirin 81 MG EC tablet, Take 1 tablet (81 mg) by mouth daily., Disp: , Rfl:      azelastine (ASTELIN) 0.1 % nasal spray, Spray 2 sprays into both nostrils 2 times daily as needed for rhinitis or allergies, Disp: 30 mL, Rfl: 3     blood glucose (NO BRAND SPECIFIED) test strip, Use to test blood sugar 1 times daily. To accompany: Blood Glucose Monitor Brands: per insurance., Disp: 100 strip, Rfl: 6     blood glucose monitoring (NO BRAND SPECIFIED) meter device kit, Use to test blood sugar 1 times daily. Preferred blood glucose meter OR supplies to accompany: Blood Glucose Monitor Brands: per insurance., Disp: 1 kit, Rfl: 0     clobetasol  (TEMOVATE) 0.05 % external cream, Apply sparingly to affected area twice weekly as needed.  Do not apply to face., Disp: 60 g, Rfl: 3     Fluticasone-Umeclidin-Vilanterol (TRELEGY ELLIPTA) 200-62.5-25 MCG/ACT oral inhaler, Inhale 1 puff into the lungs daily, Disp: 180 each, Rfl: 3     hydroCHLOROthiazide 12.5 MG tablet, Take 1 tablet (12.5 mg) by mouth daily, Disp: 90 tablet, Rfl: 3     ondansetron (ZOFRAN ODT) 4 MG ODT tab, Take 1-2 tablets (4-8 mg) by mouth every 8 hours as needed for nausea, Disp: 20 tablet, Rfl: 1     Respiratory Therapy Supplies (NEBULIZER/TUBING/MOUTHPIECE) KIT, Use with albuterol neb solution, Disp: 1 kit, Rfl: 0     rosuvastatin (CRESTOR) 20 MG tablet, TAKE 1 TABLET BY MOUTH ONCE DAILY, Disp: 90 tablet, Rfl: 0     semaglutide (OZEMPIC, 0.25 OR 0.5 MG/DOSE,) 2 MG/1.5ML SOPN pen, Inject 0.25 mg subcutaneously every 7 days for 28 days, THEN 0.5 mg every 7 days., Disp: 3 mL, Rfl: 0     thin (NO BRAND SPECIFIED) lancets, Use with lanceting device. To accompany: Blood Glucose Monitor Brands: per insurance., Disp: 100 each, Rfl: 6     triamterene-HCTZ (MAXZIDE-25) 37.5-25 MG tablet, Take 0.5 tablets by mouth daily, Disp: 30 tablet, Rfl: 3     diazepam (VALIUM) 2 MG tablet, 0.5-2 tabs once daily as needed for severe vertigo due to menieres. (Patient not taking: Reported on 11/20/2024), Disp: 10 tablet, Rfl: 0    Allergies -   Allergies   Allergen Reactions     Cartia Xt [Diltiazem] Other (See Comments)     Heartburn whenever on this medication     Losartan Cough     Miconazole      Sulfa Antibiotics Rash       Social History -   Social History     Socioeconomic History     Marital status:      Spouse name: None     Number of children: None     Years of education: None     Highest education level: None   Occupational History     Comment: Retired   Tobacco Use     Smoking status: Former     Current packs/day: 0.00     Average packs/day: 2.0 packs/day for 20.0 years (40.0 ttl pk-yrs)     Types:  Cigarettes     Start date: 1967     Quit date: 1987     Years since quittin.5     Smokeless tobacco: Never   Vaping Use     Vaping status: Never Used   Substance and Sexual Activity     Alcohol use: Yes     Comment: occas     Drug use: No     Sexual activity: Not Currently     Partners: Male     Birth control/protection: Post-menopausal   Other Topics Concern     Parent/sibling w/ CABG, MI or angioplasty before 65F 55M? No   Social History Narrative    24        ENVIRONMENTAL HISTORY: [place] The home is heated with a forced air but does not use.  Has floor heating. They do have central air conditioning. The patient's bedroom is furnished with carpeting in bedroom. 1 dog at home. There is no history of cockroach or mice infestation. There are no smokers in the house.  The house does not have a damp basement.      Social Drivers of Health     Financial Resource Strain: Low Risk  (2024)    Financial Resource Strain      Within the past 12 months, have you or your family members you live with been unable to get utilities (heat, electricity) when it was really needed?: No   Food Insecurity: Low Risk  (2024)    Food Insecurity      Within the past 12 months, did you worry that your food would run out before you got money to buy more?: No      Within the past 12 months, did the food you bought just not last and you didn t have money to get more?: No   Transportation Needs: Low Risk  (2024)    Transportation Needs      Within the past 12 months, has lack of transportation kept you from medical appointments, getting your medicines, non-medical meetings or appointments, work, or from getting things that you need?: No   Physical Activity: Insufficiently Active (2024)    Exercise Vital Sign      Days of Exercise per Week: 3 days      Minutes of Exercise per Session: 30 min   Stress: Stress Concern Present (2024)    [place] [employer]  Occupational Health - Occupational Stress Questionnaire      Feeling of Stress : Rather much   Social Connections: Unknown (9/12/2024)    Social Connection and Isolation Panel [NHANES]      Frequency of Social Gatherings with Friends and Family: Once a week   Interpersonal Safety: Low Risk  (8/9/2024)    Interpersonal Safety      Do you feel physically and emotionally safe where you currently live?: Yes      Within the past 12 months, have you been hit, slapped, kicked or otherwise physically hurt by someone?: No      Within the past 12 months, have you been humiliated or emotionally abused in other ways by your partner or ex-partner?: No   Housing Stability: Low Risk  (9/12/2024)    Housing Stability      Do you have housing? : Yes      Are you worried about losing your housing?: No       Family History -   Family History   Problem Relation Age of Onset     Other Cancer Mother         pancreatic     Diabetes Mother         unsure type; in 40s     Arthritis Mother      Cerebrovascular Disease Father         CVA     Chronic Obstructive Pulmonary Disease Father      Skin Cancer Father      Breast Cancer Sister      Skin Cancer Sister      Migraines Sister      Alzheimer Disease Brother         early onset     Arthritis Brother      Aneurysm Paternal Grandmother      Unknown/Adopted Paternal Grandfather      Diabetes Son         type 1     Thyroid Disease Son      Stomach Problem Son         polyps, diverticulitis, heartburn, passes blood     Alcoholism Son      Breast Cancer Paternal Aunt        Review of Systems:   !.  Weight Loss: No   2. Difficulty Breathing: No   3. Difficulty Swallowing: No   4. Pain: No    Physical Exam  B/P: 131/83, T: 98, P: 88, R: Data Unavailable  Vitals: /83 (BP Location: Left arm, Patient Position: Chair, Cuff Size: Adult Large)   Pulse 88   Temp 98  F (36.7  C) (Tympanic)   Wt 92.5 kg (204 lb)   BMI 33.95 kg/m    BMI= Body mass index is 33.95 kg/m .    General  Appearance -  Normal  Head/Face/Scalp:    Skin - Normal    Facial Palpation - Normal    Facial Strength - Normal  Ears:    Pinna - Normal    Canal - Normal   Tympanic membrane - Normal  Nose:    External - Normal    Septum - Normal    Turbinates - Normal    Middle meatus - Normal  Oral Cavity:    Lips - Normal    Floor of Mouth - Normal    Gingiva - Normal    Tongue - Normal    Buccal - Normal    Palate - Normal  Nasopharynx:    Oropharynx:    Tonsils - Normal    Tongue base - Normal    Soft palate - Normal    Posterior pharyngeal wall - Normal  Hypopharynx:  Larynx:    Epiglottis -     Aryepiglottic folds -     Arytenoids -     False vocal cords -     True vocal cords -  Neck Masses - No  Neck lymphatics - no lymphadenopathy  Thyroid - Normal  Salivary glands - Normal    Audiogram - not applicable  Radiology - not applicable   Reports:   View films:  Procedures - not applicable  Patient Education:     A/P - Luz Elena Shipley is a 69 year old female  Medical Decision Making 1. Meniere's disease, right ear 2. Hearing loss right ear  Prednisone 60 mg daily x 7 days. Will evaluate in 7 days and determine progress - will send a my chart message      Again, thank you for allowing me to participate in the care of your patient.        Sincerely,        Baljit De Los Santos MD

## 2024-11-20 NOTE — PROGRESS NOTES
Luz Elena Shipley is a 69 year old female  Chief Complaint: Meniere's disease right ear, diagnosed about 6 months ago. Has been on Na restriction and diuretic since. Last vertigo spell in Septeber.  2 weeks ago pressure and decreased hearing in the right ear, no vertigo. Leonid has improved a little, but has not returned to baseline  History of Present Illness  Location: right ear  Quality:decline in hearing  Severity:moderate  Duration:2 w    Past Medical History -   Patient Active Problem List   Diagnosis    Severe persistent asthma (H)    Lichen sclerosus    Benign essential hypertension    Osteopenia    Class 2 severe obesity due to excess calories with serious comorbidity in adult (H)    Closed avulsion fracture of lateral malleolus of left fibula with routine healing, subsequent encounter    Meniere's disease, right    Change in hearing, right    NEGRA (obstructive sleep apnea)    Type 2 diabetes mellitus without complication, without long-term current use of insulin (H)       Current Medications -   Current Outpatient Medications:     aspirin 81 MG EC tablet, Take 1 tablet (81 mg) by mouth daily., Disp: , Rfl:     azelastine (ASTELIN) 0.1 % nasal spray, Spray 2 sprays into both nostrils 2 times daily as needed for rhinitis or allergies, Disp: 30 mL, Rfl: 3    blood glucose (NO BRAND SPECIFIED) test strip, Use to test blood sugar 1 times daily. To accompany: Blood Glucose Monitor Brands: per insurance., Disp: 100 strip, Rfl: 6    blood glucose monitoring (NO BRAND SPECIFIED) meter device kit, Use to test blood sugar 1 times daily. Preferred blood glucose meter OR supplies to accompany: Blood Glucose Monitor Brands: per insurance., Disp: 1 kit, Rfl: 0    clobetasol (TEMOVATE) 0.05 % external cream, Apply sparingly to affected area twice weekly as needed.  Do not apply to face., Disp: 60 g, Rfl: 3    Fluticasone-Umeclidin-Vilanterol (TRELEGY ELLIPTA) 200-62.5-25 MCG/ACT oral inhaler, Inhale 1 puff into the lungs  daily, Disp: 180 each, Rfl: 3    hydroCHLOROthiazide 12.5 MG tablet, Take 1 tablet (12.5 mg) by mouth daily, Disp: 90 tablet, Rfl: 3    ondansetron (ZOFRAN ODT) 4 MG ODT tab, Take 1-2 tablets (4-8 mg) by mouth every 8 hours as needed for nausea, Disp: 20 tablet, Rfl: 1    Respiratory Therapy Supplies (NEBULIZER/TUBING/MOUTHPIECE) KIT, Use with albuterol neb solution, Disp: 1 kit, Rfl: 0    rosuvastatin (CRESTOR) 20 MG tablet, TAKE 1 TABLET BY MOUTH ONCE DAILY, Disp: 90 tablet, Rfl: 0    semaglutide (OZEMPIC, 0.25 OR 0.5 MG/DOSE,) 2 MG/1.5ML SOPN pen, Inject 0.25 mg subcutaneously every 7 days for 28 days, THEN 0.5 mg every 7 days., Disp: 3 mL, Rfl: 0    thin (NO BRAND SPECIFIED) lancets, Use with lanceting device. To accompany: Blood Glucose Monitor Brands: per insurance., Disp: 100 each, Rfl: 6    triamterene-HCTZ (MAXZIDE-25) 37.5-25 MG tablet, Take 0.5 tablets by mouth daily, Disp: 30 tablet, Rfl: 3    diazepam (VALIUM) 2 MG tablet, 0.5-2 tabs once daily as needed for severe vertigo due to menieres. (Patient not taking: Reported on 2024), Disp: 10 tablet, Rfl: 0    Allergies -   Allergies   Allergen Reactions    Cartia Xt [Diltiazem] Other (See Comments)     Heartburn whenever on this medication    Losartan Cough    Miconazole     Sulfa Antibiotics Rash       Social History -   Social History     Socioeconomic History    Marital status:      Spouse name: None    Number of children: None    Years of education: None    Highest education level: None   Occupational History     Comment: Retired   Tobacco Use    Smoking status: Former     Current packs/day: 0.00     Average packs/day: 2.0 packs/day for 20.0 years (40.0 ttl pk-yrs)     Types: Cigarettes     Start date: 1967     Quit date: 1987     Years since quittin.5    Smokeless tobacco: Never   Vaping Use    Vaping status: Never Used   Substance and Sexual Activity    Alcohol use: Yes     Comment: occas    Drug use: No    Sexual activity:  Not Currently     Partners: Male     Birth control/protection: Post-menopausal   Other Topics Concern    Parent/sibling w/ CABG, MI or angioplasty before 65F 55M? No   Social History Narrative    05/23/24        ENVIRONMENTAL HISTORY: [place] The home is heated with a forced air but does not use.  Has floor heating. They do have central air conditioning. The patient's bedroom is furnished with carpeting in bedroom. 1 dog at home. There is no history of cockroach or mice infestation. There are no smokers in the house.  The house does not have a damp basement.      Social Drivers of Health     Financial Resource Strain: Low Risk  (9/12/2024)    Financial Resource Strain     Within the past 12 months, have you or your family members you live with been unable to get utilities (heat, electricity) when it was really needed?: No   Food Insecurity: Low Risk  (9/12/2024)    Food Insecurity     Within the past 12 months, did you worry that your food would run out before you got money to buy more?: No     Within the past 12 months, did the food you bought just not last and you didn t have money to get more?: No   Transportation Needs: Low Risk  (9/12/2024)    Transportation Needs     Within the past 12 months, has lack of transportation kept you from medical appointments, getting your medicines, non-medical meetings or appointments, work, or from getting things that you need?: No   Physical Activity: Insufficiently Active (9/12/2024)    Exercise Vital Sign     Days of Exercise per Week: 3 days     Minutes of Exercise per Session: 30 min   Stress: Stress Concern Present (9/12/2024)    [employer] [place] [employer] - Occupational Stress Questionnaire     Feeling of Stress : Rather much   Social Connections: Unknown (9/12/2024)    Social Connection and Isolation Panel [NHANES]     Frequency of Social Gatherings with Friends and Family: Once a week   Interpersonal Safety: Low  Risk  (8/9/2024)    Interpersonal Safety     Do you feel physically and emotionally safe where you currently live?: Yes     Within the past 12 months, have you been hit, slapped, kicked or otherwise physically hurt by someone?: No     Within the past 12 months, have you been humiliated or emotionally abused in other ways by your partner or ex-partner?: No   Housing Stability: Low Risk  (9/12/2024)    Housing Stability     Do you have housing? : Yes     Are you worried about losing your housing?: No       Family History -   Family History   Problem Relation Age of Onset    Other Cancer Mother         pancreatic    Diabetes Mother         unsure type; in 40s    Arthritis Mother     Cerebrovascular Disease Father         CVA    Chronic Obstructive Pulmonary Disease Father     Skin Cancer Father     Breast Cancer Sister     Skin Cancer Sister     Migraines Sister     Alzheimer Disease Brother         early onset    Arthritis Brother     Aneurysm Paternal Grandmother     Unknown/Adopted Paternal Grandfather     Diabetes Son         type 1    Thyroid Disease Son     Stomach Problem Son         polyps, diverticulitis, heartburn, passes blood    Alcoholism Son     Breast Cancer Paternal Aunt        Review of Systems:   !.  Weight Loss: No   2. Difficulty Breathing: No   3. Difficulty Swallowing: No   4. Pain: No    Physical Exam  B/P: 131/83, T: 98, P: 88, R: Data Unavailable  Vitals: /83 (BP Location: Left arm, Patient Position: Chair, Cuff Size: Adult Large)   Pulse 88   Temp 98  F (36.7  C) (Tympanic)   Wt 92.5 kg (204 lb)   BMI 33.95 kg/m    BMI= Body mass index is 33.95 kg/m .    General  Appearance - Normal  Head/Face/Scalp:    Skin - Normal    Facial Palpation - Normal    Facial Strength - Normal  Ears:    Pinna - Normal    Canal - Normal   Tympanic membrane - Normal  Nose:    External - Normal    Septum - Normal    Turbinates - Normal    Middle meatus - Normal  Oral Cavity:    Lips - Normal    Floor of  Mouth - Normal    Gingiva - Normal    Tongue - Normal    Buccal - Normal    Palate - Normal  Nasopharynx:    Oropharynx:    Tonsils - Normal    Tongue base - Normal    Soft palate - Normal    Posterior pharyngeal wall - Normal  Hypopharynx:  Larynx:    Epiglottis -     Aryepiglottic folds -     Arytenoids -     False vocal cords -     True vocal cords -  Neck Masses - No  Neck lymphatics - no lymphadenopathy  Thyroid - Normal  Salivary glands - Normal    Audiogram - not applicable  Radiology - not applicable   Reports:   View films:  Procedures - not applicable  Patient Education:     A/P - Luz Elena Shipley is a 69 year old female  Medical Decision Making 1. Meniere's disease, right ear 2. Hearing loss right ear  Prednisone 60 mg daily x 7 days. Will evaluate in 7 days and determine progress - will send a my chart message

## 2024-12-03 ENCOUNTER — OFFICE VISIT (OUTPATIENT)
Dept: AUDIOLOGY | Facility: CLINIC | Age: 69
End: 2024-12-03
Payer: MEDICARE

## 2024-12-03 DIAGNOSIS — H90.3 SENSORINEURAL HEARING LOSS, BILATERAL: Primary | ICD-10-CM

## 2024-12-03 DIAGNOSIS — H91.21 SUDDEN RIGHT HEARING LOSS: ICD-10-CM

## 2024-12-03 DIAGNOSIS — H93.8X1 EAR FULLNESS, RIGHT: ICD-10-CM

## 2024-12-03 PROCEDURE — 92557 COMPREHENSIVE HEARING TEST: CPT | Performed by: AUDIOLOGIST

## 2024-12-03 PROCEDURE — 92550 TYMPANOMETRY & REFLEX THRESH: CPT | Performed by: AUDIOLOGIST

## 2024-12-03 NOTE — PROGRESS NOTES
AUDIOLOGY REPORT    SUBJECTIVE:  Luz Elena Shipley is a 69 year old female who was seen in the Audiology Clinic at the [facility] for audiologic evaluation, referred by Baljit De Los Santos MD. The patient has been seen previously in this clinic on 11/19/2024 for assessment of a sudden hearing loss in the right ear and results indicated moderate rising to mild sloping to moderately severe sensorineural hearing loss in the right ear and normal to moderately severe sensorineural hearing loss in the left ear. The patient has a diagnosis of Meniere's disease in the right ear. She has not had any recent dizziness. She was treated with steroids after the sudden hearing loss. The patient reports that she has not noted any changes in hearing or ear fullness, though she did note a decrease in tinnitus in the right ear today. The patient noted significant difficulty hearing in a large group on Thanksgiving last week. The patient was unaccompanied to today's appointment.    OBJECTIVE:  Otoscopic exam indicates ears are clear of cerumen bilaterally     Pure Tone Thresholds assessed using conventional audiometry with good  reliability from 250-8000 Hz bilaterally using insert earphones and circumaural headphones     RIGHT:  mild sloping to  moderately severe  sensorineural hearing loss    LEFT:    normal hearing sensitivity through 1000 Hz sloping to  mild to moderately severe  sensorineural hearing loss    Tympanogram:    RIGHT: normal eardrum mobility    LEFT:   normal eardrum mobility    Reflexes (reported by stimulus ear):  RIGHT: Ipsilateral is elevated at frequencies tested  RIGHT: Contralateral is present at normal levels  LEFT:   Ipsilateral is present at normal levels  LEFT:   Contralateral is elevated at frequencies tested    Speech Reception Threshold:    RIGHT: 30 dB HL    LEFT:   25 dB HL    Word Recognition Score:     RIGHT: 88% at 70 dB HL using NU-6 recorded word list.    LEFT:   100% at 70 dB HL  using NU-6 recorded word list.      ASSESSMENT:     ICD-10-CM    1. Sensorineural hearing loss, bilateral  H90.3 [facility] Thr[facility] Eval & Speech Recog (47538)     Tymps / Reflex   (85335)      2. Sudden right hearing loss  H91.21 Adult Audiology  Referral     [facility] Audi[facility] Thr[facility]ld Eval & Speech Recog (58623)     Tymps / Reflex   ([id number])      3. Ear fullness, right  H93.8X1 [facility] Thr[facility] Eval & Speech Recog ([id number])     Tymps / Reflex   ([id number])        Compared to patient's previous audiogram dated 11/19/2024, hearing has improved by 10-20 dB at 250-1000 Hz in the right ear. Word recognition is also significantly better today in the right ear. Left ear thresholds and word recognition are stable. Today s results were discussed with the patient in detail.     PLAN:  Patient was counseled regarding hearing loss and impact on communication. It is recommended that the patient return as needed. Results will be sent to ENT for review.  Please call this clinic with questions regarding these results or recommendations.      Wilmer Chow, CCC-A  [place] Licensed Audiologist #[id number]  12/3/2024

## 2024-12-09 ENCOUNTER — OFFICE VISIT (OUTPATIENT)
Dept: FAMILY MEDICINE | Facility: CLINIC | Age: 69
End: 2024-12-09
Attending: PHYSICIAN ASSISTANT
Payer: MEDICARE

## 2024-12-09 VITALS
HEART RATE: 92 BPM | WEIGHT: 205 LBS | OXYGEN SATURATION: 99 % | HEIGHT: 65 IN | BODY MASS INDEX: 34.16 KG/M2 | DIASTOLIC BLOOD PRESSURE: 60 MMHG | TEMPERATURE: 98.3 F | RESPIRATION RATE: 16 BRPM | SYSTOLIC BLOOD PRESSURE: 124 MMHG

## 2024-12-09 DIAGNOSIS — E66.01 CLASS 2 SEVERE OBESITY DUE TO EXCESS CALORIES WITH SERIOUS COMORBIDITY IN ADULT, UNSPECIFIED BMI (H): ICD-10-CM

## 2024-12-09 DIAGNOSIS — E66.812 CLASS 2 SEVERE OBESITY DUE TO EXCESS CALORIES WITH SERIOUS COMORBIDITY IN ADULT, UNSPECIFIED BMI (H): ICD-10-CM

## 2024-12-09 DIAGNOSIS — E11.9 TYPE 2 DIABETES MELLITUS WITHOUT COMPLICATION, WITHOUT LONG-TERM CURRENT USE OF INSULIN (H): Primary | ICD-10-CM

## 2024-12-09 LAB
CHOLEST SERPL-MCNC: 119 MG/DL
EST. AVERAGE GLUCOSE BLD GHB EST-MCNC: 151 MG/DL
FASTING STATUS PATIENT QL REPORTED: NO
HBA1C MFR BLD: 6.9 % (ref 0–5.6)
HDLC SERPL-MCNC: 58 MG/DL
LDLC SERPL CALC-MCNC: 34 MG/DL
NONHDLC SERPL-MCNC: 61 MG/DL
TRIGL SERPL-MCNC: 134 MG/DL

## 2024-12-09 PROCEDURE — 83036 HEMOGLOBIN GLYCOSYLATED A1C: CPT | Performed by: PHYSICIAN ASSISTANT

## 2024-12-09 PROCEDURE — 36415 COLL VENOUS BLD VENIPUNCTURE: CPT | Performed by: PHYSICIAN ASSISTANT

## 2024-12-09 PROCEDURE — 80061 LIPID PANEL: CPT | Performed by: PHYSICIAN ASSISTANT

## 2024-12-09 PROCEDURE — 99214 OFFICE O/P EST MOD 30 MIN: CPT | Performed by: PHYSICIAN ASSISTANT

## 2024-12-09 PROCEDURE — G2211 COMPLEX E/M VISIT ADD ON: HCPCS | Performed by: PHYSICIAN ASSISTANT

## 2024-12-09 RX ORDER — ROSUVASTATIN CALCIUM 20 MG/1
20 TABLET, COATED ORAL DAILY
Qty: 90 TABLET | Refills: 3 | Status: SHIPPED | OUTPATIENT
Start: 2024-12-09

## 2024-12-09 ASSESSMENT — ASTHMA QUESTIONNAIRES
ACT_TOTALSCORE: 25
QUESTION_3 LAST FOUR WEEKS HOW OFTEN DID YOUR ASTHMA SYMPTOMS (WHEEZING, COUGHING, SHORTNESS OF BREATH, CHEST TIGHTNESS OR PAIN) WAKE YOU UP AT NIGHT OR EARLIER THAN USUAL IN THE MORNING: NOT AT ALL
QUESTION_1 LAST FOUR WEEKS HOW MUCH OF THE TIME DID YOUR ASTHMA KEEP YOU FROM GETTING AS MUCH DONE AT WORK, SCHOOL OR AT HOME: NONE OF THE TIME
ACT_TOTALSCORE: 25
QUESTION_2 LAST FOUR WEEKS HOW OFTEN HAVE YOU HAD SHORTNESS OF BREATH: NOT AT ALL
QUESTION_4 LAST FOUR WEEKS HOW OFTEN HAVE YOU USED YOUR RESCUE INHALER OR NEBULIZER MEDICATION (SUCH AS ALBUTEROL): NOT AT ALL
QUESTION_5 LAST FOUR WEEKS HOW WOULD YOU RATE YOUR ASTHMA CONTROL: COMPLETELY CONTROLLED

## 2024-12-09 ASSESSMENT — PAIN SCALES - GENERAL: PAINLEVEL_OUTOF10: NO PAIN (0)

## 2024-12-09 NOTE — PROGRESS NOTES
Assessment & Plan     Type 2 diabetes mellitus without complication, without long-term current use of insulin (H)  Controlled though A1c has gone up despite starting ozempic.  Currently at 0.5 mg dose.  She had been on high dose prednisone.  No change in treatment plan.  - Albumin Random Urine Quantitative with Creat Ratio  - Lipid panel reflex to direct LDL Non-fasting  - Hemoglobin A1c  - rosuvastatin (CRESTOR) 20 MG tablet; Take 1 tablet (20 mg) by mouth daily.    Class 2 severe obesity due to excess calories with serious comorbidity in adult, unspecified BMI (H)  Uncontrolled, comorbid DM2.  Continue plan of ozempic which should also help wt loss.    The longitudinal plan of care for the diagnosis(es)/condition(s) as documented were addressed during this visit. Due to the added complexity in care, I will continue to support Gigi in the subsequent management and with ongoing continuity of care.    Patient Instructions   Doing well - keep working way up on ozempic, send My Chart message update when starting to run low on the 1 mg.    Wait another week before vaccines    Recheck 3-6 months.      Subjective   Gigi is a 69 year old, presenting for the following health issues:  Diabetes, Lipids, and Hypertension        12/9/2024     2:01 PM   Additional Questions   Roomed by Alix KOCH CMA     History of Present Illness       Diabetes:   She presents for follow up of diabetes.  She is checking home blood glucose one time daily.   She checks blood glucose before and after meals.  Blood glucose is sometimes over 200 and          The patient has had a diabetic eye exam in the last 12 months.  [facility] of [facility] eye [facility]       On ozempic 0.5.  Feels better now than she did the first week.  Does have some constipation - BM every few days and slower.  Is down 21 pounds since Sept based on flowsheet.  A1c today went from 6.6 to 6.9.  she notes high dose prednisone (for Meneires sudden hearing loss) shot her  "sugars for awhile.      Hyperlipidemia Follow-Up  Are you regularly taking any medication or supplement to lower your cholesterol?   Yes- Crestor   Are you having muscle aches or other side effects that you think could be caused by your cholesterol lowering medication?  No    Hypertension Follow-up  Do you check your blood pressure regularly outside of the clinic? Yes   Are you following a low salt diet? Yes  Are your blood pressures ever more than 140 on the top number (systolic) OR more   than 90 on the bottom number (diastolic), for example 140/90? No    BP Readings from Last 2 Encounters:   12/09/24 124/60   11/20/24 131/83     Hemoglobin A1C (%)   Date Value   12/09/2024 6.9 (H)   08/09/2024 6.6 (H)   06/04/2020 6.3 (H)   02/27/2018 6.3 (H)     LDL Cholesterol Calculated (mg/dL)   Date Value   12/09/2024 34   01/12/2022 114 (H)   02/27/2018 98   04/28/2016 100 (H)         Objective    /60 (BP Location: Right arm, Patient Position: Sitting, Cuff Size: Adult Large)   Pulse 92   Temp 98.3  F (36.8  C)   Resp 16   Ht 1.651 m (5' 5\")   Wt 93 kg (205 lb)   SpO2 99%   BMI 34.11 kg/m    Body mass index is 34.11 kg/m .      Signed Electronically by: Kiara Zazueta PA-C    "

## 2024-12-09 NOTE — NURSING NOTE
"Chief Complaint   Patient presents with    Diabetes       Initial Ht 1.651 m (5' 5\")   BMI 33.95 kg/m   Estimated body mass index is 33.95 kg/m  as calculated from the following:    Height as of this encounter: 1.651 m (5' 5\").    Weight as of 11/20/24: 92.5 kg (204 lb).    Patient presents to the clinic using No DME    Is there anyone who you would like to be able to receive your results? No  If yes have patient fill out SOH[facility]      "

## 2024-12-09 NOTE — PATIENT INSTRUCTIONS
Doing well - keep working way up on ozempic, send My Chart message update when starting to run low on the 1 mg.    Wait another week before vaccines    Recheck 3-6 months.

## 2024-12-24 ENCOUNTER — E-VISIT (OUTPATIENT)
Dept: URGENT CARE | Facility: CLINIC | Age: 69
End: 2024-12-24
Payer: MEDICARE

## 2024-12-24 ENCOUNTER — NURSE TRIAGE (OUTPATIENT)
Dept: FAMILY MEDICINE | Facility: CLINIC | Age: 69
End: 2024-12-24

## 2024-12-24 DIAGNOSIS — J06.9 VIRAL URI WITH COUGH: Primary | ICD-10-CM

## 2024-12-24 RX ORDER — GUAIFENESIN 1200 MG/1
1200 TABLET, EXTENDED RELEASE ORAL 2 TIMES DAILY
Qty: 60 TABLET | Refills: 0 | Status: SHIPPED | OUTPATIENT
Start: 2024-12-24

## 2024-12-24 RX ORDER — BENZONATATE 100 MG/1
100 CAPSULE ORAL 3 TIMES DAILY PRN
Qty: 30 CAPSULE | Refills: 0 | Status: SHIPPED | OUTPATIENT
Start: 2024-12-24

## 2024-12-24 NOTE — TELEPHONE ENCOUNTER
.Nurse Triage SBAR    Is this a 2nd Level Triage? NO  Situation: Pt has had a cough for 2 to 3 days    Background:     Assessment: Patient has a non productive cough for 2 to 3 days..  No fever.  No wheezing.  Negative Covid test on Sunday.  Pt is drinking and urinating normally.  Pt denies shortness of breath but the coughing spells bother her most.  No wheezing.      Protocol Recommended Disposition:   See in Office Today or Tomorrow    Recommendation: Pt was advised she can make a  Video appt, go to  in [place]. There are no in person openings at the clinics today.  Pt chose to do an E Visit.         Does the patient meet one of the following criteria for [facility] visit consideration? 16+ years old, with an [facility] PCP     TIP  Providers, please consider if this condition is appropriate for management at one of our Acute and Diagnostic Services sites.     If patient is a good candidate, please use dotphrase <dot>triageresponse and select Refer to ADS to document.  Reason for Disposition   Continuous (nonstop) coughing interferes with work or school and no improvement using cough treatment per Care Advice    Additional Information   Negative: Bluish (or gray) lips or face   Negative: SEVERE difficulty breathing (e.g., struggling for each breath, speaks in single words)   Negative: Rapid onset of cough and has hives   Negative: Coughing started suddenly after medicine, an allergic food or bee sting   Negative: Difficulty breathing after exposure to flames, smoke, or fumes   Negative: Sounds like a life-threatening emergency to the triager   Negative: Previous asthma attacks and this feels like asthma attack   Negative: Dry cough (non-productive; no sputum or minimal clear sputum) and within 14 days of COVID-19 Exposure   Negative: MODERATE difficulty breathing (e.g., speaks in phrases, SOB even at rest, pulse 100-120) and still present when not coughing   Negative: Chest pain present when not coughing   Negative:  "Passed out (e.g., fainted, lost consciousness, blacked out and was not responding)   Negative: Patient sounds very sick or weak to the triager   Negative: MILD difficulty breathing (e.g., minimal/no SOB at rest, SOB with walking, pulse <100) and still present when not coughing   Negative: Coughed up > 1 tablespoon (15 ml) blood (Exception: Blood-tinged sputum.)   Negative: Fever > 103 F (39.4 C)   Negative: Fever > 101 F (38.3 C) and over 60 years of age   Negative: Fever > 100 F (37.8 C) and has diabetes mellitus or a weak immune system (e.g., HIV positive, cancer chemotherapy, organ transplant, splenectomy, chronic steroids)   Negative: Fever > 100 F (37.8 C) and bedridden (e.g., CVA, chronic illness, recovering from surgery)   Negative: Increasing ankle swelling   Negative: Wheezing is present   Negative: SEVERE coughing spells (e.g., whooping sound after coughing, vomiting after coughing)   Negative: Coughing up sofia-colored (reddish-brown) or blood-tinged sputum   Negative: Fever present > 3 days (72 hours)   Negative: Fever returns after gone for over 24 hours and symptoms worse or not improved   Negative: Using nasal washes and pain medicine > 24 hours and sinus pain persists   Negative: Known COPD or other severe lung disease (i.e., bronchiectasis, cystic fibrosis, lung surgery) and symptoms getting worse (i.e., increased sputum purulence or amount, increased breathing difficulty)    Answer Assessment - Initial Assessment Questions  1. ONSET: \"When did the cough begin?\"       2 days ago  2. SEVERITY: \"How bad is the cough today?\"       Moderate  3. SPUTUM: \"Describe the color of your sputum\" (e.g., none, dry cough; clear, white, yellow, green)      Dry cough  4. HEMOPTYSIS: \"Are you coughing up any blood?\" If Yes, ask: \"How much?\" (e.g., flecks, streaks, tablespoons, etc.)      NO  5. DIFFICULTY BREATHING: \"Are you having difficulty breathing?\" If Yes, ask: \"How bad is it?\" (e.g., mild, moderate, severe)    " "   NO  6. FEVER: \"Do you have a fever?\" If Yes, ask: \"What is your temperature, how was it measured, and when did it start?\"      NO  7. CARDIAC HISTORY: \"Do you have any history of heart disease?\" (e.g., heart attack, congestive heart failure)       No  8. LUNG HISTORY: \"Do you have any history of lung disease?\"  (e.g., pulmonary embolus, asthma, emphysema)      Asthma  9. PE RISK FACTORS: \"Do you have a history of blood clots?\" (or: recent major surgery, recent prolonged travel, bedridden)      No  10. OTHER SYMPTOMS: \"Do you have any other symptoms?\" (e.g., runny nose, wheezing, chest pain)        Chest congestion  11. PREGNANCY: \"Is there any chance you are pregnant?\" \"When was your last menstrual period?\"        NO  12. TRAVEL: \"Have you traveled out of the country in the last month?\" (e.g., travel history, exposures)        No    Protocols used: Cough-A-OH    "

## 2024-12-24 NOTE — PATIENT INSTRUCTIONS
Thank you for choosing us for your care. I have placed an order for a prescription so that you can start treatment. View your full visit summary for details by clicking on the link below. Your pharmacist will able to address any questions you may have about the medication.     If you're not feeling better within 5-7 days, please schedule an appointment.  You can schedule an appointment right here in [facility], or call [phone]  If the visit is for the same symptoms as your eVisit, we'll refund the cost of your eVisit if seen within seven days.

## 2025-01-04 ENCOUNTER — TELEPHONE (OUTPATIENT)
Dept: NURSING | Facility: CLINIC | Age: 70
End: 2025-01-04
Payer: MEDICARE

## 2025-01-04 NOTE — TELEPHONE ENCOUNTER
Telephone call  Patient calling she states her ozempic was frozen and it says not to use it if frozen. Recommended that she call the pharmacy and ask the pharmacist and if she needs a new prescription to call us back.    Kiya Clark RN   [facility] Nurse Advisor  10:30 AM 1/4/2025

## 2025-01-06 DIAGNOSIS — E11.9 TYPE 2 DIABETES MELLITUS WITHOUT COMPLICATION, WITHOUT LONG-TERM CURRENT USE OF INSULIN (H): ICD-10-CM

## 2025-01-22 DIAGNOSIS — E11.9 TYPE 2 DIABETES MELLITUS WITHOUT COMPLICATION, WITHOUT LONG-TERM CURRENT USE OF INSULIN (H): ICD-10-CM

## 2025-01-23 RX ORDER — LANCETS
EACH MISCELLANEOUS
OUTPATIENT
Start: 2025-01-23

## 2025-01-27 ENCOUNTER — TELEPHONE (OUTPATIENT)
Dept: FAMILY MEDICINE | Facility: CLINIC | Age: 70
End: 2025-01-27
Payer: MEDICARE

## 2025-01-27 DIAGNOSIS — E11.9 TYPE 2 DIABETES MELLITUS WITHOUT COMPLICATION, WITHOUT LONG-TERM CURRENT USE OF INSULIN (H): ICD-10-CM

## 2025-01-27 NOTE — TELEPHONE ENCOUNTER
New Medication Request        What medication are you requesting?: patient finish last ozempic pen 1 mg, requesting next dose. Reports no concerns or problem with previous dose.     Patient took last dose today next inject Tuesday 02/03/2025      Preferred Pharmacy:    [facility], [place] - [address] [place] [address]  Phone: [phone] Fax: [phone]      Could we [facility] or would you prefer to receive a phone call?:   Patient would prefer a phone call   Okay to leave a detailed message?: Yes at Cell number on file:    Telephone Information:   Mobile [phone]

## 2025-02-19 ENCOUNTER — OFFICE VISIT (OUTPATIENT)
Dept: PEDIATRICS | Facility: CLINIC | Age: 70
End: 2025-02-19
Payer: MEDICARE

## 2025-02-19 ENCOUNTER — NURSE TRIAGE (OUTPATIENT)
Dept: FAMILY MEDICINE | Facility: CLINIC | Age: 70
End: 2025-02-19

## 2025-02-19 ENCOUNTER — TELEPHONE (OUTPATIENT)
Dept: FAMILY MEDICINE | Facility: CLINIC | Age: 70
End: 2025-02-19

## 2025-02-19 VITALS
WEIGHT: 180 LBS | BODY MASS INDEX: 29.99 KG/M2 | TEMPERATURE: 97.5 F | HEIGHT: 65 IN | OXYGEN SATURATION: 94 % | SYSTOLIC BLOOD PRESSURE: 125 MMHG | HEART RATE: 100 BPM | DIASTOLIC BLOOD PRESSURE: 84 MMHG

## 2025-02-19 DIAGNOSIS — R10.11 RUQ ABDOMINAL PAIN: ICD-10-CM

## 2025-02-19 DIAGNOSIS — N28.9 ACUTE RENAL INSUFFICIENCY: ICD-10-CM

## 2025-02-19 DIAGNOSIS — R11.2 NAUSEA AND VOMITING, UNSPECIFIED VOMITING TYPE: ICD-10-CM

## 2025-02-19 DIAGNOSIS — R10.13 ABDOMINAL PAIN, EPIGASTRIC: ICD-10-CM

## 2025-02-19 DIAGNOSIS — T88.7XXA MEDICATION SIDE EFFECTS: ICD-10-CM

## 2025-02-19 DIAGNOSIS — E86.0 DEHYDRATION: Primary | ICD-10-CM

## 2025-02-19 DIAGNOSIS — E66.01 CLASS 2 SEVERE OBESITY DUE TO EXCESS CALORIES WITH SERIOUS COMORBIDITY IN ADULT, UNSPECIFIED BMI (H): ICD-10-CM

## 2025-02-19 DIAGNOSIS — E66.812 CLASS 2 SEVERE OBESITY DUE TO EXCESS CALORIES WITH SERIOUS COMORBIDITY IN ADULT, UNSPECIFIED BMI (H): ICD-10-CM

## 2025-02-19 DIAGNOSIS — E11.9 TYPE 2 DIABETES MELLITUS WITHOUT COMPLICATION, WITHOUT LONG-TERM CURRENT USE OF INSULIN (H): ICD-10-CM

## 2025-02-19 DIAGNOSIS — E87.6 HYPOKALEMIA: ICD-10-CM

## 2025-02-19 LAB
ALBUMIN SERPL BCG-MCNC: 4.2 G/DL (ref 3.5–5.2)
ALP SERPL-CCNC: 55 U/L (ref 40–150)
ALT SERPL W P-5'-P-CCNC: 46 U/L (ref 0–50)
ANION GAP SERPL CALCULATED.3IONS-SCNC: 15 MMOL/L (ref 7–15)
AST SERPL W P-5'-P-CCNC: 39 U/L (ref 0–45)
BASOPHILS # BLD AUTO: 0 10E3/UL (ref 0–0.2)
BASOPHILS NFR BLD AUTO: 0 %
BILIRUB SERPL-MCNC: 1.5 MG/DL
BUN SERPL-MCNC: 17.8 MG/DL (ref 8–23)
CALCIUM SERPL-MCNC: 10 MG/DL (ref 8.8–10.4)
CHLORIDE SERPL-SCNC: 87 MMOL/L (ref 98–107)
CREAT SERPL-MCNC: 1.29 MG/DL (ref 0.51–0.95)
CRP SERPL-MCNC: <3 MG/L
EGFRCR SERPLBLD CKD-EPI 2021: 44 ML/MIN/1.73M2
EOSINOPHIL # BLD AUTO: 0 10E3/UL (ref 0–0.7)
EOSINOPHIL NFR BLD AUTO: 0 %
ERYTHROCYTE [DISTWIDTH] IN BLOOD BY AUTOMATED COUNT: 12.2 % (ref 10–15)
GLUCOSE SERPL-MCNC: 147 MG/DL (ref 70–99)
HCO3 SERPL-SCNC: 33 MMOL/L (ref 22–29)
HCT VFR BLD AUTO: 50.8 % (ref 35–47)
HGB BLD-MCNC: 17.3 G/DL (ref 11.7–15.7)
IMM GRANULOCYTES # BLD: 0 10E3/UL
IMM GRANULOCYTES NFR BLD: 0 %
LIPASE SERPL-CCNC: 61 U/L (ref 13–60)
LYMPHOCYTES # BLD AUTO: 1.7 10E3/UL (ref 0.8–5.3)
LYMPHOCYTES NFR BLD AUTO: 17 %
MCH RBC QN AUTO: 30.8 PG (ref 26.5–33)
MCHC RBC AUTO-ENTMCNC: 34.1 G/DL (ref 31.5–36.5)
MCV RBC AUTO: 91 FL (ref 78–100)
MONOCYTES # BLD AUTO: 1 10E3/UL (ref 0–1.3)
MONOCYTES NFR BLD AUTO: 10 %
NEUTROPHILS # BLD AUTO: 7.3 10E3/UL (ref 1.6–8.3)
NEUTROPHILS NFR BLD AUTO: 72 %
PLATELET # BLD AUTO: 261 10E3/UL (ref 150–450)
POTASSIUM SERPL-SCNC: 2.8 MMOL/L (ref 3.4–5.3)
PROT SERPL-MCNC: 7.8 G/DL (ref 6.4–8.3)
RBC # BLD AUTO: 5.61 10E6/UL (ref 3.8–5.2)
SODIUM SERPL-SCNC: 135 MMOL/L (ref 135–145)
WBC # BLD AUTO: 10.1 10E3/UL (ref 4–11)

## 2025-02-19 RX ORDER — POTASSIUM CHLORIDE 1500 MG/1
40 TABLET, EXTENDED RELEASE ORAL ONCE
Status: COMPLETED | OUTPATIENT
Start: 2025-02-19 | End: 2025-02-19

## 2025-02-19 RX ORDER — ACETAMINOPHEN 500 MG
1000 TABLET ORAL ONCE
Status: COMPLETED | OUTPATIENT
Start: 2025-02-19 | End: 2025-02-19

## 2025-02-19 RX ORDER — POTASSIUM CHLORIDE 1500 MG/1
20 TABLET, EXTENDED RELEASE ORAL 2 TIMES DAILY
Qty: 14 TABLET | Refills: 0 | Status: SHIPPED | OUTPATIENT
Start: 2025-02-19

## 2025-02-19 RX ORDER — ONDANSETRON 4 MG/1
4 TABLET, ORALLY DISINTEGRATING ORAL EVERY 8 HOURS PRN
Qty: 12 TABLET | Refills: 0 | Status: SHIPPED | OUTPATIENT
Start: 2025-02-19

## 2025-02-19 RX ORDER — ONDANSETRON 2 MG/ML
4 INJECTION INTRAMUSCULAR; INTRAVENOUS
Status: ACTIVE | OUTPATIENT
Start: 2025-02-19 | End: 2025-02-20

## 2025-02-19 RX ADMIN — Medication 1000 MG: at 15:48

## 2025-02-19 RX ADMIN — Medication 1000 ML: at 16:09

## 2025-02-19 RX ADMIN — POTASSIUM CHLORIDE 40 MEQ: 1500 TABLET, EXTENDED RELEASE ORAL at 15:38

## 2025-02-19 RX ADMIN — Medication 1000 ML: at 14:56

## 2025-02-19 RX ADMIN — ONDANSETRON 4 MG: 2 INJECTION INTRAMUSCULAR; INTRAVENOUS at 16:12

## 2025-02-19 ASSESSMENT — PAIN SCALES - GENERAL: PAINLEVEL_OUTOF10: SEVERE PAIN (7)

## 2025-02-19 NOTE — PATIENT INSTRUCTIONS
Do your best to continue to take in fluids.  Starting tomorrow you can take the potassium replacement that I sent to the pharmacy twice daily.  Zofran can be used for nausea every 8 hours as needed.    You have a follow-up appointment back here at this [facility] clinic on Friday, February 21 at 9:30 AM.    Discontinue your 2 mg dosing of Ozempic.  I did send in a new prescription for the lower dose of Ozempic.  We are going to start you back on the 0.5 mg weekly dose.  I recommend that you stay at this lower dose until you are feeling well for at least a week or two.  Then you can speak with your primary doctor about considering increasing to the Ozempic 1 mg weekly dose.  I would not go higher than that.

## 2025-02-19 NOTE — Clinical Note
FYI - I have this patient coming back in on Friday for a recheck.  Needs a stat BMP and [facility] would have to call a .  I wanted to make sure the BMP could be addressed promptly in case she needs more IV fluids or needs to stop the potassium.  Thank you!

## 2025-02-19 NOTE — TELEPHONE ENCOUNTER
Patient calling with concerns about current dosage of Ozempic 2 mg causing some side effects. Patient took 3rd dose on 2/15/25.    Patient reports she has issues with not able to eat or drink much due to stomach fullness, nausea, vomiting the day after taking Ozempic 2 mg.  Patient is concerned about dehydration due to not able to drink much water or fluids due to feeling full all of the time.  Patient report only drinking 16 ounces of water per day and no other liquids   Urine is darker yellow like a school bus color  Dry mouth , dry eyes, dry skin.  Skin on back of hand when pinched takes more then 20 seconds to go back down.    Headaches every few nights of 6 out of 10 pain.  Patient is very tired all of the time, feels weak,  and has very little energy.  1:15 pm BP: 112/100; pulse 108   1:20 pm 104/92; pulse 109  Patient has not been testing blood sugar levels for type 2 diabetes.      Reason for Disposition   MODERATE weakness or fatigue from poor fluid intake with no improvement after 2 hours of rest and fluids   Drinking very little and dehydration suspected (e.g., no urine > 12 hours, very dry mouth, very lightheaded)   Patient sounds very sick or weak to the triager    Additional Information   Negative: SEVERE difficulty breathing (e.g., struggling for each breath, speaks in single words)   Negative: Shock suspected (e.g., cold/pale/clammy skin, too weak to stand, low BP, rapid pulse)   Negative: Difficult to awaken or acting confused (e.g., disoriented, slurred speech)   Negative: Fainted > 15 minutes ago and still feels too weak or dizzy to stand   Negative: SEVERE weakness (e.g., unable to walk or barely able to walk, requires support) and new-onset or getting worse   Negative: Sounds like a life-threatening emergency to the triager   Negative: Weakness of the face, arm or leg on one side of the body   Negative: Has diabetes mellitus and weakness from low blood sugar (i.e., < 60 mg/dL or 3.5 mmol/L)    "Negative: Recent heat exposure, suspected cause of weakness   Negative: Vomiting is main symptom   Negative: Diarrhea is main symptom   Negative: Difficulty breathing   Negative: Heart beating < 50 beats per minute OR > 140 beats per minute   Negative: Extra heartbeats, irregular heart beating, or heart is beating very fast (i.e., 'palpitations')   Negative: Follows large amount of bleeding (e.g., from vomiting, rectum, vagina) (Exception: Small transient weakness from sight of a small amount blood.)   Negative: Black or tarry bowel movements    Answer Assessment - Initial Assessment Questions  1. DESCRIPTION: \"Describe how you are feeling.\"      Feeling tired when walking around her house.    2. SEVERITY: \"How bad is it?\"  \"Can you stand and walk?\"      Able to ambulate but sits a lot more due to not having any energy.    3. ONSET: \"When did these symptoms begin?\" (e.g., hours, days, weeks, months)      2/3/25.    4. CAUSE: \"What do you think is causing the weakness or fatigue?\" (e.g., not drinking enough fluids, medical problem, trouble sleeping)      Dehydrated and not able to drink much for fluids after Ozempic increased to 2 mg.    5. NEW MEDICINES:  \"Have you started on any new medicines recently?\" (e.g., opioid pain medicines, benzodiazepines, muscle relaxants, antidepressants, antihistamines, neuroleptics, beta blockers)      Ozempic 2 mg on 2/3/25   Ro    6. OTHER SYMPTOMS: \"Do you have any other symptoms?\" (e.g., chest pain, fever, cough, SOB, vomiting, diarrhea, bleeding, other areas of pain)      Tired, not able to drink water, darker color of urine, dry mucus membranes, dry skin, constipation, headache, stomach pain after eating or drinking anything.    Protocols used: Weakness (Generalized) and Fatigue-JONATHAN-COLT Juarez RN on 2/19/2025 at 1:21 PM     "

## 2025-02-19 NOTE — TELEPHONE ENCOUNTER
Referral to Acute and Diagnostic Services    [phone] ([place]) [place] - [address] [place] [phone]    Transition to Acute & Diagnostic Services Clinic has been discussed with patient, and she agrees with next level of care.   Patient understands that evaluation/treatment at [facility] typically takes significantly longer than in clinic/urgent care (>2 hours).  The [facility] has been contacted by provider/staff to confirm patient acceptance.         Special issues:      Claustrophobia requiring premedication    Patient can get to [facility] within 20 min.

## 2025-02-19 NOTE — Clinical Note
LIZZY - seen at [facility] today.  Dehydration, acute renal insufficiency and hypokalemia.  See my note for full details.  I have her scheduled to come back to [facility] in 2 days for recheck BMP (your schedule was full).

## 2025-02-19 NOTE — TELEPHONE ENCOUNTER
Patient Quality Outreach    Patient is due for the following:   Diabetes -  A1C and Microalbumin    Action(s) Taken:   Patient declined follow up at this time.    Type of outreach:    Phone, spoke to patient/parent. Patient     Questions for provider review:    None           Michelle Bolden, CMA

## 2025-02-19 NOTE — PROGRESS NOTES
[facility] Clinic Visit  Assessment & Plan     Patient having nausea and vomiting secondary to medication side effect of increased dose of Ozempic.  Findings today include dehydration, acute renal insufficiency, severe hypokalemia as well as nausea and vomiting and abdominal pain.    Patient given 1 L IV normal saline x2 while here in clinic  Given acetaminophen 1000 mg p.o. x 1 for abdominal pain  Potassium chloride 40 mEq p.o. x 1 given for hypokalemia  Zofran 4mg IV given for nausea  Recheck in [facility] on Friday as scheduled.  Will need repeat BMP.     Recommended:  Do your best to continue to take in fluids.  Starting tomorrow you can take the potassium replacement that I sent to the pharmacy twice daily.  Zofran can be used for nausea every 8 hours as needed.    You have a follow-up appointment back here at this [facility] on Friday, February 21 at 9:30 AM.    Discontinue your 2 mg dosing of Ozempic.  I did send in a new prescription for the lower dose of Ozempic.  We are going to start you back on the 0.5 mg weekly dose.  I recommend that you stay at this lower dose until you are feeling well for at least a week or two.  Then you can speak with your primary doctor about considering increasing to the Ozempic 1 mg weekly dose.  I would not go higher than that.    Dehydration  Patient given 1 L IV normal saline x2 while here in clinic  - CBC with platelets differential; Future  - Comprehensive metabolic panel; Future  - Lipase; Future  - UA Macroscopic with reflex to Microscopic and Culture - Clinic Collect  - CRP inflammation; Future  - sodium chloride (PF) 0.9% PF flush 3 mL  - sodium chloride 0.9% BOLUS 1,000 mL  - CBC with platelets differential  - Comprehensive metabolic panel  - Lipase  - CRP inflammation  - sodium chloride 0.9% BOLUS 1,000 mL    Acute renal insufficiency  Given 2 L of IV fluid-normal saline-here in clinic and scheduled for recheck in 2 days.  - sodium chloride 0.9%  BOLUS 1,000 mL    Hypokalemia  Given 40 mEq potassium chloride orally here in clinic and will take 20 mEq twice daily.  Prescription written for enough for a week.  She will be rechecked in 2 days.  - EKG 12-lead, tracing only  - potassium chloride irvin ER (KLOR-CON M20) CR tablet 40 mEq  - potassium chloride irvin ER (KLOR-CON M20) 20 MEQ CR tablet; Take 1 tablet (20 mEq) by mouth 2 times daily.    Nausea and vomiting, unspecified vomiting type  1 dose of Zofran 4 mg IV given in clinic today  Zofran every 8 hours as needed  - CBC with platelets differential; Future  - Comprehensive metabolic panel; Future  - Lipase; Future  - UA Macroscopic with reflex to Microscopic and Culture - Clinic Collect  - CRP inflammation; Future  - sodium chloride (PF) 0.9% PF flush 3 mL  - sodium chloride 0.9% BOLUS 1,000 mL  - CBC with platelets differential  - Comprehensive metabolic panel  - Lipase  - CRP inflammation  - ondansetron (ZOFRAN) injection 4 mg  - ondansetron (ZOFRAN ODT) 4 MG ODT tab; Take 1 tablet (4 mg) by mouth every 8 hours as needed for nausea.    Medication side effects  Recommended discontinuing the 2 mg weekly Ozempic dose  We will start her back on 0.5 mg weekly and stay at this level until she is feeling well  - semaglutide (OZEMPIC) 2 MG/3ML pen; Inject 0.5 mg subcutaneously every 7 days.    Class 2 severe obesity due to excess calories with serious comorbidity in adult, unspecified BMI (H)  On Ozempic  Here for med side effect  - semaglutide (OZEMPIC) 2 MG/3ML pen; Inject 0.5 mg subcutaneously every 7 days.    RUQ abdominal pain  Reviewed all lab results with patient and her  here in clinic  Tylenol given for pain  - CBC with platelets differential; Future  - Comprehensive metabolic panel; Future  - Lipase; Future  - UA Macroscopic with reflex to Microscopic and Culture - Clinic Collect  - CRP inflammation; Future  - sodium chloride (PF) 0.9% PF flush 3 mL  - sodium chloride 0.9% BOLUS 1,000 mL  - CBC  with platelets differential  - Comprehensive metabolic panel  - Lipase  - CRP inflammation  - acetaminophen (TYLENOL) tablet 1,000 mg    Abdominal pain, epigastric  See above  She also has constipation.  She has dulcolax at home.  Advised that she can use that.   - CBC with platelets differential; Future  - Comprehensive metabolic panel; Future  - Lipase; Future  - UA Macroscopic with reflex to Microscopic and Culture - Clinic Collect  - CRP inflammation; Future  - sodium chloride (PF) 0.9% PF flush 3 mL  - sodium chloride 0.9% BOLUS 1,000 mL  - CBC with platelets differential  - Comprehensive metabolic panel  - Lipase  - CRP inflammation  - acetaminophen (TYLENOL) tablet 1,000 mg    Type 2 diabetes mellitus without complication, without long-term current use of insulin (H)  Treating with Ozempic  - semaglutide (OZEMPIC) 2 MG/3ML pen; Inject 0.5 mg subcutaneously every 7 days.    > 70 minutes were spent doing chart review, history and exam, documentation and further activities per the note.             No follow-ups on file.    Dominick Yu is a 69 year old, presenting for the following health issues:  Dehydration and Generalized Weakness    HPI     Nausea/Vomiting/Dehydration  Onset/Duration: x 2-3 weeks ago with exhaustion, nausea and vomiting started 2-3 weeks ago        Nausea: YES       Vomiting:  YES       How many times in the last 24 hours:  0, last time she vomited was Tuesday        What is the appearance:  dry heaves , no liquid        Any blood present: no        Diarrhea: no        Constipation: YES       Melena/dark stools: no        What has oral intake been like: not able to drink more than 16oz daily        Abdominal pain: YES       Character/location/radiation:  Upper Gi pain   Risks       Recent travel: no        Exposures to ill contacts: no        Recent antibiotics past 2 months: no        Other:  just increased dose of ozempic when this arrived   Progression of symptoms:  "worsening  Accompanying signs and symptoms:       Fever/chills: YES - chills       Gas/bloating: YES       Dysuria or hematuria: no        Other symptoms: full feeling   Therapies tried and outcome: Zofran - has helped nausea    Patient is a 69-year-old female who was started on Ozempic several weeks ago.  She completed 4 weeks of the 0.5 mg weekly dosing and then did 4 weeks at 1 mg.  About 3 weeks ago she increased to the 2 mg/week dosage and since then has been feeling ill.  She feels like she has a lump or a knot in her stomach and anytime she tries to eat or drink it gets worse.  She has been having nausea and vomiting.  She feels weak and just wants to sleep all the time.  She gets easily tired with activity.  She is hardly eating at all and can only drink about 16 ounces of fluid per day.  Increased burping.  Feeling constipated and has not had a bowel movement for 2 or 3 days.  Prior to starting Ozempic she used to have bowel movements daily.  She did have some Zofran at home left over from a bout with Ménière's disease and the Zofran has been helping with the nausea.  She has lost 45 pounds in the last 2 months.    Review of Systems  negative except listed in HPI      Objective    /84 (BP Location: Right arm, Patient Position: Sitting, Cuff Size: Adult Regular)   Pulse 100   Temp 97.5  F (36.4  C) (Tympanic)   Ht 1.651 m (5' 5\")   Wt 81.6 kg (180 lb)   SpO2 94%   BMI 29.95 kg/m    Body mass index is 29.95 kg/m .  Physical Exam   Vitals are noted and within normal limits  In general she is alert, oriented, and in no acute distress  Mouth mucous membranes are pink and slightly dry  Neck is supple with no cervical adenopathy  Heart has regular rate and rhythm without murmur  Lungs are clear to auscultation bilaterally with good air entry  Abdomen with normal bowel sounds.  Soft and nondistended.  She has tenderness to palpation in the right upper quadrant and epigastric areas.  Cap refill is about " 2 seconds  Positive tenting of the skin  CBC: CBC is within normal limits.  White count and hemoglobin are bit high but this appears to be her baseline based on previous results.  CRP: Normal/negative  CMP: Severe hypokalemia with a potassium of 2.8.  Acute renal insufficiency with a creatinine of 1.29 and estimated GFR of 44.  Elevated total bilirubin and remaining LFTs are within normal limits. Chloride is low, glucose elevated.  Normal anion gap and sodium.  Lipase: Mildly elevated at 61  UA: Unable to give urine sample today  EKG: Normal sinus rhythm with no acute changes.  No U waves seen on EKG.  EKG is read by me.  Results for orders placed or performed in visit on 02/19/25   Comprehensive metabolic panel     Status: Abnormal   Result Value Ref Range    Sodium 135 135 - 145 mmol/L    Potassium 2.8 (L) 3.4 - 5.3 mmol/L    Carbon Dioxide (CO2) 33 (H) 22 - 29 mmol/L    Anion Gap 15 7 - 15 mmol/L    Urea Nitrogen 17.8 8.0 - 23.0 mg/dL    Creatinine 1.29 (H) 0.51 - 0.95 mg/dL    GFR Estimate 44 (L) >60 mL/min/1.73m2    Calcium 10.0 8.8 - 10.4 mg/dL    Chloride 87 (L) 98 - 107 mmol/L    Glucose 147 (H) 70 - 99 mg/dL    Alkaline Phosphatase 55 40 - 150 U/L    AST 39 0 - 45 U/L    ALT 46 0 - 50 U/L    Protein Total 7.8 6.4 - 8.3 g/dL    Albumin 4.2 3.5 - 5.2 g/dL    Bilirubin Total 1.5 (H) <=1.2 mg/dL   Lipase     Status: Abnormal   Result Value Ref Range    Lipase 61 (H) 13 - 60 U/L   CRP inflammation     Status: Normal   Result Value Ref Range    CRP Inflammation <3.00 <5.00 mg/L   CBC with platelets and differential     Status: Abnormal   Result Value Ref Range    WBC Count 10.1 4.0 - 11.0 10e3/uL    RBC Count 5.61 (H) 3.80 - 5.20 10e6/uL    Hemoglobin 17.3 (H) 11.7 - 15.7 g/dL    Hematocrit 50.8 (H) 35.0 - 47.0 %    MCV 91 78 - 100 fL    MCH 30.8 26.5 - 33.0 pg    MCHC 34.1 31.5 - 36.5 g/dL    RDW 12.2 10.0 - 15.0 %    Platelet Count 261 150 - 450 10e3/uL    % Neutrophils 72 %    % Lymphocytes 17 %    % Monocytes  10 %    % Eosinophils 0 %    % Basophils 0 %    % Immature Granulocytes 0 %    Absolute Neutrophils 7.3 1.6 - 8.3 10e3/uL    Absolute Lymphocytes 1.7 0.8 - 5.3 10e3/uL    Absolute Monocytes 1.0 0.0 - 1.3 10e3/uL    Absolute Eosinophils 0.0 0.0 - 0.7 10e3/uL    Absolute Basophils 0.0 0.0 - 0.2 10e3/uL    Absolute Immature Granulocytes 0.0 <=0.4 10e3/uL   CBC with platelets differential     Status: Abnormal    Narrative    The following orders were created for panel order CBC with platelets differential.  Procedure                               Abnormality         Status                     ---------                               -----------         ------                     CBC with platelets and d...[[id number]]  Abnormal            Final result                 Please view results for these tests on the individual orders.                 Signed Electronically by: Isabel Robin MD

## 2025-02-20 NOTE — PROGRESS NOTES
Oliva, RN, needed to leave [facility] abruptly and asked that this RN document acetaminophen that was administered as she did not document that it was given.  Oliva shared this with this RN in follow up message after her report.      I verified with pt that pt received acetaminophen for headache.  Pt says the headache was quite strong while in clinic but that the intensity was much improved while her first bag of IV fluids neared completion of infusion.      No further complaints of headache.    Acetaminophen administration was confirmed and documented by this RN.    Staci Robles RN

## 2025-03-10 ENCOUNTER — PATIENT OUTREACH (OUTPATIENT)
Dept: CARE COORDINATION | Facility: CLINIC | Age: 70
End: 2025-03-10
Payer: MEDICARE

## 2025-03-12 ENCOUNTER — TELEPHONE (OUTPATIENT)
Dept: FAMILY MEDICINE | Facility: CLINIC | Age: 70
End: 2025-03-12
Payer: MEDICARE

## 2025-03-12 NOTE — TELEPHONE ENCOUNTER
Patient Quality Outreach    Patient is due for the following:   Diabetes -  A1C and Microalbumin    Action(s) Taken:   Schedule a office visit for DM     Type of outreach:    Sent [employer] message.    Questions for provider review:    None           Michelle Bolden CMA

## 2025-03-18 DIAGNOSIS — E11.9 TYPE 2 DIABETES MELLITUS WITHOUT COMPLICATION, WITHOUT LONG-TERM CURRENT USE OF INSULIN (H): ICD-10-CM

## 2025-03-18 RX ORDER — SEMAGLUTIDE 2.68 MG/ML
INJECTION, SOLUTION SUBCUTANEOUS
Qty: 3 ML | Refills: 0 | Status: SHIPPED | OUTPATIENT
Start: 2025-03-18

## 2025-03-18 NOTE — TELEPHONE ENCOUNTER
Has appointmetn in 3 weeks    Requested Prescriptions   Pending Prescriptions Disp Refills    OZEMPIC, 2 MG/DOSE, 8 MG/3ML pen [Pharmacy Med Name: OZEMPIC 8MG/3ML SOLN PEN-INJ] 3 mL 3     Sig: INJECT TWO (2) MG SUBCUTANEOUSLY EVERY 7 DAYS.       GLP-1 Agonists Protocol Failed - 3/18/2025  3:17 PM             If the protocol passes (green check), you do not need to verify med dose and sig.    A prescription matches if they are the same clinical intention.    For Example: once daily and every morning are the same.    The protocol can not identify upper and lower case letters as matching and will fail.     For Example: Take 1 tablet (50 mg) by mouth daily     TAKE 1 TABLET (50 MG) BY MOUTH DAILY    For all fails (red x), verify dose and sig.    If the refill does match what is on file, the RN can still proceed to approve the refill request.       If they do not match, route to the appropriate provider.             Failed - Has GFR on file in past 12 months and most recent value is normal        Failed - Medication indicated for associated diagnosis     Medication is associated with one or more of the following diagnoses:     Type 2 diabetes mellitus           Passed - Recent (6 mo) or future (90 days) visit within the authorizing provider's specialty     The patient must have completed an in-person or virtual visit within the past 6 months or has a future visit scheduled within the next 90 days with the authorizing provider s specialty.  Urgent care and e-visits do not quality as an office visit for this protocol.          Passed - Patient is age 18 or older        Passed - No active pregnancy on record        Passed - No positive pregnancy test in past 12 months             
- continue Na bicarb for now.    - trend BMP

## 2025-03-19 DIAGNOSIS — E11.9 TYPE 2 DIABETES MELLITUS WITHOUT COMPLICATION, WITHOUT LONG-TERM CURRENT USE OF INSULIN (H): Primary | ICD-10-CM

## 2025-03-22 ENCOUNTER — LAB (OUTPATIENT)
Dept: LAB | Facility: CLINIC | Age: 70
End: 2025-03-22
Payer: MEDICARE

## 2025-03-22 DIAGNOSIS — E11.9 TYPE 2 DIABETES MELLITUS WITHOUT COMPLICATION, WITHOUT LONG-TERM CURRENT USE OF INSULIN (H): ICD-10-CM

## 2025-03-22 DIAGNOSIS — T88.7XXA MEDICATION SIDE EFFECTS: ICD-10-CM

## 2025-03-22 DIAGNOSIS — E66.01 CLASS 2 SEVERE OBESITY DUE TO EXCESS CALORIES WITH SERIOUS COMORBIDITY IN ADULT, UNSPECIFIED BMI (H): ICD-10-CM

## 2025-03-22 DIAGNOSIS — E66.812 CLASS 2 SEVERE OBESITY DUE TO EXCESS CALORIES WITH SERIOUS COMORBIDITY IN ADULT, UNSPECIFIED BMI (H): ICD-10-CM

## 2025-03-22 LAB
CREAT UR-MCNC: 232.9 MG/DL
MICROALBUMIN UR-MCNC: 514.6 MG/L
MICROALBUMIN/CREAT UR: 220.95 MG/G CR (ref 0–25)

## 2025-03-22 PROCEDURE — 82570 ASSAY OF URINE CREATININE: CPT

## 2025-03-22 PROCEDURE — 82043 UR ALBUMIN QUANTITATIVE: CPT

## 2025-03-23 ENCOUNTER — HEALTH MAINTENANCE LETTER (OUTPATIENT)
Age: 70
End: 2025-03-23

## 2025-03-24 ENCOUNTER — ALLIED HEALTH/NURSE VISIT (OUTPATIENT)
Dept: FAMILY MEDICINE | Facility: CLINIC | Age: 70
End: 2025-03-24
Payer: MEDICARE

## 2025-03-24 ENCOUNTER — TELEPHONE (OUTPATIENT)
Dept: FAMILY MEDICINE | Facility: CLINIC | Age: 70
End: 2025-03-24

## 2025-03-24 DIAGNOSIS — E11.9 TYPE 2 DIABETES MELLITUS WITHOUT COMPLICATION, WITHOUT LONG-TERM CURRENT USE OF INSULIN (H): Primary | ICD-10-CM

## 2025-03-24 PROCEDURE — 99207 PR NO CHARGE NURSE ONLY: CPT

## 2025-03-24 RX ORDER — SEMAGLUTIDE 0.68 MG/ML
INJECTION, SOLUTION SUBCUTANEOUS
Qty: 2 ML | Refills: 2 | Status: SHIPPED | OUTPATIENT
Start: 2025-03-24 | End: 2025-03-26

## 2025-03-24 NOTE — PROGRESS NOTES
"Pt walked into clinic.     I am on Ozempic and the 2 mg dose was ordered for me, but I am supposed to be on the 0.5 mg dose? I had some issues with GI side effects and am due to take it again today, so I thought I better check..\"     Will send message to Provider to clarify dose.     Cynthia Benjamin RN    "

## 2025-03-24 NOTE — TELEPHONE ENCOUNTER
"Pt walked into clinic.     \"I am on Ozempic and the 2 mg dose was ordered for me, but I am supposed to be on the 0.5 mg dose? I had some issues with GI side effects and am due to take it again today, so I thought I better check..\"     Please advise. Cynthia Benjamin RN  "

## 2025-03-25 NOTE — TELEPHONE ENCOUNTER
Thank patient for bringing this to my attention.  Corrected prescription sent for the 0.5 mg with a couple refills.  If she ends up wanting to go up on dose, let me know.

## 2025-03-26 RX ORDER — SEMAGLUTIDE 0.68 MG/ML
0.5 INJECTION, SOLUTION SUBCUTANEOUS
Qty: 2 ML | Refills: 2 | Status: SHIPPED | OUTPATIENT
Start: 2025-03-26

## 2025-04-09 ENCOUNTER — OFFICE VISIT (OUTPATIENT)
Dept: FAMILY MEDICINE | Facility: CLINIC | Age: 70
End: 2025-04-09
Attending: PHYSICIAN ASSISTANT
Payer: MEDICARE

## 2025-04-09 VITALS
DIASTOLIC BLOOD PRESSURE: 82 MMHG | HEIGHT: 65 IN | SYSTOLIC BLOOD PRESSURE: 134 MMHG | RESPIRATION RATE: 16 BRPM | TEMPERATURE: 98.1 F | HEART RATE: 84 BPM | OXYGEN SATURATION: 98 % | WEIGHT: 180 LBS | BODY MASS INDEX: 29.99 KG/M2

## 2025-04-09 DIAGNOSIS — J45.50 SEVERE PERSISTENT ASTHMA WITHOUT COMPLICATION (H): ICD-10-CM

## 2025-04-09 DIAGNOSIS — N17.9 AKI (ACUTE KIDNEY INJURY): ICD-10-CM

## 2025-04-09 DIAGNOSIS — R80.9 ALBUMINURIA: ICD-10-CM

## 2025-04-09 DIAGNOSIS — E66.812 CLASS 2 SEVERE OBESITY DUE TO EXCESS CALORIES WITH SERIOUS COMORBIDITY IN ADULT, UNSPECIFIED BMI (H): ICD-10-CM

## 2025-04-09 DIAGNOSIS — H81.01 MENIERE'S DISEASE, RIGHT: ICD-10-CM

## 2025-04-09 DIAGNOSIS — E66.01 CLASS 2 SEVERE OBESITY DUE TO EXCESS CALORIES WITH SERIOUS COMORBIDITY IN ADULT, UNSPECIFIED BMI (H): ICD-10-CM

## 2025-04-09 DIAGNOSIS — E11.22 TYPE 2 DIABETES MELLITUS WITH STAGE 3A CHRONIC KIDNEY DISEASE, WITHOUT LONG-TERM CURRENT USE OF INSULIN (H): Primary | ICD-10-CM

## 2025-04-09 DIAGNOSIS — N18.31 TYPE 2 DIABETES MELLITUS WITH STAGE 3A CHRONIC KIDNEY DISEASE, WITHOUT LONG-TERM CURRENT USE OF INSULIN (H): Primary | ICD-10-CM

## 2025-04-09 DIAGNOSIS — H90.5 SENSORY HEARING LOSS, UNILATERAL: ICD-10-CM

## 2025-04-09 PROBLEM — E11.9 TYPE 2 DIABETES MELLITUS WITHOUT COMPLICATION, WITHOUT LONG-TERM CURRENT USE OF INSULIN (H): Status: ACTIVE | Noted: 2024-08-09

## 2025-04-09 PROBLEM — S82.62XD CLOSED AVULSION FRACTURE OF LATERAL MALLEOLUS OF LEFT FIBULA WITH ROUTINE HEALING, SUBSEQUENT ENCOUNTER: Status: RESOLVED | Noted: 2024-07-10 | Resolved: 2025-04-09

## 2025-04-09 LAB
EST. AVERAGE GLUCOSE BLD GHB EST-MCNC: 140 MG/DL
HBA1C MFR BLD: 6.5 % (ref 0–5.6)

## 2025-04-09 PROCEDURE — 83036 HEMOGLOBIN GLYCOSYLATED A1C: CPT | Performed by: PHYSICIAN ASSISTANT

## 2025-04-09 PROCEDURE — 36415 COLL VENOUS BLD VENIPUNCTURE: CPT | Performed by: PHYSICIAN ASSISTANT

## 2025-04-09 PROCEDURE — 1125F AMNT PAIN NOTED PAIN PRSNT: CPT | Performed by: PHYSICIAN ASSISTANT

## 2025-04-09 PROCEDURE — 3075F SYST BP GE 130 - 139MM HG: CPT | Performed by: PHYSICIAN ASSISTANT

## 2025-04-09 PROCEDURE — G2211 COMPLEX E/M VISIT ADD ON: HCPCS | Performed by: PHYSICIAN ASSISTANT

## 2025-04-09 PROCEDURE — 3079F DIAST BP 80-89 MM HG: CPT | Performed by: PHYSICIAN ASSISTANT

## 2025-04-09 PROCEDURE — 99214 OFFICE O/P EST MOD 30 MIN: CPT | Performed by: PHYSICIAN ASSISTANT

## 2025-04-09 RX ORDER — PREDNISONE 20 MG/1
60 TABLET ORAL DAILY
Qty: 21 TABLET | Refills: 0 | Status: SHIPPED | OUTPATIENT
Start: 2025-04-09

## 2025-04-09 RX ORDER — SEMAGLUTIDE 0.68 MG/ML
0.5 INJECTION, SOLUTION SUBCUTANEOUS
Status: SHIPPED
Start: 2025-04-09

## 2025-04-09 ASSESSMENT — PAIN SCALES - GENERAL: PAINLEVEL_OUTOF10: MILD PAIN (2)

## 2025-04-09 NOTE — PROGRESS NOTES
Assessment & Plan     Type 2 diabetes mellitus with stage 3a chronic kidney disease, without long-term current use of insulin (H)  Controlled, A1c improved at 7.6, continue re-escalating dose to max of 1 mg as she did NOT tolerate 2 mg.  New albuminuria, given recent ZEB and no previous for comparison, will recheck in 3 months though at a level of 220, I doubt this will resolve.  Discussed lisinopril v farxiga.  Lisinopril some caution given that she is on triamterene-containing medication for her meniere's.  - HEMOGLOBIN A1C; Future  - HEMOGLOBIN A1C  - Semaglutide, 1 MG/DOSE, (OZEMPIC) 4 MG/3ML pen; Inject 1 mg subcutaneously every 7 days.  - semaglutide (OZEMPIC, 0.25 OR 0.5 MG/DOSE,) 2 MG/3ML pen; Inject 0.5 mg subcutaneously every 7 days.  - **Hemoglobin A1c FUTURE 3mo; Future  - Albumin Random Urine Quantitative with Creat Ratio; Future    Class 2 severe obesity due to excess calories with serious comorbidity in adult, unspecified BMI (H)  Improved, BMI now ~30.  Dose increase if tolerated.  - semaglutide (OZEMPIC, 0.25 OR 0.5 MG/DOSE,) 2 MG/3ML pen; Inject 0.5 mg subcutaneously every 7 days.    Albuminuria  New problem - recheck in 3 months as above  - Albumin Random Urine Quantitative with Creat Ratio; Future    ZEB (acute kidney injury)  Recheck  -Creatinine    Meniere's disease, right  Sensory hearing loss, unilateral  Asking for repeat prednisone for hearing loss, as given by ENT in Nov.  This is high dose and we discussed future refills through ENT.  Meniere's medication impacting albuminuria considerations as above.  - predniSONE (DELTASONE) 20 MG tablet; Take 3 tablets (60 mg) by mouth daily.    Severe persistent asthma without complication (H)  Doing well, discussed vaccines    The longitudinal plan of care for the diagnosis(es)/condition(s) as documented were addressed during this visit. Due to the added complexity in care, I will continue to support Gigi in the subsequent management and with  "ongoing continuity of care.    BMI  Estimated body mass index is 29.95 kg/m  as calculated from the following:    Height as of this encounter: 1.651 m (5' 5\").    Weight as of this encounter: 81.6 kg (180 lb).     Patient Instructions   Rechecking kidney filter rate today so we have updated baseline from recent kidney injury  Is some protein in urine - with recent kidney injury will plan to repeat urine lab in 3 months before making decision to add further medication to support kidneys. Discussed lisinopril (potassium risk when combined with triamterene) versus fargixa or similar (expensive, increased risk dehydration, yeast or bladder infections).    Prednisone for hearing.  If needing again, call ENT office since I want them aware of how often you are needing it.  Not good for long term recurring treatment.      Wait 2 wks after finishing high dose prednisone then update vaccines - covid, RSV, and possibly influenza even though is later in season (high risk due to your asthma)    Recheck 3 months - maybe labs a couple days prior to visit so we can discuss protein in urine    Sugar goals:  Under 130-140 when checking before breakfast  Under 180 if checked 2+ hours after a meal  Under 200 if checked right after a meal  Contact me if sugars being consistently high      Subjective   Gigi is a 70 year old, presenting for the following health issues:  Diabetes        4/9/2025    11:02 AM   Additional Questions   Roomed by Alix KOCH CMA     History of Present Illness       Diabetes:   She presents for follow up of diabetes.  She is checking home blood glucose a few times a week.   She checks blood glucose before and after meals.  Blood glucose is never over 200 and never under 70. She is aware of hypoglycemia symptoms including shakiness and weakness.    She has no concerns regarding her diabetes at this time.   She is not experiencing numbness or burning in feet, excessive thirst, blurry vision, weight changes or " "redness, sores or blisters on feet.                Down 46 pounds from flowsheet in Sept.  Feels much better having switched off high dose ozempic due to ZEB, dehydration where she could barely move.  Now on 0.5 mg.          Objective    /82 (BP Location: Right arm, Patient Position: Sitting, Cuff Size: Adult Large)   Pulse 84   Temp 98.1  F (36.7  C)   Resp 16   Ht 1.651 m (5' 5\")   Wt 81.6 kg (180 lb)   SpO2 98%   BMI 29.95 kg/m    Body mass index is 29.95 kg/m .      Signed Electronically by: Kiara Zazueta PA-C    "

## 2025-04-09 NOTE — NURSING NOTE
"Chief Complaint   Patient presents with    Diabetes       Initial There were no vitals taken for this visit. Estimated body mass index is 29.95 kg/m  as calculated from the following:    Height as of 2/21/25: 1.651 m (5' 5\").    Weight as of 2/21/25: 81.6 kg (180 lb).    Patient presents to the clinic using No DME    Is there anyone who you would like to be able to receive your results? No  If yes have patient fill out SOHAM      "

## 2025-04-09 NOTE — PATIENT INSTRUCTIONS
Rechecking kidney filter rate today so we have updated baseline from recent kidney injury  Is some protein in urine - with recent kidney injury will plan to repeat urine lab in 3 months before making decision to add further medication to support kidneys. Discussed lisinopril (potassium risk when combined with triamterene) versus fargixa or similar (expensive, increased risk dehydration, yeast or bladder infections).    Prednisone for hearing.  If needing again, call ENT office since I want them aware of how often you are needing it.  Not good for long term recurring treatment.      Wait 2 wks after finishing high dose prednisone then update vaccines - covid, RSV, and possibly influenza even though is later in season (high risk due to your asthma)    Recheck 3 months - maybe labs a couple days prior to visit so we can discuss protein in urine    Sugar goals:  Under 130-140 when checking before breakfast  Under 180 if checked 2+ hours after a meal  Under 200 if checked right after a meal  Contact me if sugars being consistently high

## 2025-05-12 ENCOUNTER — OFFICE VISIT (OUTPATIENT)
Dept: PEDIATRICS | Facility: CLINIC | Age: 70
End: 2025-05-12
Payer: MEDICARE

## 2025-05-12 ENCOUNTER — RESULTS FOLLOW-UP (OUTPATIENT)
Dept: PEDIATRICS | Facility: CLINIC | Age: 70
End: 2025-05-12

## 2025-05-12 ENCOUNTER — NURSE TRIAGE (OUTPATIENT)
Dept: FAMILY MEDICINE | Facility: CLINIC | Age: 70
End: 2025-05-12
Payer: MEDICARE

## 2025-05-12 VITALS
TEMPERATURE: 100.8 F | RESPIRATION RATE: 20 BRPM | HEIGHT: 65 IN | WEIGHT: 180 LBS | SYSTOLIC BLOOD PRESSURE: 115 MMHG | DIASTOLIC BLOOD PRESSURE: 71 MMHG | BODY MASS INDEX: 29.99 KG/M2 | HEART RATE: 95 BPM | OXYGEN SATURATION: 95 %

## 2025-05-12 DIAGNOSIS — N17.9 AKI (ACUTE KIDNEY INJURY): ICD-10-CM

## 2025-05-12 DIAGNOSIS — E87.6 HYPOKALEMIA: ICD-10-CM

## 2025-05-12 DIAGNOSIS — R11.2 NAUSEA AND VOMITING, UNSPECIFIED VOMITING TYPE: Primary | ICD-10-CM

## 2025-05-12 DIAGNOSIS — R50.9 FEVER, UNSPECIFIED FEVER CAUSE: ICD-10-CM

## 2025-05-12 LAB
ALBUMIN SERPL BCG-MCNC: 4 G/DL (ref 3.5–5.2)
ALP SERPL-CCNC: 65 U/L (ref 40–150)
ALT SERPL W P-5'-P-CCNC: 22 U/L (ref 0–50)
ANION GAP SERPL CALCULATED.3IONS-SCNC: 13 MMOL/L (ref 7–15)
AST SERPL W P-5'-P-CCNC: 27 U/L (ref 0–45)
BASOPHILS # BLD AUTO: 0 10E3/UL (ref 0–0.2)
BASOPHILS NFR BLD AUTO: 0 %
BILIRUB SERPL-MCNC: 1.9 MG/DL
BUN SERPL-MCNC: 17.3 MG/DL (ref 8–23)
CALCIUM SERPL-MCNC: 9.8 MG/DL (ref 8.8–10.4)
CHLORIDE SERPL-SCNC: 89 MMOL/L (ref 98–107)
CREAT SERPL-MCNC: 1.25 MG/DL (ref 0.51–0.95)
EGFRCR SERPLBLD CKD-EPI 2021: 46 ML/MIN/1.73M2
EOSINOPHIL # BLD AUTO: 0 10E3/UL (ref 0–0.7)
EOSINOPHIL NFR BLD AUTO: 0 %
ERYTHROCYTE [DISTWIDTH] IN BLOOD BY AUTOMATED COUNT: 12.8 % (ref 10–15)
GLUCOSE SERPL-MCNC: 150 MG/DL (ref 70–99)
HCO3 SERPL-SCNC: 34 MMOL/L (ref 22–29)
HCT VFR BLD AUTO: 46.5 % (ref 35–47)
HGB BLD-MCNC: 15.9 G/DL (ref 11.7–15.7)
IMM GRANULOCYTES # BLD: 0.1 10E3/UL
IMM GRANULOCYTES NFR BLD: 0 %
LYMPHOCYTES # BLD AUTO: 1.3 10E3/UL (ref 0.8–5.3)
LYMPHOCYTES NFR BLD AUTO: 10 %
MCH RBC QN AUTO: 31.4 PG (ref 26.5–33)
MCHC RBC AUTO-ENTMCNC: 34.2 G/DL (ref 31.5–36.5)
MCV RBC AUTO: 92 FL (ref 78–100)
MONOCYTES # BLD AUTO: 1.5 10E3/UL (ref 0–1.3)
MONOCYTES NFR BLD AUTO: 12 %
NEUTROPHILS # BLD AUTO: 9.9 10E3/UL (ref 1.6–8.3)
NEUTROPHILS NFR BLD AUTO: 78 %
PLATELET # BLD AUTO: 249 10E3/UL (ref 150–450)
POTASSIUM SERPL-SCNC: 3 MMOL/L (ref 3.4–5.3)
PROT SERPL-MCNC: 7.7 G/DL (ref 6.4–8.3)
RBC # BLD AUTO: 5.07 10E6/UL (ref 3.8–5.2)
SODIUM SERPL-SCNC: 136 MMOL/L (ref 135–145)
WBC # BLD AUTO: 12.7 10E3/UL (ref 4–11)

## 2025-05-12 PROCEDURE — 3074F SYST BP LT 130 MM HG: CPT | Performed by: NURSE PRACTITIONER

## 2025-05-12 PROCEDURE — 96361 HYDRATE IV INFUSION ADD-ON: CPT | Performed by: NURSE PRACTITIONER

## 2025-05-12 PROCEDURE — 3078F DIAST BP <80 MM HG: CPT | Performed by: NURSE PRACTITIONER

## 2025-05-12 PROCEDURE — 96374 THER/PROPH/DIAG INJ IV PUSH: CPT | Performed by: NURSE PRACTITIONER

## 2025-05-12 PROCEDURE — 99215 OFFICE O/P EST HI 40 MIN: CPT | Mod: 25 | Performed by: NURSE PRACTITIONER

## 2025-05-12 PROCEDURE — 1125F AMNT PAIN NOTED PAIN PRSNT: CPT | Performed by: NURSE PRACTITIONER

## 2025-05-12 PROCEDURE — 80053 COMPREHEN METABOLIC PANEL: CPT | Performed by: NURSE PRACTITIONER

## 2025-05-12 PROCEDURE — 85025 COMPLETE CBC W/AUTO DIFF WBC: CPT | Performed by: NURSE PRACTITIONER

## 2025-05-12 PROCEDURE — 36415 COLL VENOUS BLD VENIPUNCTURE: CPT | Performed by: NURSE PRACTITIONER

## 2025-05-12 RX ORDER — ONDANSETRON 4 MG/1
4 TABLET, ORALLY DISINTEGRATING ORAL EVERY 8 HOURS PRN
Qty: 10 TABLET | Refills: 0 | Status: SHIPPED | OUTPATIENT
Start: 2025-05-12

## 2025-05-12 RX ORDER — POTASSIUM CHLORIDE 1500 MG/1
20 TABLET, EXTENDED RELEASE ORAL ONCE
Status: COMPLETED | OUTPATIENT
Start: 2025-05-12 | End: 2025-05-12

## 2025-05-12 RX ORDER — POTASSIUM CHLORIDE 1500 MG/1
20 TABLET, EXTENDED RELEASE ORAL 2 TIMES DAILY
Qty: 10 TABLET | Refills: 0 | Status: SHIPPED | OUTPATIENT
Start: 2025-05-12

## 2025-05-12 RX ORDER — ACETAMINOPHEN 325 MG/1
650 TABLET ORAL ONCE
Status: COMPLETED | OUTPATIENT
Start: 2025-05-12 | End: 2025-05-12

## 2025-05-12 RX ORDER — ONDANSETRON 2 MG/ML
4 INJECTION INTRAMUSCULAR; INTRAVENOUS
Status: ACTIVE | OUTPATIENT
Start: 2025-05-12 | End: 2025-05-13

## 2025-05-12 RX ADMIN — Medication 1000 ML: at 15:45

## 2025-05-12 RX ADMIN — ACETAMINOPHEN 650 MG: 325 TABLET ORAL at 16:44

## 2025-05-12 RX ADMIN — POTASSIUM CHLORIDE 20 MEQ: 1500 TABLET, EXTENDED RELEASE ORAL at 16:33

## 2025-05-12 RX ADMIN — ONDANSETRON 4 MG: 2 INJECTION INTRAMUSCULAR; INTRAVENOUS at 15:45

## 2025-05-12 ASSESSMENT — PAIN SCALES - GENERAL: PAINLEVEL_OUTOF10: MODERATE PAIN (4)

## 2025-05-12 NOTE — PROGRESS NOTES
Acute and Diagnostic Services Clinic Visit    Assessment & Plan     Nausea and vomiting, unspecified vomiting type  Fever, unspecified fever cause  Hypokalemia  ZEB (acute kidney injury)  See HPI for further details. N/V, fever began last night. Abdominal exam is benign, no TTP. CBC with mild leukocytosis, CMP with creatinine is 1.25. Baseline is typically around 0.9, however it has not been checked since her last ADS evaluation for ZEB. Does have mild hypokalemia, K+ 3.0, mild elevation in bicarb, anion gap normal. Low concern for diverticulitis, colitis given no abd pain, imaging deferred for now. She is hyrdrated with 1L normal saline, given IV Zofran with good improvement in symptoms. Suspect this is a viral GI illness as her SO has some similar symptoms. She did have a semaglutide dose adjustment in mid April which did cause N/V, ZEB in Feb, so certainly a consideration if things are not improving. Prescribed Zofran, discussed hydration. We will have her hold her hydrochlorothiazide and triamterene until seen for tomorrow. Repeat potassium dose in the morning. We will have her rechecked in clinic tomorrow to ensure symptoms, labs are improving. Discussed that should she develop abdominal pain, either be seen in ER or call clinic tomorrow for ADS appointment.   - sodium chloride 0.9% BOLUS 1,000 mL  - Comprehensive metabolic panel; Future  - CBC with platelets differential; Future  - ondansetron (ZOFRAN) injection 4 mg  - ondansetron (ZOFRAN ODT) 4 MG ODT tab; Take 1 tablet (4 mg) by mouth every 8 hours as needed for nausea.  - acetaminophen (TYLENOL) tablet 650 mg  - potassium chloride irvin ER (KLOR-CON M20) CR tablet 20 mEq  - potassium chloride irvin ER (KLOR-CON M20) 20 MEQ CR tablet; Take 1 tablet (20 mEq) by mouth 2 times daily.          43 minutes were spent doing chart review, history and exam, documentation and further activities per the note.         Follow-up  Return in about 1 day (around  5/13/2025).    Subjective   Gigi is a 70 year old, presenting for the following health issues:  Vomiting    HPI      Nausea/Vomiting/Dehydration  Onset/Duration: yesterday       Nausea: YES       Vomiting:  YES       How many times in the last 24 hours:  5        What is the appearance:  watery, bile       Any blood present: no        Diarrhea: no        Constipation: no        Melena/dark stools: no        What has oral intake been like: has been trying to have sips of water, isn't going well       Abdominal pain: no   Risks       Recent travel: no        Exposures to ill contacts: no        Recent antibiotics past 2 months: no        Other: None  Progression of symptoms: worsening  Accompanying signs and symptoms:       Fever/chills: YES       Gas/bloating: no        Dysuria or hematuria: no        Other symptoms: No  Therapies tried and outcome: None    Yesterday developed nausea and vomiting as well as elevated temp of 99. Nausea and vomiting persisted today, so called clinic and was referred to ADS for evaluation. Of note, similar presentation in February following dose adjustment of semaglutide, had ZEB, persistent vomiting at that time. Does have history of Meniere's, notes this is not similar to that. No diarrhea, notes she has baseline constipation since starting semaglutide, this is unchanged. She is not having abdominal pain, melena, hematochezia. No HA. No back pain. Denies urinary symptoms. Here with her significant other who has also been experiencing nausea, feeling generally unwell for the past 2-3 days.          Review of Systems  Constitutional, neuro, ENT, endocrine, pulmonary, cardiac, gastrointestinal, genitourinary, musculoskeletal, integument and psychiatric systems are negative, except as otherwise noted.      Objective    /68   Pulse 107   Temp 99.4  F (37.4  C) (Tympanic)   Resp 20   SpO2 92%   There is no height or weight on file to calculate BMI.  Physical Exam  Vitals and  nursing note reviewed.   Constitutional:       Appearance: Normal appearance.   HENT:      Head: Normocephalic and atraumatic.      Mouth/Throat:      Mouth: Mucous membranes are dry.   Eyes:      Comments: Non-icteric   Cardiovascular:      Rate and Rhythm: Regular rhythm.      Pulses: Normal pulses.      Heart sounds: Normal heart sounds, S1 normal and S2 normal. Heart sounds not distant. No murmur heard.     No friction rub. No gallop.      Comments: Mildly tachycardic  Pulmonary:      Effort: Pulmonary effort is normal.      Breath sounds: Normal breath sounds.   Abdominal:      General: Abdomen is flat. Bowel sounds are normal.      Palpations: Abdomen is soft.   Musculoskeletal:      Cervical back: Neck supple.      Right lower leg: No edema.      Left lower leg: No edema.   Skin:     General: Skin is warm and dry.      Capillary Refill: Capillary refill takes less than 2 seconds.   Neurological:      General: No focal deficit present.      Mental Status: She is alert and oriented to person, place, and time.   Psychiatric:         Mood and Affect: Mood normal.         Behavior: Behavior normal.         Thought Content: Thought content normal.         Judgment: Judgment normal.            Results for orders placed or performed in visit on 05/12/25 (from the past 24 hours)   Comprehensive metabolic panel   Result Value Ref Range    Sodium 136 135 - 145 mmol/L    Potassium 3.0 (L) 3.4 - 5.3 mmol/L    Carbon Dioxide (CO2) 34 (H) 22 - 29 mmol/L    Anion Gap 13 7 - 15 mmol/L    Urea Nitrogen 17.3 8.0 - 23.0 mg/dL    Creatinine 1.25 (H) 0.51 - 0.95 mg/dL    GFR Estimate 46 (L) >60 mL/min/1.73m2    Calcium 9.8 8.8 - 10.4 mg/dL    Chloride 89 (L) 98 - 107 mmol/L    Glucose 150 (H) 70 - 99 mg/dL    Alkaline Phosphatase 65 40 - 150 U/L    AST 27 0 - 45 U/L    ALT 22 0 - 50 U/L    Protein Total 7.7 6.4 - 8.3 g/dL    Albumin 4.0 3.5 - 5.2 g/dL    Bilirubin Total 1.9 (H) <=1.2 mg/dL   CBC with platelets differential     Narrative    The following orders were created for panel order CBC with platelets differential.  Procedure                               Abnormality         Status                     ---------                               -----------         ------                     CBC with platelets and ...[[id number]]  Abnormal            Final result                 Please view results for these tests on the individual orders.   CBC with platelets and differential   Result Value Ref Range    WBC Count 12.7 (H) 4.0 - 11.0 10e3/uL    RBC Count 5.07 3.80 - 5.20 10e6/uL    Hemoglobin 15.9 (H) 11.7 - 15.7 g/dL    Hematocrit 46.5 35.0 - 47.0 %    MCV 92 78 - 100 fL    MCH 31.4 26.5 - 33.0 pg    MCHC 34.2 31.5 - 36.5 g/dL    RDW 12.8 10.0 - 15.0 %    Platelet Count 249 150 - 450 10e3/uL    % Neutrophils 78 %    % Lymphocytes 10 %    % Monocytes 12 %    % Eosinophils 0 %    % Basophils 0 %    % Immature Granulocytes 0 %    Absolute Neutrophils 9.9 (H) 1.6 - 8.3 10e3/uL    Absolute Lymphocytes 1.3 0.8 - 5.3 10e3/uL    Absolute Monocytes 1.5 (H) 0.0 - 1.3 10e3/uL    Absolute Eosinophils 0.0 0.0 - 0.7 10e3/uL    Absolute Basophils 0.0 0.0 - 0.2 10e3/uL    Absolute Immature Granulocytes 0.1 <=0.4 10e3/uL           Signed Electronically by: JOSE Rodriguez CNP

## 2025-05-12 NOTE — PATIENT INSTRUCTIONS
Potassium supplement in the morning.  Can use Zofran as needed for nausea vomiting.  Hold blood pressure medicines until seen in clinic.  Small sips of fluids as tolerated.  Advance diet slowly, bland foods first.  If you develop abdominal pain, call the clinic and ask for ADS appointment tomorrow, otherwise keep clinic appointment for recheck.  If you have worsening symptoms, severe pain, go to ER right away.

## 2025-05-12 NOTE — TELEPHONE ENCOUNTER
Nurse Triage SBAR    Is this a 2nd Level Triage? YES, LICENSED PRACTITIONER REVIEW IS REQUIRED    Situation: Patient started vomiting yesterday afternoon    Background: Patient has DM2. A couple weeks ago dose increased of Ozempic from 0.5 mg to 1 mg. History of ZEB and dehydration with increasing dose from 1 mg to 2 mg of Ozempic.    Assessment: Patient states yesterday had a low grade fever. Only able to keep grapes down from this morning. Current B-checked on the phone.    Protocol Recommended Disposition:   Call ADS/Go to ED/UCC Now (Or To Office with PCP Approval)    Recommendation: Pending ADS referral.     Discussing with ADS    Does the patient meet one of the following criteria for ADS visit consideration? 16+ years old, with an [facility] PCP     TIP  Providers, please consider if this condition is appropriate for management at one of our Acute and Diagnostic Services sites.     If patient is a good candidate, please use dotphrase <dot>triageresponse and select Refer to ADS to document.  Reason for Disposition   MODERATE vomiting (e.g., 3 - 5 times/day) and age > 60 years    Additional Information   Negative: Shock suspected (e.g., cold/pale/clammy skin, too weak to stand, low BP, rapid pulse)   Negative: Difficult to awaken or acting confused (e.g., disoriented, slurred speech)   Negative: Sounds like a life-threatening emergency to the triager   Negative: Vomiting occurs only while coughing   Negative: Pregnant < 20 Weeks and nausea/vomiting began in early pregnancy (i.e., 4-8 weeks pregnant)   Negative: Chest pain   Negative: Headache is main symptom   Negative: Vomiting red blood or black (coffee ground) material   Negative: Vomiting and hernia is more painful or swollen than usual   Negative: Recent head injury (within 3 days)   Negative: Recent abdominal injury (within 7 days)   Negative: Insulin-dependent diabetes and glucose > 240 mg/dL (13 mmol/L)   Negative: Severe pain in one eye   Negative:  "SEVERE vomiting (e.g., 6 or more times/day)  (Exception: Patient sounds well, is drinking liquids, does not sound dehydrated, and vomiting has lasted less than 24 hours.)    Answer Assessment - Initial Assessment Questions  1. VOMITING SEVERITY: \"How many times have you vomited in the past 24 hours?\"       4-5 times  2. ONSET: \"When did the vomiting begin?\"       Yesterday afternoon  3. FLUIDS: \"What fluids or food have you vomited up today?\" \"Have you been able to keep any fluids down?\"      Only grapes so far  4. ABDOMEN PAIN: \"Are your having any abdomen pain?\" If Yes : \"How bad is it and what does it feel like?\" (e.g., crampy, dull, intermittent, constant)       No  5. DIARRHEA: \"Is there any diarrhea?\" If Yes, ask: \"How many times today?\"       No  6. CONTACTS: \"Is there anyone else in the family with the same symptoms?\"       No  7. CAUSE: \"What do you think is causing your vomiting?\"      Unsure. Dose increase of Ozempic from 0.5 mg to 1 mg a couple weeks ago  8. HYDRATION STATUS: \"Any signs of dehydration?\" (e.g., dry mouth [not only dry lips], too weak to stand) \"When did you last urinate?\"      Dry mouth, weakness. Last urination 30 minutes ago  9. OTHER SYMPTOMS: \"Do you have any other symptoms?\" (e.g., fever, headache, vertigo, vomiting blood or coffee grounds, recent head injury)      Headache, low grade fever yesterday   10. PREGNANCY: \"Is there any chance you are pregnant?\" \"When was your last menstrual period?\"        N/A    Protocols used: Vomiting-A-OH    Edenilson SARMIENTO RN  [facility]    "

## 2025-05-14 ENCOUNTER — OFFICE VISIT (OUTPATIENT)
Dept: FAMILY MEDICINE | Facility: CLINIC | Age: 70
End: 2025-05-14
Payer: MEDICARE

## 2025-05-14 ENCOUNTER — TELEPHONE (OUTPATIENT)
Dept: FAMILY MEDICINE | Facility: CLINIC | Age: 70
End: 2025-05-14

## 2025-05-14 VITALS
WEIGHT: 174.4 LBS | RESPIRATION RATE: 22 BRPM | SYSTOLIC BLOOD PRESSURE: 103 MMHG | OXYGEN SATURATION: 97 % | DIASTOLIC BLOOD PRESSURE: 70 MMHG | BODY MASS INDEX: 29.06 KG/M2 | TEMPERATURE: 97.6 F | HEIGHT: 65 IN | HEART RATE: 66 BPM

## 2025-05-14 DIAGNOSIS — E87.6 HYPOKALEMIA: ICD-10-CM

## 2025-05-14 DIAGNOSIS — R11.2 NAUSEA AND VOMITING, UNSPECIFIED VOMITING TYPE: Primary | ICD-10-CM

## 2025-05-14 DIAGNOSIS — E11.9 TYPE 2 DIABETES MELLITUS WITHOUT COMPLICATION, WITHOUT LONG-TERM CURRENT USE OF INSULIN (H): ICD-10-CM

## 2025-05-14 DIAGNOSIS — N28.9 RENAL IMPAIRMENT: ICD-10-CM

## 2025-05-14 LAB
ANION GAP SERPL CALCULATED.3IONS-SCNC: 12 MMOL/L (ref 7–15)
BUN SERPL-MCNC: 16.7 MG/DL (ref 8–23)
CALCIUM SERPL-MCNC: 9.5 MG/DL (ref 8.8–10.4)
CHLORIDE SERPL-SCNC: 91 MMOL/L (ref 98–107)
CREAT SERPL-MCNC: 1.16 MG/DL (ref 0.51–0.95)
EGFRCR SERPLBLD CKD-EPI 2021: 50 ML/MIN/1.73M2
ERYTHROCYTE [DISTWIDTH] IN BLOOD BY AUTOMATED COUNT: 12.9 % (ref 10–15)
GLUCOSE SERPL-MCNC: 125 MG/DL (ref 70–99)
HCO3 SERPL-SCNC: 32 MMOL/L (ref 22–29)
HCT VFR BLD AUTO: 47.3 % (ref 35–47)
HGB BLD-MCNC: 16 G/DL (ref 11.7–15.7)
MCH RBC QN AUTO: 31.1 PG (ref 26.5–33)
MCHC RBC AUTO-ENTMCNC: 33.8 G/DL (ref 31.5–36.5)
MCV RBC AUTO: 92 FL (ref 78–100)
PLATELET # BLD AUTO: 241 10E3/UL (ref 150–450)
POTASSIUM SERPL-SCNC: 3.5 MMOL/L (ref 3.4–5.3)
RBC # BLD AUTO: 5.14 10E6/UL (ref 3.8–5.2)
SODIUM SERPL-SCNC: 135 MMOL/L (ref 135–145)
WBC # BLD AUTO: 10.4 10E3/UL (ref 4–11)

## 2025-05-14 PROCEDURE — 36415 COLL VENOUS BLD VENIPUNCTURE: CPT | Performed by: FAMILY MEDICINE

## 2025-05-14 PROCEDURE — 99214 OFFICE O/P EST MOD 30 MIN: CPT | Performed by: FAMILY MEDICINE

## 2025-05-14 PROCEDURE — 1126F AMNT PAIN NOTED NONE PRSNT: CPT | Performed by: FAMILY MEDICINE

## 2025-05-14 PROCEDURE — 3078F DIAST BP <80 MM HG: CPT | Performed by: FAMILY MEDICINE

## 2025-05-14 PROCEDURE — 85027 COMPLETE CBC AUTOMATED: CPT | Performed by: FAMILY MEDICINE

## 2025-05-14 PROCEDURE — 80048 BASIC METABOLIC PNL TOTAL CA: CPT | Performed by: FAMILY MEDICINE

## 2025-05-14 PROCEDURE — 3074F SYST BP LT 130 MM HG: CPT | Performed by: FAMILY MEDICINE

## 2025-05-14 ASSESSMENT — PAIN SCALES - GENERAL: PAINLEVEL_OUTOF10: NO PAIN (0)

## 2025-05-14 NOTE — PROGRESS NOTES
Assessment & Plan     Nausea and vomiting, unspecified vomiting type  Hypokalemia  Patient was seen in ADS for nausea, vomiting, found to have hypokalemia, renal impairment.  Symptoms improved since discharge.  Blood pressure low normal.  Differentials discussed in detail including medications related side effects.  Recommended to discontinue Ozempic, hydrochlorothiazide and hold off Maxide for now.  CBC, BMP ordered.  Recommended to continue oral hydration, healthy diet and follow-up with PCP in 1 week or earlier if needed    Renal impairment  As above  - CBC with platelets; Future  - Basic metabolic panel  (Ca, Cl, CO2, Creat, Gluc, K, Na, BUN); Future    Type 2 diabetes mellitus without complication, without long-term current use of insulin (H)  Recommended to hold off Ozempic for now and discuss with PCP week      Subjective   Gigi is a 70 year old, presenting for the following health issues:  ADS f/u        5/14/2025     2:48 PM   Additional Questions   Roomed by Abeba MCKEON CMA     History of Present Illness       Reason for visit:  Follow up on potasism    She eats 2-3 servings of fruits and vegetables daily.She consumes 0 sweetened beverage(s) daily.She exercises with enough effort to increase her heart rate 9 or less minutes per day.  She exercises with enough effort to increase her heart rate 3 or less days per week.   She is taking medications regularly.        ED/UC Followup:    Facility:  [facility]  Date of visit: 05/12/2025  Reason for visit: Nausea and vomiting, fever, hypokalemia, ZEB  Current Status: Patient states last night she had chills, sweats, headache.  Has not thrown up.  Has been trying to stay hydrated.       Review of Systems  Constitutional, HEENT, cardiovascular, pulmonary, gi and gu systems are negative, except as otherwise noted.      Objective    /70 (BP Location: Right arm, Patient Position: Sitting, Cuff Size: Adult Large)   Pulse 66   Temp 97.6  F (36.4  " C) (Tympanic)   Resp 22   Ht 1.651 m (5' 5\")   Wt 79.1 kg (174 lb 6.4 oz)   SpO2 97%   BMI 29.02 kg/m    Body mass index is 29.02 kg/m .  Wt Readings from Last 10 Encounters:   05/14/25 79.1 kg (174 lb 6.4 oz)   05/12/25 81.6 kg (180 lb)   04/09/25 81.6 kg (180 lb)   02/21/25 81.6 kg (180 lb)   02/19/25 81.6 kg (180 lb)   12/09/24 93 kg (205 lb)   11/20/24 92.5 kg (204 lb)   09/12/24 98 kg (216 lb)   09/11/24 102.5 kg (226 lb)   08/09/24 97.5 kg (215 lb)      Physical Exam   GENERAL: alert and no distress  EYES: Eyes grossly normal to inspection, PERRL and conjunctivae and sclerae normal  HENT: normal cephalic/atraumatic, nose and mouth without ulcers or lesions, oropharynx clear, and oral mucous membranes moist  NECK: no adenopathy, no asymmetry, masses, or scars  RESP: lungs clear to auscultation - no rales, rhonchi or wheezes  CV: regular rates and rhythm, normal S1 S2, no S3 or S4, and no murmur, click or rub  ABDOMEN: soft, nontender  MS: no gross musculoskeletal defects noted, no edema  SKIN: no suspicious lesions or rashes  NEURO: Normal strength and tone, mentation intact and speech normal  MSK: No joint swelling or skin discoloration noted  PSYCH: mentation appears normal, affect normal/bright      Signed Electronically by: Eliazar Boland MD    "

## 2025-05-15 ENCOUNTER — TELEPHONE (OUTPATIENT)
Dept: FAMILY MEDICINE | Facility: CLINIC | Age: 70
End: 2025-05-15
Payer: MEDICARE

## 2025-05-15 ENCOUNTER — RESULTS FOLLOW-UP (OUTPATIENT)
Dept: FAMILY MEDICINE | Facility: CLINIC | Age: 70
End: 2025-05-15

## 2025-05-15 NOTE — TELEPHONE ENCOUNTER
Patient Quality Outreach    Patient is due for the following:   Asthma  -  ACT needed    Action(s) Taken:   Patient was scheduled for office visit     Type of outreach:    Phone, spoke to patient/parent. Patient     Questions for provider review:    None         Michelle Bolden [facility]  Chart routed to None.

## 2025-05-19 ENCOUNTER — NURSE TRIAGE (OUTPATIENT)
Dept: NURSING | Facility: CLINIC | Age: 70
End: 2025-05-19
Payer: MEDICARE

## 2025-05-19 NOTE — TELEPHONE ENCOUNTER
Nurse Triage SBAR    Is this a 2nd Level Triage? YES, LICENSED PRACTITIONER REVIEW IS REQUIRED    Situation: sudden hearing  loss to right ear, with ringing in ear and when voices on TV speak, crackling is what she hears      Background:   predniSONE (DELTASONE) 20 MG tablet (Discontinued) 21 tablet 0 4/9/2025 5/14/2025 No   Sig - Route: Take 3 tablets (60 mg) by mouth daily. - Oral   Associated Diagnoses  Meniere's disease, right [H81.01]      Sensory hearing loss, unilateral [H90.5]          Assessment:   Patient with right ear ringing and hearing loss  Is not able to hear whispers, or ticking or spouses voice  She does have some nasal drainage, but uses a nasal spray and it takes care of it, for  pollen issues  Denies cough  No current fever or chills  Fall about a month ago, but did not hit her head, just shoulder and knees  She was recently treated for dehydration and taken off of her B/P and Ozempic. She is due to see her PCP on Wednesday this week.  She did have an episode of pressure in her right ear, she was given an RX of Prednisone, but did not help.  She states that she has been off of it for about a week.  Denies dizzy or lightheaded  Denies SOB or chest discomfort  Denies N/V  Denies N/T  Pt is on Aspirin 81 mg  She did fall a month ago, but did not hit her head, just shoulder and knees.  She is willing to go to several clinics for visit or hearing test  ([facility] and [place])  She does have an appt with PCP on Wednesday this week.  She does have a non migraine headache over her right eye presently        Protocol Recommended Disposition:       Recommendation:     Routed to provider        Reason for Disposition   Hearing loss in one or both ears of sudden onset and present now    Additional Information   Negative: Patient sounds very sick or weak to the triager   Negative: Ringing in the ears (tinnitus) and taking aspirin and dosage sounds high (i.e., > 1500  "mg/day)    Answer Assessment - Initial Assessment Questions  1. DESCRIPTION: \"What type of hearing problem are you having? Describe it for me.\" (e.g., complete hearing loss, partial loss)      Sudden hearing loss,  she has a ringing in her right ear and when she watches TV,  when they speak, it crackles in her ears.  She denies any loud concerts, booms or things like that  2. LOCATION: \"One or both ears?\" If one, ask: \"Which ear?\"      Right ear  3. SEVERITY: \"Can you hear anything?\" If Yes, ask: \"What can you hear?\" (e.g., ticking watch, whisper, talking)      She is not able to hear ticking, or whispers, or spouse.  4. ONSET: \"When did this begin?\" \"Did it start suddenly or come on gradually?\"     sudden  5. PATTERN: \"Does this come and go, or has it been constant since it started?\"      constant  6. PAIN: \"Is there any pain in your ear(s)?\"  (Scale 0-10; or none, mild, moderate, severe)      Denies pain to ears, but does have a non  migraine headache over the right eye  7. CAUSE: \"What do you think is causing this hearing problem?\"      Unknown,  she was being treated for dehydration recently, taken off some B/P meds.  8. OTHER SYMPTOMS: \"Do you have any other symptoms?\" (e.g., dizziness, ringing in ears)      Ringing in right ear  Denies drainage to ear  States that she does have nasal drainage, due to pollen, but is using a nasal spray that does take care of that.  Denies cough  She did have fever and chills and did get some Saline IV, but not since then.  Pressure in right ear before and was put on Prednisone,  She did have MD call in prednisone again, but did not do anything.  She states that she has been off of it, for about a week.  Denies dizzy or lightheaded  Denies SOB or chest discomfort  Denies N/V  Denies N/T  Pt is on Aspirin 81 mg  She did fall a month ago, but did not hit her head, just shoulder and knees.  She is willing to go to several clinics for visit or hearing test  ([facility], " [facility] and [place])  She does have an appt with PCP on Wednesday this week.        9. PREGNANCY: \"Is there any chance you are pregnant?\" \"When was your last menstrual period?\"      N/a    Protocols used: Hearing Loss or Change-A-[place]    "

## 2025-05-19 NOTE — TELEPHONE ENCOUNTER
Spoke with patient and scheduled audiogram for tomorrow.  Will have results forwarded to provider.    Rebekah Fabian RN Care Coordinator, [facility] 05/19/25 2:12 PM

## 2025-05-20 ENCOUNTER — OFFICE VISIT (OUTPATIENT)
Dept: AUDIOLOGY | Facility: CLINIC | Age: 70
End: 2025-05-20
Payer: MEDICARE

## 2025-05-20 DIAGNOSIS — H90.3 ASYMMETRICAL SENSORINEURAL HEARING LOSS: Primary | ICD-10-CM

## 2025-05-20 PROCEDURE — 92550 TYMPANOMETRY & REFLEX THRESH: CPT

## 2025-05-20 PROCEDURE — 92557 COMPREHENSIVE HEARING TEST: CPT

## 2025-05-20 NOTE — Clinical Note
Patient scheduled as sudden loss, however, hearing has remained stable when compared to her most previous on 12/3/2024. States she lost hearing in her right ear yesterday (5/19), but it has since returned. She has no further concerns   Rebekah Olivas

## 2025-05-20 NOTE — PROGRESS NOTES
AUDIOLOGY REPORT:    Patient was referred to [facility] from ENT by Dr. Garza for a hearing examination. Patient is here today with concerns regarding a sudden change in right ear hearing that began yesterday morning. This morning her hearing has returned, but still reports right aural fullness. She was most recently seen on 12/3/2024 where results indicated a bilateral sensorineural hearing loss with an asymmetry noted from 250-1000 Hz (right worse than left). Gigi has been diagnosed with Meniere's disease. The patient was not accompanied to today's appointment     Testing:    Otoscopy:   Otoscopic exam indicates ears are clear of cerumen bilaterally     Tympanograms:    RIGHT: normal eardrum mobility     LEFT:   normal eardrum mobility    Reflexes (reported by stimulus ear): 1000 Hz  RIGHT: Ipsilateral is present at normal levels  RIGHT: Contralateral is absent at frequencies tested  LEFT:   Ipsilateral is present at normal levels  LEFT:   Contralateral is present at normal levels    Thresholds:   Pure Tone Thresholds assessed using conventional audiometry with good  reliability from 250-8000 Hz bilaterally using insert earphones and circumaural headphones     RIGHT:  mild sloping to moderately-severe sensorineural hearing loss    LEFT:    normal sloping to moderately-severe sensorineural hearing loss    Speech Reception Threshold:    RIGHT: 35 dB HL    LEFT:   25 dB HL  Speech Reception Thresholds are in good agreement with pure tone thresholds    Word Recognition Score:     RIGHT: 92% at 75 dB HL using NU-6 recorded word list.    LEFT:   96% at 65 dB HL using NU-6 recorded word list.    Compared to previous results on 12/3/2024, hearing has remained stable in both ears. Discussed results with the patient.     Results will be sent to Dr. Garza for follow-up     Wilmer Noel, [facility]  Licensed Audiologist  [place] #[id number]

## 2025-05-21 ENCOUNTER — OFFICE VISIT (OUTPATIENT)
Dept: FAMILY MEDICINE | Facility: CLINIC | Age: 70
End: 2025-05-21
Payer: MEDICARE

## 2025-05-21 VITALS
HEIGHT: 65 IN | DIASTOLIC BLOOD PRESSURE: 83 MMHG | HEART RATE: 92 BPM | BODY MASS INDEX: 29.99 KG/M2 | WEIGHT: 180 LBS | TEMPERATURE: 98.3 F | RESPIRATION RATE: 18 BRPM | OXYGEN SATURATION: 97 % | SYSTOLIC BLOOD PRESSURE: 120 MMHG

## 2025-05-21 DIAGNOSIS — H81.01 MENIERE'S DISEASE, RIGHT: ICD-10-CM

## 2025-05-21 DIAGNOSIS — R80.9 ALBUMINURIA: ICD-10-CM

## 2025-05-21 DIAGNOSIS — E87.6 HYPOKALEMIA: Primary | ICD-10-CM

## 2025-05-21 DIAGNOSIS — I10 BENIGN ESSENTIAL HYPERTENSION: ICD-10-CM

## 2025-05-21 DIAGNOSIS — R11.2 NAUSEA AND VOMITING, UNSPECIFIED VOMITING TYPE: ICD-10-CM

## 2025-05-21 DIAGNOSIS — N18.31 TYPE 2 DIABETES MELLITUS WITH STAGE 3A CHRONIC KIDNEY DISEASE, WITHOUT LONG-TERM CURRENT USE OF INSULIN (H): ICD-10-CM

## 2025-05-21 DIAGNOSIS — E11.22 TYPE 2 DIABETES MELLITUS WITH STAGE 3A CHRONIC KIDNEY DISEASE, WITHOUT LONG-TERM CURRENT USE OF INSULIN (H): ICD-10-CM

## 2025-05-21 DIAGNOSIS — R94.4 DECREASED GFR: ICD-10-CM

## 2025-05-21 LAB
ANION GAP SERPL CALCULATED.3IONS-SCNC: 14 MMOL/L (ref 7–15)
BUN SERPL-MCNC: 12.1 MG/DL (ref 8–23)
CALCIUM SERPL-MCNC: 8.9 MG/DL (ref 8.8–10.4)
CHLORIDE SERPL-SCNC: 104 MMOL/L (ref 98–107)
CREAT SERPL-MCNC: 1.32 MG/DL (ref 0.51–0.95)
CREAT UR-MCNC: 358.1 MG/DL
EGFRCR SERPLBLD CKD-EPI 2021: 43 ML/MIN/1.73M2
GLUCOSE SERPL-MCNC: 130 MG/DL (ref 70–99)
HCO3 SERPL-SCNC: 26 MMOL/L (ref 22–29)
MICROALBUMIN UR-MCNC: 38.9 MG/L
MICROALBUMIN/CREAT UR: 10.86 MG/G CR (ref 0–25)
POTASSIUM SERPL-SCNC: 4.1 MMOL/L (ref 3.4–5.3)
SODIUM SERPL-SCNC: 144 MMOL/L (ref 135–145)

## 2025-05-21 PROCEDURE — 82570 ASSAY OF URINE CREATININE: CPT | Performed by: PHYSICIAN ASSISTANT

## 2025-05-21 PROCEDURE — 80048 BASIC METABOLIC PNL TOTAL CA: CPT | Performed by: PHYSICIAN ASSISTANT

## 2025-05-21 PROCEDURE — 36415 COLL VENOUS BLD VENIPUNCTURE: CPT | Performed by: PHYSICIAN ASSISTANT

## 2025-05-21 PROCEDURE — 99214 OFFICE O/P EST MOD 30 MIN: CPT | Performed by: PHYSICIAN ASSISTANT

## 2025-05-21 PROCEDURE — 82043 UR ALBUMIN QUANTITATIVE: CPT | Performed by: PHYSICIAN ASSISTANT

## 2025-05-21 PROCEDURE — 3074F SYST BP LT 130 MM HG: CPT | Performed by: PHYSICIAN ASSISTANT

## 2025-05-21 PROCEDURE — 1126F AMNT PAIN NOTED NONE PRSNT: CPT | Performed by: PHYSICIAN ASSISTANT

## 2025-05-21 PROCEDURE — 3079F DIAST BP 80-89 MM HG: CPT | Performed by: PHYSICIAN ASSISTANT

## 2025-05-21 PROCEDURE — G2211 COMPLEX E/M VISIT ADD ON: HCPCS | Performed by: PHYSICIAN ASSISTANT

## 2025-05-21 RX ORDER — TRIAMTERENE AND HYDROCHLOROTHIAZIDE 37.5; 25 MG/1; MG/1
0.5 TABLET ORAL DAILY
Qty: 30 TABLET | Refills: 3 | Status: CANCELLED | OUTPATIENT
Start: 2025-05-21

## 2025-05-21 ASSESSMENT — ASTHMA QUESTIONNAIRES
QUESTION_4 LAST FOUR WEEKS HOW OFTEN HAVE YOU USED YOUR RESCUE INHALER OR NEBULIZER MEDICATION (SUCH AS ALBUTEROL): NOT AT ALL
ACT_TOTALSCORE: 25
QUESTION_5 LAST FOUR WEEKS HOW WOULD YOU RATE YOUR ASTHMA CONTROL: COMPLETELY CONTROLLED
QUESTION_3 LAST FOUR WEEKS HOW OFTEN DID YOUR ASTHMA SYMPTOMS (WHEEZING, COUGHING, SHORTNESS OF BREATH, CHEST TIGHTNESS OR PAIN) WAKE YOU UP AT NIGHT OR EARLIER THAN USUAL IN THE MORNING: NOT AT ALL
QUESTION_1 LAST FOUR WEEKS HOW MUCH OF THE TIME DID YOUR ASTHMA KEEP YOU FROM GETTING AS MUCH DONE AT WORK, SCHOOL OR AT HOME: NONE OF THE TIME
QUESTION_2 LAST FOUR WEEKS HOW OFTEN HAVE YOU HAD SHORTNESS OF BREATH: NOT AT ALL

## 2025-05-21 ASSESSMENT — PAIN SCALES - GENERAL: PAINLEVEL_OUTOF10: NO PAIN (0)

## 2025-05-21 NOTE — PROGRESS NOTES
Assessment & Plan     {Diag Picklist:682704}            {Follow-up (Optional):840408}    Dominick Yu is a 70 year old, presenting for the following health issues:  No chief complaint on file.        5/21/2025     1:48 PM   Additional Questions   Roomed by Alix KOCH CMA     History of Present Illness       Reason for visit:  Follow up on potasism    She eats 2-3 servings of fruits and vegetables daily.She consumes 0 sweetened beverage(s) daily.She exercises with enough effort to increase her heart rate 9 or less minutes per day.  She exercises with enough effort to increase her heart rate 3 or less days per week.   She is taking medications regularly.      Patient was seen in ADS for nausea, vomiting, found to have hypokalemia, kidney function similar to recent baseline since ZEB few months ago.   had similar symptoms, so not as clear that this was due to ozempic.  However she notes her stomach always felt a bit off on ozempic and she feels great off the med.  A wk ago at recheck appt potassium normalized, kidney function stable, low chloride, stable GFR.  Was advised oral hydration, off ozempic and maxzide.    Sugars - 114-139 in mornings.          Objective    There were no vitals taken for this visit.  There is no height or weight on file to calculate BMI.  Physical Exam   {Exam List (Optional):137652}    {Diagnostic Test Results (Optional):626967}        Signed Electronically by: Kiara Zazueta PA-C  {Email feedback regarding this note to [email or web address]   :[id number]}   daily.She consumes 0 sweetened beverage(s) daily.She exercises with enough effort to increase her heart rate 9 or less minutes per day.  She exercises with enough effort to increase her heart rate 3 or less days per week.   She is taking medications regularly.      Patient was seen in ADS for nausea, vomiting, found to have hypokalemia, kidney function similar to recent baseline since ZEB few months ago.   had similar symptoms, so not as clear that this was due to ozempic.  However she notes her stomach always felt a bit off on ozempic and she feels great off the med.  A wk ago at recheck appt potassium normalized, kidney function stable, low chloride, stable GFR.  Was advised oral hydration, off ozempic and maxzide.    Sugars - 114-139 in mornings.          Objective    There were no vitals taken for this visit.  There is no height or weight on file to calculate BMI.          Signed Electronically by: Kiara Zazueta PA-C

## 2025-05-21 NOTE — NURSING NOTE
"Chief Complaint   Patient presents with    potassium recheck        Initial There were no vitals taken for this visit. Estimated body mass index is 29.02 kg/m  as calculated from the following:    Height as of 5/14/25: 1.651 m (5' 5\").    Weight as of 5/14/25: 79.1 kg (174 lb 6.4 oz).    Patient presents to the clinic using No DME    Is there anyone who you would like to be able to receive your results? No  If yes have patient fill out SOHAM      "

## 2025-05-21 NOTE — PATIENT INSTRUCTIONS
Checking labs today, further plan depends on lab work    However decided did want to try mounjaro if covered by insurance.

## 2025-05-22 ENCOUNTER — RESULTS FOLLOW-UP (OUTPATIENT)
Dept: FAMILY MEDICINE | Facility: CLINIC | Age: 70
End: 2025-05-22

## 2025-05-22 DIAGNOSIS — R94.4 DECREASED GFR: ICD-10-CM

## 2025-05-22 DIAGNOSIS — E87.6 HYPOKALEMIA: Primary | ICD-10-CM

## 2025-05-22 NOTE — RESULT ENCOUNTER NOTE
"Gigi  Lab shows normal potassium, chloride, and carbon dioxide.  Kidney function is similar over past 3 months, despite most recent lab reflecting that you were not taking the triamterene-hydrochlorothiazide for your menieres.  You can restart that medication.  I do recommend a repeat lab in 2 weeks to see if it will cause the potassium to drop again.  I will place lab order.     Kidney filter rate (GFR) has been stable since Feb, but down compared to Aug.  Aug was still at its recent normal range over past few years.  I think you had a \"silent\" kidney injury sometime between Aug and Feb.  However I will keep an eye on the GFR more closely for the next few visits.  If GFR keeps dropping, then we'll do further testing.    The protein leaking into urine did resolve.      Let me know if you have questions.  Remember to set lab appt in 2 wks.    Kiara"

## 2025-05-25 DIAGNOSIS — H81.01 MENIERE'S DISEASE, RIGHT: ICD-10-CM

## 2025-05-27 ENCOUNTER — MYC MEDICAL ADVICE (OUTPATIENT)
Dept: OTOLARYNGOLOGY | Facility: CLINIC | Age: 70
End: 2025-05-27
Payer: MEDICARE

## 2025-05-27 RX ORDER — TRIAMTERENE AND HYDROCHLOROTHIAZIDE 37.5; 25 MG/1; MG/1
0.5 TABLET ORAL
Qty: 90 TABLET | Refills: 3 | Status: SHIPPED | OUTPATIENT
Start: 2025-05-27

## 2025-05-29 NOTE — RESULT ENCOUNTER NOTE
Mounjaro does not require dose adjustment based on kidney function, but we want to be careful that she doesn't get nausea/vomiting/diarrhea that could harm kidney function.  Mounjaro has some risk of these side effects.  Therefore I think she can start mounjaro at any time, but just make sure that if getting side effects, that we address them before she could get dehydrated.

## 2025-05-29 NOTE — TELEPHONE ENCOUNTER
Patient informed of message below.    Patient inquiring if ok to start Mounjaro now due to kidney function or to wait until the 2 week lab recheck (pharmacist recommended).    Please advise.    Thank you  Edenilson SARMIENTO RN  [facility]

## 2025-06-05 ENCOUNTER — LAB (OUTPATIENT)
Dept: LAB | Facility: CLINIC | Age: 70
End: 2025-06-05
Payer: MEDICARE

## 2025-06-05 DIAGNOSIS — E87.6 HYPOKALEMIA: ICD-10-CM

## 2025-06-05 LAB
ANION GAP SERPL CALCULATED.3IONS-SCNC: 13 MMOL/L (ref 7–15)
BUN SERPL-MCNC: 17.2 MG/DL (ref 8–23)
CALCIUM SERPL-MCNC: 10.1 MG/DL (ref 8.8–10.4)
CHLORIDE SERPL-SCNC: 100 MMOL/L (ref 98–107)
CREAT SERPL-MCNC: 1.01 MG/DL (ref 0.51–0.95)
EGFRCR SERPLBLD CKD-EPI 2021: 59 ML/MIN/1.73M2
GLUCOSE SERPL-MCNC: 123 MG/DL (ref 70–99)
HCO3 SERPL-SCNC: 27 MMOL/L (ref 22–29)
POTASSIUM SERPL-SCNC: 4.1 MMOL/L (ref 3.4–5.3)
SODIUM SERPL-SCNC: 140 MMOL/L (ref 135–145)

## 2025-06-06 ENCOUNTER — RESULTS FOLLOW-UP (OUTPATIENT)
Dept: FAMILY MEDICINE | Facility: CLINIC | Age: 70
End: 2025-06-06

## 2025-06-09 ENCOUNTER — PATIENT OUTREACH (OUTPATIENT)
Dept: CARE COORDINATION | Facility: CLINIC | Age: 70
End: 2025-06-09
Payer: MEDICARE

## 2025-06-13 DIAGNOSIS — J45.50 SEVERE PERSISTENT ASTHMA WITHOUT COMPLICATION (H): ICD-10-CM

## 2025-06-13 NOTE — TELEPHONE ENCOUNTER
Requested Prescriptions   Pending Prescriptions Disp Refills    Fluticasone-Umeclidin-Vilanterol (TRELEGY ELLIPTA) 200-62.5-25 MCG/ACT oral inhaler 180 each 3     Sig: Inhale 1 puff into the lungs daily.       There is no refill protocol information for this order          Last office visit: 5/23/2024 ; last virtual visit: Visit date not found with prescribing provider:  Galindo Montano      Future Office Visit:   Next 5 appointments (look out 90 days)      Jul 09, 2025 10:15 AM  Lab visit with [facility]  [facility] ([facility] ) [address] [place] [id number]  [phone]           Kelsey Salazar   Clinic Station    [facility]  [phone]

## 2025-06-13 NOTE — TELEPHONE ENCOUNTER
Routing refill request to provider for review/approval because:  Drug not on the [facility] refill protocol   Patient needs to be seen because it has been more than 1 year since last office visit. Last seen 5/2024 no future appointment scheduled at this time.     Praveena VALE RN  Specialty/Allergy Clinics    1429: pt has called and scheduled follow up 9/2025.

## 2025-06-15 RX ORDER — FLUTICASONE FUROATE, UMECLIDINIUM BROMIDE AND VILANTEROL TRIFENATATE 200; 62.5; 25 UG/1; UG/1; UG/1
1 POWDER RESPIRATORY (INHALATION) DAILY
Qty: 180 EACH | Refills: 0 | Status: SHIPPED | OUTPATIENT
Start: 2025-06-15 | End: 2025-06-19

## 2025-06-18 ASSESSMENT — ASTHMA QUESTIONNAIRES
QUESTION_4 LAST FOUR WEEKS HOW OFTEN HAVE YOU USED YOUR RESCUE INHALER OR NEBULIZER MEDICATION (SUCH AS ALBUTEROL): NOT AT ALL
QUESTION_2 LAST FOUR WEEKS HOW OFTEN HAVE YOU HAD SHORTNESS OF BREATH: NOT AT ALL
QUESTION_3 LAST FOUR WEEKS HOW OFTEN DID YOUR ASTHMA SYMPTOMS (WHEEZING, COUGHING, SHORTNESS OF BREATH, CHEST TIGHTNESS OR PAIN) WAKE YOU UP AT NIGHT OR EARLIER THAN USUAL IN THE MORNING: NOT AT ALL
QUESTION_5 LAST FOUR WEEKS HOW WOULD YOU RATE YOUR ASTHMA CONTROL: COMPLETELY CONTROLLED
QUESTION_1 LAST FOUR WEEKS HOW MUCH OF THE TIME DID YOUR ASTHMA KEEP YOU FROM GETTING AS MUCH DONE AT WORK, SCHOOL OR AT HOME: NONE OF THE TIME
ACT_TOTALSCORE: 25

## 2025-06-19 ENCOUNTER — OFFICE VISIT (OUTPATIENT)
Dept: ALLERGY | Facility: CLINIC | Age: 70
End: 2025-06-19
Payer: MEDICARE

## 2025-06-19 VITALS
DIASTOLIC BLOOD PRESSURE: 82 MMHG | WEIGHT: 175.4 LBS | SYSTOLIC BLOOD PRESSURE: 123 MMHG | BODY MASS INDEX: 29.22 KG/M2 | TEMPERATURE: 97.6 F | HEART RATE: 78 BPM | HEIGHT: 65 IN | OXYGEN SATURATION: 97 %

## 2025-06-19 DIAGNOSIS — J31.0 CHRONIC RHINITIS: ICD-10-CM

## 2025-06-19 DIAGNOSIS — J45.50 SEVERE PERSISTENT ASTHMA WITHOUT COMPLICATION (H): ICD-10-CM

## 2025-06-19 RX ORDER — AZELASTINE 1 MG/ML
2 SPRAY, METERED NASAL 2 TIMES DAILY PRN
Qty: 30 ML | Refills: 5 | Status: SHIPPED | OUTPATIENT
Start: 2025-06-19

## 2025-06-19 RX ORDER — HYDROCHLOROTHIAZIDE 12.5 MG/1
12.5 TABLET ORAL DAILY
COMMUNITY

## 2025-06-19 RX ORDER — ALBUTEROL SULFATE 90 UG/1
2-4 INHALANT RESPIRATORY (INHALATION) EVERY 4 HOURS PRN
Qty: 18 G | Refills: 5 | Status: SHIPPED | OUTPATIENT
Start: 2025-06-19

## 2025-06-19 RX ORDER — FLUTICASONE FUROATE, UMECLIDINIUM BROMIDE AND VILANTEROL TRIFENATATE 200; 62.5; 25 UG/1; UG/1; UG/1
1 POWDER RESPIRATORY (INHALATION) DAILY
Qty: 180 EACH | Refills: 3 | Status: SHIPPED | OUTPATIENT
Start: 2025-06-19

## 2025-06-19 NOTE — PROGRESS NOTES
SUBJECTIVE:                                                                   Luz Elena Shipley presents today to our Allergy Clinic at [facility] for a follow up visit. She is a 70 year old female with severe persistent asthma and chronic rhinitis.  Asthma since childhood. November 2016: Negative serum IgE to regional aeroallergen panel. CBC with differential without eosinophilia.  Normal total serum IgE.  Negative ABPA panel.  Alpha-1 antitrypsin within normal limits.  Normal CBC with differential without hypereosinophilia.  The PSG sleep study demonstrated moderate, REM, and supine predominant NEGRA with some sleep associated hypoxemia.  In February 2019, due to decrease in FEV1, chest CT was ordered that showed some opacities and small nodules that could be infectious.  The patient was treated with azithromycin.  On a follow-up phone call, the patient was asymptomatic; However, chest CT in May 2019 with new opacities. Per January'21 Pulm note: Established multiple pulmonary lung nodule(s). Near resolution of large nodules and likely related to infection/inflammation. No further surveillance indicated by CT.   In June 2021, spirometry with mild obstruction without postbronchodilator response. In January 2023 spirometry him with mild obstruction without postbronchodilator response.  Stable when compared to 2021.    The patient reports that her asthma is well-controlled. She is currently using Trelegy Ellipta 200/62.5/25 mcg, one puff once daily, and has an albuterol inhaler for as-needed use, and she cannot recall the last time she required it. She has not needed prednisone for asthma symptoms in a long time. She enjoys outdoor activities such as mowing the lawn and reports good exercise tolerance without limitations.  She denies current chest tightness, wheezing, or shortness of breath.    Her chronic rhinitis is also well-controlled with azelastine as needed. She denies uncontrolled nasal  congestion, rhinorrhea, sneezing, or postnasal drainage.      Patient Active Problem List   Diagnosis    Severe persistent asthma (H)    Lichen sclerosus    Benign essential hypertension    Osteopenia    Class 2 severe obesity due to excess calories with serious comorbidity in adult (H)    Meniere's disease, right    Change in hearing, right    NEGRA (obstructive sleep apnea)    Type 2 diabetes mellitus without complication, without long-term current use of insulin (H)       Past Medical History:   Diagnosis Date    Closed avulsion fracture of lateral malleolus of left fibula with routine healing, subsequent encounter 07/10/2024    Head injury 5/1/2019    Hypertension     Migraines Every now and then    Rheumatic fever     Sleep apnea     Type 2 diabetes mellitus without complication, without long-term current use of insulin (H) 08/09/2024    Uncomplicated asthma       Problem (# of Occurrences) Relation (Name,Age of Onset)    Unknown/Adopted (1) Paternal Grandfather (radha alicea)    Alzheimer Disease (1) Brother: early onset    Arthritis (2) Mother (fawn felder), Brother    Diabetes (2) Mother (fawn felder): unsure type; in 40s, Son (Joseph Cristina): type 1    Cerebrovascular Disease (1) Father (ben felder): CVA    Thyroid Disease (1) Son (Joseph Cristina)    Breast Cancer (3) Sister, Paternal Aunt, Sister (bob mccrary)    Aneurysm (2) Father (ben felder), Paternal Grandmother    Other Cancer (1) Mother (fawn felder): pancreatic    Migraines (2) Sister, Sister (bob mccrary)    Chronic Obstructive Pulmonary Disease (1) Father (ben felder)    Skin Cancer (2) Father (ben felder), Sister    Alcoholism (1) Son    Stomach Problem (1) Son: polyps, diverticulitis, heartburn, passes blood          Past Surgical History:   Procedure Laterality Date    BIOPSY      breast,    BIOPSY BREAST      COLONOSCOPY      COLONOSCOPY N/A 2/8/2019    Procedure: Combined Colonoscopy,  Single Or Multiple Biopsy/Polypectomy By Biopsy;  Surgeon: José Miguel Ochoa MD;  Location: [facility] OR    COLONOSCOPY N/A 3/4/2022    Procedure: COLONOSCOPY;  Surgeon: Nirmal Rm MD;  Location: [facility]    COLONOSCOPY WITH CO2 INSUFFLATION N/A 2019    Procedure: COLONOSCOPY WITH CO2 INSUFFLATION;  Surgeon: José Miguel Ochoa MD;  Location: [facility] OR    COMBINED ESOPHAGOSCOPY, GASTROSCOPY, DUODENOSCOPY (EGD) WITH CO2 INSUFFLATION N/A 2019    Procedure: COMBINED ESOPHAGOSCOPY, GASTROSCOPY, DUODENOSCOPY (EGD) WITH CO2 INSUFFLATION;  Surgeon: José Miguel Ochoa MD;  Location: [facility] OR    ESOPHAGOSCOPY, GASTROSCOPY, DUODENOSCOPY (EGD), COMBINED N/A 2019    Procedure: Combined Esophagoscopy, Gastroscopy, Duodenoscopy (Egd), Biopsy Single Or Multiple;  Surgeon: José Miguel Ochoa MD;  Location: [facility] OR    GYN SURGERY      TONSILLECTOMY       Social History     Socioeconomic History    Marital status:      Spouse name: None    Number of children: None    Years of education: None    Highest education level: None   Occupational History     Comment: Retired   Tobacco Use    Smoking status: Former     Current packs/day: 0.00     Average packs/day: 2.0 packs/day for 20.0 years (40.0 ttl pk-yrs)     Types: Cigarettes     Start date: 1967     Quit date: 1987     Years since quittin.1    Smokeless tobacco: Never   Vaping Use    Vaping status: Never Used   Substance and Sexual Activity    Alcohol use: Yes     Comment: occas    Drug use: No    Sexual activity: Not Currently     Partners: Male     Birth control/protection: Post-menopausal   Other Topics Concern    Parent/sibling w/ CABG, MI or angioplasty before 65F 55M? No   Social History Narrative    23        ENVIRONMENTAL HISTORY: [place] The home is heated with a forced air but does not use.  Has floor heating. They do have central air conditioning. The patient's bedroom  is furnished with carpeting in bedroom. 1 dog at home. There is no history of cockroach or mice infestation. There are no smokers in the house.  The house does not have a damp basement.      Social Drivers of Health     Financial Resource Strain: Low Risk  (9/12/2024)    Financial Resource Strain     Within the past 12 months, have you or your family members you live with been unable to get utilities (heat, electricity) when it was really needed?: No   Food Insecurity: Low Risk  (9/12/2024)    Food Insecurity     Within the past 12 months, did you worry that your food would run out before you got money to buy more?: No     Within the past 12 months, did the food you bought just not last and you didn t have money to get more?: No   Transportation Needs: Low Risk  (9/12/2024)    Transportation Needs     Within the past 12 months, has lack of transportation kept you from medical appointments, getting your medicines, non-medical meetings or appointments, work, or from getting things that you need?: No   Physical Activity: Insufficiently Active (9/12/2024)    Exercise Vital Sign     Days of Exercise per Week: 3 days     Minutes of Exercise per Session: 30 min   Stress: Stress Concern Present (9/12/2024)    [place] [employer] - Occupational Stress Questionnaire     Feeling of Stress : Rather much   Social Connections: Unknown (9/12/2024)    Social Connection and Isolation Panel [NHANES]     Frequency of Social Gatherings with Friends and Family: Once a week   Interpersonal Safety: Low Risk  (4/9/2025)    Interpersonal Safety     Do you feel physically and emotionally safe where you currently live?: Yes     Within the past 12 months, have you been hit, slapped, kicked or otherwise physically hurt by someone?: No     Within the past 12 months, have you been humiliated or emotionally abused in other ways by your partner or ex-partner?: No   Housing Stability: Low Risk  (9/12/2024)    Housing Stability      Do you have housing? : Yes     Are you worried about losing your housing?: No             Current Outpatient Medications:     albuterol (PROAIR HFA/PROVENTIL HFA/VENTOLIN HFA) 108 (90 Base) MCG/ACT inhaler, Inhale 2-4 puffs into the lungs every 4 hours as needed for shortness of breath or wheezing., Disp: 18 g, Rfl: 5    aspirin 81 MG EC tablet, Take 1 tablet (81 mg) by mouth daily., Disp: , Rfl:     azelastine (ASTELIN) 0.1 % nasal spray, Spray 2 sprays into both nostrils 2 times daily as needed for rhinitis or allergies., Disp: 30 mL, Rfl: 5    blood glucose (NO BRAND SPECIFIED) test strip, Use to test blood sugar 1 times daily. To accompany: Blood Glucose Monitor Brands: per insurance., Disp: 100 strip, Rfl: 6    blood glucose monitoring (NO BRAND SPECIFIED) meter device kit, Use to test blood sugar 1 times daily. Preferred blood glucose meter OR supplies to accompany: Blood Glucose Monitor Brands: per insurance., Disp: 1 kit, Rfl: 0    clobetasol (TEMOVATE) 0.05 % external cream, Apply sparingly to affected area twice weekly as needed.  Do not apply to face., Disp: 60 g, Rfl: 3    diazepam (VALIUM) 2 MG tablet, 0.5-2 tabs once daily as needed for severe vertigo due to menieres., Disp: 10 tablet, Rfl: 0    Fluticasone-Umeclidin-Vilanterol (TRELEGY ELLIPTA) 200-62.5-25 MCG/ACT oral inhaler, Inhale 1 puff into the lungs daily., Disp: 180 each, Rfl: 3    hydroCHLOROthiazide 12.5 MG tablet, Take 12.5 mg by mouth daily., Disp: , Rfl:     ondansetron (ZOFRAN ODT) 4 MG ODT tab, Take 1 tablet (4 mg) by mouth every 8 hours as needed for nausea., Disp: 10 tablet, Rfl: 0    Respiratory Therapy Supplies (NEBULIZER/TUBING/MOUTHPIECE) KIT, Use with albuterol neb solution, Disp: 1 kit, Rfl: 0    rosuvastatin (CRESTOR) 20 MG tablet, Take 1 tablet (20 mg) by mouth daily., Disp: 90 tablet, Rfl: 3    thin (NO BRAND SPECIFIED) lancets, Use with lanceting device. To accompany: Blood Glucose Monitor Brands: per insurance., Disp:  "100 each, Rfl: 6    tirzepatide (MOUNJARO) 2.5 MG/0.5ML SOAJ auto-injector pen, Inject 0.5 mLs (2.5 mg) subcutaneously every 7 days. After this prescription, go to the 5 mg/0.5 ml strength prescription., Disp: 2 mL, Rfl: 0    triamterene-HCTZ (MAXZIDE-25) 37.5-25 MG tablet, TAKE ONE HALF (1/2) TABLET BY MOUTH DAILY, Disp: 90 tablet, Rfl: 3  Immunization History   Administered Date(s) Administered    COVID-19 12+ (MODERNA) 11/01/2023    COVID-19 Bivalent 18+ (Moderna) 12/02/2022    COVID-19 Monovalent 18+ (Moderna) 02/11/2021, 03/11/2021, 11/22/2021    Influenza (High Dose) Trivalent,PF (Fluzone) 02/27/2018, 09/18/2018    Influenza (IIV3) PF 11/08/2012    Influenza Vaccine 65+ (FLUAD) 12/09/2021    Influenza Vaccine 65+ (Fluzone HD) 10/14/2022, 10/26/2023    Influenza Vaccine >6 months,quad, PF 12/12/2016, 11/20/2020    Pneumo Conj 13-V (2010&after) 02/27/2018    Pneumococcal 23 valent 06/24/2020    TDAP (Adacel,Boostrix) 05/29/2024    TDAP Vaccine (Boostrix) 11/08/2012    Tdap (Adult) Unspecified Formulation 06/30/1995    Zoster recombinant adjuvanted (Shingrix) 12/09/2021, 04/14/2022     Allergies   Allergen Reactions    Cartia Xt [Diltiazem] Other (See Comments)     Heartburn whenever on this medication    Losartan Cough    Miconazole     Sulfa Antibiotics Rash     OBJECTIVE:                                                                 /82 (BP Location: Left arm, Patient Position: Sitting, Cuff Size: Adult Regular)   Pulse 78   Temp 97.6  F (36.4  C) (Tympanic)   Ht 1.655 m (5' 5.16\")   Wt 79.6 kg (175 lb 6.4 oz)   SpO2 97%   BMI 29.05 kg/m          Physical Exam  Vitals and nursing note reviewed.   Constitutional:       General: She is not in acute distress.     Appearance: She is not ill-appearing, toxic-appearing or diaphoretic.   HENT:      Head: Normocephalic and atraumatic.      Right Ear: Tympanic membrane, ear canal and external ear normal.      Left Ear: Tympanic membrane, ear canal and " external ear normal.      Nose: Septal deviation (left) present. No mucosal edema, congestion or rhinorrhea.      Right Turbinates: Not enlarged, swollen or pale.      Left Turbinates: Not enlarged, swollen or pale.      Mouth/Throat:      Lips: Pink.      Mouth: Mucous membranes are moist.      Pharynx: Oropharynx is clear. No pharyngeal swelling, oropharyngeal exudate, posterior oropharyngeal erythema or uvula swelling.   Eyes:      General:         Right eye: No discharge.         Left eye: No discharge.      Conjunctiva/sclera: Conjunctivae normal.   Cardiovascular:      Rate and Rhythm: Normal rate and regular rhythm.      Heart sounds: Normal heart sounds. No murmur heard.  Pulmonary:      Effort: Pulmonary effort is normal. No respiratory distress.      Breath sounds: Normal breath sounds and air entry. No stridor, decreased air movement or transmitted upper airway sounds. No decreased breath sounds, wheezing, rhonchi or rales.   Neurological:      Mental Status: She is alert and oriented to person, place, and time.   Psychiatric:         Mood and Affect: Mood normal.         Behavior: Behavior normal.            WORKUP:     ACT: 25    ASSESSMENT/PLAN:           Severe persistent asthma without complication (H)  Well-controlled with Trelegy 200/62.5/25 mcg 1 puff once daily and albuterol as needed.  - She prefers to continue the same regimen.    - Fluticasone-Umeclidin-Vilanterol (TRELEGY ELLIPTA) 200-62.5-25 MCG/ACT oral inhaler  Dispense: 180 each; Refill: 3  - albuterol (PROAIR HFA/PROVENTIL HFA/VENTOLIN HFA) 108 (90 Base) MCG/ACT inhaler  Dispense: 18 g; Refill: 5  - Adult Allergy Clinic Follow-Up Order    Chronic rhinitis  Well-controlled with azelastine as needed.  - Continue without changes.    - azelastine (ASTELIN) 0.1 % nasal spray  Dispense: 30 mL; Refill: 5  - Adult Allergy Clinic Follow-Up Order       Follow up in 12 months or sooner if needed.    Thank you for allowing us to participate in the  care of this patient. Please feel free to contact us if there are any questions or concerns about the patient.    Disclaimer: This note consists of symbols derived from keyboarding, dictation and/or voice recognition software. As a result, there may be errors in the script that have gone undetected. Please consider this when interpreting information found in this chart.    Consent was obtained from the patient to use an AI documentation tool in the creation of this note.     Galindo Montano MD, FAAAAI, FACAAI  Allergy, Asthma and Immunology     [facility]

## 2025-06-19 NOTE — PATIENT INSTRUCTIONS
Prescription Assistance  If you need assistance with your prescriptions (cost, coverage, etc) please contact: [place] Prescription Assistance Program [phone]           If labs have been ordered/completed, please allow 7-14 business days for final interpretation of results to be sent on My Chart, phone or mail. Some lab results can take up to 28 days for results.         Allergy Staff Appt Hours Shot Hours Locations    Physician      Galindo Montano MD         Support Staff      Praveena Pyle MA     Tuesday:   [place] :  [place]: :         :  [place] 7-3     [place]        Tuesday: : : :10        Tuesday: :10        Thursday: 7-3:10     [facility]       Tues & Wed: :10       Thurs: 12:10       Fri:             [facility]  [address] [place] [address]  Appt Line: [phone]        [facility]  [address] [place] [address]  Appt Line: [phone]     Pulmonary Function Scheduling:  [place]: [phone]  [place]: [phone]  [phone]

## 2025-06-19 NOTE — LETTER
6/19/2025      Luz Elena Shipley  [address] [facility] [address]      Dear Colleague,    Thank you for referring your patient, Luz Elena Shipley, to the [facility]. Please see a copy of my visit note below.    SUBJECTIVE:                                                                   Luz Elena Shipley presents today to our Allergy Clinic at [facility] for a follow up visit. She is a 70 year old female with severe persistent asthma and chronic rhinitis.  Asthma since childhood. November 2016: Negative serum IgE to regional aeroallergen panel. CBC with differential without eosinophilia.  Normal total serum IgE.  Negative ABPA panel.  Alpha-1 antitrypsin within normal limits.  Normal CBC with differential without hypereosinophilia.  The PSG sleep study demonstrated moderate, REM, and supine predominant NEGRA with some sleep associated hypoxemia.  In February 2019, due to decrease in FEV1, chest CT was ordered that showed some opacities and small nodules that could be infectious.  The patient was treated with azithromycin.  On a follow-up phone call, the patient was asymptomatic; However, chest CT in May 2019 with new opacities. Per January'21 Pulm note: Established multiple pulmonary lung nodule(s). Near resolution of large nodules and likely related to infection/inflammation. No further surveillance indicated by CT.   In June 2021, spirometry with mild obstruction without postbronchodilator response. In January 2023 spirometry him with mild obstruction without postbronchodilator response.  Stable when compared to 2021.    The patient reports that her asthma is well-controlled. She is currently using Trelegy Ellipta 200/62.5/25 mcg, one puff once daily, and has an albuterol inhaler for as-needed use, and she cannot recall the last time she required it. She has not needed prednisone for asthma symptoms in a long time. She enjoys outdoor activities such as mowing the lawn  and reports good exercise tolerance without limitations.  She denies current chest tightness, wheezing, or shortness of breath.    Her chronic rhinitis is also well-controlled with azelastine as needed. She denies uncontrolled nasal congestion, rhinorrhea, sneezing, or postnasal drainage.      Patient Active Problem List   Diagnosis     Severe persistent asthma (H)     Lichen sclerosus     Benign essential hypertension     Osteopenia     Class 2 severe obesity due to excess calories with serious comorbidity in adult (H)     Meniere's disease, right     Change in hearing, right     NEGRA (obstructive sleep apnea)     Type 2 diabetes mellitus without complication, without long-term current use of insulin (H)       Past Medical History:   Diagnosis Date     Closed avulsion fracture of lateral malleolus of left fibula with routine healing, subsequent encounter 07/10/2024     Head injury 5/1/2019     Hypertension      Migraines Every now and then     Rheumatic fever      Sleep apnea      Type 2 diabetes mellitus without complication, without long-term current use of insulin (H) 08/09/2024     Uncomplicated asthma       Problem (# of Occurrences) Relation (Name,Age of Onset)    Unknown/Adopted (1) Paternal Grandfather (radha alicea)    Alzheimer Disease (1) Brother: early onset    Arthritis (2) Mother (fawn felder), Brother    Diabetes (2) Mother (fawn felder): unsure type; in 40s, Son (Joseph Cristina): type 1    Cerebrovascular Disease (1) Father (ben felder): CVA    Thyroid Disease (1) Son (Joseph Cristina)    Breast Cancer (3) Sister, Paternal Aunt, Sister (bob mccrary)    Aneurysm (2) Father (ben felder), Paternal Grandmother    Other Cancer (1) Mother (fawn felder): pancreatic    Migraines (2) Sister, Sister (bob mccrary)    Chronic Obstructive Pulmonary Disease (1) Father (ben felder)    Skin Cancer (2) Father (ben felder), Sister    Alcoholism (1) Son    Stomach  Problem (1) Son: polyps, diverticulitis, heartburn, passes blood          Past Surgical History:   Procedure Laterality Date     BIOPSY      breast,     BIOPSY BREAST       COLONOSCOPY       COLONOSCOPY N/A 2019    Procedure: Combined Colonoscopy, Single Or Multiple Biopsy/Polypectomy By Biopsy;  Surgeon: José Miguel Ochoa MD;  Location: [facility]     COLONOSCOPY N/A 3/4/2022    Procedure: COLONOSCOPY;  Surgeon: Nirmal Rm MD;  Location: [facility]     COLONOSCOPY WITH CO2 INSUFFLATION N/A 2019    Procedure: COLONOSCOPY WITH CO2 INSUFFLATION;  Surgeon: José Miguel Ochoa MD;  Location: [facility]     COMBINED ESOPHAGOSCOPY, GASTROSCOPY, DUODENOSCOPY (EGD) WITH CO2 INSUFFLATION N/A 2019    Procedure: COMBINED ESOPHAGOSCOPY, GASTROSCOPY, DUODENOSCOPY (EGD) WITH CO2 INSUFFLATION;  Surgeon: José Miguel Ochoa MD;  Location: [facility]     ESOPHAGOSCOPY, GASTROSCOPY, DUODENOSCOPY (EGD), COMBINED N/A 2019    Procedure: Combined Esophagoscopy, Gastroscopy, Duodenoscopy (Egd), Biopsy Single Or Multiple;  Surgeon: José Miguel Ochoa MD;  Location: [facility]     GYN SURGERY       TONSILLECTOMY       Social History     Socioeconomic History     Marital status:      Spouse name: None     Number of children: None     Years of education: None     Highest education level: None   Occupational History     Comment: Retired   Tobacco Use     Smoking status: Former     Current packs/day: 0.00     Average packs/day: 2.0 packs/day for 20.0 years (40.0 ttl pk-yrs)     Types: Cigarettes     Start date: 1967     Quit date: 1987     Years since quittin.1     Smokeless tobacco: Never   Vaping Use     Vaping status: Never Used   Substance and Sexual Activity     Alcohol use: Yes     Comment: occas     Drug use: No     Sexual activity: Not Currently     Partners: Male     Birth control/protection: Post-menopausal   Other Topics Concern     Parent/sibling w/ CABG, MI or angioplasty before  65F 55M? No   Social History Narrative    06/19/25        ENVIRONMENTAL HISTORY: [place] The home is heated with a forced air but does not use.  Has floor heating. They do have central air conditioning. The patient's bedroom is furnished with carpeting in bedroom. 1 dog at home. There is no history of cockroach or mice infestation. There are no smokers in the house.  The house does not have a damp basement.      Social Drivers of Health     Financial Resource Strain: Low Risk  (9/12/2024)    Financial Resource Strain      Within the past 12 months, have you or your family members you live with been unable to get utilities (heat, electricity) when it was really needed?: No   Food Insecurity: Low Risk  (9/12/2024)    Food Insecurity      Within the past 12 months, did you worry that your food would run out before you got money to buy more?: No      Within the past 12 months, did the food you bought just not last and you didn t have money to get more?: No   Transportation Needs: Low Risk  (9/12/2024)    Transportation Needs      Within the past 12 months, has lack of transportation kept you from medical appointments, getting your medicines, non-medical meetings or appointments, work, or from getting things that you need?: No   Physical Activity: Insufficiently Active (9/12/2024)    Exercise Vital Sign      Days of Exercise per Week: 3 days      Minutes of Exercise per Session: 30 min   Stress: Stress Concern Present (9/12/2024)    [employer] [place] [employer] - Occupational Stress Questionnaire      Feeling of Stress : Rather much   Social Connections: Unknown (9/12/2024)    Social Connection and Isolation Panel [NHANES]      Frequency of Social Gatherings with Friends and Family: Once a week   Interpersonal Safety: Low Risk  (4/9/2025)    Interpersonal Safety      Do you feel physically and emotionally safe where you currently live?: Yes      Within the past 12  months, have you been hit, slapped, kicked or otherwise physically hurt by someone?: No      Within the past 12 months, have you been humiliated or emotionally abused in other ways by your partner or ex-partner?: No   Housing Stability: Low Risk  (9/12/2024)    Housing Stability      Do you have housing? : Yes      Are you worried about losing your housing?: No             Current Outpatient Medications:      albuterol (PROAIR HFA/PROVENTIL HFA/VENTOLIN HFA) 108 (90 Base) MCG/ACT inhaler, Inhale 2-4 puffs into the lungs every 4 hours as needed for shortness of breath or wheezing., Disp: 18 g, Rfl: 5     aspirin 81 MG EC tablet, Take 1 tablet (81 mg) by mouth daily., Disp: , Rfl:      azelastine (ASTELIN) 0.1 % nasal spray, Spray 2 sprays into both nostrils 2 times daily as needed for rhinitis or allergies., Disp: 30 mL, Rfl: 5     blood glucose (NO BRAND SPECIFIED) test strip, Use to test blood sugar 1 times daily. To accompany: Blood Glucose Monitor Brands: per insurance., Disp: 100 strip, Rfl: 6     blood glucose monitoring (NO BRAND SPECIFIED) meter device kit, Use to test blood sugar 1 times daily. Preferred blood glucose meter OR supplies to accompany: Blood Glucose Monitor Brands: per insurance., Disp: 1 kit, Rfl: 0     clobetasol (TEMOVATE) 0.05 % external cream, Apply sparingly to affected area twice weekly as needed.  Do not apply to face., Disp: 60 g, Rfl: 3     diazepam (VALIUM) 2 MG tablet, 0.5-2 tabs once daily as needed for severe vertigo due to menieres., Disp: 10 tablet, Rfl: 0     Fluticasone-Umeclidin-Vilanterol (TRELEGY ELLIPTA) 200-62.5-25 MCG/ACT oral inhaler, Inhale 1 puff into the lungs daily., Disp: 180 each, Rfl: 3     hydroCHLOROthiazide 12.5 MG tablet, Take 12.5 mg by mouth daily., Disp: , Rfl:      ondansetron (ZOFRAN ODT) 4 MG ODT tab, Take 1 tablet (4 mg) by mouth every 8 hours as needed for nausea., Disp: 10 tablet, Rfl: 0     Respiratory Therapy Supplies (NEBULIZER/TUBING/MOUTHPIECE)  "KIT, Use with albuterol neb solution, Disp: 1 kit, Rfl: 0     rosuvastatin (CRESTOR) 20 MG tablet, Take 1 tablet (20 mg) by mouth daily., Disp: 90 tablet, Rfl: 3     thin (NO BRAND SPECIFIED) lancets, Use with lanceting device. To accompany: Blood Glucose Monitor Brands: per insurance., Disp: 100 each, Rfl: 6     tirzepatide (MOUNJARO) 2.5 MG/0.5ML SOAJ auto-injector pen, Inject 0.5 mLs (2.5 mg) subcutaneously every 7 days. After this prescription, go to the 5 mg/0.5 ml strength prescription., Disp: 2 mL, Rfl: 0     triamterene-HCTZ (MAXZIDE-25) 37.5-25 MG tablet, TAKE ONE HALF (1/2) TABLET BY MOUTH DAILY, Disp: 90 tablet, Rfl: 3  Immunization History   Administered Date(s) Administered     COVID-19 12+ (MODERNA) 11/01/2023     COVID-19 Bivalent 18+ (Moderna) 12/02/2022     COVID-19 Monovalent 18+ (Moderna) 02/11/2021, 03/11/2021, 11/22/2021     Influenza (High Dose) Trivalent,PF (Fluzone) 02/27/2018, 09/18/2018     Influenza (IIV3) PF 11/08/2012     Influenza Vaccine 65+ (FLUAD) 12/09/2021     Influenza Vaccine 65+ (Fluzone HD) 10/14/2022, 10/26/2023     Influenza Vaccine >6 months,quad, PF 12/12/2016, 11/20/2020     Pneumo Conj 13-V (2010&after) 02/27/2018     Pneumococcal 23 valent 06/24/2020     TDAP (Adacel,Boostrix) 05/29/2024     TDAP Vaccine (Boostrix) 11/08/2012     Tdap (Adult) Unspecified Formulation 06/30/1995     Zoster recombinant adjuvanted (Shingrix) 12/09/2021, 04/14/2022     Allergies   Allergen Reactions     Cartia Xt [Diltiazem] Other (See Comments)     Heartburn whenever on this medication     Losartan Cough     Miconazole      Sulfa Antibiotics Rash     OBJECTIVE:                                                                 /82 (BP Location: Left arm, Patient Position: Sitting, Cuff Size: Adult Regular)   Pulse 78   Temp 97.6  F (36.4  C) (Tympanic)   Ht 1.655 m (5' 5.16\")   Wt 79.6 kg (175 lb 6.4 oz)   SpO2 97%   BMI 29.05 kg/m          Physical Exam  Vitals and nursing note " reviewed.   Constitutional:       General: She is not in acute distress.     Appearance: She is not ill-appearing, toxic-appearing or diaphoretic.   HENT:      Head: Normocephalic and atraumatic.      Right Ear: Tympanic membrane, ear canal and external ear normal.      Left Ear: Tympanic membrane, ear canal and external ear normal.      Nose: Septal deviation (left) present. No mucosal edema, congestion or rhinorrhea.      Right Turbinates: Not enlarged, swollen or pale.      Left Turbinates: Not enlarged, swollen or pale.      Mouth/Throat:      Lips: Pink.      Mouth: Mucous membranes are moist.      Pharynx: Oropharynx is clear. No pharyngeal swelling, oropharyngeal exudate, posterior oropharyngeal erythema or uvula swelling.   Eyes:      General:         Right eye: No discharge.         Left eye: No discharge.      Conjunctiva/sclera: Conjunctivae normal.   Cardiovascular:      Rate and Rhythm: Normal rate and regular rhythm.      Heart sounds: Normal heart sounds. No murmur heard.  Pulmonary:      Effort: Pulmonary effort is normal. No respiratory distress.      Breath sounds: Normal breath sounds and air entry. No stridor, decreased air movement or transmitted upper airway sounds. No decreased breath sounds, wheezing, rhonchi or rales.   Neurological:      Mental Status: She is alert and oriented to person, place, and time.   Psychiatric:         Mood and Affect: Mood normal.         Behavior: Behavior normal.            WORKUP:     ACT: 25    ASSESSMENT/PLAN:           Severe persistent asthma without complication (H)  Well-controlled with Trelegy 200/62.5/25 mcg 1 puff once daily and albuterol as needed.  - She prefers to continue the same regimen.    - Fluticasone-Umeclidin-Vilanterol (TRELEGY ELLIPTA) 200-62.5-25 MCG/ACT oral inhaler  Dispense: 180 each; Refill: 3  - albuterol (PROAIR HFA/PROVENTIL HFA/VENTOLIN HFA) 108 (90 Base) MCG/ACT inhaler  Dispense: 18 g; Refill: 5  - Adult Allergy Clinic Follow-Up  Order    Chronic rhinitis  Well-controlled with azelastine as needed.  - Continue without changes.    - azelastine (ASTELIN) 0.1 % nasal spray  Dispense: 30 mL; Refill: 5  - Adult Allergy Clinic Follow-Up Order       Follow up in 12 months or sooner if needed.    Thank you for allowing us to participate in the care of this patient. Please feel free to contact us if there are any questions or concerns about the patient.    Disclaimer: This note consists of symbols derived from keyboarding, dictation and/or voice recognition software. As a result, there may be errors in the script that have gone undetected. Please consider this when interpreting information found in this chart.    Consent was obtained from the patient to use an AI documentation tool in the creation of this note.     Galindo Montano MD, FAAAAI, FACAAI  Allergy, Asthma and Immunology     [facility]      Again, thank you for allowing me to participate in the care of your patient.        Sincerely,        Galindo Montano MD    Electronically signed

## 2025-06-24 DIAGNOSIS — E11.22 TYPE 2 DIABETES MELLITUS WITH STAGE 3A CHRONIC KIDNEY DISEASE, WITHOUT LONG-TERM CURRENT USE OF INSULIN (H): ICD-10-CM

## 2025-06-24 DIAGNOSIS — N18.31 TYPE 2 DIABETES MELLITUS WITH STAGE 3A CHRONIC KIDNEY DISEASE, WITHOUT LONG-TERM CURRENT USE OF INSULIN (H): ICD-10-CM

## 2025-06-27 ENCOUNTER — OFFICE VISIT (OUTPATIENT)
Dept: URGENT CARE | Facility: URGENT CARE | Age: 70
End: 2025-06-27
Payer: MEDICARE

## 2025-06-27 VITALS
WEIGHT: 178 LBS | OXYGEN SATURATION: 99 % | DIASTOLIC BLOOD PRESSURE: 81 MMHG | SYSTOLIC BLOOD PRESSURE: 121 MMHG | BODY MASS INDEX: 29.66 KG/M2 | TEMPERATURE: 97 F | RESPIRATION RATE: 18 BRPM | HEART RATE: 84 BPM | HEIGHT: 65 IN

## 2025-06-27 DIAGNOSIS — E11.22 TYPE 2 DIABETES MELLITUS WITH STAGE 3A CHRONIC KIDNEY DISEASE, WITHOUT LONG-TERM CURRENT USE OF INSULIN (H): ICD-10-CM
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
## 2025-06-27 DIAGNOSIS — N18.31 TYPE 2 DIABETES MELLITUS WITH STAGE 3A CHRONIC KIDNEY DISEASE, WITHOUT LONG-TERM CURRENT USE OF INSULIN (H): ICD-10-CM

## 2025-06-27 PROCEDURE — 3074F SYST BP LT 130 MM HG: CPT | Performed by: PHYSICIAN ASSISTANT

## 2025-06-27 PROCEDURE — 99213 OFFICE O/P EST LOW 20 MIN: CPT | Performed by: PHYSICIAN ASSISTANT

## 2025-06-27 PROCEDURE — 1125F AMNT PAIN NOTED PAIN PRSNT: CPT | Performed by: PHYSICIAN ASSISTANT

## 2025-06-27 PROCEDURE — 3079F DIAST BP 80-89 MM HG: CPT | Performed by: PHYSICIAN ASSISTANT

## 2025-06-27 RX ORDER — TIRZEPATIDE 2.5 MG/.5ML
INJECTION, SOLUTION SUBCUTANEOUS
Refills: 0 | OUTPATIENT
Start: 2025-06-27

## 2025-06-27 RX ORDER — MUPIROCIN 2 %
OINTMENT (GRAM) TOPICAL
Qty: 22 G | Refills: 0 | Status: SHIPPED | OUTPATIENT
Start: 2025-06-27

## 2025-06-27 ASSESSMENT — PAIN SCALES - GENERAL: PAINLEVEL_OUTOF10: MODERATE PAIN (5)

## 2025-06-27 NOTE — PROGRESS NOTES
"  Assessment & Plan     Bug bite with infection, initial encounter  Will treat with Augmentin and bactroban. Keep area clean and dry. Return to clinic if symptoms worsen or do not improve; otherwise follow up as needed     - amoxicillin-clavulanate (AUGMENTIN) 875-125 MG tablet; Take 1 tablet by mouth 2 times daily for 7 days.  - mupirocin (BACTROBAN) 2 % external ointment; Apply to affected area 2-3 times daily x 7-14 days                      Subjective   Chief Complaint   Patient presents with    Urgent Care    Insect Bite     Started on 06/22 redness, itchiness knee to the ankle            6/27/2025     4:15 PM   Additional Questions   Roomed by ca   Accompanied by self     HPI      Bug bite    Onset of symptoms was 5 day(s) ago.  Course of illness is worsening.    Severity moderate  Current and Associated symptoms: bite on left lower leg  Treatment measures tried include None tried.  Predisposing factors include None.                      Objective    /81   Pulse 84   Temp 97  F (36.1  C) (Tympanic)   Resp 18   Ht 1.651 m (5' 5\")   Wt 80.7 kg (178 lb)   SpO2 99%   BMI 29.62 kg/m    Body mass index is 29.62 kg/m .    Physical Exam  Constitutional:       General: She is not in acute distress.  Skin:     Comments: Left lower leg has small wound with surrounding redness and tenderness. No drainage/discharge.    Neurological:      Mental Status: She is alert.                    Signed Electronically by: [facility] PROVIDER    "

## 2025-06-27 NOTE — PROGRESS NOTES
Urgent Care Clinic Visit    Chief Complaint   Patient presents with    Urgent Care    Insect Bite     Started on 06/22 redness, itchiness knee to the ankle                6/27/2025     4:15 PM   Additional Questions   Roomed by ca   Accompanied by self

## 2025-06-30 RX ORDER — TIRZEPATIDE 2.5 MG/.5ML
INJECTION, SOLUTION SUBCUTANEOUS
Refills: 0 | OUTPATIENT
Start: 2025-06-30

## 2025-06-30 NOTE — TELEPHONE ENCOUNTER
Patient is calling because she needs the 5mg dose sent to the pharmacy ([facility]).  She reports last blood sugar was 112 and numbers have been improving. No change in weight.   She is due for dose today. She did well on the 2.5mg dose and is not having any side effects.     6/24/24 had pending 5mg Tirzepatide.     Please advise.     Mamie Holcomb RN on 6/30/2025 at 4:59 PM       Normal for race

## 2025-06-30 NOTE — TELEPHONE ENCOUNTER
Pharmacy calling asking for prescription for Mounjaro 5 mgs.  Please send to [facility].    Irina Padron on 6/30/2025 at 2:19 PM

## 2025-07-09 ENCOUNTER — LAB (OUTPATIENT)
Dept: LAB | Facility: CLINIC | Age: 70
End: 2025-07-09
Payer: MEDICARE

## 2025-07-09 DIAGNOSIS — N18.31 TYPE 2 DIABETES MELLITUS WITH STAGE 3A CHRONIC KIDNEY DISEASE, WITHOUT LONG-TERM CURRENT USE OF INSULIN (H): ICD-10-CM

## 2025-07-09 DIAGNOSIS — E11.22 TYPE 2 DIABETES MELLITUS WITH STAGE 3A CHRONIC KIDNEY DISEASE, WITHOUT LONG-TERM CURRENT USE OF INSULIN (H): ICD-10-CM

## 2025-07-09 DIAGNOSIS — N17.9 AKI (ACUTE KIDNEY INJURY): ICD-10-CM

## 2025-07-09 LAB
CREAT SERPL-MCNC: 0.92 MG/DL (ref 0.51–0.95)
EGFRCR SERPLBLD CKD-EPI 2021: 66 ML/MIN/1.73M2
EST. AVERAGE GLUCOSE BLD GHB EST-MCNC: 137 MG/DL
HBA1C MFR BLD: 6.4 % (ref 0–5.6)

## 2025-07-09 PROCEDURE — 83036 HEMOGLOBIN GLYCOSYLATED A1C: CPT

## 2025-07-09 PROCEDURE — 82565 ASSAY OF CREATININE: CPT

## 2025-07-09 PROCEDURE — 36415 COLL VENOUS BLD VENIPUNCTURE: CPT

## 2025-07-09 NOTE — RESULT ENCOUNTER NOTE
Gigi    A1c is great at 6.4.  This is down just slightly from 3 months ago.  Kidney filter rate, measured by GFR, is stable to slightly improved from last check.  You are back to your baseline from last August!    Let me know if you have questions.    Kiara

## 2025-08-17 ENCOUNTER — LAB (OUTPATIENT)
Dept: LAB | Facility: CLINIC | Age: 70
End: 2025-08-17
Payer: MEDICARE

## 2025-08-17 DIAGNOSIS — R94.4 DECREASED GFR: ICD-10-CM

## 2025-08-17 LAB
ANION GAP SERPL CALCULATED.3IONS-SCNC: 10 MMOL/L (ref 7–15)
BUN SERPL-MCNC: 24.7 MG/DL (ref 8–23)
CALCIUM SERPL-MCNC: 10.2 MG/DL (ref 8.8–10.4)
CHLORIDE SERPL-SCNC: 95 MMOL/L (ref 98–107)
CREAT SERPL-MCNC: 1.55 MG/DL (ref 0.51–0.95)
EGFRCR SERPLBLD CKD-EPI 2021: 35 ML/MIN/1.73M2
GLUCOSE SERPL-MCNC: 114 MG/DL (ref 70–99)
HCO3 SERPL-SCNC: 36 MMOL/L (ref 22–29)
POTASSIUM SERPL-SCNC: 3.2 MMOL/L (ref 3.4–5.3)
SODIUM SERPL-SCNC: 141 MMOL/L (ref 135–145)

## 2025-08-17 PROCEDURE — 36415 COLL VENOUS BLD VENIPUNCTURE: CPT

## 2025-08-17 PROCEDURE — 80048 BASIC METABOLIC PNL TOTAL CA: CPT

## 2025-08-20 ENCOUNTER — OFFICE VISIT (OUTPATIENT)
Dept: FAMILY MEDICINE | Facility: CLINIC | Age: 70
End: 2025-08-20
Payer: MEDICARE

## 2025-08-20 ASSESSMENT — PAIN SCALES - GENERAL: PAINLEVEL_OUTOF10: NO PAIN (0)

## 2025-09-03 ENCOUNTER — LAB (OUTPATIENT)
Dept: LAB | Facility: CLINIC | Age: 70
End: 2025-09-03
Payer: MEDICARE

## 2025-09-03 DIAGNOSIS — N17.9 AKI (ACUTE KIDNEY INJURY): ICD-10-CM

## 2025-09-03 LAB
ANION GAP SERPL CALCULATED.3IONS-SCNC: 8 MMOL/L (ref 7–15)
BUN SERPL-MCNC: 12.1 MG/DL (ref 8–23)
CALCIUM SERPL-MCNC: 9.2 MG/DL (ref 8.8–10.4)
CHLORIDE SERPL-SCNC: 109 MMOL/L (ref 98–107)
CREAT SERPL-MCNC: 0.95 MG/DL (ref 0.51–0.95)
EGFRCR SERPLBLD CKD-EPI 2021: 63 ML/MIN/1.73M2
GLUCOSE SERPL-MCNC: 103 MG/DL (ref 70–99)
HCO3 SERPL-SCNC: 27 MMOL/L (ref 22–29)
POTASSIUM SERPL-SCNC: 3.9 MMOL/L (ref 3.4–5.3)
SODIUM SERPL-SCNC: 144 MMOL/L (ref 135–145)

## 2025-09-03 PROCEDURE — 80048 BASIC METABOLIC PNL TOTAL CA: CPT

## 2025-09-03 PROCEDURE — 36415 COLL VENOUS BLD VENIPUNCTURE: CPT

## (undated) DEVICE — ENDO CAP AND TUBING STERILE FOR ENDOGATOR  100130

## (undated) DEVICE — SUCTION MANIFOLD DORNOCH ULTRA CART UL-CL500

## (undated) DEVICE — SOL WATER IRRIG 1000ML BOTTLE 07139-09

## (undated) DEVICE — PAD CHUX UNDERPAD 23X24" 7136

## (undated) DEVICE — ENDO TUBING CO2 SMARTCAP STERILE DISP 100145CO2EXT

## (undated) DEVICE — TAPE CLOTH 3" CARDINAL 3TRCL03

## (undated) DEVICE — WIPE PREMOIST CLEANSING WASHCLOTHS 7988

## (undated) DEVICE — KIT CONNECTOR FOR OLYMPUS ENDOSCOPES DEFENDO 100310

## (undated) DEVICE — PACK ENDOSCOPY GI CUSTOM UMMC

## (undated) DEVICE — KIT ENDO FIRST STEP DISINFECTANT 200ML W/POUCH EP-4

## (undated) DEVICE — PREP CHLORAPREP 26ML TINTED ORANGE  260815

## (undated) DEVICE — SOL WATER IRRIG 1000ML BOTTLE 2F7114

## (undated) RX ORDER — FENTANYL CITRATE 50 UG/ML
INJECTION, SOLUTION INTRAMUSCULAR; INTRAVENOUS
Status: DISPENSED
Start: 2019-02-08

## (undated) RX ORDER — SIMETHICONE 40MG/0.6ML
SUSPENSION, DROPS(FINAL DOSAGE FORM)(ML) ORAL
Status: DISPENSED
Start: 2019-02-08